# Patient Record
Sex: FEMALE | Race: WHITE | HISPANIC OR LATINO | URBAN - METROPOLITAN AREA
[De-identification: names, ages, dates, MRNs, and addresses within clinical notes are randomized per-mention and may not be internally consistent; named-entity substitution may affect disease eponyms.]

---

## 2018-10-22 ENCOUNTER — EMERGENCY (EMERGENCY)
Facility: HOSPITAL | Age: 53
LOS: 0 days | Discharge: HOME | End: 2018-10-22
Attending: EMERGENCY MEDICINE | Admitting: EMERGENCY MEDICINE

## 2018-10-22 VITALS
TEMPERATURE: 98 F | DIASTOLIC BLOOD PRESSURE: 68 MMHG | SYSTOLIC BLOOD PRESSURE: 158 MMHG | OXYGEN SATURATION: 99 % | RESPIRATION RATE: 18 BRPM | HEART RATE: 87 BPM
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
## 2018-10-22 DIAGNOSIS — M54.5 LOW BACK PAIN: ICD-10-CM
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
## 2018-10-22 DIAGNOSIS — I10 ESSENTIAL (PRIMARY) HYPERTENSION: ICD-10-CM
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
## 2018-10-22 DIAGNOSIS — E11.9 TYPE 2 DIABETES MELLITUS WITHOUT COMPLICATIONS: ICD-10-CM

## 2018-10-22 DIAGNOSIS — D64.9 ANEMIA, UNSPECIFIED: ICD-10-CM

## 2018-10-22 DIAGNOSIS — S30.0XXA CONTUSION OF LOWER BACK AND PELVIS, INITIAL ENCOUNTER: ICD-10-CM

## 2018-10-22 LAB
ABO RH CONFIRMATION: SIGNIFICANT CHANGE UP
ALBUMIN SERPL ELPH-MCNC: 4.2 G/DL — SIGNIFICANT CHANGE UP (ref 3.5–5.2)
ALP SERPL-CCNC: 75 U/L — SIGNIFICANT CHANGE UP (ref 30–115)
ALT FLD-CCNC: 13 U/L — SIGNIFICANT CHANGE UP (ref 0–41)
ANION GAP SERPL CALC-SCNC: 11 MMOL/L — SIGNIFICANT CHANGE UP (ref 7–14)
ANISOCYTOSIS BLD QL: SIGNIFICANT CHANGE UP
APTT BLD: 30.2 SEC — SIGNIFICANT CHANGE UP (ref 27–39.2)
AST SERPL-CCNC: 15 U/L — SIGNIFICANT CHANGE UP (ref 0–41)
BASOPHILS # BLD AUTO: 0.06 K/UL — SIGNIFICANT CHANGE UP (ref 0–0.2)
BASOPHILS NFR BLD AUTO: 0.6 % — SIGNIFICANT CHANGE UP (ref 0–1)
BILIRUB SERPL-MCNC: 0.2 MG/DL — SIGNIFICANT CHANGE UP (ref 0.2–1.2)
BLD GP AB SCN SERPL QL: SIGNIFICANT CHANGE UP
BUN SERPL-MCNC: 10 MG/DL — SIGNIFICANT CHANGE UP (ref 10–20)
CALCIUM SERPL-MCNC: 9.1 MG/DL — SIGNIFICANT CHANGE UP (ref 8.5–10.1)
CHLORIDE SERPL-SCNC: 100 MMOL/L — SIGNIFICANT CHANGE UP (ref 98–110)
CO2 SERPL-SCNC: 26 MMOL/L — SIGNIFICANT CHANGE UP (ref 17–32)
CREAT SERPL-MCNC: <0.5 MG/DL — LOW (ref 0.7–1.5)
EOSINOPHIL # BLD AUTO: 0.34 K/UL — SIGNIFICANT CHANGE UP (ref 0–0.7)
EOSINOPHIL NFR BLD AUTO: 3.5 % — SIGNIFICANT CHANGE UP (ref 0–8)
GIANT PLATELETS BLD QL SMEAR: PRESENT — SIGNIFICANT CHANGE UP
GLUCOSE SERPL-MCNC: 127 MG/DL — HIGH (ref 70–99)
HCG SERPL QL: NEGATIVE — SIGNIFICANT CHANGE UP
HCT VFR BLD CALC: 24.1 % — LOW (ref 37–47)
HGB BLD-MCNC: 6.5 G/DL — CRITICAL LOW (ref 12–16)
HYPOCHROMIA BLD QL: SIGNIFICANT CHANGE UP
IMM GRANULOCYTES NFR BLD AUTO: 0.3 % — SIGNIFICANT CHANGE UP (ref 0.1–0.3)
INR BLD: 1.07 RATIO — SIGNIFICANT CHANGE UP (ref 0.65–1.3)
LYMPHOCYTES # BLD AUTO: 1.78 K/UL — SIGNIFICANT CHANGE UP (ref 1.2–3.4)
LYMPHOCYTES # BLD AUTO: 18.1 % — LOW (ref 20.5–51.1)
MANUAL SMEAR VERIFICATION: SIGNIFICANT CHANGE UP
MCHC RBC-ENTMCNC: 16 PG — LOW (ref 27–31)
MCHC RBC-ENTMCNC: 27 G/DL — LOW (ref 32–37)
MCV RBC AUTO: 59.2 FL — LOW (ref 81–99)
MICROCYTES BLD QL: SLIGHT — SIGNIFICANT CHANGE UP
MONOCYTES # BLD AUTO: 0.76 K/UL — HIGH (ref 0.1–0.6)
MONOCYTES NFR BLD AUTO: 7.7 % — SIGNIFICANT CHANGE UP (ref 1.7–9.3)
NEUTROPHILS # BLD AUTO: 6.86 K/UL — HIGH (ref 1.4–6.5)
NEUTROPHILS NFR BLD AUTO: 69.8 % — SIGNIFICANT CHANGE UP (ref 42.2–75.2)
NRBC # BLD: 0 /100 WBCS — SIGNIFICANT CHANGE UP (ref 0–0)
NRBC # BLD: 1 /100 — HIGH (ref 0–0)
PLAT MORPH BLD: ABNORMAL
PLATELET # BLD AUTO: 348 K/UL — SIGNIFICANT CHANGE UP (ref 130–400)
POIKILOCYTOSIS BLD QL AUTO: SLIGHT — SIGNIFICANT CHANGE UP
POTASSIUM SERPL-MCNC: 3.6 MMOL/L — SIGNIFICANT CHANGE UP (ref 3.5–5)
POTASSIUM SERPL-SCNC: 3.6 MMOL/L — SIGNIFICANT CHANGE UP (ref 3.5–5)
PROT SERPL-MCNC: 7.3 G/DL — SIGNIFICANT CHANGE UP (ref 6–8)
PROTHROM AB SERPL-ACNC: 12.3 SEC — SIGNIFICANT CHANGE UP (ref 9.95–12.87)
RBC # BLD: 4.07 M/UL — LOW (ref 4.2–5.4)
RBC # FLD: 21.4 % — HIGH (ref 11.5–14.5)
RBC BLD AUTO: ABNORMAL
SODIUM SERPL-SCNC: 137 MMOL/L — SIGNIFICANT CHANGE UP (ref 135–146)
TYPE + AB SCN PNL BLD: SIGNIFICANT CHANGE UP
WBC # BLD: 9.83 K/UL — SIGNIFICANT CHANGE UP (ref 4.8–10.8)
WBC # FLD AUTO: 9.83 K/UL — SIGNIFICANT CHANGE UP (ref 4.8–10.8)

## 2018-10-22 RX ORDER — OXYCODONE AND ACETAMINOPHEN 5; 325 MG/1; MG/1
1 TABLET ORAL ONCE
Qty: 0 | Refills: 0 | Status: DISCONTINUED | OUTPATIENT
Start: 2018-10-22 | End: 2018-10-22

## 2018-10-22 RX ORDER — METHOCARBAMOL 500 MG/1
1500 TABLET, FILM COATED ORAL ONCE
Qty: 0 | Refills: 0 | Status: COMPLETED | OUTPATIENT
Start: 2018-10-22 | End: 2018-10-22

## 2018-10-22 RX ORDER — OXYCODONE AND ACETAMINOPHEN 5; 325 MG/1; MG/1
1 TABLET ORAL
Qty: 10 | Refills: 0
Start: 2018-10-22

## 2018-10-22 RX ORDER — KETOROLAC TROMETHAMINE 30 MG/ML
15 SYRINGE (ML) INJECTION ONCE
Qty: 0 | Refills: 0 | Status: DISCONTINUED | OUTPATIENT
Start: 2018-10-22 | End: 2018-10-22

## 2018-10-22 RX ORDER — KETOROLAC TROMETHAMINE 30 MG/ML
30 SYRINGE (ML) INJECTION ONCE
Qty: 0 | Refills: 0 | Status: DISCONTINUED | OUTPATIENT
Start: 2018-10-22 | End: 2018-10-22

## 2018-10-22 RX ADMIN — Medication 15 MILLIGRAM(S): at 10:58

## 2018-10-22 RX ADMIN — METHOCARBAMOL 1500 MILLIGRAM(S): 500 TABLET, FILM COATED ORAL at 03:00

## 2018-10-22 RX ADMIN — OXYCODONE AND ACETAMINOPHEN 1 TABLET(S): 5; 325 TABLET ORAL at 10:58

## 2018-10-22 RX ADMIN — Medication 30 MILLIGRAM(S): at 12:20

## 2018-10-22 RX ADMIN — OXYCODONE AND ACETAMINOPHEN 1 TABLET(S): 5; 325 TABLET ORAL at 03:01

## 2018-10-22 RX ADMIN — Medication 30 MILLIGRAM(S): at 03:00

## 2018-10-22 RX ADMIN — OXYCODONE AND ACETAMINOPHEN 1 TABLET(S): 5; 325 TABLET ORAL at 12:20

## 2018-10-22 RX ADMIN — Medication 15 MILLIGRAM(S): at 13:04

## 2018-10-22 NOTE — ED PROVIDER NOTE - PROGRESS NOTE DETAILS
noted marked anemia. stated hgb 6.5 is low for her. usually around 8. but denies weakness/palpitations/chest pain/lightheadedness. patient has hx of anemia 2/2 heavy menses. patient currently is having her menses now. refused rectal exam. pt in ER with R low back pain s/p trip and fall on stairs.  ct abd/pelvis neg. labs sig for hgb 6.5 - pt with h/o anemia due to fibroid uterus, states when she is bleeding, her hgb can go to 7's.  had had blood transfusions in the past, would like one now as she says her hgb not usually this low. Pt to receive 1 unit prbc's. endorsed to Dr. Staples to re-eval pt after transfusion. pt evaluated, pt requesting pain meds again for BP, ed work up neg for trauma but found to be anemic w large fibroids, currently on menses. has had transfusions before, asymptomatic at this time but agreeable to 1 unit given <7 and currently on menses, was light but just soaked a pad in ED, no active bleeding, refusing pelvic exam by myself or evaluation by OBGYN and pelvic sono, states this is known problem for long time and has a GYN in [facility] remember name, wants surgery but unable to get off work, pending 1 u prbc. no midline spinal tenderness or neurodeficits on  exam.

## 2018-10-22 NOTE — ED PROVIDER NOTE - OBJECTIVE STATEMENT
53 yo female hx of DM/Neuropathy/lower back surgery 25 years ago present c/o lower back pain s/p slipped and fell down 4 steps at home. reported she tripped on her flip flop and fell on her back and down the steps. denies head injury and LOC. denies neck and back pain. denies numbness and weakness down the leg. denies chest pain/sob/abd pain.

## 2018-10-22 NOTE — ED PROVIDER NOTE - PHYSICAL EXAMINATION
CONSTITUTIONAL: Well-appearing; well-nourished; in no apparent distress.   EYES: PERRL; EOM intact.   CARDIOVASCULAR: Normal S1, S2; no murmurs, rubs, or gallops.   RESPIRATORY: Normal chest excursion with respiration; breath sounds clear and equal bilaterally; no wheezes, rhonchi, or rales.  GI/: Normal bowel sounds; non-distended; non-tender; no palpable organomegaly.   MS: + ttp midline lower back around L3,4 level with PVM tenderness toward to right lower back. no chest wall and neck/upper back tenderness. no lower extremities bony tenderness. 2+ DP pulses b/l. sensation and strength equal to b/l LE.   SKIN: Normal for age and race; warm; dry; good turgor; no apparent lesions or exudate.   NEURO/PSYCH: A & O x 4; grossly unremarkable. mood and manner are appropriate. Grooming and personal hygiene are appropriate. No apparent thoughts of harm to self or others.

## 2018-10-22 NOTE — ED PROVIDER NOTE - ATTENDING CONTRIBUTION TO CARE
51 y/o female with h/o htn, dm, diabetic neuropathy, anemia secondary to fibroids, in ER with c/o R sided back pain after fall. Pt was walking down wooden stairs, slipped and fell onto buttocks/back, slid down 4 steps.  did not hit head, no LOC.  no ha/ neck pain.  no cp/sob. no abd pain.  no motor weakness.  baseline paresthesias to b/l toes, no change.  pe - nad, nc/at, eomi, perrl, op - clear, no c-spine tenderness, cta b/l, no w/r/r, rrr, abd - soft, nt/nd, nabs, pelvis stable and nt, back - no vertebral tenderness, + tenderness R lower back, no extremity tenderness, A&O x 3, cn 2-12 intact, no motor/sensory deficit.  -pelvis, cxr, ct abd/pelvis, pain meds, re-eval.

## 2018-10-22 NOTE — ED ADULT NURSE NOTE - NSIMPLEMENTINTERV_GEN_ALL_ED
Implemented All Fall Risk Interventions:  [place] to call system. Call bell, personal items and telephone within reach. Instruct patient to call for assistance. Room bathroom lighting operational. Non-slip footwear when patient is off stretcher. Physically safe environment: no spills, clutter or unnecessary equipment. Stretcher in lowest position, wheels locked, appropriate side rails in place. Provide visual cue, wrist band, yellow gown, etc. Monitor gait and stability. Monitor for mental status changes and reorient to person, place, and time. Review medications for side effects contributing to fall risk. Reinforce activity limits and safety measures with patient and family.

## 2018-10-22 NOTE — ED PROVIDER NOTE - MEDICAL DECISION MAKING DETAILS
pt in ER with R low back pain s/p trip and fall on stairs.  ct abd/pelvis neg. labs sig for hgb 6.5 - pt with h/o anemia due to fibroid uterus, states when she is bleeding, her hgb can go to 6's-7's.  Pt currently menstruating, but states that bleeding is slowing down.  she denies any symptoms of anemia, does not want blood transfusion at this point (has had them in past).  Pt just started taking iron pills.  pt instructed to f/u with her gyn.  told to return to ER if she feels worse or for any other new/concerning symptoms.  pt understands and agrees with plan. pt states pain controlled, tolerated pRBC well, will f/u w her OBGYN this week for re-eval, copy of results given to pt to provide her OBGYN, strict return precautions provided.

## 2018-10-22 NOTE — ED PROVIDER NOTE - CARE PLAN
Principal Discharge DX:	Anemia, unspecified  Secondary Diagnosis:	Fall  Secondary Diagnosis:	Back contusion, right, initial encounter

## 2018-10-22 NOTE — ED ADULT NURSE NOTE - OBJECTIVE STATEMENT
c/o fall in the home. As per patient she had an accidental trip and fall ,had fallen around seven stairs .

## 2019-10-01 ENCOUNTER — EMERGENCY (EMERGENCY)
Facility: HOSPITAL | Age: 54
LOS: 0 days | Discharge: HOME | End: 2019-10-02
Attending: EMERGENCY MEDICINE | Admitting: EMERGENCY MEDICINE
Payer: COMMERCIAL

## 2019-10-01 VITALS
HEART RATE: 107 BPM | DIASTOLIC BLOOD PRESSURE: 97 MMHG | TEMPERATURE: 99 F | WEIGHT: 139.99 LBS | SYSTOLIC BLOOD PRESSURE: 171 MMHG | RESPIRATION RATE: 18 BRPM | OXYGEN SATURATION: 100 %

## 2019-10-01 DIAGNOSIS — M54.5 LOW BACK PAIN: ICD-10-CM

## 2019-10-01 DIAGNOSIS — R10.9 UNSPECIFIED ABDOMINAL PAIN: ICD-10-CM

## 2019-10-01 LAB
ALBUMIN SERPL ELPH-MCNC: 4.6 G/DL — SIGNIFICANT CHANGE UP (ref 3.5–5.2)
ALP SERPL-CCNC: 100 U/L — SIGNIFICANT CHANGE UP (ref 30–115)
ALT FLD-CCNC: 16 U/L — SIGNIFICANT CHANGE UP (ref 0–41)
ANION GAP SERPL CALC-SCNC: 13 MMOL/L — SIGNIFICANT CHANGE UP (ref 7–14)
APPEARANCE UR: CLEAR — SIGNIFICANT CHANGE UP
AST SERPL-CCNC: 31 U/L — SIGNIFICANT CHANGE UP (ref 0–41)
BASOPHILS # BLD AUTO: 0.03 K/UL — SIGNIFICANT CHANGE UP (ref 0–0.2)
BASOPHILS NFR BLD AUTO: 0.4 % — SIGNIFICANT CHANGE UP (ref 0–1)
BILIRUB DIRECT SERPL-MCNC: <0.2 MG/DL — SIGNIFICANT CHANGE UP (ref 0–0.2)
BILIRUB INDIRECT FLD-MCNC: >0 MG/DL — LOW (ref 0.2–1.2)
BILIRUB SERPL-MCNC: 0.2 MG/DL — SIGNIFICANT CHANGE UP (ref 0.2–1.2)
BILIRUB UR-MCNC: NEGATIVE — SIGNIFICANT CHANGE UP
BUN SERPL-MCNC: 13 MG/DL — SIGNIFICANT CHANGE UP (ref 10–20)
CALCIUM SERPL-MCNC: 9.8 MG/DL — SIGNIFICANT CHANGE UP (ref 8.5–10.1)
CHLORIDE SERPL-SCNC: 96 MMOL/L — LOW (ref 98–110)
CO2 SERPL-SCNC: 27 MMOL/L — SIGNIFICANT CHANGE UP (ref 17–32)
COLOR SPEC: SIGNIFICANT CHANGE UP
CREAT SERPL-MCNC: 0.6 MG/DL — LOW (ref 0.7–1.5)
DIFF PNL FLD: NEGATIVE — SIGNIFICANT CHANGE UP
EOSINOPHIL # BLD AUTO: 0.2 K/UL — SIGNIFICANT CHANGE UP (ref 0–0.7)
EOSINOPHIL NFR BLD AUTO: 2.9 % — SIGNIFICANT CHANGE UP (ref 0–8)
GLUCOSE SERPL-MCNC: 292 MG/DL — HIGH (ref 70–99)
GLUCOSE UR QL: ABNORMAL
HCT VFR BLD CALC: 35.6 % — LOW (ref 37–47)
HGB BLD-MCNC: 11.2 G/DL — LOW (ref 12–16)
IMM GRANULOCYTES NFR BLD AUTO: 0.1 % — SIGNIFICANT CHANGE UP (ref 0.1–0.3)
KETONES UR-MCNC: SIGNIFICANT CHANGE UP
LEUKOCYTE ESTERASE UR-ACNC: NEGATIVE — SIGNIFICANT CHANGE UP
LIDOCAIN IGE QN: 35 U/L — SIGNIFICANT CHANGE UP (ref 7–60)
LYMPHOCYTES # BLD AUTO: 2.12 K/UL — SIGNIFICANT CHANGE UP (ref 1.2–3.4)
LYMPHOCYTES # BLD AUTO: 30.5 % — SIGNIFICANT CHANGE UP (ref 20.5–51.1)
MCHC RBC-ENTMCNC: 23.1 PG — LOW (ref 27–31)
MCHC RBC-ENTMCNC: 31.5 G/DL — LOW (ref 32–37)
MCV RBC AUTO: 73.4 FL — LOW (ref 81–99)
MONOCYTES # BLD AUTO: 0.51 K/UL — SIGNIFICANT CHANGE UP (ref 0.1–0.6)
MONOCYTES NFR BLD AUTO: 7.3 % — SIGNIFICANT CHANGE UP (ref 1.7–9.3)
NEUTROPHILS # BLD AUTO: 4.09 K/UL — SIGNIFICANT CHANGE UP (ref 1.4–6.5)
NEUTROPHILS NFR BLD AUTO: 58.8 % — SIGNIFICANT CHANGE UP (ref 42.2–75.2)
NITRITE UR-MCNC: NEGATIVE — SIGNIFICANT CHANGE UP
NRBC # BLD: 0 /100 WBCS — SIGNIFICANT CHANGE UP (ref 0–0)
PH UR: 7 — SIGNIFICANT CHANGE UP (ref 5–8)
PLATELET # BLD AUTO: 250 K/UL — SIGNIFICANT CHANGE UP (ref 130–400)
POTASSIUM SERPL-MCNC: 4.1 MMOL/L — SIGNIFICANT CHANGE UP (ref 3.5–5)
POTASSIUM SERPL-SCNC: 4.1 MMOL/L — SIGNIFICANT CHANGE UP (ref 3.5–5)
PROT SERPL-MCNC: 8.1 G/DL — HIGH (ref 6–8)
PROT UR-MCNC: NEGATIVE — SIGNIFICANT CHANGE UP
RBC # BLD: 4.85 M/UL — SIGNIFICANT CHANGE UP (ref 4.2–5.4)
RBC # FLD: 18.1 % — HIGH (ref 11.5–14.5)
SODIUM SERPL-SCNC: 136 MMOL/L — SIGNIFICANT CHANGE UP (ref 135–146)
SP GR SPEC: 1.04 — HIGH (ref 1.01–1.02)
UROBILINOGEN FLD QL: SIGNIFICANT CHANGE UP
WBC # BLD: 6.96 K/UL — SIGNIFICANT CHANGE UP (ref 4.8–10.8)
WBC # FLD AUTO: 6.96 K/UL — SIGNIFICANT CHANGE UP (ref 4.8–10.8)

## 2019-10-01 PROCEDURE — 99284 EMERGENCY DEPT VISIT MOD MDM: CPT

## 2019-10-01 RX ORDER — METHOCARBAMOL 500 MG/1
1000 TABLET, FILM COATED ORAL ONCE
Refills: 0 | Status: COMPLETED | OUTPATIENT
Start: 2019-10-01 | End: 2019-10-01

## 2019-10-01 RX ORDER — KETOROLAC TROMETHAMINE 30 MG/ML
15 SYRINGE (ML) INJECTION ONCE
Refills: 0 | Status: DISCONTINUED | OUTPATIENT
Start: 2019-10-01 | End: 2019-10-01

## 2019-10-01 RX ORDER — SODIUM CHLORIDE 9 MG/ML
1000 INJECTION, SOLUTION INTRAVENOUS ONCE
Refills: 0 | Status: COMPLETED | OUTPATIENT
Start: 2019-10-01 | End: 2019-10-01

## 2019-10-01 RX ADMIN — Medication 15 MILLIGRAM(S): at 22:09

## 2019-10-01 RX ADMIN — SODIUM CHLORIDE 1000 MILLILITER(S): 9 INJECTION, SOLUTION INTRAVENOUS at 22:13

## 2019-10-01 RX ADMIN — METHOCARBAMOL 1000 MILLIGRAM(S): 500 TABLET, FILM COATED ORAL at 23:52

## 2019-10-01 NOTE — ED PROVIDER NOTE - NS ED ROS FT
Constitutional: (-) fever, (-) chills  Eyes: (-) visual changes  ENT: (-) nasal congestions  Cardiovascular: (-) chest pain, (-) syncope  Respiratory: (-) cough, (-) shortness of breath, (-) dyspnea,   Gastrointestinal: (-) vomiting, (-) diarrhea, (-)nausea,  Musculoskeletal: (-) neck pain, (+) back pain, (-) joint pain,  Integumentary: (-) rash, (-) edema, (-) bruises  Neurological: (-) headache, (-) loc, (-) dizziness, (-) tingling, (-)numbness,  Peripheral Vascular: (-) leg swelling  :  (-)dysuria,  (-) hematuria  Allergic/Immunologic: (-) pruritus

## 2019-10-01 NOTE — ED PROVIDER NOTE - OBJECTIVE STATEMENT
52 yo F, history of DM II here for assessment of L sided low back pain. Pain is aching, worse with flexion and rotation at hip, non radiating. No fever, chills, nausea, vomiting, dysuria, hematuria, no numbness, paresthesias to extremities, no change in BM or urination

## 2019-10-01 NOTE — ED PROVIDER NOTE - PHYSICAL EXAMINATION
Physical Exam    Vital Signs: I have reviewed the initial vital signs.  Constitutional: well-nourished, appears stated age, no acute distress  Eyes: Conjunctiva pink, Sclera clear  Cardiovascular: S1 and S2, regular rate, regular rhythm, well-perfused extremities, radial pulses equal and 2+  Respiratory: unlabored respiratory effort, clear to auscultation bilaterally no wheezing, rales and rhonchi  Gastrointestinal: soft, non-tender abdomen, no pulsatile mass, normal bowl sounds  Musculoskeletal: supple neck, no lower extremity edema, no midline tenderness  Integumentary: warm, dry, no rash  Neurologic: awake, alert, cranial nerves II-XII grossly intact, extremities’ motor and sensory functions grossly intact

## 2019-10-01 NOTE — ED PROVIDER NOTE - CLINICAL SUMMARY MEDICAL DECISION MAKING FREE TEXT BOX
Patient now pain free, labs unremarkable, US without hydro. Advised patient on NSAIDs, heat, stretching, no heavy lifting, signs of spinal injury, return precautions

## 2019-10-01 NOTE — ED ADULT TRIAGE NOTE - CHIEF COMPLAINT QUOTE
Patient states she has lower left flank pain. Patient states she was on antibiotics for infection on her face. denies n/v/d

## 2019-10-01 NOTE — ED PROVIDER NOTE - ATTENDING CONTRIBUTION TO CARE
52 yo F, history of DM II here for assessment of L sided low back pain. Pain is aching, worse with flexion and rotation at hip, non radiating. No fever, chills, nausea, vomiting, dysuria, hematuria, no numbness, paresthesias to extremities, no change in BM or urination     No recent trauma, no IVDA.    VS notable for mild tachycardia which resolved prior to assessment. Patient has clear lungs, RRR, soft, NT, ND abdomen, no CVA tenderness, full ROM of LE, intact sensation, no saddle anesthesia, no midline CTL spine tenderness.    Unable to reproduce pain with palpation, only with movement.    Doubt renal pathology, no signs of midline spine/cord injury, likely MSK. Will treat as such but check labs, UA and do renal US to rule out renal pathology.

## 2019-10-01 NOTE — ED PROVIDER NOTE - PATIENT PORTAL LINK FT
You can access the FollowMyHealth Patient Portal offered by [facility] by registering at the following website: [email or web address]. By joining [employer]s FollowMyHealth portal, you will also be able to view your health information using other applications (apps) compatible with our system.

## 2020-07-28 ENCOUNTER — EMERGENCY (EMERGENCY)
Facility: HOSPITAL | Age: 55
LOS: 0 days | Discharge: HOME | End: 2020-07-28
Attending: EMERGENCY MEDICINE | Admitting: EMERGENCY MEDICINE
Payer: SELF-PAY

## 2020-07-28 VITALS
DIASTOLIC BLOOD PRESSURE: 88 MMHG | SYSTOLIC BLOOD PRESSURE: 194 MMHG | OXYGEN SATURATION: 98 % | RESPIRATION RATE: 16 BRPM | HEART RATE: 96 BPM | WEIGHT: 153 LBS | TEMPERATURE: 98 F

## 2020-07-28 VITALS
SYSTOLIC BLOOD PRESSURE: 168 MMHG | HEART RATE: 94 BPM | RESPIRATION RATE: 17 BRPM | DIASTOLIC BLOOD PRESSURE: 87 MMHG | OXYGEN SATURATION: 99 % | TEMPERATURE: 98 F

## 2020-07-28 DIAGNOSIS — M54.5 LOW BACK PAIN: ICD-10-CM

## 2020-07-28 DIAGNOSIS — E11.9 TYPE 2 DIABETES MELLITUS WITHOUT COMPLICATIONS: ICD-10-CM

## 2020-07-28 DIAGNOSIS — I10 ESSENTIAL (PRIMARY) HYPERTENSION: ICD-10-CM

## 2020-07-28 DIAGNOSIS — N32.89 OTHER SPECIFIED DISORDERS OF BLADDER: ICD-10-CM

## 2020-07-28 DIAGNOSIS — Z98.890 OTHER SPECIFIED POSTPROCEDURAL STATES: ICD-10-CM

## 2020-07-28 DIAGNOSIS — D25.9 LEIOMYOMA OF UTERUS, UNSPECIFIED: ICD-10-CM

## 2020-07-28 LAB
ALBUMIN SERPL ELPH-MCNC: 4.3 G/DL — SIGNIFICANT CHANGE UP (ref 3.5–5.2)
ALP SERPL-CCNC: 100 U/L — SIGNIFICANT CHANGE UP (ref 30–115)
ALT FLD-CCNC: 12 U/L — SIGNIFICANT CHANGE UP (ref 0–41)
ANION GAP SERPL CALC-SCNC: 10 MMOL/L — SIGNIFICANT CHANGE UP (ref 7–14)
APPEARANCE UR: CLEAR — SIGNIFICANT CHANGE UP
AST SERPL-CCNC: 19 U/L — SIGNIFICANT CHANGE UP (ref 0–41)
BASOPHILS # BLD AUTO: 0.05 K/UL — SIGNIFICANT CHANGE UP (ref 0–0.2)
BASOPHILS NFR BLD AUTO: 0.8 % — SIGNIFICANT CHANGE UP (ref 0–1)
BILIRUB SERPL-MCNC: 0.4 MG/DL — SIGNIFICANT CHANGE UP (ref 0.2–1.2)
BILIRUB UR-MCNC: NEGATIVE — SIGNIFICANT CHANGE UP
BUN SERPL-MCNC: 16 MG/DL — SIGNIFICANT CHANGE UP (ref 10–20)
CALCIUM SERPL-MCNC: 9.6 MG/DL — SIGNIFICANT CHANGE UP (ref 8.5–10.1)
CHLORIDE SERPL-SCNC: 101 MMOL/L — SIGNIFICANT CHANGE UP (ref 98–110)
CO2 SERPL-SCNC: 26 MMOL/L — SIGNIFICANT CHANGE UP (ref 17–32)
COLOR SPEC: SIGNIFICANT CHANGE UP
CREAT SERPL-MCNC: 0.5 MG/DL — LOW (ref 0.7–1.5)
DIFF PNL FLD: NEGATIVE — SIGNIFICANT CHANGE UP
EOSINOPHIL # BLD AUTO: 0.33 K/UL — SIGNIFICANT CHANGE UP (ref 0–0.7)
EOSINOPHIL NFR BLD AUTO: 5.4 % — SIGNIFICANT CHANGE UP (ref 0–8)
GLUCOSE SERPL-MCNC: 154 MG/DL — HIGH (ref 70–99)
GLUCOSE UR QL: ABNORMAL
HCT VFR BLD CALC: 37.3 % — SIGNIFICANT CHANGE UP (ref 37–47)
HGB BLD-MCNC: 11.6 G/DL — LOW (ref 12–16)
IMM GRANULOCYTES NFR BLD AUTO: 0.2 % — SIGNIFICANT CHANGE UP (ref 0.1–0.3)
KETONES UR-MCNC: NEGATIVE — SIGNIFICANT CHANGE UP
LACTATE SERPL-SCNC: 0.7 MMOL/L — SIGNIFICANT CHANGE UP (ref 0.7–2)
LEUKOCYTE ESTERASE UR-ACNC: NEGATIVE — SIGNIFICANT CHANGE UP
LIDOCAIN IGE QN: 26 U/L — SIGNIFICANT CHANGE UP (ref 7–60)
LYMPHOCYTES # BLD AUTO: 1.84 K/UL — SIGNIFICANT CHANGE UP (ref 1.2–3.4)
LYMPHOCYTES # BLD AUTO: 29.9 % — SIGNIFICANT CHANGE UP (ref 20.5–51.1)
MCHC RBC-ENTMCNC: 24.3 PG — LOW (ref 27–31)
MCHC RBC-ENTMCNC: 31.1 G/DL — LOW (ref 32–37)
MCV RBC AUTO: 78 FL — LOW (ref 81–99)
MONOCYTES # BLD AUTO: 0.48 K/UL — SIGNIFICANT CHANGE UP (ref 0.1–0.6)
MONOCYTES NFR BLD AUTO: 7.8 % — SIGNIFICANT CHANGE UP (ref 1.7–9.3)
NEUTROPHILS # BLD AUTO: 3.45 K/UL — SIGNIFICANT CHANGE UP (ref 1.4–6.5)
NEUTROPHILS NFR BLD AUTO: 55.9 % — SIGNIFICANT CHANGE UP (ref 42.2–75.2)
NITRITE UR-MCNC: NEGATIVE — SIGNIFICANT CHANGE UP
NRBC # BLD: 0 /100 WBCS — SIGNIFICANT CHANGE UP (ref 0–0)
PH UR: 7 — SIGNIFICANT CHANGE UP (ref 5–8)
PLATELET # BLD AUTO: 235 K/UL — SIGNIFICANT CHANGE UP (ref 130–400)
POTASSIUM SERPL-MCNC: 3.9 MMOL/L — SIGNIFICANT CHANGE UP (ref 3.5–5)
POTASSIUM SERPL-SCNC: 3.9 MMOL/L — SIGNIFICANT CHANGE UP (ref 3.5–5)
PROT SERPL-MCNC: 7.2 G/DL — SIGNIFICANT CHANGE UP (ref 6–8)
PROT UR-MCNC: NEGATIVE — SIGNIFICANT CHANGE UP
RBC # BLD: 4.78 M/UL — SIGNIFICANT CHANGE UP (ref 4.2–5.4)
RBC # FLD: 16.6 % — HIGH (ref 11.5–14.5)
SODIUM SERPL-SCNC: 137 MMOL/L — SIGNIFICANT CHANGE UP (ref 135–146)
SP GR SPEC: 1.02 — SIGNIFICANT CHANGE UP (ref 1.01–1.02)
UROBILINOGEN FLD QL: SIGNIFICANT CHANGE UP
WBC # BLD: 6.16 K/UL — SIGNIFICANT CHANGE UP (ref 4.8–10.8)
WBC # FLD AUTO: 6.16 K/UL — SIGNIFICANT CHANGE UP (ref 4.8–10.8)

## 2020-07-28 PROCEDURE — 74177 CT ABD & PELVIS W/CONTRAST: CPT | Mod: 26

## 2020-07-28 PROCEDURE — 99285 EMERGENCY DEPT VISIT HI MDM: CPT

## 2020-07-28 RX ORDER — SODIUM CHLORIDE 9 MG/ML
1000 INJECTION INTRAMUSCULAR; INTRAVENOUS; SUBCUTANEOUS ONCE
Refills: 0 | Status: COMPLETED | OUTPATIENT
Start: 2020-07-28 | End: 2020-07-28

## 2020-07-28 RX ADMIN — SODIUM CHLORIDE 1000 MILLILITER(S): 9 INJECTION INTRAMUSCULAR; INTRAVENOUS; SUBCUTANEOUS at 13:24

## 2020-07-28 NOTE — ED PROVIDER NOTE - NS ED ROS FT
Constitutional: See HPI.  Eyes: No visual changes, eye pain or discharge.   ENMT: No hearing changes, pain, discharge or infections.  Cardiac: No SOB or edema. No chest pain with exertion.  Respiratory: No cough or respiratory distress.  GI: + abd pain. No nausea, vomiting, diarrhea   : + dysuria. No frequency or burning. No Discharge  MS: No myalgia, muscle weakness, joint pain or back pain.  Neuro: No headache or weakness.   Skin: No skin rash.  Except as documented in the HPI, all other systems are negative.

## 2020-07-28 NOTE — ED ADULT NURSE NOTE - NSIMPLEMENTINTERV_GEN_ALL_ED
Implemented All Universal Safety Interventions:  [place] to call system. Call bell, personal items and telephone within reach. Instruct patient to call for assistance. Room bathroom lighting operational. Non-slip footwear when patient is off stretcher. Physically safe environment: no spills, clutter or unnecessary equipment. Stretcher in lowest position, wheels locked, appropriate side rails in place.

## 2020-07-28 NOTE — ED PROVIDER NOTE - PATIENT PORTAL LINK FT
You can access the FollowMyHealth Patient Portal offered by [facility] by registering at the following website: [email or web address]. By joining [employer]’s FollowMyHealth portal, you will also be able to view your health information using other applications (apps) compatible with our system.

## 2020-07-28 NOTE — ED PROVIDER NOTE - CARE PLAN
Principal Discharge DX:	Uterine fibroid Principal Discharge DX:	Uterine fibroid  Secondary Diagnosis:	Back pain

## 2020-07-28 NOTE — ED PROVIDER NOTE - PHYSICAL EXAMINATION
CONST: Well appearing in NAD  EYES: Sclera and conjunctiva clear.  CARD: Normal S1 S2; Normal rate and rhythm  RESP: Equal BS B/L, No wheezes, rhonchi or rales. No distress  GI: Soft, mild suprapubic tenderness, no cva tenderness, non-distended  MS: Normal ROM in all extremities. No edema of lower extremities, no calf pain, radial pulses 2+ bilaterally  SKIN: Warm, dry, no acute rashes. Good turgor  NEURO: A&Ox3, No focal deficits. Strength 5/5 with no sensory deficits.

## 2020-07-28 NOTE — ED PROVIDER NOTE - PROGRESS NOTE DETAILS
Attending Note: 53 y/o F with PMH of DM, presents to ED for evaluation of abdominal pain. Pt states she has been having some increased burning sensation with urination and mild suprapubic abdominal discomfort that radiates to the back. No f/c, vomiting.   PE: CON: ao x 3, HENMT: clear oropharynx, neck supple,  CV: rrr, equal pulses b/l, RESP: cta b/l, GI:  soft, (+) mild suprapubic tenderness, no rebound, no guarding, (+) mild CVA tenderness SKIN: no rash, MSK: no deformities, NEURO: no gross motor or sensory deficit Psychiatric: appropriate mood, appropriate affect  IMP: Urinary SX.  Plan: Labs, UA, reevaluate. Discussed results with pt.  All questions were answered and return precautions discussed.  Pt is asx and comfortable at this time.  Unremarkable re-exam.  No further concerns at this time from pt.  Will follow up with PMD and gyn.  Pt understands and agrees with tx plan. Attending Note: 53 y/o F with PMH of DM, presents to ED for evaluation of abdominal pain. Pt states she has been having some increased burning sensation with urination and mild suprapubic abdominal discomfort that radiates to the back. No f/c, vomiting.   PE: CON: ao x 3, HENMT: clear oropharynx, neck supple,  CV: rrr, equal pulses b/l, RESP: cta b/l, GI:  soft, (+) mild suprapubic tenderness, no rebound, no guarding, (+) mild CVA tenderness SKIN: no rash, MSK: no deformities, NEURO: no gross motor or sensory deficit Psychiatric: appropriate mood, appropriate affect  IMP: Urinary SX, labs, ua and re-eval  Plan: Labs, UA, reevaluate. pt refused  exam. will f/u with gu.  strict return precautions discussed.

## 2020-07-28 NOTE — ED PROVIDER NOTE - CLINICAL SUMMARY MEDICAL DECISION MAKING FREE TEXT BOX
pt with urinary sx's, lower back pain. UA wnl. labs grossly unremarkable. CT Abdomen Pelvis done to exclude acute intra-abdominal pathology. ct showing enlarged fibroids, no other pathology. pt aware and will f/u as outpt

## 2020-07-28 NOTE — ED ADULT NURSE NOTE - OBJECTIVE STATEMENT
Pt presents for Lower abdominal pain radiating to lower back with burning while urinating. denies fever/chills.

## 2020-07-28 NOTE — ED PROVIDER NOTE - NSFOLLOWUPCLINICS_GEN_ALL_ED_FT
[facility]  OB/GYN  [address] [place] [address]  Phone: [phone]  Fax:   Follow Up Time: 1-3 Days

## 2020-07-28 NOTE — ED PROVIDER NOTE - OBJECTIVE STATEMENT
54 y.o female w/ hx of DM, uterine fibroids presents to the ED for evaluation of urinary discomfort.  states intermittent dysuria x 1 week associated w/ low abd pain, intermittent, worse w/ urination, mild severity, no radiation of pain. Denies diarrhea, constipation, vaginal d/c or bleeding, urinary sxs, fever, chills.

## 2020-07-28 NOTE — ED PROVIDER NOTE - NSFOLLOWUPINSTRUCTIONS_ED_ALL_ED_FT
Uterine Fibroids  Image   Uterine fibroids are lumps of tissue (tumors) in your womb (uterus). They are not cancer (are benign).    Most women with this condition do not need treatment. Sometimes fibroids can affect your ability to have children (your fertility). If that happens, you may need surgery to take out the fibroids.    Follow these instructions at home:  Take over-the-counter and prescription medicines only as told by your doctor. Your doctor may suggest NSAIDs (such as aspirin or ibuprofen) to help with pain.  Ask your doctor if you should:  Take iron pills.  Eat more foods that have iron in them, such as dark green, leafy vegetables.  If directed, apply heat to your back or belly to reduce pain. Use the heat source that your doctor recommends, such as a moist heat pack or a heating pad.  Put a towel between your skin and the heat source.  Leave the heat on for 20–30 minutes.  Remove the heat if your skin turns bright red. This is especially important if you are unable to feel pain, heat, or cold. You may have a greater risk of getting burned.  Pay close attention to your period (menstrual) cycles. Tell your doctor about any changes, such as:  A heavier blood flow than usual.  Needing to use more pads or tampons than normal.  A change in how many days your period lasts.  A change in symptoms that come with your period, such as cramps or back pain.  Keep all follow-up visits as told by your doctor. This is important. Your doctor may need to watch your fibroids over time for any changes.  Contact a doctor if you:  Have pain that does not get better with medicine or heat, such as pain or cramps in:  Your back.  The area between your hip bones (pelvic area).  Your belly.  Have new bleeding between your periods.  Have more bleeding during or between your periods.  Feel very tired or weak.  Feel light-headed.  Get help right away if you:  Pass out (faint).  Have pain in the area between your hip bones that suddenly gets worse.  Have bleeding that soaks a tampon or pad in 30 minutes or less.  Summary  Uterine fibroids are lumps of tissue (tumors) in your womb (uterus). They are not cancer.  The only treatment that most women need is taking aspirin or ibuprofen for pain.  Contact a doctor if you have pain or cramps that do not get better with medicine.  Make sure you know what symptoms you should get help for right away.  This information is not intended to replace advice given to you by your health care provider. Make sure you discuss any questions you have with your health care provider.    Follow up with your primary medical doctor in 1-2 days

## 2020-07-30 LAB
CULTURE RESULTS: SIGNIFICANT CHANGE UP
SPECIMEN SOURCE: SIGNIFICANT CHANGE UP

## 2021-03-24 ENCOUNTER — APPOINTMENT (OUTPATIENT)
Dept: RHEUMATOLOGY | Facility: CLINIC | Age: 56
End: 2021-03-24
Payer: MEDICAID

## 2021-03-24 VITALS
SYSTOLIC BLOOD PRESSURE: 124 MMHG | BODY MASS INDEX: 24.75 KG/M2 | WEIGHT: 154 LBS | HEART RATE: 92 BPM | DIASTOLIC BLOOD PRESSURE: 76 MMHG | HEIGHT: 66 IN | OXYGEN SATURATION: 99 % | TEMPERATURE: 97.8 F

## 2021-03-24 DIAGNOSIS — Z86.69 PERSONAL HISTORY OF OTHER DISEASES OF THE NERVOUS SYSTEM AND SENSE ORGANS: ICD-10-CM

## 2021-03-24 DIAGNOSIS — Z82.61 FAMILY HISTORY OF ARTHRITIS: ICD-10-CM

## 2021-03-24 DIAGNOSIS — M25.50 PAIN IN UNSPECIFIED JOINT: ICD-10-CM

## 2021-03-24 DIAGNOSIS — Z86.39 PERSONAL HISTORY OF OTHER ENDOCRINE, NUTRITIONAL AND METABOLIC DISEASE: ICD-10-CM

## 2021-03-24 DIAGNOSIS — Z78.9 OTHER SPECIFIED HEALTH STATUS: ICD-10-CM

## 2021-03-24 DIAGNOSIS — F17.200 NICOTINE DEPENDENCE, UNSPECIFIED, UNCOMPLICATED: ICD-10-CM

## 2021-03-24 DIAGNOSIS — Z86.79 PERSONAL HISTORY OF OTHER DISEASES OF THE CIRCULATORY SYSTEM: ICD-10-CM

## 2021-03-24 PROCEDURE — 99072 ADDL SUPL MATRL&STAF TM PHE: CPT

## 2021-03-24 PROCEDURE — 99205 OFFICE O/P NEW HI 60 MIN: CPT

## 2021-03-24 RX ORDER — PREGABALIN 150 MG/1
150 CAPSULE ORAL
Refills: 0 | Status: ACTIVE | COMMUNITY

## 2021-03-24 RX ORDER — DICLOFENAC POTASSIUM 50 MG/1
50 TABLET, COATED ORAL
Refills: 0 | Status: ACTIVE | COMMUNITY

## 2021-03-24 RX ORDER — LOSARTAN POTASSIUM 100 MG/1
TABLET, FILM COATED ORAL
Refills: 0 | Status: ACTIVE | COMMUNITY

## 2021-03-24 RX ORDER — INSULIN GLARGINE AND LIXISENATIDE 100; 33 U/ML; UG/ML
100-33 INJECTION, SOLUTION SUBCUTANEOUS
Refills: 0 | Status: ACTIVE | COMMUNITY

## 2021-03-25 NOTE — HISTORY OF PRESENT ILLNESS
[FreeTextEntry1] : 55 year old female with a history of DM, HTN, hypothyroidism here for evaluation of hand pains. \par \par Since 7/2020 patient reports difficulty grabbing objections and hard to make a fist, bilateral thumbs with trigger finger, neuropathy in feet that she takes Lyrica for along with numbness in both her hands. Symptoms started in one hand and migrated to the other with the same symptoms. Unable to button up. She unsure if there's swelling in in the hands, AM stiffness is 1 hour. NSAIDs and Tylenol gives partial relief but still unable to bend thumbs. Pain is rated from 6-8/10. Better with rest, worse with activity, symptoms are worse at the beginning of the day, when she is active and throughout the day her symptoms resolve a little bit. \par \par Denies fevers, weight loss, night sweats, rash, photosensitivity, raynaud's, oral ulcers, nasal ulcers, hair loss, sinus problems, nosebleeds, visual disturbances, dry eyes, dry mouth, history of VTE, history of miscarriage, history of serositis \par \par Reports mom and grandmother had RA

## 2021-03-25 NOTE — ASSESSMENT
[FreeTextEntry1] : 55 year old female with a history of DM, HTN, hypothyroidism here for evaluation of hand pains. \par \par \par 1. Hand pain with bilateral thumb trigger finger\par \par -Family history of RA and symptoms could suggest RA. \par -CBC, CMP, CRP, ESR, HANK, dsDNA, RF negative from PCP labs\par -Check CCP and x-rays hands and feet\par -Continue NSAIDs PRN \par -F/u 2 weeks\par \par

## 2021-03-25 NOTE — PHYSICAL EXAM
[General Appearance - Alert] : alert [General Appearance - In No Acute Distress] : in no acute distress [Sclera] : the sclera and conjunctiva were normal [PERRL With Normal Accommodation] : pupils were equal in size, round, and reactive to light [Extraocular Movements] : extraocular movements were intact [Outer Ear] : the ears and nose were normal in appearance [Oropharynx] : the oropharynx was normal [Neck Appearance] : the appearance of the neck was normal [Neck Cervical Mass (___cm)] : no neck mass was observed [Jugular Venous Distention Increased] : there was no jugular-venous distention [Thyroid Diffuse Enlargement] : the thyroid was not enlarged [Thyroid Nodule] : there were no palpable thyroid nodules [Auscultation Breath Sounds / Voice Sounds] : lungs were clear to auscultation bilaterally [Heart Rate And Rhythm] : heart rate was normal and rhythm regular [Heart Sounds] : normal S1 and S2 [Heart Sounds Gallop] : no gallops [Murmurs] : no murmurs [Heart Sounds Pericardial Friction Rub] : no pericardial rub [Bowel Sounds] : normal bowel sounds [Abdomen Soft] : soft [Abdomen Tenderness] : non-tender [Abdomen Mass (___ Cm)] : no abdominal mass palpated [Abnormal Walk] : normal gait [Nail Clubbing] : no clubbing  or cyanosis of the fingernails [Musculoskeletal - Swelling] : no joint swelling seen [Motor Tone] : muscle strength and tone were normal [] : no rash [Sensation] : the sensory exam was normal to light touch and pinprick [Affect] : the affect was normal [FreeTextEntry1] : No swelling or synovitis. Tender in all PIPs.

## 2021-08-11 ENCOUNTER — TRANSCRIPTION ENCOUNTER (OUTPATIENT)
Age: 56
End: 2021-08-11

## 2021-08-11 ENCOUNTER — EMERGENCY (EMERGENCY)
Facility: HOSPITAL | Age: 56
LOS: 0 days | Discharge: HOME | End: 2021-08-12
Attending: EMERGENCY MEDICINE | Admitting: EMERGENCY MEDICINE
Payer: MEDICAID

## 2021-08-11 VITALS
HEIGHT: 65 IN | HEART RATE: 91 BPM | RESPIRATION RATE: 20 BRPM | TEMPERATURE: 99 F | OXYGEN SATURATION: 98 % | DIASTOLIC BLOOD PRESSURE: 79 MMHG | SYSTOLIC BLOOD PRESSURE: 179 MMHG

## 2021-08-11 DIAGNOSIS — F17.200 NICOTINE DEPENDENCE, UNSPECIFIED, UNCOMPLICATED: ICD-10-CM

## 2021-08-11 DIAGNOSIS — Z90.89 ACQUIRED ABSENCE OF OTHER ORGANS: ICD-10-CM

## 2021-08-11 DIAGNOSIS — Z86.2 PERSONAL HISTORY OF DISEASES OF THE BLOOD AND BLOOD-FORMING ORGANS AND CERTAIN DISORDERS INVOLVING THE IMMUNE MECHANISM: ICD-10-CM

## 2021-08-11 DIAGNOSIS — U07.1 COVID-19: ICD-10-CM

## 2021-08-11 DIAGNOSIS — R53.1 WEAKNESS: ICD-10-CM

## 2021-08-11 DIAGNOSIS — E11.9 TYPE 2 DIABETES MELLITUS WITHOUT COMPLICATIONS: ICD-10-CM

## 2021-08-11 DIAGNOSIS — I10 ESSENTIAL (PRIMARY) HYPERTENSION: ICD-10-CM

## 2021-08-11 DIAGNOSIS — Z86.39 PERSONAL HISTORY OF OTHER ENDOCRINE, NUTRITIONAL AND METABOLIC DISEASE: ICD-10-CM

## 2021-08-11 DIAGNOSIS — R05 COUGH: ICD-10-CM

## 2021-08-11 DIAGNOSIS — M79.10 MYALGIA, UNSPECIFIED SITE: ICD-10-CM

## 2021-08-11 PROCEDURE — 99284 EMERGENCY DEPT VISIT MOD MDM: CPT

## 2021-08-12 LAB — SARS-COV-2 RNA SPEC QL NAA+PROBE: DETECTED

## 2021-08-12 NOTE — ED PROVIDER NOTE - PHYSICAL EXAMINATION
Gen: NAD, AOx3  Head: NCAT  HEENT: PERRL, oral mucosa moist, normal conjunctiva, oropharynx clear without exudate or erythema  Lung: CTAB, no respiratory distress, no wheezing, rales, rhonchi, spO2 % on exertion   CV: normal s1/s2, rrr, Normal perfusion, pulses 2+ throughout  Abd: soft, NTND, no CVA tenderness  Genitourinary: no pelvic tenderness  MSK: No edema, no visible deformities, full range of motion in all 4 extremities  Neuro: No focal neurologic deficits  Skin: No rash   Psych: normal affect

## 2021-08-12 NOTE — ED PROVIDER NOTE - NSFOLLOWUPINSTRUCTIONS_ED_ALL_ED_FT
Please follow up with your primary care physician within 24-72 hours and return immediately if symptoms worsen.    Novel Coronavirus (COVID-19)  The Facts  What is a coronavirus?  Coronaviruses are a large family of viruses that cause illnesses ranging from the common cold  to more severe diseases such as Middle East Respiratory Syndrome (MERS) and Severe Acute  Respiratory Syndrome (SARS).  What is Novel Coronavirus (COVID-19)?  COVID-19 is a new strain of Coronavirus that has not been previously identified in humans. COVID-19  was identified in [place], [place], [place] in December 2019 (COVID-19). COVID-19 has  since been identified outside of [place], in a growing number of countries internationally, including  the [place].  Where can I find the most recent information about COVID-19?  The Centers for Disease Control and Prevention (CDC) is closely monitoring the outbreak caused by the  COVID-19. For the latest information about COVID- 19, visit the CDC website at  https://www.cdc.gov/coronavirus/index.html  How are coronaviruses spread?  Coronaviruses can be transmitted from person-to- person, usually after close contact with an infected person,  for example, in a household, workplace, or healthcare setting via droplets that become airborne after a cough  or sneeze by an affected person. These droplets can then infect a nearby person. It is likely transmission also  occurs by touching recently contaminated surfaces.  What are the symptoms of coronavirus infection?  It depends on the virus, but common signs include fever and/or respiratory symptoms such as  cough and shortness of breath. In more severe cases, infection can cause pneumonia, severe acute  respiratory syndrome, kidney failure and even death. Fortunately, most cases of COVID-19 have an  illness no different than the influenza “flu”. With a majority of these patients having mild symptoms  and overall mortality which appears to be not much different than the flu.  Is there a treatment for a COVID-19?  There is no specific treatment for disease caused by COVID-19. However, many of the symptoms can  be treated based on the patient’s clinical condition. Supportive care for infected persons can be highly  effective.  What can I do to protect myself?  Washing your hands, covering your cough, and disinfecting surfaces are the best precautionary  measures. It is also advisable to avoid close contact with anyone showing symptoms of respiratory  illness such as coughing and sneezing. Those with symptoms should wear a surgical mask when  around others.  What can I do to protect those around me?  If you have been identified as someone who may be infected with COVID-19, we recommend you  follow the self-isolation procedures outlined below to protect those around you and limit the spread  of this virus.   March 3, 2020  Recommendations for Patients Advised to Self-Isolate  for Possible COVID-19 Exposure  We recommend the below precautionary steps from now until 14 days from when you  returned from your travel or date of your last known possible contact:  - Do not go to work, school, or public areas. Avoid using public transportation, ride-sharing, or  taxis.  - As much as possible, separate yourself from other people in your home. If you can, you should  stay in a room and away from other people in your home. Also, you should use a separate  bathroom, if available.  - Wear the supplied mask whenever you are around other people.  - If you have a non-urgent medical appointment, please reschedule for a later date. If the  appointment is urgent, please call the healthcare provider and tell them that you are on selfisolation for possible COVID-19. This will help the healthcare provider’s office take steps to keep  other people from getting infected or exposed. If you can reschedule routine appointments, do  so.  - Wash your hands often with soap and water for at least 15 to 20 seconds or clean your hands  with an alcohol-based hand  that contains 60 to 95% alcohol, covering all surfaces of  your hands and rubbing them together until they feel dry. Soap and water should be used  preferentially if hands are visibly dirty.  - Cover your mouth and nose with a tissue when you cough or sneeze. Throw used tissues in a  lined trash can; immediately wash your hands.  - Avoid touching your eyes, nose, and mouth with your hands.  - Avoid sharing personal household items. You should not share dishes, drinking glasses, cups,  eating utensils, towels, or bedding with other people or pets in your home. After using these  items, they should be washed thoroughly with soap and water.  - Clean and disinfect all “high-touch” surfaces every day. High touch surfaces include counters,  tabletops, doorknobs, light switches, remote controls, bathroom fixtures, toilets, phones,  keyboards, tablets, and bedside tables. Also, clean any surfaces that may have blood, stool, or  body fluids on them.       If you develop worsening symptoms:  - If you develop worsening symptoms, such as severe shortness of breath, please call [phone] option #9. They will assist you in determining your next steps.  During your time on self-isolation do the following:  - Work from home if you are able to so.  - Limit social isolation by talking with friends and family on the phone or with face-time  - Talk with friends and relatives who don’t live with you about supporting each other if one  household has to be quarantined. For example, agree to drop groceries or other supplies at the  front door.  - Exercise and spend time outdoors away from others if able to do so.    Why didn’t I get tested for novel coronavirus (COVID-19)?  The number of available tests is very limited so strict rules exist for who is allowed to be tested.  [facility] has been authorized to perform testing and is currently working hard to be  able to start providing the test. Such testing is currently reserved for patients who have had  contact with someone infected with the virus, or those who are very sick a plus those who have  traveled to areas identified by the Centers for Disease Control and Prevention (CD) and will  require hospitalization.  What should I do now?  If you are well enough to be discharged home and are not in a high risk group to have  contracted the COVID-10, you should care for yourself at home exactly like you would if you  have Influenza “flu”. Follow all the standard guidelines about washing your hands, covering  your cough, etc.  You should return to the Emergency Department if you develop worse symptoms, trouble  breathing, chest pain, and/or a fever that doesn’t improve with over the counter  acetaminophen or ibuprofen.

## 2021-08-12 NOTE — ED PROVIDER NOTE - ATTENDING CONTRIBUTION TO CARE
I personally evaluated the patient. I reviewed the Resident’s or Physician Assistant’s note (as assigned above), and agree with the findings and plan except as documented in my note.  Pt with URI complaints, tested post yesterday for Covid. Denies CP, SOB. VS reviewed, pt non-toxic appearing, NAD. Head ncat, MMM, neck supple, normal ROM, normal s1s2 without any murmurs, Lungs CTAB with normal work of breathing. abd +BS, s/nd/nt, extremities wnl, neuro exam grossly normal. No acute skin rashes. Plan is covid testing and reassess.

## 2021-08-12 NOTE — ED PROVIDER NOTE - PATIENT PORTAL LINK FT
You can access the FollowMyHealth Patient Portal offered by [facility] by registering at the following website: [email or web address]. By joining [employer]’s FollowMyHealth portal, you will also be able to view your health information using other applications (apps) compatible with our system.

## 2021-08-12 NOTE — ED PROVIDER NOTE - CLINICAL SUMMARY MEDICAL DECISION MAKING FREE TEXT BOX
Pt presents with covid symptoms. Will be discharged with instructions of symptoms management at home.    ED evaluation and management discussed with the patient and family (if available) in detail.  Close PMD follow up encouraged.  Strict ED return instructions discussed in detail and patient given the opportunity to ask any questions about their discharge diagnosis and instructions. Patient verbalized understanding.

## 2021-08-12 NOTE — ED PROVIDER NOTE - OBJECTIVE STATEMENT
54 yo female with a pmh of DM and HTN cough/weakness/body aches/subjective fevers for 2 days. pt states she tested covid+ yesterday. pt denies any other symptoms including headache, recent illness/travel, abdominal pain, chest pain, or SOB.

## 2021-08-13 ENCOUNTER — EMERGENCY (EMERGENCY)
Facility: HOSPITAL | Age: 56
LOS: 0 days | Discharge: HOME | End: 2021-08-14
Attending: EMERGENCY MEDICINE | Admitting: EMERGENCY MEDICINE
Payer: MEDICAID

## 2021-08-13 VITALS
OXYGEN SATURATION: 98 % | HEART RATE: 89 BPM | SYSTOLIC BLOOD PRESSURE: 135 MMHG | TEMPERATURE: 98 F | HEIGHT: 65 IN | RESPIRATION RATE: 20 BRPM | WEIGHT: 152.12 LBS | DIASTOLIC BLOOD PRESSURE: 73 MMHG

## 2021-08-13 DIAGNOSIS — I10 ESSENTIAL (PRIMARY) HYPERTENSION: ICD-10-CM

## 2021-08-13 DIAGNOSIS — R51.9 HEADACHE, UNSPECIFIED: ICD-10-CM

## 2021-08-13 DIAGNOSIS — F17.200 NICOTINE DEPENDENCE, UNSPECIFIED, UNCOMPLICATED: ICD-10-CM

## 2021-08-13 DIAGNOSIS — U07.1 COVID-19: ICD-10-CM

## 2021-08-13 DIAGNOSIS — E11.9 TYPE 2 DIABETES MELLITUS WITHOUT COMPLICATIONS: ICD-10-CM

## 2021-08-13 DIAGNOSIS — Z86.39 PERSONAL HISTORY OF OTHER ENDOCRINE, NUTRITIONAL AND METABOLIC DISEASE: ICD-10-CM

## 2021-08-13 DIAGNOSIS — Z90.89 ACQUIRED ABSENCE OF OTHER ORGANS: ICD-10-CM

## 2021-08-13 DIAGNOSIS — Z86.2 PERSONAL HISTORY OF DISEASES OF THE BLOOD AND BLOOD-FORMING ORGANS AND CERTAIN DISORDERS INVOLVING THE IMMUNE MECHANISM: ICD-10-CM

## 2021-08-13 DIAGNOSIS — R19.7 DIARRHEA, UNSPECIFIED: ICD-10-CM

## 2021-08-13 PROCEDURE — 99284 EMERGENCY DEPT VISIT MOD MDM: CPT

## 2021-08-13 NOTE — ED ADULT TRIAGE NOTE - HEIGHT IN FEET
5 Medical Necessity Information: It is in your best interest to select a reason for this procedure from the list below. All of these items fulfill various CMS LCD requirements except the new and changing color options.

## 2021-08-13 NOTE — ED ADULT NURSE NOTE - OBJECTIVE STATEMENT
patient covid + monday, took pulse ox at home today and it was 92% and was referred to ED by PMD. endorses cough, headache and diarrhea. denies sob. was prescribed inhalers by pmd. denies cp

## 2021-08-13 NOTE — ED ADULT TRIAGE NOTE - CHIEF COMPLAINT QUOTE
patient covid + Monday presents after o2 sat was 92% at home today. endorses cough, headache & diarrhea.

## 2021-08-14 ENCOUNTER — APPOINTMENT (OUTPATIENT)
Dept: DISASTER EMERGENCY | Facility: HOSPITAL | Age: 56
End: 2021-08-14

## 2021-08-14 VITALS
RESPIRATION RATE: 17 BRPM | TEMPERATURE: 97 F | SYSTOLIC BLOOD PRESSURE: 135 MMHG | DIASTOLIC BLOOD PRESSURE: 64 MMHG | OXYGEN SATURATION: 98 % | HEART RATE: 82 BPM

## 2021-08-14 VITALS
RESPIRATION RATE: 18 BRPM | DIASTOLIC BLOOD PRESSURE: 66 MMHG | TEMPERATURE: 99 F | HEART RATE: 81 BPM | SYSTOLIC BLOOD PRESSURE: 133 MMHG | OXYGEN SATURATION: 99 % | HEIGHT: 65 IN

## 2021-08-14 DIAGNOSIS — U07.1 COVID-19: ICD-10-CM

## 2021-08-14 DIAGNOSIS — E11.9 TYPE 2 DIABETES MELLITUS WITHOUT COMPLICATIONS: ICD-10-CM

## 2021-08-14 PROCEDURE — L9998: CPT

## 2021-08-14 RX ORDER — ONDANSETRON 8 MG/1
1 TABLET, FILM COATED ORAL
Qty: 6 | Refills: 0
Start: 2021-08-14 | End: 2021-08-15

## 2021-08-14 RX ORDER — ACETAMINOPHEN 500 MG
650 TABLET ORAL ONCE
Refills: 0 | Status: COMPLETED | OUTPATIENT
Start: 2021-08-14 | End: 2021-08-14

## 2021-08-14 RX ORDER — METOCLOPRAMIDE HCL 10 MG
10 TABLET ORAL ONCE
Refills: 0 | Status: COMPLETED | OUTPATIENT
Start: 2021-08-14 | End: 2021-08-14

## 2021-08-14 RX ORDER — SODIUM CHLORIDE 9 MG/ML
250 INJECTION INTRAMUSCULAR; INTRAVENOUS; SUBCUTANEOUS
Refills: 0 | Status: DISCONTINUED | OUTPATIENT
Start: 2021-08-14 | End: 2021-08-14

## 2021-08-14 RX ORDER — SODIUM CHLORIDE 9 MG/ML
1000 INJECTION, SOLUTION INTRAVENOUS ONCE
Refills: 0 | Status: COMPLETED | OUTPATIENT
Start: 2021-08-14 | End: 2021-08-14

## 2021-08-14 RX ADMIN — SODIUM CHLORIDE 1000 MILLILITER(S): 9 INJECTION, SOLUTION INTRAVENOUS at 00:30

## 2021-08-14 RX ADMIN — Medication 10 MILLIGRAM(S): at 00:30

## 2021-08-14 NOTE — ED PROVIDER NOTE - CLINICAL SUMMARY MEDICAL DECISION MAKING FREE TEXT BOX
pt presenting with loose stools, nausea. no sob. +COVID+. day 5 sx. Well appearing, NAD, non toxic. NCAT PERRLA EOMI neck supple non tender normal wob cta bl rrr abdomen s nt nd no rebound no guarding WWPx4 neuro non focal. normal ambulatory sat. going to get MAB tomorrow. Pt feeling improved, tolerating PO, abdomen soft, non-tender, non-distended, no rebound, no guarding. Comfortable with discharge and follow-up outpatient, strict return precautions given. Endorses understanding of all of this and aware that they can return at any time for new or concerning symptoms. No further questions or concerns at this time

## 2021-08-14 NOTE — ED PROVIDER NOTE - NS ED ROS FT
Review of Systems:  	•	CONSTITUTIONAL: no fever, no chills  	•	SKIN: no rash  	•	ENT: no sore throat   	•	RESPIRATORY: no shortness of breath, no cough  	•	CARDIAC: no chest pain, no palpitations  	•	GI: +diarrhea. no abd pain, no nausea/vomiting  	•	GENITO-URINARY: no discharge, no dysuria; no hematuria, no increased urinary frequency  	•	MUSCULOSKELETAL: no joint paint, no swelling, no redness  	•	NEUROLOGIC: +headache, no syncope, no loc   	•	PSYCH: no anxiety, non suicidal, non homicidal, no hallucination, no depression

## 2021-08-14 NOTE — ED PROVIDER NOTE - OBJECTIVE STATEMENT
55 year old female, +smoker, who presents with covid-symptoms. patient not vaccinated, reports +covid x3 days ago, presents with persistent symptoms. patient endorses decrease po intake, diarrhea and headache. patient checks pulse ox @ home, >95% ORA. denies fever, chills, chest pain, SOB, abd pain, nausea/vomiting, urinary symptoms.

## 2021-08-14 NOTE — ED PROVIDER NOTE - PATIENT PORTAL LINK FT
You can access the FollowMyHealth Patient Portal offered by [facility] by registering at the following website: [email or web address]. By joining [employer]’s FollowMyHealth portal, you will also be able to view your health information using other applications (apps) compatible with our system.

## 2021-08-14 NOTE — ED PROVIDER NOTE - PATIENT PORTAL LINK FT
You can access the FollowMyHealth Patient Portal offered by [facility] by registering at the following website: [email or web address]. By joining [employer]’s FollowMyHealth portal, you will also be able to view your health information using other applications (apps) compatible with our system.

## 2021-08-14 NOTE — ED PROVIDER NOTE - PHYSICAL EXAMINATION
CONSTITUTIONAL: Well-developed; well-nourished; in no acute distress, nontoxic appearing  SKIN: skin exam is warm and dry  HEAD: Normocephalic; atraumatic  ENT: Dry MM   NECK: No nuchal rigidity. ROM intact.  CARD: S1, S2 normal, no murmur  RESP: No wheezes, rales or rhonchi. Good air movement bilaterally  ABD: soft; non-distended; non-tender. No Rebound, No guarding  EXT: Normal ROM. No cyanosis or edema  NEURO: awake, alert, following commands, oriented, grossly unremarkable. No Focal deficits. GCS 15.   PSYCH: Cooperative, appropriate.

## 2021-08-14 NOTE — ED PROVIDER NOTE - NSFOLLOWUPINSTRUCTIONS_ED_ALL_ED_FT
Please follow up with your primary care doctor for routine follow up  Please be aware of any new or worsening signs or symptoms that should prompt your return to the ER.      Novel Coronavirus (COVID-19)  The Facts  What is a coronavirus?  Coronaviruses are a large family of viruses that cause illnesses ranging from the common cold  to more severe diseases such as Middle East Respiratory Syndrome (MERS) and Severe Acute  Respiratory Syndrome (SARS).  What is Novel Coronavirus (COVID-19)?  COVID-19 is a new strain of Coronavirus that has not been previously identified in humans. COVID-19  was identified in [place], [place], [place] in December 2019 (COVID-19). COVID-19 has  since been identified outside of [place], in a growing number of countries internationally, including  the [place].  Where can I find the most recent information about COVID-19?  The Centers for Disease Control and Prevention (CDC) is closely monitoring the outbreak caused by the  COVID-19. For the latest information about COVID- 19, visit the CDC website at  https://www.cdc.gov/coronavirus/index.html  How are coronaviruses spread?  Coronaviruses can be transmitted from person-to- person, usually after close contact with an infected person,  for example, in a household, workplace, or healthcare setting via droplets that become airborne after a cough  or sneeze by an affected person. These droplets can then infect a nearby person. It is likely transmission also  occurs by touching recently contaminated surfaces.  What are the symptoms of coronavirus infection?  It depends on the virus, but common signs include fever and/or respiratory symptoms such as  cough and shortness of breath. In more severe cases, infection can cause pneumonia, severe acute  respiratory syndrome, kidney failure and even death. Fortunately, most cases of COVID-19 have an  illness no different than the influenza “flu”. With a majority of these patients having mild symptoms  and overall mortality which appears to be not much different than the flu.  Is there a treatment for a COVID-19?  There is no specific treatment for disease caused by COVID-19. However, many of the symptoms can  be treated based on the patient’s clinical condition. Supportive care for infected persons can be highly  effective.  What can I do to protect myself?  Washing your hands, covering your cough, and disinfecting surfaces are the best precautionary  measures. It is also advisable to avoid close contact with anyone showing symptoms of respiratory  illness such as coughing and sneezing. Those with symptoms should wear a surgical mask when  around others.  What can I do to protect those around me?  If you have been identified as someone who may be infected with COVID-19, we recommend you  follow the self-isolation procedures outlined below to protect those around you and limit the spread  of this virus.   March 3, 2020  Recommendations for Patients Advised to Self-Isolate  for Possible COVID-19 Exposure  We recommend the below precautionary steps from now until 14 days from when you  returned from your travel or date of your last known possible contact:  - Do not go to work, school, or public areas. Avoid using public transportation, ride-sharing, or  taxis.  - As much as possible, separate yourself from other people in your home. If you can, you should  stay in a room and away from other people in your home. Also, you should use a separate  bathroom, if available.  - Wear the supplied mask whenever you are around other people.  - If you have a non-urgent medical appointment, please reschedule for a later date. If the  appointment is urgent, please call the healthcare provider and tell them that you are on selfisolation for possible COVID-19. This will help the healthcare provider’s office take steps to keep  other people from getting infected or exposed. If you can reschedule routine appointments, do  so.  - Wash your hands often with soap and water for at least 15 to 20 seconds or clean your hands  with an alcohol-based hand  that contains 60 to 95% alcohol, covering all surfaces of  your hands and rubbing them together until they feel dry. Soap and water should be used  preferentially if hands are visibly dirty.  - Cover your mouth and nose with a tissue when you cough or sneeze. Throw used tissues in a  lined trash can; immediately wash your hands.  - Avoid touching your eyes, nose, and mouth with your hands.  - Avoid sharing personal household items. You should not share dishes, drinking glasses, cups,  eating utensils, towels, or bedding with other people or pets in your home. After using these  items, they should be washed thoroughly with soap and water.  - Clean and disinfect all “high-touch” surfaces every day. High touch surfaces include counters,  tabletops, doorknobs, light switches, remote controls, bathroom fixtures, toilets, phones,  keyboards, tablets, and bedside tables. Also, clean any surfaces that may have blood, stool, or  body fluids on them.       If you develop worsening symptoms:  - If you develop worsening symptoms, such as severe shortness of breath, please call [phone] option #[phone]. They will assist you in determining your next steps.  During your time on self-isolation do the following:  - Work from home if you are able to so.  - Limit social isolation by talking with friends and family on the phone or with face-time  - Talk with friends and relatives who don’t live with you about supporting each other if one  household has to be quarantined. For example, agree to drop groceries or other supplies at the  front door.  - Exercise and spend time outdoors away from others if able to do so.    Why didn’t I get tested for novel coronavirus (COVID-19)?  The number of available tests is very limited so strict rules exist for who is allowed to be tested.  [facility] has been authorized to perform testing and is currently working hard to be  able to start providing the test. Such testing is currently reserved for patients who have had  contact with someone infected with the virus, or those who are very sick a plus those who have  traveled to areas identified by the Centers for Disease Control and Prevention (CD) and will  require hospitalization.  What should I do now?  If you are well enough to be discharged home and are not in a high risk group to have  contracted the COVID-10, you should care for yourself at home exactly like you would if you  have Influenza “flu”. Follow all the standard guidelines about washing your hands, covering  your cough, etc.  You should return to the Emergency Department if you develop worse symptoms, trouble  breathing, chest pain, and/or a fever that doesn’t improve with over the counter  acetaminophen or ibuprofen.

## 2021-08-14 NOTE — ED PROVIDER NOTE - PROGRESS NOTE DETAILS
patient tolerated po. reports moderate improvement of symptoms. patient educated on signs and symptoms to be aware of that should prompt return to the er. patient verbalizes understanding.

## 2021-08-14 NOTE — ED PROVIDER NOTE - OBJECTIVE STATEMENT
Scheduled MAB Infusion, requesting no professional billing.     Patient has history of COVID-19 symptoms for [6] days    Patient has high risk factors that include:  [  ] Chronic Kidney Disease [+] Diabetes Mellitus [  ] Immune Suppressive [  ] Receiving Immunosuppressive Treatment [  ] Overweight/Obesity BMI greater than 25 [  ] Age greater than or equal 65 years [  ] Cardiovascular Disease [  ] HTN [  ] COPD/Other Chronic Respiratory Disease [  ] Sickle Cell Disease [  ] Congenital/Acquired Heart Disease [  ] Neurodevelopmental Disorder [  ] Medical related technology dependence [  ] Asthma/Chronic Respiratory Disease [  ] Immune Suppressive Disease [  ] Receiving Immune Suppressive Therapy [  ] Other:     Patient received prior COVID treatment that included [-]  Patient provided explanation of monoclonal antibody infusion and is in agreement with treatment plan. See consent form in medical record.

## 2021-08-14 NOTE — ED PROVIDER NOTE - ATTENDING CONTRIBUTION TO CARE
Here for MAB. Pt never vaccinated. Sick since Monday. PCR + Tuesday. Pts sats are good. Risk: Hx of diabetes.

## 2021-08-15 ENCOUNTER — TRANSCRIPTION ENCOUNTER (OUTPATIENT)
Age: 56
End: 2021-08-15

## 2021-08-16 ENCOUNTER — TRANSCRIPTION ENCOUNTER (OUTPATIENT)
Age: 56
End: 2021-08-16

## 2022-10-09 ENCOUNTER — EMERGENCY (EMERGENCY)
Facility: HOSPITAL | Age: 57
LOS: 0 days | Discharge: HOME | End: 2022-10-09
Attending: EMERGENCY MEDICINE | Admitting: EMERGENCY MEDICINE

## 2022-10-09 VITALS
WEIGHT: 149.91 LBS | HEIGHT: 65 IN | OXYGEN SATURATION: 99 % | TEMPERATURE: 99 F | DIASTOLIC BLOOD PRESSURE: 70 MMHG | HEART RATE: 87 BPM | SYSTOLIC BLOOD PRESSURE: 171 MMHG | RESPIRATION RATE: 18 BRPM

## 2022-10-09 DIAGNOSIS — I10 ESSENTIAL (PRIMARY) HYPERTENSION: ICD-10-CM

## 2022-10-09 DIAGNOSIS — E11.9 TYPE 2 DIABETES MELLITUS WITHOUT COMPLICATIONS: ICD-10-CM

## 2022-10-09 DIAGNOSIS — L03.116 CELLULITIS OF LEFT LOWER LIMB: ICD-10-CM

## 2022-10-09 DIAGNOSIS — Z86.2 PERSONAL HISTORY OF DISEASES OF THE BLOOD AND BLOOD-FORMING ORGANS AND CERTAIN DISORDERS INVOLVING THE IMMUNE MECHANISM: ICD-10-CM

## 2022-10-09 DIAGNOSIS — L53.9 ERYTHEMATOUS CONDITION, UNSPECIFIED: ICD-10-CM

## 2022-10-09 DIAGNOSIS — D64.9 ANEMIA, UNSPECIFIED: ICD-10-CM

## 2022-10-09 DIAGNOSIS — M79.89 OTHER SPECIFIED SOFT TISSUE DISORDERS: ICD-10-CM

## 2022-10-09 DIAGNOSIS — M25.572 PAIN IN LEFT ANKLE AND JOINTS OF LEFT FOOT: ICD-10-CM

## 2022-10-09 PROCEDURE — 99283 EMERGENCY DEPT VISIT LOW MDM: CPT

## 2022-10-09 PROCEDURE — 73610 X-RAY EXAM OF ANKLE: CPT | Mod: 26,LT

## 2022-10-09 PROCEDURE — 73630 X-RAY EXAM OF FOOT: CPT | Mod: 26,LT

## 2022-10-09 RX ORDER — IBUPROFEN 200 MG
600 TABLET ORAL ONCE
Refills: 0 | Status: COMPLETED | OUTPATIENT
Start: 2022-10-09 | End: 2022-10-09

## 2022-10-09 RX ADMIN — Medication 600 MILLIGRAM(S): at 15:00

## 2022-10-09 NOTE — ED PROVIDER NOTE - CARE PROVIDER_API CALL
Aneesh Gaitan  Internal Medicine  [phone] [place] [address]  Phone: [phone]  Fax: ()-  Follow Up Time: 1-3 Days

## 2022-10-09 NOTE — ED PROVIDER NOTE - OBJECTIVE STATEMENT
56 year old female with pmh of anemia, dm, htn presenting to the ED for L ankle pain x3 days, denies any trauma or injuries to the ankle. Endorsing some swelling and redness. denies any pain w/ ambulation, no calf pain, no cp, sob, or fevers/chills.

## 2022-10-09 NOTE — ED PROVIDER NOTE - PATIENT PORTAL LINK FT
You can access the FollowMyHealth Patient Portal offered by [facility] by registering at the following website: [email or web address]. By joining [email or web address]’s FollowMyHealth portal, you will also be able to view your health information using other applications (apps) compatible with our system.

## 2022-10-09 NOTE — ED PROVIDER NOTE - PHYSICAL EXAMINATION
CONSTITUTIONAL: Well-developed; well-nourished; in no acute distress.   SKIN: warm, dry. no rashes.  HEAD: Normocephalic; atraumatic.  EYES: PERRLA, EOMI, no conjunctival erythema.  ENT: No nasal discharge; airway clear. MMM.  EXT: Normal ROM. (+) mild swelling of L ankle, dorsum of the foot w/ mild erythema and warmth, no calf ttp or leg edema. No open wounds.  NEURO: Alert, oriented, grossly unremarkable. nml motor, strength, and sensation. no fnd.  PSYCH: Cooperative, appropriate.

## 2022-10-09 NOTE — ED PROVIDER NOTE - ATTENDING CONTRIBUTION TO CARE
56-year-old female past med history of anemia, diabetes, hypertension presented with nontraumatic left ankle pain for the past 3 days.  Patient noted pain associated with swelling and some redness.  Denies any pain in ambulation, fever/ chills, calf swelling, chest pain shortness of breath or any additional complaints.  No history of trauma.  Well-appearing female no acute distress exam shows mild swelling of the ankle, and dorsum of the foot with mild erythema and warmth, no lesions, full range of motion at all joints, no palpable crepitus, neurovascular intact, no calf tenderness to palpation or leg edema, normal work of  breathing, speaking full sentences, rest of exam is unremarkable.  X-ray appears negative, findings are concerning for cellulitis, antibiotics prescribed.  Patient was advised to take the antibiotic as prescribed, follow-up with primary care doctor in 3 days, strict return precautions given.  Patient verbalized understanding and is amenable with the plan.

## 2022-11-02 ENCOUNTER — INPATIENT (INPATIENT)
Facility: HOSPITAL | Age: 57
LOS: 25 days | Discharge: HOME | End: 2022-11-28
Attending: PSYCHIATRY & NEUROLOGY | Admitting: PSYCHIATRY & NEUROLOGY

## 2022-11-02 VITALS
SYSTOLIC BLOOD PRESSURE: 200 MMHG | RESPIRATION RATE: 18 BRPM | OXYGEN SATURATION: 100 % | DIASTOLIC BLOOD PRESSURE: 100 MMHG | HEART RATE: 125 BPM | TEMPERATURE: 98 F

## 2022-11-02 DIAGNOSIS — F31.9 BIPOLAR DISORDER, UNSPECIFIED: ICD-10-CM

## 2022-11-02 DIAGNOSIS — Z72.0 TOBACCO USE: ICD-10-CM

## 2022-11-02 LAB
ALBUMIN SERPL ELPH-MCNC: 4.6 G/DL — SIGNIFICANT CHANGE UP (ref 3.5–5.2)
ALP SERPL-CCNC: 116 U/L — HIGH (ref 30–115)
ALT FLD-CCNC: 38 U/L — SIGNIFICANT CHANGE UP (ref 0–41)
AMPHET UR-MCNC: NEGATIVE — SIGNIFICANT CHANGE UP
ANION GAP SERPL CALC-SCNC: 16 MMOL/L — HIGH (ref 7–14)
APAP SERPL-MCNC: <5 UG/ML — LOW (ref 10–30)
APPEARANCE UR: CLEAR — SIGNIFICANT CHANGE UP
AST SERPL-CCNC: 37 U/L — SIGNIFICANT CHANGE UP (ref 0–41)
BACTERIA # UR AUTO: NEGATIVE — SIGNIFICANT CHANGE UP
BARBITURATES UR SCN-MCNC: NEGATIVE — SIGNIFICANT CHANGE UP
BASOPHILS # BLD AUTO: 0.03 K/UL — SIGNIFICANT CHANGE UP (ref 0–0.2)
BASOPHILS NFR BLD AUTO: 0.4 % — SIGNIFICANT CHANGE UP (ref 0–1)
BENZODIAZ UR-MCNC: NEGATIVE — SIGNIFICANT CHANGE UP
BILIRUB SERPL-MCNC: 0.6 MG/DL — SIGNIFICANT CHANGE UP (ref 0.2–1.2)
BILIRUB UR-MCNC: NEGATIVE — SIGNIFICANT CHANGE UP
BUN SERPL-MCNC: 21 MG/DL — HIGH (ref 10–20)
CALCIUM SERPL-MCNC: 9.8 MG/DL — SIGNIFICANT CHANGE UP (ref 8.4–10.5)
CHLORIDE SERPL-SCNC: 98 MMOL/L — SIGNIFICANT CHANGE UP (ref 98–110)
CO2 SERPL-SCNC: 26 MMOL/L — SIGNIFICANT CHANGE UP (ref 17–32)
COCAINE METAB.OTHER UR-MCNC: POSITIVE
COLOR SPEC: SIGNIFICANT CHANGE UP
CREAT SERPL-MCNC: 0.6 MG/DL — LOW (ref 0.7–1.5)
DIFF PNL FLD: NEGATIVE — SIGNIFICANT CHANGE UP
EGFR: 105 ML/MIN/1.73M2 — SIGNIFICANT CHANGE UP
EOSINOPHIL # BLD AUTO: 0.08 K/UL — SIGNIFICANT CHANGE UP (ref 0–0.7)
EOSINOPHIL NFR BLD AUTO: 1 % — SIGNIFICANT CHANGE UP (ref 0–8)
EPI CELLS # UR: 1 /HPF — SIGNIFICANT CHANGE UP (ref 0–5)
ETHANOL SERPL-MCNC: <10 MG/DL — SIGNIFICANT CHANGE UP
GLUCOSE BLDC GLUCOMTR-MCNC: 163 MG/DL — HIGH (ref 70–99)
GLUCOSE SERPL-MCNC: 259 MG/DL — HIGH (ref 70–99)
GLUCOSE UR QL: ABNORMAL
HCT VFR BLD CALC: 40.7 % — SIGNIFICANT CHANGE UP (ref 37–47)
HGB BLD-MCNC: 14.2 G/DL — SIGNIFICANT CHANGE UP (ref 12–16)
HYALINE CASTS # UR AUTO: 1 /LPF — SIGNIFICANT CHANGE UP (ref 0–7)
IMM GRANULOCYTES NFR BLD AUTO: 0.1 % — SIGNIFICANT CHANGE UP (ref 0.1–0.3)
KETONES UR-MCNC: ABNORMAL
LEUKOCYTE ESTERASE UR-ACNC: NEGATIVE — SIGNIFICANT CHANGE UP
LYMPHOCYTES # BLD AUTO: 1.82 K/UL — SIGNIFICANT CHANGE UP (ref 1.2–3.4)
LYMPHOCYTES # BLD AUTO: 23.9 % — SIGNIFICANT CHANGE UP (ref 20.5–51.1)
MCHC RBC-ENTMCNC: 33.6 PG — HIGH (ref 27–31)
MCHC RBC-ENTMCNC: 34.9 G/DL — SIGNIFICANT CHANGE UP (ref 32–37)
MCV RBC AUTO: 96.2 FL — SIGNIFICANT CHANGE UP (ref 81–99)
METHADONE UR-MCNC: NEGATIVE — SIGNIFICANT CHANGE UP
MONOCYTES # BLD AUTO: 0.64 K/UL — HIGH (ref 0.1–0.6)
MONOCYTES NFR BLD AUTO: 8.4 % — SIGNIFICANT CHANGE UP (ref 1.7–9.3)
NEUTROPHILS # BLD AUTO: 5.04 K/UL — SIGNIFICANT CHANGE UP (ref 1.4–6.5)
NEUTROPHILS NFR BLD AUTO: 66.2 % — SIGNIFICANT CHANGE UP (ref 42.2–75.2)
NITRITE UR-MCNC: NEGATIVE — SIGNIFICANT CHANGE UP
NRBC # BLD: 0 /100 WBCS — SIGNIFICANT CHANGE UP (ref 0–0)
OPIATES UR-MCNC: NEGATIVE — SIGNIFICANT CHANGE UP
PCP SPEC-MCNC: SIGNIFICANT CHANGE UP
PH UR: 7 — SIGNIFICANT CHANGE UP (ref 5–8)
PLATELET # BLD AUTO: 221 K/UL — SIGNIFICANT CHANGE UP (ref 130–400)
POTASSIUM SERPL-MCNC: 3.7 MMOL/L — SIGNIFICANT CHANGE UP (ref 3.5–5)
POTASSIUM SERPL-SCNC: 3.7 MMOL/L — SIGNIFICANT CHANGE UP (ref 3.5–5)
PROPOXYPHENE QUALITATIVE URINE RESULT: NEGATIVE — SIGNIFICANT CHANGE UP
PROT SERPL-MCNC: 7.9 G/DL — SIGNIFICANT CHANGE UP (ref 6–8)
PROT UR-MCNC: ABNORMAL
RBC # BLD: 4.23 M/UL — SIGNIFICANT CHANGE UP (ref 4.2–5.4)
RBC # FLD: 14.4 % — SIGNIFICANT CHANGE UP (ref 11.5–14.5)
RBC CASTS # UR COMP ASSIST: 2 /HPF — SIGNIFICANT CHANGE UP (ref 0–4)
SALICYLATES SERPL-MCNC: <0.3 MG/DL — LOW (ref 4–30)
SARS-COV-2 RNA SPEC QL NAA+PROBE: SIGNIFICANT CHANGE UP
SODIUM SERPL-SCNC: 140 MMOL/L — SIGNIFICANT CHANGE UP (ref 135–146)
SP GR SPEC: 1.02 — SIGNIFICANT CHANGE UP (ref 1.01–1.03)
TSH SERPL-MCNC: 3.14 UIU/ML — SIGNIFICANT CHANGE UP (ref 0.27–4.2)
UROBILINOGEN FLD QL: SIGNIFICANT CHANGE UP
WBC # BLD: 7.62 K/UL — SIGNIFICANT CHANGE UP (ref 4.8–10.8)
WBC # FLD AUTO: 7.62 K/UL — SIGNIFICANT CHANGE UP (ref 4.8–10.8)
WBC UR QL: 1 /HPF — SIGNIFICANT CHANGE UP (ref 0–5)

## 2022-11-02 PROCEDURE — 93010 ELECTROCARDIOGRAM REPORT: CPT

## 2022-11-02 PROCEDURE — 71045 X-RAY EXAM CHEST 1 VIEW: CPT | Mod: 26

## 2022-11-02 PROCEDURE — 99285 EMERGENCY DEPT VISIT HI MDM: CPT

## 2022-11-02 PROCEDURE — 99232 SBSQ HOSP IP/OBS MODERATE 35: CPT

## 2022-11-02 RX ORDER — INSULIN LISPRO 100/ML
VIAL (ML) SUBCUTANEOUS
Refills: 0 | Status: DISCONTINUED | OUTPATIENT
Start: 2022-11-02 | End: 2022-11-28

## 2022-11-02 RX ORDER — LEVOTHYROXINE SODIUM 125 MCG
25 TABLET ORAL DAILY
Refills: 0 | Status: DISCONTINUED | OUTPATIENT
Start: 2022-11-03 | End: 2022-11-28

## 2022-11-02 RX ORDER — HALOPERIDOL DECANOATE 100 MG/ML
2 INJECTION INTRAMUSCULAR EVERY 6 HOURS
Refills: 0 | Status: DISCONTINUED | OUTPATIENT
Start: 2022-11-02 | End: 2022-11-07

## 2022-11-02 RX ORDER — LOSARTAN POTASSIUM 100 MG/1
50 TABLET, FILM COATED ORAL DAILY
Refills: 0 | Status: DISCONTINUED | OUTPATIENT
Start: 2022-11-03 | End: 2022-11-28

## 2022-11-02 RX ORDER — DEXTROSE 50 % IN WATER 50 %
25 SYRINGE (ML) INTRAVENOUS ONCE
Refills: 0 | Status: DISCONTINUED | OUTPATIENT
Start: 2022-11-02 | End: 2022-11-28

## 2022-11-02 RX ORDER — DIPHENHYDRAMINE HCL 50 MG
25 CAPSULE ORAL EVERY 6 HOURS
Refills: 0 | Status: DISCONTINUED | OUTPATIENT
Start: 2022-11-02 | End: 2022-11-07

## 2022-11-02 RX ORDER — METOPROLOL TARTRATE 50 MG
50 TABLET ORAL DAILY
Refills: 0 | Status: DISCONTINUED | OUTPATIENT
Start: 2022-11-03 | End: 2022-11-28

## 2022-11-02 RX ORDER — ARIPIPRAZOLE 15 MG/1
5 TABLET ORAL DAILY
Refills: 0 | Status: DISCONTINUED | OUTPATIENT
Start: 2022-11-02 | End: 2022-11-02

## 2022-11-02 RX ORDER — GLUCAGON INJECTION, SOLUTION 0.5 MG/.1ML
1 INJECTION, SOLUTION SUBCUTANEOUS ONCE
Refills: 0 | Status: DISCONTINUED | OUTPATIENT
Start: 2022-11-02 | End: 2022-11-28

## 2022-11-02 RX ORDER — SODIUM CHLORIDE 9 MG/ML
1000 INJECTION, SOLUTION INTRAVENOUS
Refills: 0 | Status: DISCONTINUED | OUTPATIENT
Start: 2022-11-02 | End: 2022-11-28

## 2022-11-02 RX ORDER — DEXTROSE 50 % IN WATER 50 %
12.5 SYRINGE (ML) INTRAVENOUS ONCE
Refills: 0 | Status: DISCONTINUED | OUTPATIENT
Start: 2022-11-02 | End: 2022-11-28

## 2022-11-02 RX ORDER — HYDROXYZINE HCL 10 MG
50 TABLET ORAL EVERY 6 HOURS
Refills: 0 | Status: DISCONTINUED | OUTPATIENT
Start: 2022-11-02 | End: 2022-11-28

## 2022-11-02 RX ORDER — NICOTINE POLACRILEX 2 MG
2 GUM BUCCAL EVERY 4 HOURS
Refills: 0 | Status: DISCONTINUED | OUTPATIENT
Start: 2022-11-02 | End: 2022-11-16

## 2022-11-02 RX ORDER — ARIPIPRAZOLE 15 MG/1
5 TABLET ORAL DAILY
Refills: 0 | Status: DISCONTINUED | OUTPATIENT
Start: 2022-11-03 | End: 2022-11-05

## 2022-11-02 RX ORDER — DEXTROSE 50 % IN WATER 50 %
15 SYRINGE (ML) INTRAVENOUS ONCE
Refills: 0 | Status: DISCONTINUED | OUTPATIENT
Start: 2022-11-02 | End: 2022-11-28

## 2022-11-02 RX ADMIN — Medication 2 MILLIGRAM(S): at 10:04

## 2022-11-02 NOTE — PROGRESS NOTE BEHAVIORAL HEALTH - SUMMARY
55 yo woman with no formal psychiatric history with a medical history of NIDDM and Thyroid Disease who presents with an acute change in behavior and mentation characterized as carlton. Patient's behavior has become aggressive and disruptive and her symptoms preclude her being safely managed in an outpatient setting. Patient requires inpatient admission to be assessed, to treat her acute symptoms and to fashion a safe discharge plan.  Bipolar Disorder (current episode manic) v Mood Disorder due to underlying medical condition    #Admit    #Psychosis r/o Bipolar disorder with psychosis      -Abilify 5mg Daily    -Hydroxyzine 50mg Q6 PRN for anxiety/insomnia  -Haldol 2mg Q6 PRN for agitation/psychosis  -Benadryl 25mg Q6 PRN got EPS  -Lorazepam 1mg Q6 PRN for aggression    #Medical meds  -Metoprolol Succinate  -Synthroid  -Losartan  -Lyrica  -Insulin    #Agitation  -for agitation not amenable to verbal redirection, may give haldol 5 mg q6h prn, ativan 2 mg q6h prn, benadryl 50 mg q6h prn with escalation to IM if pt is a danger to self or/and others with repeat EKG to ensure QTc <500 ms. 55 yo woman with no formal psychiatric history with a medical history of NIDDM and Thyroid Disease who presents with an acute change in behavior and mentation characterized as carlton. Patient's behavior has become aggressive and disruptive and her symptoms preclude her being safely managed in an outpatient setting. Patient requires inpatient admission to be assessed, to treat her acute symptoms and to fashion a safe discharge plan.  Bipolar Disorder (current episode manic) v Mood Disorder due to underlying medical condition    #Admit    #Bipolar disorder with psychosis r/o substance induced mood disorder      -Abilify 5mg Daily    -Hydroxyzine 50mg Q6 PRN for anxiety/insomnia  -Haldol 2mg Q6 PRN for agitation/psychosis  -Benadryl 25mg Q6 PRN got EPS  -Lorazepam 1mg Q6 PRN for aggression    #Medical meds  -Metoprolol Succinate  -Synthroid  -Losartan  -Lyrica  -Insulin    #Agitation  -for agitation not amenable to verbal redirection, may give haldol 5 mg q6h prn, ativan 2 mg q6h prn, benadryl 50 mg q6h prn with escalation to IM if pt is a danger to self or/and others with repeat EKG to ensure QTc <500 ms. 55 yo woman with no formal psychiatric history with a medical history of NIDDM and Thyroid Disease who presents with an acute change in behavior and mentation characterized as carlton. Patient's behavior has become aggressive and disruptive and her symptoms preclude her being safely managed in an outpatient setting. Patient requires inpatient admission to be assessed, to treat her acute symptoms and to fashion a safe discharge plan.  Bipolar Disorder (current episode manic) v Mood Disorder due to underlying medical condition    #Admit    #Bipolar disorder psychosis r/o substance induced mood disorder  -Abilify 5mg Daily    #Tobacco use  -Nicotine Gum PRN    -Hydroxyzine 50mg Q6 PRN for anxiety/insomnia  -Haldol 2mg Q6 PRN for agitation/psychosis  -Benadryl 25mg Q6 PRN got EPS  -Lorazepam 1mg Q6 PRN for aggression    #Medical meds  -Metoprolol Succinate  -Synthroid  -Losartan  -Lyrica  -Insulin    #Agitation  -for agitation not amenable to verbal redirection, may give haldol 5 mg q6h prn, ativan 2 mg q6h prn, benadryl 50 mg q6h prn with escalation to IM if pt is a danger to self or/and others with repeat EKG to ensure QTc <500 ms. 57 yo woman with no formal psychiatric history with a medical history of NIDDM and Thyroid Disease who presents with an acute change in behavior and mentation characterized as carlton. Patient's behavior has become aggressive and disruptive and her symptoms preclude her being safely managed in an outpatient setting. Patient requires inpatient admission to be assessed, to treat her acute symptoms and to fashion a safe discharge plan.  Bipolar Disorder (current episode manic) v Mood Disorder due to underlying medical condition    #Admit    #Bipolar disorder r/o substance induced mood disorder  -Abilify 5mg Daily    #Tobacco use  -Nicotine Gum PRN    -Hydroxyzine 50mg Q6 PRN for anxiety/insomnia  -Haldol 2mg Q6 PRN for agitation/psychosis  -Benadryl 25mg Q6 PRN got EPS  -Lorazepam 1mg Q6 PRN for aggression    #Medical meds  -Metoprolol Succinate  -Synthroid  -Losartan  -Lyrica  -Insulin    #Agitation  -for agitation not amenable to verbal redirection, may give haldol 5 mg q6h prn, ativan 2 mg q6h prn, benadryl 50 mg q6h prn with escalation to IM if pt is a danger to self or/and others with repeat EKG to ensure QTc <500 ms.

## 2022-11-02 NOTE — ED PROVIDER NOTE - PHYSICAL EXAMINATION
PHYSICAL EXAM:    GENERAL: Alert, appears stated age, well appearing, non-toxic  SKIN: Warm, pink and dry. MMM.   HEAD: NC, AT  EYE: Normal lids/conjunctiva  ENT: Normal hearing, patent oropharynx   NECK: +supple. No meningismus, or JVD  Pulm: Bilateral BS, normal resp effort, no wheezes, stridor, or retractions  CV: RRR, no M/R/G, 2+and = radial pulses  Abd: soft, non-tender, non-distended  Mskel: no erythema, cyanosis, edema. no calf tenderness  Neuro: AAOx3. normal gait.  Psych: poor insight,  no command hallucinations/flight of ideas

## 2022-11-02 NOTE — BH CONSULTATION LIAISON ASSESSMENT NOTE - NSCOMMENTSUICRISKFACT_PSY_ALL_CORE
Patient's sleep is dysregulated and she is labile, though not impulsive by history, other than the events on the day of admission

## 2022-11-02 NOTE — BH CONSULTATION LIAISON ASSESSMENT NOTE - NSBHCHARTREVIEWVS_PSY_A_CORE FT
Vital Signs Last 24 Hrs  T(C): 37 (02 Nov 2022 06:00), Max: 37 (02 Nov 2022 06:00)  T(F): 98.6 (02 Nov 2022 06:00), Max: 98.6 (02 Nov 2022 06:00)  HR: 101 (02 Nov 2022 06:00) (101 - 125)  BP: 193/86 (02 Nov 2022 06:00) (183/85 - 200/100)  BP(mean): --  RR: 18 (02 Nov 2022 06:00) (18 - 20)  SpO2: 99% (02 Nov 2022 06:00) (99% - 100%)    Parameters below as of 02 Nov 2022 00:26  Patient On (Oxygen Delivery Method): room air
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
## 2022-11-02 NOTE — BH CONSULTATION LIAISON ASSESSMENT NOTE - VIOLENCE PROTECTIVE FACTORS:
<--- Click to Launch ICDx for PreOp, PostOp and Procedure Residential stability/Relationship stability/Sobriety

## 2022-11-02 NOTE — ED ADULT TRIAGE NOTE - CHIEF COMPLAINT QUOTE
I'm a registered nurse, my son called me and said my mother was not acting okay. She's paranoid, she's manic, not making sense. She's never acted like this, She was on some anxiety meds and she abruptly stopped 7 days ago - daughter

## 2022-11-02 NOTE — ED PROVIDER NOTE - ATTENDING APP SHARED VISIT CONTRIBUTION OF CARE
57 yo f hx anxiety, depression, anemia, dm, thyroid disorder per chart  pt brought by daughter for change in behavior. daughter states pt stopped taking her anxiety and depression medications several weeks ago abruptly. since then, pt has been acting "manic." pt has been very paranoid, anxious, hyper Hinduism.  no hallucinations. no trauma. no reported drug use. pt not daily drinker. no trauma.  daughter not sure of meds.   when speaking to patient, she says nothing is wrong or deflects questions telling writer to ask her daughter. pt states she feels fine.    vss  gen- NAD, aaox3  card-rrr  lungs-ctab, no wheezing or rhonchi  abd-sntnd, no guarding or rebound  neuro- full str/sensation, cn ii-xii grossly intact, normal coordination    will get med clearance labs, psych consult  no reported trauma, no fnd, physical exam reassuring

## 2022-11-02 NOTE — BH CONSULTATION LIAISON ASSESSMENT NOTE - SUMMARY
57 yo woman with no formal psychiatric history with a medical history of NIDDM and Thyroid Disease who presents with an acute change in behavior and mentation characterized as carlton. Patient's behavior has become aggressive and disruptive and her symptoms preclude her being safely managed in an outpatient setting. Patient requires inpatient admission to be assessed, to treat her acute symptoms and to fashion a safe discharge plan.

## 2022-11-02 NOTE — ED PROVIDER NOTE - OBJECTIVE STATEMENT
55 yo f hx anxiety, depression, anemia, dm, thyroid disorder per chart  pt brought by daughter for change in behavior. daughter states pt stopped taking her anxiety and depression medications several weeks ago abruptly. since then, pt has been acting "manic." pt has been very paranoid, anxious, hyper Pentecostalism.  no hallucinations. no trauma. no reported drug use. pt not daily drinker. no trauma.  daughter not sure of meds.   when speaking to patient, she says nothing is wrong or deflects questions telling writer to ask her daughter. pt states she feels fine.
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
## 2022-11-02 NOTE — BH CONSULTATION LIAISON ASSESSMENT NOTE - RISK ASSESSMENT
Patient is aggressive and labile though not homicidal. Patient currently refuses to answer questions about self-harm or suicide and so must be considered to be at risk at this time. Patient will require a 1:1

## 2022-11-02 NOTE — PATIENT PROFILE BEHAVIORAL HEALTH - FALL HARM RISK - RISK INTERVENTIONS
Assistance with ambulation/Communicate Fall Risk and Risk Factors to all staff, patient, and family/Monitor gait and stability/Reinforce activity limits and safety measures with patient and family/Reorient to person, place and time as needed/Use of alarms - bed, chair and/or voice tab/Purposeful Proactive Rounding

## 2022-11-02 NOTE — ED PROVIDER NOTE - CLINICAL SUMMARY MEDICAL DECISION MAKING FREE TEXT BOX
Patient endorsed to me.  Patient with change in behavior, paranoia noted on exam, disorganized thought and behavior. nonfocal exam otherwise. labs reviewed. psychiatry evaluated patient, to be admitted to their service for stabilization.

## 2022-11-02 NOTE — BH CONSULTATION LIAISON ASSESSMENT NOTE - NSUNABLEASSESSPROTRISKCOMMENT_PSY_ALL_CORE
Patient's daughter is not aware of any behavior suggesting suicide risk and feels her mother would not likely attempt to harm herself.
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
## 2022-11-02 NOTE — BH CONSULTATION LIAISON ASSESSMENT NOTE - NSBHREFERDETAILS_PSY_A_CORE_FT
Patient brought in by daughter who complained that her mother (the patient) was acting bizarrely and aggressively at home. The patient did not object but offered no other information.

## 2022-11-02 NOTE — ED PROVIDER NOTE - NS ED ROS FT
Review of Systems    Constitutional: (-) fever   Eyes/ENT: (-) vision changes   Cardiovascular: (-) chest pain, (-) syncope (-) palpitations  Respiratory: (-) cough, (-) shortness of breath  Gastrointestinal: (-) vomiting, (-) diarrhea (-) abdominal pain  Genitourinary:  (-) dysuria   Musculoskeletal: (-) neck pain, (-) back pain, (-) leg pain/swelling  Integumentary: (-) rash, (-) edema  Neurological: (-) headache,   Hematologic: (-) easy bruising

## 2022-11-02 NOTE — ED PROVIDER NOTE - PROGRESS NOTE DETAILS
CO- pt endorsed to Dr. Paniagua- pending ua/ekg, AM psych consult Received pt from LID[facility] Kinney, discussed with psych, will consult. Pt currently pacing the floors, barefoot, not allowing PCA to put on socks, very suspicious of staff.

## 2022-11-02 NOTE — ED ADULT NURSE REASSESSMENT NOTE - NS ED NURSE REASSESS COMMENT FT1
pt pacing hallway barefoot and attempting to enter other rooms, pt very suspicious of staff and will not engage in conversation. LIDIA Stiles aware and security present for safety

## 2022-11-02 NOTE — PROGRESS NOTE BEHAVIORAL HEALTH - RISK ASSESSMENT
Patient is aggressive and labile though not homicidal. Patient currently refuses to answer questions about self-harm or suicide and so must be considered to be at risk at this time. Patient will require a 1:1 Patient is aggressive and labile though not homicidal. Patient currently refuses to answer questions about self-harm or suicide and so must be considered to be at risk at this time.

## 2022-11-02 NOTE — PROGRESS NOTE BEHAVIORAL HEALTH - NSBHCHARTREVIEWINVESTIGATE_PSY_A_CORE FT
< from: 12 Lead ECG (11.02.22 @ 06:01) >    Ventricular Rate 99 BPM    Atrial Rate 99 BPM    P-R Interval 136 ms    QRS Duration 96 ms    Q-T Interval 390 ms    QTC Calculation(Bazett) 500 ms    P Axis 74 degrees    R Axis 73 degrees    T Axis 83 degrees    Diagnosis Line Normal sinus rhythm  Normal ECG    < end of copied text >

## 2022-11-02 NOTE — ED PROVIDER NOTE - NS ED ATTENDING STATEMENT MOD
This was a shared visit with the [facility]. I reviewed and verified the documentation and independently performed the documented:

## 2022-11-03 LAB
GLUCOSE BLDC GLUCOMTR-MCNC: 236 MG/DL — HIGH (ref 70–99)
GLUCOSE BLDC GLUCOMTR-MCNC: 245 MG/DL — HIGH (ref 70–99)

## 2022-11-03 PROCEDURE — 99231 SBSQ HOSP IP/OBS SF/LOW 25: CPT

## 2022-11-03 PROCEDURE — 99252 IP/OBS CONSLTJ NEW/EST SF 35: CPT

## 2022-11-03 PROCEDURE — 99221 1ST HOSP IP/OBS SF/LOW 40: CPT

## 2022-11-03 RX ADMIN — Medication 25 MICROGRAM(S): at 06:32

## 2022-11-03 NOTE — CONSULT NOTE ADULT - ASSESSMENT
55 yo f hx anxiety, depression, anemia, dm, thyroid disorder per chart pt brought by daughter for change in behavior. daughter states pt stopped taking her anxiety and depression medications several weeks ago abruptly.    Hypothyroidism:  -Follows with primary care doctor, recently had thyroid US as per patient, was awaiting results  -Continue Synthroid  -No need to check TSH while admitted to IPP  -Follow with outpatient work up    DM2:  -Can continue with sliding scale for now  -Can add Lantus 10 units SC QHS if BG consistently >120  -CHO diet    HTN:  -Continue home meds

## 2022-11-03 NOTE — CONSULT NOTE ADULT - SUBJECTIVE AND OBJECTIVE BOX
57 yo f hx anxiety, depression, anemia, dm, thyroid disorder per chart pt brought by daughter for change in behavior. daughter states pt stopped taking her anxiety and depression medications several weeks ago abruptly.    Today:  Seen in room, offers no complaints.          REVIEW OF SYSTEMS:  CONSTITUTIONAL: No fever, weight loss, or fatigue  EYES: No eye pain, visual disturbances, or discharge  ENMT:  No difficulty hearing, tinnitus, vertigo; No sinus or throat pain  NECK: No pain or stiffness  RESPIRATORY: No cough, wheezing, chills or hemoptysis; No shortness of breath  CARDIOVASCULAR: No chest pain, palpitations, dizziness, or leg swelling  GASTROINTESTINAL: No abdominal or epigastric pain. No nausea, vomiting, or hematemesis; No diarrhea or constipation. No melena or hematochezia.  GENITOURINARY: No dysuria, frequency, hematuria, or incontinence  NEUROLOGICAL: No headaches, memory loss, loss of strength, numbness, or tremors  SKIN: No itching, burning, rashes, or lesions   LYMPH NODES: No enlarged glands  ENDOCRINE: No heat or cold intolerance; No hair loss  MUSCULOSKELETAL: No joint pain or swelling; No muscle, back, or extremity pain  HEME/LYMPH: No easy bruising, or bleeding gums  ALLERGY AND IMMUNOLOGIC: No hives or eczema      MEDICATIONS  (STANDING):  ARIPiprazole 5 milliGRAM(s) Oral daily  dextrose 5%. 1000 milliLiter(s) (50 mL/Hr) IV Continuous <Continuous>  dextrose 5%. 1000 milliLiter(s) (100 mL/Hr) IV Continuous <Continuous>  dextrose 50% Injectable 25 Gram(s) IV Push once  dextrose 50% Injectable 12.5 Gram(s) IV Push once  dextrose 50% Injectable 25 Gram(s) IV Push once  glucagon  Injectable 1 milliGRAM(s) IntraMuscular once  insulin lispro (ADMELOG) corrective regimen sliding scale   SubCutaneous three times a day before meals  levothyroxine 25 MICROGram(s) Oral daily  losartan 50 milliGRAM(s) Oral daily  metoprolol succinate ER 50 milliGRAM(s) Oral daily  pregabalin 150 milliGRAM(s) Oral three times a day    MEDICATIONS  (PRN):  dextrose Oral Gel 15 Gram(s) Oral once PRN Blood Glucose LESS THAN 70 milliGRAM(s)/deciliter  diphenhydrAMINE 25 milliGRAM(s) Oral every 6 hours PRN agitation  haloperidol     Tablet 2 milliGRAM(s) Oral every 6 hours PRN agiation  hydrOXYzine hydrochloride 50 milliGRAM(s) Oral every 6 hours PRN anxiety and insomania  LORazepam     Tablet 1 milliGRAM(s) Oral every 6 hours PRN Agitation  nicotine  Polacrilex Gum 2 milliGRAM(s) Oral every 4 hours PRN Smoking Cessation      Allergies  No Known Allergies      FAMILY HISTORY:  No pertinent family history in first degree relatives        Vital Signs Last 24 Hrs  T(C): 36.8 (2022 08:45), Max: 36.8 (2022 08:45)  T(F): 98.3 (2022 08:45), Max: 98.3 (2022 08:45)  HR: 118 (2022 08:45) (95 - 118)  BP: 147/65 (2022 08:45) (130/69 - 147/65)  RR: 16 (2022 08:45) (16 - 18)          PHYSICAL EXAM:  GENERAL: NAD, well-groomed, well-developed  HEAD:  Atraumatic, Normocephalic  EYES: EOMI, PERRLA, conjunctiva and sclera clear  ENMT: No tonsillar erythema, exudates, or enlargement; Moist mucous membranes, Good dentition, No lesions  NECK: Supple, No JVD, Normal thyroid  NERVOUS SYSTEM:  Alert & Oriented X3, Good concentration  CHEST/LUNG: CTA bilaterally; No rales, rhonchi, wheezing, or rubs  HEART: Regular rate and rhythm; No murmurs, rubs, or gallops  ABDOMEN: Soft, Nontender, Nondistended; Bowel sounds present  EXTREMITIES:  2+ Peripheral Pulses, No clubbing, cyanosis, or edema      LABS:                        14.2   7.62  )-----------( 221      ( 2022 03:15 )             40.7     11-    140  |  98  |  21<H>  ----------------------------<  259<H>  3.7   |  26  |  0.6<L>    Ca    9.8      2022 03:15    TPro  7.9  /  Alb  4.6  /  TBili  0.6  /  DBili  x   /  AST  37  /  ALT  38  /  AlkPhos  116<H>  11-02      Urinalysis Basic - ( 2022 11:37 )    Color: Light Yellow / Appearance: Clear / S.023 / pH: x  Gluc: x / Ketone: Small  / Bili: Negative / Urobili: <2 mg/dL   Blood: x / Protein: 30 mg/dL / Nitrite: Negative   Leuk Esterase: Negative / RBC: 2 /HPF / WBC 1 /HPF   Sq Epi: x / Non Sq Epi: 1 /HPF / Bacteria: Negative

## 2022-11-03 NOTE — PROGRESS NOTE BEHAVIORAL HEALTH - NSBHFUPINTERVALHXFT_PSY_A_CORE
Patient seen and evaluated. As per nursing report refusing all meds except Synthroid. Patient presents as disorganized, paranoid, tangential. Patient refusing all meds. First patient stating that there is nothing wrong with her and refused Abilify. Stated she does not need any psych meds. When writer asked about her medical meds stated she needs to take them everyday but still refused to take them and could not provide a rationale as to why she was refusing them. When asked why she was in the hospital patient stated she was not herself but repeatedly stated throughout the interview that she is fine. When asked about A/v hallucinations, patient stated “everybody talks to themselves once in a while? When asked about paranoia patient stated “people get jealous”. When asked about ETOH use first stated she drinks occasionally but no drugs. Then said she does smoke “weed” sometimes”. When writer stated her urine came back positive for cocaine, patient then admitted she does cocaine occasionally. The went off on a tangent on being in a toxic relationship with the aysha she does cocaine with. Then later stated she had a . It was very difficult to follow patient. Denies any. Denies any suicidal/homicidal ideations. Denies any hx of suicide attempts.    Left message for patients daughter Caroline (# [phone]) Patient seen and evaluated. As per nursing report refusing all meds except Synthroid. Patient presents as disorganized, paranoid, tangential. Patient refusing all meds. First patient stating that there is nothing wrong with her and refused Abilify. Stated she does not need any psych meds. When writer asked about her medical meds stated she needs to take them everyday but still refused to take them and could not provide a rationale as to why she was refusing them. When asked why she was in the hospital patient stated she was not herself but repeatedly stated throughout the interview that she is fine. When asked about A/v hallucinations, patient stated “everybody talks to themselves once in a while? When asked about paranoia patient stated “people get jealous”. Then later in the conversation her computer "acting up". When asked about ETOH use first stated she drinks occasionally but no drugs. Then said she does smoke “weed” sometimes”. When writer stated her urine came back positive for cocaine, patient then admitted she does cocaine occasionally. The went off on a tangent on being in a toxic relationship with the aysha she does cocaine with. Then later stated she had a . It was very difficult to follow patient. Denies any. Denies any suicidal/homicidal ideations. Denies any hx of suicide attempts.    Left message for patients daughter Caroline (# [phone])

## 2022-11-03 NOTE — CHART NOTE - NSCHARTNOTEFT_GEN_A_CORE
INTERVAL DATA:   · Interval Chief Complaint: "I don't want to take my meds"  · Interval History: Patient seen and evaluated. as per nursing report, pt was refusing to take her psychiatric or medical medications this morning, stating she does not want to take them and will take them at home. team explained the importance of taking her medical medications, but patient still refused without any rationale as to why. Patient is agitated, but not aggressive. Patient states she is in the hospital because her family thought she was acting strangely. Patient is tangential and defensive. States her neurologist put her on anti anxiety/ anti depressants but she stopped taking them. she states she has not been herself lately, but also states she is fine and there is nothing wrong with her. she admits to ETOH use saying she occasionally drinks beer or Fireball. pt originally states she uses no drugs, and then only admits to smoking weed in the past, but when told her urine was positive for cocaine, admitted to using it with her ex- boyfriend. States she smokes cigarettes. Patient states she will stay here as long as her family wants her to. Patient is paranoid about her computer acting strangely, and suspicious about her grandson. When asked about hallucinations patient states, "everyone talks to themselves once in awhile". Patient is hard to follow. Denies suicidal/homicidal ideations.      MENTAL STATUS EXAM:   Level of consciousness: Alert  General appearance: no deformities  Body Habitus: Thin build  · Hygiene  	Fair  · Grooming	Fair  · Behavior	Uncooperative  · Eye Contact	Good  Relatedness:  normal  · Impulse Control	Normal  · Muscle Tone / Strength	Normal muscle tone/strength  · Abnormal Movements	No abnormal movements  · Gait / Station	Normal gait / station  · Speech Volume	Normal	  · Speech Rate       Increased  · Speech Spontaneity	Normal  Speech articulation     Normal  · Muscle Tone / Strength	Normal muscle tone/strength  · Abnormal Movements	No abnormal movements  · Gait / Station	Normal gait / station  · Reported mood	Good  Affect quality Irritated/ Agitated  · Affect Range	labile  Affect congruence: not congruent  Thought Associations  Loose  · Thought Process	Disorganized, tangential  · Thought Content	Paranoia  · Perceptions	unable to assess   Oriented to Person Yes   Oriented to place  Yes  · Oriented to Time	Yes  Attention / Concentration: normal  · Estimated Intelligence	Average  · Recent Memory	unable to assess  · Remote Memory	unable to assess  · Fund of Knowledge unable to assess  · Judgment (regarding everyday events) poor  · Insight (regarding psychiatric illness)	Poor      SUICIDALITY:   · Suicidality (Interval)	none known    HOMICIDALITY/AGGRESSION:   · Homicidality/Aggression	none known    DIAGNOSIS DSM-V:    Psychiatric Diagnosis (Corresponds to DSM-IV Axis I, II):  Primary Dx Bipolar disorder F31.9. Secondary Dx 1 Tobacco use Z72.0.    plan:  #Admit    #Bipolar disorder r/o substance induced mood disorder  -Abilify 5mg Daily    #Tobacco use  -Nicotine Gum PRN    -Hydroxyzine 50mg Q6 PRN for anxiety/insomnia  -Haldol 2mg Q6 PRN for agitation/psychosis  -Benadryl 25mg Q6 PRN got EPS  -Lorazepam 1mg Q6 PRN for aggression    #Medical meds  -Metoprolol Succinate  -Synthroid  -Losartan  -Lyrica  -Insulin    #Agitation  -for agitation not amenable to verbal redirection, may give haldol 5 mg q6h prn, ativan 2 mg q6h prn, benadryl 50 mg q6h prn with escalation to IM if pt is a danger to self or/and others with repeat EKG to ensure QTc <500 ms.

## 2022-11-03 NOTE — CHART NOTE - NSCHARTNOTEFT_GEN_A_CORE
Social Work Note:    Patient is a 56 years of age female who was admitted for evaluation of psychotic symptoms.  Patient was brought to the hospital by her daughter for an acute change in behavior.  According to the daughter, patient has been acting "manic", paranoid, anxious, and hyper Jain.  At the time of assessment in the emergency department, patient presented as bizarre, tangential, and labile.  She was unable to participate in meaningful interview or provide any coherent information.  She was admitted to [facility] for further assessment, safety, and stabilization.      In the community, patient is domiciled in a private residence on [address] with her daughter and her daughter’s family.  She is single marital status and has adult children.  Patient is unemployed.  Patient has a past medical history of NIDDM and Thyroid Disease.  She has no formal past psychiatric history, no past P admissions, and no prior suicide attempts.  She is not engaged in outpatient mental health treatment,      Sexual History – Patient identifies as heterosexual     Family relationships and history – Patient resides with her daughter and her daughter’s family.    Leisure Activity Assessment – Unable to assess     Community Supports –  None known     Employment – Patient is unemployed.      Substance Use Assessment – Unable to assess        History of suicidality or self- injurious behaviors – None known     Significant Loses – None known       Life Goals – Unable to assess     will continue to meet with patient 1:1 and with treatment team daily.  Discharge plan is for continued mental health treatment in an outpatient setting.

## 2022-11-03 NOTE — PROGRESS NOTE BEHAVIORAL HEALTH - RISK ASSESSMENT
Patient is aggressive and labile though not homicidal. Patient currently refuses to answer questions about self-harm or suicide and so must be considered to be at risk at this time.

## 2022-11-03 NOTE — PROGRESS NOTE BEHAVIORAL HEALTH - SUMMARY
57 yo woman with no formal psychiatric history with a medical history of NIDDM and Thyroid Disease who presents with an acute change in behavior and mentation characterized as carlton. Patient's behavior has become aggressive and disruptive and her symptoms preclude her being safely managed in an outpatient setting. Patient requires inpatient admission to be assessed, to treat her acute symptoms and to fashion a safe discharge plan.  Bipolar Disorder (current episode manic) v Mood Disorder due to underlying medical condition    Patient seen and evaluated. As per nursing report refusing all meds except Synthroid. Patient presents as disorganized, paranoid, tangential. Patient refusing all meds. First patient stating that there is nothing wrong with her and refused Abilify. Stated she does not need any psych meds. When writer asked about her medical meds stated she needs to take them everyday but still refused to take them and could not provide a rationale as to why she was refusing them. When asked why she was in the hospital patient stated she was not herself but repeatedly stated throughout the interview that she is fine. When asked about A/v hallucinations, patient stated “everybody talks to themselves once in a while? When asked about paranoia patient stated “people get jealous”. When asked about ETOH use first stated she drinks occasionally but no drugs. Then said she does smoke “weed” sometimes”. When writer stated her urine came back positive for cocaine, patient then admitted she does cocaine occasionally. The went off on a tangent on being in a toxic relationship with the aysha she does cocaine with. Then later stated she had a . It was very difficult to follow patient. Denies any. Denies any suicidal/homicidal ideations. Denies any hx of suicide attempts.    #Admit    #Bipolar disorder r/o substance induced mood disorder  -Abilify 5mg Daily    #Tobacco use  -Nicotine Gum PRN    -Hydroxyzine 50mg Q6 PRN for anxiety/insomnia  -Haldol 2mg Q6 PRN for agitation/psychosis  -Benadryl 25mg Q6 PRN got EPS  -Lorazepam 1mg Q6 PRN for aggression    #Medical meds  -Metoprolol Succinate  -Synthroid  -Losartan  -Lyrica  -Insulin    #Agitation  -for agitation not amenable to verbal redirection, may give haldol 5 mg q6h prn, ativan 2 mg q6h prn, benadryl 50 mg q6h prn with escalation to IM if pt is a danger to self or/and others with repeat EKG to ensure QTc <500 ms. 55 yo woman with no formal psychiatric history with a medical history of NIDDM and Thyroid Disease who presents with an acute change in behavior and mentation characterized as carlton. Patient's behavior has become aggressive and disruptive and her symptoms preclude her being safely managed in an outpatient setting. Patient requires inpatient admission to be assessed, to treat her acute symptoms and to fashion a safe discharge plan.  Bipolar Disorder (current episode manic) v Mood Disorder due to underlying medical condition    Patient seen and evaluated. As per nursing report refusing all meds except Synthroid. Patient presents as disorganized, paranoid, tangential. Patient refusing all meds. First patient stating that there is nothing wrong with her and refused Abilify. Stated she does not need any psych meds. When writer asked about her medical meds stated she needs to take them everyday but still refused to take them and could not provide a rationale as to why she was refusing them. When asked why she was in the hospital patient stated she was not herself but repeatedly stated throughout the interview that she is fine. When asked about A/v hallucinations, patient stated “everybody talks to themselves once in a while? When asked about paranoia patient stated “people get jealous”. Then later in the conversation her computer "acting up". When asked about ETOH use first stated she drinks occasionally but no drugs. Then said she does smoke “weed” sometimes”. When writer stated her urine came back positive for cocaine, patient then admitted she does cocaine occasionally. The went off on a tangent on being in a toxic relationship with the aysha she does cocaine with. Then later stated she had a . It was very difficult to follow patient. Denies any. Denies any suicidal/homicidal ideations. Denies any hx of suicide attempts.    #Admit    #Bipolar disorder r/o substance induced mood disorder  -Abilify 5mg Daily    #Tobacco use  -Nicotine Gum PRN    -Hydroxyzine 50mg Q6 PRN for anxiety/insomnia  -Haldol 2mg Q6 PRN for agitation/psychosis  -Benadryl 25mg Q6 PRN got EPS  -Lorazepam 1mg Q6 PRN for aggression    #Medical meds  -Metoprolol Succinate  -Synthroid  -Losartan  -Lyrica  -Insulin    #Agitation  -for agitation not amenable to verbal redirection, may give haldol 5 mg q6h prn, ativan 2 mg q6h prn, benadryl 50 mg q6h prn with escalation to IM if pt is a danger to self or/and others with repeat EKG to ensure QTc <500 ms.

## 2022-11-04 DIAGNOSIS — F39 UNSPECIFIED MOOD [AFFECTIVE] DISORDER: ICD-10-CM

## 2022-11-04 PROCEDURE — 93010 ELECTROCARDIOGRAM REPORT: CPT

## 2022-11-04 PROCEDURE — 99231 SBSQ HOSP IP/OBS SF/LOW 25: CPT

## 2022-11-04 RX ORDER — HALOPERIDOL DECANOATE 100 MG/ML
2.5 INJECTION INTRAMUSCULAR ONCE
Refills: 0 | Status: COMPLETED | OUTPATIENT
Start: 2022-11-04 | End: 2022-11-04

## 2022-11-04 RX ORDER — DIPHENHYDRAMINE HCL 50 MG
25 CAPSULE ORAL ONCE
Refills: 0 | Status: COMPLETED | OUTPATIENT
Start: 2022-11-04 | End: 2022-11-04

## 2022-11-04 RX ORDER — CLONAZEPAM 1 MG
1 TABLET ORAL ONCE
Refills: 0 | Status: DISCONTINUED | OUTPATIENT
Start: 2022-11-04 | End: 2022-11-04

## 2022-11-04 RX ADMIN — Medication 25 MILLIGRAM(S): at 19:32

## 2022-11-04 RX ADMIN — Medication 1 MILLIGRAM(S): at 19:33

## 2022-11-04 RX ADMIN — HALOPERIDOL DECANOATE 2.5 MILLIGRAM(S): 100 INJECTION INTRAMUSCULAR at 19:33

## 2022-11-04 NOTE — PROGRESS NOTE BEHAVIORAL HEALTH - NSBHFUPINTERVALHXFT_PSY_A_CORE
Patient seen and evaluated. As per nursing report continues to refuse all meds, medical and psychiatric. Patient continues to present as disorganized, paranoid, labile, tangential. Writer unsure at this time if patient has and underlying psychiatric condition, possibly Bipolar disorder. Or patients condition is 2/2 substance use. Will continue to monitor. Continues to refuse all meds, labs, fs despite much encouragement from nurses and writer. Explained to patient that her BP is elevated so she needs to take her blood pressure meds. Refused EKG yesterday. Last EKG QTC was 510. EKG tech present with team. With much encouragement from Writer and RN patient agreed to EKG. Denies any A/V hallucinations. Denies any suicidal/homicidal ideations. Patient encouraged to get out of her room and attend groups.    Spoke to patients daughter Caroline (# [phone]). Updated her on patients care and progress. Patient refusing all meds. Daughter states she will talk to patient. Patient seen and evaluated. As per nursing report continues to refuse all meds, medical and psychiatric. Patient continues to present as disorganized, paranoid, labile, tangential. Writer unsure at this time if patient has and underlying psychiatric condition, possibly Bipolar disorder. Or patients condition is 2/2 substance use. Will continue to monitor. Continues to refuse all meds, labs, fs despite much encouragement from nurses and writer. Explained to patient that her BP is elevated so she needs to take her blood pressure meds. Refused EKG yesterday. Last EKG QTC was 510. EKG tech present with team. With much encouragement from Writer and RN patient agreed to EKG. Denies any A/V hallucinations. Denies any suicidal/homicidal ideations. Patient encouraged to get out of her room and attend groups.    Spoke to patients daughter Caroline (# [phone]). Updated her on patients care and progress. Patient refusing all meds. Daughter states she will talk to patient.    Writer later notified by RN that patients BP is continuing to increase. Writer and 2 RNs repeatedly try to encourage patient to take her BP meds. Patient went off on a tangent about not taking it every day, the number is not that high and her doctor has not approved. Writer placed call to daughter who also tried to convince patient to take her medication. All attempts failed. Patient continuing to refused meds despite much encouragement. Patient seen and evaluated. As per nursing report continues to refuse all meds, medical and psychiatric. Patient continues to present as disorganized, paranoid, labile, tangential. Writer unsure at this time if patient has an underlying psychiatric condition, possibly Bipolar disorder. Or patients condition is 2/2 substance use. Will continue to monitor. Continues to refuse all meds, labs, fs despite much encouragement from nurses and writer. Explained to patient that her BP is elevated so she needs to take her blood pressure meds. Refused EKG yesterday. Last EKG QTC was 510. EKG tech present with team. With much encouragement from Writer and RN patient agreed to EKG (486). Denies any A/V hallucinations. Denies any suicidal/homicidal ideations. Patient encouraged to get out of her room and attend groups.    Spoke to patients daughter Caroline (# [phone]). Updated her on patients care and progress. Patient refusing all meds. Daughter states she will talk to patient.    Writer later notified by RN that patients BP is continuing to increase. Writer and 2 RNs repeatedly tried to encourage patient to take her BP meds. Patient went off on a tangent about not taking it every day, the number is not that high and her doctor has not approved. Writer placed call to daughter who also tried to convince patient to take her medication. All attempts failed. Patient continuing to refused meds despite much encouragement.

## 2022-11-04 NOTE — CHART NOTE - NSCHARTNOTEFT_GEN_A_CORE
Was notified by RN that patients BP is 199/103 systolic with a heart rate of 127. Patient continues to refuse her BP medications.  Patient was evaluated at bedside. Patient is continuously refusing to take her BP medication. Patient was thoroughly informed of the risks of not taking her BP medications. Patient denies headache, dizziness, blurry vision, N/V or chest pain.

## 2022-11-04 NOTE — PROGRESS NOTE BEHAVIORAL HEALTH - NSBHCHARTREVIEWINVESTIGATE_PSY_A_CORE FT
< from: 12 Lead ECG (11.02.22 @ 06:01) >    Ventricular Rate 99 BPM    Atrial Rate 99 BPM    P-R Interval 136 ms    QRS Duration 96 ms    Q-T Interval 390 ms    QTC Calculation(Bazett) 500 ms    P Axis 74 degrees    R Axis 73 degrees    T Axis 83 degrees    Diagnosis Line Normal sinus rhythm  Normal ECG    < end of copied text > < from: 12 Lead ECG (11.04.22 @ 09:15) >      Ventricular Rate 116 BPM    Atrial Rate 116 BPM    P-R Interval 148 ms    QRS Duration 94 ms    Q-T Interval 350 ms    QTC Calculation(Bazett) 486 ms    P Axis 73 degrees    R Axis 78 degrees    T Axis 76 degrees    Diagnosis Line Sinus tachycardia  Incomplete right bundle branch block  Borderline ECG

## 2022-11-04 NOTE — CHART NOTE - NSCHARTNOTEFT_GEN_A_CORE
Social Work Note:       Mounika  remains on the unit for continued treatment, safety, and observation.  Treatment team met with patient this morning on unit rounds.  Patient was calm and pleasant.  She said she’s doing well.  Patient continues to present as disorganized and confused.  She continues to refuse all medications (both psychiatric and medical) despite encouragement.  She denies SI/HI and A/V/H.  He endorses good sleep and appetite.  Patient has been in good behavioral control on the unit.      Once stable for discharge, patient will return to her home on [place] with her family.  Plan is for referral for continued mental health treatment in an outpatient setting.      Mental Status Exam:      Mood – Labile    Sleep  - Fair  Appetite – Good  ADLs – Fair  Thought Process – Disorganized/Tangential  Observation – q10 minutes Social Work Note:       Mounika  remains on the unit for continued treatment, safety, and observation.  Treatment team met with patient this morning on unit rounds.  Patient was calm and pleasant.  She said she’s doing well.  Patient continues to present as disorganized and confused.  She continues to refuse all medications (both psychiatric and medical) despite encouragement.  She denies SI/HI and A/V/H.  He endorses good sleep and appetite.  Patient has been in good behavioral control on the unit.      Once stable for discharge, patient will return to her home on [place] with her family.  Plan is for referral for continued mental health treatment in an outpatient setting.      MADELINE spoke to patients son Gonzalez [phone] on this date.  SW provided an update on patients care and progress    Mental Status Exam:      Mood – Labile    Sleep  - Fair  Appetite – Good  ADLs – Fair  Thought Process – Disorganized/Tangential  Observation – q10 minutes
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
## 2022-11-04 NOTE — CHART NOTE - NSCHARTNOTEFT_GEN_A_CORE
Patient's nurse asked me if I could help with patient because she has been refusing medications and spends most of her time near the unit's door attempting to leave against medical advice. Patient is overtly anxious and has difficulties to make simple decisions. I offered her medication to decrease her anxiety but she was unable to decide if she would take it. She has order for Ativan PRN but so far has refused to take it. I offered her Clonazepam and as she was undecided I wrote and order for 1mg of Clonazepam now, but when nurse took it to her she refused to take it. After this nurse will ask the doctor on call to help her as I have finished my work hours today.

## 2022-11-04 NOTE — PROGRESS NOTE BEHAVIORAL HEALTH - NSBHCHARTREVIEWLAB_PSY_A_CORE FT
Complete Blood Count + Automated Diff (11.02.22 @ 03:15)   WBC Count: 7.62 K/uL   RBC Count: 4.23 M/uL   Hemoglobin: 14.2 g/dL   Hematocrit: 40.7   Mean Cell Volume: 96.2 fL   Mean Cell Hemoglobin: 33.6 pg   Mean Cell Hemoglobin Conc: 34.9 g/dL   Red Cell Distrib Width: 14.4 %   Platelet Count - Automated: 221 K/uL   Auto Neutrophil #: 5.04 K/uL   Auto Lymphocyte #: 1.82 K/uL   Auto Monocyte #: 0.64 K/uL   Auto Eosinophil #: 0.08 K/uL   Auto Basophil #: 0.03 K/uL   Auto Neutrophil %: 66.2: Differential percentages must be correlated with absolute numbers for   clinical significance.   Auto Lymphocyte %: 23.9   Auto Monocyte %: 8.4   Auto Eosinophil %: 1.0   Auto Basophil %: 0.4   Auto Immature Granulocyte %: 0.1: (Includes meta, myelo and promyelocytes). Mild elevations in immature   granulocytes may be seen with many inflammatory processes and pregnancy;   clinical correlation suggested.    Nucleated RBC: 0 /100 WBCs

## 2022-11-04 NOTE — PROGRESS NOTE BEHAVIORAL HEALTH - SUMMARY
57 yo woman with no formal psychiatric history with a medical history of NIDDM and Thyroid Disease who presents with an acute change in behavior and mentation characterized as carlton. Patient's behavior has become aggressive and disruptive and her symptoms preclude her being safely managed in an outpatient setting. Patient requires inpatient admission to be assessed, to treat her acute symptoms and to fashion a safe discharge plan.  Bipolar Disorder (current episode manic) v Mood Disorder due to underlying medical condition    Patient seen and evaluated. As per nursing report continues to refuse all meds, medical and psychiatric. Patient continues to present as disorganized, paranoid, labile, tangential. Writer unsure at this time if patient has and underlying psychiatric condition, possibly Bipolar disorder. Or patients condition is 2/2 substance use. Will continue to monitor. Continues to refuse all meds, labs, fs despite much encouragement from nurses and writer. Explained to patient that her BP is elevated so she needs to take her blood pressure meds. Refused EKG yesterday. Last EKG QTC was 510. EKG tech present with team. With much encouragement from Writer and RN patient agreed to EKG. Denies any A/V hallucinations. Denies any suicidal/homicidal ideations. Patient encouraged to get out of her room and attend groups.    #Admit    #Mood disorder with psychosis vs substance induced mood disorder  -Abilify 5mg Daily    #Tobacco use  -Nicotine Gum PRN    -Hydroxyzine 50mg Q6 PRN for anxiety/insomnia  -Haldol 2mg Q6 PRN for agitation/psychosis  -Benadryl 25mg Q6 PRN got EPS  -Lorazepam 1mg Q6 PRN for aggression    #Medical meds  -Metoprolol Succinate  -Synthroid  -Losartan  -Lyrica  -Insulin    #Agitation  -for agitation not amenable to verbal redirection, may give haldol 5 mg q6h prn, ativan 2 mg q6h prn, benadryl 50 mg q6h prn with escalation to IM if pt is a danger to self or/and others with repeat EKG to ensure QTc <500 ms.

## 2022-11-04 NOTE — CHART NOTE - NSCHARTNOTEFT_GEN_A_CORE
INTERVAL DATA:   · Interval Chief Complaint: "none"  · Interval History: Patient seen and evaluated lying in bed. as per nursing report, pt was refusing meds and fingersticks, and her blood pressure has elevated to 194/86. team explained the importance of taking her medical medications, but patient still refused without any rationale as to why.  patient states she just needs to "relax" and her blood pressure will go down. patient confused as to why she needs repeat EKG and states "you're making me feel like I'm gonna have a heart attack or something", but eventually let staff do it. Denies suicidal/homicidal ideations or past SA.       MENTAL STATUS EXAM:   Level of consciousness: Alert  General appearance: no deformities  Body Habitus: Thin build  · Hygiene  	Fair  · Grooming	Fair  · Behavior	Uncooperative  · Eye Contact	Good  Relatedness:  normal  · Impulse Control	Normal  · Muscle Tone / Strength	Normal muscle tone/strength  · Abnormal Movements	No abnormal movements  · Gait / Station	Normal gait / station  · Speech Volume	Normal	  · Speech Rate       Normal  · Speech Spontaneity	Normal  Speech articulation     Normal  · Muscle Tone / Strength	Normal muscle tone/strength  · Abnormal Movements	No abnormal movements  · Gait / Station	Normal gait / station  · Reported mood	Good/ relaxed  Affect quality Euthymic  · Affect Range	normal   Affect congruence: congruent  Thought Associations  Loose  · Thought Process	Disorganized, tangential  · Thought Content	Paranoia  · Perceptions	unable to assess   Oriented to Person Yes   Oriented to place  Yes  · Oriented to Time	Yes  Attention / Concentration: normal  · Estimated Intelligence	Average  · Recent Memory	unable to assess  · Remote Memory	unable to assess  · Fund of Knowledge unable to assess  · Judgment (regarding everyday events) poor  · Insight (regarding psychiatric illness)	Poor      SUICIDALITY:   · Suicidality (Interval)	none known    HOMICIDALITY/AGGRESSION:   · Homicidality/Aggression	none known      DIAGNOSIS DSM-V:    Psychiatric Diagnosis (Corresponds to DSM-IV Axis I, II):  Primary Dx Bipolar disorder F31.9. Secondary Dx 1 Tobacco use Z72.0.    Plan     #Admit    #Bipolar disorder r/o substance induced mood disorder  -Abilify 5mg Daily    #Tobacco use  -Nicotine Gum PRN    -Hydroxyzine 50mg Q6 PRN for anxiety/insomnia  -Haldol 2mg Q6 PRN for agitation/psychosis  -Benadryl 25mg Q6 PRN got EPS  -Lorazepam 1mg Q6 PRN for aggression    #Medical meds  -Metoprolol Succinate  -Synthroid  -Losartan  -Lyrica  -Insulin    #Agitation  -for agitation not amenable to verbal redirection, may give haldol 5 mg q6h prn, ativan 2 mg q6h prn, benadryl 50 mg q6h prn with escalation to IM if pt is a danger to self or/and others with repeat EKG to ensure QTc <500 ms.    DIAGNOSIS DSM-V:    Psychiatric Diagnosis (Corresponds to DSM-IV Axis I, II):  Primary Dx Bipolar disorder F31.9. Secondary Dx 1 Tobacco use Z72.0.    plan:  #Admit    #Bipolar disorder r/o substance induced mood disorder  -Abilify 5mg Daily    #Tobacco use  -Nicotine Gum PRN    -Hydroxyzine 50mg Q6 PRN for anxiety/insomnia  -Haldol 2mg Q6 PRN for agitation/psychosis  -Benadryl 25mg Q6 PRN got EPS  -Lorazepam 1mg Q6 PRN for aggression    #Medical meds  -Metoprolol Succinate  -Synthroid  -Losartan  -Lyrica  -Insulin    #Agitation  -for agitation not amenable to verbal redirection, may give haldol 5 mg q6h prn, ativan 2 mg q6h prn, benadryl 50 mg q6h prn with escalation to IM if pt is a danger to self or/and others with repeat EKG to ensure QTc <500 ms.

## 2022-11-05 DIAGNOSIS — F14.90 COCAINE USE, UNSPECIFIED, UNCOMPLICATED: ICD-10-CM

## 2022-11-05 LAB
GLUCOSE BLDC GLUCOMTR-MCNC: 201 MG/DL — HIGH (ref 70–99)
GLUCOSE BLDC GLUCOMTR-MCNC: 345 MG/DL — HIGH (ref 70–99)

## 2022-11-05 PROCEDURE — 99231 SBSQ HOSP IP/OBS SF/LOW 25: CPT | Mod: GC

## 2022-11-05 RX ORDER — ARIPIPRAZOLE 15 MG/1
5 TABLET ORAL AT BEDTIME
Refills: 0 | Status: DISCONTINUED | OUTPATIENT
Start: 2022-11-05 | End: 2022-11-07

## 2022-11-05 RX ORDER — DIPHENHYDRAMINE HCL 50 MG
25 CAPSULE ORAL ONCE
Refills: 0 | Status: COMPLETED | OUTPATIENT
Start: 2022-11-05 | End: 2022-11-05

## 2022-11-05 RX ORDER — HALOPERIDOL DECANOATE 100 MG/ML
2.5 INJECTION INTRAMUSCULAR ONCE
Refills: 0 | Status: COMPLETED | OUTPATIENT
Start: 2022-11-05 | End: 2022-11-05

## 2022-11-05 RX ADMIN — Medication 25 MICROGRAM(S): at 06:41

## 2022-11-05 RX ADMIN — HALOPERIDOL DECANOATE 2.5 MILLIGRAM(S): 100 INJECTION INTRAMUSCULAR at 15:47

## 2022-11-05 RX ADMIN — ARIPIPRAZOLE 5 MILLIGRAM(S): 15 TABLET ORAL at 20:20

## 2022-11-05 RX ADMIN — Medication 25 MILLIGRAM(S): at 15:46

## 2022-11-05 RX ADMIN — Medication 1 MILLIGRAM(S): at 15:46

## 2022-11-05 NOTE — PROGRESS NOTE BEHAVIORAL HEALTH - NSBHFUPINTERVALHXFT_PSY_A_CORE
Patient evaluated at bedside, chart reviewed, per nursing staff pt became restless and anxious last night, then progressed to labile and paranoid, required PRNs of haldol, ativan and benadryl at 19:00.  She has been refusing all of her medications including antihypertensives, insulin, abilify, lyrica.      Upon approach, patient is calm and cooperative.  Has odd affect, illogical thought process.  She is in good behavioral control this morning.      Patient tells the writer that her family brought her into the hospital but she does not know why they did.  She does not appear to have insight into her psychiatric decompensation that occurred prior to admission.  She states that yesterday she was anxious and had to receive PRNs for agitation, however does not have insight into issues of paranoia and irritability that occurred yesterday.  She denies paranoid thoughts today and there are no overt delusions elicted, however she does mention some thoughts she had 1 week ago about her phone being bugged; she no longer believes those thoughts ot be true at this time and adamantly states "I've never had issues with paranoia."  She admits to using marijuana gummies prior to admission and also drinking a shot of whiskey whenever she felt anxious, but denies other substance use.  She denies alcohol withdrawal symptoms at this time.    Writer educated pt on risks and benefits of current medications, and educated pt on the risks of not taking insulin, antihypertensives; she expressed understanding and agrees to take these medications.  She states that she takes trulicity at home, not insulin.    Spoke with daughter Caroline who states that the pt was very secretive about substance use prior to admission but they had suspicions she was using cocaine.  Recently they also found a bag of pills at home, some labeled xanax and some unlabeled.  Daughter states that the pt was very paranoid and agitated toward daughter's son recently and she does not feel safe with the pt coming back home unless she goes to rehab first; she however admits that if pt does not go to rehab then she would have no where else to go.    Daughter states that the pt takes Trulicity 1.5mg injection weekly, does not take insulin.  Writer confirmed with [facility] that pt takes Trulicity 1.5mg weekly.

## 2022-11-05 NOTE — PROGRESS NOTE BEHAVIORAL HEALTH - OTHER
odd affect oddly related no overt delusions but endorses some paranoid ideations that she states were true a week ago, not true today "Im fine"

## 2022-11-05 NOTE — CHART NOTE - NSCHARTNOTEFT_GEN_A_CORE
Pt wants to take her own Trulicity medication instead of taking hospital medications  Pt brought in 2 different doses of trulicity (0.75 mg & 1.5 mg) stating she takes both weekly (due yday 11/4)  SPoke with pt's PCP office ([phone] Dr. Brandt) who states that pt should only be taking the 1.5 mg dose  Will order the above as nonformulary

## 2022-11-05 NOTE — CHART NOTE - NSCHARTNOTEFT_GEN_A_CORE
I was asked to see patient because patient was standing near 2S unit door refusing to leave the area. I attempted talking to patient, trying to persuade her to go to her room, but patient refused to do so. She was answering "OK, I'll go", but stayed in place. Patient was medicated with PRM Haldol 2.5mg IM, Ativan 1mg IM and Benadryl 25mg IM, and placed into restraints.

## 2022-11-05 NOTE — PROGRESS NOTE BEHAVIORAL HEALTH - SUMMARY
57 yo woman with no formal psychiatric history with a medical history of NIDDM and Thyroid Disease who presents with an acute change in behavior and mentation characterized as calrton. Patient's behavior has become aggressive and disruptive and her symptoms preclude her being safely managed in an outpatient setting. Patient requires inpatient admission to be assessed, to treat her acute symptoms and to fashion a safe discharge plan.  Bipolar Disorder (current episode manic) v Mood Disorder due to underlying medical condition    Patient seen and evaluated. As per nursing report continues to refuse all meds, medical and psychiatric. Patient continues to present as disorganized, paranoid, labile, tangential. Writer unsure at this time if patient has and underlying psychiatric condition, possibly Bipolar disorder. Or patients condition is 2/2 substance use. Will continue to monitor. Continues to refuse all meds, labs, fs despite much encouragement from nurses and writer. Explained to patient that her BP is elevated so she needs to take her blood pressure meds. Refused EKG yesterday. EKG showed QTC was 510 on 11/2 and 486 on 11/4; will continue to monitor.  Denies any A/V hallucinations. Denies any suicidal/homicidal ideations. Patient encouraged to get out of her room and attend groups. She remains disorganized in thought process, oddly related/odd affect but overall improved.  Insight remains poor.  She continues to require inpatient psychiatric hospitalization for stabilization.  Consider rehab for dispo planning.        #Mood disorder with psychosis vs substance induced mood disorder  -Abilify 5mg Daily    #Cocaine use  - UDS positive for cocaine  - consider rehab vs. outpatient substance use Tx    #Tobacco use  -Nicotine Gum PRN    -Hydroxyzine 50mg Q6 PRN for anxiety/insomnia  -Haldol 2mg Q6 PRN for agitation/psychosis  -Benadryl 25mg Q6 PRN got EPS  -Lorazepam 1mg Q6 PRN for aggression    #Medical meds  -Metoprolol Succinate  -Synthroid  -Losartan  -Lyrica  -d/c insulin --> start Trulicity (family will bring in)    #Agitation  -for agitation not amenable to verbal redirection, may give haldol 5 mg q6h prn, ativan 2 mg q6h prn, benadryl 50 mg q6h prn with escalation to IM if pt is a danger to self or/and others with repeat EKG to ensure QTc <500 ms.

## 2022-11-05 NOTE — PROGRESS NOTE BEHAVIORAL HEALTH - NSBHCHARTREVIEWINVESTIGATE_PSY_A_CORE FT
< from: 12 Lead ECG (11.04.22 @ 09:15) >    Ventricular Rate 116 BPM    Atrial Rate 116 BPM    P-R Interval 148 ms    QRS Duration 94 ms    Q-T Interval 350 ms    QTC Calculation(Bazett) 486 ms    P Axis 73 degrees    R Axis 78 degrees    T Axis 76 degrees    Diagnosis Line Sinus tachycardia  Incomplete right bundle branch block  Borderline ECG    Confirmed by Omero Castillo ([id number]) on 11/4/2022 2:09:09 PM    < end of copied text >
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
## 2022-11-06 LAB
GLUCOSE BLDC GLUCOMTR-MCNC: 190 MG/DL — HIGH (ref 70–99)
GLUCOSE BLDC GLUCOMTR-MCNC: 197 MG/DL — HIGH (ref 70–99)

## 2022-11-06 RX ADMIN — LOSARTAN POTASSIUM 50 MILLIGRAM(S): 100 TABLET, FILM COATED ORAL at 08:27

## 2022-11-06 RX ADMIN — ARIPIPRAZOLE 5 MILLIGRAM(S): 15 TABLET ORAL at 20:18
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
## 2022-11-06 NOTE — PROGRESS NOTE BEHAVIORAL HEALTH - NSBHCHARTREVIEWINVESTIGATE_PSY_A_CORE FT
< from: 12 Lead ECG (11.04.22 @ 09:15) >    Ventricular Rate 116 BPM    Atrial Rate 116 BPM    P-R Interval 148 ms    QRS Duration 94 ms    Q-T Interval 350 ms    QTC Calculation(Bazett) 486 ms    P Axis 73 degrees    R Axis 78 degrees    T Axis 76 degrees    Diagnosis Line Sinus tachycardia  Incomplete right bundle branch block  Borderline ECG    Confirmed by Omero Castillo ([id number]) on 11/4/2022 2:09:09 PM    < end of copied text >

## 2022-11-06 NOTE — PROGRESS NOTE BEHAVIORAL HEALTH - OTHER
"Im fine" oddly related odd affect no overt delusions but endorses some paranoid ideations that she states were true a week ago, not true today

## 2022-11-06 NOTE — PROGRESS NOTE BEHAVIORAL HEALTH - SUMMARY
55 yo woman with no formal psychiatric history with a medical history of NIDDM and Thyroid Disease who presents with an acute change in behavior and mentation characterized as carlton. Patient's behavior has become aggressive and disruptive and her symptoms preclude her being safely managed in an outpatient setting. Patient requires inpatient admission to be assessed, to treat her acute symptoms and to fashion a safe discharge plan.  Bipolar Disorder (current episode manic) v Mood Disorder due to underlying medical condition    Patient seen and evaluated. As per nursing report continues to refuse all meds, medical and psychiatric. Patient continues to present as disorganized, paranoid, labile, tangential. Writer unsure at this time if patient has and underlying psychiatric condition, possibly Bipolar disorder. Or patients condition is 2/2 substance use. Will continue to monitor. Continues to refuse all meds, labs, fs despite much encouragement from nurses and writer. Explained to patient that her BP is elevated so she needs to take her blood pressure meds. Refused EKG yesterday. EKG showed QTC was 510 on 11/2 and 486 on 11/4; will continue to monitor.  Denies any A/V hallucinations. Denies any suicidal/homicidal ideations. Patient encouraged to get out of her room and attend groups. She remains disorganized in thought process, oddly related/odd affect but overall improved.  Insight remains poor.  She continues to require inpatient psychiatric hospitalization for stabilization.  Consider rehab for dispo planning.        #Mood disorder with psychosis vs substance induced mood disorder  -Abilify 5mg Daily    #Cocaine use  - UDS positive for cocaine  - consider rehab vs. outpatient substance use Tx    #Tobacco use  -Nicotine Gum PRN    -Hydroxyzine 50mg Q6 PRN for anxiety/insomnia  -Haldol 2mg Q6 PRN for agitation/psychosis  -Benadryl 25mg Q6 PRN got EPS  -Lorazepam 1mg Q6 PRN for aggression    #Medical meds  -Metoprolol Succinate  -Synthroid  -Losartan  -Lyrica  -d/c insulin --> start Trulicity (family will bring in)    #Agitation  -for agitation not amenable to verbal redirection, may give haldol 5 mg q6h prn, ativan 2 mg q6h prn, benadryl 50 mg q6h prn with escalation to IM if pt is a danger to self or/and others with repeat EKG to ensure QTc <500 ms.

## 2022-11-07 LAB
BENZOYLEGONINE, UR RESULT: SIGNIFICANT CHANGE UP NG/ML
BZE UR QL SCN: SIGNIFICANT CHANGE UP NG/ML
COCAINE IN-HOUSE INTERPRETATION: POSITIVE
COCAINE UR QL SCN: POSITIVE
GLUCOSE BLDC GLUCOMTR-MCNC: 194 MG/DL — HIGH (ref 70–99)
GLUCOSE BLDC GLUCOMTR-MCNC: 210 MG/DL — HIGH (ref 70–99)
GLUCOSE BLDC GLUCOMTR-MCNC: 241 MG/DL — HIGH (ref 70–99)

## 2022-11-07 PROCEDURE — 99231 SBSQ HOSP IP/OBS SF/LOW 25: CPT

## 2022-11-07 RX ORDER — DIPHENHYDRAMINE HCL 50 MG
50 CAPSULE ORAL ONCE
Refills: 0 | Status: COMPLETED | OUTPATIENT
Start: 2022-11-07 | End: 2022-11-07

## 2022-11-07 RX ORDER — HALOPERIDOL DECANOATE 100 MG/ML
5 INJECTION INTRAMUSCULAR ONCE
Refills: 0 | Status: COMPLETED | OUTPATIENT
Start: 2022-11-07 | End: 2022-11-07

## 2022-11-07 RX ORDER — DIPHENHYDRAMINE HCL 50 MG
50 CAPSULE ORAL EVERY 6 HOURS
Refills: 0 | Status: DISCONTINUED | OUTPATIENT
Start: 2022-11-07 | End: 2022-11-28

## 2022-11-07 RX ORDER — HALOPERIDOL DECANOATE 100 MG/ML
5 INJECTION INTRAMUSCULAR EVERY 6 HOURS
Refills: 0 | Status: DISCONTINUED | OUTPATIENT
Start: 2022-11-07 | End: 2022-11-28

## 2022-11-07 RX ORDER — ARIPIPRAZOLE 15 MG/1
10 TABLET ORAL AT BEDTIME
Refills: 0 | Status: ACTIVE | OUTPATIENT
Start: 2022-11-07 | End: 2023-10-06

## 2022-11-07 RX ADMIN — Medication 2 MILLIGRAM(S): at 11:54

## 2022-11-07 RX ADMIN — Medication 50 MILLIGRAM(S): at 11:53

## 2022-11-07 RX ADMIN — HALOPERIDOL DECANOATE 5 MILLIGRAM(S): 100 INJECTION INTRAMUSCULAR at 11:54

## 2022-11-07 NOTE — PROGRESS NOTE BEHAVIORAL HEALTH - NSBHFUPINTERVALHXFT_PSY_A_CORE
Patient seen and evaluated. As per nursing report patient disruptive on the unit this weekend requiring IM medication. Continues to refuse meds. Was compliant with Abilify last night. Patient continues to present as disorganized, mood labile, paranoid, tangential. Will titrate Abilify accordingly. Denies any A/V hallucinations. Denies any suicidal/homicidal ideations. Patient encouraged to get out of her room and attend groups.    Writer notified after morning rounds patient becoming increasingly agitated, disruptive on unit. Writer assessed patient. Refusing PO PRN's. Efforts to de-escalate failed.  Patient escorted to her room. Haldol 5mg IM, Benadryl 50mg IM, Ativan 2mg IM ordered and given without incident.

## 2022-11-07 NOTE — CHART NOTE - NSCHARTNOTEFT_GEN_A_CORE
Writer notified patient becoming increasingly agitated, disruptive on unit. Refusing PO PRN's. Efforts to de-escalate failed.  Patient escorted to her room. Haldol 5mg IM, Benadryl 50mg IM, Ativan 2mg IM ordered and given without incident. Writer notified patient becoming increasingly agitated, disruptive on unit. Writer assessed patient. Refusing PO PRN's. Efforts to de-escalate failed.  Patient escorted to her room. Haldol 5mg IM, Benadryl 50mg IM, Ativan 2mg IM ordered and given without incident.

## 2022-11-07 NOTE — CHART NOTE - NSCHARTNOTEFT_GEN_A_CORE
Social Work Note:       Mounika  remains on the unit for continued treatment, safety, and observation.  Treatment team met with patient this morning on unit rounds.  Patient was calm and superficially cooperative.  She said she’s doing fine and offered no complaints.  Patient continues to refuse meds despite encouragement.  She remains disorganized and labile.  Patient has periods of irritability and agitation.  She denies SI/HI and A/V/H.      Once stable for discharge, patient will return to her home on [address] with her family.  Plan is for referral for continued mental health treatment in an outpatient setting.      Mental Status Exam:      Mood – Labile    Sleep  - Fair  Appetite – Good  ADLs – Fair  Thought Process – Disorganized/Tangential  Observation – q10 minutes.

## 2022-11-07 NOTE — PROGRESS NOTE BEHAVIORAL HEALTH - NSBHCHARTREVIEWINVESTIGATE_PSY_A_CORE FT
< from: 12 Lead ECG (11.04.22 @ 09:15) >    Ventricular Rate 116 BPM    Atrial Rate 116 BPM    P-R Interval 148 ms    QRS Duration 94 ms    Q-T Interval 350 ms    QTC Calculation(Bazett) 486 ms    P Axis 73 degrees    R Axis 78 degrees    T Axis 76 degrees    Diagnosis Line Sinus tachycardia  Incomplete right bundle branch block  Borderline ECG    Confirmed by Omero Castillo ([id number]) on 11/4/2022 2:09:09 PM    < end of copied text >

## 2022-11-07 NOTE — PROGRESS NOTE BEHAVIORAL HEALTH - SUMMARY
55 yo woman with no formal psychiatric history with a medical history of NIDDM and Thyroid Disease who presents with an acute change in behavior and mentation characterized as carlton. Patient's behavior has become aggressive and disruptive and her symptoms preclude her being safely managed in an outpatient setting. Patient requires inpatient admission to be assessed, to treat her acute symptoms and to fashion a safe discharge plan.  Bipolar Disorder (current episode manic) v Mood Disorder due to underlying medical condition    Patient seen and evaluated. As per nursing report patient disruptive on the unit this weekend requiring IM medication. Continues to refuse meds. Was compliant with Abilify last night. Patient continues to present as disorganized, mood labile, paranoid, tangential. Will titrate Abilify accordingly. Denies any A/V hallucinations. Denies any suicidal/homicidal ideations. Patient encouraged to get out of her room and attend groups.    #Mood disorder with psychosis vs substance induced mood disorder  -Abilify 10mg bedtime    #Cocaine use  - UDS positive for cocaine  - consider rehab vs. outpatient substance use Tx    #Tobacco use  -Nicotine Gum PRN    -Hydroxyzine 50mg Q6 PRN for anxiety/insomnia  -Haldol 2mg Q6 PRN for agitation/psychosis  -Benadryl 25mg Q6 PRN got EPS  -Lorazepam 1mg Q6 PRN for aggression    #Medical meds  -Metoprolol Succinate  -Synthroid  -Losartan  -Lyrica  -d/c insulin --> start Trulicity (family will bring in)    #Agitation  -for agitation not amenable to verbal redirection, may give haldol 5 mg q6h prn, ativan 2 mg q6h prn, benadryl 50 mg q6h prn with escalation to IM if pt is a danger to self or/and others with repeat EKG to ensure QTc <500 ms. 57 yo woman with no formal psychiatric history with a medical history of NIDDM and Thyroid Disease who presents with an acute change in behavior and mentation characterized as carlton. Patient's behavior has become aggressive and disruptive and her symptoms preclude her being safely managed in an outpatient setting. Patient requires inpatient admission to be assessed, to treat her acute symptoms and to fashion a safe discharge plan.  Bipolar Disorder (current episode manic) v Mood Disorder due to underlying medical condition    Patient seen and evaluated. As per nursing report patient disruptive on the unit this weekend requiring IM medication. Continues to refuse meds. Was compliant with Abilify last night. Patient continues to present as disorganized, mood labile, paranoid, tangential. Will titrate Abilify accordingly. Denies any A/V hallucinations. Denies any suicidal/homicidal ideations. Patient encouraged to get out of her room and attend groups.    #Mood disorder with psychosis vs substance induced mood disorder  -Abilify 10mg bedtime    #Cocaine use  - UDS positive for cocaine  - consider rehab vs. outpatient substance use Tx    #Tobacco use  -Nicotine Gum PRN    -Hydroxyzine 50mg Q6 PRN for anxiety/insomnia  -Haldol 5mg Q6 PRN for agitation/psychosis  -Benadryl 50mg Q6 PRN got EPS  -Lorazepam 2mg Q6 PRN for aggression    #Medical meds  -Metoprolol Succinate  -Synthroid  -Losartan  -Lyrica  -d/c insulin --> start Trulicity (family will bring in)    #Agitation  -for agitation not amenable to verbal redirection, may give haldol 5 mg q6h prn, ativan 2 mg q6h prn, benadryl 50 mg q6h prn with escalation to IM if pt is a danger to self or/and others with repeat EKG to ensure QTc <500 ms.

## 2022-11-08 LAB
GLUCOSE BLDC GLUCOMTR-MCNC: 191 MG/DL — HIGH (ref 70–99)
GLUCOSE BLDC GLUCOMTR-MCNC: 233 MG/DL — HIGH (ref 70–99)
GLUCOSE BLDC GLUCOMTR-MCNC: 270 MG/DL — HIGH (ref 70–99)

## 2022-11-08 PROCEDURE — 99231 SBSQ HOSP IP/OBS SF/LOW 25: CPT

## 2022-11-08 RX ORDER — SALICYLIC ACID 0.5 %
1 CLEANSER (GRAM) TOPICAL EVERY 6 HOURS
Refills: 0 | Status: DISCONTINUED | OUTPATIENT
Start: 2022-11-08 | End: 2022-11-28

## 2022-11-08 RX ORDER — ARIPIPRAZOLE 15 MG/1
10 TABLET ORAL
Refills: 0 | Status: DISCONTINUED | OUTPATIENT
Start: 2022-11-08 | End: 2022-11-13

## 2022-11-08 RX ADMIN — ARIPIPRAZOLE 10 MILLIGRAM(S): 15 TABLET ORAL at 15:01

## 2022-11-08 NOTE — PROGRESS NOTE BEHAVIORAL HEALTH - NSBHFUPINTERVALHXFT_PSY_A_CORE
Patient seen and evaluated. As per nursing report continues to refuse meds. On approach patient calm, no agitation or aggression noted. When asked why she refused her medication patient stated she took her medication.  Writer stated she refused Abilify last night and her medical meds this morning. Patient then stated she will take her Abilify tonight. Writer recommended patient taking the Abilify during the day when writer was present. Patient stated she requested it to be switched at night because it makes her sleepy. Patient eventually agreed to take it in the afternoon after visiting. Patient continues to present as disorganized, mood labile, tangential. Will titrate Abilify accordingly. Denies any A/V hallucinations. Denies any suicidal/homicidal ideations. Patient visible on the unit engaging with peers and attending groups. CATCH team is involved. Patient now refusing to go to rehab. Patient seen and evaluated. As per nursing report continues to refuse meds. On approach patient calm, no agitation or aggression noted. When asked why she refused her medication patient stated she took her medication.  Writer stated she refused Abilify last night and her medical meds this morning. Patient then stated she will take her Abilify tonight. Writer recommended patient taking the Abilify during the day when writer was present. Patient stated she requested it to be switched at night because it makes her sleepy. Patient eventually agreed to take it in the afternoon after visiting. Patient continues to present as disorganized, mood labile, tangential. Will titrate Abilify accordingly. Denies any A/V hallucinations. Denies any suicidal/homicidal ideations. Patient visible on the unit engaging with peers and attending groups. CATCH team is involved. Patient now refusing to go to rehab.    Patient compliant with her Abilify this afternoon

## 2022-11-08 NOTE — PROGRESS NOTE BEHAVIORAL HEALTH - NSBHCHARTREVIEWINVESTIGATE_PSY_A_CORE FT
< from: 12 Lead ECG (11.04.22 @ 09:15) >    Ventricular Rate 116 BPM    Atrial Rate 116 BPM    P-R Interval 148 ms    QRS Duration 94 ms    Q-T Interval 350 ms    QTC Calculation(Bazett) 486 ms    P Axis 73 degrees    R Axis 78 degrees    T Axis 76 degrees    Diagnosis Line Sinus tachycardia  Incomplete right bundle branch block  Borderline ECG    Confirmed by Omero Castillo ([id number]) on 11/4/2022 2:09:09 PM    < end of copied text >

## 2022-11-08 NOTE — PROGRESS NOTE BEHAVIORAL HEALTH - SUMMARY
57 yo woman with no formal psychiatric history with a medical history of NIDDM and Thyroid Disease who presents with an acute change in behavior and mentation characterized as carlton. Patient's behavior has become aggressive and disruptive and her symptoms preclude her being safely managed in an outpatient setting. Patient requires inpatient admission to be assessed, to treat her acute symptoms and to fashion a safe discharge plan.  Bipolar Disorder (current episode manic) v Mood Disorder due to underlying medical condition    Patient seen and evaluated. As per nursing report continues to refuse meds. On approach patient calm, no agitation or aggression noted. When asked why she refused her medication patient stated she took her medication.  Writer stated she refused Abilify last night and her medical meds this morning. Patient then stated she will take her Abilify tonight. Writer recommended patient taking the Abilify during the day when writer was present. Patient stated she requested it to be switched at night because it makes her sleepy. Patient eventually agreed to take it in the afternoon after visiting. Patient continues to present as disorganized, mood labile, tangential. Will titrate Abilify accordingly. Denies any A/V hallucinations. Denies any suicidal/homicidal ideations. Patient visible on the unit engaging with peers and attending groups. CATCH team is involved. Patient now refusing to go to rehab.    #Mood disorder with psychosis vs substance induced mood disorder  -Abilify 10mg bedtime    #Cocaine use  - UDS positive for cocaine  - consider rehab vs. outpatient substance use Tx    #Tobacco use  -Nicotine Gum PRN    -Hydroxyzine 50mg Q6 PRN for anxiety/insomnia  -Haldol 5mg Q6 PRN for agitation/psychosis  -Benadryl 50mg Q6 PRN got EPS  -Lorazepam 2mg Q6 PRN for aggression    #Medical meds  -Metoprolol Succinate  -Synthroid  -Losartan  -Lyrica  -d/c insulin --> start Trulicity (family will bring in)    #Agitation  -for agitation not amenable to verbal redirection, may give haldol 5 mg q6h prn, ativan 2 mg q6h prn, benadryl 50 mg q6h prn with escalation to IM if pt is a danger to self or/and others with repeat EKG to ensure QTc <500 ms.

## 2022-11-09 LAB
GLUCOSE BLDC GLUCOMTR-MCNC: 208 MG/DL — HIGH (ref 70–99)
GLUCOSE BLDC GLUCOMTR-MCNC: 249 MG/DL — HIGH (ref 70–99)
GLUCOSE BLDC GLUCOMTR-MCNC: 274 MG/DL — HIGH (ref 70–99)
GLUCOSE BLDC GLUCOMTR-MCNC: 310 MG/DL — HIGH (ref 70–99)

## 2022-11-09 PROCEDURE — 99231 SBSQ HOSP IP/OBS SF/LOW 25: CPT

## 2022-11-09 RX ORDER — INSULIN LISPRO 100/ML
6 VIAL (ML) SUBCUTANEOUS ONCE
Refills: 0 | Status: COMPLETED | OUTPATIENT
Start: 2022-11-09 | End: 2022-11-09

## 2022-11-09 RX ADMIN — ARIPIPRAZOLE 10 MILLIGRAM(S): 15 TABLET ORAL at 15:10

## 2022-11-09 NOTE — PROGRESS NOTE BEHAVIORAL HEALTH - NSBHFUPINTERVALHXFT_PSY_A_CORE
Patient seen and evaluated. As per nursing report continues to refuse medical meds. Compliant with Abilify yesterday. On approach patient calm, no agitation or aggression noted. Patient continues to present as disorganized, mood labile, tangential. Will titrate Abilify accordingly. Again agrees to take her Abilify after visiting. Denies any A/V hallucinations. Denies any suicidal/homicidal ideations. Patient visible on the unit engaging with peers and attending groups. CATCH team is involved. Patient continuing to refuse to go to rehab.

## 2022-11-09 NOTE — PROGRESS NOTE BEHAVIORAL HEALTH - SUMMARY
55 yo woman with no formal psychiatric history with a medical history of NIDDM and Thyroid Disease who presents with an acute change in behavior and mentation characterized as carlton. Patient's behavior has become aggressive and disruptive and her symptoms preclude her being safely managed in an outpatient setting. Patient requires inpatient admission to be assessed, to treat her acute symptoms and to fashion a safe discharge plan.  Bipolar Disorder (current episode manic) v Mood Disorder due to underlying medical condition    Patient seen and evaluated. As per nursing report continues to refuse medical meds. Compliant with Abilify yesterday. On approach patient calm, no agitation or aggression noted. Patient continues to present as disorganized, mood labile, tangential. Will titrate Abilify accordingly. Again agrees to take her Abilify after visiting. Denies any A/V hallucinations. Denies any suicidal/homicidal ideations. Patient visible on the unit engaging with peers and attending groups. CATCH team is involved. Patient continuing to refuse to go to rehab.    #Mood disorder with psychosis vs substance induced mood disorder  -Abilify 10mg bedtime    #Cocaine use  - UDS positive for cocaine  - consider rehab vs. outpatient substance use Tx    #Tobacco use  -Nicotine Gum PRN    -Hydroxyzine 50mg Q6 PRN for anxiety/insomnia  -Haldol 5mg Q6 PRN for agitation/psychosis  -Benadryl 50mg Q6 PRN got EPS  -Lorazepam 2mg Q6 PRN for aggression    #Medical meds  -Metoprolol Succinate  -Synthroid  -Losartan  -d/c insulin --> start Trulicity (family will bring in)    #Agitation  -for agitation not amenable to verbal redirection, may give haldol 5 mg q6h prn, ativan 2 mg q6h prn, benadryl 50 mg q6h prn with escalation to IM if pt is a danger to self or/and others with repeat EKG to ensure QTc <500 ms.

## 2022-11-09 NOTE — PROGRESS NOTE BEHAVIORAL HEALTH - NSBHCHARTREVIEWINVESTIGATE_PSY_A_CORE FT
< from: 12 Lead ECG (11.04.22 @ 09:15) >    Ventricular Rate 116 BPM    Atrial Rate 116 BPM    P-R Interval 148 ms    QRS Duration 94 ms    Q-T Interval 350 ms    QTC Calculation(Bazett) 486 ms    P Axis 73 degrees    R Axis 78 degrees    T Axis 76 degrees    Diagnosis Line Sinus tachycardia  Incomplete right bundle branch block  Borderline ECG    Confirmed by Omero Castillo ([id number]) on 11/4/2022 2:09:09 PM    < end of copied text >

## 2022-11-09 NOTE — CHART NOTE - NSCHARTNOTEFT_GEN_A_CORE
Social Work Note:       Mounika  remains on the unit for continued treatment, safety, and observation.  Treatment team met with patient this morning on unit rounds.  Patient was calm and superficially cooperative.  She said she’s doing fine and offered no complaints.  Patient continues to refuse all medical medications.  She takes psychiatric medications reluctantly.  Patient is bizarre, disorganized, and illogical.  She denies SI/HI and A/V/H.  She has been visible on the unit, socializing with peers and attending groups.       Once stable for discharge, patient will return to her home on [address] with her family.  Plan is for referral for continued mental health treatment in an outpatient setting.      At this time patient is not psychiatrically stable for discharge.     Mental Status Exam:      Mood – Labile    Sleep  - Fair  Appetite – Good  ADLs – Fair  Thought Process – Disorganized/ Illogical  Observation – q10 minutes.

## 2022-11-10 LAB
GLUCOSE BLDC GLUCOMTR-MCNC: 222 MG/DL — HIGH (ref 70–99)
GLUCOSE BLDC GLUCOMTR-MCNC: 238 MG/DL — HIGH (ref 70–99)
GLUCOSE BLDC GLUCOMTR-MCNC: 253 MG/DL — HIGH (ref 70–99)

## 2022-11-10 PROCEDURE — 99231 SBSQ HOSP IP/OBS SF/LOW 25: CPT

## 2022-11-10 RX ADMIN — ARIPIPRAZOLE 10 MILLIGRAM(S): 15 TABLET ORAL at 14:41

## 2022-11-10 NOTE — PROGRESS NOTE BEHAVIORAL HEALTH - NSBHCHARTREVIEWINVESTIGATE_PSY_A_CORE FT
< from: 12 Lead ECG (11.04.22 @ 09:15) >    Ventricular Rate 116 BPM    Atrial Rate 116 BPM    P-R Interval 148 ms    QRS Duration 94 ms    Q-T Interval 350 ms    QTC Calculation(Bazett) 486 ms    P Axis 73 degrees    R Axis 78 degrees    T Axis 76 degrees    Diagnosis Line Sinus tachycardia  Incomplete right bundle branch block  Borderline ECG    Confirmed by Omero Castillo ([id number]) on 11/4/2022 2:09:09 PM    < end of copied text >

## 2022-11-10 NOTE — PROGRESS NOTE BEHAVIORAL HEALTH - NSBHFUPINTERVALHXFT_PSY_A_CORE
Patient seen and evaluated. As per nursing report continues to refuse medical meds. Compliant with Abilify. On approach patient calm, no agitation or aggression noted. Patient continues to present as disorganized, mood labile, tangential although less so. Will titrate Abilify accordingly. Again agrees to take her Abilify after visiting. Writer asked patient why she continues to refuse her medical meds. Patient states she’s not compliant at homes. Knows she should be. States she just needs to relax when her blood pressure is elevated. States she will continue to take her Trulicity weekly but is not compliant with her other diabetic meds. States does not want to be on a lot of medication. Denies any A/V hallucinations. Denies any suicidal/homicidal ideations. Patient visible on the unit engaging with peers and attending groups. CATCH team is involved. Patient continuing to refuse to go to rehab. Patient seen and evaluated. As per nursing report continues to refuse medical meds. Compliant with Abilify. On approach patient calm, no agitation or aggression noted. Patient continues to present as disorganized, mood labile, tangential although less so. Will titrate Abilify accordingly. Again agrees to take her Abilify after visiting. Writer asked patient why she continues to refuse her medical meds. Patient states she’s not compliant at homes. Knows she should be. States she just needs to relax when her blood pressure is elevated. States she will continue to take her Trulicity weekly but is not compliant with her other diabetic meds. States does not want to be on a lot of medication. Denies any A/V hallucinations. Denies any suicidal/homicidal ideations. Patient visible on the unit engaging with peers and attending groups. CATCH team is involved. Patient continuing to refuse to go to rehab.    Spoke to patients son Gonzalez [phone]. Updated him on patients care and progress Patient seen and evaluated. As per nursing report continues to refuse medical meds. Compliant with Abilify. On approach patient calm, no agitation or aggression noted. Patient continues to present as disorganized, mood labile, tangential although less so. Will titrate Abilify accordingly. Again agrees to take her Abilify after visiting. Writer asked patient why she continues to refuse her medical meds. Patient states she’s not compliant at home. Knows she should be. States she just needs to relax when her blood pressure is elevated. States she will continue to take her Trulicity weekly but is not compliant with her other diabetic meds. States does not want to be on a lot of medication. Denies any A/V hallucinations. Denies any suicidal/homicidal ideations. Patient visible on the unit engaging with peers and attending groups. CATCH team is involved. Patient continuing to refuse to go to rehab.    Spoke to patients son Gonzalez [phone]. Updated him on patients care and progress
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
## 2022-11-10 NOTE — PROGRESS NOTE BEHAVIORAL HEALTH - SUMMARY
57 yo woman with no formal psychiatric history with a medical history of NIDDM and Thyroid Disease who presents with an acute change in behavior and mentation characterized as carlton. Patient's behavior has become aggressive and disruptive and her symptoms preclude her being safely managed in an outpatient setting. Patient requires inpatient admission to be assessed, to treat her acute symptoms and to fashion a safe discharge plan.  Bipolar Disorder (current episode manic) v Mood Disorder due to underlying medical condition    Patient seen and evaluated. As per nursing report continues to refuse medical meds. Compliant with Abilify. On approach patient calm, no agitation or aggression noted. Patient continues to present as disorganized, mood labile, tangential although less so. Will titrate Abilify accordingly. Again agrees to take her Abilify after visiting. Writer asked patient why she continues to refuse her medical meds. Patient states she’s not compliant at homes. Knows she should be. States she just needs to relax when her blood pressure is elevated. States she will continue to take her Trulicity weekly but is not compliant with her other diabetic meds. States does not want to be on a lot of medication. Denies any A/V hallucinations. Denies any suicidal/homicidal ideations. Patient visible on the unit engaging with peers and attending groups. CATCH team is involved. Patient continuing to refuse to go to rehab.    #Mood disorder with psychosis vs substance induced mood disorder  -Abilify 10mg bedtime    #Cocaine use  - UDS positive for cocaine  - consider rehab vs. outpatient substance use Tx    #Tobacco use  -Nicotine Gum PRN    -Hydroxyzine 50mg Q6 PRN for anxiety/insomnia  -Haldol 5mg Q6 PRN for agitation/psychosis  -Benadryl 50mg Q6 PRN got EPS  -Lorazepam 2mg Q6 PRN for aggression    #Medical meds  -Metoprolol Succinate  -Synthroid  -Losartan  -d/c insulin --> start Trulicity (family will bring in)    #Agitation  -for agitation not amenable to verbal redirection, may give haldol 5 mg q6h prn, ativan 2 mg q6h prn, benadryl 50 mg q6h prn with escalation to IM if pt is a danger to self or/and others with repeat EKG to ensure QTc <500 ms. 55 yo woman with no formal psychiatric history with a medical history of NIDDM and Thyroid Disease who presents with an acute change in behavior and mentation characterized as carlton. Patient's behavior has become aggressive and disruptive and her symptoms preclude her being safely managed in an outpatient setting. Patient requires inpatient admission to be assessed, to treat her acute symptoms and to fashion a safe discharge plan.  Bipolar Disorder (current episode manic) v Mood Disorder due to underlying medical condition    Patient seen and evaluated. As per nursing report continues to refuse medical meds. Compliant with Abilify. On approach patient calm, no agitation or aggression noted. Patient continues to present as disorganized, mood labile, tangential although less so. Will titrate Abilify accordingly. Again agrees to take her Abilify after visiting. Writer asked patient why she continues to refuse her medical meds. Patient states she’s not compliant at home. Knows she should be. States she just needs to relax when her blood pressure is elevated. States she will continue to take her Trulicity weekly but is not compliant with her other diabetic meds. States does not want to be on a lot of medication. Denies any A/V hallucinations. Denies any suicidal/homicidal ideations. Patient visible on the unit engaging with peers and attending groups. CATCH team is involved. Patient continuing to refuse to go to rehab.    #Mood disorder with psychosis vs substance induced mood disorder  -Abilify 10mg bedtime    #Cocaine use  - UDS positive for cocaine  - consider rehab vs. outpatient substance use Tx    #Tobacco use  -Nicotine Gum PRN    -Hydroxyzine 50mg Q6 PRN for anxiety/insomnia  -Haldol 5mg Q6 PRN for agitation/psychosis  -Benadryl 50mg Q6 PRN got EPS  -Lorazepam 2mg Q6 PRN for aggression    #Medical meds  -Metoprolol Succinate  -Synthroid  -Losartan  -d/c insulin --> start Trulicity (family will bring in)    #Agitation  -for agitation not amenable to verbal redirection, may give haldol 5 mg q6h prn, ativan 2 mg q6h prn, benadryl 50 mg q6h prn with escalation to IM if pt is a danger to self or/and others with repeat EKG to ensure QTc <500 ms.

## 2022-11-11 LAB
A1C WITH ESTIMATED AVERAGE GLUCOSE RESULT: 8.7 % — HIGH (ref 4–5.6)
CHOLEST SERPL-MCNC: 156 MG/DL — SIGNIFICANT CHANGE UP
ESTIMATED AVERAGE GLUCOSE: 203 MG/DL — HIGH (ref 68–114)
GLUCOSE BLDC GLUCOMTR-MCNC: 197 MG/DL — HIGH (ref 70–99)
GLUCOSE BLDC GLUCOMTR-MCNC: 197 MG/DL — HIGH (ref 70–99)
GLUCOSE BLDC GLUCOMTR-MCNC: 203 MG/DL — HIGH (ref 70–99)
GLUCOSE BLDC GLUCOMTR-MCNC: 275 MG/DL — HIGH (ref 70–99)
HDLC SERPL-MCNC: 42 MG/DL — LOW
LIPID PNL WITH DIRECT LDL SERPL: 85 MG/DL — SIGNIFICANT CHANGE UP
NON HDL CHOLESTEROL: 114 MG/DL — SIGNIFICANT CHANGE UP
TRIGL SERPL-MCNC: 144 MG/DL — SIGNIFICANT CHANGE UP

## 2022-11-11 PROCEDURE — 99231 SBSQ HOSP IP/OBS SF/LOW 25: CPT

## 2022-11-11 RX ADMIN — Medication 30 MILLILITER(S): at 23:40

## 2022-11-11 RX ADMIN — Medication 2 MILLIGRAM(S): at 19:15

## 2022-11-11 RX ADMIN — Medication 2 MILLIGRAM(S): at 15:04

## 2022-11-11 RX ADMIN — ARIPIPRAZOLE 10 MILLIGRAM(S): 15 TABLET ORAL at 14:50

## 2022-11-11 NOTE — CHART NOTE - NSCHARTNOTEFT_GEN_A_CORE
INTERVAL DATA:   · Interval Chief Complaint: "I'm feeling good"  · Interval History: Patient seen and evaluated. As per nursing report continues to refuse medical meds. Compliant with Abilify yesterday. On approach patient calm, no agitation or aggression noted. Patient continues to present as disorganized, mood labile, tangential, and paranoid. Will titrate Abilify accordingly. Again agrees to take her Abilify after visiting. Patient continues to refuse medical medications without logical reason as to why, but states she is not always compliant with meds at home. Denies any A/V hallucinations. Denies any suicidal/homicidal ideations. Patient visible on the unit engaging with peers and attending groups. CATCH team is involved. Discussed discharge next week with patient, and she feels ready to go. Patient continuing to refuse to go to rehab, states she wants to go to [place] and stay with family. Patient states she is interested in outpatient treatment.    MENTAL STATUS EXAM:   · General Appearance	No deformities present  · Body Habitus	Average build  · Hygiene 	Fair  · Grooming	Fair  · Behavior	cooperative  · Eye Contact	Fair  · Relatedness	Normal  · Impulse Control	Impaired  · Muscle Tone / Strength	Normal muscle tone/strength  · Abnormal Movements	No abnormal movements  · Gait / Station	Normal gait / station  · Speech Volume	Normal  · Speech Rate	Normal  · Speech Spontaneity	Normal  · Speech Articulation	Normal  · Reported mood	Normal    · Affect Quality	Euthymic  Other  · Other	odd affect  · Affect Range	Labile  · Affect Congruence	Congruent  · Thought Process	Disorganized/ Tangential  · Thought Associations	Normal  · Thought Content	Unremarkable  · Perceptions	no abnormalities  · Oriented to Time	Yes  · Oriented to Place	Yes  · Oriented to Situation	Yes  · Oriented to Person	Yes  · Attention / Concentration	Normal  · Estimated Intelligence	Average  · Recent Memory	Normal  · Remote Memory	Normal  · Fund of Knowledge	Normal  · Judgment (regarding everyday events)	Fair  · Insight (regarding psychiatric illness)	Poor    SUICIDALITY:   · Suicidality (Interval)	none known    HOMICIDALITY/AGGRESSION:   · Homicidality/Aggression	none known    DIAGNOSIS DSM-V:    Psychiatric Diagnosis (Corresponds to DSM-IV Axis I, II):  Primary Dx Mood disorder with psychosis F39. Secondary Dx 1 Tobacco use Z72.0. Secondary Dx 2 Cocaine use F14.90.    Plan    #Mood disorder with psychosis vs substance induced mood disorder  -Abilify 10mg bedtime    #Cocaine use  - UDS positive for cocaine  - consider rehab vs. outpatient substance use Tx    #Tobacco use  -Nicotine Gum PRN    -Hydroxyzine 50mg Q6 PRN for anxiety/insomnia  -Haldol 5mg Q6 PRN for agitation/psychosis  -Benadryl 50mg Q6 PRN got EPS  -Lorazepam 2mg Q6 PRN for aggression    #Medical meds  -Metoprolol Succinate  -Synthroid  -Losartan  -d/c insulin --> start Trulicity (family will bring in)    #Agitation  -for agitation not amenable to verbal redirection, may give haldol 5 mg q6h prn, ativan 2 mg q6h prn, benadryl 50 mg q6h prn with escalation to IM if pt is a danger to self or/and others with repeat EKG to ensure QTc <500 ms

## 2022-11-11 NOTE — PROGRESS NOTE BEHAVIORAL HEALTH - NSBHCHARTREVIEWINVESTIGATE_PSY_A_CORE FT
< from: 12 Lead ECG (11.04.22 @ 09:15) >    Ventricular Rate 116 BPM    Atrial Rate 116 BPM    P-R Interval 148 ms    QRS Duration 94 ms    Q-T Interval 350 ms    QTC Calculation(Bazett) 486 ms    P Axis 73 degrees    R Axis 78 degrees    T Axis 76 degrees    Diagnosis Line Sinus tachycardia  Incomplete right bundle branch block  Borderline ECG    Confirmed by Omero Castillo ([id number]) on 11/4/2022 2:09:09 PM    < end of copied text >

## 2022-11-11 NOTE — CHART NOTE - NSCHARTNOTEFT_GEN_A_CORE
Social Work Note:       Mounika  remains on the unit for continued treatment, safety, and observation.  Patient attended treatment team meeting today.  She was calm and pleasant.  She was much more cooperative than on previous evaluations.  She said she feels good and did not verbalize any complaints.  She remains disorganized and illogical at times but much less so than on admission.  She denies SI/HI and A/V/H.  Patient continues to refuse medical medications for unclear reasons but has been compliant with Abilify.  She has been social with peers and in good behavioral control on the unit.         Treatment team discussed discharge plans with patient.  She reports she will return home with her family.  She is agreeable to a referral to [facility].    SW and NP spoke to patient’s son Gonzalez Godinez [phone] yesterday 11/10 and provided an update on patients care and progress.      At this time patient is not psychiatrically stable for discharge.     Mental Status Exam:      Mood – Euthymic    Sleep  - Good  Appetite – Good  ADLs – Good  Thought Process – Disorganized  Observation – q10 minutes.

## 2022-11-11 NOTE — PROGRESS NOTE BEHAVIORAL HEALTH - NSBHFUPINTERVALHXFT_PSY_A_CORE
Patient seen and evaluated in treatment team. As per nursing report continues to refuse medical meds. Compliant with Abilify. No PRNs. On approach patient calm, no agitation or aggression noted. Patient less disorganized, less labile. Appears to be responding to the medication.  No overt delusions noted. Again agrees to take her Abilify after visiting. Denies any A/V hallucinations. Denies any suicidal/homicidal ideations. Discussed possible discharge early next week provided patient continues to improve over the weekend. Patient is agreeable to [facility] outpatient. Patient visible on the unit engaging with peers and attending groups. CATCH team is involved. Patient continuing to refuse to go to rehab.

## 2022-11-11 NOTE — PROGRESS NOTE BEHAVIORAL HEALTH - SUMMARY
55 yo woman with no formal psychiatric history with a medical history of NIDDM and Thyroid Disease who presents with an acute change in behavior and mentation characterized as carlton. Patient's behavior has become aggressive and disruptive and her symptoms preclude her being safely managed in an outpatient setting. Patient requires inpatient admission to be assessed, to treat her acute symptoms and to fashion a safe discharge plan.  Bipolar Disorder (current episode manic) v Mood Disorder due to underlying medical condition    Patient seen and evaluated in treatment team. As per nursing report continues to refuse medical meds. Compliant with Abilify. No PRNs. On approach patient calm, no agitation or aggression noted. Patient less disorganized, less labile. Appears to be responding to the medication.  No overt delusions noted. Again agrees to take her Abilify after visiting. Denies any A/V hallucinations. Denies any suicidal/homicidal ideations. Discussed possible discharge early next week provided patient continues to improve over the weekend. Patient is agreeable to [facility] outpatient. Patient visible on the unit engaging with peers and attending groups. CATCH team is involved. Patient continuing to refuse to go to rehab.    #Mood disorder with psychosis vs substance induced mood disorder  -Abilify 10mg bedtime    #Cocaine use  - UDS positive for cocaine  - consider rehab vs. outpatient substance use Tx    #Tobacco use  -Nicotine Gum PRN    -Hydroxyzine 50mg Q6 PRN for anxiety/insomnia  -Haldol 5mg Q6 PRN for agitation/psychosis  -Benadryl 50mg Q6 PRN got EPS  -Lorazepam 2mg Q6 PRN for aggression    #Medical meds  -Metoprolol Succinate  -Synthroid  -Losartan  -d/c insulin --> start Trulicity (family will bring in)    #Agitation  -for agitation not amenable to verbal redirection, may give haldol 5 mg q6h prn, ativan 2 mg q6h prn, benadryl 50 mg q6h prn with escalation to IM if pt is a danger to self or/and others with repeat EKG to ensure QTc <500 ms. 57 yo woman with no formal psychiatric history with a medical history of NIDDM and Thyroid Disease who presents with an acute change in behavior and mentation characterized as carlton. Patient's behavior has become aggressive and disruptive and her symptoms preclude her being safely managed in an outpatient setting. Patient requires inpatient admission to be assessed, to treat her acute symptoms and to fashion a safe discharge plan.  Bipolar Disorder (current episode manic) v Mood Disorder due to underlying medical condition    Patient seen and evaluated in treatment team. As per nursing report continues to refuse medical meds. Compliant with Abilify. No PRNs. On approach patient calm, no agitation or aggression noted. Patient less disorganized, less labile. Appears to be responding to the medication.  No overt delusions noted. Again agrees to take her Abilify after visiting. Denies any A/V hallucinations. Denies any suicidal/homicidal ideations. Discussed possible discharge early next week provided patient continues to improve over the weekend. Patient is agreeable to [facility] outpatient. Patient visible on the unit engaging with peers and attending groups. CATCH team is involved. Patient continuing to refuse to go to rehab.    #Mood disorder with psychosis vs substance induced mood disorder  -Abilify 10mg bedtime    #Cocaine use  - UDS positive for cocaine  - consider rehab vs. outpatient substance use Tx    #Tobacco use  -Nicotine Gum PRN    -Hydroxyzine 50mg Q6 PRN for anxiety/insomnia  -Haldol 5mg Q6 PRN for agitation/psychosis  -Benadryl 50mg Q6 PRN got EPS  -Lorazepam 2mg Q6 PRN for aggression    #Medical meds  -Metoprolol Succinate  -Synthroid  -Losartan  -Trulicity   -Maalox    #Agitation  -for agitation not amenable to verbal redirection, may give haldol 5 mg q6h prn, ativan 2 mg q6h prn, benadryl 50 mg q6h prn with escalation to IM if pt is a danger to self or/and others with repeat EKG to ensure QTc <500 ms.

## 2022-11-12 LAB
GLUCOSE BLDC GLUCOMTR-MCNC: 245 MG/DL — HIGH (ref 70–99)
GLUCOSE BLDC GLUCOMTR-MCNC: 305 MG/DL — HIGH (ref 70–99)

## 2022-11-12 RX ADMIN — Medication 30 MILLILITER(S): at 04:59

## 2022-11-12 RX ADMIN — ARIPIPRAZOLE 10 MILLIGRAM(S): 15 TABLET ORAL at 15:05

## 2022-11-12 RX ADMIN — Medication 2 MILLIGRAM(S): at 20:46

## 2022-11-12 RX ADMIN — Medication 50 MILLIGRAM(S): at 23:06

## 2022-11-13 LAB
GLUCOSE BLDC GLUCOMTR-MCNC: 215 MG/DL — HIGH (ref 70–99)
GLUCOSE BLDC GLUCOMTR-MCNC: 255 MG/DL — HIGH (ref 70–99)

## 2022-11-13 RX ORDER — ARIPIPRAZOLE 15 MG/1
10 TABLET ORAL AT BEDTIME
Refills: 0 | Status: DISCONTINUED | OUTPATIENT
Start: 2022-11-13 | End: 2022-11-15

## 2022-11-13 RX ADMIN — Medication 25 MICROGRAM(S): at 06:42

## 2022-11-13 RX ADMIN — ARIPIPRAZOLE 10 MILLIGRAM(S): 15 TABLET ORAL at 20:29

## 2022-11-14 LAB
GLUCOSE BLDC GLUCOMTR-MCNC: 235 MG/DL — HIGH (ref 70–99)
GLUCOSE BLDC GLUCOMTR-MCNC: 272 MG/DL — HIGH (ref 70–99)

## 2022-11-14 PROCEDURE — 99231 SBSQ HOSP IP/OBS SF/LOW 25: CPT

## 2022-11-14 RX ORDER — ACETAMINOPHEN 500 MG
650 TABLET ORAL EVERY 6 HOURS
Refills: 0 | Status: DISCONTINUED | OUTPATIENT
Start: 2022-11-14 | End: 2022-11-19

## 2022-11-14 RX ADMIN — ARIPIPRAZOLE 10 MILLIGRAM(S): 15 TABLET ORAL at 20:47

## 2022-11-14 RX ADMIN — Medication 25 MICROGRAM(S): at 05:49

## 2022-11-14 RX ADMIN — Medication 50 MILLIGRAM(S): at 00:25

## 2022-11-14 RX ADMIN — Medication 50 MILLIGRAM(S): at 22:52

## 2022-11-14 RX ADMIN — Medication 650 MILLIGRAM(S): at 05:48

## 2022-11-14 RX ADMIN — Medication 650 MILLIGRAM(S): at 15:09

## 2022-11-14 NOTE — PROGRESS NOTE BEHAVIORAL HEALTH - SUMMARY
57 yo woman with no formal psychiatric history with a medical history of NIDDM and Thyroid Disease who presents with an acute change in behavior and mentation characterized as carlton. Patient's behavior has become aggressive and disruptive and her symptoms preclude her being safely managed in an outpatient setting. Patient requires inpatient admission to be assessed, to treat her acute symptoms and to fashion a safe discharge plan.  Bipolar Disorder (current episode manic) v Mood Disorder due to underlying medical condition    Patient seen and evaluated. As per nursing report continues to refuse medical meds. Compliant with Abilify. On approach patient calm, no agitation or aggression noted. Patient responding well to the medication. Discussed FORBES. Patient prefers to take the pills. Discussed her substance use and how it contributed to her current hospitalization. Verbalizes wanting to stay sober. Patient  No overt delusions noted. Denies any A/V hallucinations. Denies any suicidal/homicidal ideations. Possible discharge later this week provided patient continues to improve. Patient is agreeable to [facility] outpatient. Patient visible on the unit engaging with peers and attending groups.    #Mood disorder with psychosis vs substance induced mood disorder  -Abilify 10mg bedtime    #Cocaine use  - UDS positive for cocaine  - consider rehab vs. outpatient substance use Tx    #Tobacco use  -Nicotine Gum PRN    -Hydroxyzine 50mg Q6 PRN for anxiety/insomnia  -Haldol 5mg Q6 PRN for agitation/psychosis  -Benadryl 50mg Q6 PRN got EPS  -Lorazepam 2mg Q6 PRN for aggression    #Medical meds  -Metoprolol Succinate  -Synthroid  -Losartan  -Trulicity   -Maalox    #Agitation  -for agitation not amenable to verbal redirection, may give haldol 5 mg q6h prn, ativan 2 mg q6h prn, benadryl 50 mg q6h prn with escalation to IM if pt is a danger to self or/and others with repeat EKG to ensure QTc <500 ms. 55 yo woman with no formal psychiatric history with a medical history of NIDDM and Thyroid Disease who presents with an acute change in behavior and mentation characterized as carlton. Patient's behavior has become aggressive and disruptive and her symptoms preclude her being safely managed in an outpatient setting. Patient requires inpatient admission to be assessed, to treat her acute symptoms and to fashion a safe discharge plan.  Bipolar Disorder (current episode manic) v Mood Disorder due to underlying medical condition    Patient seen and evaluated. As per nursing report continues to refuse medical meds. Compliant with Abilify. On approach patient calm, no agitation or aggression noted. Patient responding well to the medication. Discussed FORBES. Patient prefers to take the pills. Discussed her substance use and how it contributed to her current hospitalization. Verbalizes wanting to stay sober. No overt delusions noted. Denies any A/V hallucinations. Denies any suicidal/homicidal ideations. Possible discharge later this week provided patient continues to improve. Patient is agreeable to [facility] outpatient. Patient visible on the unit engaging with peers and attending groups.    #Mood disorder with psychosis vs substance induced mood disorder  -Abilify 10mg bedtime    #Cocaine use  - UDS positive for cocaine  - consider rehab vs. outpatient substance use Tx    #Tobacco use  -Nicotine Gum PRN    -Hydroxyzine 50mg Q6 PRN for anxiety/insomnia  -Haldol 5mg Q6 PRN for agitation/psychosis  -Benadryl 50mg Q6 PRN got EPS  -Lorazepam 2mg Q6 PRN for aggression    #Medical meds  -Metoprolol Succinate  -Synthroid  -Losartan  -Trulicity   -Maalox    #Agitation  -for agitation not amenable to verbal redirection, may give haldol 5 mg q6h prn, ativan 2 mg q6h prn, benadryl 50 mg q6h prn with escalation to IM if pt is a danger to self or/and others with repeat EKG to ensure QTc <500 ms.
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
## 2022-11-14 NOTE — CHART NOTE - NSCHARTNOTEFT_GEN_A_CORE
Social Work Note:       Mounika  remains on the unit for continued treatment, safety, and observation.  Treatment team met with patient on morning rounds.  She was calm, cooperative, and pleasant.  She said she feels good and did not verbalize any complaints.  Patient presents with significantly improved insight.  She discussed how substance abuse contributed to her hospital admission and expressed the desire to maintain sobriety at discharge.  She has been taking her psychiatric medications as prescribed and is agreeable to continue taking them when she leaves the hospital.  Patient denies SI/HI and A/V/H.  She has been visible on the unit, interacting with peers, watching TV, and attending groups.  She has been in good behavioral control.      Once stable for discharge, patient will return to her home on [address] with her family.  She is agreeable to referral to [facility].    Anticipated discharge later this week provided patient continues to improve.      Mental Status Exam:      Mood – Euthymic    Sleep  - Good  Appetite – Good  ADLs – Good  Thought Process – Linear  Observation – q10 minutes.

## 2022-11-14 NOTE — DISCHARGE NOTE BEHAVIORAL HEALTH - MEDICATION SUMMARY - MEDICATIONS TO STOP TAKING
I will STOP taking the medications listed below when I get home from the hospital:    oxyCODONE-acetaminophen 5 mg-325 mg oral tablet  -- 1 tab(s) by mouth every 4 to 6 hours, As Needed -for moderate pain MDD:6  -- Caution federal law prohibits the transfer of this drug to any person other  than the person for whom it was prescribed.  May cause drowsiness.  Alcohol may intensify this effect.  Use care when operating dangerous machinery.  This prescription cannot be refilled.  This product contains acetaminophen.  Do not use  with any other product containing acetaminophen to prevent possible liver damage.  Using more of this medication than prescribed may cause serious breathing problems.    Zofran 4 mg oral tablet  -- 1 tab(s) by mouth every 8 hours as needed for nausea.    amoxicillin-clavulanate 875 mg-125 mg oral tablet  -- 1 tab(s) by mouth every 12 hours   -- Finish all this medication unless otherwise directed by prescriber.  Take with food or milk.

## 2022-11-14 NOTE — DISCHARGE NOTE BEHAVIORAL HEALTH - NSBHDCDXVALIDYESFT_PSY_A_CORE
Primary Dx Mood disorder with psychosis F39. Secondary Dx 1 Tobacco use Z72.0. Secondary Dx 2 Cocaine use F14.90.

## 2022-11-14 NOTE — BH SAFETY PLAN - ENVIRONMENT SAFETY 2:
Another way I would make my environment safe would be to call for help once I get into one of these moods to call for help.

## 2022-11-14 NOTE — DISCHARGE NOTE BEHAVIORAL HEALTH - NSBHDCSWCOMMENTSFT_PSY_A_CORE
Discharge summary to [facility] Discharge summary E-Mailed to Mahesh [email or web address] on 11/28 at 10am

## 2022-11-14 NOTE — DISCHARGE NOTE BEHAVIORAL HEALTH - MEDICATION SUMMARY - MEDICATIONS TO TAKE
I will START or STAY ON the medications listed below when I get home from the hospital:    losartan 50 mg oral tablet  -- 1 tab(s) by mouth once a day  Continue to take as prescribed until told otherwise by your provider  -- Indication: For Hypertension     Trulicity Pen 1.5 mg/0.5 mL subcutaneous solution  -- 1.5 milligram(s) subcutaneous once a week Continue to take as prescribed until told otherwise by your provider  -- Indication: For Diabetes    insulin lispro 100 units/mL injectable solution  -- Administer subcutaneous 3 times a day (with meals) Continue to take as prescribed until told otherwise by your provider    1 Unit(s) if Glucose 151 - 200  2 Unit(s) if Glucose 201 - 250  3 Unit(s) if Glucose 251 - 300  4 Unit(s) if Glucose 301 - 350  5 Unit(s) if Glucose 351 - 400  6 Unit(s) if Glucose Greater Than 400  -- Indication: For Diabetes    ARIPiprazole 20 mg oral tablet  -- 1 tab(s) by mouth once a day (at bedtime)  Continue to take as prescribed until told otherwise by your provider  -- Indication: For Mood    hydrOXYzine hydrochloride 50 mg oral tablet  -- 1 tab(s) by mouth every 8 hours, As Needed -anxiety or/and insomnia Continue to take as prescribed until told otherwise by your provider  -- Indication: For Anxiety/insomnia    metoprolol succinate 50 mg oral tablet, extended release  -- 1 tab(s) by mouth once a day  -- Indication: For Hypertension    Melatonin 5 mg oral tablet  -- 1 tab(s) by mouth once a day (at bedtime)  Continue to take as prescribed until told otherwise by your provider  -- Indication: For Insomnia    nicotine 2 mg oral transmucosal gum  -- 1 gum oral transmucosal every 2 hours, As Needed -smoking cessation Continue to take as prescribed until told otherwise by your provider  -- Indication: For Smoking cessation    levothyroxine 25 mcg (0.025 mg) oral tablet  -- 1 tab(s) by mouth once a day  Continue to take as prescribed until told otherwise by your provider  -- Indication: For Hypothyroidism    Multiple Vitamins oral tablet  -- 1 tab(s) by mouth once a day  Continue to take as prescribed until told otherwise by your provider  -- Indication: For Supplement

## 2022-11-14 NOTE — DISCHARGE NOTE BEHAVIORAL HEALTH - NS MD DC FALL RISK RISK
For information on Fall & Injury Prevention, visit: [email or web address] OR  [email or web address] OR  https://www.cdc.gov/steadi/patient.html

## 2022-11-14 NOTE — PROGRESS NOTE BEHAVIORAL HEALTH - NSBHCHARTREVIEWINVESTIGATE_PSY_A_CORE FT
< from: 12 Lead ECG (11.04.22 @ 09:15) >    Ventricular Rate 116 BPM    Atrial Rate 116 BPM    P-R Interval 148 ms    QRS Duration 94 ms    Q-T Interval 350 ms    QTC Calculation(Bazett) 486 ms    P Axis 73 degrees    R Axis 78 degrees    T Axis 76 degrees    Diagnosis Line Sinus tachycardia  Incomplete right bundle branch block  Borderline ECG    Confirmed by Omero Castillo ([id number]) on 11/4/2022 2:09:09 PM    < end of copied text >

## 2022-11-14 NOTE — PROGRESS NOTE BEHAVIORAL HEALTH - NSBHFUPINTERVALHXFT_PSY_A_CORE
Patient seen and evaluated. As per nursing report continues to refuse medical meds. Compliant with Abilify. On approach patient calm, no agitation or aggression noted. Patient responding well to the medication. Discussed FORBES. Patient prefers to take the pills. Discussed her substance use and how it contributed to her current hospitalization. Verbalizes wanting to stay sober. Patient  No overt delusions noted. Denies any A/V hallucinations. Denies any suicidal/homicidal ideations. Possible discharge later this week provided patient continues to improve. Patient is agreeable to [facility] outpatient. Patient visible on the unit engaging with peers and attending groups. Patient seen and evaluated. As per nursing report continues to refuse medical meds. Compliant with Abilify. On approach patient calm, no agitation or aggression noted. Patient responding well to the medication. Discussed FORBES. Patient prefers to take the pills. Discussed her substance use and how it contributed to her current hospitalization. Verbalizes wanting to stay sober. No overt delusions noted. Denies any A/V hallucinations. Denies any suicidal/homicidal ideations. Possible discharge later this week provided patient continues to improve. Patient is agreeable to Dual focus outpatient. Patient visible on the unit engaging with peers and attending groups.

## 2022-11-15 LAB
GLUCOSE BLDC GLUCOMTR-MCNC: 249 MG/DL — HIGH (ref 70–99)
GLUCOSE BLDC GLUCOMTR-MCNC: 267 MG/DL — HIGH (ref 70–99)

## 2022-11-15 PROCEDURE — 99231 SBSQ HOSP IP/OBS SF/LOW 25: CPT

## 2022-11-15 RX ORDER — IBUPROFEN 200 MG
400 TABLET ORAL ONCE
Refills: 0 | Status: COMPLETED | OUTPATIENT
Start: 2022-11-15 | End: 2022-11-15

## 2022-11-15 RX ORDER — ARIPIPRAZOLE 15 MG/1
15 TABLET ORAL AT BEDTIME
Refills: 0 | Status: DISCONTINUED | OUTPATIENT
Start: 2022-11-15 | End: 2022-11-17

## 2022-11-15 RX ADMIN — Medication 2 MILLIGRAM(S): at 12:12

## 2022-11-15 RX ADMIN — Medication 25 MICROGRAM(S): at 06:34

## 2022-11-15 RX ADMIN — HALOPERIDOL DECANOATE 5 MILLIGRAM(S): 100 INJECTION INTRAMUSCULAR at 10:27

## 2022-11-15 RX ADMIN — Medication 650 MILLIGRAM(S): at 01:40

## 2022-11-15 RX ADMIN — Medication 650 MILLIGRAM(S): at 01:24

## 2022-11-15 RX ADMIN — Medication 50 MILLIGRAM(S): at 01:16

## 2022-11-15 RX ADMIN — Medication 400 MILLIGRAM(S): at 05:21

## 2022-11-15 RX ADMIN — ARIPIPRAZOLE 15 MILLIGRAM(S): 15 TABLET ORAL at 20:42

## 2022-11-15 NOTE — PROGRESS NOTE BEHAVIORAL HEALTH - NSBHFUPINTERVALHXFT_PSY_A_CORE
Patient seen and evaluated. As per nursing report continues to be compliant with Abilify. PRNs for sleep. On approach patient calm, no agitation or aggression noted. Patient responding well to the medication. No overt delusions noted. Denies any A/V hallucinations. Denies any suicidal/homicidal ideations. Discussed discharge. Patient is agreeable to Dual focus outpatient. Patient visible on the unit engaging with peers and attending groups. Anticipated discharge tomorrow 11/16/22. Compliant with medication. Does not warrant continued Inpatient hospitalization. Patient does not present a risk to self or others at this time. Patient visible on the unit engaging with peers and attending groups.     Later writer made aware patient acting very strangely. Writer observed patients’ behavior as erratic, mood labile. Unsure if some of it was behavioral. Unsure if patient showing more manic traits. Per staff patient was told by family that they wanted her to go the rehab. Urinalysis, urine drug screen ordered. Patient offered and accepter PRN’s.     Writer later assessed patient. Patient calm now. Writer discussed putting patient discharge on hold for now. Patient agreed. Verbalized knowing she should go to rehab but stated she was not ready yet. Expressed not feeling like herself. Writer asked about trouble sleeping last night. Writer discussed increasing Abilify. Patient in agreement.      Discharge on HOLD  Spoke to patient’s daughter Caroline (# [phone]). Updated her on patients care and progress and events of the day. Per daughter family wants patient to go to rehab. States patient does not agree to go from here they would be willing to pay for another rehab in [place]. Patient seen and evaluated. As per nursing report continues to be compliant with Abilify. PRNs for sleep. On approach patient calm, no agitation or aggression noted. Patient responding well to the medication. No overt delusions noted. Denies any A/V hallucinations. Denies any suicidal/homicidal ideations. Discussed discharge. Patient is agreeable to Dual focus outpatient. Patient visible on the unit engaging with peers and attending groups. Anticipated discharge tomorrow 11/16/22.     Later writer made aware patient acting very strangely. Writer observed patients’ behavior as erratic, mood labile. Unsure if some of it was behavioral. Unsure if patient showing more manic traits. Per staff patient was told by family that they wanted her to go the rehab. Urinalysis, urine drug screen, serum drug screen ordered. Patient offered and accepter PRN’s.     Writer later assessed patient. Patient calm now. Writer discussed putting patient discharge on hold for now. Patient agreed. Verbalized knowing she should go to rehab but stated she was not ready yet. Expressed not feeling like herself. Writer asked about trouble sleeping last night. Writer discussed increasing Abilify. Patient in agreement.      Discharge on HOLD    Spoke to patient’s daughter Caroline (# [phone]). Updated her on patients care and progress and events of the day. Per daughter family wants patient to go to rehab. States patient does not agree to go from here they would be willing to pay for another rehab in [place]. made her aware discharge on hold at this time.

## 2022-11-15 NOTE — PROVIDER CONTACT NOTE (MEDICATION) - SITUATION
pt became bizzarre singing dancing talking about [facility] was intrusive po prn of haldol not effective

## 2022-11-15 NOTE — PROGRESS NOTE BEHAVIORAL HEALTH - SUMMARY
57 yo woman with no formal psychiatric history with a medical history of NIDDM and Thyroid Disease who presents with an acute change in behavior and mentation characterized as carlton. Patient's behavior has become aggressive and disruptive and her symptoms preclude her being safely managed in an outpatient setting. Patient requires inpatient admission to be assessed, to treat her acute symptoms and to fashion a safe discharge plan.  Bipolar Disorder (current episode manic) v Mood Disorder due to underlying medical condition    Patient seen and evaluated. As per nursing report continues to be compliant with Abilify. PRNs for sleep. On approach patient calm, no agitation or aggression noted. Patient responding well to the medication. No overt delusions noted. Denies any A/V hallucinations. Denies any suicidal/homicidal ideations. Discussed discharge. Patient is agreeable to [facility] outpatient. Patient visible on the unit engaging with peers and attending groups. Anticipated discharge tomorrow 11/16/22. Compliant with medication. Does not warrant continued Inpatient hospitalization. Patient does not present a risk to self or others at this time. Patient visible on the unit engaging with peers and attending groups.     Later writer made aware patient acting very strangely. Writer observed patients’ behavior as erratic, mood labile. Unsure if some of it was behavioral. Unsure if patient showing more manic traits. Per staff patient was told by family that they wanted her to go the rehab. Urinalysis, urine drug screen ordered. Patient offered and accepter PRN’s.     Writer later assessed patient. Patient calm now. Writer discussed putting patient discharge on hold for now. Patient agreed. Verbalized knowing she should go to rehab but stated she was not ready yet. Expressed not feeling like herself. Writer asked about trouble sleeping last night. Writer discussed increasing Abilify. Patient in agreement.        #Mood disorder with psychosis vs substance induced mood disorder vs bipolar disorder  -Abilify 15mg bedtime    #Cocaine use  - UDS positive for cocaine  - consider rehab vs. outpatient substance use Tx    #Tobacco use  -Nicotine Gum PRN    -Hydroxyzine 50mg Q6 PRN for anxiety/insomnia  -Haldol 5mg Q6 PRN for agitation/psychosis  -Benadryl 50mg Q6 PRN got EPS  -Lorazepam 2mg Q6 PRN for aggression    #Medical meds  -Metoprolol Succinate  -Synthroid  -Losartan  -Trulicity   -Maalox    #Agitation  -for agitation not amenable to verbal redirection, may give haldol 5 mg q6h prn, ativan 2 mg q6h prn, benadryl 50 mg q6h prn with escalation to IM if pt is a danger to self or/and others with repeat EKG to ensure QTc <500 ms. 55 yo woman with no formal psychiatric history with a medical history of NIDDM and Thyroid Disease who presents with an acute change in behavior and mentation characterized as carlton. Patient's behavior has become aggressive and disruptive and her symptoms preclude her being safely managed in an outpatient setting. Patient requires inpatient admission to be assessed, to treat her acute symptoms and to fashion a safe discharge plan.  Bipolar Disorder (current episode manic) v Mood Disorder due to underlying medical condition    Patient seen and evaluated. As per nursing report continues to be compliant with Abilify. PRNs for sleep. On approach patient calm, no agitation or aggression noted. Patient responding well to the medication. No overt delusions noted. Denies any A/V hallucinations. Denies any suicidal/homicidal ideations. Discussed discharge. Patient is agreeable to [facility] outpatient. Patient visible on the unit engaging with peers and attending groups. Anticipated discharge tomorrow 11/16/22. Compliant with medication. Does not warrant continued Inpatient hospitalization. Patient does not present a risk to self or others at this time. Patient visible on the unit engaging with peers and attending groups.     Later writer made aware patient acting very strangely. Writer observed patients’ behavior as erratic, mood labile. Unsure if some of it was behavioral. Unsure if patient showing more manic traits. Per staff patient was told by family that they wanted her to go the rehab. Urinalysis, urine drug screen ordered. Patient offered and accepter PRN’s.     Writer later assessed patient. Patient calm now. Writer discussed putting patient discharge on hold for now. Patient agreed. Verbalized knowing she should go to rehab but stated she was not ready yet. Expressed not feeling like herself. Writer asked about trouble sleeping last night. Writer discussed increasing Abilify. Patient in agreement.    Discharge on Hold        #Mood disorder with psychosis vs substance induced mood disorder vs bipolar disorder  -Abilify 15mg bedtime    #Cocaine use  - UDS positive for cocaine  - consider rehab vs. outpatient substance use Tx    #Tobacco use  -Nicotine Gum PRN    -Hydroxyzine 50mg Q6 PRN for anxiety/insomnia  -Haldol 5mg Q6 PRN for agitation/psychosis  -Benadryl 50mg Q6 PRN got EPS  -Lorazepam 2mg Q6 PRN for aggression    #Medical meds  -Metoprolol Succinate  -Synthroid  -Losartan  -Trulicity   -Maalox    #Agitation  -for agitation not amenable to verbal redirection, may give haldol 5 mg q6h prn, ativan 2 mg q6h prn, benadryl 50 mg q6h prn with escalation to IM if pt is a danger to self or/and others with repeat EKG to ensure QTc <500 ms.

## 2022-11-15 NOTE — PROGRESS NOTE BEHAVIORAL HEALTH - NSBHCHARTREVIEWINVESTIGATE_PSY_A_CORE FT
< from: 12 Lead ECG (11.04.22 @ 09:15) >    Ventricular Rate 116 BPM    Atrial Rate 116 BPM    P-R Interval 148 ms    QRS Duration 94 ms    Q-T Interval 350 ms    QTC Calculation(Bazett) 486 ms    P Axis 73 degrees    R Axis 78 degrees    T Axis 76 degrees    Diagnosis Line Sinus tachycardia  Incomplete right bundle branch block  Borderline ECG    Confirmed by Omero Castillo ([id number]) on 11/4/2022 2:09:09 PM    < end of copied text >

## 2022-11-16 LAB
APPEARANCE UR: CLEAR — SIGNIFICANT CHANGE UP
BACTERIA # UR AUTO: ABNORMAL
BILIRUB UR-MCNC: NEGATIVE — SIGNIFICANT CHANGE UP
COLOR SPEC: YELLOW — SIGNIFICANT CHANGE UP
COMMENT - URINE: SIGNIFICANT CHANGE UP
DIFF PNL FLD: ABNORMAL
DRUG SCREEN 1, URINE RESULT: SIGNIFICANT CHANGE UP
EPI CELLS # UR: ABNORMAL /HPF
GLUCOSE BLDC GLUCOMTR-MCNC: 223 MG/DL — HIGH (ref 70–99)
GLUCOSE BLDC GLUCOMTR-MCNC: 246 MG/DL — HIGH (ref 70–99)
GLUCOSE BLDC GLUCOMTR-MCNC: 282 MG/DL — HIGH (ref 70–99)
GLUCOSE UR QL: >=1000 MG/DL
KETONES UR-MCNC: NEGATIVE — SIGNIFICANT CHANGE UP
LEUKOCYTE ESTERASE UR-ACNC: ABNORMAL
NITRITE UR-MCNC: NEGATIVE — SIGNIFICANT CHANGE UP
PH UR: 7 — SIGNIFICANT CHANGE UP (ref 5–8)
PROT UR-MCNC: NEGATIVE MG/DL — SIGNIFICANT CHANGE UP
RBC CASTS # UR COMP ASSIST: SIGNIFICANT CHANGE UP /HPF
SP GR SPEC: 1.02 — SIGNIFICANT CHANGE UP (ref 1.01–1.03)
UROBILINOGEN FLD QL: 1 MG/DL
WBC UR QL: SIGNIFICANT CHANGE UP /HPF

## 2022-11-16 PROCEDURE — 11721 DEBRIDE NAIL 6 OR MORE: CPT

## 2022-11-16 PROCEDURE — 99231 SBSQ HOSP IP/OBS SF/LOW 25: CPT

## 2022-11-16 PROCEDURE — 99221 1ST HOSP IP/OBS SF/LOW 40: CPT | Mod: 25

## 2022-11-16 RX ORDER — NICOTINE POLACRILEX 2 MG
2 GUM BUCCAL
Refills: 0 | Status: DISCONTINUED | OUTPATIENT
Start: 2022-11-16 | End: 2022-11-28

## 2022-11-16 RX ADMIN — Medication 650 MILLIGRAM(S): at 05:48

## 2022-11-16 RX ADMIN — Medication 2 MILLIGRAM(S): at 09:51

## 2022-11-16 RX ADMIN — Medication 2 MILLIGRAM(S): at 05:51

## 2022-11-16 RX ADMIN — Medication 25 MICROGRAM(S): at 05:48

## 2022-11-16 RX ADMIN — Medication 2 MILLIGRAM(S): at 12:33

## 2022-11-16 RX ADMIN — Medication 50 MILLIGRAM(S): at 08:33

## 2022-11-16 RX ADMIN — Medication 650 MILLIGRAM(S): at 21:39

## 2022-11-16 RX ADMIN — Medication 50 MILLIGRAM(S): at 14:39

## 2022-11-16 RX ADMIN — LOSARTAN POTASSIUM 50 MILLIGRAM(S): 100 TABLET, FILM COATED ORAL at 08:32

## 2022-11-16 RX ADMIN — Medication 50 MILLIGRAM(S): at 21:42

## 2022-11-16 RX ADMIN — ARIPIPRAZOLE 15 MILLIGRAM(S): 15 TABLET ORAL at 20:34

## 2022-11-16 RX ADMIN — Medication 650 MILLIGRAM(S): at 22:15

## 2022-11-16 NOTE — CHART NOTE - NSCHARTNOTEFT_GEN_A_CORE
Social Work Note:       Mounika  remains on the unit for continued treatment, safety, and observation.   Patient had an episode of bizarre and erratic behavior yesterday afternoon.  Per nursing report, there were no further incidents last night.  This morning, patient was calm and cooperative.  She was tearful at times as she discussed how she doesn’t feel herself and knows she needs help.  Treatment team provided emotional support.  Patient has been medication compliant and denies side effects.  She has been visible on the unit interacting with peers and attending groups.      SW met with patient to discuss discharge plans.  Previously, patient had refused a referral to inpatient rehab.  She now says she is willing to go.  CATCH team to meet with patient to discuss.      At this time patient is not psychiatrically stable for discharge.      Mental Status Exam:      Mood – Labile   Sleep  - Fair  Appetite – Good  ADLs – Good  Thought Process – Linear  Observation – q10 minutes.

## 2022-11-16 NOTE — CHART NOTE - NSCHARTNOTEFT_GEN_A_CORE
Spoke to daughter Caroline to let her know patient is not being discharged today or this week. Daughter had many questions for SW so writer gave her Allie's number to call. INTERVAL DATA:   · Interval Chief Complaint: "I'm better than yesterday"  · Interval History: Patient seen and evaluated. As per nursing report continues to be compliant with Abilify. On approach patient acting a little strangely, hesitant to go into her room to talk. Patient states she feels better than yesterday. says yesterday she "lost control" and became upset thinking about leaving her peers as she cares about them and if they will make the right choices when they leave. patient states she would like to be discharged now, however team let her know discharge is now on hold. patient states she will go to rehab if it means being able to see her family. patient getting emotional at the thought of not being able to see her family. Denies any A/V hallucinations. Denies any suicidal/homicidal ideations. Patient visible on the unit engaging with peers and attending groups.     Spoke to daughter Caroline to let her know patient is not being discharged today or this week. Daughter had many questions for SW so writer gave her Allie's number to call.    MENTAL STATUS EXAM:   · General Appearance	No deformities present  · Body Habitus	Average build  · Hygiene 	Fair  · Grooming	Fair  · Behavior	Cooperative, emotional  · Eye Contact	Fair  · Relatedness	Other  · Other	oddly related  · Impulse Control	Normal  · Muscle Tone / Strength	Normal muscle tone/strength  · Abnormal Movements	No abnormal movements  · Gait / Station	Normal gait / station  · Speech Volume	Normal  · Speech Rate	Normal  · Speech Spontaneity	Normal  · Speech Articulation	Normal  · Reported mood	Normal  · Affect Quality	Emotional  · Affect Range	Labile  · Affect Congruence	Not congruent  · Thought Process	Linear  · Thought Associations	Loose  · Thought Content	Unremarkable  · Perceptions	No abnormalities  · Oriented to Time	Yes  · Oriented to Place	Yes  · Oriented to Situation	Yes  · Oriented to Person	Yes  · Attention / Concentration	Normal  · Estimated Intelligence	Average  · Recent Memory	Normal  · Remote Memory	Normal  · Fund of Knowledge	Normal  · Judgment (regarding everyday events)	Fair  · Insight (regarding psychiatric illness)	Poor    SUICIDALITY:   · Suicidality (Interval)	none known    HOMICIDALITY/AGGRESSION:   · Homicidality/Aggression	none known    DIAGNOSIS DSM-V:    Psychiatric Diagnosis (Corresponds to DSM-IV Axis I, II):  Primary Dx Mood disorder with psychosis F39. Secondary Dx 1 Tobacco use Z72.0. Secondary Dx 2 Cocaine use F14.90.    Plan:  #Mood disorder with psychosis vs substance induced mood disorder vs bipolar disorder  -Abilify 15mg bedtime    #Cocaine use  - UDS positive for cocaine  - consider rehab vs. outpatient substance use Tx    #Tobacco use  -Nicotine Gum PRN    -Hydroxyzine 50mg Q6 PRN for anxiety/insomnia  -Haldol 5mg Q6 PRN for agitation/psychosis  -Benadryl 50mg Q6 PRN got EPS  -Lorazepam 2mg Q6 PRN for aggression    #Medical meds  -Metoprolol Succinate  -Synthroid  -Losartan  -Trulicity   -Maalox    #Agitation  -for agitation not amenable to verbal redirection, may give haldol 5 mg q6h prn, ativan 2 mg q6h prn, benadryl 50 mg q6h prn with escalation to IM if pt is a danger to self or/and others with repeat EKG to ensure QTc <500 ms.

## 2022-11-16 NOTE — PROGRESS NOTE BEHAVIORAL HEALTH - NSBHFUPINTERVALHXFT_PSY_A_CORE
Patient seen and evaluated. As per nursing report no further incidents last night, no PRNs for sleep and slept through the night. Continues to be compliant with Abilify. On approach patient calm, no agitation or aggression noted. Yesterday patients’ behavior was erratic, mood labile. Writer unsure if some of it was behavioral, if patient showing more manic traits. Patient admitted later she did not feel ready to go home. Discharge was scheduled for today. Discharge now on hold. Verbalized not feeling herself. Abilify increased. Will continue to monitor. Patient tearful during interview. Verbalizes knowing she needs help and wants to see her grandchildren. Now agreeable to go to Rehab. Agreeable to speak with the CATCH team again. No overt delusions noted. Denies any A/V hallucinations. Denies any suicidal/homicidal ideations. Patient visible on the unit engaging with peers and attending groups.

## 2022-11-16 NOTE — PROGRESS NOTE BEHAVIORAL HEALTH - SUMMARY
57 yo woman with no formal psychiatric history with a medical history of NIDDM and Thyroid Disease who presents with an acute change in behavior and mentation characterized as carlton. Patient's behavior has become aggressive and disruptive and her symptoms preclude her being safely managed in an outpatient setting. Patient requires inpatient admission to be assessed, to treat her acute symptoms and to fashion a safe discharge plan.  Bipolar Disorder (current episode manic) v Mood Disorder due to underlying medical condition    Patient seen and evaluated. As per nursing report no further incidents last night, no PRNs for sleep and slept through the night. Continues to be compliant with Abilify. On approach patient calm, no agitation or aggression noted. Yesterday patients’ behavior was erratic, mood labile. Writer unsure if some of it was behavioral, if patient showing more manic traits. Patient admitted later she did not feel ready to go home. Discharge was scheduled for today. Discharge now on hold. Verbalized not feeling herself. Abilify increased. Will continue to monitor. Patient tearful during interview. Verbalizes knowing she needs help and wants to see her grandchildren. Now agreeable to go to Rehab. Agreeable to speak with the CATCH team again. No overt delusions noted. Denies any A/V hallucinations. Denies any suicidal/homicidal ideations. Patient visible on the unit engaging with peers and attending groups.     Discharge on Hold    #Mood disorder with psychosis vs substance induced mood disorder vs bipolar disorder  -Abilify 15mg bedtime    #Cocaine use  - UDS positive for cocaine  - consider rehab vs. outpatient substance use Tx    #Tobacco use  -Nicotine Gum PRN    -Hydroxyzine 50mg Q6 PRN for anxiety/insomnia  -Haldol 5mg Q6 PRN for agitation/psychosis  -Benadryl 50mg Q6 PRN got EPS  -Lorazepam 2mg Q6 PRN for aggression    #Medical meds  -Metoprolol Succinate  -Synthroid  -Losartan  -Trulicity   -Maalox    #Agitation  -for agitation not amenable to verbal redirection, may give haldol 5 mg q6h prn, ativan 2 mg q6h prn, benadryl 50 mg q6h prn with escalation to IM if pt is a danger to self or/and others with repeat EKG to ensure QTc <500 ms.

## 2022-11-16 NOTE — CONSULT NOTE ADULT - SUBJECTIVE AND OBJECTIVE BOX
Podiatry Consult Note    Subjective:    DANISH NIXON is a 56y year old Female seen at bedside with a chief complaint of  painful thickened, dystrophic, ingrowing and long toenails digits 1-5 bilaterally  and preventative foot examination. Patient is medically managed  by Medicine/Hospitalists.  Patient denies any history of trauma to both feet. Patient has no other pedal complaints.  Patient is experiencing pain while standing, walking and in shoe gear.     PMH: Hypertension    Diabetes mellitus    Thyroid disease    Anemia     PAST MEDICAL & SURGICAL HISTORY:  Hypertension      Diabetes mellitus      Thyroid disease      Anemia      S/P lumbar discectomy  2006.      S/P tonsillectomy        PSH:S/P lumbar discectomy    S/P tonsillectomy      Allergies:No Known Allergies      Labs:      WBC Trend  7.62 Date (11-02 @ 03:15)      Chem          T(F): 97.8 (11-16-22 @ 01:11), Max: 98.2 (11-15-22 @ 09:10)  HR: 98 (11-16-22 @ 01:11) (94 - 104)  BP: 138/74 (11-16-22 @ 01:11) (138/74 - 152/62)  RR: 18 (11-16-22 @ 01:11) (18 - 20)  SpO2: --  Wt(kg): --    Objective:   DERM:  Skin warm, dry and supple bilateral.  No open lesions or inter-digital macerations noted bilateral.   Toenails 1-5 Right and Left feet thickened, elongated, discolored, and dystrophic with subungual debris. There is pain upon palpation of all fungal and ingrowing nails 1-5 bilaterally.   VASC: Dorsalis Pedis and Posterior Tibial pulses 2/4.  Capillary re-fill time less than 3 seconds digits 1-5 bilateral.   NEURO: Protective sensation intact to the level of the digits bilateral.  MSK: Muscle strength 5/5 all major muscle groups bilateral. No structural abnormality, bilaterally    Assessment:   Bilateral dystrophic toenails consistent with  Onychomycosis.   Pain from Elongated nails, ingrowing, incurvated,  and dystrophic nails upon palpation of nails.  Xerosis of bilateral plantar feet.     Plan:   Discussed diagnosis and treatment with patient  Aseptic debridement and curettage of all fungal and ingrowing nails 1-5 bilateral with sterile nail nipper.  Discussed importance of daily foot examinations, drying of feet after bathing and proper shoe gear.  Patient Would Benefit From Periodic Debridements; Can Follow Up as Outpatient w/ Dr. Conley Post Discharge   Discussed Plan w/ Attending; Dr. Conley    Spectra;     Podiatry Consult Note    Subjective:    DANISH NIXON is a 56y year old Female seen at bedside with a chief complaint of  painful thickened, dystrophic, ingrowing and long toenails digits 1-5 bilaterally  and preventative foot examination. Patient is medically managed  by Medicine/Hospitalists.  Patient denies any history of trauma to both feet. Patient has no other pedal complaints.  Patient is experiencing pain while standing, walking and in shoe gear.     PMH: Hypertension    Diabetes mellitus    Thyroid disease    Anemia     PAST MEDICAL & SURGICAL HISTORY:  Hypertension      Diabetes mellitus      Thyroid disease      Anemia      S/P lumbar discectomy  2006.      S/P tonsillectomy        PSH:S/P lumbar discectomy    S/P tonsillectomy      Allergies:No Known Allergies      Labs:      WBC Trend  7.62 Date (11-02 @ 03:15)      Chem          T(F): 97.8 (11-16-22 @ 01:11), Max: 98.2 (11-15-22 @ 09:10)  HR: 98 (11-16-22 @ 01:11) (94 - 104)  BP: 138/74 (11-16-22 @ 01:11) (138/74 - 152/62)  RR: 18 (11-16-22 @ 01:11) (18 - 20)  SpO2: --  Wt(kg): --    Objective:   DERM:  Skin warm, dry and supple bilateral.  No open lesions or inter-digital macerations noted bilateral.   Toenails 1-5 Right and Left feet thickened, elongated, discolored, and dystrophic with subungual debris. There is pain upon palpation of all fungal and ingrowing nails 1-5 bilaterally.   VASC: Dorsalis Pedis and Posterior Tibial pulses 2/4.  Capillary re-fill time less than 3 seconds digits 1-5 bilateral.   NEURO: Protective sensation diminished to the level of the digits bilateral.  MSK: Muscle strength 5/5 all major muscle groups bilateral. No structural abnormality, bilaterally    Assessment:   Bilateral dystrophic toenails consistent with  Onychomycosis.   Pain from Elongated nails, ingrowing, incurvated,  and dystrophic nails upon palpation of nails.  Xerosis of bilateral plantar feet.     Plan:   Discussed diagnosis and treatment with patient  Aseptic debridement and curettage of all fungal and ingrowing nails 1-5 bilateral with sterile nail nipper.  Discussed importance of daily foot examinations, drying of feet after bathing and proper shoe gear.  Patient Would Benefit From Periodic Debridements; Can Follow Up as Outpatient w/ Dr. Conley Post Discharge   Discussed Plan w/ Attending; Dr. Conley    Spectra;

## 2022-11-16 NOTE — PROGRESS NOTE BEHAVIORAL HEALTH - NSBHCHARTREVIEWINVESTIGATE_PSY_A_CORE FT
< from: 12 Lead ECG (11.04.22 @ 09:15) >    Ventricular Rate 116 BPM    Atrial Rate 116 BPM    P-R Interval 148 ms    QRS Duration 94 ms    Q-T Interval 350 ms    QTC Calculation(Bazett) 486 ms    P Axis 73 degrees    R Axis 78 degrees    T Axis 76 degrees    Diagnosis Line Sinus tachycardia  Incomplete right bundle branch block  Borderline ECG    Confirmed by Omero Castillo ([id number]) on 11/4/2022 2:09:09 PM    < end of copied text >

## 2022-11-17 ENCOUNTER — APPOINTMENT (OUTPATIENT)
Dept: PSYCHIATRY | Facility: CLINIC | Age: 57
End: 2022-11-17

## 2022-11-17 LAB
AMPHET UR-MCNC: NEGATIVE — SIGNIFICANT CHANGE UP
BARBITURATES UR SCN-MCNC: NEGATIVE — SIGNIFICANT CHANGE UP
BENZODIAZ UR-MCNC: NEGATIVE — SIGNIFICANT CHANGE UP
COCAINE METAB.OTHER UR-MCNC: NEGATIVE — SIGNIFICANT CHANGE UP
FENTANYL UR QL: NEGATIVE — SIGNIFICANT CHANGE UP
GLUCOSE BLDC GLUCOMTR-MCNC: 306 MG/DL — HIGH (ref 70–99)
GLUCOSE BLDC GLUCOMTR-MCNC: 311 MG/DL — HIGH (ref 70–99)
GLUCOSE BLDC GLUCOMTR-MCNC: 356 MG/DL — HIGH (ref 70–99)
METHADONE UR-MCNC: NEGATIVE — SIGNIFICANT CHANGE UP
OPIATES UR-MCNC: NEGATIVE — SIGNIFICANT CHANGE UP
OXYCODONE UR-MCNC: NEGATIVE — SIGNIFICANT CHANGE UP
PCP UR-MCNC: NEGATIVE — SIGNIFICANT CHANGE UP
PROPOXYPHENE QUALITATIVE URINE RESULT: NEGATIVE — SIGNIFICANT CHANGE UP
THC UR QL: NEGATIVE — SIGNIFICANT CHANGE UP

## 2022-11-17 PROCEDURE — 93010 ELECTROCARDIOGRAM REPORT: CPT

## 2022-11-17 PROCEDURE — 99231 SBSQ HOSP IP/OBS SF/LOW 25: CPT

## 2022-11-17 RX ORDER — ARIPIPRAZOLE 15 MG/1
20 TABLET ORAL AT BEDTIME
Refills: 0 | Status: DISCONTINUED | OUTPATIENT
Start: 2022-11-17 | End: 2022-11-28

## 2022-11-17 RX ADMIN — Medication 50 MILLIGRAM(S): at 08:45

## 2022-11-17 RX ADMIN — Medication 650 MILLIGRAM(S): at 04:37

## 2022-11-17 RX ADMIN — Medication 650 MILLIGRAM(S): at 10:31

## 2022-11-17 RX ADMIN — Medication 25 MICROGRAM(S): at 06:26

## 2022-11-17 RX ADMIN — Medication 650 MILLIGRAM(S): at 23:32

## 2022-11-17 RX ADMIN — ARIPIPRAZOLE 20 MILLIGRAM(S): 15 TABLET ORAL at 20:18

## 2022-11-17 RX ADMIN — LOSARTAN POTASSIUM 50 MILLIGRAM(S): 100 TABLET, FILM COATED ORAL at 08:46

## 2022-11-17 RX ADMIN — Medication 650 MILLIGRAM(S): at 04:31

## 2022-11-17 RX ADMIN — Medication 50 MILLIGRAM(S): at 20:21

## 2022-11-17 RX ADMIN — Medication 650 MILLIGRAM(S): at 11:00

## 2022-11-17 RX ADMIN — Medication 2 MILLIGRAM(S): at 23:34

## 2022-11-17 RX ADMIN — HALOPERIDOL DECANOATE 5 MILLIGRAM(S): 100 INJECTION INTRAMUSCULAR at 13:27

## 2022-11-17 RX ADMIN — Medication 2 MILLIGRAM(S): at 13:28

## 2022-11-17 NOTE — PROGRESS NOTE BEHAVIORAL HEALTH - NSBHCHARTREVIEWINVESTIGATE_PSY_A_CORE FT
< from: 12 Lead ECG (11.04.22 @ 09:15) >    Ventricular Rate 116 BPM    Atrial Rate 116 BPM    P-R Interval 148 ms    QRS Duration 94 ms    Q-T Interval 350 ms    QTC Calculation(Bazett) 486 ms    P Axis 73 degrees    R Axis 78 degrees    T Axis 76 degrees    Diagnosis Line Sinus tachycardia  Incomplete right bundle branch block  Borderline ECG    Confirmed by Omero Castillo ([id number]) on 11/4/2022 2:09:09 PM    < end of copied text >

## 2022-11-17 NOTE — PROGRESS NOTE BEHAVIORAL HEALTH - SUMMARY
55 yo woman with no formal psychiatric history with a medical history of NIDDM and Thyroid Disease who presents with an acute change in behavior and mentation characterized as carlton. Patient's behavior has become aggressive and disruptive and her symptoms preclude her being safely managed in an outpatient setting. Patient requires inpatient admission to be assessed, to treat her acute symptoms and to fashion a safe discharge plan.  Bipolar Disorder (current episode manic) v Mood Disorder due to underlying medical condition    Patient seen and evaluated. As per nursing report hydroxyzine for sleep with good effect. Patient compliant with all meds except insulin sliding scale. On approach patient calm, no agitation or aggression noted. Now agreeable to go to Rehab. Signed form yesterday for referrals to be sent out. No overt delusions noted. Denies any A/V hallucinations. Denies any suicidal/homicidal ideations. Patient visible on the unit engaging with peers and attending groups.  Discharge on Hold    #Mood disorder with psychosis vs substance induced mood disorder vs bipolar disorder  -Abilify 15mg bedtime    #Cocaine use  - UDS positive for cocaine  - consider rehab vs. outpatient substance use Tx    #Tobacco use  -Nicotine Gum PRN    -Hydroxyzine 50mg Q6 PRN for anxiety/insomnia  -Haldol 5mg Q6 PRN for agitation/psychosis  -Benadryl 50mg Q6 PRN got EPS  -Lorazepam 2mg Q6 PRN for aggression    #Medical meds  -Metoprolol Succinate  -Synthroid  -Losartan  -Trulicity   -Maalox    #Agitation  -for agitation not amenable to verbal redirection, may give haldol 5 mg q6h prn, ativan 2 mg q6h prn, benadryl 50 mg q6h prn with escalation to IM if pt is a danger to self or/and others with repeat EKG to ensure QTc <500 ms. 55 yo woman with no formal psychiatric history with a medical history of NIDDM and Thyroid Disease who presents with an acute change in behavior and mentation characterized as carlton. Patient's behavior has become aggressive and disruptive and her symptoms preclude her being safely managed in an outpatient setting. Patient requires inpatient admission to be assessed, to treat her acute symptoms and to fashion a safe discharge plan.  Bipolar Disorder (current episode manic) v Mood Disorder due to underlying medical condition    Patient seen and evaluated. As per nursing report hydroxyzine for sleep with good effect. Patient compliant with all meds except insulin sliding scale. On approach patient calm, no agitation or aggression noted. Now agreeable to go to Rehab. Signed form yesterday for referrals to be sent out. Mood labile. Will continue to titrate Abilify accordingly. Denies any A/V hallucinations. Denies any suicidal/homicidal ideations. Patient visible on the unit engaging with peers and attending groups. Ambulates as tolerated. Performs ADLs independently.    #Mood disorder with psychosis vs substance induced mood disorder vs bipolar disorder  -Abilify 15mg bedtime    #Cocaine use  - UDS positive for cocaine  - consider rehab vs. outpatient substance use Tx    #Tobacco use  -Nicotine Gum PRN    -Hydroxyzine 50mg Q6 PRN for anxiety/insomnia  -Haldol 5mg Q6 PRN for agitation/psychosis  -Benadryl 50mg Q6 PRN got EPS  -Lorazepam 2mg Q6 PRN for aggression    #Medical meds  -Metoprolol Succinate  -Synthroid  -Losartan  -Trulicity   -Maalox    #Agitation  -for agitation not amenable to verbal redirection, may give haldol 5 mg q6h prn, ativan 2 mg q6h prn, benadryl 50 mg q6h prn with escalation to IM if pt is a danger to self or/and others with repeat EKG to ensure QTc <500 ms. 55 yo woman with no formal psychiatric history with a medical history of NIDDM and Thyroid Disease who presents with an acute change in behavior and mentation characterized as carlton. Patient's behavior has become aggressive and disruptive and her symptoms preclude her being safely managed in an outpatient setting. Patient requires inpatient admission to be assessed, to treat her acute symptoms and to fashion a safe discharge plan.  Bipolar Disorder (current episode manic) v Mood Disorder due to underlying medical condition    Patient seen and evaluated. As per nursing report hydroxyzine for sleep with good effect. Patient compliant with all meds except insulin sliding scale. On approach patient calm, no agitation or aggression noted. Now agreeable to go to Rehab. Signed form yesterday for referrals to be sent out. Mood labile. Will continue to titrate Abilify accordingly. Denies any A/V hallucinations. Denies any suicidal/homicidal ideations. Patient visible on the unit engaging with peers and attending groups. Ambulates as tolerated. Performs ADLs independently.    #Mood disorder with psychosis vs substance induced mood disorder vs bipolar disorder  -Abilify 20mg bedtime    #Cocaine use  - UDS positive for cocaine  - consider rehab vs. outpatient substance use Tx    #Tobacco use  -Nicotine Gum PRN    -Hydroxyzine 50mg Q6 PRN for anxiety/insomnia  -Haldol 5mg Q6 PRN for agitation/psychosis  -Benadryl 50mg Q6 PRN got EPS  -Lorazepam 2mg Q6 PRN for aggression    #Medical meds  -Metoprolol Succinate  -Synthroid  -Losartan  -Trulicity   -Maalox    #Agitation  -for agitation not amenable to verbal redirection, may give haldol 5 mg q6h prn, ativan 2 mg q6h prn, benadryl 50 mg q6h prn with escalation to IM if pt is a danger to self or/and others with repeat EKG to ensure QTc <500 ms.

## 2022-11-17 NOTE — CHART NOTE - NSCHARTNOTEFT_GEN_A_CORE
INTERVAL DATA:   · Interval Chief Complaint: "I'm feeling great"  · Interval History: Patient seen and evaluated. As per nursing report patient took vistaril and Tylenol PRN last night. On approach patient is calm and cooperative with no aggression or agitation noted. Yesterday, patient agreed to and signed forms for rehabilitation following discharge. Patient states she would like to be discharged, however she is still awaiting placement at a rehabilitation facility. Denies any A/V hallucinations. Denies any suicidal/homicidal ideations. Patient visible on the unit engaging with peers and attending groups.     MENTAL STATUS EXAM:   · General Appearance	No deformities present  · Body Habitus	Average build  · Hygiene 	Fair  · Grooming	Fair  · Behavior	Cooperative  · Eye Contact	Fair  · Relatedness	Other  · Other	oddly related  · Impulse Control	Normal  · Muscle Tone / Strength	Normal muscle tone/strength  · Abnormal Movements	No abnormal movements  · Gait / Station	Normal gait / station  · Speech Volume	Normal  · Speech Rate	Normal  · Speech Spontaneity	Normal  · Speech Articulation	Normal  · Reported mood	Normal  · Affect Quality	Emotional  · Affect Range	Labile  · Affect Congruence	Not congruent  · Thought Process	Linear  · Thought Associations	Loose  · Thought Content	Unremarkable  · Perceptions	No abnormalities  · Oriented to Time	Yes  · Oriented to Place	Yes  · Oriented to Situation	Yes  · Oriented to Person	Yes  · Attention / Concentration	Normal  · Estimated Intelligence	Average  · Recent Memory	Normal  · Remote Memory	Normal  · Fund of Knowledge	Normal  · Judgment (regarding everyday events)	Fair  · Insight (regarding psychiatric illness)	Poor    SUICIDALITY:   · Suicidality (Interval)	none known    HOMICIDALITY/AGGRESSION:   · Homicidality/Aggression	none known    DIAGNOSIS DSM-V:    Psychiatric Diagnosis (Corresponds to DSM-IV Axis I, II):  Primary Dx Mood disorder with psychosis F39. Secondary Dx 1 Tobacco use Z72.0. Secondary Dx 2 Cocaine use F14.90.    Plan:  #Mood disorder with psychosis vs substance induced mood disorder vs bipolar disorder  -Abilify 15mg bedtime    #Cocaine use  - UDS positive for cocaine  - consider rehab vs. outpatient substance use Tx    #Tobacco use  -Nicotine Gum PRN    -Hydroxyzine 50mg Q6 PRN for anxiety/insomnia  -Haldol 5mg Q6 PRN for agitation/psychosis  -Benadryl 50mg Q6 PRN got EPS  -Lorazepam 2mg Q6 PRN for aggression    #Medical meds  -Metoprolol Succinate  -Synthroid  -Losartan  -Trulicity   -Maalox    #Agitation  -for agitation not amenable to verbal redirection, may give haldol 5 mg q6h prn, ativan 2 mg q6h prn, benadryl 50 mg q6h prn with escalation to IM if pt is a danger to self or/and others with repeat EKG to ensure QTc <500 ms.

## 2022-11-17 NOTE — PROGRESS NOTE BEHAVIORAL HEALTH - NSBHFUPINTERVALHXFT_PSY_A_CORE
Patient seen and evaluated. As per nursing report hydroxyzine for sleep with good effect. Patient compliant with all meds except insulin sliding scale. On approach patient calm, no agitation or aggression noted. Now agreeable to go to Rehab. Signed form yesterday for referrals to be sent out. No overt delusions noted. Denies any A/V hallucinations. Denies any suicidal/homicidal ideations. Patient visible on the unit engaging with peers and attending groups. Patient seen and evaluated. As per nursing report hydroxyzine for sleep with good effect. Patient compliant with all meds except insulin sliding scale. On approach patient calm, no agitation or aggression noted. Now agreeable to go to Rehab. Signed form yesterday for referrals to be sent out. No overt delusions noted. Denies any A/V hallucinations. Denies any suicidal/homicidal ideations. Patient visible on the unit engaging with peers and attending groups. Ambulate as tolerated. Performs ADLs independently. Patient seen and evaluated. As per nursing report hydroxyzine for sleep with good effect. Patient compliant with all meds except insulin sliding scale. On approach patient calm, no agitation or aggression noted. Now agreeable to go to Rehab. Signed form yesterday for referrals to be sent out. No overt delusions noted. Denies any A/V hallucinations. Denies any suicidal/homicidal ideations. Patient visible on the unit engaging with peers and attending groups. Ambulates as tolerated. Performs ADLs independently. Patient seen and evaluated. As per nursing report hydroxyzine for sleep with good effect. Patient compliant with all meds except insulin sliding scale. On approach patient calm, no agitation or aggression noted. Now agreeable to go to Rehab. Signed form yesterday for referrals to be sent out. Mood labile. Will continue to titrate Abilify accordingly. Denies any A/V hallucinations. Denies any suicidal/homicidal ideations. Patient visible on the unit engaging with peers and attending groups. Ambulates as tolerated. Performs ADLs independently.

## 2022-11-18 LAB
DRUG SCREEN, SERUM: SIGNIFICANT CHANGE UP
GLUCOSE BLDC GLUCOMTR-MCNC: 333 MG/DL — HIGH (ref 70–99)

## 2022-11-18 PROCEDURE — 99231 SBSQ HOSP IP/OBS SF/LOW 25: CPT

## 2022-11-18 RX ADMIN — Medication 650 MILLIGRAM(S): at 00:00

## 2022-11-18 RX ADMIN — Medication 650 MILLIGRAM(S): at 16:45

## 2022-11-18 RX ADMIN — Medication 50 MILLIGRAM(S): at 08:09

## 2022-11-18 RX ADMIN — Medication 650 MILLIGRAM(S): at 08:16

## 2022-11-18 RX ADMIN — ARIPIPRAZOLE 20 MILLIGRAM(S): 15 TABLET ORAL at 20:23

## 2022-11-18 RX ADMIN — Medication 2 MILLIGRAM(S): at 14:34

## 2022-11-18 RX ADMIN — Medication 50 MILLIGRAM(S): at 00:22

## 2022-11-18 RX ADMIN — Medication 650 MILLIGRAM(S): at 15:46

## 2022-11-18 RX ADMIN — Medication 25 MICROGRAM(S): at 04:36

## 2022-11-18 RX ADMIN — HALOPERIDOL DECANOATE 5 MILLIGRAM(S): 100 INJECTION INTRAMUSCULAR at 14:34

## 2022-11-18 RX ADMIN — LOSARTAN POTASSIUM 50 MILLIGRAM(S): 100 TABLET, FILM COATED ORAL at 08:09

## 2022-11-18 RX ADMIN — Medication 50 MILLIGRAM(S): at 21:18

## 2022-11-18 RX ADMIN — Medication 650 MILLIGRAM(S): at 09:15

## 2022-11-18 NOTE — CHART NOTE - NSCHARTNOTEFT_GEN_A_CORE
Social Work Note:       Ms. Godinez remains on the unit for continued treatment, safety, and observation. She continues to meet with  and treatment team daily. As per nursing report, PRN's were needed for sleep. Patient was emotional and tearful during morning rounds. She discussed her desire to see her Grandchildren and expressed ambivalence about exploring rehab, although her family is strongly encouraging her to go. Patient was open to education and support from treatment team. She denies SI/ HI and AVH. She is visible on the unit and attends most groups offered, engaging with her peers.     Ms. Godinez has agreed to work with [facility] team ([phone]) to explore rehab referrals although she continues to express ambivalence. Patient was provided with an appointment with a telephone screening with [facility] on Monday, November 21, 2022 @ 11 AM. Discharge planning ensues.     At this time patient is not psychiatrically stable for discharge.      Mental Status Exam:      Mood – Normal/ Euthymic   Sleep  - Fair  Appetite – Good  ADLs – Good  Thought Process – Linear  Observation – q10 minutes.

## 2022-11-18 NOTE — PROGRESS NOTE BEHAVIORAL HEALTH - NSBHFUPINTERVALHXFT_PSY_A_CORE
Patient seen and evaluated. As per nursing report PRNs for sleep. Patient thought she saw her son in visiting yesterday and got upset when staff told her it was not him and would not let her go in. Patient was verbally re-directable. Offered and accepted PRN’s to calm her down. On approach patient calm, no agitation or aggression noted. Patient became very emotional during rounds. Verbalized that she wanted to see her grandchildren and it would be hard for her to go to Rehab without seeing them. Expressed wanting to see them when leaving. Writer advised patient to talk to her family. Expressed concerns about visiting in rehab. Patient expressed how she never considered herself and addict but knows she needs help. Stated she always thought of an addict as someone who could never stop using. Writer discussed patient getting well so she could be there for her grandchildren. Patient agreed. Signed forms for referrals to be sent out the other day. No overt delusions noted. Denies any A/V hallucinations. Denies any suicidal/homicidal ideations. Patient visible on the unit engaging with peers and attending groups.

## 2022-11-18 NOTE — CHART NOTE - NSCHARTNOTEFT_GEN_A_CORE
INTERVAL DATA:   · Interval Chief Complaint: "I feel blessed"  · Interval History: Patient seen and evaluated. As per nursing report patient took PRNs for sleep last night. On approach patient is calm and cooperative with no aggression or agitation noted. Two days ago, patient agreed to and signed forms for rehabilitation following discharge, however patient states this morning she sent the [facility] team away when they came to speak to her. Patient now stating she does not want to go straight to rehab from the hospital, and that she wants to do it under her "terms and conditions". Patient is argumentative, and states her daughter wants her to stay in [facility]. Team told her it is her daughters house and her daughter will not let her come home unless she goes to rehab. Patient became emotional, stating she just wants to see her family. Patient eventually stated she will go to rehab and will speak to [facility] team. Denies any A/V hallucinations. Denies any suicidal/homicidal ideations. Patient visible on the unit engaging with peers and attending groups.     MENTAL STATUS EXAM:   · General Appearance	No deformities present  · Body Habitus	Average build  · Hygiene 	Fair  · Grooming	Fair  · Behavior	Uncooperative  · Eye Contact	Fair  · Relatedness	Other  · Other	oddly related  · Impulse Control	Normal  · Muscle Tone / Strength	Normal muscle tone/strength  · Abnormal Movements	No abnormal movements  · Gait / Station	Normal gait / station  · Speech Volume	Normal  · Speech Rate	Normal  · Speech Spontaneity	Normal  · Speech Articulation	Normal  · Reported mood	"Blessed"  · Affect Quality	Emotional  · Affect Range	Labile  · Affect Congruence	Not congruent  · Thought Process	Linear  · Thought Associations	Loose  · Thought Content	Unremarkable  · Perceptions	No abnormalities  · Oriented to Time	Yes  · Oriented to Place	Yes  · Oriented to Situation	Yes  · Oriented to Person	Yes  · Attention / Concentration	Normal  · Estimated Intelligence	Average  · Recent Memory	Normal  · Remote Memory	Normal  · Fund of Knowledge	Normal  · Judgment (regarding everyday events)	Fair  · Insight (regarding psychiatric illness)	Poor    SUICIDALITY:   · Suicidality (Interval)	none known    HOMICIDALITY/AGGRESSION:   · Homicidality/Aggression	none known    DIAGNOSIS DSM-V:    Psychiatric Diagnosis (Corresponds to DSM-IV Axis I, II):  Primary Dx Mood disorder with psychosis F39. Secondary Dx 1 Tobacco use Z72.0. Secondary Dx 2 Cocaine use F14.90.    Plan:  #Mood disorder with psychosis vs substance induced mood disorder vs bipolar disorder  -Abilify 15mg bedtime    #Cocaine use  - UDS positive for cocaine  - consider rehab vs. outpatient substance use Tx    #Tobacco use  -Nicotine Gum PRN    -Hydroxyzine 50mg Q6 PRN for anxiety/insomnia  -Haldol 5mg Q6 PRN for agitation/psychosis  -Benadryl 50mg Q6 PRN got EPS  -Lorazepam 2mg Q6 PRN for aggression    #Medical meds  -Metoprolol Succinate  -Synthroid  -Losartan  -Trulicity   -Maalox    #Agitation  -for agitation not amenable to verbal redirection, may give haldol 5 mg q6h prn, ativan 2 mg q6h prn, benadryl 50 mg q6h prn with escalation to IM if pt is a danger to self or/and others with repeat EKG to ensure QTc <500 ms. INTERVAL DATA:   · Interval Chief Complaint: "I feel blessed"  · Interval History: Patient seen and evaluated. As per nursing report patient took PRNs for sleep last night. On approach patient is calm and cooperative with no aggression or agitation noted. Two days ago, patient agreed to and signed forms for rehabilitation following discharge. Patient id voice concerns today about visiting in rehab and not being able to see her grandchildren. Patient also expressed that she always thought Rehab was for "addicts" who are unable to stop using. States she does not believe she is an addict but does feel she needs help. Patient asking about seeing her grandchildren before she goes and became very emotional. Patient advised to speak to her family. Denies any A/V hallucinations. Denies any suicidal/homicidal ideations. Patient visible on the unit engaging with peers and attending groups.     MENTAL STATUS EXAM:   · General Appearance	No deformities present  · Body Habitus	Average build  · Hygiene 	Fair  · Grooming	Fair  · Behavior	Uncooperative  · Eye Contact	Fair  · Relatedness	Other  · Other	oddly related  · Impulse Control	Normal  · Muscle Tone / Strength	Normal muscle tone/strength  · Abnormal Movements	No abnormal movements  · Gait / Station	Normal gait / station  · Speech Volume	Normal  · Speech Rate	Normal  · Speech Spontaneity	Normal  · Speech Articulation	Normal  · Reported mood	"Blessed"  · Affect Quality	Emotional  · Affect Range	Labile  · Affect Congruence	Not congruent  · Thought Process	Linear  · Thought Associations	Loose  · Thought Content	Unremarkable  · Perceptions	No abnormalities  · Oriented to Time	Yes  · Oriented to Place	Yes  · Oriented to Situation	Yes  · Oriented to Person	Yes  · Attention / Concentration	Normal  · Estimated Intelligence	Average  · Recent Memory	Normal  · Remote Memory	Normal  · Fund of Knowledge	Normal  · Judgment (regarding everyday events)	Fair  · Insight (regarding psychiatric illness)	Poor    SUICIDALITY:   · Suicidality (Interval)	none known    HOMICIDALITY/AGGRESSION:   · Homicidality/Aggression	none known    DIAGNOSIS DSM-V:    Psychiatric Diagnosis (Corresponds to DSM-IV Axis I, II):  Primary Dx Mood disorder with psychosis F39. Secondary Dx 1 Tobacco use Z72.0. Secondary Dx 2 Cocaine use F14.90.    Plan:  #Mood disorder with psychosis vs substance induced mood disorder vs bipolar disorder  -Abilify 15mg bedtime    #Cocaine use  - UDS positive for cocaine  - consider rehab vs. outpatient substance use Tx    #Tobacco use  -Nicotine Gum PRN    -Hydroxyzine 50mg Q6 PRN for anxiety/insomnia  -Haldol 5mg Q6 PRN for agitation/psychosis  -Benadryl 50mg Q6 PRN got EPS  -Lorazepam 2mg Q6 PRN for aggression    #Medical meds  -Metoprolol Succinate  -Synthroid  -Losartan  -Trulicity   -Maalox    #Agitation  -for agitation not amenable to verbal redirection, may give haldol 5 mg q6h prn, ativan 2 mg q6h prn, benadryl 50 mg q6h prn with escalation to IM if pt is a danger to self or/and others with repeat EKG to ensure QTc <500 ms. INTERVAL DATA:   · Interval Chief Complaint: "I feel blessed"  · Interval History: Patient seen and evaluated. As per nursing report patient took PRNs for sleep last night. On approach patient is calm and cooperative with no aggression or agitation noted. Two days ago, patient agreed to and signed forms for rehabilitation following discharge. Patient did voice concerns today about visiting in rehab and not being able to see her grandchildren. Patient also expressed that she always thought Rehab was for "addicts" who are unable to stop using. States she does not believe she is an addict but does feel she needs help. Patient asking about seeing her grandchildren before she goes and became very emotional. Patient advised to speak to her family. Denies any A/V hallucinations. Denies any suicidal/homicidal ideations. Patient visible on the unit engaging with peers and attending groups.     MENTAL STATUS EXAM:   · General Appearance	No deformities present  · Body Habitus	Average build  · Hygiene 	Fair  · Grooming	Fair  · Behavior	Uncooperative  · Eye Contact	Fair  · Relatedness	Other  · Other	oddly related  · Impulse Control	Normal  · Muscle Tone / Strength	Normal muscle tone/strength  · Abnormal Movements	No abnormal movements  · Gait / Station	Normal gait / station  · Speech Volume	Normal  · Speech Rate	Normal  · Speech Spontaneity	Normal  · Speech Articulation	Normal  · Reported mood	"Blessed"  · Affect Quality	Emotional  · Affect Range	Labile  · Affect Congruence	Not congruent  · Thought Process	Linear  · Thought Associations	Loose  · Thought Content	Unremarkable  · Perceptions	No abnormalities  · Oriented to Time	Yes  · Oriented to Place	Yes  · Oriented to Situation	Yes  · Oriented to Person	Yes  · Attention / Concentration	Normal  · Estimated Intelligence	Average  · Recent Memory	Normal  · Remote Memory	Normal  · Fund of Knowledge	Normal  · Judgment (regarding everyday events)	Fair  · Insight (regarding psychiatric illness)	Poor    SUICIDALITY:   · Suicidality (Interval)	none known    HOMICIDALITY/AGGRESSION:   · Homicidality/Aggression	none known    DIAGNOSIS DSM-V:    Psychiatric Diagnosis (Corresponds to DSM-IV Axis I, II):  Primary Dx Mood disorder with psychosis F39. Secondary Dx 1 Tobacco use Z72.0. Secondary Dx 2 Cocaine use F14.90.    Plan:  #Mood disorder with psychosis vs substance induced mood disorder vs bipolar disorder  -Abilify 15mg bedtime    #Cocaine use  - UDS positive for cocaine  - consider rehab vs. outpatient substance use Tx    #Tobacco use  -Nicotine Gum PRN    -Hydroxyzine 50mg Q6 PRN for anxiety/insomnia  -Haldol 5mg Q6 PRN for agitation/psychosis  -Benadryl 50mg Q6 PRN got EPS  -Lorazepam 2mg Q6 PRN for aggression    #Medical meds  -Metoprolol Succinate  -Synthroid  -Losartan  -Trulicity   -Maalox    #Agitation  -for agitation not amenable to verbal redirection, may give haldol 5 mg q6h prn, ativan 2 mg q6h prn, benadryl 50 mg q6h prn with escalation to IM if pt is a danger to self or/and others with repeat EKG to ensure QTc <500 ms.

## 2022-11-18 NOTE — PROGRESS NOTE BEHAVIORAL HEALTH - SUMMARY
55 yo woman with no formal psychiatric history with a medical history of NIDDM and Thyroid Disease who presents with an acute change in behavior and mentation characterized as carlton. Patient's behavior has become aggressive and disruptive and her symptoms preclude her being safely managed in an outpatient setting. Patient requires inpatient admission to be assessed, to treat her acute symptoms and to fashion a safe discharge plan.  Bipolar Disorder (current episode manic) v Mood Disorder due to underlying medical condition    Patient seen and evaluated. As per nursing report PRNs for sleep. Patient thought she saw her son in visiting yesterday and got upset when staff told her it was not him and would not let her go in. Patient was verbally re-directable. Offered and accepted PRN’s to calm her down. On approach patient calm, no agitation or aggression noted. Patient became very emotional during rounds. Verbalized that she wanted to see her grandchildren and it would be hard for her to go to Rehab without seeing them. Expressed wanting to see them when leaving. Writer advised patient to talk to her family. Expressed concerns about visiting in rehab. Patient expressed how she never considered herself and addict but knows she needs help. Stated she always thought of an addict as someone who could never stop using. Writer discussed patient getting well so she could be there for her grandchildren. Patient agreed. Signed forms for referrals to be sent out the other day. No overt delusions noted. Denies any A/V hallucinations. Denies any suicidal/homicidal ideations. Patient visible on the unit engaging with peers and attending groups.     #Mood disorder with psychosis vs substance induced mood disorder vs bipolar disorder  -Abilify 15mg bedtime    #Cocaine use  - UDS positive for cocaine  - consider rehab vs. outpatient substance use Tx    #Tobacco use  -Nicotine Gum PRN    -Hydroxyzine 50mg Q6 PRN for anxiety/insomnia  -Haldol 5mg Q6 PRN for agitation/psychosis  -Benadryl 50mg Q6 PRN got EPS  -Lorazepam 2mg Q6 PRN for aggression    #Medical meds  -Metoprolol Succinate  -Synthroid  -Losartan  -Trulicity   -Maalox    #Agitation  -for agitation not amenable to verbal redirection, may give haldol 5 mg q6h prn, ativan 2 mg q6h prn, benadryl 50 mg q6h prn with escalation to IM if pt is a danger to self or/and others with repeat EKG to ensure QTc <500 ms. 55 yo woman with no formal psychiatric history with a medical history of NIDDM and Thyroid Disease who presents with an acute change in behavior and mentation characterized as carlton. Patient's behavior has become aggressive and disruptive and her symptoms preclude her being safely managed in an outpatient setting. Patient requires inpatient admission to be assessed, to treat her acute symptoms and to fashion a safe discharge plan.  Bipolar Disorder (current episode manic) v Mood Disorder due to underlying medical condition    Patient seen and evaluated. As per nursing report PRNs for sleep. Patient thought she saw her son in visiting yesterday and got upset when staff told her it was not him and would not let her go in. Patient was verbally re-directable. Offered and accepted PRN’s to calm her down. On approach patient calm, no agitation or aggression noted. Patient became very emotional during rounds. Verbalized that she wanted to see her grandchildren and it would be hard for her to go to Rehab without seeing them. Expressed wanting to see them when leaving. Writer advised patient to talk to her family. Expressed concerns about visiting in rehab. Patient expressed how she never considered herself and addict but knows she needs help. Stated she always thought of an addict as someone who could never stop using. Writer discussed patient getting well so she could be there for her grandchildren. Patient agreed. Signed forms for referrals to be sent out the other day. No overt delusions noted. Denies any A/V hallucinations. Denies any suicidal/homicidal ideations. Patient visible on the unit engaging with peers and attending groups.     #Mood disorder with psychosis vs substance induced mood disorder vs bipolar disorder  -Abilify 20mg bedtime    #Cocaine use  - UDS positive for cocaine  - consider rehab vs. outpatient substance use Tx    #Tobacco use  -Nicotine Gum PRN    -Hydroxyzine 50mg Q6 PRN for anxiety/insomnia  -Haldol 5mg Q6 PRN for agitation/psychosis  -Benadryl 50mg Q6 PRN got EPS  -Lorazepam 2mg Q6 PRN for aggression    #Medical meds  -Metoprolol Succinate  -Synthroid  -Losartan  -Trulicity   -Maalox    #Agitation  -for agitation not amenable to verbal redirection, may give haldol 5 mg q6h prn, ativan 2 mg q6h prn, benadryl 50 mg q6h prn with escalation to IM if pt is a danger to self or/and others with repeat EKG to ensure QTc <500 ms.

## 2022-11-19 LAB
GLUCOSE BLDC GLUCOMTR-MCNC: 279 MG/DL — HIGH (ref 70–99)
GLUCOSE BLDC GLUCOMTR-MCNC: 313 MG/DL — HIGH (ref 70–99)

## 2022-11-19 RX ORDER — IBUPROFEN 200 MG
400 TABLET ORAL EVERY 6 HOURS
Refills: 0 | Status: DISCONTINUED | OUTPATIENT
Start: 2022-11-19 | End: 2022-11-28

## 2022-11-19 RX ORDER — ACETAMINOPHEN 500 MG
650 TABLET ORAL EVERY 6 HOURS
Refills: 0 | Status: DISCONTINUED | OUTPATIENT
Start: 2022-11-19 | End: 2022-11-28

## 2022-11-19 RX ADMIN — Medication 25 MICROGRAM(S): at 05:08

## 2022-11-19 RX ADMIN — Medication 2 MILLIGRAM(S): at 04:48

## 2022-11-19 RX ADMIN — Medication 50 MILLIGRAM(S): at 08:13

## 2022-11-19 RX ADMIN — LOSARTAN POTASSIUM 50 MILLIGRAM(S): 100 TABLET, FILM COATED ORAL at 08:13

## 2022-11-19 RX ADMIN — Medication 650 MILLIGRAM(S): at 01:50

## 2022-11-19 RX ADMIN — Medication 650 MILLIGRAM(S): at 13:45

## 2022-11-19 RX ADMIN — ARIPIPRAZOLE 20 MILLIGRAM(S): 15 TABLET ORAL at 20:22

## 2022-11-19 RX ADMIN — Medication 650 MILLIGRAM(S): at 01:19

## 2022-11-19 RX ADMIN — Medication 400 MILLIGRAM(S): at 18:20

## 2022-11-19 RX ADMIN — Medication 400 MILLIGRAM(S): at 19:20

## 2022-11-19 RX ADMIN — Medication 650 MILLIGRAM(S): at 22:54

## 2022-11-19 RX ADMIN — Medication 650 MILLIGRAM(S): at 23:54

## 2022-11-19 RX ADMIN — Medication 650 MILLIGRAM(S): at 13:13

## 2022-11-20 LAB
GLUCOSE BLDC GLUCOMTR-MCNC: 223 MG/DL — HIGH (ref 70–99)
GLUCOSE BLDC GLUCOMTR-MCNC: 249 MG/DL — HIGH (ref 70–99)

## 2022-11-20 RX ADMIN — Medication 50 MILLIGRAM(S): at 08:11

## 2022-11-20 RX ADMIN — LOSARTAN POTASSIUM 50 MILLIGRAM(S): 100 TABLET, FILM COATED ORAL at 08:10

## 2022-11-20 RX ADMIN — Medication 50 MILLIGRAM(S): at 20:24

## 2022-11-20 RX ADMIN — Medication 400 MILLIGRAM(S): at 23:54

## 2022-11-20 RX ADMIN — Medication 50 MILLIGRAM(S): at 22:07

## 2022-11-20 RX ADMIN — ARIPIPRAZOLE 20 MILLIGRAM(S): 15 TABLET ORAL at 20:23

## 2022-11-20 RX ADMIN — Medication 400 MILLIGRAM(S): at 00:45

## 2022-11-20 RX ADMIN — Medication 2: at 16:42

## 2022-11-20 RX ADMIN — Medication 400 MILLIGRAM(S): at 01:45

## 2022-11-20 RX ADMIN — Medication 400 MILLIGRAM(S): at 08:11

## 2022-11-20 RX ADMIN — Medication 400 MILLIGRAM(S): at 17:34

## 2022-11-20 RX ADMIN — Medication 25 MICROGRAM(S): at 06:23

## 2022-11-20 RX ADMIN — Medication 50 MILLIGRAM(S): at 00:45

## 2022-11-20 RX ADMIN — Medication 650 MILLIGRAM(S): at 12:10

## 2022-11-21 LAB
GLUCOSE BLDC GLUCOMTR-MCNC: 255 MG/DL — HIGH (ref 70–99)
GLUCOSE BLDC GLUCOMTR-MCNC: 306 MG/DL — HIGH (ref 70–99)

## 2022-11-21 PROCEDURE — 99231 SBSQ HOSP IP/OBS SF/LOW 25: CPT

## 2022-11-21 RX ORDER — LANOLIN ALCOHOL/MO/W.PET/CERES
5 CREAM (GRAM) TOPICAL AT BEDTIME
Refills: 0 | Status: DISCONTINUED | OUTPATIENT
Start: 2022-11-21 | End: 2022-11-25

## 2022-11-21 RX ADMIN — Medication 50 MILLIGRAM(S): at 09:45

## 2022-11-21 RX ADMIN — Medication 30 MILLILITER(S): at 23:01

## 2022-11-21 RX ADMIN — Medication 50 MILLIGRAM(S): at 16:54

## 2022-11-21 RX ADMIN — ARIPIPRAZOLE 20 MILLIGRAM(S): 15 TABLET ORAL at 20:13

## 2022-11-21 RX ADMIN — Medication 400 MILLIGRAM(S): at 23:01

## 2022-11-21 RX ADMIN — Medication 30 MILLILITER(S): at 00:36

## 2022-11-21 RX ADMIN — LOSARTAN POTASSIUM 50 MILLIGRAM(S): 100 TABLET, FILM COATED ORAL at 09:45

## 2022-11-21 RX ADMIN — Medication 50 MILLIGRAM(S): at 20:00

## 2022-11-21 RX ADMIN — Medication 400 MILLIGRAM(S): at 23:32

## 2022-11-21 RX ADMIN — Medication 4: at 07:43

## 2022-11-21 RX ADMIN — Medication 5 MILLIGRAM(S): at 23:29

## 2022-11-21 RX ADMIN — Medication 25 MICROGRAM(S): at 06:44

## 2022-11-21 NOTE — PROGRESS NOTE BEHAVIORAL HEALTH - NSBHCHARTREVIEWINVESTIGATE_PSY_A_CORE FT
< from: 12 Lead ECG (11.04.22 @ 09:15) >    Ventricular Rate 116 BPM    Atrial Rate 116 BPM    P-R Interval 148 ms    QRS Duration 94 ms    Q-T Interval 350 ms    QTC Calculation(Bazett) 486 ms    P Axis 73 degrees    R Axis 78 degrees    T Axis 76 degrees    Diagnosis Line Sinus tachycardia  Incomplete right bundle branch block  Borderline ECG    Confirmed by Omero Castillo ([id number]) on 11/4/2022 2:09:09 PM    < end of copied text >

## 2022-11-21 NOTE — PROGRESS NOTE BEHAVIORAL HEALTH - NSBHFUPINTERVALHXFT_PSY_A_CORE
Patient seen and evaluated. As per nursing report PRNs for sleep. Now compliant with all meds, including insulin. On approach patient calm, no agitation or aggression noted. Patient expressed feeling better than she did on admission. Asking what the next steps regarding Rehab are. Patient has a phone screening today. No overt delusions noted. Denies any A/V hallucinations. Denies any suicidal/homicidal ideations. Patient visible on the unit engaging with peers and attending groups. Patient seen and evaluated. As per nursing report PRNs for sleep. Now compliant with all meds, including insulin. On approach patient calm, no agitation or aggression noted. Patient expressed feeling better than she did on admission. Asking what the next steps regarding Rehab are. Patient has a phone screening today. No overt delusions noted. Denies any A/V hallucinations. Denies any suicidal/homicidal ideations. Patient visible on the unit engaging with peers and attending groups. Ambulates as tolerated. Performs ADLs independently.

## 2022-11-21 NOTE — PROGRESS NOTE BEHAVIORAL HEALTH - SUMMARY
57 yo woman with no formal psychiatric history with a medical history of NIDDM and Thyroid Disease who presents with an acute change in behavior and mentation characterized as carlton. Patient's behavior has become aggressive and disruptive and her symptoms preclude her being safely managed in an outpatient setting. Patient requires inpatient admission to be assessed, to treat her acute symptoms and to fashion a safe discharge plan.  Bipolar Disorder (current episode manic) v Mood Disorder due to underlying medical condition    Patient seen and evaluated. As per nursing report PRNs for sleep. Now compliant with all meds, including insulin. On approach patient calm, no agitation or aggression noted. Patient expressed feeling better than she did on admission. Asking what the next steps regarding Rehab are. Patient has a phone screening today. No overt delusions noted. Denies any A/V hallucinations. Denies any suicidal/homicidal ideations. Patient visible on the unit engaging with peers and attending groups.     #Mood disorder with psychosis vs substance induced mood disorder vs bipolar disorder  -Abilify 20mg bedtime    #Cocaine use  - UDS positive for cocaine  - consider rehab vs. outpatient substance use Tx    #Tobacco use  -Nicotine Gum PRN    -Hydroxyzine 50mg Q6 PRN for anxiety/insomnia  -Haldol 5mg Q6 PRN for agitation/psychosis  -Benadryl 50mg Q6 PRN got EPS  -Lorazepam 2mg Q6 PRN for aggression    #Medical meds  -Metoprolol Succinate  -Synthroid  -Losartan  -Trulicity   -Maalox    #Agitation  -for agitation not amenable to verbal redirection, may give haldol 5 mg q6h prn, ativan 2 mg q6h prn, benadryl 50 mg q6h prn with escalation to IM if pt is a danger to self or/and others with repeat EKG to ensure QTc <500 ms. 57 yo woman with no formal psychiatric history with a medical history of NIDDM and Thyroid Disease who presents with an acute change in behavior and mentation characterized as carlton. Patient's behavior has become aggressive and disruptive and her symptoms preclude her being safely managed in an outpatient setting. Patient requires inpatient admission to be assessed, to treat her acute symptoms and to fashion a safe discharge plan.  Bipolar Disorder (current episode manic) v Mood Disorder due to underlying medical condition    Patient seen and evaluated. As per nursing report PRNs for sleep. Now compliant with all meds, including insulin. On approach patient calm, no agitation or aggression noted. Patient expressed feeling better than she did on admission. Asking what the next steps regarding Rehab are. Patient has a phone screening today. No overt delusions noted. Denies any A/V hallucinations. Denies any suicidal/homicidal ideations. Patient visible on the unit engaging with peers and attending groups. Ambulates as tolerated. Performs ADLs independently.    #Mood disorder with psychosis vs substance induced mood disorder vs bipolar disorder  -Abilify 20mg bedtime    #Cocaine use  - UDS positive for cocaine  - consider rehab vs. outpatient substance use Tx    #Tobacco use  -Nicotine Gum PRN    -Hydroxyzine 50mg Q6 PRN for anxiety/insomnia  -Haldol 5mg Q6 PRN for agitation/psychosis  -Benadryl 50mg Q6 PRN got EPS  -Lorazepam 2mg Q6 PRN for aggression    #Medical meds  -Metoprolol Succinate  -Synthroid  -Losartan  -Trulicity   -Maalox    #Agitation  -for agitation not amenable to verbal redirection, may give haldol 5 mg q6h prn, ativan 2 mg q6h prn, benadryl 50 mg q6h prn with escalation to IM if pt is a danger to self or/and others with repeat EKG to ensure QTc <500 ms.

## 2022-11-21 NOTE — CHART NOTE - NSCHARTNOTEFT_GEN_A_CORE
Social Work Note:       Mounika remains on the unit for continued treatment, safety, and observation. Treatment team met with patient on morning rounds.  She was calm and cooperative.  Patient said her weekend was ok.  She endorsed improved mood since admission. She denies SI/ HI and AVH. Patient has been visible on the unit, socializing with peers and attending groups.  She has been medication compliant and in good behavioral control.     Mounika has agreed to work with CATCH team (Rachael [phone]) to explore rehab referrals although she continues to express ambivalence. Patient is scheduled for a telephone screening with [facility] [place] today.     At this time patient is not psychiatrically stable for discharge.      Mental Status Exam:      Mood – Euthymic   Sleep  - Fair  Appetite – Good  ADLs – Good  Thought Process – Linear  Observation – q10 minutes.

## 2022-11-22 LAB
GLUCOSE BLDC GLUCOMTR-MCNC: 243 MG/DL — HIGH (ref 70–99)
GLUCOSE BLDC GLUCOMTR-MCNC: 247 MG/DL — HIGH (ref 70–99)
GLUCOSE BLDC GLUCOMTR-MCNC: 263 MG/DL — HIGH (ref 70–99)

## 2022-11-22 PROCEDURE — 99231 SBSQ HOSP IP/OBS SF/LOW 25: CPT

## 2022-11-22 RX ORDER — BACITRACIN ZINC 500 UNIT/G
1 OINTMENT IN PACKET (EA) TOPICAL
Refills: 0 | Status: DISCONTINUED | OUTPATIENT
Start: 2022-11-22 | End: 2022-11-28

## 2022-11-22 RX ADMIN — Medication 25 MICROGRAM(S): at 05:20

## 2022-11-22 RX ADMIN — Medication 1 APPLICATION(S): at 20:31

## 2022-11-22 RX ADMIN — LOSARTAN POTASSIUM 50 MILLIGRAM(S): 100 TABLET, FILM COATED ORAL at 09:10

## 2022-11-22 RX ADMIN — Medication 50 MILLIGRAM(S): at 21:30

## 2022-11-22 RX ADMIN — Medication 50 MILLIGRAM(S): at 11:03

## 2022-11-22 RX ADMIN — Medication 50 MILLIGRAM(S): at 03:38

## 2022-11-22 RX ADMIN — Medication 3: at 12:09

## 2022-11-22 RX ADMIN — Medication 5 MILLIGRAM(S): at 21:30

## 2022-11-22 RX ADMIN — Medication 50 MILLIGRAM(S): at 09:09

## 2022-11-22 RX ADMIN — ARIPIPRAZOLE 20 MILLIGRAM(S): 15 TABLET ORAL at 20:31

## 2022-11-22 RX ADMIN — Medication 30 MILLILITER(S): at 22:55

## 2022-11-22 RX ADMIN — Medication 400 MILLIGRAM(S): at 15:49

## 2022-11-22 RX ADMIN — Medication 2: at 16:38

## 2022-11-22 NOTE — PROGRESS NOTE BEHAVIORAL HEALTH - NSBHCHARTREVIEWINVESTIGATE_PSY_A_CORE FT
< from: 12 Lead ECG (11.04.22 @ 09:15) >    Ventricular Rate 116 BPM    Atrial Rate 116 BPM    P-R Interval 148 ms    QRS Duration 94 ms    Q-T Interval 350 ms    QTC Calculation(Bazett) 486 ms    P Axis 73 degrees    R Axis 78 degrees    T Axis 76 degrees    Diagnosis Line Sinus tachycardia  Incomplete right bundle branch block  Borderline ECG    Confirmed by Omero Castillo ([id number]) on 11/4/2022 2:09:09 PM    < end of copied text >

## 2022-11-22 NOTE — CHART NOTE - NSCHARTNOTEFT_GEN_A_CORE
INTERVAL DATA:   · Interval Chief Complaint: "none"  · Interval History: Patient seen and evaluated. As per nursing report patient took vistaril and ibuprofen PRN last night. On approach patient is calm and cooperative with no aggression or agitation noted. Patient planning on attending rehab after discharge. Yesterday, patient had screening with a rehabilitation facility, and states she believes it went well. Patient states she is anxious about the process, as she is currently unsure of where she is going to go in terms of what facility. Denies any A/V hallucinations. Denies any suicidal/homicidal ideations. Patient visible on the unit engaging with peers and attending groups.     MENTAL STATUS EXAM:   · General Appearance	No deformities present  · Body Habitus	Average build  · Hygiene 	Fair  · Grooming	Fair  · Behavior	Cooperative  · Eye Contact	Fair  · Relatedness	Other  · Other	oddly related  · Impulse Control	Normal  · Muscle Tone / Strength	Normal muscle tone/strength  · Abnormal Movements	No abnormal movements  · Gait / Station	Normal gait / station  · Speech Volume	Normal  · Speech Rate	Normal  · Speech Spontaneity	Normal  · Speech Articulation	Normal  · Reported mood	Anxious  · Affect Quality	Anxious  · Affect Range	Labile  · Affect Congruence	congruent  · Thought Process	Linear  · Thought Associations	Loose  · Thought Content	Anxiety surrounding rehab placement  · Perceptions	No abnormalities  · Oriented to Time	Yes  · Oriented to Place	Yes  · Oriented to Situation	Yes  · Oriented to Person	Yes  · Attention / Concentration	Normal  · Estimated Intelligence	Average  · Recent Memory	Normal  · Remote Memory	Normal  · Fund of Knowledge	Normal  · Judgment (regarding everyday events)	Fair  · Insight (regarding psychiatric illness)	Poor    SUICIDALITY:   · Suicidality (Interval)	none known    HOMICIDALITY/AGGRESSION:   · Homicidality/Aggression	none known    DIAGNOSIS DSM-V:    Psychiatric Diagnosis (Corresponds to DSM-IV Axis I, II):  Primary Dx Mood disorder with psychosis F39. Secondary Dx 1 Tobacco use Z72.0. Secondary Dx 2 Cocaine use F14.90.    Plan:  #Mood disorder with psychosis vs substance induced mood disorder vs bipolar disorder  -Abilify 15mg bedtime    #Cocaine use  - UDS positive for cocaine  - consider rehab vs. outpatient substance use Tx    #Tobacco use  -Nicotine Gum PRN    -Hydroxyzine 50mg Q6 PRN for anxiety/insomnia  -Haldol 5mg Q6 PRN for agitation/psychosis  -Benadryl 50mg Q6 PRN got EPS  -Lorazepam 2mg Q6 PRN for aggression    #Medical meds  -Metoprolol Succinate  -Synthroid  -Losartan  -Trulicity   -Maalox    #Agitation  -for agitation not amenable to verbal redirection, may give haldol 5 mg q6h prn, ativan 2 mg q6h prn, benadryl 50 mg q6h prn with escalation to IM if pt is a danger to self or/and others with repeat EKG to ensure QTc <500 ms.

## 2022-11-22 NOTE — CHART NOTE - NSCHARTNOTEFT_GEN_A_CORE
Called by rn for patient with a wound to scalp  Pt seen at bedside mild complaint of small abrasion with drainage to posterior scalp.  Area has been excoriated patient admits to scratching the wound approx 2cm x 2cm.  Pt advised to not irritate area  Cleanse BID and cover with bacitracin and monitor.  RN will monitor and if worsens needs to be re-evaluated in 2 days.

## 2022-11-22 NOTE — PROGRESS NOTE BEHAVIORAL HEALTH - NSBHFUPINTERVALHXFT_PSY_A_CORE
Patient seen and evaluated. As per nursing report PRNs for sleep. Now compliant with all meds, including insulin. On approach patient calm, no agitation or aggression noted. Patient expressed feeling better. Verbalized being ready to go to Rehab. Patient had a phone screening yesterday. Expressed having trouble sleeping last night because she was anxious about how the phone screening went. Anxious about not knowing if she was accepted and where she will be going. No overt delusions noted. Denies any A/V hallucinations. Denies any suicidal/homicidal ideations. Patient visible on the unit engaging with peers and attending groups. Awaiting Rehab placement.

## 2022-11-22 NOTE — PROGRESS NOTE BEHAVIORAL HEALTH - SUMMARY
55 yo woman with no formal psychiatric history with a medical history of NIDDM and Thyroid Disease who presents with an acute change in behavior and mentation characterized as carlton. Patient's behavior has become aggressive and disruptive and her symptoms preclude her being safely managed in an outpatient setting. Patient requires inpatient admission to be assessed, to treat her acute symptoms and to fashion a safe discharge plan.  Bipolar Disorder (current episode manic) v Mood Disorder due to underlying medical condition    Patient seen and evaluated. As per nursing report PRNs for sleep. Now compliant with all meds, including insulin. On approach patient calm, no agitation or aggression noted. Patient expressed feeling better. Verbalized being ready to go to Rehab. Patient had a phone screening yesterday. Expressed having trouble sleeping last night because she was anxious about how the phone screening went. Anxious about not knowing if she was accepted and where she will be going. No overt delusions noted. Denies any A/V hallucinations. Denies any suicidal/homicidal ideations. Patient visible on the unit engaging with peers and attending groups. Awaiting Rehab placement.    #Mood disorder with psychosis vs substance induced mood disorder vs bipolar disorder  -Abilify 20mg bedtime    #Cocaine use  - UDS positive for cocaine  - consider rehab vs. outpatient substance use Tx    #Tobacco use  -Nicotine Gum PRN    -Hydroxyzine 50mg Q6 PRN for anxiety/insomnia  -Haldol 5mg Q6 PRN for agitation/psychosis  -Benadryl 50mg Q6 PRN got EPS  -Lorazepam 2mg Q6 PRN for aggression    #Medical meds  -Metoprolol Succinate  -Synthroid  -Losartan  -Trulicity   -Maalox    #Agitation  -for agitation not amenable to verbal redirection, may give haldol 5 mg q6h prn, ativan 2 mg q6h prn, benadryl 50 mg q6h prn with escalation to IM if pt is a danger to self or/and others with repeat EKG to ensure QTc <500 ms. 57 yo woman with no formal psychiatric history with a medical history of NIDDM and Thyroid Disease who presents with an acute change in behavior and mentation characterized as carlton. Patient's behavior has become aggressive and disruptive and her symptoms preclude her being safely managed in an outpatient setting. Patient requires inpatient admission to be assessed, to treat her acute symptoms and to fashion a safe discharge plan.  Bipolar Disorder (current episode manic) v Mood Disorder due to underlying medical condition    Patient seen and evaluated. As per nursing report PRNs for sleep. Now compliant with all meds, including insulin. On approach patient calm, no agitation or aggression noted. Patient expressed feeling better. Verbalized being ready to go to Rehab. Patient had a phone screening yesterday. Expressed having trouble sleeping last night because she was anxious about how the phone screening went. Anxious about not knowing if she was accepted and where she will be going. No overt delusions noted. Denies any A/V hallucinations. Denies any suicidal/homicidal ideations. Patient visible on the unit engaging with peers and attending groups. Awaiting Rehab placement.    #Mood disorder with psychosis vs substance induced mood disorder vs bipolar disorder  -Abilify 20mg bedtime    #Cocaine use  - UDS positive for cocaine  - consider rehab vs. outpatient substance use Tx    #Tobacco use  -Nicotine Gum PRN    -Hydroxyzine 50mg Q6 PRN for anxiety/insomnia  -Haldol 5mg Q6 PRN for agitation/psychosis  -Benadryl 50mg Q6 PRN got EPS  -Lorazepam 2mg Q6 PRN for aggression    -Melatonin 5mg bedtime PRN for insomnia    #Medical meds  -Metoprolol Succinate  -Synthroid  -Losartan  -Trulicity   -Maalox    #Agitation  -for agitation not amenable to verbal redirection, may give haldol 5 mg q6h prn, ativan 2 mg q6h prn, benadryl 50 mg q6h prn with escalation to IM if pt is a danger to self or/and others with repeat EKG to ensure QTc <500 ms.

## 2022-11-23 LAB
GLUCOSE BLDC GLUCOMTR-MCNC: 220 MG/DL — HIGH (ref 70–99)
GLUCOSE BLDC GLUCOMTR-MCNC: 233 MG/DL — HIGH (ref 70–99)
GLUCOSE BLDC GLUCOMTR-MCNC: 279 MG/DL — HIGH (ref 70–99)
GLUCOSE BLDC GLUCOMTR-MCNC: 352 MG/DL — HIGH (ref 70–99)

## 2022-11-23 PROCEDURE — 99231 SBSQ HOSP IP/OBS SF/LOW 25: CPT

## 2022-11-23 RX ADMIN — Medication 3: at 11:50

## 2022-11-23 RX ADMIN — Medication 1 TABLET(S): at 15:33

## 2022-11-23 RX ADMIN — Medication 50 MILLIGRAM(S): at 04:20

## 2022-11-23 RX ADMIN — Medication 400 MILLIGRAM(S): at 05:20

## 2022-11-23 RX ADMIN — Medication 2 MILLIGRAM(S): at 20:48

## 2022-11-23 RX ADMIN — Medication 5: at 16:17

## 2022-11-23 RX ADMIN — Medication 1 APPLICATION(S): at 20:13

## 2022-11-23 RX ADMIN — Medication 25 MICROGRAM(S): at 06:19

## 2022-11-23 RX ADMIN — Medication 2: at 07:40

## 2022-11-23 RX ADMIN — Medication 50 MILLIGRAM(S): at 12:54

## 2022-11-23 RX ADMIN — Medication 50 MILLIGRAM(S): at 22:16

## 2022-11-23 RX ADMIN — Medication 30 MILLILITER(S): at 14:22

## 2022-11-23 RX ADMIN — ARIPIPRAZOLE 20 MILLIGRAM(S): 15 TABLET ORAL at 20:12

## 2022-11-23 RX ADMIN — Medication 2 MILLIGRAM(S): at 20:15

## 2022-11-23 RX ADMIN — Medication 50 MILLIGRAM(S): at 08:12

## 2022-11-23 RX ADMIN — Medication 400 MILLIGRAM(S): at 04:20

## 2022-11-23 RX ADMIN — Medication 5 MILLIGRAM(S): at 20:14

## 2022-11-23 RX ADMIN — Medication 1 APPLICATION(S): at 08:13

## 2022-11-23 RX ADMIN — LOSARTAN POTASSIUM 50 MILLIGRAM(S): 100 TABLET, FILM COATED ORAL at 08:12

## 2022-11-23 NOTE — PROGRESS NOTE BEHAVIORAL HEALTH - NSBHFUPINTERVALHXFT_PSY_A_CORE
Patient seen and evaluated. As per nursing report PRNs for sleep. Now compliant with all meds. On approach patient calm, no agitation or aggression noted. Patient expresses feeling better than on admission but admits to being anxious about Rehab. Verbalizes being ready to go but anxious about not knowing if she has been accepted or not and when she will be leaving. Patient appears optimistic about Rehab and verbalizes being ready to get help. Patient had another phone screening yesterday. No overt delusions noted. Denies any A/V hallucinations. Denies any suicidal/homicidal ideations. Patient visible on the unit engaging with peers and attending groups. Awaiting Rehab placement. Ambulates as tolerated. Performs ADLs independently. Patient seen and evaluated. As per nursing report PRNs for sleep. Now compliant with all meds. On approach patient calm, no agitation or aggression noted. Patient expresses feeling better than on admission but admits to being anxious about Rehab. Verbalizes being ready to go but anxious about not knowing if she has been accepted or not and when she will be leaving. Patient appears optimistic about Rehab and verbalizes being ready to get help. Patient had another phone screening yesterday. No overt delusions noted. Denies any A/V hallucinations. Denies any suicidal/homicidal ideations. Patient visible on the unit engaging with peers and attending groups. Awaiting Rehab placement. Ambulates as tolerated. Performs ADLs independently.    Patient has a bump on scalp which she scratched. Bacitracin ordered.    Patient requested a multivitamin

## 2022-11-23 NOTE — PROGRESS NOTE BEHAVIORAL HEALTH - NSBHCHARTREVIEWINVESTIGATE_PSY_A_CORE FT
< from: 12 Lead ECG (11.04.22 @ 09:15) >    Ventricular Rate 116 BPM    Atrial Rate 116 BPM    P-R Interval 148 ms    QRS Duration 94 ms    Q-T Interval 350 ms    QTC Calculation(Bazett) 486 ms    P Axis 73 degrees    R Axis 78 degrees    T Axis 76 degrees    Diagnosis Line Sinus tachycardia  Incomplete right bundle branch block  Borderline ECG    Confirmed by Omero Castillo ([id number]) on 11/4/2022 2:09:09 PM    < end of copied text >

## 2022-11-23 NOTE — CHART NOTE - NSCHARTNOTEFT_GEN_A_CORE
Social Work Note:       Mounika remains on the unit for continued treatment, safety, and observation. Treatment team met with patient on morning rounds.  She was calm and cooperative.  Patient endorsed improved mood since admission.  She presents with significantly improved insight and reports looking forward to going to rehab to get help.  Patient denies SI/HI and A/V/H.  She has been medication compliant and in good behavioral control.  Patient has been visible on the unit, interacting with peers, and attending groups.      Mounika has agreed to work with CATCH team (Rachael [id number]) to explore rehab referrals.  Patient completed a phone screen with [facility] yesterday.  Her acceptance is pending.     Mental Status Exam:      Mood – Euthymic   Sleep  - Fair  Appetite – Good  ADLs – Good  Thought Process – Linear  Observation – q10 minutes.

## 2022-11-23 NOTE — PROGRESS NOTE BEHAVIORAL HEALTH - SUMMARY
57 yo woman with no formal psychiatric history with a medical history of NIDDM and Thyroid Disease who presents with an acute change in behavior and mentation characterized as carlton. Patient's behavior has become aggressive and disruptive and her symptoms preclude her being safely managed in an outpatient setting. Patient requires inpatient admission to be assessed, to treat her acute symptoms and to fashion a safe discharge plan.  Bipolar Disorder (current episode manic) v Mood Disorder due to underlying medical condition    Patient seen and evaluated. As per nursing report PRNs for sleep. Now compliant with all meds. On approach patient calm, no agitation or aggression noted. Patient expresses feeling better than on admission but admits to being anxious about Rehab. Verbalizes being ready to go but anxious about not knowing if she has been accepted or not and when she will be leaving. Patient appears optimistic about Rehab and verbalizes being ready to get help. Patient had another phone screening yesterday. No overt delusions noted. Denies any A/V hallucinations. Denies any suicidal/homicidal ideations. Patient visible on the unit engaging with peers and attending groups. Awaiting Rehab placement. Ambulates as tolerated. Performs ADLs independently.    #Mood disorder with psychosis vs substance induced mood disorder vs bipolar disorder  -Abilify 20mg bedtime    #Cocaine use  - UDS positive for cocaine  - consider rehab vs. outpatient substance use Tx    #Tobacco use  -Nicotine Gum PRN    -Hydroxyzine 50mg Q6 PRN for anxiety/insomnia  -Haldol 5mg Q6 PRN for agitation/psychosis  -Benadryl 50mg Q6 PRN got EPS  -Lorazepam 2mg Q6 PRN for aggression    -Melatonin 5mg bedtime PRN for insomnia    #Medical meds  -Metoprolol Succinate  -Synthroid  -Losartan  -Trulicity   -Maalox    #Agitation  -for agitation not amenable to verbal redirection, may give haldol 5 mg q6h prn, ativan 2 mg q6h prn, benadryl 50 mg q6h prn with escalation to IM if pt is a danger to self or/and others with repeat EKG to ensure QTc <500 ms. 57 yo woman with no formal psychiatric history with a medical history of NIDDM and Thyroid Disease who presents with an acute change in behavior and mentation characterized as carlton. Patient's behavior has become aggressive and disruptive and her symptoms preclude her being safely managed in an outpatient setting. Patient requires inpatient admission to be assessed, to treat her acute symptoms and to fashion a safe discharge plan.  Bipolar Disorder (current episode manic) v Mood Disorder due to underlying medical condition    Patient seen and evaluated. As per nursing report PRNs for sleep. Now compliant with all meds. On approach patient calm, no agitation or aggression noted. Patient expresses feeling better than on admission but admits to being anxious about Rehab. Verbalizes being ready to go but anxious about not knowing if she has been accepted or not and when she will be leaving. Patient appears optimistic about Rehab and verbalizes being ready to get help. Patient had another phone screening yesterday. No overt delusions noted. Denies any A/V hallucinations. Denies any suicidal/homicidal ideations. Patient visible on the unit engaging with peers and attending groups. Awaiting Rehab placement. Ambulates as tolerated. Performs ADLs independently.    #Mood disorder with psychosis vs substance induced mood disorder vs bipolar disorder  -Abilify 20mg bedtime    #Cocaine use  - UDS positive for cocaine  - consider rehab vs. outpatient substance use Tx    #Tobacco use  -Nicotine Gum PRN    -Hydroxyzine 50mg Q6 PRN for anxiety/insomnia  -Haldol 5mg Q6 PRN for agitation/psychosis  -Benadryl 50mg Q6 PRN got EPS  -Lorazepam 2mg Q6 PRN for aggression    -Melatonin 5mg bedtime PRN for insomnia    #Medical meds  -Metoprolol Succinate  -Synthroid  -Losartan  -Trulicity   -Maalox  -Multivitamin  -Bacitracin for scalp abraision    #Agitation  -for agitation not amenable to verbal redirection, may give haldol 5 mg q6h prn, ativan 2 mg q6h prn, benadryl 50 mg q6h prn with escalation to IM if pt is a danger to self or/and others with repeat EKG to ensure QTc <500 ms.

## 2022-11-24 LAB
GLUCOSE BLDC GLUCOMTR-MCNC: 228 MG/DL — HIGH (ref 70–99)
GLUCOSE BLDC GLUCOMTR-MCNC: 268 MG/DL — HIGH (ref 70–99)
GLUCOSE BLDC GLUCOMTR-MCNC: 308 MG/DL — HIGH (ref 70–99)
GLUCOSE BLDC GLUCOMTR-MCNC: 313 MG/DL — HIGH (ref 70–99)

## 2022-11-24 RX ORDER — INSULIN LISPRO 100/ML
4 VIAL (ML) SUBCUTANEOUS ONCE
Refills: 0 | Status: COMPLETED | OUTPATIENT
Start: 2022-11-24 | End: 2022-11-24

## 2022-11-24 RX ORDER — SIMETHICONE 80 MG/1
40 TABLET, CHEWABLE ORAL ONCE
Refills: 0 | Status: COMPLETED | OUTPATIENT
Start: 2022-11-24 | End: 2022-11-24

## 2022-11-24 RX ADMIN — Medication 5 MILLIGRAM(S): at 20:16

## 2022-11-24 RX ADMIN — Medication 1 TABLET(S): at 08:01

## 2022-11-24 RX ADMIN — ARIPIPRAZOLE 20 MILLIGRAM(S): 15 TABLET ORAL at 20:16

## 2022-11-24 RX ADMIN — Medication 50 MILLIGRAM(S): at 05:33

## 2022-11-24 RX ADMIN — Medication 4 UNIT(S): at 20:15

## 2022-11-24 RX ADMIN — Medication 1 APPLICATION(S): at 20:17

## 2022-11-24 RX ADMIN — SIMETHICONE 40 MILLIGRAM(S): 80 TABLET, CHEWABLE ORAL at 20:16

## 2022-11-24 RX ADMIN — Medication 400 MILLIGRAM(S): at 05:31

## 2022-11-24 RX ADMIN — Medication 2: at 11:29

## 2022-11-24 RX ADMIN — Medication 25 MICROGRAM(S): at 05:30

## 2022-11-24 RX ADMIN — Medication 50 MILLIGRAM(S): at 08:01

## 2022-11-24 RX ADMIN — Medication 4: at 17:17

## 2022-11-24 RX ADMIN — Medication 30 MILLILITER(S): at 19:19

## 2022-11-24 RX ADMIN — Medication 50 MILLIGRAM(S): at 23:29

## 2022-11-24 RX ADMIN — LOSARTAN POTASSIUM 50 MILLIGRAM(S): 100 TABLET, FILM COATED ORAL at 08:01

## 2022-11-24 RX ADMIN — Medication 4: at 07:54

## 2022-11-24 NOTE — CHART NOTE - NSCHARTNOTEFT_GEN_A_CORE
1 time dose of simethicone ordered for c/o upset stomach & gas after lunch, with past sxs resolved w/ simethicone

## 2022-11-25 LAB
GLUCOSE BLDC GLUCOMTR-MCNC: 198 MG/DL — HIGH (ref 70–99)
GLUCOSE BLDC GLUCOMTR-MCNC: 337 MG/DL — HIGH (ref 70–99)
GLUCOSE BLDC GLUCOMTR-MCNC: 345 MG/DL — HIGH (ref 70–99)

## 2022-11-25 PROCEDURE — 99231 SBSQ HOSP IP/OBS SF/LOW 25: CPT

## 2022-11-25 RX ORDER — METOPROLOL TARTRATE 50 MG
1 TABLET ORAL
Qty: 0 | Refills: 0 | DISCHARGE
Start: 2022-11-25

## 2022-11-25 RX ORDER — LEVOTHYROXINE SODIUM 125 MCG
1 TABLET ORAL
Qty: 0 | Refills: 0 | DISCHARGE
Start: 2022-11-25

## 2022-11-25 RX ORDER — ARIPIPRAZOLE 15 MG/1
1 TABLET ORAL
Qty: 0 | Refills: 0 | DISCHARGE
Start: 2022-11-25

## 2022-11-25 RX ORDER — INSULIN LISPRO 100/ML
0 VIAL (ML) SUBCUTANEOUS
Qty: 0 | Refills: 0 | DISCHARGE
Start: 2022-11-25

## 2022-11-25 RX ORDER — LOSARTAN POTASSIUM 100 MG/1
1 TABLET, FILM COATED ORAL
Qty: 0 | Refills: 0 | DISCHARGE
Start: 2022-11-25

## 2022-11-25 RX ORDER — LANOLIN ALCOHOL/MO/W.PET/CERES
5 CREAM (GRAM) TOPICAL
Refills: 0 | Status: DISCONTINUED | OUTPATIENT
Start: 2022-11-25 | End: 2022-11-28

## 2022-11-25 RX ORDER — HYDROXYZINE HCL 10 MG
1 TABLET ORAL
Qty: 0 | Refills: 0 | DISCHARGE
Start: 2022-11-25

## 2022-11-25 RX ADMIN — Medication 4: at 15:48

## 2022-11-25 RX ADMIN — Medication 400 MILLIGRAM(S): at 03:13

## 2022-11-25 RX ADMIN — Medication 4: at 06:58

## 2022-11-25 RX ADMIN — Medication 400 MILLIGRAM(S): at 15:00

## 2022-11-25 RX ADMIN — Medication 1 APPLICATION(S): at 20:41

## 2022-11-25 RX ADMIN — Medication 1 APPLICATION(S): at 07:04

## 2022-11-25 RX ADMIN — Medication 30 MILLILITER(S): at 10:35

## 2022-11-25 RX ADMIN — Medication 30 MILLILITER(S): at 17:01

## 2022-11-25 RX ADMIN — ARIPIPRAZOLE 20 MILLIGRAM(S): 15 TABLET ORAL at 20:40

## 2022-11-25 RX ADMIN — Medication 2 MILLIGRAM(S): at 22:18

## 2022-11-25 RX ADMIN — LOSARTAN POTASSIUM 50 MILLIGRAM(S): 100 TABLET, FILM COATED ORAL at 09:34

## 2022-11-25 RX ADMIN — Medication 1 TABLET(S): at 09:34

## 2022-11-25 RX ADMIN — Medication 5 MILLIGRAM(S): at 20:40

## 2022-11-25 RX ADMIN — Medication 50 MILLIGRAM(S): at 09:34

## 2022-11-25 RX ADMIN — Medication 25 MICROGRAM(S): at 06:18

## 2022-11-25 RX ADMIN — Medication 400 MILLIGRAM(S): at 02:33

## 2022-11-25 RX ADMIN — Medication 400 MILLIGRAM(S): at 13:19

## 2022-11-25 RX ADMIN — Medication 2 MILLIGRAM(S): at 07:03

## 2022-11-25 NOTE — PROGRESS NOTE BEHAVIORAL HEALTH - NSBHATTESTBILLING_PSY_A_CORE
[id number]-Bknnjzyyny Inpatient care - low complexity - 15 minutes
[id number]-Mgrkebrhmh Inpatient care - moderate complexity - 25 minutes
[id number]-Omghkuzbry Inpatient care - low complexity - 15 minutes
18792-Oaaziqaqjx Inpatient care - low complexity - 15 minutes
11387-Phuuzchmqb Inpatient care - low complexity - 15 minutes
19111-Kapotthezr Inpatient care - low complexity - 15 minutes
47406-Rpcvhcmsuu Inpatient care - low complexity - 15 minutes
00732-Dkaflwdxnd Inpatient care - low complexity - 15 minutes
87073-Eodfnrzlkz Inpatient care - low complexity - 15 minutes
66505-Ynwytvrpzu Inpatient care - low complexity - 15 minutes
46958-Ozegbcumta Inpatient care - low complexity - 15 minutes
32978-Dntiouwgrw Inpatient care - low complexity - 15 minutes
22366-Ipzfhowuvc Inpatient care - low complexity - 15 minutes
00134-Sspfulhwno Inpatient care - low complexity - 15 minutes
48386-Ntyxrotaur Inpatient care - low complexity - 15 minutes
77928-Hcibnwgxvg Inpatient care - low complexity - 15 minutes
43172-Nradqycsso Inpatient care - low complexity - 15 minutes
80288-Ijeqdodsep Inpatient care - low complexity - 15 minutes
91183-Acsoeunkel Inpatient care - low complexity - 15 minutes

## 2022-11-25 NOTE — PROGRESS NOTE BEHAVIORAL HEALTH - THOUGHT ASSOCIATIONS
Normal
Loose
Loose
Normal
Loose
Normal

## 2022-11-25 NOTE — PROGRESS NOTE BEHAVIORAL HEALTH - ATTENTION / CONCENTRATION
Normal
Impaired
Normal
Impaired
Normal

## 2022-11-25 NOTE — CHART NOTE - NSCHARTNOTEFT_GEN_A_CORE
Social Work Note:       Mounika remains on the unit for continued treatment, safety, and observation. Treatment team met with patient on morning rounds.  She was calm and cooperative.  Patient endorsed improved mood since admission.  She presents with significantly improved insight and reports looking forward to going to rehab to get help.  Patient denies SI/HI and A/V/H.  She has been medication compliant and in good behavioral control.  Patient has been visible on the unit, interacting with peers, and attending groups.      Patient has been working with the CATCH team (Rachael [phone]) to explore rehab referrals.  Patient completed a phone screen with [facility] on Wednesday 11/23.  Her acceptance is pending.     Mental Status Exam:      Mood – Euthymic   Sleep  - Fair  Appetite – Good  ADLs – Good  Thought Process – Linear  Observation – q10 minutes Social Work Note:       Mounika remains on the unit for continued treatment, safety, and observation. Treatment team met with patient on morning rounds.  She was calm and cooperative.  Patient endorsed improved mood since admission.  She presents with significantly improved insight and reports looking forward to going to rehab to get help.  Patient denies SI/HI and A/V/H.  She has been medication compliant and in good behavioral control.  Patient has been visible on the unit, interacting with peers, and attending groups.      Patient has been working with the CATCH team (Rachael [phone]) to explore rehab referrals.  Patient completed a phone screen with [facility] on Wednesday 11/23 and was accepted into the program.    Anticipated discharge to [facility] on Monday 11/28    Mental Status Exam:      Mood – Euthymic   Sleep  - Fair  Appetite – Good  ADLs – Good  Thought Process – Linear  Observation – q10 minutes

## 2022-11-25 NOTE — PROGRESS NOTE BEHAVIORAL HEALTH - NSBHADMITIPOBS_PSY_A_CORE
Enhanced supervision

## 2022-11-25 NOTE — PROGRESS NOTE BEHAVIORAL HEALTH - ESTIMATED INTELLIGENCE
Average
Other
Average

## 2022-11-25 NOTE — PROGRESS NOTE BEHAVIORAL HEALTH - MOOD
Normal
Other
Normal/Other
Normal
Normal
Other
Normal
Normal
Normal/Other
Normal
Normal
Normal/Other
Normal
Normal/Other
Normal/Other
Other
Normal

## 2022-11-25 NOTE — PROGRESS NOTE BEHAVIORAL HEALTH - PRIMARY DX
Mood disorder with psychosis
Bipolar disorder
Mood disorder with psychosis
Bipolar disorder
Mood disorder with psychosis

## 2022-11-25 NOTE — PROGRESS NOTE BEHAVIORAL HEALTH - NSBHFUPINTERVALCCFT_PSY_A_CORE
"I'm fine."
"None"
"I feel good"
"None"
"I'm good"
"Im feeling blessed"
"None"
"Im feeling better"
"I'm fine."
"Im better than yesterday"
"None"
"I'm doing really well."
"I'm doing well."
"Im feeling great"
"I feel good"
"I feel good"
"None"
"I'm fine, I'm ready to go"
"I'm fine, just tired"

## 2022-11-25 NOTE — PROGRESS NOTE BEHAVIORAL HEALTH - RECENT MEMORY
Other
Other
Normal
Other
Normal

## 2022-11-25 NOTE — PROGRESS NOTE BEHAVIORAL HEALTH - NSBHFUPVIOL_PSY_A_CORE
unable to assess
none known

## 2022-11-25 NOTE — PROGRESS NOTE BEHAVIORAL HEALTH - SUMMARY
57 yo woman with no formal psychiatric history with a medical history of NIDDM and Thyroid Disease who presents with an acute change in behavior and mentation characterized as carlton. Patient's behavior has become aggressive and disruptive and her symptoms preclude her being safely managed in an outpatient setting. Patient requires inpatient admission to be assessed, to treat her acute symptoms and to fashion a safe discharge plan.  Bipolar Disorder (current episode manic) v Mood Disorder due to underlying medical condition    Patient seen and evaluated. As per nursing report PRNs for sleep. Now compliant with all meds. On approach patient calm, no agitation or aggression noted. Patient expresses feeling better than on admission but admits to being anxious about Rehab. Verbalizes being ready to go but anxious about not knowing if she has been accepted or not and when she will be leaving. Patient appears optimistic about Rehab and verbalizes being ready to get help. Patient had another phone screening yesterday. No overt delusions noted. Denies any A/V hallucinations. Denies any suicidal/homicidal ideations. Patient visible on the unit engaging with peers and attending groups. Awaiting Rehab placement. Ambulates as tolerated. Performs ADLs independently.    #Mood disorder with psychosis vs substance induced mood disorder vs bipolar disorder  -c/w abilify 20 mg qhs  -change melatonin 5 mg at 1900 for insomnia    #Cocaine use  - UDS positive for cocaine  - rehab, pending placement    #Tobacco use  -Nicotine Gum PRN    -Hydroxyzine 50mg Q6 PRN for anxiety/insomnia  -Haldol 5mg Q6 PRN for agitation/psychosis  -Benadryl 50mg Q6 PRN got EPS  -Lorazepam 2mg Q6 PRN for aggression    -Melatonin 5mg bedtime PRN for insomnia    #Medical meds  -Metoprolol Succinate  -Synthroid  -Losartan  -Trulicity   -Maalox  -Multivitamin  -Bacitracin for scalp abraision    #Agitation  -for agitation not amenable to verbal redirection, may give haldol 5 mg q6h prn, ativan 2 mg q6h prn, benadryl 50 mg q6h prn with escalation to IM if pt is a danger to self or/and others with repeat EKG to ensure QTc <500 ms. 55 yo woman with no formal psychiatric history with a medical history of NIDDM and Thyroid Disease who presents with an acute change in behavior and mentation characterized as carlton. Patient's behavior has become aggressive and disruptive and her symptoms preclude her being safely managed in an outpatient setting. Patient requires inpatient admission to be assessed, to treat her acute symptoms and to fashion a safe discharge plan.  Bipolar Disorder (current episode manic) v Mood Disorder due to underlying medical condition    On evaluation today, pt presents linear and pleasant with good ADLs. She reports she is doing well. Has been in good behavioral control. She reports good mood, sleep and appetite. Discussed anxieties on rehab placement at length. No overt psychosis noted. Denies SI/HI, intent and plan. Reports she is ready for discharge, pending rehab placement. Discharge planning ensues.    #Mood disorder with psychosis vs substance induced mood disorder vs bipolar disorder  -c/w abilify 20 mg qhs  -change melatonin 5 mg at 1900 for insomnia    #Cocaine use  - UDS positive for cocaine  - rehab, pending placement    #Tobacco use  -Nicotine Gum PRN    -Hydroxyzine 50mg Q6 PRN for anxiety/insomnia  -Haldol 5mg Q6 PRN for agitation/psychosis  -Benadryl 50mg Q6 PRN got EPS  -Lorazepam 2mg Q6 PRN for aggression    -Melatonin 5mg bedtime PRN for insomnia    #Medical meds  -Metoprolol Succinate  -Synthroid  -Losartan  -Trulicity   -Maalox  -Multivitamin  -Bacitracin for scalp abraision    #Agitation  -for agitation not amenable to verbal redirection, may give haldol 5 mg q6h prn, ativan 2 mg q6h prn, benadryl 50 mg q6h prn with escalation to IM if pt is a danger to self or/and others with repeat EKG to ensure QTc <500 ms. 55 yo woman with no formal psychiatric history with a medical history of NIDDM and Thyroid Disease who presents with an acute change in behavior and mentation characterized as carlton. Patient's behavior has become aggressive and disruptive and her symptoms preclude her being safely managed in an outpatient setting. Patient requires inpatient admission to be assessed, to treat her acute symptoms and to fashion a safe discharge plan.  Bipolar Disorder (current episode manic) v Mood Disorder due to underlying medical condition    On evaluation today, pt presents linear and pleasant with good ADLs. She reports she is doing well. Has been in good behavioral control. She reports good mood, sleep and appetite. Discussed anxieties on rehab placement at length. No overt psychosis noted. Denies SI/HI, intent and plan. Reports she is ready for discharge, accepted to [facility]. Anticipated discharge for Monday.    #Mood disorder with psychosis vs substance induced mood disorder vs bipolar disorder  -c/w abilify 20 mg qhs  -change melatonin 5 mg at 1900 for insomnia    #Cocaine use  - UDS positive for cocaine  - rehab, pending placement    #Tobacco use  -Nicotine Gum PRN    -Hydroxyzine 50mg Q6 PRN for anxiety/insomnia  -Haldol 5mg Q6 PRN for agitation/psychosis  -Benadryl 50mg Q6 PRN got EPS  -Lorazepam 2mg Q6 PRN for aggression    -Melatonin 5mg bedtime PRN for insomnia    #Medical meds  -Metoprolol Succinate  -Synthroid  -Losartan  -Trulicity   -Maalox  -Multivitamin  -Bacitracin for scalp abraision    #Agitation  -for agitation not amenable to verbal redirection, may give haldol 5 mg q6h prn, ativan 2 mg q6h prn, benadryl 50 mg q6h prn with escalation to IM if pt is a danger to self or/and others with repeat EKG to ensure QTc <500 ms.

## 2022-11-25 NOTE — PROGRESS NOTE BEHAVIORAL HEALTH - THOUGHT CONTENT
Unremarkable
Other
Unremarkable
Other
Unremarkable
Unremarkable
Delusions/Other
Unremarkable
Other
Unremarkable
Other
Unremarkable
Other

## 2022-11-25 NOTE — PROGRESS NOTE BEHAVIORAL HEALTH - NS ED BHA AXIS I SECONDARY1 CODE FT
Z72.0

## 2022-11-25 NOTE — PROGRESS NOTE BEHAVIORAL HEALTH - NS ED BHA AXIS I SECONDARY2 CODE FT
F14.90

## 2022-11-25 NOTE — PROGRESS NOTE BEHAVIORAL HEALTH - NSBHCHARTREVIEWVS_PSY_A_CORE FT
Vital Signs Last 24 Hrs  T(C): 36.9 (04 Nov 2022 08:20), Max: 36.9 (04 Nov 2022 08:20)  T(F): 98.4 (04 Nov 2022 08:20), Max: 98.4 (04 Nov 2022 08:20)  HR: 105 (04 Nov 2022 08:20) (95 - 105)  BP: 196/84 (04 Nov 2022 08:20) (160/81 - 196/84)  BP(mean): --  RR: 16 (04 Nov 2022 08:20) (16 - 16)  SpO2: --
Vital Signs Last 24 Hrs  T(C): 36.3 (08 Nov 2022 09:27), Max: 37 (07 Nov 2022 15:50)  T(F): 97.3 (08 Nov 2022 09:27), Max: 98.6 (07 Nov 2022 15:50)  HR: 18 (08 Nov 2022 09:27) (18 - 89)  BP: 128/69 (08 Nov 2022 09:27) (125/62 - 128/69)  BP(mean): --  RR: 113 (08 Nov 2022 09:27) (18 - 113)  SpO2: --
Vital Signs Last 24 Hrs  T(C): 36.8 (03 Nov 2022 08:45), Max: 36.8 (03 Nov 2022 08:45)  T(F): 98.3 (03 Nov 2022 08:45), Max: 98.3 (03 Nov 2022 08:45)  HR: 118 (03 Nov 2022 08:45) (95 - 118)  BP: 147/65 (03 Nov 2022 08:45) (130/69 - 147/65)  BP(mean): --  RR: 16 (03 Nov 2022 08:45) (16 - 18)  SpO2: --
Vital Signs Last 24 Hrs  T(C): 36.6 (16 Nov 2022 09:22), Max: 36.6 (16 Nov 2022 01:11)  T(F): 97.9 (16 Nov 2022 09:22), Max: 97.9 (16 Nov 2022 09:22)  HR: 127 (16 Nov 2022 09:22) (94 - 127)  BP: 118/86 (16 Nov 2022 09:22) (118/86 - 152/62)  BP(mean): --  RR: 16 (16 Nov 2022 09:22) (16 - 20)  SpO2: --
Vital Signs Last 24 Hrs  T(C): 36.8 (15 Nov 2022 09:10), Max: 37.4 (15 Nov 2022 05:49)  T(F): 98.2 (15 Nov 2022 09:10), Max: 99.4 (15 Nov 2022 05:49)  HR: 104 (15 Nov 2022 09:10) (101 - 113)  BP: 141/83 (15 Nov 2022 09:10) (140/88 - 157/99)  BP(mean): --  RR: 18 (15 Nov 2022 09:10) (18 - 20)  SpO2: --
Vital Signs Last 24 Hrs  T(C): 36.9 (05 Nov 2022 08:12), Max: 37.2 (04 Nov 2022 20:30)  T(F): 98.5 (05 Nov 2022 08:12), Max: 98.9 (04 Nov 2022 20:30)  HR: 99 (05 Nov 2022 08:12) (99 - 127)  BP: 154/66 (05 Nov 2022 08:12) (119/68 - 199/103)  BP(mean): --  RR: 18 (05 Nov 2022 08:12) (16 - 18)  SpO2: 97% (04 Nov 2022 20:30) (97% - 98%)
Vital Signs Last 24 Hrs  T(C): 36.3 (08 Nov 2022 09:27), Max: 37 (07 Nov 2022 15:50)  T(F): 97.3 (08 Nov 2022 09:27), Max: 98.6 (07 Nov 2022 15:50)  HR: 18 (08 Nov 2022 09:27) (18 - 89)  BP: 128/69 (08 Nov 2022 09:27) (125/62 - 128/69)  BP(mean): --  RR: 113 (08 Nov 2022 09:27) (18 - 113)  SpO2: --
Vital Signs Last 24 Hrs  T(C): 36.4 (25 Nov 2022 09:47), Max: 36.4 (25 Nov 2022 09:47)  T(F): 97.6 (25 Nov 2022 09:47), Max: 97.6 (25 Nov 2022 09:47)  HR: 91 (25 Nov 2022 09:47) (91 - 92)  BP: 161/73 (25 Nov 2022 09:47) (142/78 - 161/73)  BP(mean): --  RR: 18 (25 Nov 2022 09:47) (16 - 18)  SpO2: --
Vital Signs Last 24 Hrs  T(C): 36.8 (10 Nov 2022 10:00), Max: 36.8 (09 Nov 2022 16:07)  T(F): 98.2 (10 Nov 2022 10:00), Max: 98.3 (09 Nov 2022 16:07)  HR: 110 (10 Nov 2022 10:00) (110 - 118)  BP: 178/95 (10 Nov 2022 10:00) (132/101 - 178/95)  BP(mean): --  RR: 18 (10 Nov 2022 10:00) (16 - 18)  SpO2: --
Vital Signs Last 24 Hrs  T(C): 36.9 (21 Nov 2022 08:45), Max: 36.9 (21 Nov 2022 08:45)  T(F): 98.4 (21 Nov 2022 08:45), Max: 98.4 (21 Nov 2022 08:45)  HR: 92 (21 Nov 2022 08:45) (92 - 92)  BP: 124/79 (21 Nov 2022 08:45) (124/79 - 124/79)  BP(mean): --  RR: 18 (21 Nov 2022 08:45) (18 - 18)  SpO2: --
Vital Signs Last 24 Hrs  T(C): 36.4 (02 Nov 2022 15:32), Max: 37 (02 Nov 2022 06:00)  T(F): 97.6 (02 Nov 2022 15:32), Max: 98.6 (02 Nov 2022 06:00)  HR: 95 (02 Nov 2022 15:32) (95 - 125)  BP: 140/66 (02 Nov 2022 15:32) (140/66 - 200/100)  BP(mean): --  RR: 18 (02 Nov 2022 15:32) (18 - 20)  SpO2: 99% (02 Nov 2022 06:00) (99% - 100%)    Parameters below as of 02 Nov 2022 00:26  Patient On (Oxygen Delivery Method): room air
Vital Signs Last 24 Hrs  T(C): 36.3 (22 Nov 2022 08:52), Max: 37 (21 Nov 2022 15:33)  T(F): 97.4 (22 Nov 2022 08:52), Max: 98.6 (21 Nov 2022 15:33)  HR: 84 (22 Nov 2022 08:52) (84 - 95)  BP: 165/75 (22 Nov 2022 08:52) (130/74 - 165/75)  BP(mean): --  RR: 18 (22 Nov 2022 08:52) (16 - 18)  SpO2: --
Vital Signs Last 24 Hrs  T(C): 36.8 (14 Nov 2022 03:08), Max: 37 (13 Nov 2022 16:00)  T(F): 98.3 (14 Nov 2022 03:08), Max: 98.6 (13 Nov 2022 16:00)  HR: 123 (14 Nov 2022 03:08) (119 - 123)  BP: 151/93 (14 Nov 2022 03:08) (151/93 - 168/73)  BP(mean): --  RR: 20 (14 Nov 2022 03:08) (18 - 20)  SpO2: --
Vital Signs Last 24 Hrs  T(C): 36.1 (17 Nov 2022 08:22), Max: 36.8 (16 Nov 2022 16:20)  T(F): 96.9 (17 Nov 2022 08:22), Max: 98.3 (16 Nov 2022 16:20)  HR: 96 (17 Nov 2022 08:22) (89 - 100)  BP: 150/83 (17 Nov 2022 08:22) (129/60 - 160/90)  BP(mean): --  RR: 18 (17 Nov 2022 08:22) (17 - 18)  SpO2: --
Vital Signs Last 24 Hrs  T(C): 37.3 (06 Nov 2022 09:40), Max: 37.3 (06 Nov 2022 09:40)  T(F): 99.1 (06 Nov 2022 09:40), Max: 99.1 (06 Nov 2022 09:40)  HR: 127 (06 Nov 2022 09:40) (121 - 127)  BP: 150/85 (06 Nov 2022 09:40) (150/85 - 195/89)  BP(mean): --  RR: 16 (06 Nov 2022 09:40) (16 - 20)  SpO2: --
Vital Signs Last 24 Hrs  T(C): 36.9 (17 Nov 2022 15:41), Max: 36.9 (17 Nov 2022 15:41)  T(F): 98.4 (17 Nov 2022 15:41), Max: 98.4 (17 Nov 2022 15:41)  HR: 95 (17 Nov 2022 15:41) (95 - 95)  BP: 157/80 (17 Nov 2022 15:41) (157/80 - 157/80)  BP(mean): --  RR: 18 (17 Nov 2022 15:41) (18 - 18)  SpO2: --
Vital Signs Last 24 Hrs  T(C): 36.4 (23 Nov 2022 08:24), Max: 36.4 (23 Nov 2022 08:24)  T(F): 97.6 (23 Nov 2022 08:24), Max: 97.6 (23 Nov 2022 08:24)  HR: 89 (23 Nov 2022 08:24) (85 - 89)  BP: 135/74 (23 Nov 2022 08:24) (135/74 - 156/69)  BP(mean): --  RR: 18 (23 Nov 2022 08:24) (18 - 18)  SpO2: --
Vital Signs Last 24 Hrs  T(C): 36.7 (07 Nov 2022 08:30), Max: 36.7 (07 Nov 2022 05:07)  T(F): 98 (07 Nov 2022 08:30), Max: 98 (07 Nov 2022 05:07)  HR: 58 (07 Nov 2022 08:30) (58 - 122)  BP: 137/70 (07 Nov 2022 08:30) (126/80 - 158/98)  BP(mean): --  RR: 18 (07 Nov 2022 08:30) (16 - 20)  SpO2: --
Vital Signs Last 24 Hrs  T(C): 36.1 (11 Nov 2022 08:58), Max: 36.2 (10 Nov 2022 16:21)  T(F): 96.9 (11 Nov 2022 08:58), Max: 97.1 (10 Nov 2022 16:21)  HR: 110 (11 Nov 2022 08:58) (110 - 116)  BP: 156/73 (11 Nov 2022 08:58) (156/73 - 184/86)  BP(mean): --  RR: 18 (11 Nov 2022 08:58) (16 - 18)  SpO2: --

## 2022-11-25 NOTE — PROGRESS NOTE BEHAVIORAL HEALTH - NSBHCHARTREVIEWINVESTIGATE_PSY_A_CORE FT
< from: 12 Lead ECG (11.04.22 @ 09:15) >    Ventricular Rate 116 BPM    Atrial Rate 116 BPM    P-R Interval 148 ms    QRS Duration 94 ms    Q-T Interval 350 ms    QTC Calculation(Bazett) 486 ms    P Axis 73 degrees    R Axis 78 degrees    T Axis 76 degrees    Diagnosis Line Sinus tachycardia  Incomplete right bundle branch block  Borderline ECG    Confirmed by Omero Castillo ([id number]) on 11/4/2022 2:09:09 PM    < end of copied text >

## 2022-11-25 NOTE — PROGRESS NOTE BEHAVIORAL HEALTH - NSBHADMITIPOBSFT_PSY_A_CORE
As per unit protocol
As per unit ptotocol
As per unit ptotocol
As per unit protocol
As per unit ptotocol
As per unit protocol
As per unit ptotocol
As per unit protocol
As per unit ptotocol
As per unit protocol

## 2022-11-25 NOTE — PROGRESS NOTE BEHAVIORAL HEALTH - NSBHPTASSESSDT_PSY_A_CORE
04-Nov-2022 13:14
07-Nov-2022 12:27
08-Nov-2022 12:53
23-Nov-2022 09:41
15-Nov-2022 12:40
25-Nov-2022 09:15
16-Nov-2022 10:50
18-Nov-2022 11:58
05-Nov-2022 10:27
11-Nov-2022 10:20
10-Nov-2022 10:40
09-Nov-2022 10:44
22-Nov-2022 10:23
02-Nov-2022 14:47
21-Nov-2022 10:08
06-Nov-2022 14:31
17-Nov-2022 10:50
03-Nov-2022 10:52
14-Nov-2022 11:19

## 2022-11-25 NOTE — PROGRESS NOTE BEHAVIORAL HEALTH - NSBHFUPINTERVALHXFT_PSY_A_CORE
Pt seen and evaluated during treatment team, chart reviewed. As per nursing report, pt requested PRN melatonin for insomnia. On evaluation, pt presents calm and cooperative, socializing appropriately with unit milieu. She reports she is doing well and ready for discharge. She reports mood "fine". Endorses anxiety on being accepted to rehab, requesting updates on referrals. States d/t anxiety, she had difficulty falling asleep, reports PRN melatonin effective. Endorses good appetite. She denies AVH, paranoia. Denies SI/HI, intent and plan. Adherent to medications, denies negative side effects. Visible on unit and in behavioral control. Pt noted with elevated BS, has been nonadherent with CHO diet. Reeducated pt on importance of adhering to CHO diet and medication regimen, she verbalizes understanding. Discharge planning ensues. Pt seen and evaluated during treatment team, chart reviewed. As per nursing report, pt requested PRN melatonin for insomnia. On evaluation, pt presents calm and cooperative, socializing appropriately with unit milieu. She reports she is doing well and ready for discharge. She reports mood "fine". Endorses anxiety on being accepted to rehab, requesting updates on referrals. States d/t anxiety, she had difficulty falling asleep, reports PRN melatonin effective. Endorses good appetite. She denies AVH, paranoia. Denies SI/HI, intent and plan. Adherent to medications, denies negative side effects. Visible on unit and in behavioral control. Pt noted with elevated BS, has been nonadherent with CHO diet. Reeducated pt on importance of adhering to CHO diet and medication regimen, she verbalizes understanding.   Pt accepted to  [facility], anticipated discharge for Monday.

## 2022-11-25 NOTE — PROGRESS NOTE BEHAVIORAL HEALTH - PERCEPTIONS
No abnormalities
Other
No abnormalities
Other
No abnormalities

## 2022-11-25 NOTE — PROGRESS NOTE BEHAVIORAL HEALTH - SECONDARY DX2
Cocaine use

## 2022-11-25 NOTE — PROGRESS NOTE BEHAVIORAL HEALTH - FUND OF KNOWLEDGE
Normal
Other
Normal
Other
Normal
Other
Normal

## 2022-11-25 NOTE — PROGRESS NOTE BEHAVIORAL HEALTH - AXIS III
HTN, DM, Thyroid disease

## 2022-11-25 NOTE — PROGRESS NOTE BEHAVIORAL HEALTH - NSBHATTESTTYPEVISIT_PSY_A_CORE
Attending Only
CIARA without on-site Attending supervision
Attending with Resident/Fellow/Student
Attending Only
CIARA without on-site Attending supervision
Attending Only

## 2022-11-25 NOTE — PROGRESS NOTE BEHAVIORAL HEALTH - ORIENTED TO SITUATION
Fwd to AD  Pending order for refill of Advair for review.
Yes

## 2022-11-25 NOTE — PROGRESS NOTE BEHAVIORAL HEALTH - PRN MEDS
haldol, ativan, benadryl last night
melatonin
haldol, ativan, benadryl last night

## 2022-11-25 NOTE — PROGRESS NOTE BEHAVIORAL HEALTH - NS ED [facility] REVIEW OF ED CHART AVAILABLE LABS REVIEWED
None available
Yes
None available
Yes
None available
Yes
None available
None available

## 2022-11-25 NOTE — PROGRESS NOTE BEHAVIORAL HEALTH - NSBHFUPTYPE_PSY_A_CORE
Inpatient
Inpatient-On Service Note
Inpatient

## 2022-11-25 NOTE — PROGRESS NOTE BEHAVIORAL HEALTH - REMOTE MEMORY
Other
Normal
Other
Normal
Normal
Other
Normal

## 2022-11-25 NOTE — PROGRESS NOTE BEHAVIORAL HEALTH - BEHAVIOR
Cooperative
Uncooperative
Uncooperative
Cooperative
Uncooperative
Cooperative
Uncooperative
Uncooperative
Cooperative
Uncooperative
Cooperative
Cooperative/Other
Cooperative

## 2022-11-26 LAB
GLUCOSE BLDC GLUCOMTR-MCNC: 198 MG/DL — HIGH (ref 70–99)
GLUCOSE BLDC GLUCOMTR-MCNC: 290 MG/DL — HIGH (ref 70–99)

## 2022-11-26 RX ADMIN — Medication 3: at 16:34

## 2022-11-26 RX ADMIN — ARIPIPRAZOLE 20 MILLIGRAM(S): 15 TABLET ORAL at 20:29

## 2022-11-26 RX ADMIN — Medication 50 MILLIGRAM(S): at 20:29

## 2022-11-26 RX ADMIN — Medication 1 APPLICATION(S): at 08:11

## 2022-11-26 RX ADMIN — Medication 30 MILLILITER(S): at 16:07

## 2022-11-26 RX ADMIN — Medication 25 MICROGRAM(S): at 05:58

## 2022-11-26 RX ADMIN — LOSARTAN POTASSIUM 50 MILLIGRAM(S): 100 TABLET, FILM COATED ORAL at 08:10

## 2022-11-26 RX ADMIN — Medication 1 APPLICATION(S): at 20:30

## 2022-11-26 RX ADMIN — Medication 2 MILLIGRAM(S): at 20:47

## 2022-11-26 RX ADMIN — Medication 3: at 11:56

## 2022-11-26 RX ADMIN — Medication 5 MILLIGRAM(S): at 20:28

## 2022-11-26 RX ADMIN — Medication 50 MILLIGRAM(S): at 08:11

## 2022-11-26 RX ADMIN — Medication 1: at 08:06

## 2022-11-26 RX ADMIN — Medication 650 MILLIGRAM(S): at 06:27

## 2022-11-26 RX ADMIN — Medication 650 MILLIGRAM(S): at 06:57

## 2022-11-26 RX ADMIN — Medication 1 TABLET(S): at 08:10

## 2022-11-27 LAB
GLUCOSE BLDC GLUCOMTR-MCNC: 255 MG/DL — HIGH (ref 70–99)
GLUCOSE BLDC GLUCOMTR-MCNC: 266 MG/DL — HIGH (ref 70–99)
GLUCOSE BLDC GLUCOMTR-MCNC: 270 MG/DL — HIGH (ref 70–99)
GLUCOSE BLDC GLUCOMTR-MCNC: 331 MG/DL — HIGH (ref 70–99)

## 2022-11-27 RX ORDER — INSULIN LISPRO 100/ML
6 VIAL (ML) SUBCUTANEOUS ONCE
Refills: 0 | Status: COMPLETED | OUTPATIENT
Start: 2022-11-27 | End: 2022-11-27

## 2022-11-27 RX ADMIN — Medication 400 MILLIGRAM(S): at 20:51

## 2022-11-27 RX ADMIN — Medication 400 MILLIGRAM(S): at 00:36

## 2022-11-27 RX ADMIN — Medication 50 MILLIGRAM(S): at 07:50

## 2022-11-27 RX ADMIN — Medication 25 MICROGRAM(S): at 05:06

## 2022-11-27 RX ADMIN — LOSARTAN POTASSIUM 50 MILLIGRAM(S): 100 TABLET, FILM COATED ORAL at 07:50

## 2022-11-27 RX ADMIN — Medication 3: at 10:59

## 2022-11-27 RX ADMIN — Medication 50 MILLIGRAM(S): at 20:19

## 2022-11-27 RX ADMIN — ARIPIPRAZOLE 20 MILLIGRAM(S): 15 TABLET ORAL at 20:17

## 2022-11-27 RX ADMIN — Medication 6 UNIT(S): at 20:15

## 2022-11-27 RX ADMIN — Medication 1 APPLICATION(S): at 20:17

## 2022-11-27 RX ADMIN — Medication 1 TABLET(S): at 07:50

## 2022-11-27 RX ADMIN — Medication 1 APPLICATION(S): at 07:50

## 2022-11-27 RX ADMIN — Medication 3: at 07:36

## 2022-11-27 RX ADMIN — Medication 5 MILLIGRAM(S): at 20:17

## 2022-11-27 RX ADMIN — Medication 400 MILLIGRAM(S): at 21:20

## 2022-11-28 VITALS
TEMPERATURE: 96 F | HEART RATE: 86 BPM | RESPIRATION RATE: 16 BRPM | DIASTOLIC BLOOD PRESSURE: 92 MMHG | SYSTOLIC BLOOD PRESSURE: 128 MMHG

## 2022-11-28 LAB
GLUCOSE BLDC GLUCOMTR-MCNC: 174 MG/DL — HIGH (ref 70–99)
GLUCOSE BLDC GLUCOMTR-MCNC: 270 MG/DL — HIGH (ref 70–99)
GLUCOSE BLDC GLUCOMTR-MCNC: 279 MG/DL — HIGH (ref 70–99)

## 2022-11-28 PROCEDURE — 99238 HOSP IP/OBS DSCHRG MGMT 30/<: CPT

## 2022-11-28 RX ADMIN — Medication 50 MILLIGRAM(S): at 06:22

## 2022-11-28 RX ADMIN — LOSARTAN POTASSIUM 50 MILLIGRAM(S): 100 TABLET, FILM COATED ORAL at 08:03

## 2022-11-28 RX ADMIN — Medication 25 MICROGRAM(S): at 06:22

## 2022-11-28 RX ADMIN — Medication 50 MILLIGRAM(S): at 08:03

## 2022-11-28 RX ADMIN — Medication 1 APPLICATION(S): at 08:03

## 2022-11-28 RX ADMIN — Medication 1 TABLET(S): at 08:03

## 2022-11-28 NOTE — CHART NOTE - NSCHARTNOTEFT_GEN_A_CORE
D/C to Rehab today. Transportation set up. Patient appeared to be in good spirits today. Endorses a better mood. Insight and judgment have improved. Denies suicidal/ homicidal ideations. Patient able to contract for safety, stating the importance of compliance with medication and treatment. Compliant with medication. Does not warrant continued Inpatient hospitalization. Writer reviewed D/C papers with patient. Patient verbalized understanding. Patient does not appear to be of risk to self or others at this time.    Patient admitted with her own supply of Trulicity. Given to patient upon discharge to take with her to Rehab.    · Residence	other facility...  · Housing Details	[facility]     Aftercare Appointments:  · Agency Name	[facility]  · Appointment Date/Time	28-Nov-2022 10:00  · Address	[address] [place] [address]  · Phone #	[phone]  · Purpose	Inpatient Substance Abuse Treatment

## 2022-11-28 NOTE — CHART NOTE - NSCHARTNOTEFT_GEN_A_CORE
Spoke to daughter, Caroline, to let her know patient is being discharged today around 10:00am. daughter has no concerns about discharge. Provided her with address and phone # to [facility] [employer].

## 2022-11-28 NOTE — CHART NOTE - NSCHARTNOTESELECT_GEN_ALL_CORE
Event Note
Social Work Note/Event Note
DM meds
Elevated BP/Event Note
Event Note
Nutrition Services

## 2022-11-28 NOTE — CHART NOTE - NSCHARTNOTEFT_GEN_A_CORE
Social Work Discharge Note    Patient is for discharge today.  She is alert and oriented x3.  Mood has improved.  Anxiety has decreased.  Insight and judgment have improved.  Suicidal/homicidal ideation denied.    Patient will be discharged to [facility] for inpatient substance use treatment.       Patient confirms her cell phone # is [phone]    Patient is aware and agreeable to discharge today.

## 2022-11-29 ENCOUNTER — EMERGENCY (EMERGENCY)
Facility: HOSPITAL | Age: 57
LOS: 0 days | Discharge: HOME | End: 2022-11-30
Attending: EMERGENCY MEDICINE | Admitting: EMERGENCY MEDICINE

## 2022-11-29 VITALS
DIASTOLIC BLOOD PRESSURE: 88 MMHG | HEART RATE: 85 BPM | TEMPERATURE: 99 F | WEIGHT: 141.1 LBS | SYSTOLIC BLOOD PRESSURE: 179 MMHG | OXYGEN SATURATION: 98 % | RESPIRATION RATE: 18 BRPM | HEIGHT: 65 IN

## 2022-11-29 DIAGNOSIS — Z79.85 LONG-TERM (CURRENT) USE OF INJECTABLE NON-INSULIN ANTIDIABETIC DRUGS: ICD-10-CM

## 2022-11-29 DIAGNOSIS — I10 ESSENTIAL (PRIMARY) HYPERTENSION: ICD-10-CM

## 2022-11-29 DIAGNOSIS — E11.9 TYPE 2 DIABETES MELLITUS WITHOUT COMPLICATIONS: ICD-10-CM

## 2022-11-29 DIAGNOSIS — E03.9 HYPOTHYROIDISM, UNSPECIFIED: ICD-10-CM

## 2022-11-29 DIAGNOSIS — R51.9 HEADACHE, UNSPECIFIED: ICD-10-CM

## 2022-11-29 PROCEDURE — 99284 EMERGENCY DEPT VISIT MOD MDM: CPT

## 2022-11-29 RX ORDER — LOSARTAN POTASSIUM 100 MG/1
25 TABLET, FILM COATED ORAL ONCE
Refills: 0 | Status: COMPLETED | OUTPATIENT
Start: 2022-11-29 | End: 2022-11-29

## 2022-11-29 RX ORDER — ACETAMINOPHEN 500 MG
975 TABLET ORAL ONCE
Refills: 0 | Status: COMPLETED | OUTPATIENT
Start: 2022-11-29 | End: 2022-11-29

## 2022-11-29 RX ORDER — METOCLOPRAMIDE HCL 10 MG
10 TABLET ORAL ONCE
Refills: 0 | Status: COMPLETED | OUTPATIENT
Start: 2022-11-29 | End: 2022-11-29

## 2022-11-29 RX ADMIN — LOSARTAN POTASSIUM 25 MILLIGRAM(S): 100 TABLET, FILM COATED ORAL at 23:58

## 2022-11-29 RX ADMIN — Medication 975 MILLIGRAM(S): at 23:58

## 2022-11-29 RX ADMIN — Medication 10 MILLIGRAM(S): at 23:58

## 2022-11-30 VITALS — DIASTOLIC BLOOD PRESSURE: 76 MMHG | SYSTOLIC BLOOD PRESSURE: 162 MMHG

## 2022-11-30 NOTE — ED PROVIDER NOTE - NS ED ROS FT
Constitutional: No fever, chills, unintended weight loss.  Eyes:  No visual changes, eye pain or discharge.  ENMT:  No hearing changes, pain, no sore throat or runny nose, no difficulty swallowing  Cardiac:  No chest pain, SOB or edema. No chest pain with exertion.  Respiratory:  No cough or respiratory distress. No hemoptysis. No history of asthma or RAD.  GI:  No nausea, vomiting, diarrhea or abdominal pain.  :  No dysuria, frequency or burning.  MS:  No myalgia, muscle weakness, joint pain or back pain.  Neuro:  +headache no focal numbness or weakness.  No LOC.  Skin:  No skin rash.   Endocrine: +dm +hypothyroidism

## 2022-11-30 NOTE — ED PROVIDER NOTE - CLINICAL SUMMARY MEDICAL DECISION MAKING FREE TEXT BOX
perez, htn - neuro exam nf - analgesia, home BP med given while in ED, bp improved - strict return precautions discussed, rec close op PCP f/u for continued HTN mgmt

## 2022-11-30 NOTE — ED PROVIDER NOTE - PROVIDER TOKENS
FREE:[LAST:[aleman],PHONE:[(   )    -],FAX:[(   )    -],ADDRESS:[your PCP Dr. Aleman],FOLLOWUP:[1-3 Days],ESTABLISHEDPATIENT:[T]]

## 2022-11-30 NOTE — ED PROVIDER NOTE - CARE PROVIDER_API CALL
tatum,   your PCP Dr. Aleman  Phone: (   )    -  Fax: (   )    -  Established Patient  Follow Up Time: 1-3 Days

## 2022-11-30 NOTE — ED PROVIDER NOTE - PATIENT PORTAL LINK FT
You can access the FollowMyHealth Patient Portal offered by [facility] by registering at the following website: [email or web address]. By joining [email or web address]’s FollowMyHealth portal, you will also be able to view your health information using other applications (apps) compatible with our system.

## 2022-11-30 NOTE — ED PROVIDER NOTE - OBJECTIVE STATEMENT
56F from [address] [place] [address] dm, htn, hypothyroidism p/w htn. Rpts recent poorly controlled htn, has h/o same, states her med were adjusted after recent [facility] admission from metoprolol to losartan, rpts compliance but bp still high. Accomp by intermitt generalized ha. Denies vision changes, cp/sob, nv, abd pain, edema, focal numbness or weakness.

## 2022-12-01 DIAGNOSIS — F51.05 INSOMNIA DUE TO OTHER MENTAL DISORDER: ICD-10-CM

## 2022-12-01 DIAGNOSIS — E03.9 HYPOTHYROIDISM, UNSPECIFIED: ICD-10-CM

## 2022-12-01 DIAGNOSIS — Z56.0 UNEMPLOYMENT, UNSPECIFIED: ICD-10-CM

## 2022-12-01 DIAGNOSIS — F41.9 ANXIETY DISORDER, UNSPECIFIED: ICD-10-CM

## 2022-12-01 DIAGNOSIS — I10 ESSENTIAL (PRIMARY) HYPERTENSION: ICD-10-CM

## 2022-12-01 DIAGNOSIS — Z20.822 CONTACT WITH AND (SUSPECTED) EXPOSURE TO COVID-19: ICD-10-CM

## 2022-12-01 DIAGNOSIS — F29 UNSPECIFIED PSYCHOSIS NOT DUE TO A SUBSTANCE OR KNOWN PHYSIOLOGICAL CONDITION: ICD-10-CM

## 2022-12-01 DIAGNOSIS — F31.9 BIPOLAR DISORDER, UNSPECIFIED: ICD-10-CM
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
## 2022-12-01 DIAGNOSIS — S00.01XA ABRASION OF SCALP, INITIAL ENCOUNTER: ICD-10-CM

## 2022-12-01 DIAGNOSIS — T43.206A UNDERDOSING OF UNSPECIFIED ANTIDEPRESSANTS, INITIAL ENCOUNTER: ICD-10-CM

## 2022-12-01 DIAGNOSIS — B35.1 TINEA UNGUIUM: ICD-10-CM

## 2022-12-01 DIAGNOSIS — Z28.21 IMMUNIZATION NOT CARRIED OUT BECAUSE OF PATIENT REFUSAL: ICD-10-CM

## 2022-12-01 DIAGNOSIS — F17.210 NICOTINE DEPENDENCE, CIGARETTES, UNCOMPLICATED: ICD-10-CM

## 2022-12-01 DIAGNOSIS — Z62.810 PERSONAL HISTORY OF PHYSICAL AND SEXUAL ABUSE IN CHILDHOOD: ICD-10-CM

## 2022-12-01 DIAGNOSIS — T50.916A UNDERDOSING OF MULTIPLE UNSPECIFIED DRUGS, MEDICAMENTS AND BIOLOGICAL SUBSTANCES, INITIAL ENCOUNTER: ICD-10-CM

## 2022-12-01 DIAGNOSIS — E11.65 TYPE 2 DIABETES MELLITUS WITH HYPERGLYCEMIA: ICD-10-CM

## 2022-12-01 DIAGNOSIS — Z79.85 LONG-TERM (CURRENT) USE OF INJECTABLE NON-INSULIN ANTIDIABETIC DRUGS: ICD-10-CM

## 2022-12-01 DIAGNOSIS — Z91.128 PATIENT'S INTENTIONAL UNDERDOSING OF MEDICATION REGIMEN FOR OTHER REASON: ICD-10-CM

## 2022-12-01 DIAGNOSIS — L60.0 INGROWING NAIL: ICD-10-CM

## 2022-12-01 DIAGNOSIS — Z91.118 PATIENT'S NONCOMPLIANCE WITH DIETARY REGIMEN FOR OTHER REASON: ICD-10-CM

## 2022-12-01 DIAGNOSIS — Z62.811 PERSONAL HISTORY OF PSYCHOLOGICAL ABUSE IN CHILDHOOD: ICD-10-CM
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
## 2022-12-01 DIAGNOSIS — R45.1 RESTLESSNESS AND AGITATION: ICD-10-CM

## 2022-12-01 SDOH — ECONOMIC STABILITY - INCOME SECURITY: UNEMPLOYMENT, UNSPECIFIED: Z56.0

## 2023-03-11 ENCOUNTER — EMERGENCY (EMERGENCY)
Facility: HOSPITAL | Age: 58
LOS: 0 days | Discharge: HOME | End: 2023-03-11
Attending: EMERGENCY MEDICINE
Payer: MEDICAID

## 2023-03-11 VITALS
HEART RATE: 98 BPM | SYSTOLIC BLOOD PRESSURE: 179 MMHG | RESPIRATION RATE: 16 BRPM | DIASTOLIC BLOOD PRESSURE: 81 MMHG | TEMPERATURE: 98 F | OXYGEN SATURATION: 98 %

## 2023-03-11 DIAGNOSIS — M79.672 PAIN IN LEFT FOOT: ICD-10-CM

## 2023-03-11 DIAGNOSIS — Z90.89 ACQUIRED ABSENCE OF OTHER ORGANS: ICD-10-CM

## 2023-03-11 DIAGNOSIS — E11.9 TYPE 2 DIABETES MELLITUS WITHOUT COMPLICATIONS: ICD-10-CM

## 2023-03-11 DIAGNOSIS — I10 ESSENTIAL (PRIMARY) HYPERTENSION: ICD-10-CM

## 2023-03-11 DIAGNOSIS — M79.671 PAIN IN RIGHT FOOT: ICD-10-CM

## 2023-03-11 DIAGNOSIS — E03.9 HYPOTHYROIDISM, UNSPECIFIED: ICD-10-CM

## 2023-03-11 DIAGNOSIS — Z79.4 LONG TERM (CURRENT) USE OF INSULIN: ICD-10-CM

## 2023-03-11 PROCEDURE — 99284 EMERGENCY DEPT VISIT MOD MDM: CPT

## 2023-03-11 PROCEDURE — 99283 EMERGENCY DEPT VISIT LOW MDM: CPT

## 2023-03-11 RX ORDER — IBUPROFEN 200 MG
600 TABLET ORAL ONCE
Refills: 0 | Status: COMPLETED | OUTPATIENT
Start: 2023-03-11 | End: 2023-03-11

## 2023-03-11 RX ORDER — GABAPENTIN 400 MG/1
300 CAPSULE ORAL ONCE
Refills: 0 | Status: COMPLETED | OUTPATIENT
Start: 2023-03-11 | End: 2023-03-11

## 2023-03-11 RX ADMIN — Medication 600 MILLIGRAM(S): at 11:14

## 2023-03-11 RX ADMIN — GABAPENTIN 300 MILLIGRAM(S): 400 CAPSULE ORAL at 11:14

## 2023-03-11 NOTE — ED ADULT NURSE NOTE - OBJECTIVE STATEMENT
presented to ED c/o b/l LE nerve pain. pt c/o b/l feet tingling for years. pt states she takes gabapentin at home

## 2023-03-11 NOTE — ED PROVIDER NOTE - PATIENT PORTAL LINK FT
You can access the FollowMyHealth Patient Portal offered by [facility] by registering at the following website: [email or web address]. By joining "[employer]’s FollowMyHealth portal, you will also be able to view your health information using other applications (apps) compatible with our system.

## 2023-03-11 NOTE — ED PROVIDER NOTE - NSFOLLOWUPINSTRUCTIONS_ED_ALL_ED_FT
Please follow-up with your primary care doctor.  Continue taking your medications as prescribed.    Diabetes is a chronic condition caused by high blood sugar levels. Your blood sugar levels become high because your body does not have enough insulin. Insulin helps move sugar out of the blood so it can be used for energy. Increased sugar in your blood can cause problems in several organs of your body including but not limited to your blood vessels, your kidneys, your brain, and your eyes. The lack of insulin forces your body to use fat instead of sugar for energy. This can be dangerous if not controlled.  Seek Medical Attention If: You have a seizure. You begin to breathe fast, or are short of breath. You become weak and confused. You are more drowsy than usual. You have fruity, sweet breath. You have severe, new stomach pain and are vomiting. Your blood sugar level is lower or higher than your healthcare provider says it should be. You have ketones in your blood or urine. You have a fever or chills. You are more thirsty than usual. You are urinating more often than usual. You have questions or concerns about your condition or care.  Insulin and diabetes medicine decreases the amount of sugar in your blood. The best way to prevent problems from Diabetes is to control your blood sugars.  Monitor your blood sugar levels closely. Administer Insulin as directed by your physician.  Speak with your doctor and/or a nutritionist about the best way to change your lifestyle and dietary habits to avoid any problems from Diabetes in the future.

## 2023-03-11 NOTE — ED PROVIDER NOTE - CLINICAL SUMMARY MEDICAL DECISION MAKING FREE TEXT BOX
57-year-old female with past medical history of diabetes, presents with bilateral solar feet pain for several months.  No acute changes.  Denies any swelling, redness, fever or trauma.  On exam nontoxic, vital signs noted, bilateral feet nontender, nonswollen, no erythema or fluctuance, no signs of infection.  Neurovascular intact.  Suspect due to chronic diabetic peripheral neuropathy.  Advised that she needs to continue with her gabapentin and follow-up with her PMD to discuss change in management.  In my opinion, based on current evaluation and results, an acute medical or surgical emergency does not appear to be occurring at this time and I feel that the pt is stable for further outpatient work up and/or treatment.

## 2023-03-11 NOTE — ED PROVIDER NOTE - IV ALTEPLASE INCLUSION HIDDEN
show I personally evaluated the patient. I reviewed the Resident’s or Physician Assistant’s note (as assigned above), and agree with the findings and plan except as documented in my note.  74 F with hx of DM and MDS with resulting chronic anemia was sent in by her hematologist for evaluation of incidental hyperkalemia. Had a level of 6.2 yesterday and a repeated level of 5.9 thiis morning. Pt has no complaints. VS reviewed, pt non-toxic appearing, NAD. Head ncat, MMM, normal s1s2 without any murmurs, Lungs CTAB with normal work of breathing. abd +BS, s/nd/nt, extremities wnl, neuro exam grossly normal. No acute skin rashes. Plan is ekg, labs and dispo accordingly.

## 2023-03-11 NOTE — ED PROVIDER NOTE - CARE PROVIDER_API CALL
Gaurav Villeda)  Family Medicine  [address] [place] [address]  Phone: [phone]  Fax: ()-  Follow Up Time:

## 2023-03-11 NOTE — ED ADULT NURSE NOTE - IN THE PAST 12 MONTHS HAVE YOU USED DRUGS OTHER THAN THOSE REQUIRED FOR MEDICAL REASON?
Chief Complaint   Patient presents with     Allergy Injection     allergy injection     Patient came in for weekly Allergy injection. Patient had no reaction to last weeks shots. I gave 0.5 ml of the red bottle x3 sub cutaneous [id number] any issues.   Patient instructed to wait in building for at least 30 min. after injections  Brett Orosco comes into clinic today at the request of Charlie Rivera for allergy injections    No reaction to last injection.    This service provided today was under the direct supervision of Dr. Rivera, who was available if needed.        Monalisa Whtieside, SMA     No

## 2023-03-11 NOTE — ED PROVIDER NOTE - OBJECTIVE STATEMENT
57-year-old female past medical history insulin-dependent diabetes, hypertension, hypothyroidism presents to ED for bilateral feet pain times several months.  Patient states she follows with PMD Dr. Daniel.  She was previously on gabapentin 100 mg 3 times daily for this problem, was increased to 300 mg 3 times daily last week.  She reports she still has bilateral heel pain that is described as shooting pain in her bilateral heels. Denies trauma, swelling, redness, rash.  She states she had blood work done recently with PMD and had hemoglobin A1c 12.5.  States she does not check her blood sugar regularly at home.

## 2023-04-10 NOTE — PROGRESS NOTE BEHAVIORAL HEALTH - ORIENTATION OTHER
Unable to assess
What Type Of Note Output Would You Prefer (Optional)?: Standard Output
Hpi Title: Evaluation of Skin Lesions
How Severe Are Your Spot(S)?: moderate
Have Your Spot(S) Been Treated In The Past?: has not been treated
Unable to assess

## 2023-10-11 NOTE — DISCHARGE NOTE BEHAVIORAL HEALTH - ADMISSION DATE +STARTOFVISITDATE
[facility] RHEUMATOLOGY FOLLOW UP NOTE       Date Of Service: 10/11/2023  Provider: ESTEFANIA Stoll - KARLEY    Name: Mak Ball   MRN: [id number]    Chief Complaint(s)     Chief Complaint   Patient presents with    Follow-up     3 month follow up        History of Present Illness (HPI)     Mak Ball  is a(n)60 y.o. female with a hx of adrenal abnormalities, arthritis, asthma, CVA, headaches, hyperlipidemia, h/o myositis, bipolar disorder, may thurner syndrome, dry eye, fatty liver here for the f/u evaluation of h/o MCTD, inflammatory arthritis     Interval hx:    - vomiting and diarrhea starting last Wednesday- went to ER and diagnosed with viral gastroenteritis   - started the methotrexate - is noticing some improvement    - has not started the humira yet- did get an approval letter   - unsteady gait- scheduled to see neurologist soon (Dr. Satnam Koehler)    pain affecting the fingers, wrists, elbows, neck, back, ankles, feet  Pain on a scale 0-10: 9/10  Type of pain: constant aching, stiffness  Timing: mornings  Aggravating factors: weather changes, back: getting up from seated position, walking long distances  Alleviating factors: tylenol hands    Associated symptoms:  + swelling/  Redness/ warmth (right fingers), + AM stiffness lasting a couple hours      REVIEW OF SYSTEMS: (ROS)    Review of Systems   Constitutional:  Positive for fatigue. Negative for fever and unexpected weight change. HENT:  Negative for congestion and trouble swallowing. Eyes:  Negative for pain and itching. Respiratory:  Positive for shortness of breath. Negative for cough. Cardiovascular:  Negative for chest pain and leg swelling. Gastrointestinal:  Positive for constipation. Negative for abdominal pain, diarrhea and nausea. Endocrine: Negative for cold intolerance and heat intolerance. Genitourinary:  Negative for difficulty urinating, frequency and urgency.    Musculoskeletal:  Positive for arthralgias and joint Statement Selected

## 2024-02-01 ENCOUNTER — INPATIENT (INPATIENT)
Facility: HOSPITAL | Age: 59
LOS: 4 days | Discharge: HOME | DRG: 812 | End: 2024-02-06
Attending: HOSPITALIST | Admitting: INTERNAL MEDICINE
Payer: MEDICAID

## 2024-02-01 VITALS
SYSTOLIC BLOOD PRESSURE: 129 MMHG | TEMPERATURE: 98 F | DIASTOLIC BLOOD PRESSURE: 60 MMHG | HEART RATE: 108 BPM | OXYGEN SATURATION: 99 % | RESPIRATION RATE: 19 BRPM

## 2024-02-01 DIAGNOSIS — T50.901A POISONING BY UNSPECIFIED DRUGS, MEDICAMENTS AND BIOLOGICAL SUBSTANCES, ACCIDENTAL (UNINTENTIONAL), INITIAL ENCOUNTER: ICD-10-CM

## 2024-02-01 DIAGNOSIS — E08.10 DIABETES MELLITUS DUE TO UNDERLYING CONDITION WITH KETOACIDOSIS WITHOUT COMA: ICD-10-CM

## 2024-02-01 LAB
ALBUMIN SERPL ELPH-MCNC: 4.6 G/DL — SIGNIFICANT CHANGE UP (ref 3.5–5.2)
ALP SERPL-CCNC: 249 U/L — HIGH (ref 30–115)
ALT FLD-CCNC: 26 U/L — SIGNIFICANT CHANGE UP (ref 0–41)
ANION GAP SERPL CALC-SCNC: 18 MMOL/L — HIGH (ref 7–14)
APAP SERPL-MCNC: <5 UG/ML — LOW (ref 10–30)
APPEARANCE UR: CLEAR — SIGNIFICANT CHANGE UP
AST SERPL-CCNC: 20 U/L — SIGNIFICANT CHANGE UP (ref 0–41)
B-OH-BUTYR SERPL-SCNC: 3.5 MMOL/L — HIGH
BASOPHILS # BLD AUTO: 0.03 K/UL — SIGNIFICANT CHANGE UP (ref 0–0.2)
BASOPHILS NFR BLD AUTO: 0.3 % — SIGNIFICANT CHANGE UP (ref 0–1)
BILIRUB SERPL-MCNC: 0.4 MG/DL — SIGNIFICANT CHANGE UP (ref 0.2–1.2)
BILIRUB UR-MCNC: NEGATIVE — SIGNIFICANT CHANGE UP
BUN SERPL-MCNC: 18 MG/DL — SIGNIFICANT CHANGE UP (ref 10–20)
CALCIUM SERPL-MCNC: 10.7 MG/DL — HIGH (ref 8.4–10.5)
CHLORIDE SERPL-SCNC: 92 MMOL/L — LOW (ref 98–110)
CO2 SERPL-SCNC: 22 MMOL/L — SIGNIFICANT CHANGE UP (ref 17–32)
COLOR SPEC: YELLOW — SIGNIFICANT CHANGE UP
CREAT SERPL-MCNC: 0.8 MG/DL — SIGNIFICANT CHANGE UP (ref 0.7–1.5)
DIFF PNL FLD: ABNORMAL
EGFR: 85 ML/MIN/1.73M2 — SIGNIFICANT CHANGE UP
EOSINOPHIL # BLD AUTO: 0.02 K/UL — SIGNIFICANT CHANGE UP (ref 0–0.7)
EOSINOPHIL NFR BLD AUTO: 0.2 % — SIGNIFICANT CHANGE UP (ref 0–8)
ETHANOL SERPL-MCNC: <10 MG/DL — SIGNIFICANT CHANGE UP
GAS PNL BLDV: SIGNIFICANT CHANGE UP
GLUCOSE BLDC GLUCOMTR-MCNC: 273 MG/DL — HIGH (ref 70–99)
GLUCOSE BLDC GLUCOMTR-MCNC: 281 MG/DL — HIGH (ref 70–99)
GLUCOSE BLDC GLUCOMTR-MCNC: 430 MG/DL — HIGH (ref 70–99)
GLUCOSE BLDC GLUCOMTR-MCNC: 492 MG/DL — CRITICAL HIGH (ref 70–99)
GLUCOSE SERPL-MCNC: 627 MG/DL — CRITICAL HIGH (ref 70–99)
GLUCOSE UR QL: >=1000 MG/DL
HCG SERPL QL: NEGATIVE — SIGNIFICANT CHANGE UP
HCT VFR BLD CALC: 49 % — HIGH (ref 37–47)
HGB BLD-MCNC: 16.8 G/DL — HIGH (ref 12–16)
IMM GRANULOCYTES NFR BLD AUTO: 0.2 % — SIGNIFICANT CHANGE UP (ref 0.1–0.3)
KETONES UR-MCNC: 40 MG/DL
LACTATE SERPL-SCNC: 2.2 MMOL/L — HIGH (ref 0.7–2)
LEUKOCYTE ESTERASE UR-ACNC: NEGATIVE — SIGNIFICANT CHANGE UP
LIDOCAIN IGE QN: 37 U/L — SIGNIFICANT CHANGE UP (ref 7–60)
LYMPHOCYTES # BLD AUTO: 1.92 K/UL — SIGNIFICANT CHANGE UP (ref 1.2–3.4)
LYMPHOCYTES # BLD AUTO: 20.9 % — SIGNIFICANT CHANGE UP (ref 20.5–51.1)
MAGNESIUM SERPL-MCNC: 2.3 MG/DL — SIGNIFICANT CHANGE UP (ref 1.8–2.4)
MCHC RBC-ENTMCNC: 29.8 PG — SIGNIFICANT CHANGE UP (ref 27–31)
MCHC RBC-ENTMCNC: 34.3 G/DL — SIGNIFICANT CHANGE UP (ref 32–37)
MCV RBC AUTO: 87 FL — SIGNIFICANT CHANGE UP (ref 81–99)
MONOCYTES # BLD AUTO: 0.62 K/UL — HIGH (ref 0.1–0.6)
MONOCYTES NFR BLD AUTO: 6.7 % — SIGNIFICANT CHANGE UP (ref 1.7–9.3)
NEUTROPHILS # BLD AUTO: 6.59 K/UL — HIGH (ref 1.4–6.5)
NEUTROPHILS NFR BLD AUTO: 71.7 % — SIGNIFICANT CHANGE UP (ref 42.2–75.2)
NITRITE UR-MCNC: NEGATIVE — SIGNIFICANT CHANGE UP
NRBC # BLD: 0 /100 WBCS — SIGNIFICANT CHANGE UP (ref 0–0)
OSMOLALITY SERPL: 314 MOS/KG — HIGH (ref 275–300)
PH UR: 6.5 — SIGNIFICANT CHANGE UP (ref 5–8)
PHOSPHATE SERPL-MCNC: 4 MG/DL — SIGNIFICANT CHANGE UP (ref 2.1–4.9)
PLATELET # BLD AUTO: 238 K/UL — SIGNIFICANT CHANGE UP (ref 130–400)
PMV BLD: 10.9 FL — HIGH (ref 7.4–10.4)
POTASSIUM SERPL-MCNC: 4.6 MMOL/L — SIGNIFICANT CHANGE UP (ref 3.5–5)
POTASSIUM SERPL-SCNC: 4.6 MMOL/L — SIGNIFICANT CHANGE UP (ref 3.5–5)
PROT SERPL-MCNC: 8.2 G/DL — HIGH (ref 6–8)
PROT UR-MCNC: 100 MG/DL
RBC # BLD: 5.63 M/UL — HIGH (ref 4.2–5.4)
RBC # FLD: 14.7 % — HIGH (ref 11.5–14.5)
SALICYLATES SERPL-MCNC: <0.3 MG/DL — LOW (ref 4–30)
SODIUM SERPL-SCNC: 132 MMOL/L — LOW (ref 135–146)
SP GR SPEC: >1.03 — HIGH (ref 1–1.03)
UROBILINOGEN FLD QL: 0.2 MG/DL — SIGNIFICANT CHANGE UP (ref 0.2–1)
WBC # BLD: 9.2 K/UL — SIGNIFICANT CHANGE UP (ref 4.8–10.8)
WBC # FLD AUTO: 9.2 K/UL — SIGNIFICANT CHANGE UP (ref 4.8–10.8)

## 2024-02-01 PROCEDURE — 82962 GLUCOSE BLOOD TEST: CPT

## 2024-02-01 PROCEDURE — 93010 ELECTROCARDIOGRAM REPORT: CPT

## 2024-02-01 PROCEDURE — 80048 BASIC METABOLIC PNL TOTAL CA: CPT

## 2024-02-01 PROCEDURE — 72125 CT NECK SPINE W/O DYE: CPT | Mod: 26,MA

## 2024-02-01 PROCEDURE — 70450 CT HEAD/BRAIN W/O DYE: CPT | Mod: 26,MA

## 2024-02-01 PROCEDURE — 93005 ELECTROCARDIOGRAM TRACING: CPT

## 2024-02-01 PROCEDURE — 71045 X-RAY EXAM CHEST 1 VIEW: CPT | Mod: 26

## 2024-02-01 PROCEDURE — 99291 CRITICAL CARE FIRST HOUR: CPT

## 2024-02-01 PROCEDURE — 85025 COMPLETE CBC W/AUTO DIFF WBC: CPT

## 2024-02-01 PROCEDURE — 83036 HEMOGLOBIN GLYCOSYLATED A1C: CPT

## 2024-02-01 PROCEDURE — 83605 ASSAY OF LACTIC ACID: CPT

## 2024-02-01 PROCEDURE — 83735 ASSAY OF MAGNESIUM: CPT

## 2024-02-01 PROCEDURE — 84443 ASSAY THYROID STIM HORMONE: CPT

## 2024-02-01 PROCEDURE — 80053 COMPREHEN METABOLIC PANEL: CPT

## 2024-02-01 PROCEDURE — 84100 ASSAY OF PHOSPHORUS: CPT

## 2024-02-01 PROCEDURE — 86803 HEPATITIS C AB TEST: CPT

## 2024-02-01 PROCEDURE — 36415 COLL VENOUS BLD VENIPUNCTURE: CPT

## 2024-02-01 PROCEDURE — 80061 LIPID PANEL: CPT

## 2024-02-01 RX ORDER — SODIUM CHLORIDE 9 MG/ML
1000 INJECTION, SOLUTION INTRAVENOUS
Refills: 0 | Status: DISCONTINUED | OUTPATIENT
Start: 2024-02-01 | End: 2024-02-01

## 2024-02-01 RX ORDER — INSULIN HUMAN 100 [IU]/ML
10 INJECTION, SOLUTION SUBCUTANEOUS ONCE
Refills: 0 | Status: COMPLETED | OUTPATIENT
Start: 2024-02-01 | End: 2024-02-01

## 2024-02-01 RX ORDER — SODIUM CHLORIDE 9 MG/ML
2000 INJECTION, SOLUTION INTRAVENOUS ONCE
Refills: 0 | Status: COMPLETED | OUTPATIENT
Start: 2024-02-01 | End: 2024-02-01

## 2024-02-01 RX ORDER — INSULIN HUMAN 100 [IU]/ML
10 INJECTION, SOLUTION SUBCUTANEOUS
Qty: 100 | Refills: 0 | Status: DISCONTINUED | OUTPATIENT
Start: 2024-02-01 | End: 2024-02-02

## 2024-02-01 RX ADMIN — INSULIN HUMAN 10 UNIT(S): 100 INJECTION, SOLUTION SUBCUTANEOUS at 20:40

## 2024-02-01 RX ADMIN — INSULIN HUMAN 7 UNIT(S)/HR: 100 INJECTION, SOLUTION SUBCUTANEOUS at 20:41

## 2024-02-01 RX ADMIN — SODIUM CHLORIDE 2000 MILLILITER(S): 9 INJECTION, SOLUTION INTRAVENOUS at 20:40

## 2024-02-01 NOTE — ED PROVIDER NOTE - CHIEF COMPLAINT
The patient is a 58y Female complaining of hypoglycemia. The patient is a 58y Female complaining of hyperglycemia

## 2024-02-01 NOTE — ED PROVIDER NOTE - INTERPRETATION
How Did Your Itching Occur?: gradual in onset  (over a period of years)
How Severe Is Your Itching?: moderate
Additional History: Washes hair once weekly. No change in hair routine
normal

## 2024-02-01 NOTE — ED PROVIDER NOTE - CARE PLAN
1 Principal Discharge DX:	Diabetic ketoacidosis   Principal Discharge DX:	Diabetic ketoacidosis  Secondary Diagnosis:	Overdose of medication

## 2024-02-01 NOTE — ED ADULT NURSE NOTE - CHIEF COMPLAINT QUOTE
pt has history of diabetes and has not had her insulin in months. pt is non complaint. FS in triage "hi" pt states she took extra Lyrica and Baclofen today

## 2024-02-01 NOTE — ED ADULT NURSE NOTE - OBJECTIVE STATEMENT
Patient is a 58 year old female with history of diabetes, has not taken her insulin in over a month, havintg multiple falls over the past week. Patient DIOMEDES CHRISTENSEN

## 2024-02-01 NOTE — ED PROVIDER NOTE - PHYSICAL EXAMINATION
Initial vital signs reviewed.  General: NAD, nontoxic appearing.  HENT: AT/NC. No hemotympanum b/l. MMM. Oropharynx clear without erythema or exudate. No pillai signs or raccoon eyes.  Eyes: non-injected conjunctivae b/l. PERRLA b/l. EOMI b/l.  Neck: supple. No nuchal rigidity. Full aROM. C-spine without midline tenderness or stepoffs.  CV: RRR, no murmurs. 2+ distal pulses x4.  Pulm: nonlabored work of breathing, CTAB.  Abd: soft, nondistended, nontender.  MSK: unsteady gait, no joint deformity.  Skin: warm, dry, well-perfused.  Neuro: A&Ox4. moving all extremities.  Psych: appropriate mood and affect.

## 2024-02-01 NOTE — ED PROVIDER NOTE - OBJECTIVE STATEMENT
58 yoF with pmhx IDDM with neuropathy, hypertension, hypothyroidism, presents for lightheadedness, falls, and vomiting in setting of noncompliance with insulin as well as taking additional baclofen and lyrica x 3 days. States that she feels "whoozy and jerky" with reportedly falling 3x over these past 3 day without any LOC. Has been vomiting multiple times. Reportedly supposed to take baclofen and lyrica q8, but has been taking both q3-4h due to increasing chronic LE pain. Denies any other pain including no chest pain, abd pain, neck pain. 58 yoF with pmhx IDDM with neuropathy, hypertension, hypothyroidism, presents for lightheadedness, falls, and vomiting in setting of noncompliance with insulin as well as taking additional baclofen and lyrica x 3 days. States that she feels "whoozy and jerky" with reportedly falling 3x over these past 3 day without any LOC. Has been vomiting multiple times. Reportedly supposed to take 20mg baclofen and 150mg lyrica q8, but has been taking both q3-4h due to increasing chronic LE pain. Denies any other pain including no chest pain, abd pain, neck pain.

## 2024-02-01 NOTE — ED PROVIDER NOTE - CLINICAL SUMMARY MEDICAL DECISION MAKING FREE TEXT BOX
58 y.o. F with pmhx IDDM with neuropathy, hypertension, hypothyroidism, presents for lightheadedness, falls, and vomiting in setting of noncompliance with insulin as well as taking additional baclofen and Lyrica x 3 days. Pt is a little confused and not appropriately answering questions. Pt said that she felt dizzy and fell down today but had no LOC, reports hitting her head. Pt has also had multiple episodes of vomiting- NBNB. Pt reportedly supposed to take 20mg baclofen 8 times a day and 150mg lyrica 8 times a day, but has been taking both q3-4h due to increasing chronic neuropathy. On exam, pt in NAD, sleepy, confused, head NC/AT, CN II-XII intact, PEERL, EOMi, MM dry, neck (-) midline tenderness, lungs CTA B/L, CV S1S2 regular, abdomen soft/NT/ND/(+)BS, ext (-) edema/deformity, moving all extremities. Labs/CT reviewed. IVF/insulin started. Will admit to ICU for DKA. Tox consulted.

## 2024-02-01 NOTE — ED ADULT NURSE NOTE - NSFALLHARMRISKINTERV_ED_ALL_ED
Assistance OOB with selected safe patient handling equipment if applicable/Assistance with ambulation/Communicate risk of Fall with Harm to all staff, patient, and family/Monitor gait and stability/Monitor for mental status changes and reorient to person, place, and time, as needed/Provide visual cue: red socks, yellow wristband, yellow gown, etc/Reinforce activity limits and safety measures with patient and family/Toileting schedule using arm’s reach rule for commode and bathroom/Use of alarms - bed, stretcher, chair and/or video monitoring/Bed in lowest position, wheels locked, appropriate side rails in place/Call bell, personal items and telephone in reach/Instruct patient to call for assistance before getting out of bed/chair/stretcher/Non-slip footwear applied when patient is off stretcher/[place] to call system/Physically safe environment - no spills, clutter or unnecessary equipment/Purposeful Proactive Rounding/Room/bathroom lighting operational, light cord in reach

## 2024-02-01 NOTE — ED PROVIDER NOTE - PROGRESS NOTE DETAILS
PS: Pt is a 57 y/o female with PMH of DM (noncompliant with insulin), HTN and hypothyroidism (noncompliant with synthroid) presenting for weakness. Pt reports she has had generalized weakness x couple of days. Says she fell 3x over the last couple of days and hit her head today. No LOC. No blood thinners. Also reports she has been taking her lyrica and baclofen more frequently. Says she is supposed to take it every 8 hours but she has been taking it every 4 hours the last couple of days.     CONST: lethargic but awake and alert  HEAD:  normocephalic, atraumatic  EYES:  conjunctivae without injection, drainage or discharge  ENMT: dry mucous membranes  NECK:  supple, no midline tenderness, full ROM  CARDIAC:  regular rate and rhythm, normal S1 and S2, no murmurs, rubs or gallops  RESP:  respiratory rate and effort appear normal for age; lungs are clear to auscultation bilaterally; no rales or wheezes  ABDOMEN:  soft, nontender, nondistended  MUSCULOSKELETAL/NEURO:  SUE, unable to ambulate secondary to generalized weakness  SKIN:  no rash    FS high in triage. Will do labs, imaging, ECG and toxicology consult. Will need admission PS: Pt consents to tox consult

## 2024-02-02 LAB
A1C WITH ESTIMATED AVERAGE GLUCOSE RESULT: >15.5 % — HIGH (ref 4–5.6)
ALBUMIN SERPL ELPH-MCNC: 3.4 G/DL — LOW (ref 3.5–5.2)
ALBUMIN SERPL ELPH-MCNC: 3.7 G/DL — SIGNIFICANT CHANGE UP (ref 3.5–5.2)
ALP SERPL-CCNC: 157 U/L — HIGH (ref 30–115)
ALP SERPL-CCNC: 187 U/L — HIGH (ref 30–115)
ALT FLD-CCNC: 16 U/L — SIGNIFICANT CHANGE UP (ref 0–41)
ALT FLD-CCNC: 18 U/L — SIGNIFICANT CHANGE UP (ref 0–41)
ANION GAP SERPL CALC-SCNC: 14 MMOL/L — SIGNIFICANT CHANGE UP (ref 7–14)
ANION GAP SERPL CALC-SCNC: 15 MMOL/L — HIGH (ref 7–14)
ANION GAP SERPL CALC-SCNC: 9 MMOL/L — SIGNIFICANT CHANGE UP (ref 7–14)
ANION GAP SERPL CALC-SCNC: 9 MMOL/L — SIGNIFICANT CHANGE UP (ref 7–14)
AST SERPL-CCNC: 11 U/L — SIGNIFICANT CHANGE UP (ref 0–41)
AST SERPL-CCNC: 13 U/L — SIGNIFICANT CHANGE UP (ref 0–41)
BASOPHILS # BLD AUTO: 0.03 K/UL — SIGNIFICANT CHANGE UP (ref 0–0.2)
BASOPHILS # BLD AUTO: 0.04 K/UL — SIGNIFICANT CHANGE UP (ref 0–0.2)
BASOPHILS NFR BLD AUTO: 0.4 % — SIGNIFICANT CHANGE UP (ref 0–1)
BASOPHILS NFR BLD AUTO: 0.5 % — SIGNIFICANT CHANGE UP (ref 0–1)
BILIRUB SERPL-MCNC: 0.4 MG/DL — SIGNIFICANT CHANGE UP (ref 0.2–1.2)
BILIRUB SERPL-MCNC: 0.5 MG/DL — SIGNIFICANT CHANGE UP (ref 0.2–1.2)
BUN SERPL-MCNC: 10 MG/DL — SIGNIFICANT CHANGE UP (ref 10–20)
BUN SERPL-MCNC: 13 MG/DL — SIGNIFICANT CHANGE UP (ref 10–20)
BUN SERPL-MCNC: 13 MG/DL — SIGNIFICANT CHANGE UP (ref 10–20)
BUN SERPL-MCNC: 8 MG/DL — LOW (ref 10–20)
CALCIUM SERPL-MCNC: 7.4 MG/DL — LOW (ref 8.4–10.5)
CALCIUM SERPL-MCNC: 8.8 MG/DL — SIGNIFICANT CHANGE UP (ref 8.4–10.5)
CALCIUM SERPL-MCNC: 9.4 MG/DL — SIGNIFICANT CHANGE UP (ref 8.4–10.5)
CALCIUM SERPL-MCNC: 9.9 MG/DL — SIGNIFICANT CHANGE UP (ref 8.4–10.4)
CHLORIDE SERPL-SCNC: 100 MMOL/L — SIGNIFICANT CHANGE UP (ref 98–110)
CHLORIDE SERPL-SCNC: 104 MMOL/L — SIGNIFICANT CHANGE UP (ref 98–110)
CHLORIDE SERPL-SCNC: 98 MMOL/L — SIGNIFICANT CHANGE UP (ref 98–110)
CHLORIDE SERPL-SCNC: 98 MMOL/L — SIGNIFICANT CHANGE UP (ref 98–110)
CHOLEST SERPL-MCNC: 153 MG/DL — SIGNIFICANT CHANGE UP
CO2 SERPL-SCNC: 22 MMOL/L — SIGNIFICANT CHANGE UP (ref 17–32)
CO2 SERPL-SCNC: 22 MMOL/L — SIGNIFICANT CHANGE UP (ref 17–32)
CO2 SERPL-SCNC: 24 MMOL/L — SIGNIFICANT CHANGE UP (ref 17–32)
CO2 SERPL-SCNC: 25 MMOL/L — SIGNIFICANT CHANGE UP (ref 17–32)
CREAT SERPL-MCNC: 0.5 MG/DL — LOW (ref 0.7–1.5)
CREAT SERPL-MCNC: 0.6 MG/DL — LOW (ref 0.7–1.5)
CREAT SERPL-MCNC: 0.6 MG/DL — LOW (ref 0.7–1.5)
CREAT SERPL-MCNC: <0.5 MG/DL — LOW (ref 0.7–1.5)
EGFR: 103 ML/MIN/1.73M2 — SIGNIFICANT CHANGE UP
EGFR: 103 ML/MIN/1.73M2 — SIGNIFICANT CHANGE UP
EGFR: 108 ML/MIN/1.73M2 — SIGNIFICANT CHANGE UP
EGFR: 122 ML/MIN/1.73M2 — SIGNIFICANT CHANGE UP
EOSINOPHIL # BLD AUTO: 0.02 K/UL — SIGNIFICANT CHANGE UP (ref 0–0.7)
EOSINOPHIL # BLD AUTO: 0.04 K/UL — SIGNIFICANT CHANGE UP (ref 0–0.7)
EOSINOPHIL NFR BLD AUTO: 0.2 % — SIGNIFICANT CHANGE UP (ref 0–8)
EOSINOPHIL NFR BLD AUTO: 0.5 % — SIGNIFICANT CHANGE UP (ref 0–8)
ESTIMATED AVERAGE GLUCOSE: >398 MG/DL — HIGH (ref 68–114)
GLUCOSE BLDC GLUCOMTR-MCNC: 112 MG/DL — HIGH (ref 70–99)
GLUCOSE BLDC GLUCOMTR-MCNC: 117 MG/DL — HIGH (ref 70–99)
GLUCOSE BLDC GLUCOMTR-MCNC: 148 MG/DL — HIGH (ref 70–99)
GLUCOSE BLDC GLUCOMTR-MCNC: 182 MG/DL — HIGH (ref 70–99)
GLUCOSE BLDC GLUCOMTR-MCNC: 206 MG/DL — HIGH (ref 70–99)
GLUCOSE BLDC GLUCOMTR-MCNC: 213 MG/DL — HIGH (ref 70–99)
GLUCOSE BLDC GLUCOMTR-MCNC: 214 MG/DL — HIGH (ref 70–99)
GLUCOSE BLDC GLUCOMTR-MCNC: 223 MG/DL — HIGH (ref 70–99)
GLUCOSE BLDC GLUCOMTR-MCNC: 227 MG/DL — HIGH (ref 70–99)
GLUCOSE BLDC GLUCOMTR-MCNC: 233 MG/DL — HIGH (ref 70–99)
GLUCOSE BLDC GLUCOMTR-MCNC: 234 MG/DL — HIGH (ref 70–99)
GLUCOSE BLDC GLUCOMTR-MCNC: 250 MG/DL — HIGH (ref 70–99)
GLUCOSE BLDC GLUCOMTR-MCNC: 252 MG/DL — HIGH (ref 70–99)
GLUCOSE BLDC GLUCOMTR-MCNC: 269 MG/DL — HIGH (ref 70–99)
GLUCOSE BLDC GLUCOMTR-MCNC: 302 MG/DL — HIGH (ref 70–99)
GLUCOSE BLDC GLUCOMTR-MCNC: 359 MG/DL — HIGH (ref 70–99)
GLUCOSE BLDC GLUCOMTR-MCNC: 382 MG/DL — HIGH (ref 70–99)
GLUCOSE BLDC GLUCOMTR-MCNC: 85 MG/DL — SIGNIFICANT CHANGE UP (ref 70–99)
GLUCOSE SERPL-MCNC: 162 MG/DL — HIGH (ref 70–99)
GLUCOSE SERPL-MCNC: 196 MG/DL — HIGH (ref 70–99)
GLUCOSE SERPL-MCNC: 212 MG/DL — HIGH (ref 70–99)
GLUCOSE SERPL-MCNC: 292 MG/DL — HIGH (ref 70–99)
HCT VFR BLD CALC: 38.9 % — SIGNIFICANT CHANGE UP (ref 37–47)
HCT VFR BLD CALC: 42.3 % — SIGNIFICANT CHANGE UP (ref 37–47)
HCV AB S/CO SERPL IA: 0.04 COI — SIGNIFICANT CHANGE UP
HCV AB SERPL-IMP: SIGNIFICANT CHANGE UP
HDLC SERPL-MCNC: 38 MG/DL — LOW
HGB BLD-MCNC: 13.4 G/DL — SIGNIFICANT CHANGE UP (ref 12–16)
HGB BLD-MCNC: 14.7 G/DL — SIGNIFICANT CHANGE UP (ref 12–16)
IMM GRANULOCYTES NFR BLD AUTO: 0.3 % — SIGNIFICANT CHANGE UP (ref 0.1–0.3)
IMM GRANULOCYTES NFR BLD AUTO: 0.4 % — HIGH (ref 0.1–0.3)
LACTATE SERPL-SCNC: 1.1 MMOL/L — SIGNIFICANT CHANGE UP (ref 0.7–2)
LACTATE SERPL-SCNC: 2 MMOL/L — SIGNIFICANT CHANGE UP (ref 0.7–2)
LIPID PNL WITH DIRECT LDL SERPL: 91 MG/DL — SIGNIFICANT CHANGE UP
LYMPHOCYTES # BLD AUTO: 1.62 K/UL — SIGNIFICANT CHANGE UP (ref 1.2–3.4)
LYMPHOCYTES # BLD AUTO: 1.65 K/UL — SIGNIFICANT CHANGE UP (ref 1.2–3.4)
LYMPHOCYTES # BLD AUTO: 18.8 % — LOW (ref 20.5–51.1)
LYMPHOCYTES # BLD AUTO: 19.6 % — LOW (ref 20.5–51.1)
MAGNESIUM SERPL-MCNC: 1.5 MG/DL — LOW (ref 1.8–2.4)
MAGNESIUM SERPL-MCNC: 1.7 MG/DL — LOW (ref 1.8–2.4)
MAGNESIUM SERPL-MCNC: 2.5 MG/DL — HIGH (ref 1.8–2.4)
MCHC RBC-ENTMCNC: 29.7 PG — SIGNIFICANT CHANGE UP (ref 27–31)
MCHC RBC-ENTMCNC: 29.8 PG — SIGNIFICANT CHANGE UP (ref 27–31)
MCHC RBC-ENTMCNC: 34.4 G/DL — SIGNIFICANT CHANGE UP (ref 32–37)
MCHC RBC-ENTMCNC: 34.8 G/DL — SIGNIFICANT CHANGE UP (ref 32–37)
MCV RBC AUTO: 85.5 FL — SIGNIFICANT CHANGE UP (ref 81–99)
MCV RBC AUTO: 86.6 FL — SIGNIFICANT CHANGE UP (ref 81–99)
MONOCYTES # BLD AUTO: 0.56 K/UL — SIGNIFICANT CHANGE UP (ref 0.1–0.6)
MONOCYTES # BLD AUTO: 0.69 K/UL — HIGH (ref 0.1–0.6)
MONOCYTES NFR BLD AUTO: 6.5 % — SIGNIFICANT CHANGE UP (ref 1.7–9.3)
MONOCYTES NFR BLD AUTO: 8.2 % — SIGNIFICANT CHANGE UP (ref 1.7–9.3)
NEUTROPHILS # BLD AUTO: 6.01 K/UL — SIGNIFICANT CHANGE UP (ref 1.4–6.5)
NEUTROPHILS # BLD AUTO: 6.34 K/UL — SIGNIFICANT CHANGE UP (ref 1.4–6.5)
NEUTROPHILS NFR BLD AUTO: 71.2 % — SIGNIFICANT CHANGE UP (ref 42.2–75.2)
NEUTROPHILS NFR BLD AUTO: 73.4 % — SIGNIFICANT CHANGE UP (ref 42.2–75.2)
NON HDL CHOLESTEROL: 115 MG/DL — SIGNIFICANT CHANGE UP
NRBC # BLD: 0 /100 WBCS — SIGNIFICANT CHANGE UP (ref 0–0)
NRBC # BLD: 0 /100 WBCS — SIGNIFICANT CHANGE UP (ref 0–0)
PLATELET # BLD AUTO: 182 K/UL — SIGNIFICANT CHANGE UP (ref 130–400)
PLATELET # BLD AUTO: 215 K/UL — SIGNIFICANT CHANGE UP (ref 130–400)
PMV BLD: 10.3 FL — SIGNIFICANT CHANGE UP (ref 7.4–10.4)
PMV BLD: 10.6 FL — HIGH (ref 7.4–10.4)
POTASSIUM SERPL-MCNC: 3.2 MMOL/L — LOW (ref 3.5–5)
POTASSIUM SERPL-MCNC: 4 MMOL/L — SIGNIFICANT CHANGE UP (ref 3.5–5)
POTASSIUM SERPL-MCNC: 4.3 MMOL/L — SIGNIFICANT CHANGE UP (ref 3.5–5)
POTASSIUM SERPL-MCNC: 7.1 MMOL/L — CRITICAL HIGH (ref 3.5–5)
POTASSIUM SERPL-SCNC: 3.2 MMOL/L — LOW (ref 3.5–5)
POTASSIUM SERPL-SCNC: 4 MMOL/L — SIGNIFICANT CHANGE UP (ref 3.5–5)
POTASSIUM SERPL-SCNC: 4.3 MMOL/L — SIGNIFICANT CHANGE UP (ref 3.5–5)
POTASSIUM SERPL-SCNC: 7.1 MMOL/L — CRITICAL HIGH (ref 3.5–5)
PROT SERPL-MCNC: 5.5 G/DL — LOW (ref 6–8)
PROT SERPL-MCNC: 6.4 G/DL — SIGNIFICANT CHANGE UP (ref 6–8)
RBC # BLD: 4.49 M/UL — SIGNIFICANT CHANGE UP (ref 4.2–5.4)
RBC # BLD: 4.95 M/UL — SIGNIFICANT CHANGE UP (ref 4.2–5.4)
RBC # FLD: 14.6 % — HIGH (ref 11.5–14.5)
RBC # FLD: 14.6 % — HIGH (ref 11.5–14.5)
SODIUM SERPL-SCNC: 129 MMOL/L — LOW (ref 135–146)
SODIUM SERPL-SCNC: 134 MMOL/L — LOW (ref 135–146)
SODIUM SERPL-SCNC: 138 MMOL/L — SIGNIFICANT CHANGE UP (ref 135–146)
SODIUM SERPL-SCNC: 139 MMOL/L — SIGNIFICANT CHANGE UP (ref 135–146)
TRIGL SERPL-MCNC: 117 MG/DL — SIGNIFICANT CHANGE UP
TSH SERPL-MCNC: 5.84 UIU/ML — HIGH (ref 0.27–4.2)
WBC # BLD: 8.43 K/UL — SIGNIFICANT CHANGE UP (ref 4.8–10.8)
WBC # BLD: 8.63 K/UL — SIGNIFICANT CHANGE UP (ref 4.8–10.8)
WBC # FLD AUTO: 8.43 K/UL — SIGNIFICANT CHANGE UP (ref 4.8–10.8)
WBC # FLD AUTO: 8.63 K/UL — SIGNIFICANT CHANGE UP (ref 4.8–10.8)

## 2024-02-02 PROCEDURE — ZZZZZ: CPT

## 2024-02-02 PROCEDURE — 99291 CRITICAL CARE FIRST HOUR: CPT

## 2024-02-02 PROCEDURE — 93010 ELECTROCARDIOGRAM REPORT: CPT

## 2024-02-02 RX ORDER — INSULIN LISPRO 100/ML
VIAL (ML) SUBCUTANEOUS AT BEDTIME
Refills: 0 | Status: DISCONTINUED | OUTPATIENT
Start: 2024-02-02 | End: 2024-02-06

## 2024-02-02 RX ORDER — ZOLPIDEM TARTRATE 10 MG/1
5 TABLET ORAL AT BEDTIME
Refills: 0 | Status: DISCONTINUED | OUTPATIENT
Start: 2024-02-02 | End: 2024-02-06

## 2024-02-02 RX ORDER — INSULIN LISPRO 100/ML
VIAL (ML) SUBCUTANEOUS
Refills: 0 | Status: DISCONTINUED | OUTPATIENT
Start: 2024-02-02 | End: 2024-02-03

## 2024-02-02 RX ORDER — INSULIN GLARGINE 100 [IU]/ML
18 INJECTION, SOLUTION SUBCUTANEOUS ONCE
Refills: 0 | Status: COMPLETED | OUTPATIENT
Start: 2024-02-02 | End: 2024-02-02

## 2024-02-02 RX ORDER — SODIUM CHLORIDE 9 MG/ML
1000 INJECTION, SOLUTION INTRAVENOUS
Refills: 0 | Status: DISCONTINUED | OUTPATIENT
Start: 2024-02-02 | End: 2024-02-06

## 2024-02-02 RX ORDER — ENOXAPARIN SODIUM 100 MG/ML
40 INJECTION SUBCUTANEOUS EVERY 24 HOURS
Refills: 0 | Status: DISCONTINUED | OUTPATIENT
Start: 2024-02-02 | End: 2024-02-06

## 2024-02-02 RX ORDER — LANOLIN ALCOHOL/MO/W.PET/CERES
1 CREAM (GRAM) TOPICAL
Qty: 0 | Refills: 0 | DISCHARGE

## 2024-02-02 RX ORDER — DEXTROSE MONOHYDRATE, SODIUM CHLORIDE, AND POTASSIUM CHLORIDE 50; .745; 4.5 G/1000ML; G/1000ML; G/1000ML
1000 INJECTION, SOLUTION INTRAVENOUS
Refills: 0 | Status: DISCONTINUED | OUTPATIENT
Start: 2024-02-02 | End: 2024-02-02

## 2024-02-02 RX ORDER — BACLOFEN 100 %
20 POWDER (GRAM) MISCELLANEOUS EVERY 8 HOURS
Refills: 0 | Status: DISCONTINUED | OUTPATIENT
Start: 2024-02-02 | End: 2024-02-06

## 2024-02-02 RX ORDER — POTASSIUM CHLORIDE 20 MEQ
40 PACKET (EA) ORAL EVERY 4 HOURS
Refills: 0 | Status: COMPLETED | OUTPATIENT
Start: 2024-02-02 | End: 2024-02-02

## 2024-02-02 RX ORDER — MAGNESIUM SULFATE 500 MG/ML
2 VIAL (ML) INJECTION ONCE
Refills: 0 | Status: COMPLETED | OUTPATIENT
Start: 2024-02-02 | End: 2024-02-02

## 2024-02-02 RX ORDER — INSULIN LISPRO 100/ML
6 VIAL (ML) SUBCUTANEOUS
Refills: 0 | Status: DISCONTINUED | OUTPATIENT
Start: 2024-02-02 | End: 2024-02-03

## 2024-02-02 RX ORDER — ACETAMINOPHEN 500 MG
650 TABLET ORAL EVERY 6 HOURS
Refills: 0 | Status: DISCONTINUED | OUTPATIENT
Start: 2024-02-02 | End: 2024-02-06

## 2024-02-02 RX ORDER — INFLUENZA VIRUS VACCINE 15; 15; 15; 15 UG/.5ML; UG/.5ML; UG/.5ML; UG/.5ML
0.5 SUSPENSION INTRAMUSCULAR ONCE
Refills: 0 | Status: DISCONTINUED | OUTPATIENT
Start: 2024-02-02 | End: 2024-02-06

## 2024-02-02 RX ORDER — POTASSIUM CHLORIDE 20 MEQ
40 PACKET (EA) ORAL EVERY 4 HOURS
Refills: 0 | Status: DISCONTINUED | OUTPATIENT
Start: 2024-02-02 | End: 2024-02-02

## 2024-02-02 RX ORDER — PANTOPRAZOLE SODIUM 20 MG/1
40 TABLET, DELAYED RELEASE ORAL
Refills: 0 | Status: DISCONTINUED | OUTPATIENT
Start: 2024-02-02 | End: 2024-02-06

## 2024-02-02 RX ORDER — DEXTROSE 50 % IN WATER 50 %
25 SYRINGE (ML) INTRAVENOUS ONCE
Refills: 0 | Status: DISCONTINUED | OUTPATIENT
Start: 2024-02-02 | End: 2024-02-06

## 2024-02-02 RX ORDER — LEVOTHYROXINE SODIUM 125 MCG
50 TABLET ORAL DAILY
Refills: 0 | Status: DISCONTINUED | OUTPATIENT
Start: 2024-02-02 | End: 2024-02-06

## 2024-02-02 RX ORDER — INSULIN GLARGINE 100 [IU]/ML
20 INJECTION, SOLUTION SUBCUTANEOUS EVERY MORNING
Refills: 0 | Status: DISCONTINUED | OUTPATIENT
Start: 2024-02-03 | End: 2024-02-03

## 2024-02-02 RX ORDER — GLUCAGON INJECTION, SOLUTION 0.5 MG/.1ML
1 INJECTION, SOLUTION SUBCUTANEOUS ONCE
Refills: 0 | Status: DISCONTINUED | OUTPATIENT
Start: 2024-02-02 | End: 2024-02-06

## 2024-02-02 RX ORDER — DEXTROSE 50 % IN WATER 50 %
12.5 SYRINGE (ML) INTRAVENOUS ONCE
Refills: 0 | Status: DISCONTINUED | OUTPATIENT
Start: 2024-02-02 | End: 2024-02-06

## 2024-02-02 RX ORDER — CHLORHEXIDINE GLUCONATE 213 G/1000ML
1 SOLUTION TOPICAL
Refills: 0 | Status: DISCONTINUED | OUTPATIENT
Start: 2024-02-02 | End: 2024-02-06

## 2024-02-02 RX ORDER — NORTRIPTYLINE HYDROCHLORIDE 10 MG/1
25 CAPSULE ORAL AT BEDTIME
Refills: 0 | Status: DISCONTINUED | OUTPATIENT
Start: 2024-02-02 | End: 2024-02-06

## 2024-02-02 RX ORDER — NICOTINE POLACRILEX 2 MG
1 GUM BUCCAL
Qty: 0 | Refills: 0 | DISCHARGE

## 2024-02-02 RX ORDER — METOPROLOL TARTRATE 50 MG
100 TABLET ORAL DAILY
Refills: 0 | Status: DISCONTINUED | OUTPATIENT
Start: 2024-02-02 | End: 2024-02-06

## 2024-02-02 RX ORDER — LOSARTAN POTASSIUM 100 MG/1
50 TABLET, FILM COATED ORAL DAILY
Refills: 0 | Status: DISCONTINUED | OUTPATIENT
Start: 2024-02-02 | End: 2024-02-06

## 2024-02-02 RX ORDER — SODIUM CHLORIDE 9 MG/ML
1000 INJECTION, SOLUTION INTRAVENOUS
Refills: 0 | Status: DISCONTINUED | OUTPATIENT
Start: 2024-02-02 | End: 2024-02-02

## 2024-02-02 RX ORDER — DULAGLUTIDE 4.5 MG/.5ML
1.5 INJECTION, SOLUTION SUBCUTANEOUS
Qty: 0 | Refills: 0 | DISCHARGE

## 2024-02-02 RX ORDER — DEXTROSE 50 % IN WATER 50 %
50 SYRINGE (ML) INTRAVENOUS ONCE
Refills: 0 | Status: COMPLETED | OUTPATIENT
Start: 2024-02-02 | End: 2024-02-02

## 2024-02-02 RX ORDER — DEXTROSE 50 % IN WATER 50 %
15 SYRINGE (ML) INTRAVENOUS ONCE
Refills: 0 | Status: DISCONTINUED | OUTPATIENT
Start: 2024-02-02 | End: 2024-02-06

## 2024-02-02 RX ADMIN — Medication 50 MICROGRAM(S): at 05:40

## 2024-02-02 RX ADMIN — Medication 6: at 22:44

## 2024-02-02 RX ADMIN — NORTRIPTYLINE HYDROCHLORIDE 25 MILLIGRAM(S): 10 CAPSULE ORAL at 22:16

## 2024-02-02 RX ADMIN — Medication 40 MILLIEQUIVALENT(S): at 15:32

## 2024-02-02 RX ADMIN — Medication 150 MILLIGRAM(S): at 15:32

## 2024-02-02 RX ADMIN — INSULIN GLARGINE 18 UNIT(S): 100 INJECTION, SOLUTION SUBCUTANEOUS at 11:49

## 2024-02-02 RX ADMIN — Medication 20 MILLIGRAM(S): at 21:16

## 2024-02-02 RX ADMIN — Medication 6 UNIT(S): at 11:49

## 2024-02-02 RX ADMIN — Medication 20 MILLIGRAM(S): at 15:31

## 2024-02-02 RX ADMIN — LOSARTAN POTASSIUM 50 MILLIGRAM(S): 100 TABLET, FILM COATED ORAL at 05:40

## 2024-02-02 RX ADMIN — Medication 50 MILLILITER(S): at 10:56

## 2024-02-02 RX ADMIN — Medication 150 MILLIGRAM(S): at 21:16

## 2024-02-02 RX ADMIN — Medication 100 MILLIGRAM(S): at 05:40

## 2024-02-02 RX ADMIN — INSULIN HUMAN 10 UNIT(S)/HR: 100 INJECTION, SOLUTION SUBCUTANEOUS at 03:37

## 2024-02-02 RX ADMIN — Medication 40 MILLIEQUIVALENT(S): at 11:49

## 2024-02-02 RX ADMIN — SODIUM CHLORIDE 75 MILLILITER(S): 9 INJECTION, SOLUTION INTRAVENOUS at 10:55

## 2024-02-02 RX ADMIN — DEXTROSE MONOHYDRATE, SODIUM CHLORIDE, AND POTASSIUM CHLORIDE 100 MILLILITER(S): 50; .745; 4.5 INJECTION, SOLUTION INTRAVENOUS at 04:39

## 2024-02-02 RX ADMIN — Medication 650 MILLIGRAM(S): at 20:35

## 2024-02-02 RX ADMIN — Medication 25 GRAM(S): at 15:31

## 2024-02-02 NOTE — PATIENT PROFILE ADULT - FALL HARM RISK - HARM RISK INTERVENTIONS
Assistance with ambulation/Assistance OOB with selected safe patient handling equipment/Communicate Risk of Fall with Harm to all staff/Discuss with provider need for PT consult/Monitor gait and stability/Reinforce activity limits and safety measures with patient and family/Tailored Fall Risk Interventions/Visual Cue: Yellow wristband and red socks/Bed in lowest position, wheels locked, appropriate side rails in place/Call bell, personal items and telephone in reach/Instruct patient to call for assistance before getting out of bed or chair/Non-slip footwear when patient is out of bed/[place] to call system/Physically safe environment - no spills, clutter or unnecessary equipment/Purposeful Proactive Rounding/Room/bathroom lighting operational, light cord in reach

## 2024-02-02 NOTE — CONSULT NOTE ADULT - SUBJECTIVE AND OBJECTIVE BOX
MEDICAL TOXICOLOGY CONSULT    HPI:  58y female bc of IDDM, hypothyroid presents for multiple falls. Pt has had leg pain lately and for the past few days been taking baclofen 20mg and pregabalin 150mg q3-4hr instead of TID for both. Reportedly pt also stopped taking insulin. Denies taking any other meds/illicit drugs.  Vitals: , /60, RR 19, T 98.5F, sats well on RA  EKG: QRS 84, QTc 469  Exam: Lethargic and confused in the ER  Labs: salicylate/apap/alcohol negative, kidney function/LFT’s reassuring, rest of pts labs consistent with DKA  In ED: pt treated for DKA/admitted    Toxicology consulted for supra therapeutic dosing of baclofen/pregabalin    PAST MEDICAL & SURGICAL HISTORY:  Hypertension      Diabetes mellitus      Thyroid disease      Anemia      S/P lumbar discectomy  2006.      S/P tonsillectomy          MEDICATION HISTORY:  acetaminophen     Tablet .. 650 milliGRAM(s) Oral every 6 hours PRN  baclofen 20 milliGRAM(s) Oral every 8 hours  chlorhexidine 2% Cloths 1 Application(s) Topical <User Schedule>  enoxaparin Injectable 40 milliGRAM(s) SubCutaneous every 24 hours  insulin regular Infusion 10 Unit(s)/Hr IV Continuous <Continuous>  levothyroxine 50 MICROGram(s) Oral daily  losartan 50 milliGRAM(s) Oral daily  metoprolol succinate  milliGRAM(s) Oral daily  nortriptyline 25 milliGRAM(s) Oral at bedtime  pantoprazole    Tablet 40 milliGRAM(s) Oral before breakfast  pregabalin 150 milliGRAM(s) Oral every 8 hours  sodium chloride 0.45% with potassium chloride 20 mEq/L 1000 milliLiter(s) IV Continuous <Continuous>  zolpidem 5 milliGRAM(s) Oral at bedtime PRN  zolpidem 5 milliGRAM(s) Oral at bedtime PRN      FAMILY HISTORY:      REVIEW OF SYSTEMS:   _____unable to perform due to intoxication, dementia, or illness      Vital Signs Last 24 Hrs  T(C): 36.7 (01 Feb 2024 23:36), Max: 36.9 (01 Feb 2024 18:35)  T(F): 98 (01 Feb 2024 23:36), Max: 98.5 (01 Feb 2024 18:35)  HR: 90 (02 Feb 2024 02:00) (90 - 108)  BP: 132/70 (02 Feb 2024 02:00) (129/60 - 158/74)  BP(mean): --  RR: 17 (02 Feb 2024 02:00) (17 - 19)  SpO2: 97% (02 Feb 2024 02:00) (96% - 99%)    Parameters below as of 02 Feb 2024 02:00  Patient On (Oxygen Delivery Method): room air        SIGNIFICANT LABORATORY STUDIES:                        16.8   9.20  )-----------( 238      ( 01 Feb 2024 19:19 )             49.0       02-02    139  |  100  |  13  ----------------------------<  292<H>  4.0   |  24  |  0.6<L>    Ca    9.4      02 Feb 2024 01:11  Phos  4.0     02-01  Mg     2.3     02-01    TPro  8.2<H>  /  Alb  4.6  /  TBili  0.4  /  DBili  x   /  AST  20  /  ALT  26  /  AlkPhos  249<H>  02-01          Urinalysis Basic - ( 02 Feb 2024 01:11 )    Color: x / Appearance: x / SG: x / pH: x  Gluc: 292 mg/dL / Ketone: x  / Bili: x / Urobili: x   Blood: x / Protein: x / Nitrite: x   Leuk Esterase: x / RBC: x / WBC x   Sq Epi: x / Non Sq Epi: x / Bacteria: x        Anion Gap: 15<H> 02-02 @ 01:11  CK: -- 02-02 @ 01:11  Troponin:  --  02-02 @ 01:11  Pro-BNP:  --  02-02 @ 01:11  VBG:  --  02-02 @ 01:11  Carboxyhemoglobin %:  --  02-02 @ 01:11  Methemoglobin %:  --  02-02 @ 01:11  Osmolality Serum:  --  02-02 @ 01:11  Aspirin Level: --  02-02 @ 01:11  Acetaminophen Level:  --  02-02 @ 01:11  Ethanol Level:  --  02-02 @ 01:11  Digoxin Level:  --  02-02 @ 01:11  Phenytoin Level:  --  02-02 @ 01:11  Carbamazepine level:  --  02-02 @ 01:11  Lamotrigine level:  --  02-02 @ 01:11  Anion Gap: 18<H> 02-01 @ 19:19  CK: -- 02-01 @ 19:19  Troponin:  --  02-01 @ 19:19  Pro-BNP:  --  02-01 @ 19:19  VBG:  --  02-01 @ 19:19  Carboxyhemoglobin %:  --  02-01 @ 19:19  Methemoglobin %:  --  02-01 @ 19:19  Osmolality Serum:  314<H>  02-01 @ 19:19  Aspirin Level: <0.3<L>  02-01 @ 19:19  Acetaminophen Level:  <5.0<L>  02-01 @ 19:19  Ethanol Level:  --  02-01 @ 19:19  Digoxin Level:  --  02-01 @ 19:19  Phenytoin Level:  --  02-01 @ 19:19  Carbamazepine level:  --  02-01 @ 19:19  Lamotrigine level:  --  02-01 @ 19:19

## 2024-02-02 NOTE — H&P ADULT - ATTENDING COMMENTS
my note robin resident noted, presented with dizziness/ N/ V, taking her baclofen/ lyrica more than prescribed, off her insulin for 2 month, seen by tox was given IVF/ insulin drip for mild DKA, feels better this AM, when AG closed change to SQ, feeding, IVF, home dose baclofen, SDU when off insulin

## 2024-02-02 NOTE — PATIENT PROFILE ADULT - NSTOBACCOCESSATIONEDU2_GEN_A_NUR
Patient called and complains of diverticulitis. She has been treated in the past for this. She requests antibiotic and if there is anything she can take for the pain.    Patient uses [facility] in [employer]
Develop coping skills.  For example, strategies and lifestyle changes that reduce negative moods, stress, and exposure to smoking cues.

## 2024-02-02 NOTE — H&P ADULT - NSHPPHYSICALEXAM_GEN_ALL_CORE
GENERAL: NAD, lying in bed comfortably  HEAD:  Atraumatic, Normocephalic  EYES: EOMI, PERRLA, conjunctiva and sclera clear  ENT: dry mucous membranes  NECK: Supple, No JVD  CHEST/LUNG: Clear to auscultation bilaterally; No rales, rhonchi, wheezing, or rubs. Unlabored respirations  HEART: Regular rate and rhythm; No murmurs, rubs, or gallops  ABDOMEN: BSx4; Soft, nontender, nondistended  EXTREMITIES:  2+ Peripheral Pulses, brisk capillary refill. No clubbing, cyanosis, or edema  NERVOUS SYSTEM:  A&Ox3, no focal deficits   SKIN: one ulcer on the left chin, healed

## 2024-02-02 NOTE — H&P ADULT - HISTORY OF PRESENT ILLNESS
58 yoF with pmhx IDDM with neuropathy, hypertension, hypothyroidism, presents for lightheadedness, falls, and vomiting in setting of noncompliance with insulin as well as taking additional baclofen and lyrica x 3 days. States that she feels "whoozy and jerky" with reportedly falling 3x over these past 3 day without any LOC. Pt has also had multiple episodes of vomiting. Reportedly supposed to take 20mg baclofen and 150mg lyrica q8, but has been taking both q3-4h due to increasing chronic LE pain.     ED vitals:   / 60, , RR 19, Temp 98.5, O2 sat 99% on RA    Sig labs   Na 132, AG 18, Bicarb 22, Glucose 627, Calcium 10.7, Beta HB 3.5, Osm 314    VBG: lactate 3.0    UA: bacteria occasional, no nitrite or LE    CT HEAD:  - No acute intracranial findings.  - Mild chronic microvascular ischemic changes.    CT CERVICAL SPINE:  - No acute cervical fracture or facet subluxation.    Pt was given 3 L of LR, started on insulin infusion and admitted to the ICU for DKA 59 yo F with pmhx IDDM with neuropathy, hypertension, hypothyroidism, presents for lightheadedness, falls, and vomiting in setting of noncompliance with insulin as well as taking additional baclofen and lyrica x 3 days. Pt is a little confused and not appropriately answering questions. Pt said that she fell dizzy and fell down today but had no LOC but did hit her head on the left. Pt has also had multiple episodes of vomiting and it was all food that she ate. Reportedly supposed to take 20mg baclofen 8 times a day and 150mg lyrica 8 times a day, but has been taking both q3-4h due to increasing chronic neuropathy.     ED vitals:   / 60, , RR 19, Temp 98.5, O2 sat 99% on RA    Sig labs   Na 132, AG 18, Bicarb 22, Glucose 627, Calcium 10.7, Beta HB 3.5, Osm 314    VBG: lactate 3.0    UA: bacteria occasional, no nitrite or LE    CT HEAD:  - No acute intracranial findings.  - Mild chronic microvascular ischemic changes.    CT CERVICAL SPINE:  - No acute cervical fracture or facet subluxation.    Pt was given 3 L of LR, started on insulin infusion and admitted to the ICU for DKA 59 yo F with pmhx IDDM with neuropathy, hypertension, hypothyroidism, presents for lightheadedness, falls, and vomiting in setting of noncompliance with insulin as well as taking additional baclofen and Lyrica x 3 days. Pt is a little confused and not appropriately answering questions. Pt said that she fell dizzy and fell down today but had no LOC but did hit her head on the left. Pt has also had multiple episodes of vomiting and it was all food that she ate. Reportedly supposed to take 20mg baclofen 8 times a day and 150mg lyrica 8 times a day, but has been taking both q3-4h due to increasing chronic neuropathy.     ED vitals:   / 60, , RR 19, Temp 98.5, O2 sat 99% on RA    Sig labs   Na 132, AG 18, Bicarb 22, Glucose 627, Calcium 10.7, Beta HB 3.5, Osm 314    VBG: lactate 3.0    UA: bacteria occasional, no nitrite or LE    CT HEAD:  - No acute intracranial findings.  - Mild chronic microvascular ischemic changes.    CT CERVICAL SPINE:  - No acute cervical fracture or facet subluxation.    Pt was given 3 L of LR, started on insulin infusion and admitted to the ICU for DKA

## 2024-02-02 NOTE — H&P ADULT - NSHPLABSRESULTS_GEN_ALL_CORE
LABS:                          16.8   9.20  )-----------( 238      ( 01 Feb 2024 19:19 )             49.0     02-02    139  |  100  |  13  ----------------------------<  292<H>  4.0   |  24  |  0.6<L>    Ca    9.4      02 Feb 2024 01:11  Phos  4.0     02-01  Mg     2.3     02-01    TPro  8.2<H>  /  Alb  4.6  /  TBili  0.4  /  DBili  x   /  AST  20  /  ALT  26  /  AlkPhos  249<H>  02-01

## 2024-02-02 NOTE — H&P ADULT - NSHPREVIEWOFSYSTEMS_GEN_ALL_CORE
CONSTITUTIONAL: No weakness, fevers or chills  EYES/ENT: No visual changes;  No vertigo or throat pain   NECK: No pain or stiffness  RESPIRATORY: No cough, wheezing, hemoptysis; No shortness of breath  CARDIOVASCULAR: No chest pain or palpitations  GASTROINTESTINAL: Nausea and vomiting. No abdominal or epigastric pain.  GENITOURINARY: increase frequency of urination  NEUROLOGICAL: numbness of LE, chronic  SKIN: No itching, rashes

## 2024-02-02 NOTE — ED ADULT NURSE REASSESSMENT NOTE - NS ED NURSE REASSESS COMMENT FT1
Pt received from outgoing shift at 0700. Pt is calmly resting in bed at this time. Pt is alert and oriented x3. No s/s of respiratory distress. Pt is able to make all needs known. No further complaints at this time. Pt is currently awaiting further reevaluation at this time. Safety and comfort measures in place. Fall precautions in place as per hospital policy. Will continue to monitor and assess for changes.

## 2024-02-02 NOTE — CONSULT NOTE ADULT - ASSESSMENT
Assessment:  Clinical picture seems multifactorial however supra therapeutic ingestions of baclofen and pregabalin may be contributing to pts unsteadiness. Toxicity for baclofen (GABAb agonist) would be most worrisome as it may cause CNS depression, lethargy/coma, seizures. It’s important to note though that if the pt is dependent on baclofen, abruptly stopping this medication would cause a potentially deadly withdrawal syndrome of tremors, hyperthermia/rhabdomyolysis, and seizures (if pt is dependent on pregabalin, this may cause delirium/altered mental status if abruptly discontinued though withdrawal syndrome not as dangerous as baclofen).    Pt is being admitted for treatment of DKA.    Recommendations:  1. If the pt hasn’t chronically been on baclofen/pregabalin (and is therefore not dependent on them), would consider holding the pts next dose or two of baclofen and pregabalin and monitoring mental status.   2. If the pt has been taking these medications for some time and may be dependent on them, would start the pt back up on home dosing regimen of these medications though at a lower dose as to prevent the pt from going into withdrawal while also decreasing exposure of these xenobiotics. Once pt is back at baseline mental status or if there’s ever concern for withdrawal from these medications, can give the pt back their home dose of medication.  3. Double check to ensure pt isn’t a daily alcohol drinker or pt doesn’t use benzodiazapines daily to ensure pt isn’t having withdrawal from these xenobiotics. Assessment:  Clinical picture seems multifactorial however supra therapeutic ingestions of baclofen and pregabalin may be contributing to pts unsteadiness. Toxicity for baclofen (GABAb agonist) would be most worrisome as it may cause CNS depression, lethargy/coma, seizures. It’s important to note though that if the pt is dependent on baclofen, abruptly stopping this medication would cause a potentially deadly withdrawal syndrome of tremors, hyperthermia/rhabdomyolysis, and seizures (if pt is dependent on pregabalin, this may cause delirium/altered mental status if abruptly discontinued though withdrawal syndrome not as dangerous as baclofen).    Pt is being admitted for treatment of DKA.    Recommendations:  1. If the pt hasn’t chronically been on baclofen/pregabalin (and is therefore not dependent on them), would consider holding the pts next dose or two of baclofen and pregabalin and monitoring mental status.   2. If the pt has been taking these medications for some time and may be dependent on them, would start the pt back up on home dosing regimen of these medications though at a lower dose as to prevent the pt from going into withdrawal while also decreasing exposure of these xenobiotics. Once pt is back at baseline mental status or if there’s ever concern for withdrawal from these medications, can give the pt back their home dose of medication.  3. Double check to ensure pt isn’t a daily alcohol drinker or pt doesn’t use benzodiazapines daily to ensure pt isn’t having withdrawal from these xenobiotics.    I personally discussed with ED team. I reviewed the med tox fellow’s note (as assigned above), and agree with the findings and plan except as documented in my note.  --Will continue to follow. Please call with any further questions    Jamel    [phone] (pager)

## 2024-02-02 NOTE — PATIENT PROFILE ADULT - FUNCTIONAL SCREEN CURRENT LEVEL: SWALLOWING (IF SCORE 2 OR MORE FOR ANY ITEM, CONSULT REHAB SERVICES), MLM)
Addended Nickie Arthur on: 8/26/2021 06:11 AM     Modules accepted: Orders
0 = swallows foods/liquids without difficulty

## 2024-02-02 NOTE — H&P ADULT - ASSESSMENT
58 yoF with pmhx IDDM with neuropathy, hypertension, hypothyroidism, presents for lightheadedness, falls, and vomiting in setting of noncompliance with insulin as well as taking additional baclofen and lyrica x 3 days.    Impression  #DKA in the setting of non-compliance with Neuropathy  #HTN  #Hypothyroidism    PLAN:    HEENT: Oral care    PULMONARY:  HOB @ 45 degrees    CARDIOVASCULAR:    GI: GI prophylaxis.  Feeding     RENAL:  Follow up lytes.  Correct as needed    INFECTIOUS DISEASE: Follow up cultures    HEMATOLOGICAL:  DVT prophylaxis.    ENDOCRINE:  - 0.45 ns with K @100/ hr  - cw insulin infusion  - once serum glucose falls to < 200 mg/dL (11.1 mmol/L) change fluids to 5% dextrose with 0.45% sodium chloride at 150-250 mL/hour  - check bmp q4h until closure of ag  - maintain serum glucose 150-200 mg/dL (8.3-11.1 mmol/L) until resolution of DKA    MUSCULOSKELETAL:  Bed rest    Dispo: ICU   58 yoF with pmhx IDDM with neuropathy, hypertension, hypothyroidism, Hx Mood disorder with psychosis, current tobacco use presents for lightheadedness, falls, and vomiting in setting of noncompliance with insulin as well as taking additional baclofen and lyrica x 3 days.    Impression  #DKA in the setting of non-compliance with Neuropathy  #HTN  #Hypothyroidism  #Hx of mood disorder    PLAN:    HEENT: Oral care    PULMONARY:  HOB @ 45 degrees    CARDIOVASCULAR:  - cw losartan and metoprolol  - keep HR above 60 and MAP above 65    GI:   - GI prophylaxis.  NPO for now  - ppi     RENAL:    - Follow up lytes.  Correct as needed    INFECTIOUS DISEASE:   - monitor off abx for now    HEMATOLOGICAL:    - DVT prophylaxis.    ENDOCRINE:  - 0.45 ns with K @100/ hr  - cw insulin infusion  - once serum glucose falls to < 200 mg/dL (11.1 mmol/L) change fluids to 5% dextrose with 0.45% sodium chloride at 150-250 mL/hour  - check bmp q4h until closure of ag  - maintain serum glucose 150-200 mg/dL (8.3-11.1 mmol/L) until resolution of DKA    MUSCULOSKELETAL:  - Bed rest    Dispo: ICU

## 2024-02-03 LAB
A1C WITH ESTIMATED AVERAGE GLUCOSE RESULT: 13.8 % — HIGH (ref 4–5.6)
ALBUMIN SERPL ELPH-MCNC: 3.5 G/DL — SIGNIFICANT CHANGE UP (ref 3.5–5.2)
ALP SERPL-CCNC: 194 U/L — HIGH (ref 30–115)
ALT FLD-CCNC: 20 U/L — SIGNIFICANT CHANGE UP (ref 0–41)
ANION GAP SERPL CALC-SCNC: 12 MMOL/L — SIGNIFICANT CHANGE UP (ref 7–14)
ANION GAP SERPL CALC-SCNC: 16 MMOL/L — HIGH (ref 7–14)
ANION GAP SERPL CALC-SCNC: 9 MMOL/L — SIGNIFICANT CHANGE UP (ref 7–14)
AST SERPL-CCNC: 24 U/L — SIGNIFICANT CHANGE UP (ref 0–41)
BASOPHILS # BLD AUTO: 0.04 K/UL — SIGNIFICANT CHANGE UP (ref 0–0.2)
BASOPHILS NFR BLD AUTO: 0.5 % — SIGNIFICANT CHANGE UP (ref 0–1)
BILIRUB SERPL-MCNC: 0.9 MG/DL — SIGNIFICANT CHANGE UP (ref 0.2–1.2)
BUN SERPL-MCNC: 14 MG/DL — SIGNIFICANT CHANGE UP (ref 10–20)
BUN SERPL-MCNC: 15 MG/DL — SIGNIFICANT CHANGE UP (ref 10–20)
BUN SERPL-MCNC: 16 MG/DL — SIGNIFICANT CHANGE UP (ref 10–20)
CALCIUM SERPL-MCNC: 9.3 MG/DL — SIGNIFICANT CHANGE UP (ref 8.4–10.5)
CALCIUM SERPL-MCNC: 9.4 MG/DL — SIGNIFICANT CHANGE UP (ref 8.4–10.5)
CALCIUM SERPL-MCNC: 9.6 MG/DL — SIGNIFICANT CHANGE UP (ref 8.4–10.5)
CHLORIDE SERPL-SCNC: 93 MMOL/L — LOW (ref 98–110)
CHLORIDE SERPL-SCNC: 94 MMOL/L — LOW (ref 98–110)
CHLORIDE SERPL-SCNC: 98 MMOL/L — SIGNIFICANT CHANGE UP (ref 98–110)
CO2 SERPL-SCNC: 20 MMOL/L — SIGNIFICANT CHANGE UP (ref 17–32)
CO2 SERPL-SCNC: 23 MMOL/L — SIGNIFICANT CHANGE UP (ref 17–32)
CO2 SERPL-SCNC: 24 MMOL/L — SIGNIFICANT CHANGE UP (ref 17–32)
CREAT SERPL-MCNC: 0.6 MG/DL — LOW (ref 0.7–1.5)
CREAT SERPL-MCNC: 0.7 MG/DL — SIGNIFICANT CHANGE UP (ref 0.7–1.5)
CREAT SERPL-MCNC: 0.8 MG/DL — SIGNIFICANT CHANGE UP (ref 0.7–1.5)
CULTURE RESULTS: SIGNIFICANT CHANGE UP
EGFR: 100 ML/MIN/1.73M2 — SIGNIFICANT CHANGE UP
EGFR: 103 ML/MIN/1.73M2 — SIGNIFICANT CHANGE UP
EGFR: 85 ML/MIN/1.73M2 — SIGNIFICANT CHANGE UP
EOSINOPHIL # BLD AUTO: 0.07 K/UL — SIGNIFICANT CHANGE UP (ref 0–0.7)
EOSINOPHIL NFR BLD AUTO: 0.9 % — SIGNIFICANT CHANGE UP (ref 0–8)
ESTIMATED AVERAGE GLUCOSE: 349 MG/DL — HIGH (ref 68–114)
GLUCOSE BLDC GLUCOMTR-MCNC: 219 MG/DL — HIGH (ref 70–99)
GLUCOSE BLDC GLUCOMTR-MCNC: 257 MG/DL — HIGH (ref 70–99)
GLUCOSE BLDC GLUCOMTR-MCNC: 313 MG/DL — HIGH (ref 70–99)
GLUCOSE BLDC GLUCOMTR-MCNC: 416 MG/DL — HIGH (ref 70–99)
GLUCOSE BLDC GLUCOMTR-MCNC: 443 MG/DL — HIGH (ref 70–99)
GLUCOSE BLDC GLUCOMTR-MCNC: 542 MG/DL — CRITICAL HIGH (ref 70–99)
GLUCOSE SERPL-MCNC: 450 MG/DL — CRITICAL HIGH (ref 70–99)
GLUCOSE SERPL-MCNC: 452 MG/DL — CRITICAL HIGH (ref 70–99)
GLUCOSE SERPL-MCNC: 547 MG/DL — CRITICAL HIGH (ref 70–99)
HCT VFR BLD CALC: 40.8 % — SIGNIFICANT CHANGE UP (ref 37–47)
HGB BLD-MCNC: 14.1 G/DL — SIGNIFICANT CHANGE UP (ref 12–16)
IMM GRANULOCYTES NFR BLD AUTO: 0.5 % — HIGH (ref 0.1–0.3)
LYMPHOCYTES # BLD AUTO: 1.8 K/UL — SIGNIFICANT CHANGE UP (ref 1.2–3.4)
LYMPHOCYTES # BLD AUTO: 22.8 % — SIGNIFICANT CHANGE UP (ref 20.5–51.1)
MAGNESIUM SERPL-MCNC: 2.1 MG/DL — SIGNIFICANT CHANGE UP (ref 1.8–2.4)
MCHC RBC-ENTMCNC: 30 PG — SIGNIFICANT CHANGE UP (ref 27–31)
MCHC RBC-ENTMCNC: 34.6 G/DL — SIGNIFICANT CHANGE UP (ref 32–37)
MCV RBC AUTO: 86.8 FL — SIGNIFICANT CHANGE UP (ref 81–99)
MONOCYTES # BLD AUTO: 0.8 K/UL — HIGH (ref 0.1–0.6)
MONOCYTES NFR BLD AUTO: 10.1 % — HIGH (ref 1.7–9.3)
NEUTROPHILS # BLD AUTO: 5.14 K/UL — SIGNIFICANT CHANGE UP (ref 1.4–6.5)
NEUTROPHILS NFR BLD AUTO: 65.2 % — SIGNIFICANT CHANGE UP (ref 42.2–75.2)
NRBC # BLD: 0 /100 WBCS — SIGNIFICANT CHANGE UP (ref 0–0)
PLATELET # BLD AUTO: 159 K/UL — SIGNIFICANT CHANGE UP (ref 130–400)
PMV BLD: 10.5 FL — HIGH (ref 7.4–10.4)
POTASSIUM SERPL-MCNC: 4.3 MMOL/L — SIGNIFICANT CHANGE UP (ref 3.5–5)
POTASSIUM SERPL-MCNC: 4.4 MMOL/L — SIGNIFICANT CHANGE UP (ref 3.5–5)
POTASSIUM SERPL-MCNC: 4.6 MMOL/L — SIGNIFICANT CHANGE UP (ref 3.5–5)
POTASSIUM SERPL-SCNC: 4.3 MMOL/L — SIGNIFICANT CHANGE UP (ref 3.5–5)
POTASSIUM SERPL-SCNC: 4.4 MMOL/L — SIGNIFICANT CHANGE UP (ref 3.5–5)
POTASSIUM SERPL-SCNC: 4.6 MMOL/L — SIGNIFICANT CHANGE UP (ref 3.5–5)
PROT SERPL-MCNC: 6.2 G/DL — SIGNIFICANT CHANGE UP (ref 6–8)
RBC # BLD: 4.7 M/UL — SIGNIFICANT CHANGE UP (ref 4.2–5.4)
RBC # FLD: 15 % — HIGH (ref 11.5–14.5)
SODIUM SERPL-SCNC: 129 MMOL/L — LOW (ref 135–146)
SODIUM SERPL-SCNC: 129 MMOL/L — LOW (ref 135–146)
SODIUM SERPL-SCNC: 131 MMOL/L — LOW (ref 135–146)
SPECIMEN SOURCE: SIGNIFICANT CHANGE UP
WBC # BLD: 7.89 K/UL — SIGNIFICANT CHANGE UP (ref 4.8–10.8)
WBC # FLD AUTO: 7.89 K/UL — SIGNIFICANT CHANGE UP (ref 4.8–10.8)

## 2024-02-03 PROCEDURE — 99232 SBSQ HOSP IP/OBS MODERATE 35: CPT

## 2024-02-03 RX ORDER — INSULIN GLARGINE 100 [IU]/ML
45 INJECTION, SOLUTION SUBCUTANEOUS EVERY MORNING
Refills: 0 | Status: DISCONTINUED | OUTPATIENT
Start: 2024-02-03 | End: 2024-02-06

## 2024-02-03 RX ORDER — INSULIN GLARGINE 100 [IU]/ML
40 INJECTION, SOLUTION SUBCUTANEOUS EVERY MORNING
Refills: 0 | Status: DISCONTINUED | OUTPATIENT
Start: 2024-02-03 | End: 2024-02-03

## 2024-02-03 RX ORDER — INSULIN LISPRO 100/ML
12 VIAL (ML) SUBCUTANEOUS
Refills: 0 | Status: DISCONTINUED | OUTPATIENT
Start: 2024-02-03 | End: 2024-02-04

## 2024-02-03 RX ORDER — INSULIN GLARGINE 100 [IU]/ML
18 INJECTION, SOLUTION SUBCUTANEOUS ONCE
Refills: 0 | Status: COMPLETED | OUTPATIENT
Start: 2024-02-03 | End: 2024-02-03

## 2024-02-03 RX ORDER — INSULIN HUMAN 100 [IU]/ML
10 INJECTION, SOLUTION SUBCUTANEOUS ONCE
Refills: 0 | Status: DISCONTINUED | OUTPATIENT
Start: 2024-02-03 | End: 2024-02-03

## 2024-02-03 RX ORDER — INSULIN GLARGINE 100 [IU]/ML
36 INJECTION, SOLUTION SUBCUTANEOUS EVERY MORNING
Refills: 0 | Status: DISCONTINUED | OUTPATIENT
Start: 2024-02-03 | End: 2024-02-03

## 2024-02-03 RX ADMIN — Medication 6 UNIT(S): at 11:27

## 2024-02-03 RX ADMIN — PANTOPRAZOLE SODIUM 40 MILLIGRAM(S): 20 TABLET, DELAYED RELEASE ORAL at 05:23

## 2024-02-03 RX ADMIN — NORTRIPTYLINE HYDROCHLORIDE 25 MILLIGRAM(S): 10 CAPSULE ORAL at 21:26

## 2024-02-03 RX ADMIN — INSULIN GLARGINE 18 UNIT(S): 100 INJECTION, SOLUTION SUBCUTANEOUS at 06:07

## 2024-02-03 RX ADMIN — Medication 20 MILLIGRAM(S): at 05:50

## 2024-02-03 RX ADMIN — Medication 100 MILLIGRAM(S): at 05:23

## 2024-02-03 RX ADMIN — Medication 150 MILLIGRAM(S): at 05:50

## 2024-02-03 RX ADMIN — Medication 12: at 08:06

## 2024-02-03 RX ADMIN — Medication 12 UNIT(S): at 17:51

## 2024-02-03 RX ADMIN — Medication 0: at 21:25

## 2024-02-03 RX ADMIN — Medication 50 MICROGRAM(S): at 05:23

## 2024-02-03 RX ADMIN — INSULIN GLARGINE 20 UNIT(S): 100 INJECTION, SOLUTION SUBCUTANEOUS at 11:27

## 2024-02-03 RX ADMIN — ENOXAPARIN SODIUM 40 MILLIGRAM(S): 100 INJECTION SUBCUTANEOUS at 08:07

## 2024-02-03 RX ADMIN — CHLORHEXIDINE GLUCONATE 1 APPLICATION(S): 213 SOLUTION TOPICAL at 05:24

## 2024-02-03 RX ADMIN — Medication 20 MILLIGRAM(S): at 17:52

## 2024-02-03 RX ADMIN — Medication 12: at 11:28

## 2024-02-03 RX ADMIN — Medication 150 MILLIGRAM(S): at 17:51

## 2024-02-03 RX ADMIN — Medication 150 MILLIGRAM(S): at 21:26

## 2024-02-03 RX ADMIN — LOSARTAN POTASSIUM 50 MILLIGRAM(S): 100 TABLET, FILM COATED ORAL at 05:23

## 2024-02-03 RX ADMIN — Medication 6 UNIT(S): at 08:06

## 2024-02-03 RX ADMIN — Medication 20 MILLIGRAM(S): at 21:26

## 2024-02-04 LAB
ALBUMIN SERPL ELPH-MCNC: 3.2 G/DL — LOW (ref 3.5–5.2)
ALP SERPL-CCNC: 182 U/L — HIGH (ref 30–115)
ALT FLD-CCNC: 18 U/L — SIGNIFICANT CHANGE UP (ref 0–41)
ANION GAP SERPL CALC-SCNC: 12 MMOL/L — SIGNIFICANT CHANGE UP (ref 7–14)
AST SERPL-CCNC: 19 U/L — SIGNIFICANT CHANGE UP (ref 0–41)
BASOPHILS # BLD AUTO: 0.04 K/UL — SIGNIFICANT CHANGE UP (ref 0–0.2)
BASOPHILS NFR BLD AUTO: 0.6 % — SIGNIFICANT CHANGE UP (ref 0–1)
BILIRUB SERPL-MCNC: 0.5 MG/DL — SIGNIFICANT CHANGE UP (ref 0.2–1.2)
BUN SERPL-MCNC: 15 MG/DL — SIGNIFICANT CHANGE UP (ref 10–20)
CALCIUM SERPL-MCNC: 9 MG/DL — SIGNIFICANT CHANGE UP (ref 8.4–10.5)
CHLORIDE SERPL-SCNC: 98 MMOL/L — SIGNIFICANT CHANGE UP (ref 98–110)
CO2 SERPL-SCNC: 27 MMOL/L — SIGNIFICANT CHANGE UP (ref 17–32)
CREAT SERPL-MCNC: 0.7 MG/DL — SIGNIFICANT CHANGE UP (ref 0.7–1.5)
EGFR: 100 ML/MIN/1.73M2 — SIGNIFICANT CHANGE UP
EOSINOPHIL # BLD AUTO: 0.13 K/UL — SIGNIFICANT CHANGE UP (ref 0–0.7)
EOSINOPHIL NFR BLD AUTO: 1.9 % — SIGNIFICANT CHANGE UP (ref 0–8)
GLUCOSE BLDC GLUCOMTR-MCNC: 221 MG/DL — HIGH (ref 70–99)
GLUCOSE BLDC GLUCOMTR-MCNC: 266 MG/DL — HIGH (ref 70–99)
GLUCOSE BLDC GLUCOMTR-MCNC: 277 MG/DL — HIGH (ref 70–99)
GLUCOSE BLDC GLUCOMTR-MCNC: 431 MG/DL — HIGH (ref 70–99)
GLUCOSE SERPL-MCNC: 292 MG/DL — HIGH (ref 70–99)
HCT VFR BLD CALC: 39.3 % — SIGNIFICANT CHANGE UP (ref 37–47)
HGB BLD-MCNC: 13.5 G/DL — SIGNIFICANT CHANGE UP (ref 12–16)
IMM GRANULOCYTES NFR BLD AUTO: 0.3 % — SIGNIFICANT CHANGE UP (ref 0.1–0.3)
LYMPHOCYTES # BLD AUTO: 2.34 K/UL — SIGNIFICANT CHANGE UP (ref 1.2–3.4)
LYMPHOCYTES # BLD AUTO: 33.8 % — SIGNIFICANT CHANGE UP (ref 20.5–51.1)
MAGNESIUM SERPL-MCNC: 1.8 MG/DL — SIGNIFICANT CHANGE UP (ref 1.8–2.4)
MCHC RBC-ENTMCNC: 29.9 PG — SIGNIFICANT CHANGE UP (ref 27–31)
MCHC RBC-ENTMCNC: 34.4 G/DL — SIGNIFICANT CHANGE UP (ref 32–37)
MCV RBC AUTO: 87.1 FL — SIGNIFICANT CHANGE UP (ref 81–99)
MONOCYTES # BLD AUTO: 0.74 K/UL — HIGH (ref 0.1–0.6)
MONOCYTES NFR BLD AUTO: 10.7 % — HIGH (ref 1.7–9.3)
NEUTROPHILS # BLD AUTO: 3.65 K/UL — SIGNIFICANT CHANGE UP (ref 1.4–6.5)
NEUTROPHILS NFR BLD AUTO: 52.7 % — SIGNIFICANT CHANGE UP (ref 42.2–75.2)
NRBC # BLD: 0 /100 WBCS — SIGNIFICANT CHANGE UP (ref 0–0)
PHOSPHATE SERPL-MCNC: 2.9 MG/DL — SIGNIFICANT CHANGE UP (ref 2.1–4.9)
PLATELET # BLD AUTO: 156 K/UL — SIGNIFICANT CHANGE UP (ref 130–400)
PMV BLD: 10.3 FL — SIGNIFICANT CHANGE UP (ref 7.4–10.4)
POTASSIUM SERPL-MCNC: 4.3 MMOL/L — SIGNIFICANT CHANGE UP (ref 3.5–5)
POTASSIUM SERPL-SCNC: 4.3 MMOL/L — SIGNIFICANT CHANGE UP (ref 3.5–5)
PROT SERPL-MCNC: 5.9 G/DL — LOW (ref 6–8)
RBC # BLD: 4.51 M/UL — SIGNIFICANT CHANGE UP (ref 4.2–5.4)
RBC # FLD: 14.8 % — HIGH (ref 11.5–14.5)
SODIUM SERPL-SCNC: 137 MMOL/L — SIGNIFICANT CHANGE UP (ref 135–146)
WBC # BLD: 6.92 K/UL — SIGNIFICANT CHANGE UP (ref 4.8–10.8)
WBC # FLD AUTO: 6.92 K/UL — SIGNIFICANT CHANGE UP (ref 4.8–10.8)

## 2024-02-04 PROCEDURE — 99232 SBSQ HOSP IP/OBS MODERATE 35: CPT

## 2024-02-04 RX ORDER — INSULIN LISPRO 100/ML
20 VIAL (ML) SUBCUTANEOUS
Refills: 0 | Status: DISCONTINUED | OUTPATIENT
Start: 2024-02-04 | End: 2024-02-06

## 2024-02-04 RX ORDER — INSULIN GLARGINE 100 [IU]/ML
20 INJECTION, SOLUTION SUBCUTANEOUS AT BEDTIME
Refills: 0 | Status: DISCONTINUED | OUTPATIENT
Start: 2024-02-04 | End: 2024-02-06

## 2024-02-04 RX ORDER — KETOROLAC TROMETHAMINE 30 MG/ML
10 SYRINGE (ML) INJECTION ONCE
Refills: 0 | Status: DISCONTINUED | OUTPATIENT
Start: 2024-02-04 | End: 2024-02-04

## 2024-02-04 RX ADMIN — Medication 150 MILLIGRAM(S): at 05:12

## 2024-02-04 RX ADMIN — CHLORHEXIDINE GLUCONATE 1 APPLICATION(S): 213 SOLUTION TOPICAL at 05:15

## 2024-02-04 RX ADMIN — Medication 650 MILLIGRAM(S): at 20:06

## 2024-02-04 RX ADMIN — LOSARTAN POTASSIUM 50 MILLIGRAM(S): 100 TABLET, FILM COATED ORAL at 05:13

## 2024-02-04 RX ADMIN — Medication 20 MILLIGRAM(S): at 21:19

## 2024-02-04 RX ADMIN — Medication 50 MICROGRAM(S): at 05:13

## 2024-02-04 RX ADMIN — INSULIN GLARGINE 45 UNIT(S): 100 INJECTION, SOLUTION SUBCUTANEOUS at 08:26

## 2024-02-04 RX ADMIN — Medication 150 MILLIGRAM(S): at 14:56

## 2024-02-04 RX ADMIN — INSULIN GLARGINE 20 UNIT(S): 100 INJECTION, SOLUTION SUBCUTANEOUS at 21:19

## 2024-02-04 RX ADMIN — Medication 20 UNIT(S): at 17:32

## 2024-02-04 RX ADMIN — Medication 20 MILLIGRAM(S): at 05:12

## 2024-02-04 RX ADMIN — Medication 12 UNIT(S): at 11:17

## 2024-02-04 RX ADMIN — NORTRIPTYLINE HYDROCHLORIDE 25 MILLIGRAM(S): 10 CAPSULE ORAL at 21:19

## 2024-02-04 RX ADMIN — Medication 20 MILLIGRAM(S): at 14:56

## 2024-02-04 RX ADMIN — PANTOPRAZOLE SODIUM 40 MILLIGRAM(S): 20 TABLET, DELAYED RELEASE ORAL at 05:17

## 2024-02-04 RX ADMIN — Medication 12 UNIT(S): at 08:24

## 2024-02-04 RX ADMIN — Medication 0: at 21:21

## 2024-02-04 RX ADMIN — ENOXAPARIN SODIUM 40 MILLIGRAM(S): 100 INJECTION SUBCUTANEOUS at 08:26

## 2024-02-04 RX ADMIN — Medication 100 MILLIGRAM(S): at 05:12

## 2024-02-04 RX ADMIN — Medication 150 MILLIGRAM(S): at 21:18

## 2024-02-04 NOTE — BH CONSULTATION LIAISON ASSESSMENT NOTE - NSBHCHARTREVIEWINVESTIGATE_PSY_A_CORE FT
Ventricular Rate 82 BPM    Atrial Rate 82 BPM    P-R Interval 144 ms    QRS Duration 88 ms    Q-T Interval 396 ms    QTC Calculation(Bazett) 462 ms    P Axis 64 degrees    R Axis 50 degrees    T Axis 99 degrees    Diagnosis Line Normal sinus rhythm  Normal ECG    Confirmed by Behuria, Supreeti ([id number]) on 2/2/2024 2:26:17 PM

## 2024-02-04 NOTE — BH CONSULTATION LIAISON ASSESSMENT NOTE - NSBHCHARTREVIEWVS_PSY_A_CORE FT
Vital Signs Last 24 Hrs  T(C): 36 (04 Feb 2024 13:14), Max: 37.1 (03 Feb 2024 20:56)  T(F): 96.8 (04 Feb 2024 13:14), Max: 98.8 (03 Feb 2024 20:56)  HR: 104 (04 Feb 2024 13:14) (95 - 104)  BP: 107/61 (04 Feb 2024 13:14) (107/61 - 123/59)  BP(mean): 80 (04 Feb 2024 04:41) (80 - 80)  RR: 18 (04 Feb 2024 13:14) (16 - 18)  SpO2: --    Parameters below as of 04 Feb 2024 13:14  Patient On (Oxygen Delivery Method): room air

## 2024-02-04 NOTE — BH CONSULTATION LIAISON ASSESSMENT NOTE - HPI (INCLUDE ILLNESS QUALITY, SEVERITY, DURATION, TIMING, CONTEXT, MODIFYING FACTORS, ASSOCIATED SIGNS AND SYMPTOMS)
Mounika Godinez is a 59 yo woman, domiciled in private residence, unemployed, PMH IDDM with neuropathy, hypertension, hypothyroidism, PPH unspecified mood disorder, reports 3 inpatient psychiatric hospitalizations, denies any prior suicide attempts, substance use hx of cocaine, BIBS for confusion secondary to medication noncompliance for diabetes. Admitted to ICU for hyperglycemia/DKA, downgraded to medicine. Psychiatry consulted for depression.    Upon approach, patient is lying in bed comfortably. Patient is alert and oriented and engages with interviewer. Patient is cooperative and answers all questions appropriately. Patient reports that she was taking extra baclofen and other medications due to not being able to sleep. Patient reports that her neuropathy has been worsening over the years. She reports that she has not gotten refills on some of her diabetes medications and that when she last had checked her a1c it was around 8. Patient reports that she was watching TV and thinking she was seeing something from 100 years ago and thought her necklace would burn her. Patient denies having a lot of difficulty falling and staying asleep as she would often have to get up to use the restroom or she would have pain in her feet keeping her awake.     Patient denies taking any extra medications in an attempt to hurt herself or end her life. Denies any prior suicide attempts. Patient reports she has had some passive suicidal ideations in the past with regards to her diabetic neuropathy and not being able to live a "full" life, but denies any current passive or active suicidal ideation, intent, or plans. Patient denies any current persecutory delusions, auditory or visual hallucinations. Denies any homicidal ideations. Patient reports depressive symptoms of poor sleep, irritability, denies any feelings of guilt, hopelessness. Denies any changes to appetite. Patient denies any symptoms of carlton.     Patient allows permission to speak with  Gonzalez, [phone]. Attempted to call, went to .

## 2024-02-04 NOTE — BH CONSULTATION LIAISON ASSESSMENT NOTE - NSBHCONSULTFOLLOWAFTERCARE_PSY_A_CORE FT
Upon discharge, if agreeable, can refer patient to [facility], located at [address] [place] [address]. Patient can be given [phone] to make appointment themselves. Please ensure that this referral information is included in discharge document.

## 2024-02-04 NOTE — BH CONSULTATION LIAISON ASSESSMENT NOTE - NSBHCHARTREVIEWLAB_PSY_A_CORE FT
02-04    137  |  98  |  15  ----------------------------<  292<H>  4.3   |  27  |  0.7    Ca    9.0      04 Feb 2024 05:50  Phos  2.9     02-04  Mg     1.8     02-04    TPro  5.9<L>  /  Alb  3.2<L>  /  TBili  0.5  /  DBili  x   /  AST  19  /  ALT  18  /  AlkPhos  182<H>  02-04                          13.5   6.92  )-----------( 156      ( 04 Feb 2024 05:50 )             39.3

## 2024-02-04 NOTE — BH CONSULTATION LIAISON ASSESSMENT NOTE - OTHER PAST PSYCHIATRIC HISTORY (INCLUDE DETAILS REGARDING ONSET, COURSE OF ILLNESS, INPATIENT/OUTPATIENT TREATMENT)
Patient reports she has been hospitalized in inpatient psychiatric setting three times. Once in November 2022 and two other times after, last in 2023 [place]. Patient reports dx of bipolar disorder, however, did not follow up outpatient and denies medication compliance post discharge. Reports rehab at [facility] after first inpatient hospitalization in Nov 2022.

## 2024-02-04 NOTE — BH CONSULTATION LIAISON ASSESSMENT NOTE - SUMMARY
Mounika Godinez is a 57 yo woman, domiciled in private residence, unemployed, PMH IDDM with neuropathy, hypertension, hypothyroidism, PPH unspecified mood disorder, reports 3 inpatient psychiatric hospitalizations, denies any prior suicide attempts, substance use hx of cocaine, BIBS for confusion secondary to medication noncompliance for diabetes. Admitted to ICU for hyperglycemia/DKA, downgraded to medicine. Psychiatry consulted for depression.    Upon assessment, patient is linear, cooperative, AOx3. Patient reports some depressive symptoms in the context of worsening diabetic neuropathy and associated insomnia. Patient denies taking medications in an attempt to hurt herself or end her life, but rather in order to sleep as she was unable to due to neuropathic pain. Patient is future-oriented, hopeful, has many protective factors as listed below. Although patient has had prior inpatient psychiatric hospitalizations, patient is not currently suicidal, homicidal, manic, psychotic. Patient agreeable to outpatient psychiatry services. Patient does not meet criteria for inpatient psychiatric hospitalization at this time, and does not require constant observation. No psychiatric contraindications to discharge. [facility] psychiatry will sign off on this patient, please feel free to call back with any questions.

## 2024-02-04 NOTE — BH CONSULTATION LIAISON ASSESSMENT NOTE - DESCRIPTION
Lives at home with  and dog, Brenda. 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 Reports has two children, 4 grandchildren. Reports not currently employed.

## 2024-02-04 NOTE — BH CONSULTATION LIAISON ASSESSMENT NOTE - RISK ASSESSMENT
Modifiable risk factors include current depressive symptoms, medical illness/pain, unemployment  Static risk factors include chronic medical illness  Protective factors include social support/family connectiveness, parenthood, seeking out treatment/hopefulness, residential stability, pets

## 2024-02-04 NOTE — BH CONSULTATION LIAISON ASSESSMENT NOTE - DETAILS
Reports feeling like a "zombie" while on risperdal  Patient reports passive suicidal ideation intermittent secondary to neuropathic pain, but denies any active suicidal ideations, intent, plans

## 2024-02-04 NOTE — BH CONSULTATION LIAISON ASSESSMENT NOTE - CURRENT MEDICATION
MEDICATIONS  (STANDING):  baclofen 20 milliGRAM(s) Oral every 8 hours  chlorhexidine 2% Cloths 1 Application(s) Topical <User Schedule>  dextrose 5%. 1000 milliLiter(s) (50 mL/Hr) IV Continuous <Continuous>  dextrose 5%. 1000 milliLiter(s) (100 mL/Hr) IV Continuous <Continuous>  dextrose 50% Injectable 12.5 Gram(s) IV Push once  dextrose 50% Injectable 25 Gram(s) IV Push once  dextrose 50% Injectable 25 Gram(s) IV Push once  enoxaparin Injectable 40 milliGRAM(s) SubCutaneous every 24 hours  glucagon  Injectable 1 milliGRAM(s) IntraMuscular once  influenza   Vaccine 0.5 milliLiter(s) IntraMuscular once  insulin glargine Injectable (LANTUS) 45 Unit(s) SubCutaneous every morning  insulin lispro (ADMELOG) corrective regimen sliding scale   SubCutaneous at bedtime  insulin lispro Injectable (ADMELOG) 12 Unit(s) SubCutaneous three times a day before meals  levothyroxine 50 MICROGram(s) Oral daily  losartan 50 milliGRAM(s) Oral daily  metoprolol succinate  milliGRAM(s) Oral daily  nortriptyline 25 milliGRAM(s) Oral at bedtime  pantoprazole    Tablet 40 milliGRAM(s) Oral before breakfast  pregabalin 150 milliGRAM(s) Oral every 8 hours    MEDICATIONS  (PRN):  acetaminophen     Tablet .. 650 milliGRAM(s) Oral every 6 hours PRN Temp greater or equal to 38C (100.4F), Mild Pain (1 - 3)  dextrose Oral Gel 15 Gram(s) Oral once PRN Blood Glucose LESS THAN 70 milliGRAM(s)/deciliter  zolpidem 5 milliGRAM(s) Oral at bedtime PRN Insomnia  zolpidem 5 milliGRAM(s) Oral at bedtime PRN Insomnia

## 2024-02-05 LAB
ALBUMIN SERPL ELPH-MCNC: 3.4 G/DL — LOW (ref 3.5–5.2)
ALP SERPL-CCNC: 193 U/L — HIGH (ref 30–115)
ALT FLD-CCNC: 22 U/L — SIGNIFICANT CHANGE UP (ref 0–41)
ANION GAP SERPL CALC-SCNC: 10 MMOL/L — SIGNIFICANT CHANGE UP (ref 7–14)
AST SERPL-CCNC: 24 U/L — SIGNIFICANT CHANGE UP (ref 0–41)
BASOPHILS # BLD AUTO: 0.02 K/UL — SIGNIFICANT CHANGE UP (ref 0–0.2)
BASOPHILS NFR BLD AUTO: 0.4 % — SIGNIFICANT CHANGE UP (ref 0–1)
BILIRUB SERPL-MCNC: <0.2 MG/DL — SIGNIFICANT CHANGE UP (ref 0.2–1.2)
BUN SERPL-MCNC: 18 MG/DL — SIGNIFICANT CHANGE UP (ref 10–20)
CALCIUM SERPL-MCNC: 8.7 MG/DL — SIGNIFICANT CHANGE UP (ref 8.4–10.5)
CHLORIDE SERPL-SCNC: 99 MMOL/L — SIGNIFICANT CHANGE UP (ref 98–110)
CO2 SERPL-SCNC: 26 MMOL/L — SIGNIFICANT CHANGE UP (ref 17–32)
CREAT SERPL-MCNC: <0.5 MG/DL — LOW (ref 0.7–1.5)
EGFR: 114 ML/MIN/1.73M2 — SIGNIFICANT CHANGE UP
EOSINOPHIL # BLD AUTO: 0.12 K/UL — SIGNIFICANT CHANGE UP (ref 0–0.7)
EOSINOPHIL NFR BLD AUTO: 2.3 % — SIGNIFICANT CHANGE UP (ref 0–8)
GLUCOSE BLDC GLUCOMTR-MCNC: 232 MG/DL — HIGH (ref 70–99)
GLUCOSE BLDC GLUCOMTR-MCNC: 292 MG/DL — HIGH (ref 70–99)
GLUCOSE BLDC GLUCOMTR-MCNC: 299 MG/DL — HIGH (ref 70–99)
GLUCOSE BLDC GLUCOMTR-MCNC: 311 MG/DL — HIGH (ref 70–99)
GLUCOSE SERPL-MCNC: 329 MG/DL — HIGH (ref 70–99)
HCT VFR BLD CALC: 38.9 % — SIGNIFICANT CHANGE UP (ref 37–47)
HGB BLD-MCNC: 13.3 G/DL — SIGNIFICANT CHANGE UP (ref 12–16)
IMM GRANULOCYTES NFR BLD AUTO: 0.4 % — HIGH (ref 0.1–0.3)
LYMPHOCYTES # BLD AUTO: 1.67 K/UL — SIGNIFICANT CHANGE UP (ref 1.2–3.4)
LYMPHOCYTES # BLD AUTO: 31.9 % — SIGNIFICANT CHANGE UP (ref 20.5–51.1)
MAGNESIUM SERPL-MCNC: 2.1 MG/DL — SIGNIFICANT CHANGE UP (ref 1.8–2.4)
MCHC RBC-ENTMCNC: 29.9 PG — SIGNIFICANT CHANGE UP (ref 27–31)
MCHC RBC-ENTMCNC: 34.2 G/DL — SIGNIFICANT CHANGE UP (ref 32–37)
MCV RBC AUTO: 87.4 FL — SIGNIFICANT CHANGE UP (ref 81–99)
MONOCYTES # BLD AUTO: 0.63 K/UL — HIGH (ref 0.1–0.6)
MONOCYTES NFR BLD AUTO: 12 % — HIGH (ref 1.7–9.3)
NEUTROPHILS # BLD AUTO: 2.78 K/UL — SIGNIFICANT CHANGE UP (ref 1.4–6.5)
NEUTROPHILS NFR BLD AUTO: 53 % — SIGNIFICANT CHANGE UP (ref 42.2–75.2)
NRBC # BLD: 0 /100 WBCS — SIGNIFICANT CHANGE UP (ref 0–0)
PHOSPHATE SERPL-MCNC: 3 MG/DL — SIGNIFICANT CHANGE UP (ref 2.1–4.9)
PLATELET # BLD AUTO: 150 K/UL — SIGNIFICANT CHANGE UP (ref 130–400)
PMV BLD: 10.6 FL — HIGH (ref 7.4–10.4)
POTASSIUM SERPL-MCNC: 4.1 MMOL/L — SIGNIFICANT CHANGE UP (ref 3.5–5)
POTASSIUM SERPL-SCNC: 4.1 MMOL/L — SIGNIFICANT CHANGE UP (ref 3.5–5)
PROT SERPL-MCNC: 6 G/DL — SIGNIFICANT CHANGE UP (ref 6–8)
RBC # BLD: 4.45 M/UL — SIGNIFICANT CHANGE UP (ref 4.2–5.4)
RBC # FLD: 14.6 % — HIGH (ref 11.5–14.5)
SODIUM SERPL-SCNC: 135 MMOL/L — SIGNIFICANT CHANGE UP (ref 135–146)
WBC # BLD: 5.24 K/UL — SIGNIFICANT CHANGE UP (ref 4.8–10.8)
WBC # FLD AUTO: 5.24 K/UL — SIGNIFICANT CHANGE UP (ref 4.8–10.8)

## 2024-02-05 PROCEDURE — 99233 SBSQ HOSP IP/OBS HIGH 50: CPT

## 2024-02-05 PROCEDURE — 99253 IP/OBS CNSLTJ NEW/EST LOW 45: CPT | Mod: GC

## 2024-02-05 PROCEDURE — 99223 1ST HOSP IP/OBS HIGH 75: CPT

## 2024-02-05 RX ORDER — KETOROLAC TROMETHAMINE 30 MG/ML
15 SYRINGE (ML) INJECTION ONCE
Refills: 0 | Status: DISCONTINUED | OUTPATIENT
Start: 2024-02-05 | End: 2024-02-05

## 2024-02-05 RX ADMIN — Medication 50 MICROGRAM(S): at 05:20

## 2024-02-05 RX ADMIN — Medication 150 MILLIGRAM(S): at 21:28

## 2024-02-05 RX ADMIN — Medication 150 MILLIGRAM(S): at 05:19

## 2024-02-05 RX ADMIN — Medication 100 MILLIGRAM(S): at 05:20

## 2024-02-05 RX ADMIN — Medication 20 MILLIGRAM(S): at 05:20

## 2024-02-05 RX ADMIN — LOSARTAN POTASSIUM 50 MILLIGRAM(S): 100 TABLET, FILM COATED ORAL at 05:20

## 2024-02-05 RX ADMIN — Medication 150 MILLIGRAM(S): at 15:09

## 2024-02-05 RX ADMIN — INSULIN GLARGINE 20 UNIT(S): 100 INJECTION, SOLUTION SUBCUTANEOUS at 21:27

## 2024-02-05 RX ADMIN — Medication 15 MILLIGRAM(S): at 10:31

## 2024-02-05 RX ADMIN — Medication 20 UNIT(S): at 17:13

## 2024-02-05 RX ADMIN — Medication 650 MILLIGRAM(S): at 09:00

## 2024-02-05 RX ADMIN — CHLORHEXIDINE GLUCONATE 1 APPLICATION(S): 213 SOLUTION TOPICAL at 05:26

## 2024-02-05 RX ADMIN — Medication 20 UNIT(S): at 07:46

## 2024-02-05 RX ADMIN — Medication 2: at 21:27

## 2024-02-05 RX ADMIN — Medication 20 MILLIGRAM(S): at 21:28

## 2024-02-05 RX ADMIN — Medication 20 UNIT(S): at 11:56

## 2024-02-05 RX ADMIN — Medication 20 MILLIGRAM(S): at 15:09

## 2024-02-05 RX ADMIN — PANTOPRAZOLE SODIUM 40 MILLIGRAM(S): 20 TABLET, DELAYED RELEASE ORAL at 05:21

## 2024-02-05 RX ADMIN — ENOXAPARIN SODIUM 40 MILLIGRAM(S): 100 INJECTION SUBCUTANEOUS at 07:50

## 2024-02-05 RX ADMIN — INSULIN GLARGINE 45 UNIT(S): 100 INJECTION, SOLUTION SUBCUTANEOUS at 07:47

## 2024-02-05 NOTE — CONSULT NOTE ADULT - SUBJECTIVE AND OBJECTIVE BOX
HPI:  59 yo F with pmhx IDDM with neuropathy, hypertension, hypothyroidism, presents for lightheadedness, falls, and vomiting in setting of noncompliance with insulin as well as taking additional baclofen and Lyrica x 3 days. Pt is a little confused and not appropriately answering questions. Pt said that she fell dizzy and fell down today but had no LOC but did hit her head on the left. Pt has also had multiple episodes of vomiting and it was all food that she ate. Reportedly supposed to take 20mg baclofen 8 times a day and 150mg lyrica 8 times a day, but has been taking both q3-4h due to increasing chronic neuropathy.     ED vitals:   / 60, , RR 19, Temp 98.5, O2 sat 99% on RA    Sig labs   Na 132, AG 18, Bicarb 22, Glucose 627, Calcium 10.7, Beta HB 3.5, Osm 314    VBG: lactate 3.0    UA: bacteria occasional, no nitrite or LE    CT HEAD:  - No acute intracranial findings.  - Mild chronic microvascular ischemic changes.    CT CERVICAL SPINE:  - No acute cervical fracture or facet subluxation.    Pt was given 3 L of LR, started on insulin infusion and admitted to the ICU for DKA (02 Feb 2024 00:22)      PAST MEDICAL & SURGICAL HISTORY  Hypertension    Diabetes mellitus    Thyroid disease    Anemia    S/P lumbar discectomy  2006.    S/P tonsillectomy      FAMILY HISTORY:  FAMILY HISTORY:    SOCIAL HISTORY:  [  ] Non-smoker  [  ] Smoker  [  ] Alcohol use:  [  ] Drug use:     ALLERGIES:  No Known Allergies      MEDICATIONS:  MEDICATIONS  (STANDING):  baclofen 20 milliGRAM(s) Oral every 8 hours  chlorhexidine 2% Cloths 1 Application(s) Topical <User Schedule>  dextrose 5%. 1000 milliLiter(s) (50 mL/Hr) IV Continuous <Continuous>  dextrose 5%. 1000 milliLiter(s) (100 mL/Hr) IV Continuous <Continuous>  dextrose 50% Injectable 25 Gram(s) IV Push once  dextrose 50% Injectable 25 Gram(s) IV Push once  dextrose 50% Injectable 12.5 Gram(s) IV Push once  enoxaparin Injectable 40 milliGRAM(s) SubCutaneous every 24 hours  glucagon  Injectable 1 milliGRAM(s) IntraMuscular once  influenza   Vaccine 0.5 milliLiter(s) IntraMuscular once  insulin glargine Injectable (LANTUS) 45 Unit(s) SubCutaneous every morning  insulin glargine Injectable (LANTUS) 20 Unit(s) SubCutaneous at bedtime  insulin lispro (ADMELOG) corrective regimen sliding scale   SubCutaneous at bedtime  insulin lispro Injectable (ADMELOG) 20 Unit(s) SubCutaneous three times a day before meals  levothyroxine 50 MICROGram(s) Oral daily  losartan 50 milliGRAM(s) Oral daily  metoprolol succinate  milliGRAM(s) Oral daily  nortriptyline 25 milliGRAM(s) Oral at bedtime  pantoprazole    Tablet 40 milliGRAM(s) Oral before breakfast  pregabalin 150 milliGRAM(s) Oral every 8 hours    MEDICATIONS  (PRN):  acetaminophen     Tablet .. 650 milliGRAM(s) Oral every 6 hours PRN Temp greater or equal to 38C (100.4F), Mild Pain (1 - 3)  dextrose Oral Gel 15 Gram(s) Oral once PRN Blood Glucose LESS THAN 70 milliGRAM(s)/deciliter  zolpidem 5 milliGRAM(s) Oral at bedtime PRN Insomnia  zolpidem 5 milliGRAM(s) Oral at bedtime PRN Insomnia      HOME MEDICATIONS:  Home Medications:  Ambien 10 mg oral tablet: 1 tab(s) orally once a day (at bedtime) (02 Feb 2024 02:34)  baclofen 20 mg oral tablet: 1 tab(s) orally every 8 hours (02 Feb 2024 02:33)  ibuprofen 800 mg oral tablet: 1 tab(s) orally every 8 hours (02 Feb 2024 02:33)  Jardiance 25 mg oral tablet: 1 tab(s) orally once a day (02 Feb 2024 02:33)  levothyroxine 50 mcg (0.05 mg) oral tablet: 1 tab(s) orally once a day (02 Feb 2024 02:34)  losartan 50 mg oral tablet: 1 tab(s) orally once a day (02 Feb 2024 02:34)  metoprolol succinate 100 mg oral capsule, extended release: 1 cap(s) orally once a day (02 Feb 2024 02:34)  nortriptyline 25 mg oral capsule: 1 cap(s) orally once a day (at bedtime) (02 Feb 2024 02:33)  pregabalin 150 mg oral capsule: 1 cap(s) orally every 8 hours (02 Feb 2024 02:33)      VITALS:   T(F): 96.9 (02-05 @ 06:01), Max: 99 (02-03 @ 13:14)  HR: 94 (02-05 @ 06:01) (88 - 119)  BP: 157/78 (02-05 @ 06:01) (104/53 - 177/81)  BP(mean): 80 (02-04 @ 04:41) (80 - 80)  RR: 18 (02-05 @ 06:01) (16 - 20)  SpO2: 96% (02-02 @ 12:31) (96% - 96%)    I&O's Summary      REVIEW OF SYSTEMS:  negative except as mentioned in HPI    PHYSICAL EXAM:  GENERAL: Patient is awake , alert and oriented,  not in acute distress  EYES: No proptosis, no lid lag  NECK: No thyroid enlargement, no palpable nodules , no bruit  LUNGS: Clear to auscultation bilaterally   CARDIOVASCULAR: S1/S2 present, RRR , no murmurs or rubs  ABD: Soft, non-tender, non-distended, +BS  EXT: No ZEHRA    LABS:                        13.3   5.24  )-----------( 150      ( 05 Feb 2024 07:12 )             38.9     02-05    135  |  99  |  18  ----------------------------<  329<H>  4.1   |  26  |  <0.5<L>    Ca    8.7      05 Feb 2024 07:12  Phos  3.0     02-05  Mg     2.1     02-05    TPro  6.0  /  Alb  3.4<L>  /  TBili  <0.2  /  DBili  x   /  AST  24  /  ALT  22  /  AlkPhos  193<H>  02-05 02-02 Chol 153 LDL -- HDL 38<L> Trig 117  POCT Blood Glucose.: 299 mg/dL (02-05-24 @ 11:05)  POCT Blood Glucose.: 311 mg/dL (02-05-24 @ 07:27)  POCT Blood Glucose.: 221 mg/dL (02-04-24 @ 21:03)  POCT Blood Glucose.: 266 mg/dL (02-04-24 @ 16:52)  POCT Blood Glucose.: 431 mg/dL (02-04-24 @ 11:12)  POCT Blood Glucose.: 277 mg/dL (02-04-24 @ 07:52)  POCT Blood Glucose.: 219 mg/dL (02-03-24 @ 21:01)  POCT Blood Glucose.: 257 mg/dL (02-03-24 @ 17:07)  POCT Blood Glucose.: 313 mg/dL (02-03-24 @ 14:06)  POCT Blood Glucose.: 542 mg/dL (02-03-24 @ 11:21)  POCT Blood Glucose.: 416 mg/dL (02-03-24 @ 07:43)  POCT Blood Glucose.: 443 mg/dL (02-03-24 @ 05:41)  POCT Blood Glucose.: 359 mg/dL (02-02-24 @ 22:36)  POCT Blood Glucose.: 382 mg/dL (02-02-24 @ 21:00)  POCT Blood Glucose.: 250 mg/dL (02-02-24 @ 17:53)  POCT Blood Glucose.: 117 mg/dL (02-02-24 @ 14:44)    TSH 5.84; Total T3 --; Free T4 --   HPI:  59 yo F with pmhx IDDM with neuropathy, hypertension, hypothyroidism, presents for lightheadedness, falls, and vomiting in setting of noncompliance with insulin as well as taking additional baclofen and Lyrica x 3 days. Pt is a little confused and not appropriately answering questions. Pt said that she fell dizzy and fell down today but had no LOC but did hit her head on the left. Pt has also had multiple episodes of vomiting and it was all food that she ate. Reportedly supposed to take 20mg baclofen 8 times a day and 150mg lyrica 8 times a day, but has been taking both q3-4h due to increasing chronic neuropathy.     ED vitals:   / 60, , RR 19, Temp 98.5, O2 sat 99% on RA    Sig labs   Na 132, AG 18, Bicarb 22, Glucose 627, Calcium 10.7, Beta HB 3.5, Osm 314    VBG: lactate 3.0    UA: bacteria occasional, no nitrite or LE    CT HEAD:  - No acute intracranial findings.  - Mild chronic microvascular ischemic changes.    CT CERVICAL SPINE:  - No acute cervical fracture or facet subluxation.    Pt was given 3 L of LR, started on insulin infusion and admitted to the ICU for DKA (02 Feb 2024 00:22)      PAST MEDICAL & SURGICAL HISTORY  Hypertension    Diabetes mellitus    Thyroid disease    Anemia    S/P lumbar discectomy  2006.    S/P tonsillectomy      FAMILY HISTORY:  FAMILY HISTORY:    SOCIAL HISTORY:  [  ] Non-smoker  [  ] Smoker  [  ] Alcohol use:  [  ] Drug use:     ALLERGIES:  No Known Allergies      MEDICATIONS:  MEDICATIONS  (STANDING):  baclofen 20 milliGRAM(s) Oral every 8 hours  chlorhexidine 2% Cloths 1 Application(s) Topical <User Schedule>  dextrose 5%. 1000 milliLiter(s) (50 mL/Hr) IV Continuous <Continuous>  dextrose 5%. 1000 milliLiter(s) (100 mL/Hr) IV Continuous <Continuous>  dextrose 50% Injectable 25 Gram(s) IV Push once  dextrose 50% Injectable 25 Gram(s) IV Push once  dextrose 50% Injectable 12.5 Gram(s) IV Push once  enoxaparin Injectable 40 milliGRAM(s) SubCutaneous every 24 hours  glucagon  Injectable 1 milliGRAM(s) IntraMuscular once  influenza   Vaccine 0.5 milliLiter(s) IntraMuscular once  insulin glargine Injectable (LANTUS) 45 Unit(s) SubCutaneous every morning  insulin glargine Injectable (LANTUS) 20 Unit(s) SubCutaneous at bedtime  insulin lispro (ADMELOG) corrective regimen sliding scale   SubCutaneous at bedtime  insulin lispro Injectable (ADMELOG) 20 Unit(s) SubCutaneous three times a day before meals  levothyroxine 50 MICROGram(s) Oral daily  losartan 50 milliGRAM(s) Oral daily  metoprolol succinate  milliGRAM(s) Oral daily  nortriptyline 25 milliGRAM(s) Oral at bedtime  pantoprazole    Tablet 40 milliGRAM(s) Oral before breakfast  pregabalin 150 milliGRAM(s) Oral every 8 hours    MEDICATIONS  (PRN):  acetaminophen     Tablet .. 650 milliGRAM(s) Oral every 6 hours PRN Temp greater or equal to 38C (100.4F), Mild Pain (1 - 3)  dextrose Oral Gel 15 Gram(s) Oral once PRN Blood Glucose LESS THAN 70 milliGRAM(s)/deciliter  zolpidem 5 milliGRAM(s) Oral at bedtime PRN Insomnia  zolpidem 5 milliGRAM(s) Oral at bedtime PRN Insomnia      HOME MEDICATIONS:  Home Medications:  Ambien 10 mg oral tablet: 1 tab(s) orally once a day (at bedtime) (02 Feb 2024 02:34)  baclofen 20 mg oral tablet: 1 tab(s) orally every 8 hours (02 Feb 2024 02:33)  ibuprofen 800 mg oral tablet: 1 tab(s) orally every 8 hours (02 Feb 2024 02:33)  Jardiance 25 mg oral tablet: 1 tab(s) orally once a day (02 Feb 2024 02:33)  levothyroxine 50 mcg (0.05 mg) oral tablet: 1 tab(s) orally once a day (02 Feb 2024 02:34)  losartan 50 mg oral tablet: 1 tab(s) orally once a day (02 Feb 2024 02:34)  metoprolol succinate 100 mg oral capsule, extended release: 1 cap(s) orally once a day (02 Feb 2024 02:34)  nortriptyline 25 mg oral capsule: 1 cap(s) orally once a day (at bedtime) (02 Feb 2024 02:33)  pregabalin 150 mg oral capsule: 1 cap(s) orally every 8 hours (02 Feb 2024 02:33)      VITALS:   T(F): 96.9 (02-05 @ 06:01), Max: 99 (02-03 @ 13:14)  HR: 94 (02-05 @ 06:01) (88 - 119)  BP: 157/78 (02-05 @ 06:01) (104/53 - 177/81)  BP(mean): 80 (02-04 @ 04:41) (80 - 80)  RR: 18 (02-05 @ 06:01) (16 - 20)  SpO2: 96% (02-02 @ 12:31) (96% - 96%)    I&O's Summary      REVIEW OF SYSTEMS:  negative except as mentioned in HPI    PHYSICAL EXAM:  GENERAL: Patient is awake , alert and oriented,  not in acute distress  EYES: No proptosis, no lid lag  NECK: No thyroid enlargement, no palpable nodules , no bruit  LUNGS: Clear to auscultation bilaterally   CARDIOVASCULAR: S1/S2 present, RRR , no murmurs or rubs  ABD: Soft, non-tender, non-distended, +BS  EXT: No ZEHRA    LABS:                        13.3   5.24  )-----------( 150      ( 05 Feb 2024 07:12 )             38.9     02-05    135  |  99  |  18  ----------------------------<  329<H>  4.1   |  26  |  <0.5<L>    Ca    8.7      05 Feb 2024 07:12  Phos  3.0     02-05  Mg     2.1     02-05    TPro  6.0  /  Alb  3.4<L>  /  TBili  <0.2  /  DBili  x   /  AST  24  /  ALT  22  /  AlkPhos  193<H>  02-05 02-02 Chol 153 LDL -- HDL 38<L> Trig 117  POCT Blood Glucose.: 299 mg/dL (02-05-24 @ 11:05)  POCT Blood Glucose.: 311 mg/dL (02-05-24 @ 07:27)  POCT Blood Glucose.: 221 mg/dL (02-04-24 @ 21:03)  POCT Blood Glucose.: 266 mg/dL (02-04-24 @ 16:52)  POCT Blood Glucose.: 431 mg/dL (02-04-24 @ 11:12)  POCT Blood Glucose.: 277 mg/dL (02-04-24 @ 07:52)  POCT Blood Glucose.: 219 mg/dL (02-03-24 @ 21:01)  POCT Blood Glucose.: 257 mg/dL (02-03-24 @ 17:07)  POCT Blood Glucose.: 313 mg/dL (02-03-24 @ 14:06)  POCT Blood Glucose.: 542 mg/dL (02-03-24 @ 11:21)  POCT Blood Glucose.: 416 mg/dL (02-03-24 @ 07:43)  POCT Blood Glucose.: 443 mg/dL (02-03-24 @ 05:41)  POCT Blood Glucose.: 359 mg/dL (02-02-24 @ 22:36)  POCT Blood Glucose.: 382 mg/dL (02-02-24 @ 21:00)  POCT Blood Glucose.: 250 mg/dL (02-02-24 @ 17:53)  POCT Blood Glucose.: 117 mg/dL (02-02-24 @ 14:44)          A1C with Estimated Average Glucose Result: 13.8: Method: Immunoassay Thyroid Stimulating Hormone, Serum: 5.84 uIU/mL (02.02.24 @ 06:28) TSH 5.84; Total T3 --; Free T4 --

## 2024-02-05 NOTE — CONSULT NOTE ADULT - ASSESSMENT
57 yo F with pmhx IDDM with neuropathy, hypertension, hypothyroidism, presents for lightheadedness, falls, and vomiting in setting of noncompliance with insulin as well as taking additional baclofen and Lyrica x 3 days. patient was found to be in DKA. endocrinology consulted for hyperglycemia    #IDDM  #DKA-resolved   - a1c 13.8 02/24  - patient does not know the doses of her insulin at home  - records show she is on Jardiance at home as well  - patient is non compliant with he medications  - here patient was lantus 45 units AM and lispro 12 units TID premeal -> uncontrolled FS -> primary team switched lispro to 20 units TID premeal and added lantus 20 units at night today    #hypothyroidism  - TSH 5.52 02/24  - on levothyroxine 50 mcg daily but also not compliant 57 yo F with pmhx IDDM with neuropathy, hypertension, hypothyroidism, presents for lightheadedness, falls, and vomiting in setting of noncompliance with insulin as well as taking additional baclofen and Lyrica x 3 days. patient was found to be in DKA. endocrinology consulted for hyperglycemia    #IDDM  #DKA-resolved   - a1c 13.8 02/24  - patient does not know the doses of her insulin at home but she reports taking shots twice a day in addition to once a week Trulicity shot  - records show she is on Jardiance at home as well  - patient is non compliant with her medications -> she hasn't taken any of her meds for the past 2 months.  - here patient was lantus 45 units AM and lispro 12 units TID premeal -> uncontrolled FS -> primary team switched lispro to 20 units TID premeal and added lantus 20 units at night today  - would wait and see the effect of the changes made today -> would opt to change lantus to once a day dosing eventually (60 units) due to compliance issues.    #hypothyroidism  - TSH 5.52 02/24  - on levothyroxine 50 mcg daily but also not compliant  - c/w levothyroxine with OP fu 57 yo F with pmhx IDDM with neuropathy, hypertension, hypothyroidism, presents for lightheadedness, falls, and vomiting in setting of noncompliance with insulin as well as taking additional baclofen and Lyrica x 3 days. patient was found to be in DKA. endocrinology consulted for hyperglycemia    #IDDM  #DKA-resolved   - a1c 13.8 02/24  - patient does not know the doses of her insulin at home but she reports taking shots twice a day in addition to once a week Trulicity shot  - records show she is on Jardiance at home as well  - patient is non compliant with her medications -> she hasn't taken any of her meds for the past 2 months.  - here patient was lantus 45 units AM and lispro 12 units TID premeal -> uncontrolled FS -> primary team switched lispro to 20 units TID premeal and added lantus 20 units at night today  - increase am lantus to 52 units and continue bedtime Lantus 20 units   - increase admelog to 24 units TIDAC   - sliding scale 2: 50 > 150   - discussed importance of compliance with insulin once at home to avoid complications ,     #hypothyroidism  - TSH 5.52 02/24  - on levothyroxine 50 mcg daily but also not compliant, was not taking it   - c/w levothyroxine 50mcg daily

## 2024-02-05 NOTE — CONSULT NOTE ADULT - SUBJECTIVE AND OBJECTIVE BOX
PAIN MANAGEMENT CONSULT NOTE    Chief Complaint: low back pain    HPI:  57 yo F with pmhx IDDM with neuropathy, hypertension, hypothyroidism, presents for lightheadedness, falls, and vomiting in setting of noncompliance with insulin as well as taking additional baclofen and Lyrica x 3 days. Pt is a little confused and not appropriately answering questions. Pt said that she fell dizzy and fell down today but had no LOC but did hit her head on the left. Pt has also had multiple episodes of vomiting and it was all food that she ate. Reportedly supposed to take 20mg baclofen 8 times a day and 150mg lyrica 8 times a day, but has been taking both q3-4h due to increasing chronic neuropathy. Pain is sharp and stabbing in nature down her legs which can be severe and not relieved with epidurals.     ED vitals:   / 60, , RR 19, Temp 98.5, O2 sat 99% on RA    Sig labs   Na 132, AG 18, Bicarb 22, Glucose 627, Calcium 10.7, Beta HB 3.5, Osm 314    VBG: lactate 3.0    UA: bacteria occasional, no nitrite or LE    CT HEAD:  - No acute intracranial findings.  - Mild chronic microvascular ischemic changes.    CT CERVICAL SPINE:  - No acute cervical fracture or facet subluxation.    Pt was given 3 L of LR, started on insulin infusion and admitted to the ICU for DKA (02 Feb 2024 00:22)      PAST MEDICAL & SURGICAL HISTORY:  Hypertension      Diabetes mellitus      Thyroid disease      Anemia      S/P lumbar discectomy  2006.      S/P tonsillectomy          FAMILY HISTORY:      SOCIAL HISTORY:  [x ] Denies Smoking, Alcohol, or Drug Use    HOME MEDICATIONS:   Please refer to initial HNP    PAIN HOME MEDICATIONS:    Allergies    No Known Allergies    Intolerances        PAIN MEDICATIONS:  acetaminophen     Tablet .. 650 milliGRAM(s) Oral every 6 hours PRN  baclofen 20 milliGRAM(s) Oral every 8 hours  nortriptyline 25 milliGRAM(s) Oral at bedtime  pregabalin 150 milliGRAM(s) Oral every 8 hours  zolpidem 5 milliGRAM(s) Oral at bedtime PRN  zolpidem 5 milliGRAM(s) Oral at bedtime PRN    Heme:  enoxaparin Injectable 40 milliGRAM(s) SubCutaneous every 24 hours    Antibiotics:    Cardiovascular:  losartan 50 milliGRAM(s) Oral daily  metoprolol succinate  milliGRAM(s) Oral daily    GI:  pantoprazole    Tablet 40 milliGRAM(s) Oral before breakfast    Endocrine:  dextrose 50% Injectable 25 Gram(s) IV Push once  dextrose 50% Injectable 25 Gram(s) IV Push once  dextrose 50% Injectable 12.5 Gram(s) IV Push once  dextrose Oral Gel 15 Gram(s) Oral once PRN  glucagon  Injectable 1 milliGRAM(s) IntraMuscular once  insulin glargine Injectable (LANTUS) 45 Unit(s) SubCutaneous every morning  insulin glargine Injectable (LANTUS) 20 Unit(s) SubCutaneous at bedtime  insulin lispro (ADMELOG) corrective regimen sliding scale   SubCutaneous at bedtime  insulin lispro Injectable (ADMELOG) 20 Unit(s) SubCutaneous three times a day before meals  levothyroxine 50 MICROGram(s) Oral daily    All Other Medications:  chlorhexidine 2% Cloths 1 Application(s) Topical <User Schedule>  dextrose 5%. 1000 milliLiter(s) IV Continuous <Continuous>  dextrose 5%. 1000 milliLiter(s) IV Continuous <Continuous>  influenza   Vaccine 0.5 milliLiter(s) IntraMuscular once      Vital Signs Last 24 Hrs  T(C): 36.4 (05 Feb 2024 13:47), Max: 36.4 (05 Feb 2024 13:47)  T(F): 97.6 (05 Feb 2024 13:47), Max: 97.6 (05 Feb 2024 13:47)  HR: 90 (05 Feb 2024 13:47) (90 - 94)  BP: 135/77 (05 Feb 2024 13:47) (135/77 - 157/78)  BP(mean): --  RR: 20 (05 Feb 2024 13:47) (18 - 20)  SpO2: --    Parameters below as of 05 Feb 2024 13:47  Patient On (Oxygen Delivery Method): room air        LABS:                        13.3   5.24  )-----------( 150      ( 05 Feb 2024 07:12 )             38.9     02-05    135  |  99  |  18  ----------------------------<  329<H>  4.1   |  26  |  <0.5<L>    Ca    8.7      05 Feb 2024 07:12  Phos  3.0     02-05  Mg     2.1     02-05    TPro  6.0  /  Alb  3.4<L>  /  TBili  <0.2  /  DBili  x   /  AST  24  /  ALT  22  /  AlkPhos  193<H>  02-05      Urinalysis Basic - ( 05 Feb 2024 07:12 )    Color: x / Appearance: x / SG: x / pH: x  Gluc: 329 mg/dL / Ketone: x  / Bili: x / Urobili: x   Blood: x / Protein: x / Nitrite: x   Leuk Esterase: x / RBC: x / WBC x   Sq Epi: x / Non Sq Epi: x / Bacteria: x        REVIEW OF SYSTEMS:  see hpi    PHYSICAL EXAM  GENERAL: Laying in bed, NAD  Neuro:  EOMI  Cranial nerves grossly intact  CHEST/LUNG: no audible wheeze, no accessory muscle usage  HEART: Regular rate and rhythm; No edema  ABDOMEN: Soft, Nontender  EXTREMITIES: No clubbing, cyanosis  SKIN: No rashes or lesions      ASSESSMENT:   lumbar radiculopathy  low back pain    PLAN:   - Pain:  change tylenol to 975mg q8hr  c/w lyrica 150mg q8hr  d/c nortriptyline as per pt  start celebrex 200mg BID  c/w baclofen 20mg q8hr  can add tramadol 50mg q8hr prn   follow up with her pain physician outpatient for another epidural steroid injection  she has had two without relief thus far  can d/c on this regimen

## 2024-02-05 NOTE — CONSULT NOTE ADULT - ATTENDING COMMENTS
# DKA / poorly controlled type 2 DM   - A1c 13.8 % , reports using insulin in the past and trulicity but stopped more then 2 months ago and wasonly taking jardiance   - POC reviewed now on  lantus 45 units AM  and received 20 units at bedtime, and lispro 20  units TID premeal   - would wait and see the effect of the changes made today -> would opt to change lantus to once a day dosing eventually (60 units) due to compliance issues.    #hypothyroidism  - TSH 5.52 02/24  - on levothyroxine 50 mcg daily but also not compliant  - c/w levothyroxine with OP fu

## 2024-02-06 ENCOUNTER — TRANSCRIPTION ENCOUNTER (OUTPATIENT)
Age: 59
End: 2024-02-06

## 2024-02-06 VITALS
SYSTOLIC BLOOD PRESSURE: 127 MMHG | TEMPERATURE: 98 F | HEART RATE: 99 BPM | DIASTOLIC BLOOD PRESSURE: 73 MMHG | RESPIRATION RATE: 18 BRPM

## 2024-02-06 LAB
ALBUMIN SERPL ELPH-MCNC: 3.3 G/DL — LOW (ref 3.5–5.2)
ALP SERPL-CCNC: 202 U/L — HIGH (ref 30–115)
ALT FLD-CCNC: 24 U/L — SIGNIFICANT CHANGE UP (ref 0–41)
ANION GAP SERPL CALC-SCNC: 11 MMOL/L — SIGNIFICANT CHANGE UP (ref 7–14)
AST SERPL-CCNC: 30 U/L — SIGNIFICANT CHANGE UP (ref 0–41)
BASOPHILS # BLD AUTO: 0.03 K/UL — SIGNIFICANT CHANGE UP (ref 0–0.2)
BASOPHILS NFR BLD AUTO: 0.6 % — SIGNIFICANT CHANGE UP (ref 0–1)
BILIRUB SERPL-MCNC: <0.2 MG/DL — SIGNIFICANT CHANGE UP (ref 0.2–1.2)
BUN SERPL-MCNC: 14 MG/DL — SIGNIFICANT CHANGE UP (ref 10–20)
CALCIUM SERPL-MCNC: 8.9 MG/DL — SIGNIFICANT CHANGE UP (ref 8.4–10.4)
CHLORIDE SERPL-SCNC: 98 MMOL/L — SIGNIFICANT CHANGE UP (ref 98–110)
CO2 SERPL-SCNC: 25 MMOL/L — SIGNIFICANT CHANGE UP (ref 17–32)
CREAT SERPL-MCNC: <0.5 MG/DL — LOW (ref 0.7–1.5)
EGFR: 114 ML/MIN/1.73M2 — SIGNIFICANT CHANGE UP
EOSINOPHIL # BLD AUTO: 0.15 K/UL — SIGNIFICANT CHANGE UP (ref 0–0.7)
EOSINOPHIL NFR BLD AUTO: 2.9 % — SIGNIFICANT CHANGE UP (ref 0–8)
GLUCOSE BLDC GLUCOMTR-MCNC: 288 MG/DL — HIGH (ref 70–99)
GLUCOSE BLDC GLUCOMTR-MCNC: 292 MG/DL — HIGH (ref 70–99)
GLUCOSE SERPL-MCNC: 328 MG/DL — HIGH (ref 70–99)
HCT VFR BLD CALC: 38.1 % — SIGNIFICANT CHANGE UP (ref 37–47)
HGB BLD-MCNC: 12.9 G/DL — SIGNIFICANT CHANGE UP (ref 12–16)
IMM GRANULOCYTES NFR BLD AUTO: 0.4 % — HIGH (ref 0.1–0.3)
LYMPHOCYTES # BLD AUTO: 1.86 K/UL — SIGNIFICANT CHANGE UP (ref 1.2–3.4)
LYMPHOCYTES # BLD AUTO: 35.8 % — SIGNIFICANT CHANGE UP (ref 20.5–51.1)
MAGNESIUM SERPL-MCNC: 1.8 MG/DL — SIGNIFICANT CHANGE UP (ref 1.8–2.4)
MCHC RBC-ENTMCNC: 29.7 PG — SIGNIFICANT CHANGE UP (ref 27–31)
MCHC RBC-ENTMCNC: 33.9 G/DL — SIGNIFICANT CHANGE UP (ref 32–37)
MCV RBC AUTO: 87.8 FL — SIGNIFICANT CHANGE UP (ref 81–99)
MONOCYTES # BLD AUTO: 0.63 K/UL — HIGH (ref 0.1–0.6)
MONOCYTES NFR BLD AUTO: 12.1 % — HIGH (ref 1.7–9.3)
NEUTROPHILS # BLD AUTO: 2.5 K/UL — SIGNIFICANT CHANGE UP (ref 1.4–6.5)
NEUTROPHILS NFR BLD AUTO: 48.2 % — SIGNIFICANT CHANGE UP (ref 42.2–75.2)
NRBC # BLD: 0 /100 WBCS — SIGNIFICANT CHANGE UP (ref 0–0)
PHOSPHATE SERPL-MCNC: 3.1 MG/DL — SIGNIFICANT CHANGE UP (ref 2.1–4.9)
PLATELET # BLD AUTO: 163 K/UL — SIGNIFICANT CHANGE UP (ref 130–400)
PMV BLD: 11 FL — HIGH (ref 7.4–10.4)
POTASSIUM SERPL-MCNC: 4.1 MMOL/L — SIGNIFICANT CHANGE UP (ref 3.5–5)
POTASSIUM SERPL-SCNC: 4.1 MMOL/L — SIGNIFICANT CHANGE UP (ref 3.5–5)
PROT SERPL-MCNC: 5.9 G/DL — LOW (ref 6–8)
RBC # BLD: 4.34 M/UL — SIGNIFICANT CHANGE UP (ref 4.2–5.4)
RBC # FLD: 14.5 % — SIGNIFICANT CHANGE UP (ref 11.5–14.5)
SODIUM SERPL-SCNC: 134 MMOL/L — LOW (ref 135–146)
WBC # BLD: 5.19 K/UL — SIGNIFICANT CHANGE UP (ref 4.8–10.8)
WBC # FLD AUTO: 5.19 K/UL — SIGNIFICANT CHANGE UP (ref 4.8–10.8)

## 2024-02-06 PROCEDURE — 99239 HOSP IP/OBS DSCHRG MGMT >30: CPT

## 2024-02-06 RX ORDER — NORTRIPTYLINE HYDROCHLORIDE 10 MG/1
1 CAPSULE ORAL
Refills: 0 | DISCHARGE

## 2024-02-06 RX ORDER — EMPAGLIFLOZIN 10 MG/1
1 TABLET, FILM COATED ORAL
Qty: 30 | Refills: 0
Start: 2024-02-06

## 2024-02-06 RX ORDER — INSULIN GLARGINE 100 [IU]/ML
60 INJECTION, SOLUTION SUBCUTANEOUS
Qty: 0 | Refills: 0 | DISCHARGE
Start: 2024-02-06

## 2024-02-06 RX ORDER — ISOPROPYL ALCOHOL, BENZOCAINE .7; .06 ML/ML; ML/ML
0 SWAB TOPICAL
Qty: 100 | Refills: 1
Start: 2024-02-06

## 2024-02-06 RX ORDER — IBUPROFEN 200 MG
1 TABLET ORAL
Refills: 0 | DISCHARGE

## 2024-02-06 RX ORDER — INSULIN GLARGINE 100 [IU]/ML
20 INJECTION, SOLUTION SUBCUTANEOUS ONCE
Refills: 0 | Status: COMPLETED | OUTPATIENT
Start: 2024-02-06 | End: 2024-02-06

## 2024-02-06 RX ORDER — CELECOXIB 200 MG/1
200 CAPSULE ORAL
Refills: 0 | Status: DISCONTINUED | OUTPATIENT
Start: 2024-02-06 | End: 2024-02-06

## 2024-02-06 RX ORDER — CELECOXIB 200 MG/1
1 CAPSULE ORAL
Qty: 0 | Refills: 0 | DISCHARGE
Start: 2024-02-06

## 2024-02-06 RX ORDER — DULAGLUTIDE 4.5 MG/.5ML
0.75 INJECTION, SOLUTION SUBCUTANEOUS
Qty: 0 | Refills: 0 | DISCHARGE

## 2024-02-06 RX ORDER — INSULIN LISPRO 100/ML
24 VIAL (ML) SUBCUTANEOUS
Refills: 0 | Status: DISCONTINUED | OUTPATIENT
Start: 2024-02-06 | End: 2024-02-06

## 2024-02-06 RX ORDER — ACETAMINOPHEN 500 MG
975 TABLET ORAL ONCE
Refills: 0 | Status: COMPLETED | OUTPATIENT
Start: 2024-02-06 | End: 2024-02-06

## 2024-02-06 RX ORDER — ACETAMINOPHEN 500 MG
975 TABLET ORAL EVERY 8 HOURS
Refills: 0 | Status: DISCONTINUED | OUTPATIENT
Start: 2024-02-06 | End: 2024-02-06

## 2024-02-06 RX ORDER — ZOLPIDEM TARTRATE 10 MG/1
1 TABLET ORAL
Refills: 0 | DISCHARGE

## 2024-02-06 RX ORDER — EMPAGLIFLOZIN 10 MG/1
1 TABLET, FILM COATED ORAL
Refills: 0 | DISCHARGE

## 2024-02-06 RX ORDER — INSULIN GLARGINE 100 [IU]/ML
60 INJECTION, SOLUTION SUBCUTANEOUS
Qty: 10 | Refills: 0
Start: 2024-02-06

## 2024-02-06 RX ORDER — DULAGLUTIDE 4.5 MG/.5ML
0.75 INJECTION, SOLUTION SUBCUTANEOUS
Qty: 5 | Refills: 0
Start: 2024-02-06 | End: 2024-03-06

## 2024-02-06 RX ORDER — ISOPROPYL ALCOHOL, BENZOCAINE .7; .06 ML/ML; ML/ML
1 SWAB TOPICAL
Qty: 1 | Refills: 0
Start: 2024-02-06

## 2024-02-06 RX ADMIN — Medication 50 MICROGRAM(S): at 05:18

## 2024-02-06 RX ADMIN — Medication 650 MILLIGRAM(S): at 04:58

## 2024-02-06 RX ADMIN — Medication 650 MILLIGRAM(S): at 02:50

## 2024-02-06 RX ADMIN — Medication 975 MILLIGRAM(S): at 11:55

## 2024-02-06 RX ADMIN — Medication 150 MILLIGRAM(S): at 13:02

## 2024-02-06 RX ADMIN — LOSARTAN POTASSIUM 50 MILLIGRAM(S): 100 TABLET, FILM COATED ORAL at 05:17

## 2024-02-06 RX ADMIN — ENOXAPARIN SODIUM 40 MILLIGRAM(S): 100 INJECTION SUBCUTANEOUS at 08:11

## 2024-02-06 RX ADMIN — Medication 20 MILLIGRAM(S): at 13:03

## 2024-02-06 RX ADMIN — Medication 20 UNIT(S): at 08:12

## 2024-02-06 RX ADMIN — Medication 150 MILLIGRAM(S): at 05:17

## 2024-02-06 RX ADMIN — PANTOPRAZOLE SODIUM 40 MILLIGRAM(S): 20 TABLET, DELAYED RELEASE ORAL at 05:17

## 2024-02-06 RX ADMIN — Medication 24 UNIT(S): at 11:39

## 2024-02-06 RX ADMIN — Medication 975 MILLIGRAM(S): at 12:30

## 2024-02-06 RX ADMIN — INSULIN GLARGINE 45 UNIT(S): 100 INJECTION, SOLUTION SUBCUTANEOUS at 08:12

## 2024-02-06 RX ADMIN — Medication 20 MILLIGRAM(S): at 05:17

## 2024-02-06 RX ADMIN — CHLORHEXIDINE GLUCONATE 1 APPLICATION(S): 213 SOLUTION TOPICAL at 05:19

## 2024-02-06 RX ADMIN — Medication 100 MILLIGRAM(S): at 05:17

## 2024-02-06 RX ADMIN — INSULIN GLARGINE 20 UNIT(S): 100 INJECTION, SOLUTION SUBCUTANEOUS at 11:39

## 2024-02-06 NOTE — DISCHARGE NOTE NURSING/CASE MANAGEMENT/SOCIAL WORK - NSDCPEWEB_GEN_ALL_CORE
[facility] for Tobacco Control website --- [email or web address] website --- [email or web address]

## 2024-02-06 NOTE — DISCHARGE NOTE PROVIDER - DISCHARGE SERVICE FOR PATIENT
on the discharge service for the patient. I have reviewed and made amendments to the documentation where necessary. 3.507

## 2024-02-06 NOTE — DISCHARGE NOTE NURSING/CASE MANAGEMENT/SOCIAL WORK - NSDCPEFALRISK_GEN_ALL_CORE
For information on Fall & Injury Prevention, visit: [email or web address] OR  [email or web address] OR  https://www.cdc.gov/steadi/patient.html

## 2024-02-06 NOTE — DISCHARGE NOTE PROVIDER - NSDCMRMEDTOKEN_GEN_ALL_CORE_FT
Ambien 10 mg oral tablet: 1 tab(s) orally once a day (at bedtime)  baclofen 20 mg oral tablet: 1 tab(s) orally every 8 hours  ibuprofen 800 mg oral tablet: 1 tab(s) orally every 8 hours  Jardiance 25 mg oral tablet: 1 tab(s) orally once a day  levothyroxine 50 mcg (0.05 mg) oral tablet: 1 tab(s) orally once a day  losartan 50 mg oral tablet: 1 tab(s) orally once a day  metoprolol succinate 100 mg oral capsule, extended release: 1 cap(s) orally once a day  nortriptyline 25 mg oral capsule: 1 cap(s) orally once a day (at bedtime)  pregabalin 150 mg oral capsule: 1 cap(s) orally every 8 hours   baclofen 20 mg oral tablet: 1 tab(s) orally every 8 hours  celecoxib 200 mg oral capsule: 1 cap(s) orally 2 times a day as needed for  moderate pain  dulaglutide 0.75 mg/0.5 mL subcutaneous solution: 0.75 milligram(s) subcutaneously every 7 days  insulin glargine 100 units/mL subcutaneous solution: 60 unit(s) subcutaneous once a day  Jardiance 25 mg oral tablet: 1 tab(s) orally once a day  levothyroxine 50 mcg (0.05 mg) oral tablet: 1 tab(s) orally once a day  losartan 50 mg oral tablet: 1 tab(s) orally once a day  metoprolol succinate 100 mg oral capsule, extended release: 1 cap(s) orally once a day  pregabalin 150 mg oral capsule: 1 cap(s) orally every 8 hours

## 2024-02-06 NOTE — DISCHARGE NOTE NURSING/CASE MANAGEMENT/SOCIAL WORK - PATIENT PORTAL LINK FT
You can access the FollowMyHealth Patient Portal offered by [facility] by registering at the following website: [email or web address]. By joining [email or web address]’s FollowMyHealth portal, you will also be able to view your health information using other applications (apps) compatible with our system.

## 2024-02-06 NOTE — DISCHARGE NOTE PROVIDER - PROVIDER TOKENS
PROVIDER:[TOKEN:[[id number]],FOLLOWUP:[2 weeks],ESTABLISHEDPATIENT:[T]],PROVIDER:[TOKEN:[[id number]],FOLLOWUP:[2 weeks],ESTABLISHEDPATIENT:[T]] PROVIDER:[TOKEN:[[id number]],FOLLOWUP:[2 weeks],ESTABLISHEDPATIENT:[T]],PROVIDER:[TOKEN:[[id number]],FOLLOWUP:[2 weeks],ESTABLISHEDPATIENT:[T]],PROVIDER:[TOKEN:[[id number]],FOLLOWUP:[2 weeks]]

## 2024-02-06 NOTE — DISCHARGE NOTE NURSING/CASE MANAGEMENT/SOCIAL WORK - NSDCPEEMAIL_GEN_ALL_CORE
[facility] email [email or web address]

## 2024-02-06 NOTE — DISCHARGE NOTE PROVIDER - HOSPITAL COURSE
57 yo F with pmhx IDDM with neuropathy, hypertension, hypothyroidism, presents for lightheadedness, falls, and vomiting in setting of noncompliance with insulin as well as taking additional baclofen and Lyrica x 3 days. Pt is a little confused and not appropriately answering questions. Pt said that she fell dizzy and fell down today but had no LOC but did hit her head on the left. Pt has also had multiple episodes of vomiting and it was all food that she ate. Reportedly supposed to take 20mg baclofen 8 times a day and 150mg lyrica 8 times a day, but has been taking both q3-4h due to increasing chronic neuropathy.     ED vitals:   / 60, , RR 19, Temp 98.5, O2 sat 99% on RA    Sig labs   Na 132, AG 18, Bicarb 22, Glucose 627, Calcium 10.7, Beta HB 3.5, Osm 314    VBG: lactate 3.0    UA: bacteria occasional, no nitrite or LE    CT HEAD:  - No acute intracranial findings.  - Mild chronic microvascular ischemic changes.    CT CERVICAL SPINE:  - No acute cervical fracture or facet subluxation.    Pt was given 3 L of LR, started on insulin infusion and admitted to the ICU for DKA    #Toxic Metabolic Encephaloapthy 2/2 possible Baclofen and Lyrica Overdose   - resolved  - patient intentionally took extra dose of above meds to control pain   - tox consult appreciated on admission  - c/w baclofen  - c/w Lyrica   - pt councelled    #Mild DKA due to non-compliance    #Noncompliance   #Pseudohyponatremia   #Diabetic neuropathy   - Lantus 45 units qAM, Lantus 20 units at bedtime   - 20 units Lispro with meals   - A1c -13.8   - pt counselled  - Insulin adjusted    #HTN  #Hypothyroidism  #Hx of mood disorder  - c/w losartan and metoprolol  - cont current meds    dvt ppx: lovenox  GI ppx: protonix

## 2024-02-06 NOTE — DISCHARGE NOTE PROVIDER - CARE PROVIDER_API CALL
[facility]  [address] - [address]  [place] [address]  Phone: [phone]  Fax: [phone]  Established Patient  Follow Up Time: 2 weeks    Mojgan Oden  Endocrinology/Metab/Diabetes  [address]  [place] [address]  Phone: [phone]  Fax: [phone]  Established Patient  Follow Up Time: 2 weeks   [facility]  [address]  [place] [address]  Phone: [phone]  Fax: [phone]  Established Patient  Follow Up Time: 2 weeks    Mojgan Oden  Endocrinology/Metab/Diabetes  [address] [facility] [address]  [place] [address]  Phone: [phone]  Fax: [phone]  Established Patient  Follow Up Time: 2 weeks    Adrian Harding  Physical/Rehab Medicine  [address] [place] [address]  Phone: [phone]  Fax: [phone]  Follow Up Time: 2 weeks

## 2024-02-06 NOTE — DISCHARGE NOTE PROVIDER - CARE PROVIDERS DIRECT ADDRESSES
,[email or web address],DirectAddress_Unknown [email or web address],DirectAddress_Unknown,DirectAddress_Unknown

## 2024-02-06 NOTE — PROGRESS NOTE ADULT - ASSESSMENT
58 yoF with pmhx IDDM with neuropathy, hypertension, hypothyroidism, Hx Mood disorder with psychosis, current tobacco use presents for lightheadedness, falls, and vomiting in setting of noncompliance with insulin as well as taking additional baclofen and lyrica x 3 days.     #TME 2/2 Baclofen and Lyrica Overdose   - resolved  - patient intentionally took extra dose of above meds to control pain   - tox consult appreciated on admission  - c/w baclofen  - c/w Lyrica     #Mild DKA due to non-compliance    #Noncompliance due to depression  #Pseudohyponatremia   #Diabetic neuropathy   - Lantus 45 units qAM, Lantus 20 units at bedtime   - 20 units Lispro with meals   - endo eval pending  - A1c -13.8   - psych eval for depression (noncompliant with insulin due to depression, 2 previous admissions to inpatient psych)    - pain management eval for neuropathic pain     #HTN  #Hypothyroidism  #Hx of mood disorder  - c/w losartan and metoprolol  - endo eval     dvt ppx: lovenox  GI ppx: protonix  Pending: pain management, endo eval, monitor hyperglycemia       
58 yoF with pmhx IDDM with neuropathy, hypertension, hypothyroidism, Hx Mood disorder with psychosis, current tobacco use presents for lightheadedness, falls, and vomiting in setting of noncompliance with insulin as well as taking additional baclofen and lyrica x 3 days.     #Toxic Metabolic Encephaloapthy 2/2 possible Baclofen and Lyrica Overdose   - resolved  - patient intentionally took extra dose of above meds to control pain   - tox consult appreciated on admission  - c/w baclofen  - c/w Lyrica   - pt councelled    #Mild DKA due to non-compliance    #Noncompliance   #Pseudohyponatremia   #Diabetic neuropathy   - Lantus 45 units qAM, Lantus 20 units at bedtime   - 20 units Lispro with meals   - A1c -13.8   - pt counselled  - Insulin adjusted    #HTN  #Hypothyroidism  #Hx of mood disorder  - c/w losartan and metoprolol  - cont current meds    dvt ppx: lovenox  GI ppx: protonix    #Progress Note Handoff  Pending: Clinical improvement and stability__x___  Pt/Family discussion: Pt informed and agrees with the current plan  Disposition: Home____    My note supersedes the residents note should a discrepancy arise.    Chart and notes personally reviewed.  Care Discussed with Consultants/Other Providers/ Housestaff [ x] YES [ ] NO   Radiology, labs, old records personally reviewed.    discussed w/ housestaff, nursing, case management    Attestation Statements:    Attestation Statements:  Risk Statement (NON-critical care).     On this date of service, level of risk to patient is considered: High.     Due to: uncontrolled DM, toxic metabolic encephalopathy    Time-based billing (NON-critical care).     50 minutes spent on total encounter. The necessity of the time spent during the encounter on this date of service was due to:     time spent on review of labs, imaging studies, old records, obtaining history, personally examining patient, counselling and communicating with patient/ family, entering orders for medications/tests/etc, discussions with other health care providers, documentation in electronic health records, independent interpretation of labs, imaging/procedure results and care coordination.        
58 yoF with pmhx IDDM with neuropathy, hypertension, hypothyroidism, Hx Mood disorder with psychosis, current tobacco use presents for lightheadedness, falls, and vomiting in setting of noncompliance with insulin as well as taking additional baclofen and lyrica x 3 days.    #TME 2/2 Baclofen and Lyrica  #Mild DKA due to non-compliance    #Pseudohyponatremia   #HTN  #Hypothyroidism  #Hx of mood disorder  PLAN:  - Lantus 45 units qAM and 12 units Lispro with meals   - endo eval  - f/u A1c -13.8   - psych eval for depression (noncompliant with insulin due to depression, 2 previous admissions to inpatient psych for depression)    - c/w losartan and metoprolol  - c/w baclofen  - c/w Lyrica   - endo eval     dvt ppx     
58 yoF with pmhx IDDM with neuropathy, hypertension, hypothyroidism, Hx Mood disorder with psychosis, current tobacco use presents for lightheadedness, falls, and vomiting in setting of noncompliance with insulin as well as taking additional baclofen and lyrica x 3 days.     #TME 2/2 Baclofen and Lyrica Overdose   - resolved  - patient intentionally took extra dose of above meds to control pain   - tox consult appreciated on admission  - c/w baclofen  - c/w Lyrica     #Mild DKA due to non-compliance    #Noncompliance due to depression  #Pseudohyponatremia   #Diabetic neuropathy   PLAN:  - Lantus 45 units qAM, add Lantus 20 units at bedtime   - Increase to 20 units Lispro with meals   - endo eval pending  - A1c -13.8   - psych eval for depression (noncompliant with insulin due to depression, 2 previous admissions to inpatient psych)    - called psych today for non urgent consult   - pain management eval for neuropathic pain     #HTN  #Hypothyroidism  #Hx of mood disorder  - c/w losartan and metoprolol  - endo eval     dvt ppx       Pending: pain management, endo eval, monitor hyperglycemia   Plan of care d/w family at bedside   Dispo: Home in 24-48 hrs 
58 yoF with pmhx IDDM with neuropathy, hypertension, hypothyroidism, Hx Mood disorder with psychosis, current tobacco use presents for lightheadedness, falls, and vomiting in setting of noncompliance with insulin as well as taking additional baclofen and lyrica x 3 days.     #Toxic Metabolic Encephaloapthy 2/2 possible Baclofen and Lyrica Overdose   - resolved  - patient intentionally took extra dose of above meds to control pain   - tox consult appreciated on admission  - c/w baclofen  - c/w Lyrica   - pt councelled    #Mild DKA due to non-compliance    #Noncompliance   #Pseudohyponatremia   #Diabetic neuropathy   - Lantus 60 units qAM  - Trulicity and Jardiance on d/c  - A1c -13.8   - pt counselled  - Insulin adjusted    #HTN  #Hypothyroidism  #Hx of mood disorder  - c/w losartan and metoprolol      dvt ppx: lovenox  GI ppx: protonix    Discharge instructions discussed and patient knows when to seek immediate medical attention.  Patient has proper follow up.  All results discussed and patient aware they require further follow up/work up.  Stressed importance of proper follow up.  Medications prescribed and changes discussed.  All questions and concerns from patient  addressed. Understanding of instructions verbalized.    Chart and notes personally reviewed.  Care Discussed with Consultants/Other Providers/ Housestaff [ x] YES [ ] NO   Radiology, labs, old records personally reviewed.    discussed w/ housestaff, nursing, case management    Time-based billing (NON-critical care).     35 minutes spent on total encounter. The necessity of the time spent during the encounter on this date of service was due to:     time spent on review of labs, imaging studies, old records, obtaining history, personally examining patient, counselling and communicating with patient/ family, entering orders for medications/tests/etc, discussions with other health care providers, documentation in electronic health records, independent interpretation of labs, imaging/procedure results and care coordination.

## 2024-02-06 NOTE — DISCHARGE NOTE PROVIDER - NSDCCPCAREPLAN_GEN_ALL_CORE_FT
PRINCIPAL DISCHARGE DIAGNOSIS  Diagnosis: Diabetic ketoacidosis  Assessment and Plan of Treatment: You came to the hospital because you had elevated blood glucose. You were placed on insulin until your blood glucose levels came down. Please take your medications as directed and follow up with your PCP and an endocrinologist in 2 weeks.     PRINCIPAL DISCHARGE DIAGNOSIS  Diagnosis: Diabetic ketoacidosis  Assessment and Plan of Treatment: You came to the hospital because you had elevated blood glucose. You were placed on insulin until your blood glucose levels came down. Please take your medications as directed and follow up with your PCP and an endocrinologist in 1-2 weeks. Please take your lyrica and Baclofen only as prescribed. Taking more than prescribed can lead to coma or death.

## 2024-02-06 NOTE — PROGRESS NOTE ADULT - SUBJECTIVE AND OBJECTIVE BOX
Pt seen and examined at bedside. Continues to reports LE pain from neuropathy.       VITAL SIGNS (Last 24 hrs):  T(C): 36 (02-04-24 @ 13:14), Max: 37.1 (02-03-24 @ 20:56)  HR: 104 (02-04-24 @ 13:14) (95 - 104)  BP: 107/61 (02-04-24 @ 13:14) (107/61 - 123/59)  RR: 18 (02-04-24 @ 13:14) (16 - 18)  SpO2: --  Wt(kg): --  Daily     Daily     I&O's Summary      PHYSICAL EXAM:  GENERAL: NAD, well-developed  HEAD:  Atraumatic, Normocephalic  EYES: EOMI, PERRLA, conjunctiva and sclera clear  NECK: Supple, No JVD  CHEST/LUNG: Clear to auscultation bilaterally; No wheeze  HEART: Regular rate and rhythm; No murmurs, rubs, or gallops  ABDOMEN: Soft, Nontender, Nondistended; Bowel sounds present  EXTREMITIES:  2+ Peripheral Pulses, No clubbing, cyanosis, or edema  PSYCH: AAOx3  NEUROLOGY: non-focal  SKIN: No rashes or lesions    Labs Reviewed  Spoke to patient in regards to abnormal labs.    CBC Full  -  ( 04 Feb 2024 05:50 )  WBC Count : 6.92 K/uL  Hemoglobin : 13.5 g/dL  Hematocrit : 39.3 %  Platelet Count - Automated : 156 K/uL  Mean Cell Volume : 87.1 fL  Mean Cell Hemoglobin : 29.9 pg  Mean Cell Hemoglobin Concentration : 34.4 g/dL  Auto Neutrophil # : 3.65 K/uL  Auto Lymphocyte # : 2.34 K/uL  Auto Monocyte # : 0.74 K/uL  Auto Eosinophil # : 0.13 K/uL  Auto Basophil # : 0.04 K/uL  Auto Neutrophil % : 52.7 %  Auto Lymphocyte % : 33.8 %  Auto Monocyte % : 10.7 %  Auto Eosinophil % : 1.9 %  Auto Basophil % : 0.6 %    BMP:    02-04 @ 05:50    Blood Urea Nitrogen - 15  Calcium - 9.0  Carbond Dioxide - 27  Chloride - 98  Creatinine - 0.7  Glucose - 292  Potassium - 4.3  Sodium - 137      Hemoglobin A1c -     Urine Culture:  02-01 @ 20:35 Urine culture: --    Culture Results:   >=3 organisms. Probable collection contamination.  Method Type: --  Organism: --  Organism Identification: --  Specimen Source: Clean Catch Clean Catch (Midstream)            MEDICATIONS  (STANDING):  baclofen 20 milliGRAM(s) Oral every 8 hours  chlorhexidine 2% Cloths 1 Application(s) Topical <User Schedule>  dextrose 5%. 1000 milliLiter(s) (50 mL/Hr) IV Continuous <Continuous>  dextrose 5%. 1000 milliLiter(s) (100 mL/Hr) IV Continuous <Continuous>  dextrose 50% Injectable 25 Gram(s) IV Push once  dextrose 50% Injectable 25 Gram(s) IV Push once  dextrose 50% Injectable 12.5 Gram(s) IV Push once  enoxaparin Injectable 40 milliGRAM(s) SubCutaneous every 24 hours  glucagon  Injectable 1 milliGRAM(s) IntraMuscular once  influenza   Vaccine 0.5 milliLiter(s) IntraMuscular once  insulin glargine Injectable (LANTUS) 45 Unit(s) SubCutaneous every morning  insulin glargine Injectable (LANTUS) 20 Unit(s) SubCutaneous at bedtime  insulin lispro (ADMELOG) corrective regimen sliding scale   SubCutaneous at bedtime  insulin lispro Injectable (ADMELOG) 20 Unit(s) SubCutaneous three times a day before meals  levothyroxine 50 MICROGram(s) Oral daily  losartan 50 milliGRAM(s) Oral daily  metoprolol succinate  milliGRAM(s) Oral daily  nortriptyline 25 milliGRAM(s) Oral at bedtime  pantoprazole    Tablet 40 milliGRAM(s) Oral before breakfast  pregabalin 150 milliGRAM(s) Oral every 8 hours    MEDICATIONS  (PRN):  acetaminophen     Tablet .. 650 milliGRAM(s) Oral every 6 hours PRN Temp greater or equal to 38C (100.4F), Mild Pain (1 - 3)  dextrose Oral Gel 15 Gram(s) Oral once PRN Blood Glucose LESS THAN 70 milliGRAM(s)/deciliter  zolpidem 5 milliGRAM(s) Oral at bedtime PRN Insomnia  zolpidem 5 milliGRAM(s) Oral at bedtime PRN Insomnia  
24H events:    Patient is a 58y old Female who presents with a chief complaint of   Primary diagnosis of Diabetic ketoacidosis    Today is hospital day 4d. This morning patient was seen and examined at bedside, resting comfortably in bed.    No acute or major events overnight.      PAST MEDICAL & SURGICAL HISTORY  Hypertension    Diabetes mellitus    Thyroid disease    Anemia    S/P lumbar discectomy  2006.    S/P tonsillectomy      SOCIAL HISTORY:  Social History:      ALLERGIES:  No Known Allergies    MEDICATIONS:  STANDING MEDICATIONS  baclofen 20 milliGRAM(s) Oral every 8 hours  chlorhexidine 2% Cloths 1 Application(s) Topical <User Schedule>  dextrose 5%. 1000 milliLiter(s) IV Continuous <Continuous>  dextrose 5%. 1000 milliLiter(s) IV Continuous <Continuous>  dextrose 50% Injectable 12.5 Gram(s) IV Push once  dextrose 50% Injectable 25 Gram(s) IV Push once  dextrose 50% Injectable 25 Gram(s) IV Push once  enoxaparin Injectable 40 milliGRAM(s) SubCutaneous every 24 hours  glucagon  Injectable 1 milliGRAM(s) IntraMuscular once  influenza   Vaccine 0.5 milliLiter(s) IntraMuscular once  insulin glargine Injectable (LANTUS) 45 Unit(s) SubCutaneous every morning  insulin glargine Injectable (LANTUS) 20 Unit(s) SubCutaneous at bedtime  insulin lispro (ADMELOG) corrective regimen sliding scale   SubCutaneous at bedtime  insulin lispro Injectable (ADMELOG) 20 Unit(s) SubCutaneous three times a day before meals  ketorolac   Injectable 15 milliGRAM(s) IV Push once  levothyroxine 50 MICROGram(s) Oral daily  losartan 50 milliGRAM(s) Oral daily  metoprolol succinate  milliGRAM(s) Oral daily  nortriptyline 25 milliGRAM(s) Oral at bedtime  pantoprazole    Tablet 40 milliGRAM(s) Oral before breakfast  pregabalin 150 milliGRAM(s) Oral every 8 hours    PRN MEDICATIONS  acetaminophen     Tablet .. 650 milliGRAM(s) Oral every 6 hours PRN  dextrose Oral Gel 15 Gram(s) Oral once PRN  zolpidem 5 milliGRAM(s) Oral at bedtime PRN  zolpidem 5 milliGRAM(s) Oral at bedtime PRN    VITALS:   T(F): 96.9  HR: 94  BP: 157/78  RR: 18  SpO2: --    PHYSICAL EXAM:  GENERAL:   ( x ) NAD, lying in bed comfortably     (  ) obtunded     (  ) lethargic     (  ) somnolent    HEART:  Rate -->     ( x ) normal rate     (  ) bradycardic     (  ) tachycardic  Rhythm -->     ( x ) regular     (  ) regularly irregular     (  ) irregularly irregular  Murmurs -->     ( x ) normal s1s2     (  ) systolic murmur     (  ) diastolic murmur     (  ) continuous murmur      (  ) S3 present     (  ) S4 present    LUNGS:   ( x )Unlabored respirations     (  ) tachypnea  ( x ) B/L air entry     (  ) decreased breath sounds in:  (location     )    (  ) no adventitious sound     (  ) crackles     (  ) wheezing      (  ) rhonchi      (specify location:       )  (  ) chest wall tenderness (specify location:       )    ABDOMEN:   ( x ) Soft     (  ) tense   |   ( x ) nondistended     (  ) distended   |   (  ) +BS     (  ) hypoactive bowel sounds     (  ) hyperactive bowel sounds  (  ) nontender     (  ) RUQ tenderness     (  ) RLQ tenderness     (  ) LLQ tenderness     (  ) epigastric tenderness     (  ) diffuse tenderness  (  ) Splenomegaly      (  ) Hepatomegaly      (  ) Jaundice     (  ) ecchymosis     EXTREMITIES:  ( x ) Normal     (  ) Rash     (  ) ecchymosis     (  ) varicose veins      (  ) pitting edema     (  ) non-pitting edema   (  ) ulceration     (  ) gangrene:     (location:     )    NERVOUS SYSTEM:    ( x ) A&Ox3     (  ) confused     (  ) lethargic  CN II-XII:     (  ) Intact     (  ) deficits found     (Specify:     )   Upper extremities:     (  ) no sensorimotor deficits     (  ) weakness     (  ) loss of proprioception/vibration     (  ) loss of touch/temperature (specify:    )  Lower extremities:     (  ) no sensorimotor deficits     (  ) weakness     (  ) loss of proprioception/vibration     (  ) loss of touch/temperature (specify:    )    (  ) Indwelling Shannon Catheter:   Date insterted:    Reason (  ) Critical illness     (  ) urinary retention    (  ) Accurate Ins/Outs Monitoring     (  ) CMO patient    (  ) Central Line:   Date inserted:  Location: (  ) Right IJ     (  ) Left IJ     (  ) Right Fem     (  ) Left Fem    (  ) SPC        (  ) pigtail       (  ) PEG tube       (  ) colostomy       (  ) jejunostomy  (  ) U-Dall    LABS:                        13.3   5.24  )-----------( 150      ( 05 Feb 2024 07:12 )             38.9     02-05    135  |  99  |  18  ----------------------------<  329<H>  4.1   |  26  |  <0.5<L>    Ca    8.7      05 Feb 2024 07:12  Phos  3.0     02-05  Mg     2.1     02-05    TPro  6.0  /  Alb  3.4<L>  /  TBili  <0.2  /  DBili  x   /  AST  24  /  ALT  22  /  AlkPhos  193<H>  02-05      Urinalysis Basic - ( 05 Feb 2024 07:12 )    Color: x / Appearance: x / SG: x / pH: x  Gluc: 329 mg/dL / Ketone: x  / Bili: x / Urobili: x   Blood: x / Protein: x / Nitrite: x   Leuk Esterase: x / RBC: x / WBC x   Sq Epi: x / Non Sq Epi: x / Bacteria: x                RADIOLOGY:          
Patient is a 58y old  Female who presents with a chief complaint of   INTERVAL HPI/OVERNIGHT EVENTS: Patient was examined and seen at bedside. This morning pt is resting comfortably in bed and reports no new issues or overnight events. No complaints, feels better  ROS: Denies CP, SOB, AP, new weakness  All other systems reviewed and are within normal limits.  InitialHPI:  57 yo F with pmhx IDDM with neuropathy, hypertension, hypothyroidism, presents for lightheadedness, falls, and vomiting in setting of noncompliance with insulin as well as taking additional baclofen and Lyrica x 3 days. Pt is a little confused and not appropriately answering questions. Pt said that she fell dizzy and fell down today but had no LOC but did hit her head on the left. Pt has also had multiple episodes of vomiting and it was all food that she ate. Reportedly supposed to take 20mg baclofen 8 times a day and 150mg lyrica 8 times a day, but has been taking both q3-4h due to increasing chronic neuropathy.     ED vitals:   / 60, , RR 19, Temp 98.5, O2 sat 99% on RA    Sig labs   Na 132, AG 18, Bicarb 22, Glucose 627, Calcium 10.7, Beta HB 3.5, Osm 314    VBG: lactate 3.0    UA: bacteria occasional, no nitrite or LE    CT HEAD:  - No acute intracranial findings.  - Mild chronic microvascular ischemic changes.    CT CERVICAL SPINE:  - No acute cervical fracture or facet subluxation.    Pt was given 3 L of LR, started on insulin infusion and admitted to the ICU for DKA (02 Feb 2024 00:22)    PAST MEDICAL & SURGICAL HISTORY:  Hypertension      Diabetes mellitus      Thyroid disease      Anemia      S/P lumbar discectomy  2006.      S/P tonsillectomy          General: NAD, AAO3  HEENT:  EOMI, no LAD  CV: S1 S2  Resp: decreased breath sounds at bases  GI: NT/ND/S +BS  MS: no clubbing/cyanosis/edema, + pulses b/l  Neuro: nonfocal, +reflexes thruout, decr sens distal LE          Home Medications:  baclofen 20 mg oral tablet: 1 tab(s) orally every 8 hours (02 Feb 2024 02:33)  celecoxib 200 mg oral capsule: 1 cap(s) orally 2 times a day as needed for  moderate pain (06 Feb 2024 12:42)  levothyroxine 50 mcg (0.05 mg) oral tablet: 1 tab(s) orally once a day (02 Feb 2024 02:34)  losartan 50 mg oral tablet: 1 tab(s) orally once a day (02 Feb 2024 02:34)  metoprolol succinate 100 mg oral capsule, extended release: 1 cap(s) orally once a day (02 Feb 2024 02:34)  pregabalin 150 mg oral capsule: 1 cap(s) orally every 8 hours (02 Feb 2024 02:33)    MEDICATIONS  (STANDING):  acetaminophen     Tablet .. 975 milliGRAM(s) Oral every 8 hours  baclofen 20 milliGRAM(s) Oral every 8 hours  celecoxib 200 milliGRAM(s) Oral two times a day  chlorhexidine 2% Cloths 1 Application(s) Topical <User Schedule>  dextrose 5%. 1000 milliLiter(s) (50 mL/Hr) IV Continuous <Continuous>  dextrose 5%. 1000 milliLiter(s) (100 mL/Hr) IV Continuous <Continuous>  dextrose 50% Injectable 25 Gram(s) IV Push once  dextrose 50% Injectable 12.5 Gram(s) IV Push once  dextrose 50% Injectable 25 Gram(s) IV Push once  enoxaparin Injectable 40 milliGRAM(s) SubCutaneous every 24 hours  glucagon  Injectable 1 milliGRAM(s) IntraMuscular once  influenza   Vaccine 0.5 milliLiter(s) IntraMuscular once  insulin glargine Injectable (LANTUS) 45 Unit(s) SubCutaneous every morning  insulin lispro (ADMELOG) corrective regimen sliding scale   SubCutaneous at bedtime  insulin lispro Injectable (ADMELOG) 24 Unit(s) SubCutaneous three times a day before meals  levothyroxine 50 MICROGram(s) Oral daily  losartan 50 milliGRAM(s) Oral daily  metoprolol succinate  milliGRAM(s) Oral daily  pantoprazole    Tablet 40 milliGRAM(s) Oral before breakfast  pregabalin 150 milliGRAM(s) Oral every 8 hours    MEDICATIONS  (PRN):  dextrose Oral Gel 15 Gram(s) Oral once PRN Blood Glucose LESS THAN 70 milliGRAM(s)/deciliter  zolpidem 5 milliGRAM(s) Oral at bedtime PRN Insomnia  zolpidem 5 milliGRAM(s) Oral at bedtime PRN Insomnia    Vital Signs Last 24 Hrs  T(C): 36.7 (06 Feb 2024 14:00), Max: 36.8 (05 Feb 2024 20:21)  T(F): 98.1 (06 Feb 2024 14:00), Max: 98.3 (05 Feb 2024 20:21)  HR: 99 (06 Feb 2024 14:00) (92 - 100)  BP: 127/73 (06 Feb 2024 14:00) (127/73 - 146/72)  BP(mean): 94 (06 Feb 2024 14:00) (91 - 94)  RR: 18 (06 Feb 2024 14:00) (18 - 18)  SpO2: --    Parameters below as of 05 Feb 2024 20:21  Patient On (Oxygen Delivery Method): room air      CAPILLARY BLOOD GLUCOSE      POCT Blood Glucose.: 288 mg/dL (06 Feb 2024 11:01)  POCT Blood Glucose.: 292 mg/dL (06 Feb 2024 07:16)  POCT Blood Glucose.: 292 mg/dL (05 Feb 2024 21:22)    LABS:                        12.9   5.19  )-----------( 163      ( 06 Feb 2024 06:07 )             38.1     02-06    134<L>  |  98  |  14  ----------------------------<  328<H>  4.1   |  25  |  <0.5<L>    Ca    8.9      06 Feb 2024 06:07  Phos  3.1     02-06  Mg     1.8     02-06    TPro  5.9<L>  /  Alb  3.3<L>  /  TBili  <0.2  /  DBili  x   /  AST  30  /  ALT  24  /  AlkPhos  202<H>  02-06    LIVER FUNCTIONS - ( 06 Feb 2024 06:07 )  Alb: 3.3 g/dL / Pro: 5.9 g/dL / ALK PHOS: 202 U/L / ALT: 24 U/L / AST: 30 U/L / GGT: x                 Urinalysis Basic - ( 06 Feb 2024 06:07 )    Color: x / Appearance: x / SG: x / pH: x  Gluc: 328 mg/dL / Ketone: x  / Bili: x / Urobili: x   Blood: x / Protein: x / Nitrite: x   Leuk Esterase: x / RBC: x / WBC x   Sq Epi: x / Non Sq Epi: x / Bacteria: x              Consultant Notes Reviewed:  [x ] YES  [ ] NO  Care Discussed with Consultants/Other Providers/ Housestaff [ x] YES  [ ] NO  Radiology, labs, EKGs, new studies personally reviewed.                                                            
     Pt seen and examined at bedside.   ROS positive for depressed mood. Denies SI, HI.       VITAL SIGNS (Last 24 hrs):  T(C): 37.2 (24 @ 13:14), Max: 37.2 (24 @ 13:14)  HR: 103 (24 @ 13:14) (103 - 119)  BP: 104/53 (24 @ 13:14) (104/53 - 125/54)  RR: 20 (24 @ 13:14) (16 - 20)  SpO2: --  Wt(kg): --  Daily     Daily Weight in k.8 (2024 20:30)    I&O's Summary    2024 07:01  -  2024 07:00  --------------------------------------------------------  IN: 632 mL / OUT: 0 mL / NET: 632 mL        PHYSICAL EXAM:  GENERAL: NAD, well-developed  HEAD:  Atraumatic, Normocephalic  EYES: EOMI, PERRLA, conjunctiva and sclera clear  NECK: Supple, No JVD  CHEST/LUNG: Clear to auscultation bilaterally; No wheeze  HEART: Regular rate and rhythm; No murmurs, rubs, or gallops  ABDOMEN: Soft, Nontender, Nondistended; Bowel sounds present  EXTREMITIES:  2+ Peripheral Pulses, No clubbing, cyanosis, or edema  PSYCH: AAOx3  NEUROLOGY: non-focal  SKIN: No rashes or lesions    Labs Reviewed  Spoke to patient in regards to abnormal labs.    CBC Full  -  ( 2024 06:33 )  WBC Count : 7.89 K/uL  Hemoglobin : 14.1 g/dL  Hematocrit : 40.8 %  Platelet Count - Automated : 159 K/uL  Mean Cell Volume : 86.8 fL  Mean Cell Hemoglobin : 30.0 pg  Mean Cell Hemoglobin Concentration : 34.6 g/dL  Auto Neutrophil # : 5.14 K/uL  Auto Lymphocyte # : 1.80 K/uL  Auto Monocyte # : 0.80 K/uL  Auto Eosinophil # : 0.07 K/uL  Auto Basophil # : 0.04 K/uL  Auto Neutrophil % : 65.2 %  Auto Lymphocyte % : 22.8 %  Auto Monocyte % : 10.1 %  Auto Eosinophil % : 0.9 %  Auto Basophil % : 0.5 %    BMP:     @ 11:29    Blood Urea Nitrogen - 15  Calcium - 9.4  Carbond Dioxide - 23  Chloride - 94  Creatinine - 0.7  Glucose - 547  Potassium - 4.3  Sodium - 129      Hemoglobin A1c -     Urine Culture:   @ 20:35 Urine culture: --    Culture Results:   >=3 organisms. Probable collection contamination.  Method Type: --  Organism: --  Organism Identification: --  Specimen Source: Clean Catch Clean Catch (Midstream)            MEDICATIONS  (STANDING):  baclofen 20 milliGRAM(s) Oral every 8 hours  chlorhexidine 2% Cloths 1 Application(s) Topical <User Schedule>  dextrose 5%. 1000 milliLiter(s) (50 mL/Hr) IV Continuous <Continuous>  dextrose 5%. 1000 milliLiter(s) (100 mL/Hr) IV Continuous <Continuous>  dextrose 50% Injectable 25 Gram(s) IV Push once  dextrose 50% Injectable 25 Gram(s) IV Push once  dextrose 50% Injectable 12.5 Gram(s) IV Push once  enoxaparin Injectable 40 milliGRAM(s) SubCutaneous every 24 hours  glucagon  Injectable 1 milliGRAM(s) IntraMuscular once  influenza   Vaccine 0.5 milliLiter(s) IntraMuscular once  insulin glargine Injectable (LANTUS) 45 Unit(s) SubCutaneous every morning  insulin lispro (ADMELOG) corrective regimen sliding scale   SubCutaneous at bedtime  insulin lispro Injectable (ADMELOG) 12 Unit(s) SubCutaneous three times a day before meals  levothyroxine 50 MICROGram(s) Oral daily  losartan 50 milliGRAM(s) Oral daily  metoprolol succinate  milliGRAM(s) Oral daily  nortriptyline 25 milliGRAM(s) Oral at bedtime  pantoprazole    Tablet 40 milliGRAM(s) Oral before breakfast  pregabalin 150 milliGRAM(s) Oral every 8 hours    MEDICATIONS  (PRN):  acetaminophen     Tablet .. 650 milliGRAM(s) Oral every 6 hours PRN Temp greater or equal to 38C (100.4F), Mild Pain (1 - 3)  dextrose Oral Gel 15 Gram(s) Oral once PRN Blood Glucose LESS THAN 70 milliGRAM(s)/deciliter  zolpidem 5 milliGRAM(s) Oral at bedtime PRN Insomnia  zolpidem 5 milliGRAM(s) Oral at bedtime PRN Insomnia  
Patient is a 58y old  Female who presents with a chief complaint of   INTERVAL HPI/OVERNIGHT EVENTS: Patient was examined and seen at bedside. This morning pt is resting comfortably in bed and reports no new issues or overnight events. No complaints, feels better  ROS: Denies CP, SOB, AP, new weakness  All other systems reviewed and are within normal limits.  InitialHPI:  59 yo F with pmhx IDDM with neuropathy, hypertension, hypothyroidism, presents for lightheadedness, falls, and vomiting in setting of noncompliance with insulin as well as taking additional baclofen and Lyrica x 3 days. Pt is a little confused and not appropriately answering questions. Pt said that she fell dizzy and fell down today but had no LOC but did hit her head on the left. Pt has also had multiple episodes of vomiting and it was all food that she ate. Reportedly supposed to take 20mg baclofen 8 times a day and 150mg lyrica 8 times a day, but has been taking both q3-4h due to increasing chronic neuropathy.     ED vitals:   / 60, , RR 19, Temp 98.5, O2 sat 99% on RA    Sig labs   Na 132, AG 18, Bicarb 22, Glucose 627, Calcium 10.7, Beta HB 3.5, Osm 314    VBG: lactate 3.0    UA: bacteria occasional, no nitrite or LE    CT HEAD:  - No acute intracranial findings.  - Mild chronic microvascular ischemic changes.    CT CERVICAL SPINE:  - No acute cervical fracture or facet subluxation.    Pt was given 3 L of LR, started on insulin infusion and admitted to the ICU for DKA (02 Feb 2024 00:22)    PAST MEDICAL & SURGICAL HISTORY:  Hypertension      Diabetes mellitus      Thyroid disease      Anemia      S/P lumbar discectomy  2006.      S/P tonsillectomy          General: NAD, AAO3  HEENT:  EOMI, no LAD  CV: S1 S2  Resp: decreased breath sounds at bases  GI: NT/ND/S +BS  MS: no clubbing/cyanosis/edema, + pulses b/l  Neuro: nonfocal, +reflexes thruout, decr sens distal LE    MEDICATIONS  (STANDING):  baclofen 20 milliGRAM(s) Oral every 8 hours  chlorhexidine 2% Cloths 1 Application(s) Topical <User Schedule>  dextrose 5%. 1000 milliLiter(s) (50 mL/Hr) IV Continuous <Continuous>  dextrose 5%. 1000 milliLiter(s) (100 mL/Hr) IV Continuous <Continuous>  dextrose 50% Injectable 12.5 Gram(s) IV Push once  dextrose 50% Injectable 25 Gram(s) IV Push once  dextrose 50% Injectable 25 Gram(s) IV Push once  enoxaparin Injectable 40 milliGRAM(s) SubCutaneous every 24 hours  glucagon  Injectable 1 milliGRAM(s) IntraMuscular once  influenza   Vaccine 0.5 milliLiter(s) IntraMuscular once  insulin glargine Injectable (LANTUS) 45 Unit(s) SubCutaneous every morning  insulin glargine Injectable (LANTUS) 20 Unit(s) SubCutaneous at bedtime  insulin lispro (ADMELOG) corrective regimen sliding scale   SubCutaneous at bedtime  insulin lispro Injectable (ADMELOG) 20 Unit(s) SubCutaneous three times a day before meals  levothyroxine 50 MICROGram(s) Oral daily  losartan 50 milliGRAM(s) Oral daily  metoprolol succinate  milliGRAM(s) Oral daily  nortriptyline 25 milliGRAM(s) Oral at bedtime  pantoprazole    Tablet 40 milliGRAM(s) Oral before breakfast  pregabalin 150 milliGRAM(s) Oral every 8 hours    MEDICATIONS  (PRN):  acetaminophen     Tablet .. 650 milliGRAM(s) Oral every 6 hours PRN Temp greater or equal to 38C (100.4F), Mild Pain (1 - 3)  dextrose Oral Gel 15 Gram(s) Oral once PRN Blood Glucose LESS THAN 70 milliGRAM(s)/deciliter  zolpidem 5 milliGRAM(s) Oral at bedtime PRN Insomnia  zolpidem 5 milliGRAM(s) Oral at bedtime PRN Insomnia    Vital Signs Last 24 Hrs  T(C): 36.4 (05 Feb 2024 13:47), Max: 36.7 (04 Feb 2024 20:08)  T(F): 97.6 (05 Feb 2024 13:47), Max: 98 (04 Feb 2024 20:08)  HR: 90 (05 Feb 2024 13:47) (90 - 105)  BP: 135/77 (05 Feb 2024 13:47) (122/60 - 157/78)  BP(mean): --  RR: 20 (05 Feb 2024 13:47) (18 - 20)  SpO2: --    Parameters below as of 05 Feb 2024 13:47  Patient On (Oxygen Delivery Method): room air      CAPILLARY BLOOD GLUCOSE      POCT Blood Glucose.: 232 mg/dL (05 Feb 2024 16:58)  POCT Blood Glucose.: 299 mg/dL (05 Feb 2024 11:05)  POCT Blood Glucose.: 311 mg/dL (05 Feb 2024 07:27)  POCT Blood Glucose.: 221 mg/dL (04 Feb 2024 21:03)                          13.3   5.24  )-----------( 150      ( 05 Feb 2024 07:12 )             38.9     02-05    135  |  99  |  18  ----------------------------<  329<H>  4.1   |  26  |  <0.5<L>    Ca    8.7      05 Feb 2024 07:12  Phos  3.0     02-05  Mg     2.1     02-05    TPro  6.0  /  Alb  3.4<L>  /  TBili  <0.2  /  DBili  x   /  AST  24  /  ALT  22  /  AlkPhos  193<H>  02-05    LIVER FUNCTIONS - ( 05 Feb 2024 07:12 )  Alb: 3.4 g/dL / Pro: 6.0 g/dL / ALK PHOS: 193 U/L / ALT: 22 U/L / AST: 24 U/L / GGT: x                 Urinalysis Basic - ( 05 Feb 2024 07:12 )    Color: x / Appearance: x / SG: x / pH: x  Gluc: 329 mg/dL / Ketone: x  / Bili: x / Urobili: x   Blood: x / Protein: x / Nitrite: x   Leuk Esterase: x / RBC: x / WBC x   Sq Epi: x / Non Sq Epi: x / Bacteria: x              Chart, Consultant(s) Notes Reviewed:  [x ] YES  [ ] NO  Care Discussed with Consultants/Other Providers/ Housestaff [ x] YES  [ ] NO  Radiology, labs, old available records personally reviewed.

## 2024-02-08 ENCOUNTER — APPOINTMENT (OUTPATIENT)
Dept: PSYCHIATRY | Facility: CLINIC | Age: 59
End: 2024-02-08

## 2024-02-12 DIAGNOSIS — Z91.148 PATIENT'S OTHER NONCOMPLIANCE WITH MEDICATION REGIMEN FOR OTHER REASON: ICD-10-CM

## 2024-02-12 DIAGNOSIS — E11.40 TYPE 2 DIABETES MELLITUS WITH DIABETIC NEUROPATHY, UNSPECIFIED: ICD-10-CM

## 2024-02-12 DIAGNOSIS — F39 UNSPECIFIED MOOD [AFFECTIVE] DISORDER: ICD-10-CM
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
## 2024-02-12 DIAGNOSIS — T65.894A: ICD-10-CM

## 2024-02-12 DIAGNOSIS — E11.649 TYPE 2 DIABETES MELLITUS WITH HYPOGLYCEMIA WITHOUT COMA: ICD-10-CM

## 2024-02-12 DIAGNOSIS — G92.8 OTHER TOXIC ENCEPHALOPATHY: ICD-10-CM

## 2024-02-12 DIAGNOSIS — I10 ESSENTIAL (PRIMARY) HYPERTENSION: ICD-10-CM

## 2024-02-12 DIAGNOSIS — T42.8X1A POISONING BY ANTIPARKINSONISM DRUGS AND OTHER CENTRAL MUSCLE-TONE DEPRESSANTS, ACCIDENTAL (UNINTENTIONAL), INITIAL ENCOUNTER: ICD-10-CM

## 2024-02-12 DIAGNOSIS — E11.10 TYPE 2 DIABETES MELLITUS WITH KETOACIDOSIS WITHOUT COMA: ICD-10-CM

## 2024-02-12 DIAGNOSIS — E03.8 OTHER SPECIFIED HYPOTHYROIDISM: ICD-10-CM

## 2024-02-12 DIAGNOSIS — E87.1 HYPO-OSMOLALITY AND HYPONATREMIA: ICD-10-CM

## 2024-02-12 DIAGNOSIS — Z91.199 PATIENT'S NONCOMPLIANCE WITH OTHER MEDICAL TREATMENT AND REGIMEN DUE TO UNSPECIFIED REASON: ICD-10-CM

## 2024-02-12 DIAGNOSIS — Z48.1 ENCOUNTER FOR PLANNED POSTPROCEDURAL WOUND CLOSURE: ICD-10-CM

## 2024-02-12 DIAGNOSIS — E83.42 HYPOMAGNESEMIA: ICD-10-CM

## 2024-02-12 DIAGNOSIS — F43.20 ADJUSTMENT DISORDER, UNSPECIFIED: ICD-10-CM

## 2024-02-15 ENCOUNTER — INPATIENT (INPATIENT)
Facility: HOSPITAL | Age: 59
LOS: 5 days | Discharge: HOME HEALTH | DRG: 420 | End: 2024-02-21
Attending: INTERNAL MEDICINE | Admitting: INTERNAL MEDICINE
Payer: MEDICAID

## 2024-02-15 VITALS
WEIGHT: 169.98 LBS | SYSTOLIC BLOOD PRESSURE: 137 MMHG | OXYGEN SATURATION: 98 % | DIASTOLIC BLOOD PRESSURE: 82 MMHG | HEIGHT: 66 IN | TEMPERATURE: 99 F | HEART RATE: 100 BPM | RESPIRATION RATE: 18 BRPM

## 2024-02-15 DIAGNOSIS — R53.1 WEAKNESS: ICD-10-CM

## 2024-02-15 LAB
ALBUMIN SERPL ELPH-MCNC: 4.1 G/DL — SIGNIFICANT CHANGE UP (ref 3.5–5.2)
ALP SERPL-CCNC: 253 U/L — HIGH (ref 30–115)
ALT FLD-CCNC: 20 U/L — SIGNIFICANT CHANGE UP (ref 0–41)
ANION GAP SERPL CALC-SCNC: 12 MMOL/L — SIGNIFICANT CHANGE UP (ref 7–14)
AST SERPL-CCNC: 13 U/L — SIGNIFICANT CHANGE UP (ref 0–41)
B-OH-BUTYR SERPL-SCNC: 1.1 MMOL/L — HIGH
BASE EXCESS BLDV CALC-SCNC: 3.3 MMOL/L — HIGH (ref -2–3)
BASOPHILS # BLD AUTO: 0.03 K/UL — SIGNIFICANT CHANGE UP (ref 0–0.2)
BASOPHILS NFR BLD AUTO: 0.4 % — SIGNIFICANT CHANGE UP (ref 0–1)
BILIRUB SERPL-MCNC: 0.3 MG/DL — SIGNIFICANT CHANGE UP (ref 0.2–1.2)
BUN SERPL-MCNC: 14 MG/DL — SIGNIFICANT CHANGE UP (ref 10–20)
CA-I SERPL-SCNC: 1.29 MMOL/L — SIGNIFICANT CHANGE UP (ref 1.15–1.33)
CALCIUM SERPL-MCNC: 9.8 MG/DL — SIGNIFICANT CHANGE UP (ref 8.4–10.4)
CHLORIDE SERPL-SCNC: 100 MMOL/L — SIGNIFICANT CHANGE UP (ref 98–110)
CO2 SERPL-SCNC: 26 MMOL/L — SIGNIFICANT CHANGE UP (ref 17–32)
CREAT SERPL-MCNC: 0.6 MG/DL — LOW (ref 0.7–1.5)
EGFR: 103 ML/MIN/1.73M2 — SIGNIFICANT CHANGE UP
EOSINOPHIL # BLD AUTO: 0.1 K/UL — SIGNIFICANT CHANGE UP (ref 0–0.7)
EOSINOPHIL NFR BLD AUTO: 1.5 % — SIGNIFICANT CHANGE UP (ref 0–8)
GAS PNL BLDV: 136 MMOL/L — SIGNIFICANT CHANGE UP (ref 136–145)
GAS PNL BLDV: SIGNIFICANT CHANGE UP
GLUCOSE BLDC GLUCOMTR-MCNC: 196 MG/DL — HIGH (ref 70–99)
GLUCOSE BLDC GLUCOMTR-MCNC: 242 MG/DL — HIGH (ref 70–99)
GLUCOSE BLDC GLUCOMTR-MCNC: 284 MG/DL — HIGH (ref 70–99)
GLUCOSE SERPL-MCNC: 333 MG/DL — HIGH (ref 70–99)
HCO3 BLDV-SCNC: 30 MMOL/L — HIGH (ref 22–29)
HCT VFR BLD CALC: 44.8 % — SIGNIFICANT CHANGE UP (ref 37–47)
HCT VFR BLDA CALC: 46 % — SIGNIFICANT CHANGE UP (ref 34.5–46.5)
HGB BLD CALC-MCNC: 15.4 G/DL — SIGNIFICANT CHANGE UP (ref 11.7–16.1)
HGB BLD-MCNC: 15.1 G/DL — SIGNIFICANT CHANGE UP (ref 12–16)
IMM GRANULOCYTES NFR BLD AUTO: 0.4 % — HIGH (ref 0.1–0.3)
LACTATE BLDV-MCNC: 1.2 MMOL/L — SIGNIFICANT CHANGE UP (ref 0.5–2)
LYMPHOCYTES # BLD AUTO: 1.69 K/UL — SIGNIFICANT CHANGE UP (ref 1.2–3.4)
LYMPHOCYTES # BLD AUTO: 25.1 % — SIGNIFICANT CHANGE UP (ref 20.5–51.1)
MAGNESIUM SERPL-MCNC: 2.3 MG/DL — SIGNIFICANT CHANGE UP (ref 1.8–2.4)
MCHC RBC-ENTMCNC: 29.4 PG — SIGNIFICANT CHANGE UP (ref 27–31)
MCHC RBC-ENTMCNC: 33.7 G/DL — SIGNIFICANT CHANGE UP (ref 32–37)
MCV RBC AUTO: 87.2 FL — SIGNIFICANT CHANGE UP (ref 81–99)
MONOCYTES # BLD AUTO: 0.48 K/UL — SIGNIFICANT CHANGE UP (ref 0.1–0.6)
MONOCYTES NFR BLD AUTO: 7.1 % — SIGNIFICANT CHANGE UP (ref 1.7–9.3)
NEUTROPHILS # BLD AUTO: 4.39 K/UL — SIGNIFICANT CHANGE UP (ref 1.4–6.5)
NEUTROPHILS NFR BLD AUTO: 65.5 % — SIGNIFICANT CHANGE UP (ref 42.2–75.2)
NRBC # BLD: 0 /100 WBCS — SIGNIFICANT CHANGE UP (ref 0–0)
PCO2 BLDV: 50 MMHG — HIGH (ref 39–42)
PH BLDV: 7.38 — SIGNIFICANT CHANGE UP (ref 7.32–7.43)
PHOSPHATE SERPL-MCNC: 4 MG/DL — SIGNIFICANT CHANGE UP (ref 2.1–4.9)
PLATELET # BLD AUTO: 228 K/UL — SIGNIFICANT CHANGE UP (ref 130–400)
PMV BLD: 9.6 FL — SIGNIFICANT CHANGE UP (ref 7.4–10.4)
PO2 BLDV: 29 MMHG — SIGNIFICANT CHANGE UP (ref 25–45)
POTASSIUM BLDV-SCNC: 4.6 MMOL/L — SIGNIFICANT CHANGE UP (ref 3.5–5.1)
POTASSIUM SERPL-MCNC: 4.5 MMOL/L — SIGNIFICANT CHANGE UP (ref 3.5–5)
POTASSIUM SERPL-SCNC: 4.5 MMOL/L — SIGNIFICANT CHANGE UP (ref 3.5–5)
PROT SERPL-MCNC: 7.3 G/DL — SIGNIFICANT CHANGE UP (ref 6–8)
RBC # BLD: 5.14 M/UL — SIGNIFICANT CHANGE UP (ref 4.2–5.4)
RBC # FLD: 14.7 % — HIGH (ref 11.5–14.5)
SAO2 % BLDV: 46.8 % — LOW (ref 67–88)
SODIUM SERPL-SCNC: 138 MMOL/L — SIGNIFICANT CHANGE UP (ref 135–146)
WBC # BLD: 6.72 K/UL — SIGNIFICANT CHANGE UP (ref 4.8–10.8)
WBC # FLD AUTO: 6.72 K/UL — SIGNIFICANT CHANGE UP (ref 4.8–10.8)

## 2024-02-15 PROCEDURE — 81003 URINALYSIS AUTO W/O SCOPE: CPT

## 2024-02-15 PROCEDURE — G0378: CPT

## 2024-02-15 PROCEDURE — 85025 COMPLETE CBC W/AUTO DIFF WBC: CPT

## 2024-02-15 PROCEDURE — 97116 GAIT TRAINING THERAPY: CPT | Mod: GP

## 2024-02-15 PROCEDURE — 97162 PT EVAL MOD COMPLEX 30 MIN: CPT | Mod: GP

## 2024-02-15 PROCEDURE — 85027 COMPLETE CBC AUTOMATED: CPT

## 2024-02-15 PROCEDURE — 99222 1ST HOSP IP/OBS MODERATE 55: CPT

## 2024-02-15 PROCEDURE — 99285 EMERGENCY DEPT VISIT HI MDM: CPT

## 2024-02-15 PROCEDURE — 84443 ASSAY THYROID STIM HORMONE: CPT

## 2024-02-15 PROCEDURE — 82962 GLUCOSE BLOOD TEST: CPT

## 2024-02-15 PROCEDURE — 80053 COMPREHEN METABOLIC PANEL: CPT

## 2024-02-15 PROCEDURE — 83735 ASSAY OF MAGNESIUM: CPT

## 2024-02-15 PROCEDURE — 97110 THERAPEUTIC EXERCISES: CPT | Mod: GP

## 2024-02-15 PROCEDURE — 36415 COLL VENOUS BLD VENIPUNCTURE: CPT

## 2024-02-15 RX ORDER — LEVOTHYROXINE SODIUM 125 MCG
50 TABLET ORAL DAILY
Refills: 0 | Status: DISCONTINUED | OUTPATIENT
Start: 2024-02-15 | End: 2024-02-21

## 2024-02-15 RX ORDER — SODIUM CHLORIDE 9 MG/ML
1000 INJECTION, SOLUTION INTRAVENOUS
Refills: 0 | Status: DISCONTINUED | OUTPATIENT
Start: 2024-02-15 | End: 2024-02-21

## 2024-02-15 RX ORDER — METOPROLOL TARTRATE 50 MG
100 TABLET ORAL DAILY
Refills: 0 | Status: DISCONTINUED | OUTPATIENT
Start: 2024-02-15 | End: 2024-02-21

## 2024-02-15 RX ORDER — METOPROLOL TARTRATE 50 MG
1 TABLET ORAL
Refills: 0 | DISCHARGE

## 2024-02-15 RX ORDER — INSULIN LISPRO 100/ML
20 VIAL (ML) SUBCUTANEOUS
Refills: 0 | Status: DISCONTINUED | OUTPATIENT
Start: 2024-02-15 | End: 2024-02-21

## 2024-02-15 RX ORDER — LOSARTAN POTASSIUM 100 MG/1
50 TABLET, FILM COATED ORAL DAILY
Refills: 0 | Status: DISCONTINUED | OUTPATIENT
Start: 2024-02-15 | End: 2024-02-21

## 2024-02-15 RX ORDER — BACLOFEN 100 %
5 POWDER (GRAM) MISCELLANEOUS EVERY 8 HOURS
Refills: 0 | Status: DISCONTINUED | OUTPATIENT
Start: 2024-02-15 | End: 2024-02-18

## 2024-02-15 RX ORDER — INSULIN LISPRO 100/ML
VIAL (ML) SUBCUTANEOUS
Refills: 0 | Status: DISCONTINUED | OUTPATIENT
Start: 2024-02-15 | End: 2024-02-21

## 2024-02-15 RX ORDER — INSULIN GLARGINE 100 [IU]/ML
30 INJECTION, SOLUTION SUBCUTANEOUS AT BEDTIME
Refills: 0 | Status: DISCONTINUED | OUTPATIENT
Start: 2024-02-15 | End: 2024-02-16

## 2024-02-15 RX ORDER — ENOXAPARIN SODIUM 100 MG/ML
40 INJECTION SUBCUTANEOUS EVERY 24 HOURS
Refills: 0 | Status: DISCONTINUED | OUTPATIENT
Start: 2024-02-15 | End: 2024-02-21

## 2024-02-15 RX ORDER — CELECOXIB 200 MG/1
200 CAPSULE ORAL
Refills: 0 | Status: DISCONTINUED | OUTPATIENT
Start: 2024-02-15 | End: 2024-02-21

## 2024-02-15 RX ORDER — LEVOTHYROXINE SODIUM 125 MCG
1 TABLET ORAL
Refills: 0 | DISCHARGE

## 2024-02-15 RX ORDER — INSULIN GLARGINE 100 [IU]/ML
60 INJECTION, SOLUTION SUBCUTANEOUS AT BEDTIME
Refills: 0 | Status: DISCONTINUED | OUTPATIENT
Start: 2024-02-15 | End: 2024-02-15

## 2024-02-15 RX ORDER — SODIUM CHLORIDE 9 MG/ML
1000 INJECTION, SOLUTION INTRAVENOUS ONCE
Refills: 0 | Status: COMPLETED | OUTPATIENT
Start: 2024-02-15 | End: 2024-02-15

## 2024-02-15 RX ORDER — GLUCAGON INJECTION, SOLUTION 0.5 MG/.1ML
1 INJECTION, SOLUTION SUBCUTANEOUS ONCE
Refills: 0 | Status: DISCONTINUED | OUTPATIENT
Start: 2024-02-15 | End: 2024-02-21

## 2024-02-15 RX ADMIN — SODIUM CHLORIDE 1000 MILLILITER(S): 9 INJECTION, SOLUTION INTRAVENOUS at 14:59

## 2024-02-15 RX ADMIN — INSULIN GLARGINE 30 UNIT(S): 100 INJECTION, SOLUTION SUBCUTANEOUS at 23:37

## 2024-02-15 RX ADMIN — Medication 150 MILLIGRAM(S): at 22:55

## 2024-02-15 RX ADMIN — Medication 6: at 22:55

## 2024-02-15 NOTE — ED PROVIDER NOTE - CLINICAL SUMMARY MEDICAL DECISION MAKING FREE TEXT BOX
This patient presents with generalized weakness and fatigue likely secondary to DEHYDRATION and chronic illness. Labs non-diagnostic. Doubt intrinsic renal dysfunction or obstructive nephropathy. Considered alternate etiologies of the patient’s symptoms including infectious processes, severe metabolic derangements or electrolyte abnormalities, ischemia/ACS, heart failure, and intracranial/central processes but think these are unlikely given the history and physical exam. Plan to admit for further evaluation and management.

## 2024-02-15 NOTE — H&P ADULT - HISTORY OF PRESENT ILLNESS
58-year-old female with PMH of HTN, HLD, IDDM noncompliant with her insulin, neuropathy, HTN, hypothyroidism, mood disorder with 3 prior inpatient psychiatric hospitalizations, recent admission from 2/1 - 2/6 for DKA presents ED for evaluation of generalized weakness over the last few days.  Patient states she is too weak to get out of bed, normally ambulatory with a walker however is too weak to do this now.  Denies any fall or trauma, fever/chills, CP, SOB, abdominal pain, and/V/D, urinary symptoms.  Notes chronic lower extremity pain from her neuropathy but denies any new or worsening/different pain.    ED spoke with . Patient has been non-compliant with her medication since she arrived home. Unable to  medications. Would like his wife to be admitted for NH placement as she is too weak too walk and unable to independently take care of herself    At ED, VSS. Lab show hyperglycemia, but no HAGMA, and BHB- 1.1EKG shows NS.  Given 1L LR bolus

## 2024-02-15 NOTE — ED PROVIDER NOTE - ATTENDING CONTRIBUTION TO CARE
I have reviewed and agree with the mid-level note, except as documented in my note below.    58-year-old female history of HTN, DM, anemia, neuropathy, hypothyroid, spinal surgeries, admitted 2/1 to 2/6 for weakness and DKA, also reports recent frequent falls (not since most recent discharge), now presents with generalized weakness since discharge, associated NBNB vomiting, difficulty ambulating, denies fever, chest pain / pressure, abd pain, urinary sx, dyspnea, LE edema, near or total loss of consciousness, witnessed seizure activity, abnormal speech, paresthesias, clumsiness, difficulty with coordination, focal weakness or neurological deficits, recent trauma or other associated complaints at present. Old chart reviewed. I have reviewed and agree with the initial nursing note, except as documented in my note.    VSS, awake, alert, non-toxic appearing, oropharynx clear, mmm, chest CTAB, non-labored breathing, no w/r/r, +S1/S2, RRR, no m/r/g, abdomen soft, NT, ND, +BS, no peripheral edema or deformities, equal pulses upper and lower extremities, alert and oriented to person, place and time, clear speech, cranial nerves II-XII are intact, upper and lower extremity strength is symmetrical, coordination normal.        59 yo F with pmhx IDDM with neuropathy, hypertension, hypothyroidism, presents for lightheadedness, falls, and vomiting in setting of noncompliance with insulin as well as taking additional baclofen and Lyrica x 3 days. Pt is a little confused and not appropriately answering questions. Pt said that she fell dizzy and fell down today but had no LOC but did hit her head on the left. Pt has also had multiple episodes of vomiting and it was all food that she ate. Reportedly supposed to take 20mg baclofen 8 times a day and 150mg lyrica 8 times a day, but has been taking both q3-4h due to increasing chronic neuropathy.

## 2024-02-15 NOTE — ED PROVIDER NOTE - PROGRESS NOTE DETAILS
MARIA VICTORIA: I discussed with patient's  on the phone, he is requesting placement for rehabilitation as patient is too weak to walk and he cannot care for her if she is bedbound.  Labs show hyperglycemia but no DKA.  Admitted to medicine

## 2024-02-15 NOTE — H&P ADULT - NSHPLABSRESULTS_GEN_ALL_CORE
LABS:  cret                        15.1   6.72  )-----------( 228      ( 15 Feb 2024 15:29 )             44.8     02-15    138  |  100  |  14  ----------------------------<  333<H>  4.5   |  26  |  0.6<L>    Ca    9.8      15 Feb 2024 15:29  Phos  4.0     02-15  Mg     2.3     02-15    TPro  7.3  /  Alb  4.1  /  TBili  0.3  /  DBili  x   /  AST  13  /  ALT  20  /  AlkPhos  253<H>  02-15

## 2024-02-15 NOTE — ED PROVIDER NOTE - OBJECTIVE STATEMENT
58-year-old female with PMH of HTN, HLD, IDDM noncompliant with her insulin, neuropathy, HTN, hypothyroidism, mood disorder with 3 prior inpatient psychiatric hospitalizations, recent admission from 2/1 - 2/6 for DKA presents ED for evaluation of generalized weakness over the last few days.  Patient states she is too weak to get out of bed, normally ambulatory with a walker however is too weak to do this now.  Denies any fall or trauma, fever/chills, CP, SOB, abdominal pain, and/V/D, urinary symptoms.  Notes chronic lower extremity pain from her neuropathy but denies any new or worsening/different pain.

## 2024-02-15 NOTE — ED ADULT TRIAGE NOTE - CHIEF COMPLAINT QUOTE
pt c/o weakness for months - family looking for placment/rehab. Pt has had multiple falls at home (last fall was last week - denies loc denies hitting head)  PT states she is diabetic -    by EMS

## 2024-02-15 NOTE — H&P ADULT - ATTENDING COMMENTS
58-year-old female with type 2 diabetes and history of prior admissions for DKA, psych history hypertension dyslipidemia presenting to the ER for evaluation for weakness  Patient was seen and examined in the emergency room  Assessment and plan as above  I edited the note  Will need PT eval  input  Continue with home meds  Discussed with the patient

## 2024-02-15 NOTE — ED ADULT NURSE NOTE - OBJECTIVE STATEMENT
58 year old female presenting to ED pt c/o weakness for months . Pt has had multiple falls at home. DM. Pt on monitor

## 2024-02-15 NOTE — ED ADULT NURSE NOTE - NSFALLHARMRISKINTERV_ED_ALL_ED

## 2024-02-15 NOTE — ED PROVIDER NOTE - PHYSICAL EXAMINATION
VITAL SIGNS: I have reviewed nursing notes and confirm.  CONSTITUTIONAL: Well-developed; well-nourished; in no acute distress.  SKIN: Skin exam is warm and dry, no acute rash.  HEAD: Normocephalic; atraumatic.  EYES: PERRL, EOM intact; conjunctiva and sclera clear.  ENT: No nasal discharge; airway clear. TMs clear.  CARD: S1, S2 normal; no murmurs, gallops, or rubs. Regular rate and rhythm.  RESP: Normal respiratory effort, no tachypnea or distress. Lungs CTAB, no wheezes, rales or rhonchi.  ABD: soft, NT/ND.  EXT: Normal ROM. No clubbing, cyanosis or edema.  NEURO: Alert, oriented. Grossly unremarkable. No focal deficits.  PSYCH: Cooperative, appropriate.

## 2024-02-16 LAB
ALBUMIN SERPL ELPH-MCNC: 3.7 G/DL — SIGNIFICANT CHANGE UP (ref 3.5–5.2)
ALP SERPL-CCNC: 224 U/L — HIGH (ref 30–115)
ALT FLD-CCNC: 17 U/L — SIGNIFICANT CHANGE UP (ref 0–41)
ANION GAP SERPL CALC-SCNC: 13 MMOL/L — SIGNIFICANT CHANGE UP (ref 7–14)
APPEARANCE UR: CLEAR — SIGNIFICANT CHANGE UP
AST SERPL-CCNC: 15 U/L — SIGNIFICANT CHANGE UP (ref 0–41)
BASOPHILS # BLD AUTO: 0.03 K/UL — SIGNIFICANT CHANGE UP (ref 0–0.2)
BASOPHILS NFR BLD AUTO: 0.4 % — SIGNIFICANT CHANGE UP (ref 0–1)
BILIRUB SERPL-MCNC: 0.3 MG/DL — SIGNIFICANT CHANGE UP (ref 0.2–1.2)
BILIRUB UR-MCNC: NEGATIVE — SIGNIFICANT CHANGE UP
BUN SERPL-MCNC: 14 MG/DL — SIGNIFICANT CHANGE UP (ref 10–20)
CALCIUM SERPL-MCNC: 9.2 MG/DL — SIGNIFICANT CHANGE UP (ref 8.4–10.4)
CHLORIDE SERPL-SCNC: 101 MMOL/L — SIGNIFICANT CHANGE UP (ref 98–110)
CO2 SERPL-SCNC: 24 MMOL/L — SIGNIFICANT CHANGE UP (ref 17–32)
COLOR SPEC: YELLOW — SIGNIFICANT CHANGE UP
CREAT SERPL-MCNC: 0.6 MG/DL — LOW (ref 0.7–1.5)
DIFF PNL FLD: NEGATIVE — SIGNIFICANT CHANGE UP
EGFR: 103 ML/MIN/1.73M2 — SIGNIFICANT CHANGE UP
EOSINOPHIL # BLD AUTO: 0.15 K/UL — SIGNIFICANT CHANGE UP (ref 0–0.7)
EOSINOPHIL NFR BLD AUTO: 2 % — SIGNIFICANT CHANGE UP (ref 0–8)
GLUCOSE BLDC GLUCOMTR-MCNC: 136 MG/DL — HIGH (ref 70–99)
GLUCOSE BLDC GLUCOMTR-MCNC: 179 MG/DL — HIGH (ref 70–99)
GLUCOSE BLDC GLUCOMTR-MCNC: 186 MG/DL — HIGH (ref 70–99)
GLUCOSE BLDC GLUCOMTR-MCNC: 94 MG/DL — SIGNIFICANT CHANGE UP (ref 70–99)
GLUCOSE SERPL-MCNC: 254 MG/DL — HIGH (ref 70–99)
GLUCOSE UR QL: >=1000 MG/DL
HCT VFR BLD CALC: 40.7 % — SIGNIFICANT CHANGE UP (ref 37–47)
HGB BLD-MCNC: 14 G/DL — SIGNIFICANT CHANGE UP (ref 12–16)
IMM GRANULOCYTES NFR BLD AUTO: 0.3 % — SIGNIFICANT CHANGE UP (ref 0.1–0.3)
KETONES UR-MCNC: 15 MG/DL
LEUKOCYTE ESTERASE UR-ACNC: NEGATIVE — SIGNIFICANT CHANGE UP
LYMPHOCYTES # BLD AUTO: 2.28 K/UL — SIGNIFICANT CHANGE UP (ref 1.2–3.4)
LYMPHOCYTES # BLD AUTO: 30.4 % — SIGNIFICANT CHANGE UP (ref 20.5–51.1)
MAGNESIUM SERPL-MCNC: 2.1 MG/DL — SIGNIFICANT CHANGE UP (ref 1.8–2.4)
MCHC RBC-ENTMCNC: 29.8 PG — SIGNIFICANT CHANGE UP (ref 27–31)
MCHC RBC-ENTMCNC: 34.4 G/DL — SIGNIFICANT CHANGE UP (ref 32–37)
MCV RBC AUTO: 86.6 FL — SIGNIFICANT CHANGE UP (ref 81–99)
MONOCYTES # BLD AUTO: 0.56 K/UL — SIGNIFICANT CHANGE UP (ref 0.1–0.6)
MONOCYTES NFR BLD AUTO: 7.5 % — SIGNIFICANT CHANGE UP (ref 1.7–9.3)
NEUTROPHILS # BLD AUTO: 4.46 K/UL — SIGNIFICANT CHANGE UP (ref 1.4–6.5)
NEUTROPHILS NFR BLD AUTO: 59.4 % — SIGNIFICANT CHANGE UP (ref 42.2–75.2)
NITRITE UR-MCNC: NEGATIVE — SIGNIFICANT CHANGE UP
NRBC # BLD: 0 /100 WBCS — SIGNIFICANT CHANGE UP (ref 0–0)
PH UR: 7 — SIGNIFICANT CHANGE UP (ref 5–8)
PLATELET # BLD AUTO: 225 K/UL — SIGNIFICANT CHANGE UP (ref 130–400)
PMV BLD: 9.8 FL — SIGNIFICANT CHANGE UP (ref 7.4–10.4)
POTASSIUM SERPL-MCNC: 3.9 MMOL/L — SIGNIFICANT CHANGE UP (ref 3.5–5)
POTASSIUM SERPL-SCNC: 3.9 MMOL/L — SIGNIFICANT CHANGE UP (ref 3.5–5)
PROT SERPL-MCNC: 6.7 G/DL — SIGNIFICANT CHANGE UP (ref 6–8)
PROT UR-MCNC: SIGNIFICANT CHANGE UP MG/DL
RBC # BLD: 4.7 M/UL — SIGNIFICANT CHANGE UP (ref 4.2–5.4)
RBC # FLD: 14.6 % — HIGH (ref 11.5–14.5)
SODIUM SERPL-SCNC: 138 MMOL/L — SIGNIFICANT CHANGE UP (ref 135–146)
SP GR SPEC: >1.03 — HIGH (ref 1–1.03)
UROBILINOGEN FLD QL: 0.2 MG/DL — SIGNIFICANT CHANGE UP (ref 0.2–1)
WBC # BLD: 7.5 K/UL — SIGNIFICANT CHANGE UP (ref 4.8–10.8)
WBC # FLD AUTO: 7.5 K/UL — SIGNIFICANT CHANGE UP (ref 4.8–10.8)

## 2024-02-16 PROCEDURE — 99232 SBSQ HOSP IP/OBS MODERATE 35: CPT

## 2024-02-16 RX ORDER — INSULIN GLARGINE 100 [IU]/ML
20 INJECTION, SOLUTION SUBCUTANEOUS AT BEDTIME
Refills: 0 | Status: DISCONTINUED | OUTPATIENT
Start: 2024-02-16 | End: 2024-02-21

## 2024-02-16 RX ORDER — INFLUENZA VIRUS VACCINE 15; 15; 15; 15 UG/.5ML; UG/.5ML; UG/.5ML; UG/.5ML
0.5 SUSPENSION INTRAMUSCULAR ONCE
Refills: 0 | Status: DISCONTINUED | OUTPATIENT
Start: 2024-02-16 | End: 2024-02-21

## 2024-02-16 RX ORDER — INSULIN GLARGINE 100 [IU]/ML
45 INJECTION, SOLUTION SUBCUTANEOUS EVERY MORNING
Refills: 0 | Status: DISCONTINUED | OUTPATIENT
Start: 2024-02-16 | End: 2024-02-21

## 2024-02-16 RX ADMIN — LOSARTAN POTASSIUM 50 MILLIGRAM(S): 100 TABLET, FILM COATED ORAL at 05:45

## 2024-02-16 RX ADMIN — Medication 20 UNIT(S): at 08:35

## 2024-02-16 RX ADMIN — CELECOXIB 200 MILLIGRAM(S): 200 CAPSULE ORAL at 23:27

## 2024-02-16 RX ADMIN — INSULIN GLARGINE 45 UNIT(S): 100 INJECTION, SOLUTION SUBCUTANEOUS at 10:11

## 2024-02-16 RX ADMIN — Medication 150 MILLIGRAM(S): at 21:35

## 2024-02-16 RX ADMIN — Medication 2: at 08:36

## 2024-02-16 RX ADMIN — Medication 150 MILLIGRAM(S): at 13:19

## 2024-02-16 RX ADMIN — Medication 100 MILLIGRAM(S): at 05:45

## 2024-02-16 RX ADMIN — CELECOXIB 200 MILLIGRAM(S): 200 CAPSULE ORAL at 21:35

## 2024-02-16 RX ADMIN — Medication 150 MILLIGRAM(S): at 05:44

## 2024-02-16 RX ADMIN — INSULIN GLARGINE 20 UNIT(S): 100 INJECTION, SOLUTION SUBCUTANEOUS at 21:36

## 2024-02-16 RX ADMIN — Medication 20 UNIT(S): at 11:29

## 2024-02-16 NOTE — PATIENT PROFILE ADULT - FALL HARM RISK - HARM RISK INTERVENTIONS
Communicate Risk of Fall with Harm to all staff/Monitor gait and stability/Reinforce activity limits and safety measures with patient and family/Tailored Fall Risk Interventions/Visual Cue: Yellow wristband and red socks/Bed in lowest position, wheels locked, appropriate side rails in place/Call bell, personal items and telephone in reach/Instruct patient to call for assistance before getting out of bed or chair/Non-slip footwear when patient is out of bed/[place] to call system/Physically safe environment - no spills, clutter or unnecessary equipment/Purposeful Proactive Rounding/Room/bathroom lighting operational, light cord in reach

## 2024-02-16 NOTE — PROGRESS NOTE ADULT - ASSESSMENT
58-year-old female with PMH of HTN, HLD, IDDM noncompliant with her insulin, neuropathy, HTN, hypothyroidism, mood disorder with 3 prior inpatient psychiatric hospitalizations, recent admission from 2/1 - 2/6 for DKA presents ED for evaluation of generalized weakness over the last few days. 2/2 noncompliance to medications, dehydration 2/2 being unable to take care of herself. needs placement to LTF    Generalized weakness  -s/p 1L IVF bolus  -PT  -orthostatic BP measurement    Mild DKA due to non-compliance    Noncompliance   Pseudohyponatremia   Diabetic neuropathy   -Lantus 45 units qAM, Lantus 20 units at bedtime   -20 units Lispro with meals   -A1c 13.8   -pt counselled  -Insulin adjusted    Chronic pain 2/2 lumbar radiculopathy, neuropathy  -c/w baclofen and pregabalin    HTN  Hypothyroidism  Hx of mood disorder  -c/w losartan and metoprolol  -cont current meds  -check TSH    DVT ppx: lovenox  GI ppx: protonix  Code: full  Diet: DASH with CC  Dispo: acute   58-year-old female with PMH of HTN, HLD, IDDM noncompliant with her insulin, neuropathy, HTN, hypothyroidism, mood disorder with 3 prior inpatient psychiatric hospitalizations, recent admission from 2/1 - 2/6 for DKA presents ED for evaluation of generalized weakness over the last few days. 2/2 noncompliance to medications, dehydration 2/2 being unable to take care of herself. needs placement to LTF    Generalized weakness  -s/p 1L IVF bolus  -PT    Plan:  ·	check orthostatic BP     Mild DKA due to non-compliance    Noncompliance   Pseudohyponatremia   Diabetic neuropathy   -Lantus 45 units qAM, Lantus 20 units at bedtime   -20 units Lispro with meals   -A1c 13.8   -pt counselled  -Insulin adjusted    Chronic pain 2/2 lumbar radiculopathy, neuropathy  -c/w baclofen and pregabalin    HTN  Hypothyroidism  Hx of mood disorder  -c/w losartan and metoprolol  -c/w levothyroxine  -c/w psych meds     Plan:  ·	check TSH    DVT ppx: lovenox  GI ppx: protonix  Code: full  Diet: DASH with CC  Dispo: acute    Hand off: pending placement

## 2024-02-16 NOTE — PROGRESS NOTE ADULT - ASSESSMENT
58-year-old female with PMH of HTN, HLD, IDDM noncompliant with her insulin, neuropathy, HTN, hypothyroidism, mood disorder with 3 prior inpatient psychiatric hospitalizations, recent admission from 2/1 - 2/6 for DKA presents ED for evaluation of generalized weakness over the last few days. 2/2 noncompliance to medications, dehydration 2/2 being unable to take care of herself. needs placement to LTF    Functional Decline due to Generalized weakness  -s/p 1L IVF bolus  -check orthostatic BP     Mild DKA due to non-compliance    Pseudohyponatremia   Diabetic neuropathy   -Lantus 45 units qAM, Lantus 20 units at bedtime   -20 units Lispro with meals   -A1c 13.8   -pt counselled  -Insulin adjusted    Chronic pain 2/2 lumbar radiculopathy, neuropathy  -c/w baclofen and pregabalin    HTN  Hypothyroidism  Hx of mood disorder  -c/w losartan and metoprolol  -c/w levothyroxine  -c/w psych meds      >> check TSH    DVT ppx: lovenox  GI ppx: protonix  Code: full  Diet: DASH with CC  Dispo: acute    Pending: PT  Plan d/w the patient at bedside.   Dispo: TBD  58-year-old female with PMH of HTN, HLD, IDDM noncompliant with her insulin, neuropathy, HTN, hypothyroidism, mood disorder with 3 prior inpatient psychiatric hospitalizations, recent admission from 2/1 - 2/6 for DKA presents ED for evaluation of generalized weakness over the last few days. 2/2 noncompliance to medications, dehydration 2/2 being unable to take care of herself. needs placement to LTF    Functional Decline due to Generalized weakness likely due to uncontrolled DM   -s/p 1L IVF bolus  -check orthostatic BP ,     UA    Mild DKA due to non-compliance    Pseudohyponatremia   Diabetic neuropathy   -Lantus 45 units qAM, Lantus 20 units at bedtime   -20 units Lispro with meals   -A1c 13.8   -pt counselled  -Insulin adjusted    Chronic pain 2/2 lumbar radiculopathy, neuropathy  -c/w baclofen and pregabalin    HTN  Hypothyroidism  Hx of mood disorder  -c/w losartan and metoprolol  -c/w levothyroxine  -c/w psych meds      >> check TSH    DVT ppx: lovenox  GI ppx: protonix  Code: full  Diet: DASH with CC  Dispo: acute    Pending: PT  Plan d/w the patient at bedside.   Dispo: TBD

## 2024-02-16 NOTE — PATIENT PROFILE ADULT - NSPROGENOTHERPROVIDER_GEN_A_NUR
Physical Therapy Visit    Visit Type: Daily Treatment Note  Visit: 14  Referring Provider: Gil French CNP  Medical Diagnosis (from order): I69.354 - Hemiparesis affecting left side as late effect of stroke (CMD)  Z74.09, Z78.9 - Impaired mobility and ADLs  I63.9 - Ischemic stroke of frontal lobe (CMD)  H53.2 - Diplopia   Patient alert and oriented X3.    SUBJECTIVE                                                                                                               [place]  [place] [id number]  Patient states she is doing well; walking without cane when inside therapy department.  At home is walking without the cane as well.  Doesn't notice issues with balance when walking, loses balance when having to stand on one foot.    Is doing more activity around the home; is able to do more cleaning activities including cleaning her bathroom.  Is able to prepare a meal.    Continues to voice difficulty if she moves quickly she will tend to lose her balance.    Still unable to mop the floor safely; can't climb stairs, but is able to sweep-uses the left hand to support and is able to do that.  Before the weather got cold she was able to get out and run some errands.      Pain / Symptoms  - Pain rating (out of 10): Current: 1   - Stiffness rating (out of 10): Current: 1 (some discomfort/tension in the posterior apect of the left leg.)      OBJECTIVE                                                                                                                                       Treatment     Therapeutic Exercise  Therapeutic exercise prescription designed to address ROM/arthrokinematics and strength deficits as identified in initial evaluation in order to achieve long term goals set in initial evaluation.  Supine-deferred this date 12/18/23; 12/21/23; 12/28/23; 1/4/24  Lower trunk rotation 20x  Bridging  with manual resist PNF techniques to left pelvis/hip 20x  SLR bilat 15x-deferred  Isometric hip  adduction with activation of TA 120x  Clam with resistance green theraband 20x  Bent knee fall outs left only 20  Dead bug for core stabilization 10x each side  Sit to stand with hands on knees, hinge at hips 15x  Seated posterior lean for abdominal/core strengthening 15 x  Nu step level 1 x 5 minutes with assist for left UE-performed this date 1/8/24 12/18/23'; 12/21/23; 1/4/24; 1/8/24  Forward marching with table support 7'  Forward walking with table support 7'  Backward walking with table support 7'  Side stepping with PT support 7'x4    NU step level 4 x 6 minutes arms and legs    Therapeutic Activity  Education on condition  Education in treatment options /plan  Education on effects of therapeutic exercise including possibility of increased pain initially as tissues are stretched and challenged. Encouraged to participate in current HEP and to bring papers to next visit to adjust as appropriate.    Education on increased participation in family events with family assist as endurance allows.   Tests and measures    Neuromuscular Re-Education  Neuromuscular re-education techniques utilized in PT to address weakness and balance deficits determined in initial evaluation as result of CVA with left sided weakness 10/7/23  Rolling in bed from supine to right side with PNF techniques  Seated Right UE forward, down, up and out 10x  Seated Right LE abduction with IR, Right UE across body 10x  Seated left LE abduction with IR, left UE across body 10x  Seated weight shift from right to left hand support 10x  Scooting on mat forward/backward 3x  Kneeling on mat with bilat UE reaching tasks/stacking cones at various levels x 3minutes-deferred  1/2 kneeling on mat with CGA and bilat UE reaching tasks/stacking cones at various levels x 3 minutes-deferred  Seated reaching for cones/various levels x 2 minutes-deferred  Standing-forward reaching multiple angles bilat Ue's x 2 minutes  Standing reaching toward upper cabinet  shelves-able to get to second shelve x 1 minute-deferred  Standing heel taps with abduction/ER and opposite arm reaching posteriorly 10x  Standing toe taps bilat 10x  Ambulation using table support with PT resistance 50'x2 and activation of core stabilzers  Stagger stance with opposite foot forward and back without UE support x 10x each side.   Foot placement on step and hold without UE support 3 x 10 seconds bilat-deferred  Standing on foam eyes open 1 minute-deferred  Standing on foam eyes closed 30 seconds-deferred  Marching on foam without UE support 20x-deferred  Marching in room 40 ft x 2  Side stepping in room 30ft x 4    Gait Training  Ambulation in room with gait belt, SBA and no assistive device 35'x2 with emphasis on arm swing opposite leg    Skilled input: verbal instruction/cues and posture correction    Writer verbally educated and received verbal consent for hand placement, positioning of patient, and techniques to be performed today from patient for therapist position for techniques and hand placement and palpation for techniques as described above and how they are pertinent to the patient's plan of care.  Home Exercise Program  None provided this date with evaluative testing performed.  Advised to bring HEP provided by previous PT to next session as well as continue with HEP provided at least daily.   Access Code: [email or web address]  URL: [email or web address]  Date: 11/17/2023  Prepared by: Ashanti Moreira    Exercises  - Supine Hip Adduction Isometric with Ball  - 2 x daily - 7 x weekly - 1 sets - 10 reps  - Hooklying Clamshell with Resistance  - 2 x daily - 7 x weekly - 1 sets - 10 reps  - Bent Knee Fallouts  - 2 x daily - 7 x weekly - 1 sets - 10 reps      ASSESSMENT                                                                                                            Patient was able to ambulate from reception area without use of NBQC.  Stating she is now only using the cane  when out in the community for safety.  No longer using when in her home.  Primary concerns with balance include quick turns as she might perform when working in her kitchen; or when she has to reach for something when one leg may come off the ground.    Is doing better overall in therapy sessions; able to tolerate advancement of exercise routine.   Is showing evidence of performance of comprehensive HEP with good carry over.  Will proceed with PT according to current POC.   Pain/symptoms after session (out of 10): 1  Education:   - Results of above outlined education: Verbalizes understanding and Demonstrates understanding    PLAN                                                                                                                           Suggestions for next session as indicated: Progress per plan of care       Therapy procedure time and total treatment time can be found documented on the Time Entry flowsheet     none

## 2024-02-17 LAB
ALBUMIN SERPL ELPH-MCNC: 3.7 G/DL — SIGNIFICANT CHANGE UP (ref 3.5–5.2)
ALP SERPL-CCNC: 219 U/L — HIGH (ref 30–115)
ALT FLD-CCNC: 15 U/L — SIGNIFICANT CHANGE UP (ref 0–41)
ANION GAP SERPL CALC-SCNC: 10 MMOL/L — SIGNIFICANT CHANGE UP (ref 7–14)
AST SERPL-CCNC: 17 U/L — SIGNIFICANT CHANGE UP (ref 0–41)
BASOPHILS # BLD AUTO: 0.04 K/UL — SIGNIFICANT CHANGE UP (ref 0–0.2)
BASOPHILS NFR BLD AUTO: 0.6 % — SIGNIFICANT CHANGE UP (ref 0–1)
BILIRUB SERPL-MCNC: 0.3 MG/DL — SIGNIFICANT CHANGE UP (ref 0.2–1.2)
BUN SERPL-MCNC: 20 MG/DL — SIGNIFICANT CHANGE UP (ref 10–20)
CALCIUM SERPL-MCNC: 9.3 MG/DL — SIGNIFICANT CHANGE UP (ref 8.4–10.5)
CHLORIDE SERPL-SCNC: 98 MMOL/L — SIGNIFICANT CHANGE UP (ref 98–110)
CO2 SERPL-SCNC: 25 MMOL/L — SIGNIFICANT CHANGE UP (ref 17–32)
CREAT SERPL-MCNC: <0.5 MG/DL — LOW (ref 0.7–1.5)
EGFR: 114 ML/MIN/1.73M2 — SIGNIFICANT CHANGE UP
EOSINOPHIL # BLD AUTO: 0.16 K/UL — SIGNIFICANT CHANGE UP (ref 0–0.7)
EOSINOPHIL NFR BLD AUTO: 2.2 % — SIGNIFICANT CHANGE UP (ref 0–8)
GLUCOSE BLDC GLUCOMTR-MCNC: 118 MG/DL — HIGH (ref 70–99)
GLUCOSE BLDC GLUCOMTR-MCNC: 119 MG/DL — HIGH (ref 70–99)
GLUCOSE BLDC GLUCOMTR-MCNC: 144 MG/DL — HIGH (ref 70–99)
GLUCOSE BLDC GLUCOMTR-MCNC: 150 MG/DL — HIGH (ref 70–99)
GLUCOSE BLDC GLUCOMTR-MCNC: 179 MG/DL — HIGH (ref 70–99)
GLUCOSE SERPL-MCNC: 145 MG/DL — HIGH (ref 70–99)
HCT VFR BLD CALC: 42.9 % — SIGNIFICANT CHANGE UP (ref 37–47)
HGB BLD-MCNC: 14.8 G/DL — SIGNIFICANT CHANGE UP (ref 12–16)
IMM GRANULOCYTES NFR BLD AUTO: 0.3 % — SIGNIFICANT CHANGE UP (ref 0.1–0.3)
LYMPHOCYTES # BLD AUTO: 2.08 K/UL — SIGNIFICANT CHANGE UP (ref 1.2–3.4)
LYMPHOCYTES # BLD AUTO: 28.8 % — SIGNIFICANT CHANGE UP (ref 20.5–51.1)
MAGNESIUM SERPL-MCNC: 2.1 MG/DL — SIGNIFICANT CHANGE UP (ref 1.8–2.4)
MCHC RBC-ENTMCNC: 29.7 PG — SIGNIFICANT CHANGE UP (ref 27–31)
MCHC RBC-ENTMCNC: 34.5 G/DL — SIGNIFICANT CHANGE UP (ref 32–37)
MCV RBC AUTO: 86.1 FL — SIGNIFICANT CHANGE UP (ref 81–99)
MONOCYTES # BLD AUTO: 0.47 K/UL — SIGNIFICANT CHANGE UP (ref 0.1–0.6)
MONOCYTES NFR BLD AUTO: 6.5 % — SIGNIFICANT CHANGE UP (ref 1.7–9.3)
NEUTROPHILS # BLD AUTO: 4.46 K/UL — SIGNIFICANT CHANGE UP (ref 1.4–6.5)
NEUTROPHILS NFR BLD AUTO: 61.6 % — SIGNIFICANT CHANGE UP (ref 42.2–75.2)
NRBC # BLD: 0 /100 WBCS — SIGNIFICANT CHANGE UP (ref 0–0)
PLATELET # BLD AUTO: 215 K/UL — SIGNIFICANT CHANGE UP (ref 130–400)
PMV BLD: 9.3 FL — SIGNIFICANT CHANGE UP (ref 7.4–10.4)
POTASSIUM SERPL-MCNC: 3.5 MMOL/L — SIGNIFICANT CHANGE UP (ref 3.5–5)
POTASSIUM SERPL-SCNC: 3.5 MMOL/L — SIGNIFICANT CHANGE UP (ref 3.5–5)
PROT SERPL-MCNC: 6.5 G/DL — SIGNIFICANT CHANGE UP (ref 6–8)
RBC # BLD: 4.98 M/UL — SIGNIFICANT CHANGE UP (ref 4.2–5.4)
RBC # FLD: 15.1 % — HIGH (ref 11.5–14.5)
SODIUM SERPL-SCNC: 133 MMOL/L — LOW (ref 135–146)
TSH SERPL-MCNC: 5.95 UIU/ML — HIGH (ref 0.27–4.2)
WBC # BLD: 7.23 K/UL — SIGNIFICANT CHANGE UP (ref 4.8–10.8)
WBC # FLD AUTO: 7.23 K/UL — SIGNIFICANT CHANGE UP (ref 4.8–10.8)

## 2024-02-17 PROCEDURE — 99232 SBSQ HOSP IP/OBS MODERATE 35: CPT

## 2024-02-17 RX ORDER — LANOLIN ALCOHOL/MO/W.PET/CERES
3 CREAM (GRAM) TOPICAL AT BEDTIME
Refills: 0 | Status: DISCONTINUED | OUTPATIENT
Start: 2024-02-17 | End: 2024-02-21

## 2024-02-17 RX ADMIN — Medication 20 UNIT(S): at 16:39

## 2024-02-17 RX ADMIN — Medication 100 MILLIGRAM(S): at 05:29

## 2024-02-17 RX ADMIN — Medication 3 MILLIGRAM(S): at 21:23

## 2024-02-17 RX ADMIN — Medication 1 APPLICATORFUL: at 17:43

## 2024-02-17 RX ADMIN — INSULIN GLARGINE 20 UNIT(S): 100 INJECTION, SOLUTION SUBCUTANEOUS at 21:22

## 2024-02-17 RX ADMIN — Medication 5 MILLIGRAM(S): at 05:30

## 2024-02-17 RX ADMIN — Medication 20 UNIT(S): at 12:15

## 2024-02-17 RX ADMIN — Medication 150 MILLIGRAM(S): at 14:02

## 2024-02-17 RX ADMIN — Medication 5 MILLIGRAM(S): at 20:06

## 2024-02-17 RX ADMIN — LOSARTAN POTASSIUM 50 MILLIGRAM(S): 100 TABLET, FILM COATED ORAL at 05:29

## 2024-02-17 RX ADMIN — ENOXAPARIN SODIUM 40 MILLIGRAM(S): 100 INJECTION SUBCUTANEOUS at 05:30

## 2024-02-17 RX ADMIN — CELECOXIB 200 MILLIGRAM(S): 200 CAPSULE ORAL at 14:01

## 2024-02-17 RX ADMIN — Medication 50 MICROGRAM(S): at 05:30

## 2024-02-17 RX ADMIN — INSULIN GLARGINE 45 UNIT(S): 100 INJECTION, SOLUTION SUBCUTANEOUS at 09:59

## 2024-02-17 RX ADMIN — Medication 150 MILLIGRAM(S): at 05:29

## 2024-02-17 RX ADMIN — Medication 3 MILLIGRAM(S): at 00:24

## 2024-02-17 RX ADMIN — Medication 150 MILLIGRAM(S): at 21:21

## 2024-02-17 RX ADMIN — CELECOXIB 200 MILLIGRAM(S): 200 CAPSULE ORAL at 14:31

## 2024-02-17 RX ADMIN — Medication 20 UNIT(S): at 08:27

## 2024-02-17 NOTE — PHYSICAL THERAPY INITIAL EVALUATION ADULT - PERTINENT HX OF CURRENT PROBLEM, REHAB EVAL
58-year-old female with PMH of HTN, HLD, IDDM noncompliant with her insulin, neuropathy, HTN, hypothyroidism, mood disorder with 3 prior inpatient psychiatric hospitalizations, recent admission from 2/1 - 2/6 for DKA presents ED for evaluation of generalized weakness over the last few days. 2/2 noncompliance to medications, dehydration 2/2 being unable to take care of herself. needs placement to LTF

## 2024-02-17 NOTE — PROVIDER CONTACT NOTE (OTHER) - SITUATION
pharmacy does not stock the 1% clotrimazole cream here. they said to call [phone] to see what can be ordered

## 2024-02-17 NOTE — PROGRESS NOTE ADULT - ASSESSMENT
58-year-old female with PMH of HTN, HLD, IDDM noncompliant with her insulin, neuropathy, HTN, hypothyroidism, mood disorder with 3 prior inpatient psychiatric hospitalizations, recent admission from 2/1 - 2/6 for DKA presents ED for evaluation of generalized weakness over the last few days. 2/2 noncompliance to medications, dehydration 2/2 being unable to take care of herself. needs placement to LTF    Functional Decline due to Generalized weakness likely due to uncontrolled DM   -physical therapy consulted     Mild DKA due to non-compliance    Pseudohyponatremia   Diabetic neuropathy   -Lantus 45 units qAM, Lantus 20 units at bedtime   -20 units Lispro with meals   -A1c 13.8   -pt counselled  -Insulin adjusted    Chronic pain 2/2 lumbar radiculopathy, neuropathy  -c/w baclofen and pregabalin    HTN  Hypothyroidism  Hx of mood disorder  -c/w losartan and metoprolol  -c/w levothyroxine  -c/w psych meds   - TSH elevated on 2/2 - 5.52, has been on levothyroxine with a hx of non-compliance; pt needs repeat thyroid studies in March       DVT ppx: lovenox  GI ppx: protonix  Code: full  Diet: DASH with CC  Dispo: acute    Pending: PT  Plan d/w the patient at bedside.   Dispo: TBD

## 2024-02-17 NOTE — PHYSICAL THERAPY INITIAL EVALUATION ADULT - PATIENT/FAMILY AGREES WITH PLAN
Health Maintenance Due   Topic Date Due   • Hepatitis B Vaccine (1 of 3 - 3-dose series) Never done   • Pneumococcal Vaccine 0-64 (1 - PCV) Never done   • DTaP/Tdap/Td Vaccine (1 - Tdap) Never done   • Cervical Cancer Screen 30-64 -  Never done   • Colorectal Cancer Screen-  Never done   • Shingles Vaccine (1 of 2) Never done   • Breast Cancer Screening  Never done   • COVID-19 Vaccine (2 - Pfizer series) 03/17/2022       Patient is due for topics listed above, she wishes to proceed with Colorectal Cancer Screening: Colonoscopy and Mammogram, but is not proceeding with Immunization(s) COVID-19, Dtap/Tdap/Td, Pneumococcal and Shingles and Cervical cancer screening at this time. The following has occurred:  Appt scheduled to perform Cervical Cancer Screening. Orders placed for Colorectal Cancer Screening: Colonoscopy and Mammogram. Education provided for Immunization(s) Pneumococcal.     yes

## 2024-02-18 LAB
GLUCOSE BLDC GLUCOMTR-MCNC: 104 MG/DL — HIGH (ref 70–99)
GLUCOSE BLDC GLUCOMTR-MCNC: 106 MG/DL — HIGH (ref 70–99)
GLUCOSE BLDC GLUCOMTR-MCNC: 113 MG/DL — HIGH (ref 70–99)
GLUCOSE BLDC GLUCOMTR-MCNC: 173 MG/DL — HIGH (ref 70–99)

## 2024-02-18 PROCEDURE — 99232 SBSQ HOSP IP/OBS MODERATE 35: CPT

## 2024-02-18 RX ORDER — HYDROXYZINE HCL 10 MG
25 TABLET ORAL ONCE
Refills: 0 | Status: COMPLETED | OUTPATIENT
Start: 2024-02-18 | End: 2024-02-18

## 2024-02-18 RX ORDER — BACLOFEN 100 %
20 POWDER (GRAM) MISCELLANEOUS EVERY 8 HOURS
Refills: 0 | Status: DISCONTINUED | OUTPATIENT
Start: 2024-02-18 | End: 2024-02-21

## 2024-02-18 RX ORDER — ALPRAZOLAM 0.25 MG
0.25 TABLET ORAL ONCE
Refills: 0 | Status: DISCONTINUED | OUTPATIENT
Start: 2024-02-18 | End: 2024-02-18

## 2024-02-18 RX ORDER — LANOLIN ALCOHOL/MO/W.PET/CERES
3 CREAM (GRAM) TOPICAL ONCE
Refills: 0 | Status: COMPLETED | OUTPATIENT
Start: 2024-02-18 | End: 2024-02-18

## 2024-02-18 RX ADMIN — Medication 3 MILLIGRAM(S): at 02:13

## 2024-02-18 RX ADMIN — Medication 150 MILLIGRAM(S): at 06:23

## 2024-02-18 RX ADMIN — Medication 50 MICROGRAM(S): at 05:22

## 2024-02-18 RX ADMIN — ENOXAPARIN SODIUM 40 MILLIGRAM(S): 100 INJECTION SUBCUTANEOUS at 06:24

## 2024-02-18 RX ADMIN — Medication 20 UNIT(S): at 12:11

## 2024-02-18 RX ADMIN — Medication 20 UNIT(S): at 08:20

## 2024-02-18 RX ADMIN — CELECOXIB 200 MILLIGRAM(S): 200 CAPSULE ORAL at 21:38

## 2024-02-18 RX ADMIN — Medication 2: at 08:21

## 2024-02-18 RX ADMIN — Medication 20 MILLIGRAM(S): at 13:09

## 2024-02-18 RX ADMIN — Medication 0.25 MILLIGRAM(S): at 13:09

## 2024-02-18 RX ADMIN — Medication 25 MILLIGRAM(S): at 23:02

## 2024-02-18 RX ADMIN — Medication 150 MILLIGRAM(S): at 13:09

## 2024-02-18 RX ADMIN — Medication 150 MILLIGRAM(S): at 21:35

## 2024-02-18 RX ADMIN — Medication 20 MILLIGRAM(S): at 21:35

## 2024-02-18 RX ADMIN — Medication 20 UNIT(S): at 17:08

## 2024-02-18 RX ADMIN — Medication 1 APPLICATORFUL: at 12:04

## 2024-02-18 RX ADMIN — Medication 20 MILLIGRAM(S): at 06:24

## 2024-02-18 RX ADMIN — Medication 100 MILLIGRAM(S): at 06:24

## 2024-02-18 RX ADMIN — INSULIN GLARGINE 45 UNIT(S): 100 INJECTION, SOLUTION SUBCUTANEOUS at 08:23

## 2024-02-18 RX ADMIN — LOSARTAN POTASSIUM 50 MILLIGRAM(S): 100 TABLET, FILM COATED ORAL at 06:24

## 2024-02-18 NOTE — PROVIDER CONTACT NOTE (OTHER) - SITUATION
during morning assessment. pt was noted to have no IV, pt stated she pulled it out and does not want a new one during morning assessment. pt was noted to have no IV, pt stated she pulled it out and does not want a new one. despite teaching and education on importance

## 2024-02-18 NOTE — PROVIDER CONTACT NOTE (OTHER) - DATE AND TIME:
16-Feb-2024 11:23
17-Feb-2024 16:35
17-Feb-2024 16:52
18-Feb-2024 09:45
18-Feb-2024 12:03
17-Feb-2024 13:47

## 2024-02-18 NOTE — PROVIDER CONTACT NOTE (OTHER) - SITUATION
pt is c/o blurry vision. this is new this admission, but pt reported this has happened in the past. /65 HR 82 resp 18, pulse ox 98 rm air.

## 2024-02-18 NOTE — PROVIDER CONTACT NOTE (OTHER) - ACTION/TREATMENT ORDERED:
Provider aware, no further intervention
notified resident Da, no further intervention
Provider came to assess pt. ordered 0.25 xanax. okay to give lispro as ordered
Provider notified RN awaiting new orders
provider notified of the above awaiting any new orders
Provider notified will order medication

## 2024-02-18 NOTE — PROGRESS NOTE ADULT - ASSESSMENT
58-year-old female with PMH of HTN, HLD, IDDM noncompliant with her insulin, neuropathy, HTN, hypothyroidism, mood disorder with 3 prior inpatient psychiatric hospitalizations, recent admission from 2/1 - 2/6 for DKA presents ED for evaluation of generalized weakness over the last few days. 2/2 noncompliance to medications, dehydration 2/2 being unable to take care of herself. needs placement to LTF    Functional Decline due to Generalized weakness likely due to uncontrolled DM   Multiple hospital admission  Hx of mood disorder  - PT recommends outpatient PT but need placement for medication managements  - c/w psych meds     Mild DKA due to non-compliance, fsg stable at this time  Pseudohyponatremia   Diabetic neuropathy   - Lantus 45 units qAM, Lantus 20 units at bedtime   - 20 units Lispro with meals   - A1c 13.8   - c/w baclofen and pregabalin    Chronic pain 2/2 lumbar radiculopathy, neuropathy  - c/w baclofen and pregabalin    HTN, stable  - c/w losartan and metoprolol    Hypothyroidism  - c/w levothyroxine  - TSH elevated on 2/2 - 5.52, has been on levothyroxine with a hx of non-compliance; pt needs repeat thyroid studies in March     DVT ppx: lovenox  GI ppx: protonix  Code: full  Diet: DASH with CC    Dispo: needs placement SNF vs. LTC  58-year-old female with PMH of HTN, HLD, IDDM noncompliant with her insulin, neuropathy, HTN, hypothyroidism, mood disorder with 3 prior inpatient psychiatric hospitalizations, recent admission from 2/1 - 2/6 for DKA presents ED for evaluation of generalized weakness over the last few days. 2/2 noncompliance to medications, dehydration 2/2 being unable to take care of herself. needs placement to LTF    Functional Decline due to Generalized weakness likely due to uncontrolled DM   Multiple hospital admission  - PT recommends outpatient PT but need placement for medication managements    Hx of mood disorder  - previous noted on risperidone May 2023 but not recent psych medications    Mild DKA due to non-compliance, fsg stable at this time  Pseudohyponatremia   Diabetic neuropathy   - Lantus 45 units qAM, Lantus 20 units at bedtime   - 20 units Lispro with meals   - A1c 13.8   - c/w baclofen and pregabalin    Chronic pain 2/2 lumbar radiculopathy, neuropathy  - c/w baclofen and pregabalin    HTN, stable  - c/w losartan and metoprolol    Hypothyroidism  - c/w levothyroxine  - TSH elevated on 2/2 - 5.52, has been on levothyroxine with a hx of non-compliance; pt needs repeat thyroid studies in March     DVT ppx: lovenox  GI ppx: protonix  Code: full  Diet: DASH with CC    Dispo: needs placement SNF vs. LTC

## 2024-02-19 LAB
ALBUMIN SERPL ELPH-MCNC: 3.4 G/DL — LOW (ref 3.5–5.2)
ALP SERPL-CCNC: 190 U/L — HIGH (ref 30–115)
ALT FLD-CCNC: 14 U/L — SIGNIFICANT CHANGE UP (ref 0–41)
ANION GAP SERPL CALC-SCNC: 11 MMOL/L — SIGNIFICANT CHANGE UP (ref 7–14)
AST SERPL-CCNC: 18 U/L — SIGNIFICANT CHANGE UP (ref 0–41)
BILIRUB SERPL-MCNC: 0.3 MG/DL — SIGNIFICANT CHANGE UP (ref 0.2–1.2)
BUN SERPL-MCNC: 20 MG/DL — SIGNIFICANT CHANGE UP (ref 10–20)
CALCIUM SERPL-MCNC: 9.7 MG/DL — SIGNIFICANT CHANGE UP (ref 8.4–10.5)
CHLORIDE SERPL-SCNC: 106 MMOL/L — SIGNIFICANT CHANGE UP (ref 98–110)
CO2 SERPL-SCNC: 24 MMOL/L — SIGNIFICANT CHANGE UP (ref 17–32)
CREAT SERPL-MCNC: 0.5 MG/DL — LOW (ref 0.7–1.5)
EGFR: 108 ML/MIN/1.73M2 — SIGNIFICANT CHANGE UP
GLUCOSE BLDC GLUCOMTR-MCNC: 113 MG/DL — HIGH (ref 70–99)
GLUCOSE BLDC GLUCOMTR-MCNC: 121 MG/DL — HIGH (ref 70–99)
GLUCOSE BLDC GLUCOMTR-MCNC: 160 MG/DL — HIGH (ref 70–99)
GLUCOSE BLDC GLUCOMTR-MCNC: 182 MG/DL — HIGH (ref 70–99)
GLUCOSE SERPL-MCNC: 174 MG/DL — HIGH (ref 70–99)
HCT VFR BLD CALC: 41.4 % — SIGNIFICANT CHANGE UP (ref 37–47)
HGB BLD-MCNC: 13.9 G/DL — SIGNIFICANT CHANGE UP (ref 12–16)
MAGNESIUM SERPL-MCNC: 2 MG/DL — SIGNIFICANT CHANGE UP (ref 1.8–2.4)
MCHC RBC-ENTMCNC: 29.5 PG — SIGNIFICANT CHANGE UP (ref 27–31)
MCHC RBC-ENTMCNC: 33.6 G/DL — SIGNIFICANT CHANGE UP (ref 32–37)
MCV RBC AUTO: 87.9 FL — SIGNIFICANT CHANGE UP (ref 81–99)
NRBC # BLD: 0 /100 WBCS — SIGNIFICANT CHANGE UP (ref 0–0)
PLATELET # BLD AUTO: 207 K/UL — SIGNIFICANT CHANGE UP (ref 130–400)
PMV BLD: 10 FL — SIGNIFICANT CHANGE UP (ref 7.4–10.4)
POTASSIUM SERPL-MCNC: 4 MMOL/L — SIGNIFICANT CHANGE UP (ref 3.5–5)
POTASSIUM SERPL-SCNC: 4 MMOL/L — SIGNIFICANT CHANGE UP (ref 3.5–5)
PROT SERPL-MCNC: 6.1 G/DL — SIGNIFICANT CHANGE UP (ref 6–8)
RBC # BLD: 4.71 M/UL — SIGNIFICANT CHANGE UP (ref 4.2–5.4)
RBC # FLD: 14.9 % — HIGH (ref 11.5–14.5)
SODIUM SERPL-SCNC: 141 MMOL/L — SIGNIFICANT CHANGE UP (ref 135–146)
WBC # BLD: 6.17 K/UL — SIGNIFICANT CHANGE UP (ref 4.8–10.8)
WBC # FLD AUTO: 6.17 K/UL — SIGNIFICANT CHANGE UP (ref 4.8–10.8)

## 2024-02-19 PROCEDURE — 99232 SBSQ HOSP IP/OBS MODERATE 35: CPT

## 2024-02-19 RX ORDER — ACETAMINOPHEN 500 MG
975 TABLET ORAL EVERY 8 HOURS
Refills: 0 | Status: DISCONTINUED | OUTPATIENT
Start: 2024-02-19 | End: 2024-02-21

## 2024-02-19 RX ADMIN — Medication 20 UNIT(S): at 12:25

## 2024-02-19 RX ADMIN — Medication 150 MILLIGRAM(S): at 05:48

## 2024-02-19 RX ADMIN — INSULIN GLARGINE 45 UNIT(S): 100 INJECTION, SOLUTION SUBCUTANEOUS at 12:32

## 2024-02-19 RX ADMIN — Medication 3 MILLIGRAM(S): at 22:16

## 2024-02-19 RX ADMIN — Medication 2: at 12:26

## 2024-02-19 RX ADMIN — Medication 50 MICROGRAM(S): at 05:48

## 2024-02-19 RX ADMIN — Medication 20 UNIT(S): at 17:23

## 2024-02-19 RX ADMIN — ENOXAPARIN SODIUM 40 MILLIGRAM(S): 100 INJECTION SUBCUTANEOUS at 05:51

## 2024-02-19 RX ADMIN — INSULIN GLARGINE 20 UNIT(S): 100 INJECTION, SOLUTION SUBCUTANEOUS at 21:46

## 2024-02-19 RX ADMIN — Medication 100 MILLIGRAM(S): at 05:48

## 2024-02-19 RX ADMIN — Medication 20 MILLIGRAM(S): at 22:16

## 2024-02-19 RX ADMIN — Medication 2: at 08:12

## 2024-02-19 RX ADMIN — Medication 20 UNIT(S): at 08:12

## 2024-02-19 RX ADMIN — Medication 150 MILLIGRAM(S): at 22:15

## 2024-02-19 RX ADMIN — LOSARTAN POTASSIUM 50 MILLIGRAM(S): 100 TABLET, FILM COATED ORAL at 05:48

## 2024-02-19 RX ADMIN — Medication 150 MILLIGRAM(S): at 15:06

## 2024-02-19 RX ADMIN — Medication 20 MILLIGRAM(S): at 05:48

## 2024-02-19 RX ADMIN — Medication 20 MILLIGRAM(S): at 15:06

## 2024-02-19 RX ADMIN — Medication 1 APPLICATORFUL: at 17:22

## 2024-02-19 RX ADMIN — Medication 975 MILLIGRAM(S): at 22:16

## 2024-02-19 NOTE — PROGRESS NOTE ADULT - ASSESSMENT
58-year-old female with PMH of HTN, HLD, IDDM noncompliant with her insulin, neuropathy, HTN, hypothyroidism, mood disorder with 3 prior inpatient psychiatric hospitalizations, recent admission from 2/1 - 2/6 for DKA presents ED for evaluation of generalized weakness over the last few days. 2/2 noncompliance to medications, dehydration 2/2 being unable to take care of herself. needs placement to LTF    #Functional Decline due to Generalized weakness likely due to uncontrolled DM   #Multiple hospital admissions  - PT recommends outpatient PT but need placement for medication managements    #Hx of mood disorder  - previous noted on risperidone May 2023 but not recent psych medications    #Mild DKA due to non-compliance, fsg stable at this time  #Pseudohyponatremia   #Diabetic neuropathy   - Lantus 45 units qAM, Lantus 20 units at bedtime   - 20 units Lispro with meals   - A1c 13.8   - c/w baclofen and pregabalin  -per pain mgmt note 2/5/24:  change tylenol to 975mg q8hr  c/w lyrica 150mg q8hr  start celebrex 200mg BID  c/w baclofen 20mg q8hr  can add tramadol 50mg q8hr prn   follow up with her pain physician outpatient for another epidural steroid injection  she has had two without relief thus far  can d/c on this regimen    #Chronic pain 2/2 lumbar radiculopathy, neuropathy  - c/w baclofen and pregabalin  -see above pain mgmt recs     #HTN, stable  - c/w losartan and metoprolol    #Hypothyroidism  - c/w levothyroxine  - TSH elevated on 2/2 - 5.52, has been on levothyroxine with a hx of non-compliance; pt needs repeat thyroid studies in March     DVT ppx: lovenox  GI ppx: protonix  Code: full  Diet: DASH with CC    Dispo: needs placement SNF vs. LTC; she has had multiple hospital readmissions due to issues with taking her medications

## 2024-02-20 ENCOUNTER — TRANSCRIPTION ENCOUNTER (OUTPATIENT)
Age: 59
End: 2024-02-20

## 2024-02-20 LAB
GLUCOSE BLDC GLUCOMTR-MCNC: 104 MG/DL — HIGH (ref 70–99)
GLUCOSE BLDC GLUCOMTR-MCNC: 140 MG/DL — HIGH (ref 70–99)
GLUCOSE BLDC GLUCOMTR-MCNC: 173 MG/DL — HIGH (ref 70–99)
GLUCOSE BLDC GLUCOMTR-MCNC: 175 MG/DL — HIGH (ref 70–99)
GLUCOSE BLDC GLUCOMTR-MCNC: 236 MG/DL — HIGH (ref 70–99)

## 2024-02-20 PROCEDURE — 99231 SBSQ HOSP IP/OBS SF/LOW 25: CPT

## 2024-02-20 RX ORDER — INSULIN GLARGINE 100 [IU]/ML
20 INJECTION, SOLUTION SUBCUTANEOUS
Qty: 0 | Refills: 0 | DISCHARGE
Start: 2024-02-20

## 2024-02-20 RX ORDER — INSULIN LISPRO 100/ML
8 VIAL (ML) SUBCUTANEOUS
Qty: 0 | Refills: 0 | DISCHARGE
Start: 2024-02-20

## 2024-02-20 RX ORDER — INSULIN GLARGINE 100 [IU]/ML
45 INJECTION, SOLUTION SUBCUTANEOUS
Qty: 0 | Refills: 0 | DISCHARGE
Start: 2024-02-20

## 2024-02-20 RX ORDER — CHLORHEXIDINE GLUCONATE 213 G/1000ML
1 SOLUTION TOPICAL
Refills: 0 | Status: DISCONTINUED | OUTPATIENT
Start: 2024-02-20 | End: 2024-02-21

## 2024-02-20 RX ADMIN — Medication 150 MILLIGRAM(S): at 06:26

## 2024-02-20 RX ADMIN — Medication 150 MILLIGRAM(S): at 14:11

## 2024-02-20 RX ADMIN — Medication 975 MILLIGRAM(S): at 06:25

## 2024-02-20 RX ADMIN — Medication 2: at 17:36

## 2024-02-20 RX ADMIN — Medication 20 MILLIGRAM(S): at 22:10

## 2024-02-20 RX ADMIN — Medication 20 UNIT(S): at 17:36

## 2024-02-20 RX ADMIN — Medication 150 MILLIGRAM(S): at 22:10

## 2024-02-20 RX ADMIN — Medication 1 APPLICATORFUL: at 11:26

## 2024-02-20 RX ADMIN — LOSARTAN POTASSIUM 50 MILLIGRAM(S): 100 TABLET, FILM COATED ORAL at 06:24

## 2024-02-20 RX ADMIN — Medication 4: at 08:23

## 2024-02-20 RX ADMIN — Medication 975 MILLIGRAM(S): at 22:11

## 2024-02-20 RX ADMIN — INSULIN GLARGINE 20 UNIT(S): 100 INJECTION, SOLUTION SUBCUTANEOUS at 22:09

## 2024-02-20 RX ADMIN — INSULIN GLARGINE 45 UNIT(S): 100 INJECTION, SOLUTION SUBCUTANEOUS at 08:23

## 2024-02-20 RX ADMIN — Medication 100 MILLIGRAM(S): at 06:24

## 2024-02-20 RX ADMIN — Medication 3 MILLIGRAM(S): at 22:10

## 2024-02-20 RX ADMIN — ENOXAPARIN SODIUM 40 MILLIGRAM(S): 100 INJECTION SUBCUTANEOUS at 06:27

## 2024-02-20 RX ADMIN — Medication 975 MILLIGRAM(S): at 14:11

## 2024-02-20 RX ADMIN — Medication 20 MILLIGRAM(S): at 06:25

## 2024-02-20 RX ADMIN — Medication 20 UNIT(S): at 08:23

## 2024-02-20 RX ADMIN — Medication 20 MILLIGRAM(S): at 14:11

## 2024-02-20 RX ADMIN — Medication 20 UNIT(S): at 12:13

## 2024-02-20 RX ADMIN — Medication 975 MILLIGRAM(S): at 14:41

## 2024-02-20 RX ADMIN — Medication 50 MICROGRAM(S): at 06:25

## 2024-02-20 NOTE — DISCHARGE NOTE PROVIDER - PROVIDER TOKENS
PROVIDER:[TOKEN:[[id number]],FOLLOWUP:[1 week]] PROVIDER:[TOKEN:[[id number]],FOLLOWUP:[1 week]],PROVIDER:[TOKEN:[[id number]],FOLLOWUP:[2 weeks]]

## 2024-02-20 NOTE — DISCHARGE NOTE NURSING/CASE MANAGEMENT/SOCIAL WORK - PATIENT PORTAL LINK FT
You can access the FollowMyHealth Patient Portal offered by [facility] by registering at the following website: [email or web address]. By joining [employer]’s FollowMyHealth portal, you will also be able to view your health information using other applications (apps) compatible with our system.

## 2024-02-20 NOTE — PROGRESS NOTE ADULT - ASSESSMENT
58-year-old female with PMH of HTN, HLD, IDDM noncompliant with her insulin, neuropathy, HTN, hypothyroidism, mood disorder with 3 prior inpatient psychiatric hospitalizations, recent admission from 2/1 - 2/6 for DKA presents ED for evaluation of generalized weakness over the last few days. 2/2 noncompliance to medications, dehydration 2/2 being unable to take care of herself. needs placement to LTF    #Functional Decline due to Generalized weakness likely due to uncontrolled DM   #Multiple hospital admissions  - PT recommends outpatient PT but need placement for medication managements    #Hx of mood disorder  - previous noted on risperidone May 2023 but not recent psych medications    #Mild DKA due to non-compliance, fsg stable at this time  #Pseudohyponatremia   #Diabetic neuropathy   - Lantus 45 units qAM, Lantus 20 units at bedtime   - 20 units Lispro with meals   - on DC should be on insulin   - A1c 13.8   - c/w baclofen and pregabalin  -per pain mgmt note 2/5/24:  tylenol to 975mg q8hr  c/w lyrica 150mg q8hr  celebrex 200mg BID  c/w baclofen 20mg q8hr  follow up with her pain physician outpatient for another epidural steroid injection        #Chronic pain 2/2 lumbar radiculopathy, neuropathy  - c/w baclofen and pregabalin  -see above pain mgmt recs     #HTN, stable  - c/w losartan and metoprolol    #Hypothyroidism  - c/w levothyroxine 50mcg qd   - TSH elevated on 2/2 - 5.52, has been on levothyroxine with a hx of non-compliance; pt needs repeat thyroid studies on March DVT ppx: lovenox  Code: full  Diet: DASH with CC    Dispo: needs placement SNF vs. LTC; she has had multiple hospital readmissions due to issues with taking her medications  58-year-old female with PMH of HTN, HLD, IDDM noncompliant with her insulin, neuropathy, HTN, hypothyroidism, mood disorder with 3 prior inpatient psychiatric hospitalizations, recent admission from 2/1 - 2/6 for DKA presents ED for evaluation of generalized weakness over the last few days. 2/2 noncompliance to medications, dehydration 2/2 being unable to take care of herself. needs placement to LTF    #Functional Decline due to Generalized weakness likely due to uncontrolled DM   #Multiple hospital admissions  - PT recommends outpatient PT but need placement for medication managements    #Hx of mood disorder  - previous noted on risperidone May 2023 but not recent psych medications    #Mild DKA due to non-compliance, fsg stable at this time  #Pseudohyponatremia   #Diabetic neuropathy   - Lantus 45 units qAM, Lantus 20 units at bedtime   - 20 units Lispro with meals   - on DC should be on insulin   - A1c 13.8   - c/w baclofen and pregabalin  -per pain mgmt note 2/5/24:  tylenol to 975mg q8hr  c/w lyrica 150mg q8hr  celebrex 200mg BID  c/w baclofen 20mg q8hr  follow up with her pain physician outpatient for another epidural steroid injection        #Chronic pain 2/2 lumbar radiculopathy, neuropathy  - c/w baclofen and pregabalin  -see above pain mgmt recs     #HTN, stable  - c/w losartan and metoprolol    #Hypothyroidism  - c/w levothyroxine 50mcg qd   - TSH elevated on 2/2 - 5.52, has been on levothyroxine with a hx of non-compliance; pt needs repeat thyroid studies on March DVT ppx: lovenox  Code: full  Diet: DASH with CC    Dispo: needs placement SNF vs. LTC; later decided to go home w home care, pt needs to be educated to use insulin, send DM supplies to [facility]

## 2024-02-20 NOTE — DISCHARGE NOTE PROVIDER - ATTENDING DISCHARGE PHYSICAL EXAMINATION:
PHYSICAL EXAM:  General: WDWN  HEENT: NC/AT; PERRL, clear conjunctiva  Neck: supple  Cardiovascular: +S1/S2; RRR  Respiratory: CTA b/l; no W/R/R  Gastrointestinal: soft, NT/ND; +BSx4  Extremities: WWP; 2+ peripheral pulses; no edema   Neurological: AAOx3; no focal deficits    Vital Signs Last 24 Hrs  T(C): 36.2 (21 Feb 2024 08:52), Max: 36.7 (20 Feb 2024 16:24)  T(F): 97.1 (21 Feb 2024 08:52), Max: 98.1 (20 Feb 2024 16:24)  HR: 81 (21 Feb 2024 08:52) (81 - 86)  BP: 143/82 (21 Feb 2024 08:52) (134/74 - 156/83)  BP(mean): --  RR: 18 (21 Feb 2024 08:52) (18 - 18)  SpO2: 98% (21 Feb 2024 08:52) (96% - 98%)    Parameters below as of 20 Feb 2024 16:24  Patient On (Oxygen Delivery Method): room air

## 2024-02-20 NOTE — DISCHARGE NOTE PROVIDER - HOSPITAL COURSE
58-year-old female with PMH of HTN, HLD, IDDM noncompliant with her insulin, neuropathy, HTN, hypothyroidism, mood disorder with 3 prior inpatient psychiatric hospitalizations, recent admission from 2/1 - 2/6 for DKA presents ED for evaluation of generalized weakness over the last few days. 2/2 noncompliance to medications, dehydration 2/2 being unable to take care of herself. She was admitted for placement.    #Functional Decline due to Generalized weakness likely due to uncontrolled DM   #Multiple hospital admissions  - PT recommends outpatient PT but need placement for medication managements    #Hx of mood disorder  - previous noted on risperidone May 2023 but not recent psych medications    #Mild DKA due to non-compliance, fsg stable at this time  #Pseudohyponatremia   #Diabetic neuropathy   - Lantus 45 units qAM, Lantus 20 units at bedtime   - 20 units Lispro with meals   - A1c 13.8   - c/w baclofen and pregabalin  -per pain mgmt note 2/5/24:  change tylenol to 975mg q8hr  c/w lyrica 150mg q8hr  start celebrex 200mg BID  c/w baclofen 20mg q8hr  can add tramadol 50mg q8hr prn   follow up with her pain physician outpatient for another epidural steroid injection  she has had two without relief thus far  can d/c on this regimen    #Chronic pain 2/2 lumbar radiculopathy, neuropathy  - c/w baclofen and pregabalin  -see above pain mgmt recs     #HTN, stable  - c/w losartan and metoprolol    #Hypothyroidism  - c/w levothyroxine  - TSH elevated on 2/2 - 5.52, has been on levothyroxine with a hx of non-compliance; pt needs repeat thyroid studies in March     DVT ppx: lovenox  GI ppx: protonix  Code: full  Diet: DASH with CC 58-year-old female with PMH of HTN, HLD, IDDM noncompliant with her insulin, neuropathy, HTN, hypothyroidism, mood disorder with 3 prior inpatient psychiatric hospitalizations, recent admission from 2/1 - 2/6 for DKA presents ED for evaluation of generalized weakness over the last few days. 2/2 noncompliance to medications, dehydration 2/2 being unable to take care of herself. She was admitted for placement. Discharged home after education on how to administer insulin and monitor FS at home.     #Functional Decline due to Generalized weakness likely due to uncontrolled DM   #Multiple hospital admissions  - PT recommends outpatient PT but need placement for medication managements    #Hx of mood disorder  - previous noted on risperidone May 2023 but not recent psych medications    #Mild DKA due to non-compliance, fsg stable at this time  #Pseudohyponatremia   #Diabetic neuropathy   - Lantus 45 units qAM, Lantus 20 units at bedtime   - 20 units Lispro with meals   - A1c 13.8   - c/w baclofen and pregabalin  -per pain mgmt note 2/5/24:  change tylenol to 975mg q8hr  c/w lyrica 150mg q8hr  start celebrex 200mg BID  c/w baclofen 20mg q8hr  can add tramadol 50mg q8hr prn   follow up with her pain physician outpatient for another epidural steroid injection  she has had two without relief thus far  can d/c on this regimen    #Chronic pain 2/2 lumbar radiculopathy, neuropathy  - c/w baclofen and pregabalin  -see above pain mgmt recs     #HTN, stable  - c/w losartan and metoprolol    #Hypothyroidism  - c/w levothyroxine  - TSH elevated on 2/2 - 5.52, has been on levothyroxine with a hx of non-compliance; pt needs repeat thyroid studies in March     DVT ppx: lovenox  GI ppx: protonix  Code: full  Diet: DASH with CC 58-year-old female with PMH of HTN, HLD, IDDM noncompliant with her insulin, neuropathy, HTN, hypothyroidism, mood disorder with 3 prior inpatient psychiatric hospitalizations, recent admission from 2/1 - 2/6 for DKA presents ED for evaluation of generalized weakness over the last few days. 2/2 noncompliance to medications, dehydration 2/2 being unable to take care of herself. She was admitted for placement. Discharged home after education on how to administer insulin and monitor FS at home.     #Functional Decline due to Generalized weakness likely due to uncontrolled DM   #Multiple hospital admissions  - PT recommends outpatient PT but need placement for medication managements    #Hx of mood disorder  - previous noted on risperidone May 2023 but not recent psych medications     #Mild DKA due to non-compliance, fsg stable at this time  #Pseudohyponatremia   #Diabetic neuropathy   - Lantus 45 units qAM, Lantus 20 units at bedtime   - 20 units Lispro with meals   - A1c 13.8   - c/w baclofen and pregabalin  -per pain mgmt note 2/5/24:  change tylenol to 975mg q8hr  c/w lyrica 150mg q8hr  start celebrex 200mg BID  c/w baclofen 20mg q8hr  can add tramadol 50mg q8hr prn   follow up with her pain physician outpatient for another epidural steroid injection  she has had two without relief thus far  can d/c on this regimen    #Chronic pain 2/2 lumbar radiculopathy, neuropathy  - c/w baclofen and pregabalin  -see above pain mgmt recs     #HTN, stable  - c/w losartan and metoprolol    #Hypothyroidism  - c/w levothyroxine  - TSH elevated on 2/2 - 5.52, has been on levothyroxine with a hx of non-compliance; pt needs repeat thyroid studies in March     DVT ppx: lovenox  GI ppx: protonix  Code: full  Diet: DASH with CC

## 2024-02-20 NOTE — DISCHARGE NOTE PROVIDER - CARE PROVIDER_API CALL
Jil Garner  Internal Medicine  [address] [place] [id number]  Phone: [phone]  Fax: [phone]  Follow Up Time: 1 week   Jil Garner  Internal Medicine  [address] [place] [address]  Phone: [phone]  Fax: [phone]  Follow Up Time: 1 week    Mojgan Oden  Endocrinology/Metab/Diabetes  [address] [facility]  [place] [address]  Phone: [phone]  Fax: [phone]  Follow Up Time: 2 weeks

## 2024-02-20 NOTE — DISCHARGE NOTE NURSING/CASE MANAGEMENT/SOCIAL WORK - NSDCPEFALRISK_GEN_ALL_CORE
For information on Fall & Injury Prevention, visit: [email or web address] OR  [email or web address] OR  https://www.cdc.gov/steadi/patient.html

## 2024-02-20 NOTE — DISCHARGE NOTE PROVIDER - NSDCMRMEDTOKEN_GEN_ALL_CORE_FT
baclofen 20 mg oral tablet: 1 tab(s) orally every 8 hours  celecoxib 200 mg oral capsule: 1 cap(s) orally 2 times a day as needed for  moderate pain  insulin glargine 100 units/mL subcutaneous solution: 20 unit(s) subcutaneous once a day (at bedtime)  insulin glargine 100 units/mL subcutaneous solution: 45 unit(s) subcutaneous once a day (in the morning)  insulin lispro 100 units/mL injectable solution: 20 unit(s) injectable 3 times a day (before meals)  Jardiance 25 mg oral tablet: 1 tab(s) orally once a day  levothyroxine 50 mcg (0.05 mg) oral tablet: 1 tab(s) orally once a day  losartan 50 mg oral tablet: 1 tab(s) orally once a day  metoprolol succinate 100 mg oral capsule, extended release: 1 cap(s) orally once a day  pregabalin 150 mg oral capsule: 1 cap(s) orally every 8 hours   alcohol swabs: Apply topically to affected area 4 times a day  baclofen 20 mg oral tablet: 1 tab(s) orally every 8 hours  celecoxib 200 mg oral capsule: 1 cap(s) orally 2 times a day as needed for  moderate pain  Freestyle Precision Samir Strips: Test blood sugar four times a day when you see check blood glucose symbol or when symptoms don&#x27;t match Marycruz reading.  glucometer (per patient&#x27;s insurance): Test blood sugars four times a day. Dispense #1 glucometer.  insulin glargine 100 units/mL subcutaneous solution: 20 unit(s) subcutaneous once a day (at bedtime)  insulin glargine 100 units/mL subcutaneous solution: 20 unit(s) subcutaneous once a day (at bedtime)  insulin glargine 100 units/mL subcutaneous solution: 45 unit(s) subcutaneous once a day (in the morning)  insulin lispro 100 units/mL injectable solution: 20 unit(s) injectable 3 times a day (before meals)  Insulin Pen Needles, 4mm: 1 application subcutaneously 4 times a day. ** Use with insulin pen **  Jardiance 25 mg oral tablet: 1 tab(s) orally once a day  lancets: 1 application subcutaneously 4 times a day  levothyroxine 50 mcg (0.05 mg) oral tablet: 1 tab(s) orally once a day  losartan 50 mg oral tablet: 1 tab(s) orally once a day  metoprolol succinate 100 mg oral capsule, extended release: 1 cap(s) orally once a day  pregabalin 150 mg oral capsule: 1 cap(s) orally every 8 hours   baclofen 20 mg oral tablet: 1 tab(s) orally every 8 hours  celecoxib 200 mg oral capsule: 1 cap(s) orally 2 times a day as needed for  moderate pain  insulin glargine 100 units/mL subcutaneous solution: 45 unit(s) subcutaneous once a day (in the morning)  insulin lispro 100 units/mL injectable solution: 20 unit(s) injectable 3 times a day (before meals)  Jardiance 25 mg oral tablet: 1 tab(s) orally once a day  levothyroxine 50 mcg (0.05 mg) oral tablet: 1 tab(s) orally once a day  losartan 50 mg oral tablet: 1 tab(s) orally once a day  metoprolol succinate 100 mg oral capsule, extended release: 1 cap(s) orally once a day  pregabalin 150 mg oral capsule: 1 cap(s) orally every 8 hours   baclofen 20 mg oral tablet: 1 tab(s) orally every 8 hours  celecoxib 200 mg oral capsule: 1 cap(s) orally 2 times a day as needed for  moderate pain  insulin glargine 100 units/mL subcutaneous solution: 45 unit(s) subcutaneous once a day (in the morning)  insulin lispro 100 units/mL injectable solution: 20 unit(s) injectable 3 times a day (before meals)  Insulin Lispro KwikPen 100 units/mL injectable solution: 20 unit(s) injectable 3 times a day (before meals)  Jardiance 25 mg oral tablet: 1 tab(s) orally once a day  levothyroxine 50 mcg (0.05 mg) oral tablet: 1 tab(s) orally once a day  losartan 50 mg oral tablet: 1 tab(s) orally once a day  metoprolol succinate 100 mg oral capsule, extended release: 1 cap(s) orally once a day  pregabalin 150 mg oral capsule: 1 cap(s) orally every 8 hours

## 2024-02-20 NOTE — DISCHARGE NOTE PROVIDER - CARE PROVIDERS DIRECT ADDRESSES
[email or web address]

## 2024-02-20 NOTE — DISCHARGE NOTE PROVIDER - NSDCCPCAREPLAN_GEN_ALL_CORE_FT
PRINCIPAL DISCHARGE DIAGNOSIS  Diagnosis: Generalized weakness  Assessment and Plan of Treatment: Weakness is a lack of strength. You may feel weak all over your body (generalized), or you may feel weak in one specific part of your body (focal). There are many potential causes of weakness. Sometimes, the cause of your weakness may not be known. Some causes of weakness can be serious, so it is important to see your doctor.  Follow these instructions at home:  Rest as needed.  Try to get enough sleep. Talk to your doctor about how much sleep you need each night.  Take over-the-counter and prescription medicines only as told by your doctor.  Eat a healthy, well-balanced diet. This includes:  Proteins to build muscles, such as lean meats and fish.  Fresh fruits and vegetables.  Carbohydrates to boost energy, such as whole grains.  Drink enough fluid to keep your pee (urine) clear or pale yellow.  Do strength exercises, such as arm curls and leg raises, for 30 minutes at least 2 days a week or as told by your doctor.  Think about working with a physical therapist or  to help you get stronger.  Keep all follow-up visits as told by your doctor. This is important.  Contact a doctor if:  Your weakness does not get better or it gets worse.  Your weakness affects your ability to:  Think clearly.  Do your normal daily activities.  Get help right away if:  You have sudden weakness.  You have trouble breathing or shortness of breath.  You have problems with your vision.  You have trouble talking or swallowing.  You have trouble standing or walking.  You have chest pain.  You are light-headed.  You pass out (lose consciousness).  This information is not intended to replace advice given to you by your health care provider. Make sure you discuss any questions you have with your health care provider.        SECONDARY DISCHARGE DIAGNOSES  Diagnosis: Unable to ambulate  Assessment and Plan of Treatment:      PRINCIPAL DISCHARGE DIAGNOSIS  Diagnosis: Generalized weakness  Assessment and Plan of Treatment: Weakness is a lack of strength. You may feel weak all over your body (generalized), or you may feel weak in one specific part of your body (focal). There are many potential causes of weakness. Sometimes, the cause of your weakness may not be known. Some causes of weakness can be serious, so it is important to see your doctor.  Follow these instructions at home:  Rest as needed.  Try to get enough sleep. Talk to your doctor about how much sleep you need each night.  Take over-the-counter and prescription medicines only as told by your doctor.  Eat a healthy, well-balanced diet. This includes:  Proteins to build muscles, such as lean meats and fish.  Fresh fruits and vegetables.  Carbohydrates to boost energy, such as whole grains.  Drink enough fluid to keep your pee (urine) clear or pale yellow.  Do strength exercises, such as arm curls and leg raises, for 30 minutes at least 2 days a week or as told by your doctor.  Think about working with a physical therapist or  to help you get stronger.  Keep all follow-up visits as told by your doctor. This is important.  Contact a doctor if:  Your weakness does not get better or it gets worse.  Your weakness affects your ability to:  Think clearly.  Do your normal daily activities.  Get help right away if:  You have sudden weakness.  You have trouble breathing or shortness of breath.  You have problems with your vision.  You have trouble talking or swallowing.  You have trouble standing or walking.  You have chest pain.  You are light-headed.  You pass out (lose consciousness).  This information is not intended to replace advice given to you by your health care provider. Make sure you discuss any questions you have with your health care provider.        SECONDARY DISCHARGE DIAGNOSES  Diagnosis: Unable to ambulate  Assessment and Plan of Treatment:     Diagnosis: Hypothyroidism  Assessment and Plan of Treatment: Please take meds as prescribed. Need to have repeat thyroid function studies in 1 month.

## 2024-02-21 VITALS
DIASTOLIC BLOOD PRESSURE: 82 MMHG | TEMPERATURE: 97 F | SYSTOLIC BLOOD PRESSURE: 143 MMHG | HEART RATE: 81 BPM | RESPIRATION RATE: 18 BRPM | OXYGEN SATURATION: 98 %

## 2024-02-21 LAB
GLUCOSE BLDC GLUCOMTR-MCNC: 169 MG/DL — HIGH (ref 70–99)
GLUCOSE BLDC GLUCOMTR-MCNC: 180 MG/DL — HIGH (ref 70–99)
GLUCOSE BLDC GLUCOMTR-MCNC: 197 MG/DL — HIGH (ref 70–99)

## 2024-02-21 PROCEDURE — 99239 HOSP IP/OBS DSCHRG MGMT >30: CPT

## 2024-02-21 RX ORDER — ISOPROPYL ALCOHOL, BENZOCAINE .7; .06 ML/ML; ML/ML
0 SWAB TOPICAL
Qty: 100 | Refills: 3
Start: 2024-02-21

## 2024-02-21 RX ORDER — INSULIN GLARGINE 100 [IU]/ML
20 INJECTION, SOLUTION SUBCUTANEOUS
Qty: 1 | Refills: 3
Start: 2024-02-21

## 2024-02-21 RX ORDER — INSULIN GLARGINE 100 [IU]/ML
45 INJECTION, SOLUTION SUBCUTANEOUS
Qty: 1 | Refills: 3
Start: 2024-02-21

## 2024-02-21 RX ORDER — INSULIN GLARGINE-YFGN 100 [IU]/ML
30 INJECTION, SOLUTION SUBCUTANEOUS
Qty: 1 | Refills: 3 | DISCHARGE
Start: 2024-02-21

## 2024-02-21 RX ORDER — INSULIN LISPRO 100/ML
20 VIAL (ML) SUBCUTANEOUS
Qty: 1 | Refills: 3
Start: 2024-02-21

## 2024-02-21 RX ADMIN — Medication 2: at 12:18

## 2024-02-21 RX ADMIN — Medication 1 APPLICATORFUL: at 12:16

## 2024-02-21 RX ADMIN — ENOXAPARIN SODIUM 40 MILLIGRAM(S): 100 INJECTION SUBCUTANEOUS at 05:14

## 2024-02-21 RX ADMIN — Medication 20 MILLIGRAM(S): at 05:14

## 2024-02-21 RX ADMIN — Medication 975 MILLIGRAM(S): at 05:14

## 2024-02-21 RX ADMIN — Medication 2: at 08:47

## 2024-02-21 RX ADMIN — Medication 50 MICROGRAM(S): at 05:14

## 2024-02-21 RX ADMIN — Medication 2: at 17:17

## 2024-02-21 RX ADMIN — Medication 20 UNIT(S): at 17:17

## 2024-02-21 RX ADMIN — Medication 20 UNIT(S): at 08:47

## 2024-02-21 RX ADMIN — LOSARTAN POTASSIUM 50 MILLIGRAM(S): 100 TABLET, FILM COATED ORAL at 05:15

## 2024-02-21 RX ADMIN — CHLORHEXIDINE GLUCONATE 1 APPLICATION(S): 213 SOLUTION TOPICAL at 05:16

## 2024-02-21 RX ADMIN — Medication 975 MILLIGRAM(S): at 13:24

## 2024-02-21 RX ADMIN — Medication 150 MILLIGRAM(S): at 13:23

## 2024-02-21 RX ADMIN — Medication 20 MILLIGRAM(S): at 13:23

## 2024-02-21 RX ADMIN — INSULIN GLARGINE 45 UNIT(S): 100 INJECTION, SOLUTION SUBCUTANEOUS at 08:55

## 2024-02-21 RX ADMIN — Medication 20 UNIT(S): at 12:18

## 2024-02-21 RX ADMIN — Medication 975 MILLIGRAM(S): at 13:22

## 2024-02-21 RX ADMIN — Medication 150 MILLIGRAM(S): at 05:15

## 2024-02-21 RX ADMIN — Medication 100 MILLIGRAM(S): at 05:15

## 2024-02-21 NOTE — PROGRESS NOTE ADULT - SUBJECTIVE AND OBJECTIVE BOX
TIFFANI, DANISH  58y  Female      Patient is a 58y old  Female who presents with a chief complaint of placement, generalized weakness     INTERVAL HPI/OVERNIGHT EVENTS:      ******************************* REVIEW OF SYSTEMS:**********************************************    All other review of systems negative    *********************** VITALS ******************************************    T(F): 97.6 (02-16-24 @ 09:00)  HR: 64 (02-16-24 @ 09:00) (64 - 100)  BP: 133/64 (02-16-24 @ 09:00) (129/65 - 147/68)  RR: 18 (02-16-24 @ 09:00) (18 - 18)  SpO2: 99% (02-16-24 @ 09:00) (96% - 99%)            ******************************** PHYSICAL EXAM:**************************************************  GENERAL: NAD    PSYCH: no agitation, baseline mentation  HEENT:     NERVOUS SYSTEM:  Alert & Oriented X3,   PULMONARY: [place], CTA    CARDIOVASCULAR: S1S2 RRR    GI: Soft, NT, ND; BS present.    EXTREMITIES:  2+ Peripheral Pulses, No clubbing, cyanosis, or edema    LYMPH: No lymphadenopathy noted    SKIN: No rashes or lesions      **************************** LABS *******************************************************                          14.0   7.50  )-----------( 225      ( 16 Feb 2024 05:21 )             40.7     02-16    138  |  101  |  14  ----------------------------<  254<H>  3.9   |  24  |  0.6<L>    Ca    9.2      16 Feb 2024 05:21  Phos  4.0     02-15  Mg     2.1     02-16    TPro  6.7  /  Alb  3.7  /  TBili  0.3  /  DBili  x   /  AST  15  /  ALT  17  /  AlkPhos  224<H>  02-16      Urinalysis Basic - ( 16 Feb 2024 05:21 )    Color: x / Appearance: x / SG: x / pH: x  Gluc: 254 mg/dL / Ketone: x  / Bili: x / Urobili: x   Blood: x / Protein: x / Nitrite: x   Leuk Esterase: x / RBC: x / WBC x   Sq Epi: x / Non Sq Epi: x / Bacteria: x        Lactate Trend        CAPILLARY BLOOD GLUCOSE      POCT Blood Glucose.: 136 mg/dL (16 Feb 2024 11:26)          **************************Active Medications *******************************************  No Known Allergies      baclofen 5 milliGRAM(s) Oral every 8 hours PRN  celecoxib 200 milliGRAM(s) Oral two times a day PRN  dextrose 5%. 1000 milliLiter(s) IV Continuous <Continuous>  enoxaparin Injectable 40 milliGRAM(s) SubCutaneous every 24 hours  glucagon  Injectable 1 milliGRAM(s) IntraMuscular once  influenza   Vaccine 0.5 milliLiter(s) IntraMuscular once  insulin glargine Injectable (LANTUS) 45 Unit(s) SubCutaneous every morning  insulin glargine Injectable (LANTUS) 20 Unit(s) SubCutaneous at bedtime  insulin lispro (ADMELOG) corrective regimen sliding scale   SubCutaneous three times a day before meals  insulin lispro Injectable (ADMELOG) 20 Unit(s) SubCutaneous three times a day before meals  levothyroxine 50 MICROGram(s) Oral daily  losartan 50 milliGRAM(s) Oral daily  metoprolol succinate  milliGRAM(s) Oral daily  pregabalin 150 milliGRAM(s) Oral every 8 hours      ***************************************************  RADIOLOGY & ADDITIONAL TESTS:    Imaging Personally Reviewed:  [ ] YES  [ ] NO    HEALTH ISSUES - PROBLEM Dx:      
24H events:    Patient is a 58y old Female who presents with a chief complaint of placement, generalized weakness (15 Feb 2024 16:41)    Primary diagnosis of Generalized weakness    This morning patient was seen and examined at bedside, resting comfortably in bed.    No acute or major events overnight.    PAST MEDICAL & SURGICAL HISTORY  Hypertension    Diabetes mellitus    Thyroid disease    Anemia    S/P lumbar discectomy  2006.    S/P tonsillectomy        ALLERGIES:  No Known Allergies    MEDICATIONS:  STANDING MEDICATIONS  dextrose 5%. 1000 milliLiter(s) IV Continuous <Continuous>  enoxaparin Injectable 40 milliGRAM(s) SubCutaneous every 24 hours  glucagon  Injectable 1 milliGRAM(s) IntraMuscular once  influenza   Vaccine 0.5 milliLiter(s) IntraMuscular once  insulin glargine Injectable (LANTUS) 30 Unit(s) SubCutaneous at bedtime  insulin lispro (ADMELOG) corrective regimen sliding scale   SubCutaneous three times a day before meals  insulin lispro Injectable (ADMELOG) 20 Unit(s) SubCutaneous three times a day before meals  levothyroxine 50 MICROGram(s) Oral daily  losartan 50 milliGRAM(s) Oral daily  metoprolol succinate  milliGRAM(s) Oral daily  pregabalin 150 milliGRAM(s) Oral every 8 hours    PRN MEDICATIONS  baclofen 5 milliGRAM(s) Oral every 8 hours PRN  celecoxib 200 milliGRAM(s) Oral two times a day PRN    VITALS:   T(F): 97.2  HR: 89  BP: 147/68  RR: 18  SpO2: 96%    PHYSICAL EXAM:  GENERAL: NAD    HEART: S1, S2 with no murmurs heard on auscultation    LUNGS: bilaterally clear to auscultation with no added sounds    ABDOMEN: soft, lax, NTND    EXTREMITIES: peripheral pulses palpable, no pitting edema    NERVOUS SYSTEM:  AOx3, non-focal    SKIN: no rashes or lesions noted      LABS:                        14.0   7.50  )-----------( 225      ( 16 Feb 2024 05:21 )             40.7     02-16    138  |  101  |  14  ----------------------------<  254<H>  3.9   |  24  |  0.6<L>    Ca    9.2      16 Feb 2024 05:21  Phos  4.0     02-15  Mg     2.1     02-16    TPro  6.7  /  Alb  3.7  /  TBili  0.3  /  DBili  x   /  AST  15  /  ALT  17  /  AlkPhos  224<H>  02-16      Urinalysis Basic - ( 16 Feb 2024 05:21 )    Color: x / Appearance: x / SG: x / pH: x  Gluc: 254 mg/dL / Ketone: x  / Bili: x / Urobili: x   Blood: x / Protein: x / Nitrite: x   Leuk Esterase: x / RBC: x / WBC x   Sq Epi: x / Non Sq Epi: x / Bacteria: x                      
  DANISH NIXON  58y Female    Patient is a 58y old  Female who presents with a chief complaint of generalized weakness    INTERVAL HPI/OVERNIGHT EVENTS:    ROS: Pt admits to constant bilateral LE pain. The patient denies fever, chest pain, chills, SOB, palpitations, nausea, vomiting, abdominal pain, dysuria, diarrhea, constipation, rash. The patient is using walker to ambulate to the bathrrom.  Overnight events: No acute events overnight.    Vital Signs Last 24 Hrs  T(C): 36.2 (18 Feb 2024 12:03), Max: 36.2 (18 Feb 2024 07:39)  T(F): 97.2 (18 Feb 2024 12:03), Max: 97.2 (18 Feb 2024 07:39)  HR: 82 (18 Feb 2024 12:03) (78 - 84)  BP: 145/65 (18 Feb 2024 12:03) (135/66 - 145/65)  BP(mean): --  RR: 18 (18 Feb 2024 12:03) (18 - 18)  SpO2: 98% (18 Feb 2024 12:03) (95% - 98%)    Parameters below as of 18 Feb 2024 12:03  Patient On (Oxygen Delivery Method): room air    No Known Allergies    MEDICATIONS  (STANDING):  baclofen 20 milliGRAM(s) Oral every 8 hours  clotrimazole 2% Vaginal Cream 1 Applicatorful Vaginal daily  dextrose 5%. 1000 milliLiter(s) (100 mL/Hr) IV Continuous <Continuous>  enoxaparin Injectable 40 milliGRAM(s) SubCutaneous every 24 hours  glucagon  Injectable 1 milliGRAM(s) IntraMuscular once  influenza   Vaccine 0.5 milliLiter(s) IntraMuscular once  insulin glargine Injectable (LANTUS) 20 Unit(s) SubCutaneous at bedtime  insulin glargine Injectable (LANTUS) 45 Unit(s) SubCutaneous every morning  insulin lispro (ADMELOG) corrective regimen sliding scale   SubCutaneous three times a day before meals  insulin lispro Injectable (ADMELOG) 20 Unit(s) SubCutaneous three times a day before meals  levothyroxine 50 MICROGram(s) Oral daily  losartan 50 milliGRAM(s) Oral daily  melatonin 3 milliGRAM(s) Oral at bedtime  metoprolol succinate  milliGRAM(s) Oral daily  pregabalin 150 milliGRAM(s) Oral every 8 hours    MEDICATIONS  (PRN):  celecoxib 200 milliGRAM(s) Oral two times a day PRN for moderate pain      PHYSICAL EXAM:  General Appearance: NAD, normal for age and gender. 	  Neck: Normal JVP, no bruit.   Eyes: Conjunctiva clear, Extra Ocular muscles intact. No scleral icterus.  ENMT: Moist oral mucosa. No oral lesion.  Cardiovascular: Regular rate and rhythm S1 S2, No JVD, No murmurs.  Respiratory: Bilateral air entry. No wheezes, rales or rhonchi.  Psychiatry: Alert and oriented x 3, Mood & affect appropriate.  Gastrointestinal:  Soft, Non-tender, Non-distended.  Neurologic: Non-focal deficits.  Musculoskeletal/ extremities: Normal range of motion, No clubbing, cyanosis or edema.  Vascular: Peripheral pulses palpable bilaterally.  Skin/Integumen: No rashes, No ecchymoses, No cyanosis.    LABS:                        14.8   7.23  )-----------( 215      ( 17 Feb 2024 08:53 )             42.9     02-17    133<L>  |  98  |  20  ----------------------------<  145<H>  3.5   |  25  |  <0.5<L>    Ca    9.3      17 Feb 2024 08:53  Mg     2.1     02-17    TPro  6.5  /  Alb  3.7  /  TBili  0.3  /  DBili  x   /  AST  17  /  ALT  15  /  AlkPhos  219<H>  02-17          RADIOLOGY & ADDITIONAL TESTS:            
Patient is a 58y old  Female who presents with a chief complaint of placement, generalized weakness (16 Feb 2024 13:07)      Patient seen and examined at bedside.  Patient denies any chest pain or shortness of breath   ALLERGIES:  No Known Allergies    MEDICATIONS:  baclofen 5 milliGRAM(s) Oral every 8 hours PRN  celecoxib 200 milliGRAM(s) Oral two times a day PRN  dextrose 5%. 1000 milliLiter(s) IV Continuous <Continuous>  enoxaparin Injectable 40 milliGRAM(s) SubCutaneous every 24 hours  glucagon  Injectable 1 milliGRAM(s) IntraMuscular once  influenza   Vaccine 0.5 milliLiter(s) IntraMuscular once  insulin glargine Injectable (LANTUS) 20 Unit(s) SubCutaneous at bedtime  insulin glargine Injectable (LANTUS) 45 Unit(s) SubCutaneous every morning  insulin lispro (ADMELOG) corrective regimen sliding scale   SubCutaneous three times a day before meals  insulin lispro Injectable (ADMELOG) 20 Unit(s) SubCutaneous three times a day before meals  levothyroxine 50 MICROGram(s) Oral daily  losartan 50 milliGRAM(s) Oral daily  melatonin 3 milliGRAM(s) Oral at bedtime  metoprolol succinate  milliGRAM(s) Oral daily  pregabalin 150 milliGRAM(s) Oral every 8 hours    Vital Signs Last 24 Hrs  T(F): 98 (17 Feb 2024 09:46), Max: 98.1 (16 Feb 2024 16:15)  HR: 94 (17 Feb 2024 09:46) (72 - 94)  BP: 136/70 (17 Feb 2024 09:46) (103/53 - 136/70)  RR: 18 (17 Feb 2024 09:46) (18 - 18)  SpO2: 98% (17 Feb 2024 09:46) (96% - 98%)  I&O's Summary    17 Feb 2024 07:01  -  17 Feb 2024 14:39  --------------------------------------------------------  IN: 300 mL / OUT: 350 mL / NET: -50 mL        PHYSICAL EXAM:  General: NAD, A/O x 3  ENT: MMM  Neck: Supple, No JVD  Lungs: Clear to auscultation bilaterally  Cardio: RRR, S1/S2, No murmurs  Abdomen: Soft, Nontender, Nondistended; Bowel sounds present  Extremities: No cyanosis, No edema    LABS:                        14.8   7.23  )-----------( 215      ( 17 Feb 2024 08:53 )             42.9     02-17    133  |  98  |  20  ----------------------------<  145  3.5   |  25  |  <0.5    Ca    9.3      17 Feb 2024 08:53  Phos  4.0     02-15  Mg     2.1     02-17    TPro  6.5  /  Alb  3.7  /  TBili  0.3  /  DBili  x   /  AST  17  /  ALT  15  /  AlkPhos  219  02-17              02-02 Chol 153 mg/dL LDL -- HDL 38 mg/dL Trig 117 mg/dL    15:10 - VBG - pH: 7.38  | pCO2: 50    | pO2: 29    | Lactate: 1.2              POCT Blood Glucose.: 119 mg/dL (17 Feb 2024 11:46)  POCT Blood Glucose.: 179 mg/dL (17 Feb 2024 09:53)  POCT Blood Glucose.: 150 mg/dL (17 Feb 2024 08:20)  POCT Blood Glucose.: 179 mg/dL (16 Feb 2024 21:14)  POCT Blood Glucose.: 94 mg/dL (16 Feb 2024 17:03)      Urinalysis Basic - ( 17 Feb 2024 08:53 )    Color: x / Appearance: x / SG: x / pH: x  Gluc: 145 mg/dL / Ketone: x  / Bili: x / Urobili: x   Blood: x / Protein: x / Nitrite: x   Leuk Esterase: x / RBC: x / WBC x   Sq Epi: x / Non Sq Epi: x / Bacteria: x            RADIOLOGY & ADDITIONAL TESTS:    Care Discussed with Consultants/Other Providers: 
Patient is a 58y old  Female who presents with a chief complaint of placement, generalized weakness (19 Feb 2024 14:22)      OVERNIGHT EVENTS: no major events     SUBJECTIVE / INTERVAL HPI: Patient seen and examined at bedside.     VITAL SIGNS:  Vital Signs Last 24 Hrs  T(C): 36.5 (20 Feb 2024 09:30), Max: 36.6 (19 Feb 2024 16:00)  T(F): 97.7 (20 Feb 2024 09:30), Max: 97.9 (19 Feb 2024 16:00)  HR: 78 (20 Feb 2024 09:30) (76 - 78)  BP: 117/62 (20 Feb 2024 09:30) (114/61 - 117/62)  BP(mean): --  RR: 18 (20 Feb 2024 09:30) (18 - 18)  SpO2: 97% (20 Feb 2024 09:30) (96% - 97%)    Parameters below as of 20 Feb 2024 00:30  Patient On (Oxygen Delivery Method): room air        PHYSICAL EXAM:    General: WDWN  HEENT: NC/AT; PERRL, clear conjunctiva  Neck: supple  Cardiovascular: +S1/S2; RRR  Respiratory: CTA b/l; no W/R/R  Gastrointestinal: soft, NT/ND; +BSx4  Extremities: WWP; 2+ peripheral pulses; no edema   Neurological: AAOx3; no focal deficits    MEDICATIONS:  MEDICATIONS  (STANDING):  acetaminophen     Tablet .. 975 milliGRAM(s) Oral every 8 hours  baclofen 20 milliGRAM(s) Oral every 8 hours  chlorhexidine 2% Cloths 1 Application(s) Topical <User Schedule>  clotrimazole 2% Vaginal Cream 1 Applicatorful Vaginal daily  dextrose 5%. 1000 milliLiter(s) (100 mL/Hr) IV Continuous <Continuous>  enoxaparin Injectable 40 milliGRAM(s) SubCutaneous every 24 hours  glucagon  Injectable 1 milliGRAM(s) IntraMuscular once  influenza   Vaccine 0.5 milliLiter(s) IntraMuscular once  insulin glargine Injectable (LANTUS) 45 Unit(s) SubCutaneous every morning  insulin glargine Injectable (LANTUS) 20 Unit(s) SubCutaneous at bedtime  insulin lispro (ADMELOG) corrective regimen sliding scale   SubCutaneous three times a day before meals  insulin lispro Injectable (ADMELOG) 20 Unit(s) SubCutaneous three times a day before meals  levothyroxine 50 MICROGram(s) Oral daily  losartan 50 milliGRAM(s) Oral daily  melatonin 3 milliGRAM(s) Oral at bedtime  metoprolol succinate  milliGRAM(s) Oral daily  pregabalin 150 milliGRAM(s) Oral every 8 hours    MEDICATIONS  (PRN):  celecoxib 200 milliGRAM(s) Oral two times a day PRN for moderate pain      ALLERGIES:  Allergies    No Known Allergies    Intolerances        LABS:                        13.9   6.17  )-----------( 207      ( 19 Feb 2024 05:55 )             41.4     02-19    141  |  106  |  20  ----------------------------<  174<H>  4.0   |  24  |  0.5<L>    Ca    9.7      19 Feb 2024 05:55  Mg     2.0     02-19    TPro  6.1  /  Alb  3.4<L>  /  TBili  0.3  /  DBili  x   /  AST  18  /  ALT  14  /  AlkPhos  190<H>  02-19      Urinalysis Basic - ( 19 Feb 2024 05:55 )    Color: x / Appearance: x / SG: x / pH: x  Gluc: 174 mg/dL / Ketone: x  / Bili: x / Urobili: x   Blood: x / Protein: x / Nitrite: x   Leuk Esterase: x / RBC: x / WBC x   Sq Epi: x / Non Sq Epi: x / Bacteria: x      CAPILLARY BLOOD GLUCOSE      POCT Blood Glucose.: 140 mg/dL (20 Feb 2024 12:09)      RADIOLOGY & ADDITIONAL TESTS: Reviewed.    ASSESSMENT:    PLAN: 
  DANISH NIXON  [facility] [address] ([facility])    ***My note supersedes ALL resident notes that I sign.  My corrections for their notes are in my note.***    Patient is a 58y old  Female who presents with a chief complaint of placement, generalized weakness (20 Feb 2024 13:06)        Interval events:   Patient seen and examined at bedside. No acute events overnight. Says leg pain is improved. No fevers. No CP/SOB.     -PMHx: Hypertension    Diabetes mellitus     Thyroid disease    Anemia      -PSHx:S/P lumbar discectomy    S/P tonsillectomy            REVIEW OF SYSTEMS:  CONSTITUTIONAL: No fever, weight loss, or fatigue  RESPIRATORY: No cough, wheezing, chills or hemoptysis; No shortness of breath  CARDIOVASCULAR: No chest pain, palpitations, dizziness, or leg swelling  GASTROINTESTINAL: No abdominal or epigastric pain. No nausea, vomiting, or hematemesis; No diarrhea or constipation. No melena or hematochezia.  NEUROLOGICAL: No headaches  LYMPH NODES: No enlarged glands  MUSCULOSKELETAL: as above       T(C): , Max: 36.7 (02-21-24 @ 04:24)  HR: 81 (02-21-24 @ 08:52)  BP: 143/82 (02-21-24 @ 08:52)  RR: 18 (02-21-24 @ 08:52)   SpO2: 98% (02-21-24 @ 08:52)  CAPILLARY BLOOD GLUCOSE      POCT Blood Glucose.: 169 mg/dL (21 Feb 2024 17:15)  POCT Blood Glucose.: 197 mg/dL (21 Feb 2024 11:41)  POCT Blood Glucose.: 180 mg/dL (21 Feb 2024 08:28)  POCT Blood Glucose.: 173 mg/dL (20 Feb 2024 21:28)      PHYSICAL EXAM:  General Appearance: NAD, normal for age and gender. 	  Neck: Normal JVP, no bruit.   Eyes: Conjunctiva clear, Extra Ocular muscles intact. No scleral icterus.  ENMT: Moist oral mucosa. No oral lesion.  Cardiovascular: Regular rate and rhythm S1 S2, No JVD, No murmurs.  Respiratory: Bilateral air entry. No wheezes, rales or rhonchi.  Psychiatry: Alert and oriented x 3, Mood & affect appropriate.  Gastrointestinal:  Soft, Non-tender, Non-distended.  Neurologic: Non-focal deficits.  Musculoskeletal/ extremities: Normal range of motion, No clubbing, cyanosis or edema.  Vascular: Peripheral pulses palpable bilaterally.  Skin/Integumen: No rashes, No ecchymoses, No cyanosis.    Consultant(s) Notes Reviewed:  [x ] YES  [ ] NO  Care Discussed with Consultants/Other Providers [ x] YES  [ ] NO      LABS:               Microbiology:      RADIOLOGY & ADDITIONAL TESTS:        Medications:  acetaminophen     Tablet .. 975 milliGRAM(s) Oral every 8 hours  baclofen 20 milliGRAM(s) Oral every 8 hours  celecoxib 200 milliGRAM(s) Oral two times a day PRN  chlorhexidine 2% Cloths 1 Application(s) Topical <User Schedule>  clotrimazole 2% Vaginal Cream 1 Applicatorful Vaginal daily  dextrose 5%. 1000 milliLiter(s) IV Continuous <Continuous>  enoxaparin Injectable 40 milliGRAM(s) SubCutaneous every 24 hours  glucagon  Injectable 1 milliGRAM(s) IntraMuscular once  influenza   Vaccine 0.5 milliLiter(s) IntraMuscular once  insulin glargine Injectable (LANTUS) 20 Unit(s) SubCutaneous at bedtime  insulin glargine Injectable (LANTUS) 45 Unit(s) SubCutaneous every morning  insulin lispro (ADMELOG) corrective regimen sliding scale   SubCutaneous three times a day before meals  insulin lispro Injectable (ADMELOG) 20 Unit(s) SubCutaneous three times a day before meals  levothyroxine 50 MICROGram(s) Oral daily  losartan 50 milliGRAM(s) Oral daily  melatonin 3 milliGRAM(s) Oral at bedtime  metoprolol succinate  milliGRAM(s) Oral daily  pregabalin 150 milliGRAM(s) Oral every 8 hours      
  DANISH NIXON  [facility] [address] ([facility])    ***My note supersedes ALL resident notes that I sign.  My corrections for their notes are in my note.***    Patient is a 58y old  Female who presents with a chief complaint of placement, generalized weakness (18 Feb 2024 16:26)        Interval events:   Patient seen and examined at bedside. No acute events overnight. C/o constant b/l LE neuropathy. No fevers/chills/CP/SOB.     -PMHx: Hypertension    Diabetes mellitus    Thyroid disease    Anemia      -PSHx:S/P lumbar discectomy    S/P tonsillectomy            REVIEW OF SYSTEMS:  CONSTITUTIONAL: No fever, weight loss, or fatigue  RESPIRATORY: No cough, wheezing, chills or hemoptysis; No shortness of breath  CARDIOVASCULAR: No chest pain, palpitations, dizziness, or leg swelling  GASTROINTESTINAL: No abdominal or epigastric pain. No nausea, vomiting, or hematemesis; No diarrhea or constipation. No melena or hematochezia.  NEUROLOGICAL: as above   LYMPH NODES: No enlarged glands  MUSCULOSKELETAL: as above        T(C): , Max: 36.3 (02-19-24 @ 09:21)  HR: 81 (02-19-24 @ 09:21)  BP: 154/76 (02-19-24 @ 09:21)  RR: 18 (02-19-24 @ 09:21)  SpO2: 97% (02-19-24 @ 09:21)  CAPILLARY BLOOD GLUCOSE      POCT Blood Glucose.: 182 mg/dL (19 Feb 2024 11:53)  POCT Blood Glucose.: 160 mg/dL (19 Feb 2024 08:02)  POCT Blood Glucose.: 104 mg/dL (18 Feb 2024 21:25)  POCT Blood Glucose.: 113 mg/dL (18 Feb 2024 17:00)      PHYSICAL EXAM:  General Appearance: NAD, normal for age and gender. 	  Neck: Normal JVP, no bruit.   Eyes: Conjunctiva clear, Extra Ocular muscles intact. No scleral icterus.  ENMT: Moist oral mucosa. No oral lesion.  Cardiovascular: Regular rate and rhythm S1 S2, No JVD, No murmurs.  Respiratory: Bilateral air entry. No wheezes, rales or rhonchi.  Psychiatry: Alert and oriented x 3, Mood & affect appropriate.  Gastrointestinal:  Soft, Non-tender, Non-distended.  Neurologic: Non-focal deficits.  Musculoskeletal/ extremities: Normal range of motion, No clubbing, cyanosis or edema.  Vascular: Peripheral pulses palpable bilaterally.  Skin/Integumen: No rashes, No ecchymoses, No cyanosis.    Consultant(s) Notes Reviewed:  [x ] YES  [ ] NO  Care Discussed with Consultants/Other Providers [ x] YES  [ ] NO      LABS:          13.9  6.17  )-------(207           41.4  N=--  L=--  MCV=87.9    141|106|20  ------------------<174<H>  4.0|24|0.5<L>  eGFR:--  Ca:9.7            Microbiology:      RADIOLOGY & ADDITIONAL TESTS:        Medications:  baclofen 20 milliGRAM(s) Oral every 8 hours  celecoxib 200 milliGRAM(s) Oral two times a day PRN  clotrimazole 2% Vaginal Cream 1 Applicatorful Vaginal daily  dextrose 5%. 1000 milliLiter(s) IV Continuous <Continuous>  enoxaparin Injectable 40 milliGRAM(s) SubCutaneous every 24 hours  glucagon  Injectable 1 milliGRAM(s) IntraMuscular once  influenza   Vaccine 0.5 milliLiter(s) IntraMuscular once  insulin glargine Injectable (LANTUS) 45 Unit(s) SubCutaneous every morning  insulin glargine Injectable (LANTUS) 20 Unit(s) SubCutaneous at bedtime  insulin lispro (ADMELOG) corrective regimen sliding scale   SubCutaneous three times a day before meals  insulin lispro Injectable (ADMELOG) 20 Unit(s) SubCutaneous three times a day before meals  levothyroxine 50 MICROGram(s) Oral daily  losartan 50 milliGRAM(s) Oral daily  melatonin 3 milliGRAM(s) Oral at bedtime  metoprolol succinate  milliGRAM(s) Oral daily  pregabalin 150 milliGRAM(s) Oral every 8 hours

## 2024-02-21 NOTE — PROGRESS NOTE ADULT - REASON FOR ADMISSION
placement, generalized weakness

## 2024-02-21 NOTE — PROGRESS NOTE ADULT - ASSESSMENT
58-year-old female with PMH of HTN, HLD, IDDM noncompliant with her insulin, neuropathy, HTN, hypothyroidism, mood disorder with 3 prior inpatient psychiatric hospitalizations, recent admission from 2/1 - 2/6 for DKA presents ED for evaluation of generalized weakness over the last few days. 2/2 noncompliance to medications, dehydration 2/2 being unable to take care of herself. needs placement to LTF    #Functional Decline due to Generalized weakness likely due to uncontrolled DM   #Multiple hospital admissions  -SW conducted family meeting with patient and  Gonzalez. Patient explained that she was not compliant with  diabetic meds and understands that this is affecting her health. Diabetic teaching from RN appreciated; sent all meds and supplies to [facility]. Patient will have home visit program come on Monday 2/26    #Hx of mood disorder  - previous noted on risperidone May 2023 but not recent psych medications     #Mild DKA due to non-compliance, fsg stable at this time  #Pseudohyponatremia   #Diabetic neuropathy   - Lantus 45 units qAM, Lantus 20 units at bedtime   - 20 units Lispro with meals   - on DC should be on insulin   - A1c 13.8   - c/w baclofen and pregabalin  -per pain mgmt note 2/5/24:  tylenol to 975mg q8hr  c/w lyrica 150mg q8hr  celebrex 200mg BID  c/w baclofen 20mg q8hr  follow up with her pain physician outpatient for another epidural steroid injection    #Chronic pain 2/2 lumbar radiculopathy, neuropathy  - c/w baclofen and pregabalin  -see above pain mgmt recs      #HTN, stable  - c/w losartan and metoprolol    #Hypothyroidism  - c/w levothyroxine 50mcg qd   - TSH elevated on 2/2 - 5.52, has been on levothyroxine with a hx of non-compliance; pt needs repeat thyroid studies on March     DVT ppx: lovenox  Code: full  Diet: DASH with CC    Dispo: home with home care   Medical team updated family

## 2024-02-25 NOTE — PROGRESS NOTE BEHAVIORAL HEALTH - NSBHLEGALSTATUS_PSY_A_CORE
The clinical goals for the shift include Pt will remain comfortable throughout the shift    Over the shift, Pt progressively became more alert. Pain managed well with Fentanyl PCA. Although Pt was disoriented, she quickly understood after being reoriented. Very pleasant and conversational. Hansen placed for accurate I/O due to concern for retention and diminished urine production. Gallbladder drain continues to drain bloody drainage, approximately 8 mL aside from the 20 mL flush.    9.39 (Emergency)

## 2024-02-26 ENCOUNTER — INPATIENT (INPATIENT)
Facility: HOSPITAL | Age: 59
LOS: 9 days | Discharge: NURSING, REHAB OR LONG-TERM CARE | DRG: 58 | End: 2024-03-07
Attending: INTERNAL MEDICINE | Admitting: INTERNAL MEDICINE
Payer: MEDICAID

## 2024-02-26 VITALS
TEMPERATURE: 98 F | WEIGHT: 169.98 LBS | OXYGEN SATURATION: 98 % | RESPIRATION RATE: 18 BRPM | SYSTOLIC BLOOD PRESSURE: 160 MMHG | HEIGHT: 66 IN | DIASTOLIC BLOOD PRESSURE: 89 MMHG | HEART RATE: 97 BPM

## 2024-02-26 DIAGNOSIS — R20.2 PARESTHESIA OF SKIN: ICD-10-CM
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
## 2024-02-26 DIAGNOSIS — E11.40 TYPE 2 DIABETES MELLITUS WITH DIABETIC NEUROPATHY, UNSPECIFIED: ICD-10-CM

## 2024-02-26 DIAGNOSIS — M79.661 PAIN IN RIGHT LOWER LEG: ICD-10-CM

## 2024-02-26 DIAGNOSIS — E03.9 HYPOTHYROIDISM, UNSPECIFIED: ICD-10-CM

## 2024-02-26 DIAGNOSIS — G89.11 ACUTE PAIN DUE TO TRAUMA: ICD-10-CM

## 2024-02-26 DIAGNOSIS — E11.65 TYPE 2 DIABETES MELLITUS WITH HYPERGLYCEMIA: ICD-10-CM

## 2024-02-26 DIAGNOSIS — M54.16 RADICULOPATHY, LUMBAR REGION: ICD-10-CM

## 2024-02-26 DIAGNOSIS — S82.831A OTHER FRACTURE OF UPPER AND LOWER END OF RIGHT FIBULA, INITIAL ENCOUNTER FOR CLOSED FRACTURE: ICD-10-CM

## 2024-02-26 DIAGNOSIS — F31.9 BIPOLAR DISORDER, UNSPECIFIED: ICD-10-CM

## 2024-02-26 DIAGNOSIS — R29.6 REPEATED FALLS: ICD-10-CM

## 2024-02-26 DIAGNOSIS — I10 ESSENTIAL (PRIMARY) HYPERTENSION: ICD-10-CM

## 2024-02-26 DIAGNOSIS — Z11.52 ENCOUNTER FOR SCREENING FOR COVID-19: ICD-10-CM

## 2024-02-26 DIAGNOSIS — Z79.4 LONG TERM (CURRENT) USE OF INSULIN: ICD-10-CM

## 2024-02-26 PROCEDURE — 73562 X-RAY EXAM OF KNEE 3: CPT | Mod: 26,RT

## 2024-02-26 PROCEDURE — 99285 EMERGENCY DEPT VISIT HI MDM: CPT

## 2024-02-26 PROCEDURE — 73590 X-RAY EXAM OF LOWER LEG: CPT | Mod: 26,RT

## 2024-02-26 PROCEDURE — 73630 X-RAY EXAM OF FOOT: CPT | Mod: 26,RT

## 2024-02-26 PROCEDURE — 73610 X-RAY EXAM OF ANKLE: CPT | Mod: 26,RT

## 2024-02-26 RX ORDER — KETOROLAC TROMETHAMINE 30 MG/ML
30 SYRINGE (ML) INJECTION ONCE
Refills: 0 | Status: DISCONTINUED | OUTPATIENT
Start: 2024-02-26 | End: 2024-02-26

## 2024-02-26 RX ADMIN — Medication 30 MILLIGRAM(S): at 21:31

## 2024-02-26 NOTE — ED ADULT TRIAGE NOTE - CHIEF COMPLAINT QUOTE
pt presents with R knee to R foot pain after falling down a few steps yesterday. denies head strike, LOC.

## 2024-02-27 DIAGNOSIS — R29.6 REPEATED FALLS: ICD-10-CM

## 2024-02-27 DIAGNOSIS — R09.89 OTHER SPECIFIED SYMPTOMS AND SIGNS INVOLVING THE CIRCULATORY AND RESPIRATORY SYSTEMS: ICD-10-CM

## 2024-02-27 LAB
ALBUMIN SERPL ELPH-MCNC: 4.1 G/DL — SIGNIFICANT CHANGE UP (ref 3.5–5.2)
ALP SERPL-CCNC: 220 U/L — HIGH (ref 30–115)
ALT FLD-CCNC: 22 U/L — SIGNIFICANT CHANGE UP (ref 0–41)
ANION GAP SERPL CALC-SCNC: 11 MMOL/L — SIGNIFICANT CHANGE UP (ref 7–14)
AST SERPL-CCNC: 20 U/L — SIGNIFICANT CHANGE UP (ref 0–41)
BILIRUB SERPL-MCNC: 0.2 MG/DL — SIGNIFICANT CHANGE UP (ref 0.2–1.2)
BUN SERPL-MCNC: 21 MG/DL — HIGH (ref 10–20)
CALCIUM SERPL-MCNC: 10.1 MG/DL — SIGNIFICANT CHANGE UP (ref 8.4–10.5)
CHLORIDE SERPL-SCNC: 104 MMOL/L — SIGNIFICANT CHANGE UP (ref 98–110)
CO2 SERPL-SCNC: 27 MMOL/L — SIGNIFICANT CHANGE UP (ref 17–32)
CREAT SERPL-MCNC: 0.7 MG/DL — SIGNIFICANT CHANGE UP (ref 0.7–1.5)
EGFR: 100 ML/MIN/1.73M2 — SIGNIFICANT CHANGE UP
GLUCOSE BLDC GLUCOMTR-MCNC: 205 MG/DL — HIGH (ref 70–99)
GLUCOSE BLDC GLUCOMTR-MCNC: 215 MG/DL — HIGH (ref 70–99)
GLUCOSE SERPL-MCNC: 279 MG/DL — HIGH (ref 70–99)
HCT VFR BLD CALC: 42.6 % — SIGNIFICANT CHANGE UP (ref 37–47)
HGB BLD-MCNC: 13.9 G/DL — SIGNIFICANT CHANGE UP (ref 12–16)
MAGNESIUM SERPL-MCNC: 2.3 MG/DL — SIGNIFICANT CHANGE UP (ref 1.8–2.4)
MCHC RBC-ENTMCNC: 29.3 PG — SIGNIFICANT CHANGE UP (ref 27–31)
MCHC RBC-ENTMCNC: 32.6 G/DL — SIGNIFICANT CHANGE UP (ref 32–37)
MCV RBC AUTO: 89.7 FL — SIGNIFICANT CHANGE UP (ref 81–99)
NRBC # BLD: 0 /100 WBCS — SIGNIFICANT CHANGE UP (ref 0–0)
PLATELET # BLD AUTO: 236 K/UL — SIGNIFICANT CHANGE UP (ref 130–400)
PMV BLD: 10 FL — SIGNIFICANT CHANGE UP (ref 7.4–10.4)
POTASSIUM SERPL-MCNC: 4.3 MMOL/L — SIGNIFICANT CHANGE UP (ref 3.5–5)
POTASSIUM SERPL-SCNC: 4.3 MMOL/L — SIGNIFICANT CHANGE UP (ref 3.5–5)
PROT SERPL-MCNC: 7.1 G/DL — SIGNIFICANT CHANGE UP (ref 6–8)
RBC # BLD: 4.75 M/UL — SIGNIFICANT CHANGE UP (ref 4.2–5.4)
RBC # FLD: 15.1 % — HIGH (ref 11.5–14.5)
SODIUM SERPL-SCNC: 142 MMOL/L — SIGNIFICANT CHANGE UP (ref 135–146)
WBC # BLD: 6.2 K/UL — SIGNIFICANT CHANGE UP (ref 4.8–10.8)
WBC # FLD AUTO: 6.2 K/UL — SIGNIFICANT CHANGE UP (ref 4.8–10.8)

## 2024-02-27 PROCEDURE — 82962 GLUCOSE BLOOD TEST: CPT

## 2024-02-27 PROCEDURE — G0378: CPT

## 2024-02-27 PROCEDURE — 99222 1ST HOSP IP/OBS MODERATE 55: CPT

## 2024-02-27 PROCEDURE — 97161 PT EVAL LOW COMPLEX 20 MIN: CPT | Mod: GP

## 2024-02-27 PROCEDURE — 97110 THERAPEUTIC EXERCISES: CPT | Mod: GP

## 2024-02-27 PROCEDURE — 97116 GAIT TRAINING THERAPY: CPT | Mod: GP

## 2024-02-27 PROCEDURE — 87635 SARS-COV-2 COVID-19 AMP PRB: CPT

## 2024-02-27 RX ORDER — KETOROLAC TROMETHAMINE 30 MG/ML
15 SYRINGE (ML) INJECTION THREE TIMES A DAY
Refills: 0 | Status: DISCONTINUED | OUTPATIENT
Start: 2024-02-27 | End: 2024-03-03

## 2024-02-27 RX ORDER — BACLOFEN 100 %
20 POWDER (GRAM) MISCELLANEOUS EVERY 8 HOURS
Refills: 0 | Status: DISCONTINUED | OUTPATIENT
Start: 2024-02-27 | End: 2024-03-07

## 2024-02-27 RX ORDER — DEXTROSE 50 % IN WATER 50 %
25 SYRINGE (ML) INTRAVENOUS ONCE
Refills: 0 | Status: DISCONTINUED | OUTPATIENT
Start: 2024-02-27 | End: 2024-03-07

## 2024-02-27 RX ORDER — ACETAMINOPHEN 500 MG
975 TABLET ORAL EVERY 8 HOURS
Refills: 0 | Status: DISCONTINUED | OUTPATIENT
Start: 2024-02-27 | End: 2024-03-07

## 2024-02-27 RX ORDER — ENOXAPARIN SODIUM 100 MG/ML
40 INJECTION SUBCUTANEOUS EVERY 24 HOURS
Refills: 0 | Status: DISCONTINUED | OUTPATIENT
Start: 2024-02-27 | End: 2024-03-07

## 2024-02-27 RX ORDER — LOSARTAN POTASSIUM 100 MG/1
50 TABLET, FILM COATED ORAL DAILY
Refills: 0 | Status: DISCONTINUED | OUTPATIENT
Start: 2024-02-27 | End: 2024-03-07

## 2024-02-27 RX ORDER — SODIUM CHLORIDE 9 MG/ML
1000 INJECTION, SOLUTION INTRAVENOUS
Refills: 0 | Status: DISCONTINUED | OUTPATIENT
Start: 2024-02-27 | End: 2024-03-07

## 2024-02-27 RX ORDER — MORPHINE SULFATE 50 MG/1
2 CAPSULE, EXTENDED RELEASE ORAL EVERY 6 HOURS
Refills: 0 | Status: DISCONTINUED | OUTPATIENT
Start: 2024-02-27 | End: 2024-02-28

## 2024-02-27 RX ORDER — METOPROLOL TARTRATE 50 MG
100 TABLET ORAL DAILY
Refills: 0 | Status: DISCONTINUED | OUTPATIENT
Start: 2024-02-27 | End: 2024-03-07

## 2024-02-27 RX ORDER — DEXTROSE 50 % IN WATER 50 %
15 SYRINGE (ML) INTRAVENOUS ONCE
Refills: 0 | Status: DISCONTINUED | OUTPATIENT
Start: 2024-02-27 | End: 2024-03-07

## 2024-02-27 RX ORDER — INSULIN GLARGINE 100 [IU]/ML
45 INJECTION, SOLUTION SUBCUTANEOUS EVERY MORNING
Refills: 0 | Status: DISCONTINUED | OUTPATIENT
Start: 2024-02-27 | End: 2024-03-07

## 2024-02-27 RX ORDER — INSULIN LISPRO 100/ML
20 VIAL (ML) SUBCUTANEOUS
Refills: 0 | Status: DISCONTINUED | OUTPATIENT
Start: 2024-02-27 | End: 2024-03-07

## 2024-02-27 RX ORDER — LEVOTHYROXINE SODIUM 125 MCG
50 TABLET ORAL DAILY
Refills: 0 | Status: DISCONTINUED | OUTPATIENT
Start: 2024-02-27 | End: 2024-03-07

## 2024-02-27 RX ORDER — INSULIN LISPRO 100/ML
VIAL (ML) SUBCUTANEOUS
Refills: 0 | Status: DISCONTINUED | OUTPATIENT
Start: 2024-02-27 | End: 2024-03-07

## 2024-02-27 RX ORDER — GLUCAGON INJECTION, SOLUTION 0.5 MG/.1ML
1 INJECTION, SOLUTION SUBCUTANEOUS ONCE
Refills: 0 | Status: DISCONTINUED | OUTPATIENT
Start: 2024-02-27 | End: 2024-03-07

## 2024-02-27 RX ORDER — INFLUENZA VIRUS VACCINE 15; 15; 15; 15 UG/.5ML; UG/.5ML; UG/.5ML; UG/.5ML
0.5 SUSPENSION INTRAMUSCULAR ONCE
Refills: 0 | Status: DISCONTINUED | OUTPATIENT
Start: 2024-02-27 | End: 2024-03-07

## 2024-02-27 RX ORDER — DEXTROSE 50 % IN WATER 50 %
12.5 SYRINGE (ML) INTRAVENOUS ONCE
Refills: 0 | Status: DISCONTINUED | OUTPATIENT
Start: 2024-02-27 | End: 2024-03-07

## 2024-02-27 RX ADMIN — Medication 150 MILLIGRAM(S): at 21:46

## 2024-02-27 RX ADMIN — Medication 100 MILLIGRAM(S): at 21:46

## 2024-02-27 RX ADMIN — ENOXAPARIN SODIUM 40 MILLIGRAM(S): 100 INJECTION SUBCUTANEOUS at 14:58

## 2024-02-27 RX ADMIN — INSULIN GLARGINE 45 UNIT(S): 100 INJECTION, SOLUTION SUBCUTANEOUS at 14:58

## 2024-02-27 RX ADMIN — LOSARTAN POTASSIUM 50 MILLIGRAM(S): 100 TABLET, FILM COATED ORAL at 21:45

## 2024-02-27 NOTE — PATIENT PROFILE ADULT - FALL HARM RISK - HARM RISK INTERVENTIONS

## 2024-02-27 NOTE — H&P ADULT - HISTORY OF PRESENT ILLNESS
58-year-old female with a past medical history of diabetes, bipolar disorder, hypertension, and chronic back pain presents to the ED for evaluation of right lower extremity pain status post fall 3 stairs yesterday.  Patient reports when she fell she landed on her left side and was able to get up on her own.  Patient denies head injury, LOC, headache, dizziness, neck pain, back pain, abdominal pain, nausea, vomiting, diarrhea, constipation, urinary symptoms, weakness, numbness, tingling, or use of blood thinners  58-year-old female with a past medical history of diabetes, bipolar disorder, hypertension, and chronic back pain recent admission for fall (related to taking too much baclofen as per pt) and uncontrolled BS. Pt presents to the ED for evaluation of right lower extremity pain status post fall. The patient reports that she missed a step going down the stairs lost her balance and subsequently fell 3 steps.  Patient reports when she fell she landed on her left side and was able to get up on her own.  Patient denies head injury, LOC, Pt presented to the ED S/P Fall and was found to have a likely sub-acute fibular fracture. The patient does report pain in the R fibular region for the past "several months" which worsens when she steps on the foot and there was no recent worsening of the pain S/P fall. In addition patient does note that her leg was slightly more swollen than usual.       Pts VSS remained stable on admission and will be admitted for ortho consult, PT eval for possible placement.

## 2024-02-27 NOTE — ED PROVIDER NOTE - PATIENT PORTAL LINK FT
You can access the FollowMyHealth Patient Portal offered by [facility] by registering at the following website: [email or web address]. By joining [employer]’s FollowMyHealth portal, you will also be able to view your health information using other applications (apps) compatible with our system.

## 2024-02-27 NOTE — H&P ADULT - NSHPREVIEWOFSYSTEMS_GEN_ALL_CORE
REVIEW OF SYSTEMS:    CONSTITUTIONAL: No weakness, fevers or chills  EYES/ENT: No visual changes;  No vertigo or throat pain   NECK: No pain or stiffness  RESPIRATORY: No cough, wheezing, hemoptysis; No shortness of breath  CARDIOVASCULAR: No chest pain or palpitations  GASTROINTESTINAL: No abdominal or epigastric pain. No nausea, vomiting, or hematemesis; No diarrhea or constipation. No melena or hematochezia.  GENITOURINARY: No dysuria, frequency or hematuria  NEUROLOGICAL: No numbness or weakness  SKIN: No itching, rashes REVIEW OF SYSTEMS:    CONSTITUTIONAL: No weakness, chills  RESPIRATORY: No cough, wheezing, hemoptysis; No shortness of breath  CARDIOVASCULAR: No chest pain or palpitations  GASTROINTESTINAL: No abdominal or epigastric pain. No nausea, vomiting, or hematemesis; No diarrhea or constipation. No melena or hematochezia.  GENITOURINARY: No dysuria, hematuria  NEUROLOGICAL: paresthesias of the legs (chronic)  SKIN: No itching, rashes

## 2024-02-27 NOTE — H&P ADULT - ASSESSMENT
58-year-old female with a past medical history of diabetes, bipolar disorder, hypertension, and chronic back pain recent admission for fall (related to taking too much baclofen as per pt) and uncontrolled BS. Pt presents to the ED for evaluation of right lower extremity pain status post fall.     #Frequent falls  #Fibular fracture likely sub-acute  - Pt reports her last fall was due to taking more Baclofen than prescribed leading to lethargy  - current fall is mechanical  - Spoke to Ortho resident to convey the consult  - as per our discussion pt to be NWB RLE till orthopedic evaluation   - PT eval if ortho clears for weight bearing   - fall precautions   - Pain control with the following   - Tylenol 975 Q 8 PRN mild pain   - Ketorolac 15 Q 8 PRN for mod pain to be titrated off to home dose celecoxib once pain is better controlled  - Morphine 2 mg Q 6 PRN for severe pain  - C/W home dose baclofen 20 Q 8  - C/W Lyrica 150 Q 8 (home dose)  - symptoms not consistent with orthostasis so will not order orthostatics    #DM  - C/W home insulin regimen   - 45 Lantus in am  - 20 TID lispro pre meals  - SSI +1  - adjust PRN to keep -180    #Chronic pain 2/2 lumbar radiculopathy, neuropathy  - c/w baclofen and pregabalin     #HTN, stable  - c/w losartan 50 Qd and metoprolol 100 QD    #Hypothyroidism  - c/w levothyroxine 50mcg qd   - TSH elevated on 2/2 - 5.52, has been on levothyroxine with a hx of non-compliance; pt needs repeat thyroid studies in March     DVT ppx: Lovenox  Diet: DASH with CC  Dispo: acute      58-year-old female with a past medical history of diabetes, bipolar disorder, hypertension, and chronic back pain recent admission for fall (related to taking too much baclofen as per pt) and uncontrolled BS. Pt presents to the ED for evaluation of right lower extremity pain status post fall.     #Frequent falls  #Fibular fracture likely sub-acute  - Pt reports her last fall was due to taking more Baclofen than prescribed leading to lethargy  - current fall is mechanical  - Spoke to Ortho resident to convey the consult  - as per our discussion pt to be NWB RLE till orthopedic evaluation   - PT eval if ortho clears for weight bearing   - fall precautions   - Pain control with the following   - Tylenol 975 Q 8 PRN mild pain   - Ketorolac 15 Q 8 PRN for mod pain to be titrated off to home dose celecoxib once pain is better controlled  - Morphine 2 mg Q 6 PRN for severe pain  - C/W home dose baclofen 20 Q 8  - C/W Lyrica 150 Q 8 (home dose)  - symptoms not consistent with orthostasis so will not order orthostatics    #DM  - C/W home insulin regimen   - 45 Lantus in am  - 20 TID lispro pre meals  - SSI +1  - adjust PRN to keep -180    #Chronic pain 2/2 lumbar radiculopathy, neuropathy  - c/w baclofen and pregabalin     #HTN, stable  - c/w losartan 50 Qd and metoprolol 100 QD    #Hypothyroidism  - c/w levothyroxine 50mcg qd   - TSH elevated on 2/2 - 5.52, has been on levothyroxine with a hx of non-compliance; pt needs repeat thyroid studies in March     DVT ppx: Lovenox  Diet: DASH with CC  Dispo: acute        Limit labs spoke to Dr. Olivares

## 2024-02-27 NOTE — ED PROVIDER NOTE - CARE PLAN
1 Principal Discharge DX:	Fall  Secondary Diagnosis:	Fibula fracture   Principal Discharge DX:	Frequent falls  Secondary Diagnosis:	Fibula fracture  Secondary Diagnosis:	Hospital admission due to social situation

## 2024-02-27 NOTE — ED ADULT NURSE NOTE - NS PRO AD NO ADVANCE DIRECTIVE
No Scheduled right laparoscopic partial nephrectomy.    Written & verbal preop instructions, gi prophylaxis & surgical soap given  Pt verbalized good understanding.  Teach back done on surgical soap instructions.  medical eval requested by surgeon

## 2024-02-27 NOTE — ED PROVIDER NOTE - ATTENDING APP SHARED VISIT CONTRIBUTION OF CARE
58-year-old female with past medical history of chronic back pain, diabetes, HTN, neuropathy, bipolar, frequent falls presents to the ED for another fall last night going down 3 stairs.  Patient states that she landed on her left side but now has right leg pain from the knee down to the ankle.  Denies any head injury, denies any use of anticoagulants, denies neck pain, denies any shortness of breath/chest pain/pelvic pain/nausea/vomiting/hematuria/diarrhea/rashes/headache.    On exam patient has no cervical spine/T/L/S spine tenderness or step off, +tenderness to the proximal distal fibula, mild soft tissue swelling to bilateral ankles, distal pulses intact, neuro/vascular/motor intact.  Patient ambulates with limp and pain.  Remainder exam as per PA note    A/P spoke to patient's daughter Caroline (phone number is [phone]) states patient was admitted recently for poorly controlled sugars and frequent falls and was supposed to be placed in an assisted living facility.  However patient at time refused to go to assisted living and therefore was discharged without placement.  Patient has been falling frequently and her daughter states that she has no place to go upon discharge, has been unable to make appropriate medical decisions and therefore she is taking action to become power of , and is very concerned that she is unsafe for discharge she will fall again.  Will check x-rays, leave a consult request with social work, and reassess    ALL: nkda  PMH as above  Meds: lyrica, jardiance, lantus, muscle relaxer, losartan, metoprolol

## 2024-02-27 NOTE — H&P ADULT - ATTENDING COMMENTS
Patient seen this morning, resting comfortably    s/p fall, apparently recent history of falls    No loss of consciousness    Vitals stable  Right lower extremity with ACE wrap  Ortho spoken to will perform formal assessment    Labs and xray results reviewed  ekg 2/15-nsr, no arrythmia (int by me)    Ortho eval/PT/SNR placement    Monitor finger sticks/regimen outlined by resident

## 2024-02-27 NOTE — H&P ADULT - NSHPLABSRESULTS_GEN_ALL_CORE
13.9   6.20  )-----------( 236      ( 27 Feb 2024 04:07 )             42.6       02-27    142  |  104  |  21<H>  ----------------------------<  279<H>  4.3   |  27  |  0.7    Ca    10.1      27 Feb 2024 04:07  Mg     2.3     02-27    TPro  7.1  /  Alb  4.1  /  TBili  0.2  /  DBili  x   /  AST  20  /  ALT  22  /  AlkPhos  220<H>  02-27              Urinalysis Basic - ( 27 Feb 2024 04:07 )    Color: x / Appearance: x / SG: x / pH: x  Gluc: 279 mg/dL / Ketone: x  / Bili: x / Urobili: x   Blood: x / Protein: x / Nitrite: x   Leuk Esterase: x / RBC: x / WBC x   Sq Epi: x / Non Sq Epi: x / Bacteria: x            Lactate Trend            CAPILLARY BLOOD GLUCOSE            Culture Results:   >=3 organisms. Probable collection contamination. (02-01 @ 20:35)      IMPRESSION:    Proximal fibular neck fracture with mild callus formation, possibly   subacute.    Swelling around the lateral malleolus with no definite fracture.    --- End of Report ---          JANNIE TAI MD; Resident Radiologist  This document has been electronically signed.  BOY THOMAS MD; Attending Radiologist  This document has been electronically signed. Feb 27 2024  1:33AM

## 2024-02-27 NOTE — ED PROVIDER NOTE - OBJECTIVE STATEMENT
58-year-old female with a past medical history of diabetes, hypertension, and chronic back pain presents to the ED for evaluation of right lower extremity pain status post fall 3 stairs yesterday.  Patient reports when she fell she landed on her left side and was able to get up on her own.  Patient denies head injury, LOC, headache, dizziness, neck pain, back pain, abdominal pain, nausea, vomiting, diarrhea, constipation, urinary symptoms, weakness, numbness, tingling, or use of blood thinners. 58-year-old female with a past medical history of diabetes, bipolar disorder, hypertension, and chronic back pain presents to the ED for evaluation of right lower extremity pain status post fall 3 stairs yesterday.  Patient reports when she fell she landed on her left side and was able to get up on her own.  Patient denies head injury, LOC, headache, dizziness, neck pain, back pain, abdominal pain, nausea, vomiting, diarrhea, constipation, urinary symptoms, weakness, numbness, tingling, or use of blood thinners. see progress note.

## 2024-02-27 NOTE — ED PROVIDER NOTE - NSFOLLOWUPINSTRUCTIONS_ED_ALL_ED_FT
I spoke with ortho resident Moris who reports patients proximal fibula fracture looks old (chronic), but even if it was acute would treat with ace wrapping the area of injury, ice, and weight bearing as tolerated. can give crutches if patient would like to use to help weight bearing. I discussed radiology results and plan with patient. I asked pt if she had fallen previously she said she fell two weeks ago after taking too much of her muscle relaxer causing her to feel week and she feels that she hurt her leg two weeks ago during that injury. pt reports she feel at home prior to that several weeks ago due to sugar levels. pt reports she lives with her . I asked pt if she feels safe at home with her right lower leg ace wrapped and crutches to use only if she needs it. pt and pt family at bedside agree with this plan. I discussed with pt that referral coordinator will called pt in 24-48 hours to schedule appt with orthopedist. pt advised of return precautions discussed at bedside. agreeable to discharge.     Our Emergency Department Referral Coordinators will be reaching out to you in the next 24-48 hours from 9:00am to 5:00pm with a follow up appointment. Please expect a phone call from the hospital in that time frame. If you do not receive a call or if you have any questions or concerns, you can reach them at   [phone]    Tibial and Fibular Fracture, Adult    Tibial and fibular fracture is a break in the bones of your lower leg (tibia and fibula). The tibia is the larger of these two bones. The fibula is the smaller of the two bones. It is on the outer side of your leg.     CAUSES  Low-energy injuries, such as a fall from ground level.  High-energy injuries, such as motor vehicle injuries, gunshot wounds, or high-speed sports collisions.    RISK FACTORS  Jumping activities.  Repetitive stress, such as long-distance running.  Participation in sports.  Osteoporosis.  Advanced age.    SIGNS AND SYMPTOMS  Pain.  Swelling.  Inability to put weight on your injured leg.  Bone deformities at the site of your injury.  Bruising.    DIAGNOSIS  Tibial and fibular fractures are diagnosed with the use of X-ray exams.    TREATMENT  If you have a simple fracture of these two bones, they can be treated with simple immobilization. A cast or splint will be used on your leg to keep it from moving while it heals. Then you can begin range-of-motion exercises to regain your knee motion.    HOME CARE INSTRUCTIONS  Apply ice to your leg:  Put ice in a plastic bag.  Place a towel between your skin and the bag.  Leave the ice on for 20 minutes, 2–3 times a day.  If you have a plaster or fiberglass cast:  Do not try to scratch the skin under the cast using sharp or pointed objects.  Check the skin around the cast every day. You may put lotion on any red or sore areas.  Keep your cast dry and clean.  If you have a plaster splint:  Wear the splint as directed.  You may loosen the elastic around the splint if your toes become numb, tingle, or turn cold or blue.  Do not put pressure on any part of your cast or splint until it is fully hardened, because it may deform.  Your cast or splint can be protected during bathing with a plastic bag. Do not lower the cast or splint into water.  Use crutches as directed.  Only take over-the-counter or prescription medicines for pain, discomfort, or fever as directed by your health care provider.   Follow all instructions given to you by your health care provider.  Make and keep all follow-up appointments.     SEEK MEDICAL CARE IF:  Your pain is becoming worse rather than better or is not controlled with medicines.  You have increased swelling or redness in the foot.  You begin to lose feeling in your foot or toes.    SEEK IMMEDIATE MEDICAL CARE IF:  You develop a cold or blue foot or toes on the injured side.  You develop severe pain in your injured leg, especially if the pain is increased with movement of your toes.    MAKE SURE YOU:  Understand these instructions.   Will watch your condition.  Will get help right away if you are not doing well or get worse.    ADDITIONAL NOTES AND INSTRUCTIONS    Please follow up with your Primary MD in 24-48 hr.  Seek immediate medical care for any new/worsening signs or symptoms.

## 2024-02-27 NOTE — ED PROVIDER NOTE - PHYSICAL EXAMINATION
Physical Exam    Vital Signs: I have reviewed the initial vital signs.  Constitutional: well-nourished, appears stated age, no acute distress  Eyes: Conjunctiva pink, Sclera clear  Cardiovascular: S1 and S2, regular rate, regular rhythm, well-perfused extremities, (+) pedal pulses equal and 2+ b/l.   Respiratory: unlabored respiratory effort, clear to auscultation bilaterally no wheezing, rales and rhonchi. pt is speaking full sentences. no accessory muscle use.   Musculoskeletal: supple neck, no lower extremity edema, no calf tenderness, no midline tenderness, no palpable spinal step offs, (+) pt has tenderness to the proximal right tibia, and medial malleolus with mild swelling. pt has trace swelling to the left lower extremity.   Integumentary: warm, dry, no rash  Neurologic: awake, extremities’ motor and sensory functions grossly intact. finger to nose intact. negative pronator drift.  steady gait.   Psychiatric: appropriate mood, appropriate affect

## 2024-02-27 NOTE — ED PROVIDER NOTE - CLINICAL SUMMARY MEDICAL DECISION MAKING FREE TEXT BOX
58-year-old female with past medical history of chronic back pain, diabetes, HTN, neuropathy, bipolar, frequent falls presents to the ED for another fall last night going down 3 stairs.  Patient states that she landed on her left side but now has right leg pain from the knee down to the ankle.  Denies any head injury, denies any use of anticoagulants, denies neck pain, denies any shortness of breath/chest pain/pelvic pain/nausea/vomiting/hematuria/diarrhea/rashes/headache.    On exam patient has no cervical spine/T/L/S spine tenderness or step off, +tenderness to the proximal distal fibula, mild soft tissue swelling to bilateral ankles, distal pulses intact, neuro/vascular/motor intact.  Patient ambulates with limp and pain.  Remainder exam as per PA note    A/P spoke to patient's daughter Caroline (phone number is [phone]) states patient was admitted recently for poorly controlled sugars and frequent falls and was supposed to be placed in an assisted living facility.  However patient at time refused to go to assisted living and therefore was discharged without placement.  Patient has been falling frequently and her daughter states that she has no place to go upon discharge, has been unable to make appropriate medical decisions and therefore she is taking action to become power of , and is very concerned that she is unsafe for discharge she will fall again.  XR shows subacute prox fibular fx, ortho recommends ace bandage for it for now, and  not on call tonight. Will admit to medicine for SW admission/placement issues secondary to her frequent falls and subacute fibular fx.

## 2024-02-27 NOTE — H&P ADULT - NSHPPHYSICALEXAM_GEN_ALL_CORE
PHYSICAL EXAM:  GENERAL: NAD, lying in bed comfortably  HEAD:  Atraumatic, Normocephalic  EYES: EOMI, PERRLA, conjunctiva and sclera clear  ENT: Moist mucous membranes  NECK: Supple, No JVD  CHEST/LUNG: Clear to auscultation bilaterally; No rales, rhonchi, wheezing, or rubs. Unlabored respirations  HEART: Regular rate and rhythm; No murmurs, rubs, or gallops  ABDOMEN: Bowel sounds present; Soft, Nontender, Nondistended. No hepatomegally  EXTREMITIES:  2+ Peripheral Pulses, brisk capillary refill. No clubbing, cyanosis, or edema  NERVOUS SYSTEM:  Alert & Oriented X3, speech clear. No deficits   MSK: FROM all 4 extremities, full and equal strength  SKIN: No rashes or lesions PHYSICAL EXAM:  GENERAL: NAD, lying in bed comfortably  NECK: Supple, No JVD  CHEST/LUNG: Clear to auscultation bilaterally  HEART: Regular rate and rhythm; No murmurs  ABDOMEN: Bowel sounds present; Soft, Nontender, Nondistended  EXTREMITIES:  No edema LE  NERVOUS SYSTEM:  Alert & Oriented X3, speech clear. No deficits   MSK: moving all extremities mild tenderness to palpation at the L fibular region  SKIN: No rashes or lesions

## 2024-02-27 NOTE — ED PROVIDER NOTE - PROGRESS NOTE DETAILS
YF: called radiologist x 2 for a reading. XRs show abnormality with prox and distal fibula on RLE. Spoke to resident Ese who states she will look at films and call back FF: I spoke with ortho resident ambrosio who reports pt proximal fibula fracture looks old, but even if it was acute would treat with ace wrapping the area of injury, ice and weight bearing as tolerated. can give crutches if she would like to use to help weight bearing. I discussed radiology results and plan with pt. I asked pt if she had fallen previously she said she fell two weeks ago after taking too much of her muscle relaxer causing her to feel week and she feels that she hurt her leg two weeks ago during that injury. pt reports she feel at home prior to that several weeks ago due to sugar levels. pt reports she lives with her . I asked pt if she feels safe at home with her right lower leg ace wrapped and crutches to use only if she needs it. pt and pt family at bedside agree with this plan. I discussed with pt that referral coordinator will called pt in 24-48 hours to schedule appt with orthopedist. pt advised of return precautions discussed at bedside. agreeable to discharge. FF: upon discharge pt daughter called and reports pt had no place to go and cannot care for herself. pt daughter alfonso at [phone] reports pt has been having frequent falls. pt daughter reports she spoke with [facility] who is aware of this but told daughter pt needs to go there after a hospital admission in order to have insurance pay for it.

## 2024-02-28 DIAGNOSIS — I10 ESSENTIAL (PRIMARY) HYPERTENSION: ICD-10-CM

## 2024-02-28 DIAGNOSIS — G89.29 OTHER CHRONIC PAIN: ICD-10-CM

## 2024-02-28 DIAGNOSIS — Z79.84 LONG TERM (CURRENT) USE OF ORAL HYPOGLYCEMIC DRUGS: ICD-10-CM

## 2024-02-28 DIAGNOSIS — F17.210 NICOTINE DEPENDENCE, CIGARETTES, UNCOMPLICATED: ICD-10-CM

## 2024-02-28 DIAGNOSIS — T38.3X6A UNDERDOSING OF INSULIN AND ORAL HYPOGLYCEMIC [ANTIDIABETIC] DRUGS, INITIAL ENCOUNTER: ICD-10-CM

## 2024-02-28 DIAGNOSIS — Z79.4 LONG TERM (CURRENT) USE OF INSULIN: ICD-10-CM

## 2024-02-28 DIAGNOSIS — E11.40 TYPE 2 DIABETES MELLITUS WITH DIABETIC NEUROPATHY, UNSPECIFIED: ICD-10-CM

## 2024-02-28 DIAGNOSIS — E86.0 DEHYDRATION: ICD-10-CM

## 2024-02-28 DIAGNOSIS — M51.16 INTERVERTEBRAL DISC DISORDERS WITH RADICULOPATHY, LUMBAR REGION: ICD-10-CM

## 2024-02-28 DIAGNOSIS — E11.10 TYPE 2 DIABETES MELLITUS WITH KETOACIDOSIS WITHOUT COMA: ICD-10-CM

## 2024-02-28 DIAGNOSIS — Z91.148 PATIENT'S OTHER NONCOMPLIANCE WITH MEDICATION REGIMEN FOR OTHER REASON: ICD-10-CM

## 2024-02-28 DIAGNOSIS — E03.9 HYPOTHYROIDISM, UNSPECIFIED: ICD-10-CM

## 2024-02-28 LAB
GLUCOSE BLDC GLUCOMTR-MCNC: 106 MG/DL — HIGH (ref 70–99)
GLUCOSE BLDC GLUCOMTR-MCNC: 128 MG/DL — HIGH (ref 70–99)
GLUCOSE BLDC GLUCOMTR-MCNC: 75 MG/DL — SIGNIFICANT CHANGE UP (ref 70–99)
GLUCOSE BLDC GLUCOMTR-MCNC: 88 MG/DL — SIGNIFICANT CHANGE UP (ref 70–99)
GLUCOSE BLDC GLUCOMTR-MCNC: 89 MG/DL — SIGNIFICANT CHANGE UP (ref 70–99)
SARS-COV-2 RNA SPEC QL NAA+PROBE: SIGNIFICANT CHANGE UP

## 2024-02-28 PROCEDURE — 99232 SBSQ HOSP IP/OBS MODERATE 35: CPT

## 2024-02-28 RX ADMIN — Medication 20 MILLIGRAM(S): at 21:13

## 2024-02-28 RX ADMIN — Medication 100 MILLIGRAM(S): at 05:44

## 2024-02-28 RX ADMIN — LOSARTAN POTASSIUM 50 MILLIGRAM(S): 100 TABLET, FILM COATED ORAL at 05:44

## 2024-02-28 RX ADMIN — Medication 20 MILLIGRAM(S): at 13:33

## 2024-02-28 RX ADMIN — Medication 150 MILLIGRAM(S): at 05:44

## 2024-02-28 RX ADMIN — Medication 20 UNIT(S): at 07:52

## 2024-02-28 RX ADMIN — Medication 150 MILLIGRAM(S): at 21:13

## 2024-02-28 RX ADMIN — ENOXAPARIN SODIUM 40 MILLIGRAM(S): 100 INJECTION SUBCUTANEOUS at 13:34

## 2024-02-28 RX ADMIN — Medication 20 UNIT(S): at 11:51

## 2024-02-28 RX ADMIN — Medication 50 MICROGRAM(S): at 05:44

## 2024-02-28 RX ADMIN — Medication 20 MILLIGRAM(S): at 05:44

## 2024-02-28 RX ADMIN — Medication 20 UNIT(S): at 15:59

## 2024-02-28 RX ADMIN — Medication 150 MILLIGRAM(S): at 13:33

## 2024-02-28 RX ADMIN — INSULIN GLARGINE 45 UNIT(S): 100 INJECTION, SOLUTION SUBCUTANEOUS at 08:06

## 2024-02-28 NOTE — PHYSICAL THERAPY INITIAL EVALUATION ADULT - ADDITIONAL COMMENTS
Pt reports has been staying with daughter but is going to an apt with her spouse, has 12 steps. Pt was ambulating independently with RW prior to this admission 2* to hx of R fibula fx.

## 2024-02-28 NOTE — PROGRESS NOTE ADULT - SUBJECTIVE AND OBJECTIVE BOX
Patient is a 58y old  Female who presents with a chief complaint of Fall (27 Feb 2024 16:14)    INTERVAL HPI/OVERNIGHT EVENTS: Patient was examined and seen at bedside. This morning pt is resting comfortably in bed and reports no new issues or overnight events. No complaints, feels better. Ambulating w/ PT.  ROS: Denies CP, SOB, AP, new weakness  All other systems reviewed and are within normal limits.  InitialHPI:   58-year-old female with a past medical history of diabetes, bipolar disorder, hypertension, and chronic back pain recent admission for fall (related to taking too much baclofen as per pt) and uncontrolled BS. Pt presents to the ED for evaluation of right lower extremity pain status post fall. The patient reports that she missed a step going down the stairs lost her balance and subsequently fell 3 steps.  Patient reports when she fell she landed on her left side and was able to get up on her own.  Patient denies head injury, LOC, Pt presented to the ED S/P Fall and was found to have a likely sub-acute fibular fracture. The patient does report pain in the R fibular region for the past "several months" which worsens when she steps on the foot and there was no recent worsening of the pain S/P fall. In addition patient does note that her leg was slightly more swollen than usual.       Pts VSS remained stable on admission and will be admitted for ortho consult, PT eval for possible placement.             (27 Feb 2024 12:42)    PAST MEDICAL & SURGICAL HISTORY:  Hypertension      Diabetes mellitus      Thyroid disease      Anemia      S/P lumbar discectomy  2006.      S/P tonsillectomy          General: NAD, AAO3  HEENT:  EOMI, no LAD  CV: S1 S2  Resp: decreased breath sounds at bases  GI: NT/ND/S +BS  MS: no clubbing/cyanosis/edema, + pulses b/l  Neuro: nonfocal, +reflexes thruout    MEDICATIONS  (STANDING):  baclofen 20 milliGRAM(s) Oral every 8 hours  dextrose 5%. 1000 milliLiter(s) (50 mL/Hr) IV Continuous <Continuous>  dextrose 5%. 1000 milliLiter(s) (100 mL/Hr) IV Continuous <Continuous>  dextrose 50% Injectable 12.5 Gram(s) IV Push once  dextrose 50% Injectable 25 Gram(s) IV Push once  dextrose 50% Injectable 25 Gram(s) IV Push once  enoxaparin Injectable 40 milliGRAM(s) SubCutaneous every 24 hours  glucagon  Injectable 1 milliGRAM(s) IntraMuscular once  influenza   Vaccine 0.5 milliLiter(s) IntraMuscular once  insulin glargine Injectable (LANTUS) 45 Unit(s) SubCutaneous every morning  insulin lispro (ADMELOG) corrective regimen sliding scale   SubCutaneous three times a day before meals  insulin lispro Injectable (ADMELOG) 20 Unit(s) SubCutaneous three times a day before meals  levothyroxine 50 MICROGram(s) Oral daily  losartan 50 milliGRAM(s) Oral daily  metoprolol succinate  milliGRAM(s) Oral daily  pregabalin 150 milliGRAM(s) Oral every 8 hours    MEDICATIONS  (PRN):  acetaminophen     Tablet .. 975 milliGRAM(s) Oral every 8 hours PRN Mild Pain (1 - 3)  dextrose Oral Gel 15 Gram(s) Oral once PRN Blood Glucose LESS THAN 70 milliGRAM(s)/deciliter  ketorolac   Injectable 15 milliGRAM(s) IV Push three times a day PRN Moderate Pain (4 - 6)  morphine  - Injectable 2 milliGRAM(s) IV Push every 6 hours PRN Severe Pain (7 - 10)    Vital Signs Last 24 Hrs  T(C): 37.1 (28 Feb 2024 13:47), Max: 37.1 (28 Feb 2024 13:47)  T(F): 98.8 (28 Feb 2024 13:47), Max: 98.8 (28 Feb 2024 13:47)  HR: 83 (28 Feb 2024 13:47) (76 - 97)  BP: 108/59 (28 Feb 2024 13:47) (108/59 - 138/74)  BP(mean): 77 (28 Feb 2024 13:47) (77 - 97)  RR: 18 (28 Feb 2024 13:47) (18 - 18)  SpO2: 98% (28 Feb 2024 13:47) (98% - 98%)    Parameters below as of 28 Feb 2024 13:47  Patient On (Oxygen Delivery Method): room air      CAPILLARY BLOOD GLUCOSE      POCT Blood Glucose.: 89 mg/dL (28 Feb 2024 17:07)  POCT Blood Glucose.: 75 mg/dL (28 Feb 2024 15:59)  POCT Blood Glucose.: 128 mg/dL (28 Feb 2024 11:38)  POCT Blood Glucose.: 106 mg/dL (28 Feb 2024 07:41)                          13.9   6.20  )-----------( 236      ( 27 Feb 2024 04:07 )             42.6     02-27    142  |  104  |  21<H>  ----------------------------<  279<H>  4.3   |  27  |  0.7    Ca    10.1      27 Feb 2024 04:07  Mg     2.3     02-27    TPro  7.1  /  Alb  4.1  /  TBili  0.2  /  DBili  x   /  AST  20  /  ALT  22  /  AlkPhos  220<H>  02-27    LIVER FUNCTIONS - ( 27 Feb 2024 04:07 )  Alb: 4.1 g/dL / Pro: 7.1 g/dL / ALK PHOS: 220 U/L / ALT: 22 U/L / AST: 20 U/L / GGT: x                 Urinalysis Basic - ( 27 Feb 2024 04:07 )    Color: x / Appearance: x / SG: x / pH: x  Gluc: 279 mg/dL / Ketone: x  / Bili: x / Urobili: x   Blood: x / Protein: x / Nitrite: x   Leuk Esterase: x / RBC: x / WBC x   Sq Epi: x / Non Sq Epi: x / Bacteria: x              Chart, Consultant(s) Notes Reviewed:  [x ] YES  [ ] NO  Care Discussed with Consultants/Other Providers/ Housestaff [ x] YES  [ ] NO  Radiology, labs, old available records personally reviewed.

## 2024-02-28 NOTE — PHYSICAL THERAPY INITIAL EVALUATION ADULT - GENERAL OBSERVATIONS, REHAB EVAL
8:40-9:10 Pt encountered semifowler in bed in NAD. + ace wrap R knee. Pt reports 5/10 pain RLE, reports improved since took pain meds. Pt reports hx of tingling to B feet 2* to neuropathy.

## 2024-02-28 NOTE — PHYSICAL THERAPY INITIAL EVALUATION ADULT - GAIT TRAINING, PT EVAL
by discharge: 150 ft x 2 with RW distant supervision ; 1 flight of steps with supervision 1 rail by discharge: 150 ft x 2 with RW with supervision ; 1 flight of steps with supervision 1 rail

## 2024-02-28 NOTE — PHYSICAL THERAPY INITIAL EVALUATION ADULT - PERTINENT HX OF CURRENT PROBLEM, REHAB EVAL
Pt is a  58-year-old female with a past medical history of diabetes, bipolar disorder, hypertension, and chronic back pain recent admission for fall (related to taking too much baclofen as per pt) and uncontrolled BS. Pt presents to the ED for evaluation of right lower extremity pain status post fall. The patient reports that she missed a step going down the stairs lost her balance and subsequently fell 3 steps.  Patient reports when she fell she landed on her left side and was able to get up on her own.  Patient denies head injury, LOC, Pt presented to the ED S/P Fall and was found to have a likely sub-acute fibular fracture. The patient does report pain in the R fibular region for the past "several months" which worsens when she steps on the foot and there was no recent worsening of the pain S/P fall. In addition patient does note that her leg was slightly more swollen than usual.

## 2024-02-28 NOTE — PROGRESS NOTE ADULT - ASSESSMENT
58-year-old female with a past medical history of diabetes, bipolar disorder, hypertension, and chronic back pain recent admission for fall (related to taking too much baclofen as per pt) and uncontrolled BS. Pt presents to the ED for evaluation of right lower extremity pain status post fall.     #Mechanical fall  #Fibular fracture sub-acute  - Pt reports her last fall was due to taking more Baclofen than prescribed leading to lethargy  - current fall is mechanical  - Pain control with the following   - Tylenol 975 Q 8 PRN mild pain   - Ketorolac 15 Q 8 PRN for mod pain to be titrated off to home dose celecoxib once pain is better controlled  - C/W home dose baclofen 20 Q 8  - C/W Lyrica 150 Q 8 (home dose)  - check orthostatics    #DM  - C/W home insulin regimen   - 45 Lantus in am  - 20 TID lispro pre meals  - SSI +1  - adjust PRN to keep -180    #Chronic pain 2/2 lumbar radiculopathy, neuropathy  - c/w baclofen and pregabalin     #HTN, stable  - c/w losartan 50 Qd and metoprolol 100 QD    #Hypothyroidism  - c/w levothyroxine 50mcg qd   - TSH elevated on 2/2 - 5.52, has been on levothyroxine with a hx of non-compliance; pt needs repeat thyroid studies in March     DVT ppx: Lovenox  Diet: DASH with CC    #Progress Note Handoff  Pending: Clinical improvement and stability__x___  Pt/Family discussion: Pt informed and agrees with the current plan  Disposition: Safe dispo    My note supersedes the residents note should a discrepancy arise.    Chart and notes personally reviewed.  Care Discussed with Consultants/Other Providers/ Housestaff [ x] YES [ ] NO   Radiology, labs, old records personally reviewed.    discussed w/ housestaff, nursing, case management, PT        Time-based billing (NON-critical care).     35 minutes spent on total encounter. The necessity of the time spent during the encounter on this date of service was due to:     time spent on review of labs, imaging studies, old records, obtaining history, personally examining patient, counselling and communicating with patient/ family, entering orders for medications/tests/etc, discussions with other health care providers, documentation in electronic health records, independent interpretation of labs, imaging/procedure results and care coordination.

## 2024-02-29 LAB
GLUCOSE BLDC GLUCOMTR-MCNC: 104 MG/DL — HIGH (ref 70–99)
GLUCOSE BLDC GLUCOMTR-MCNC: 124 MG/DL — HIGH (ref 70–99)
GLUCOSE BLDC GLUCOMTR-MCNC: 125 MG/DL — HIGH (ref 70–99)
GLUCOSE BLDC GLUCOMTR-MCNC: 167 MG/DL — HIGH (ref 70–99)
GLUCOSE BLDC GLUCOMTR-MCNC: 94 MG/DL — SIGNIFICANT CHANGE UP (ref 70–99)

## 2024-02-29 PROCEDURE — 99232 SBSQ HOSP IP/OBS MODERATE 35: CPT

## 2024-02-29 RX ORDER — NICOTINE POLACRILEX 2 MG
1 GUM BUCCAL DAILY
Refills: 0 | Status: DISCONTINUED | OUTPATIENT
Start: 2024-02-29 | End: 2024-03-07

## 2024-02-29 RX ADMIN — Medication 150 MILLIGRAM(S): at 13:01

## 2024-02-29 RX ADMIN — Medication 20 UNIT(S): at 07:39

## 2024-02-29 RX ADMIN — Medication 20 MILLIGRAM(S): at 05:33

## 2024-02-29 RX ADMIN — Medication 20 UNIT(S): at 16:40

## 2024-02-29 RX ADMIN — Medication 1 PATCH: at 19:24

## 2024-02-29 RX ADMIN — Medication 50 MICROGRAM(S): at 05:44

## 2024-02-29 RX ADMIN — Medication 1 PATCH: at 15:45

## 2024-02-29 RX ADMIN — Medication 20 UNIT(S): at 11:31

## 2024-02-29 RX ADMIN — Medication 150 MILLIGRAM(S): at 21:15

## 2024-02-29 RX ADMIN — Medication 20 MILLIGRAM(S): at 13:00

## 2024-02-29 RX ADMIN — LOSARTAN POTASSIUM 50 MILLIGRAM(S): 100 TABLET, FILM COATED ORAL at 05:33

## 2024-02-29 RX ADMIN — Medication 150 MILLIGRAM(S): at 05:33

## 2024-02-29 RX ADMIN — Medication 100 MILLIGRAM(S): at 05:33

## 2024-02-29 RX ADMIN — INSULIN GLARGINE 45 UNIT(S): 100 INJECTION, SOLUTION SUBCUTANEOUS at 07:38

## 2024-02-29 RX ADMIN — Medication 20 MILLIGRAM(S): at 21:15

## 2024-02-29 RX ADMIN — ENOXAPARIN SODIUM 40 MILLIGRAM(S): 100 INJECTION SUBCUTANEOUS at 13:00

## 2024-02-29 NOTE — PROGRESS NOTE ADULT - ASSESSMENT
58-year-old female with a past medical history of diabetes, bipolar disorder, hypertension, and chronic back pain recent admission for fall (related to taking too much baclofen as per pt) and uncontrolled BS. Pt presents to the ED for evaluation of right lower extremity pain status post fall.     #Mechanical fall  #Subacute Fibular fracture   - Pt reports her last fall was due to taking more Baclofen than prescribed leading to lethargy  - current fall is mechanical  - Pain control with the following   - Tylenol 975 Q 8 PRN mild pain   - Ketorolac 15 Q 8 PRN for mod pain to be titrated off to home dose celecoxib once pain is better controlled  - C/W home dose baclofen 20 Q 8  - C/W Lyrica 150 Q 8 (home dose)    #DM  - C/W home insulin regimen   - 45 Lantus in am  - 20 TID lispro pre meals  - SSI +1  - adjust PRN to keep -180    #Chronic pain 2/2 lumbar radiculopathy, neuropathy  - c/w baclofen and pregabalin     #HTN, stable  - c/w losartan 50 Qd and metoprolol 100 QD    #Hypothyroidism  - c/w levothyroxine 50mcg qd   - TSH elevated on 2/2 - 5.52, has been on levothyroxine with a hx of non-compliance; pt needs repeat thyroid studies in March     DVT ppx: Lovenox  Diet: DASH with CC    #Progress Note Handoff  Pending: Clinical improvement and stability__x___  Pt/Family discussion: Pt informed and agrees with the current plan  Disposition: Safe dispo    My note supersedes the residents note should a discrepancy arise.    Chart and notes personally reviewed.  Care Discussed with Consultants/Other Providers/ Housestaff [ x] YES [ ] NO   Radiology, labs, old records personally reviewed.    discussed w/ housestaff, nursing, case management    Time-based billing (NON-critical care).     35 minutes spent on total encounter. The necessity of the time spent during the encounter on this date of service was due to:     time spent on review of labs, imaging studies, old records, obtaining history, personally examining patient, counselling and communicating with patient/ family, entering orders for medications/tests/etc, discussions with other health care providers, documentation in electronic health records, independent interpretation of labs, imaging/procedure results and care coordination.

## 2024-02-29 NOTE — PROGRESS NOTE ADULT - SUBJECTIVE AND OBJECTIVE BOX
Patient is a 58y old  Female who presents with a chief complaint of Fall (27 Feb 2024 16:14)    INTERVAL HPI/OVERNIGHT EVENTS: Patient was examined and seen at bedside. This morning pt is resting comfortably in bed and reports no new issues or overnight events. No complaints, feels well  ROS: Denies CP, SOB, AP, new weakness  All other systems reviewed and are within normal limits.  InitialHPI:   58-year-old female with a past medical history of diabetes, bipolar disorder, hypertension, and chronic back pain recent admission for fall (related to taking too much baclofen as per pt) and uncontrolled BS. Pt presents to the ED for evaluation of right lower extremity pain status post fall. The patient reports that she missed a step going down the stairs lost her balance and subsequently fell 3 steps.  Patient reports when she fell she landed on her left side and was able to get up on her own.  Patient denies head injury, LOC, Pt presented to the ED S/P Fall and was found to have a likely sub-acute fibular fracture. The patient does report pain in the R fibular region for the past "several months" which worsens when she steps on the foot and there was no recent worsening of the pain S/P fall. In addition patient does note that her leg was slightly more swollen than usual.       Pts VSS remained stable on admission and will be admitted for ortho consult, PT eval for possible placement.             (27 Feb 2024 12:42)    PAST MEDICAL & SURGICAL HISTORY:  Hypertension      Diabetes mellitus      Thyroid disease      Anemia      S/P lumbar discectomy  2006.      S/P tonsillectomy          General: NAD, AAO3  HEENT:  EOMI, no LAD  CV: S1 S2  Resp: decreased breath sounds at bases  GI: NT/ND/S +BS  MS: no clubbing/cyanosis/edema, + pulses b/l  Neuro: nonfocal, +reflexes thruout          Home Medications:  baclofen 20 mg oral tablet: 1 tab(s) orally every 8 hours (15 Feb 2024 16:45)  celecoxib 200 mg oral capsule: 1 cap(s) orally 2 times a day as needed for  moderate pain (15 Feb 2024 16:45)  insulin lispro 100 units/mL injectable solution: 20 unit(s) injectable 3 times a day (before meals) (21 Feb 2024 11:22)  levothyroxine 50 mcg (0.05 mg) oral tablet: 1 tab(s) orally once a day (15 Feb 2024 16:45)  losartan 50 mg oral tablet: 1 tab(s) orally once a day (15 Feb 2024 16:45)  metoprolol succinate 100 mg oral capsule, extended release: 1 cap(s) orally once a day (15 Feb 2024 16:45)  pregabalin 150 mg oral capsule: 1 cap(s) orally every 8 hours (15 Feb 2024 16:45)    MEDICATIONS  (STANDING):  baclofen 20 milliGRAM(s) Oral every 8 hours  dextrose 5%. 1000 milliLiter(s) (50 mL/Hr) IV Continuous <Continuous>  dextrose 5%. 1000 milliLiter(s) (100 mL/Hr) IV Continuous <Continuous>  dextrose 50% Injectable 12.5 Gram(s) IV Push once  dextrose 50% Injectable 25 Gram(s) IV Push once  dextrose 50% Injectable 25 Gram(s) IV Push once  enoxaparin Injectable 40 milliGRAM(s) SubCutaneous every 24 hours  glucagon  Injectable 1 milliGRAM(s) IntraMuscular once  influenza   Vaccine 0.5 milliLiter(s) IntraMuscular once  insulin glargine Injectable (LANTUS) 45 Unit(s) SubCutaneous every morning  insulin lispro (ADMELOG) corrective regimen sliding scale   SubCutaneous three times a day before meals  insulin lispro Injectable (ADMELOG) 20 Unit(s) SubCutaneous three times a day before meals  levothyroxine 50 MICROGram(s) Oral daily  losartan 50 milliGRAM(s) Oral daily  metoprolol succinate  milliGRAM(s) Oral daily  nicotine -  14 mG/24Hr(s) Patch 1 Patch Transdermal daily  pregabalin 150 milliGRAM(s) Oral every 8 hours    MEDICATIONS  (PRN):  acetaminophen     Tablet .. 975 milliGRAM(s) Oral every 8 hours PRN Mild Pain (1 - 3)  dextrose Oral Gel 15 Gram(s) Oral once PRN Blood Glucose LESS THAN 70 milliGRAM(s)/deciliter  ketorolac   Injectable 15 milliGRAM(s) IV Push three times a day PRN Moderate Pain (4 - 6)    Vital Signs Last 24 Hrs  T(C): 36.7 (29 Feb 2024 13:04), Max: 36.7 (29 Feb 2024 13:04)  T(F): 98 (29 Feb 2024 13:04), Max: 98 (29 Feb 2024 13:04)  HR: 81 (29 Feb 2024 13:04) (74 - 81)  BP: 130/62 (29 Feb 2024 13:04) (116/58 - 142/67)  BP(mean): 96 (29 Feb 2024 05:23) (83 - 96)  RR: 18 (29 Feb 2024 13:04) (18 - 18)  SpO2: 98% (29 Feb 2024 13:04) (98% - 98%)    Parameters below as of 29 Feb 2024 13:04  Patient On (Oxygen Delivery Method): room air      CAPILLARY BLOOD GLUCOSE      POCT Blood Glucose.: 125 mg/dL (29 Feb 2024 16:14)  POCT Blood Glucose.: 94 mg/dL (29 Feb 2024 11:09)  POCT Blood Glucose.: 124 mg/dL (29 Feb 2024 07:35)  POCT Blood Glucose.: 167 mg/dL (29 Feb 2024 00:15)  POCT Blood Glucose.: 88 mg/dL (28 Feb 2024 20:20)    LABS:                            Consultant Notes Reviewed:  [x ] YES  [ ] NO  Care Discussed with Consultants/Other Providers/ Housestaff [ x] YES  [ ] NO  Radiology, labs, EKGs, new studies personally reviewed.

## 2024-03-01 LAB
GLUCOSE BLDC GLUCOMTR-MCNC: 109 MG/DL — HIGH (ref 70–99)
GLUCOSE BLDC GLUCOMTR-MCNC: 117 MG/DL — HIGH (ref 70–99)
GLUCOSE BLDC GLUCOMTR-MCNC: 221 MG/DL — HIGH (ref 70–99)
GLUCOSE BLDC GLUCOMTR-MCNC: 92 MG/DL — SIGNIFICANT CHANGE UP (ref 70–99)

## 2024-03-01 PROCEDURE — 99232 SBSQ HOSP IP/OBS MODERATE 35: CPT

## 2024-03-01 RX ORDER — ZOLPIDEM TARTRATE 10 MG/1
5 TABLET ORAL AT BEDTIME
Refills: 0 | Status: DISCONTINUED | OUTPATIENT
Start: 2024-03-01 | End: 2024-03-07

## 2024-03-01 RX ADMIN — Medication 150 MILLIGRAM(S): at 06:10

## 2024-03-01 RX ADMIN — Medication 100 MILLIGRAM(S): at 05:26

## 2024-03-01 RX ADMIN — Medication 20 MILLIGRAM(S): at 21:27

## 2024-03-01 RX ADMIN — Medication 150 MILLIGRAM(S): at 21:27

## 2024-03-01 RX ADMIN — LOSARTAN POTASSIUM 50 MILLIGRAM(S): 100 TABLET, FILM COATED ORAL at 05:27

## 2024-03-01 RX ADMIN — Medication 15 MILLIGRAM(S): at 13:00

## 2024-03-01 RX ADMIN — ZOLPIDEM TARTRATE 5 MILLIGRAM(S): 10 TABLET ORAL at 21:27

## 2024-03-01 RX ADMIN — Medication 20 UNIT(S): at 12:02

## 2024-03-01 RX ADMIN — INSULIN GLARGINE 45 UNIT(S): 100 INJECTION, SOLUTION SUBCUTANEOUS at 08:45

## 2024-03-01 RX ADMIN — Medication 1 PATCH: at 12:02

## 2024-03-01 RX ADMIN — Medication 20 UNIT(S): at 17:31

## 2024-03-01 RX ADMIN — Medication 20 MILLIGRAM(S): at 13:23

## 2024-03-01 RX ADMIN — Medication 15 MILLIGRAM(S): at 12:30

## 2024-03-01 RX ADMIN — ENOXAPARIN SODIUM 40 MILLIGRAM(S): 100 INJECTION SUBCUTANEOUS at 13:23

## 2024-03-01 RX ADMIN — Medication 1 PATCH: at 19:45

## 2024-03-01 RX ADMIN — Medication 50 MICROGRAM(S): at 05:26

## 2024-03-01 RX ADMIN — Medication 2: at 08:19

## 2024-03-01 RX ADMIN — Medication 150 MILLIGRAM(S): at 13:23

## 2024-03-01 RX ADMIN — Medication 20 UNIT(S): at 08:19

## 2024-03-01 RX ADMIN — Medication 975 MILLIGRAM(S): at 05:26

## 2024-03-01 RX ADMIN — Medication 20 MILLIGRAM(S): at 06:11

## 2024-03-01 NOTE — PROGRESS NOTE ADULT - SUBJECTIVE AND OBJECTIVE BOX
Patient is a 58y old  Female who presents with a chief complaint of Fall (27 Feb 2024 16:14)    INTERVAL HPI/OVERNIGHT EVENTS: Patient was examined and seen at bedside. This morning pt is resting comfortably in bed and reports no new issues or overnight events. No complaints, feels well  ROS: Denies CP, SOB, AP, new weakness  All other systems reviewed and are within normal limits.  InitialHPI:   58-year-old female with a past medical history of diabetes, bipolar disorder, hypertension, and chronic back pain recent admission for fall (related to taking too much baclofen as per pt) and uncontrolled BS. Pt presents to the ED for evaluation of right lower extremity pain status post fall. The patient reports that she missed a step going down the stairs lost her balance and subsequently fell 3 steps.  Patient reports when she fell she landed on her left side and was able to get up on her own.  Patient denies head injury, LOC, Pt presented to the ED S/P Fall and was found to have a likely sub-acute fibular fracture. The patient does report pain in the R fibular region for the past "several months" which worsens when she steps on the foot and there was no recent worsening of the pain S/P fall. In addition patient does note that her leg was slightly more swollen than usual.       Pts VSS remained stable on admission and will be admitted for ortho consult, PT eval for possible placement.             (27 Feb 2024 12:42)    PAST MEDICAL & SURGICAL HISTORY:  Hypertension      Diabetes mellitus      Thyroid disease      Anemia      S/P lumbar discectomy  2006.      S/P tonsillectomy          General: NAD, AAO3  HEENT:  EOMI, no LAD  CV: S1 S2  Resp: decreased breath sounds at bases  GI: NT/ND/S +BS  MS: no clubbing/cyanosis/edema, + pulses b/l  Neuro: nonfocal, +reflexes thruout          Home Medications:  baclofen 20 mg oral tablet: 1 tab(s) orally every 8 hours (15 Feb 2024 16:45)  celecoxib 200 mg oral capsule: 1 cap(s) orally 2 times a day as needed for  moderate pain (15 Feb 2024 16:45)  insulin lispro 100 units/mL injectable solution: 20 unit(s) injectable 3 times a day (before meals) (21 Feb 2024 11:22)  levothyroxine 50 mcg (0.05 mg) oral tablet: 1 tab(s) orally once a day (15 Feb 2024 16:45)  losartan 50 mg oral tablet: 1 tab(s) orally once a day (15 Feb 2024 16:45)  metoprolol succinate 100 mg oral capsule, extended release: 1 cap(s) orally once a day (15 Feb 2024 16:45)  pregabalin 150 mg oral capsule: 1 cap(s) orally every 8 hours (15 Feb 2024 16:45)    MEDICATIONS  (STANDING):  baclofen 20 milliGRAM(s) Oral every 8 hours  dextrose 5%. 1000 milliLiter(s) (100 mL/Hr) IV Continuous <Continuous>  dextrose 5%. 1000 milliLiter(s) (50 mL/Hr) IV Continuous <Continuous>  dextrose 50% Injectable 25 Gram(s) IV Push once  dextrose 50% Injectable 25 Gram(s) IV Push once  dextrose 50% Injectable 12.5 Gram(s) IV Push once  enoxaparin Injectable 40 milliGRAM(s) SubCutaneous every 24 hours  glucagon  Injectable 1 milliGRAM(s) IntraMuscular once  influenza   Vaccine 0.5 milliLiter(s) IntraMuscular once  insulin glargine Injectable (LANTUS) 45 Unit(s) SubCutaneous every morning  insulin lispro (ADMELOG) corrective regimen sliding scale   SubCutaneous three times a day before meals  insulin lispro Injectable (ADMELOG) 20 Unit(s) SubCutaneous three times a day before meals  levothyroxine 50 MICROGram(s) Oral daily  losartan 50 milliGRAM(s) Oral daily  metoprolol succinate  milliGRAM(s) Oral daily  nicotine -  14 mG/24Hr(s) Patch 1 Patch Transdermal daily  pregabalin 150 milliGRAM(s) Oral every 8 hours    MEDICATIONS  (PRN):  acetaminophen     Tablet .. 975 milliGRAM(s) Oral every 8 hours PRN Mild Pain (1 - 3)  dextrose Oral Gel 15 Gram(s) Oral once PRN Blood Glucose LESS THAN 70 milliGRAM(s)/deciliter  ketorolac   Injectable 15 milliGRAM(s) IV Push three times a day PRN Moderate Pain (4 - 6)    Vital Signs Last 24 Hrs  T(C): 36.6 (01 Mar 2024 13:37), Max: 36.6 (01 Mar 2024 13:37)  T(F): 97.8 (01 Mar 2024 13:37), Max: 97.8 (01 Mar 2024 13:37)  HR: 81 (01 Mar 2024 13:37) (79 - 81)  BP: 111/64 (01 Mar 2024 13:37) (111/64 - 137/76)  BP(mean): 81 (01 Mar 2024 13:37) (81 - 98)  RR: 18 (01 Mar 2024 13:37) (18 - 18)  SpO2: 98% (01 Mar 2024 13:37) (98% - 98%)    Parameters below as of 01 Mar 2024 13:37  Patient On (Oxygen Delivery Method): room air      CAPILLARY BLOOD GLUCOSE      POCT Blood Glucose.: 117 mg/dL (01 Mar 2024 11:04)  POCT Blood Glucose.: 221 mg/dL (01 Mar 2024 07:47)  POCT Blood Glucose.: 104 mg/dL (29 Feb 2024 21:34)    LABS:                            Consultant Notes Reviewed:  [x ] YES  [ ] NO  Care Discussed with Consultants/Other Providers/ Housestaff [ x] YES  [ ] NO  Radiology, labs, EKGs, new studies personally reviewed.

## 2024-03-01 NOTE — PROGRESS NOTE ADULT - ASSESSMENT
58-year-old female with a past medical history of diabetes, bipolar disorder, hypertension, and chronic back pain recent admission for fall (related to taking too much baclofen as per pt) and uncontrolled BS. Pt presents to the ED for evaluation of right lower extremity pain status post fall.     #Mechanical fall  #Subacute Fibular fracture   - Pt reports her last fall was due to taking more Baclofen than prescribed leading to lethargy  - current fall is mechanical  - Pain control with the following   - Tylenol 975 Q 8 PRN mild pain   - Ketorolac 15 Q 8 PRN for mod pain to be titrated off to home dose celecoxib once pain is better controlled  - C/W home dose baclofen 20 Q 8  - C/W Lyrica 150 Q 8 (home dose)    #DM  - C/W home insulin regimen   - 45 Lantus in am  - 20 TID lispro pre meals  - SSI +1  - adjust PRN to keep -180    #Chronic pain 2/2 lumbar radiculopathy, neuropathy  - c/w baclofen and pregabalin     #HTN, stable  - c/w losartan 50 Qd and metoprolol 100 QD    #Hypothyroidism  - c/w levothyroxine 50mcg qd   - TSH elevated on 2/2 - 5.52, has been on levothyroxine with a hx of non-compliance; pt needs repeat thyroid studies in March     DVT ppx: Lovenox  Diet: DASH with CC    #Progress Note Handoff  Pending: Auth  Pt/Family discussion: Pt informed and agrees with the current plan  Disposition: Safe dispo    My note supersedes the residents note should a discrepancy arise.    Chart and notes personally reviewed.  Care Discussed with Consultants/Other Providers/ Housestaff [ x] YES [ ] NO   Radiology, labs, old records personally reviewed.    discussed w/ housestaff, nursing, case management    Time-based billing (NON-critical care).     35 minutes spent on total encounter. The necessity of the time spent during the encounter on this date of service was due to:     time spent on review of labs, imaging studies, old records, obtaining history, personally examining patient, counselling and communicating with patient/ family, entering orders for medications/tests/etc, discussions with other health care providers, documentation in electronic health records, independent interpretation of labs, imaging/procedure results and care coordination.

## 2024-03-02 LAB — GLUCOSE BLDC GLUCOMTR-MCNC: 142 MG/DL — HIGH (ref 70–99)

## 2024-03-02 RX ADMIN — Medication 975 MILLIGRAM(S): at 03:32

## 2024-03-02 RX ADMIN — Medication 1 PATCH: at 17:37

## 2024-03-02 RX ADMIN — Medication 975 MILLIGRAM(S): at 00:39

## 2024-03-02 RX ADMIN — Medication 150 MILLIGRAM(S): at 22:15

## 2024-03-02 RX ADMIN — Medication 15 MILLIGRAM(S): at 20:29

## 2024-03-02 RX ADMIN — Medication 150 MILLIGRAM(S): at 13:48

## 2024-03-02 RX ADMIN — Medication 975 MILLIGRAM(S): at 22:10

## 2024-03-02 RX ADMIN — ZOLPIDEM TARTRATE 5 MILLIGRAM(S): 10 TABLET ORAL at 22:11

## 2024-03-02 RX ADMIN — Medication 20 MILLIGRAM(S): at 22:11

## 2024-03-02 RX ADMIN — Medication 20 UNIT(S): at 08:32

## 2024-03-02 RX ADMIN — ENOXAPARIN SODIUM 40 MILLIGRAM(S): 100 INJECTION SUBCUTANEOUS at 12:59

## 2024-03-02 RX ADMIN — Medication 100 MILLIGRAM(S): at 05:25

## 2024-03-02 RX ADMIN — Medication 50 MICROGRAM(S): at 05:25

## 2024-03-02 RX ADMIN — Medication 1 PATCH: at 11:33

## 2024-03-02 RX ADMIN — INSULIN GLARGINE 45 UNIT(S): 100 INJECTION, SOLUTION SUBCUTANEOUS at 08:37

## 2024-03-02 RX ADMIN — Medication 150 MILLIGRAM(S): at 05:25

## 2024-03-02 RX ADMIN — LOSARTAN POTASSIUM 50 MILLIGRAM(S): 100 TABLET, FILM COATED ORAL at 05:25

## 2024-03-02 RX ADMIN — Medication 20 UNIT(S): at 11:32

## 2024-03-02 RX ADMIN — Medication 20 MILLIGRAM(S): at 13:46

## 2024-03-02 RX ADMIN — Medication 20 MILLIGRAM(S): at 05:26

## 2024-03-02 RX ADMIN — Medication 1 PATCH: at 11:38

## 2024-03-02 NOTE — PROGRESS NOTE ADULT - ASSESSMENT
58-year-old female with a past medical history of diabetes, bipolar disorder, hypertension, and chronic back pain recent admission for fall (related to taking too much baclofen as per pt) and uncontrolled BS. Pt presents to the ED for evaluation of right lower extremity pain status post fall.     #Mechanical fall  #Subacute Fibular fracture   - Pt reports her last fall was due to taking more Baclofen than prescribed leading to lethargy  - current fall is mechanical  - Pain control with the following   - Tylenol 975 Q 8 PRN mild pain   - Ketorolac 15 Q 8 PRN for mod pain to be titrated off to home dose celecoxib once pain is better controlled  - C/W home dose baclofen 20 Q 8  - C/W Lyrica 150 Q 8 (home dose)    #DM  - C/W home insulin regimen   - 45 Lantus in am  - 20 TID lispro pre meals  - SSI +1  - adjust PRN to keep -180    #Chronic pain 2/2 lumbar radiculopathy, neuropathy  - c/w baclofen and pregabalin     #HTN, stable  - c/w losartan 50 Qd and metoprolol 100 QD    #Hypothyroidism  - c/w levothyroxine 50mcg qd   - TSH elevated on 2/2 - 5.52, has been on levothyroxine with a hx of non-compliance; pt needs repeat thyroid studies in March     DVT ppx: Lovenox  Diet: DASH with CC    #Progress Note Handoff  Pending: Auth  Pt/Family discussion: Pt informed and agrees with the current plan  Disposition: Safe dispo

## 2024-03-02 NOTE — PROGRESS NOTE ADULT - SUBJECTIVE AND OBJECTIVE BOX
TIFFANI DANISH  58y  Female      Patient is a 58y old  Female who presents with a chief complaint of Fall (01 Mar 2024 16:16)      INTERVAL HPI/OVERNIGHT EVENTS:  Pt feels well, denied any new issues. No acute overnight events,    Vital Signs Last 24 Hrs  T(C): 36.1 (02 Mar 2024 04:45), Max: 36.6 (01 Mar 2024 13:37)  T(F): 97 (02 Mar 2024 04:45), Max: 97.8 (01 Mar 2024 13:37)  HR: 86 (02 Mar 2024 04:45) (79 - 86)  BP: 146/79 (02 Mar 2024 04:45) (111/64 - 146/79)  BP(mean): 92 (02 Mar 2024 04:45) (81 - 92)  RR: 18 (02 Mar 2024 04:45) (18 - 18)  SpO2: 97% (02 Mar 2024 04:45) (97% - 98%)    Parameters below as of 02 Mar 2024 04:45  Patient On (Oxygen Delivery Method): room air      03-01-24 @ 07:01  -  03-02-24 @ 07:00  --------------------------------------------------------  IN: 0 mL / OUT: 1 mL / NET: -1 mL      PHYSICAL EXAM:  General: NAD, AAO3  HEENT:  EOMI, no LAD  CV: S1 S2  Resp: decreased breath sounds at bases  GI: NT/ND/S +BS  MS: no clubbing/cyanosis/edema, + pulses b/l  Neuro: nonfocal, +reflexes thruout      Consultant(s) Notes Reviewed:  [x ] YES  [ ] NO        POCT Blood Glucose.: 111 mg/dL (02 Mar 2024 11:30)

## 2024-03-03 LAB
GLUCOSE BLDC GLUCOMTR-MCNC: 136 MG/DL — HIGH (ref 70–99)
GLUCOSE BLDC GLUCOMTR-MCNC: 264 MG/DL — HIGH (ref 70–99)
GLUCOSE BLDC GLUCOMTR-MCNC: 72 MG/DL — SIGNIFICANT CHANGE UP (ref 70–99)

## 2024-03-03 PROCEDURE — 99254 IP/OBS CNSLTJ NEW/EST MOD 60: CPT | Mod: GC

## 2024-03-03 PROCEDURE — 99232 SBSQ HOSP IP/OBS MODERATE 35: CPT

## 2024-03-03 RX ADMIN — Medication 1 PATCH: at 20:22

## 2024-03-03 RX ADMIN — Medication 15 MILLIGRAM(S): at 20:41

## 2024-03-03 RX ADMIN — LOSARTAN POTASSIUM 50 MILLIGRAM(S): 100 TABLET, FILM COATED ORAL at 06:23

## 2024-03-03 RX ADMIN — Medication 50 MICROGRAM(S): at 06:53

## 2024-03-03 RX ADMIN — Medication 1 PATCH: at 11:23

## 2024-03-03 RX ADMIN — Medication 20 MILLIGRAM(S): at 14:16

## 2024-03-03 RX ADMIN — Medication 100 MILLIGRAM(S): at 06:28

## 2024-03-03 RX ADMIN — Medication 20 UNIT(S): at 17:27

## 2024-03-03 RX ADMIN — Medication 20 UNIT(S): at 08:03

## 2024-03-03 RX ADMIN — ZOLPIDEM TARTRATE 5 MILLIGRAM(S): 10 TABLET ORAL at 21:09

## 2024-03-03 RX ADMIN — Medication 20 MILLIGRAM(S): at 06:23

## 2024-03-03 RX ADMIN — Medication 150 MILLIGRAM(S): at 06:23

## 2024-03-03 RX ADMIN — Medication 150 MILLIGRAM(S): at 14:16

## 2024-03-03 RX ADMIN — Medication 3: at 17:27

## 2024-03-03 RX ADMIN — Medication 1: at 08:03

## 2024-03-03 RX ADMIN — INSULIN GLARGINE 45 UNIT(S): 100 INJECTION, SOLUTION SUBCUTANEOUS at 08:02

## 2024-03-03 RX ADMIN — Medication 150 MILLIGRAM(S): at 21:05

## 2024-03-03 RX ADMIN — Medication 20 MILLIGRAM(S): at 21:05

## 2024-03-03 RX ADMIN — ENOXAPARIN SODIUM 40 MILLIGRAM(S): 100 INJECTION SUBCUTANEOUS at 14:16

## 2024-03-03 RX ADMIN — Medication 15 MILLIGRAM(S): at 20:11

## 2024-03-03 NOTE — CONSULT NOTE ADULT - ASSESSMENT
This 58 y old female with the above PMH referred to neurology for "Pt with hx of multiple falls, b/l neuropathic pain" The patient has many risk factors for neuropathy includes DM (poorly controlled with A1c 13.8), and history or alcohol and substance use. She also has asymmetric reflexes with vascular risk factors, that require r/o a stroke which may contribute to her balance and asymmetric reflexes.       Recommendations:   - B12, and B6 levels   - Better control for her DM, and hypothyroidism  - B1 level collection followed with B1 supplementation until the level is back   - MR brain wo   - Pain management consult   - Suggest gradually changing pregabalin to Cymbalta; however, this need time and can be done as outpatient     Thank you for sharing this patient with me; please do not hesitate to contact me in case of any question.         
A/P: 58y Female with R chronic fibular neck fracture. Patient is currently walking unassisted with minimal pain. XR appears to demonstrate callus formation at fracture site, consistent with remote injury. No appreciated ankle tenderness or XR findings.     - WBAT with assistance as needed  - Pain control per primary team  - compressive dressing as needed for comfort  - Extremity icing/elevation  - Patient instructed to return to ED if any worsening pain, numbness, tingling, fevers, chills or any other concerning symptoms  - F/U with Dr. Gallardo in 2 weeks if symptoms persist. Please call [phone].

## 2024-03-03 NOTE — PROGRESS NOTE ADULT - SUBJECTIVE AND OBJECTIVE BOX
T H I S   I S    N O  T   A    F I N A L I Z E D   N O T DANISH MANN  58y, Female  Allergy: No Known Allergies    Hospital Day: 5d    Patient seen and examined earlier today.     PMH/PSH:  PAST MEDICAL & SURGICAL HISTORY:  Hypertension      Diabetes mellitus      Thyroid disease      Anemia      S/P lumbar discectomy  2006.      S/P tonsillectomy          LAST 24-Hr EVENTS:    VITALS:  T(F): 95.8 (03-03-24 @ 04:45), Max: 98 (03-02-24 @ 13:22)  HR: 79 (03-03-24 @ 04:45)  BP: 129/60 (03-03-24 @ 04:45) (110/63 - 133/88)  RR: 18 (03-02-24 @ 21:10)  SpO2: 98% (03-02-24 @ 21:10)          TESTS & MEASUREMENTS:  Weight/BMI  77.1 (03-01-24 @ 16:16)  27.4 (03-01-24 @ 16:16)    03-01-24 @ 07:01  -  03-02-24 @ 07:00  --------------------------------------------------------  IN: 0 mL / OUT: 1 mL / NET: -1 mL                                    COVID-19 PCR: NotDetec (02-28-24 @ 11:00)        A1C with Estimated Average Glucose Result: 13.8 % (02-03-24 @ 06:33)  A1C with Estimated Average Glucose Result: >15.5 % (02-02-24 @ 06:28)          RADIOLOGY, ECG, & ADDITIONAL TESTS:  12 Lead ECG:   Ventricular Rate 91 BPM    Atrial Rate 91 BPM    P-R Interval 134 ms    QRS Duration 82 ms    Q-T Interval 386 ms    QTC Calculation(Bazett) 474 ms    P Axis 47 degrees    R Axis 18 degrees    T Axis 84 degrees    Diagnosis Line Normal sinus rhythm  Nonspecific ST abnormality  Abnormal ECG    Confirmed by Omero Castillo ([id number]) on 2/15/2024 2:56:09 PM (02-15-24 @ 14:19)      RECENT DIAGNOSTIC ORDERS:      MEDICATIONS:  MEDICATIONS  (STANDING):  baclofen 20 milliGRAM(s) Oral every 8 hours  dextrose 5%. 1000 milliLiter(s) (100 mL/Hr) IV Continuous <Continuous>  dextrose 5%. 1000 milliLiter(s) (50 mL/Hr) IV Continuous <Continuous>  dextrose 50% Injectable 25 Gram(s) IV Push once  dextrose 50% Injectable 12.5 Gram(s) IV Push once  dextrose 50% Injectable 25 Gram(s) IV Push once  enoxaparin Injectable 40 milliGRAM(s) SubCutaneous every 24 hours  glucagon  Injectable 1 milliGRAM(s) IntraMuscular once  influenza   Vaccine 0.5 milliLiter(s) IntraMuscular once  insulin glargine Injectable (LANTUS) 45 Unit(s) SubCutaneous every morning  insulin lispro (ADMELOG) corrective regimen sliding scale   SubCutaneous three times a day before meals  insulin lispro Injectable (ADMELOG) 20 Unit(s) SubCutaneous three times a day before meals  levothyroxine 50 MICROGram(s) Oral daily  losartan 50 milliGRAM(s) Oral daily  metoprolol succinate  milliGRAM(s) Oral daily  nicotine -  14 mG/24Hr(s) Patch 1 Patch Transdermal daily  pregabalin 150 milliGRAM(s) Oral every 8 hours    MEDICATIONS  (PRN):  acetaminophen     Tablet .. 975 milliGRAM(s) Oral every 8 hours PRN Mild Pain (1 - 3)  dextrose Oral Gel 15 Gram(s) Oral once PRN Blood Glucose LESS THAN 70 milliGRAM(s)/deciliter  ketorolac   Injectable 15 milliGRAM(s) IV Push three times a day PRN Moderate Pain (4 - 6)  zolpidem 5 milliGRAM(s) Oral at bedtime PRN Insomnia      HOME MEDICATIONS:  baclofen 20 mg oral tablet (02-15)  celecoxib 200 mg oral capsule (02-15)  insulin glargine 100 units/mL subcutaneous solution (02-22)  insulin lispro 100 units/mL injectable solution (02-21)  Jardiance 25 mg oral tablet (02-15)  levothyroxine 50 mcg (0.05 mg) oral tablet (02-15)  losartan 50 mg oral tablet (02-15)  metoprolol succinate 100 mg oral capsule, extended release (02-15)  pregabalin 150 mg oral capsule (02-15)      PHYSICAL EXAM:  GENERAL:   CHEST/LUNG:   HEART:   ABDOMEN:   EXTREMITIES:               DANISH NIXON  58y, Female  Allergy: No Known Allergies    Hospital Day: 5d    Patient seen and examined earlier today. she stated that only lyrica help her for the chronic b/l neuropathic pain     PMH/PSH:  PAST MEDICAL & SURGICAL HISTORY:  Hypertension      Diabetes mellitus      Thyroid disease      Anemia      S/P lumbar discectomy  2006.      S/P tonsillectomy          LAST 24-Hr EVENTS:    VITALS:  T(F): 95.8 (03-03-24 @ 04:45), Max: 98 (03-02-24 @ 13:22)  HR: 79 (03-03-24 @ 04:45)  BP: 129/60 (03-03-24 @ 04:45) (110/63 - 133/88)  RR: 18 (03-02-24 @ 21:10)  SpO2: 98% (03-02-24 @ 21:10)          TESTS & MEASUREMENTS:  Weight/BMI  77.1 (03-01-24 @ 16:16)  27.4 (03-01-24 @ 16:16)    03-01-24 @ 07:01  -  03-02-24 @ 07:00  --------------------------------------------------------  IN: 0 mL / OUT: 1 mL / NET: -1 mL                                    COVID-19 PCR: Timmy (02-28-24 @ 11:00)        A1C with Estimated Average Glucose Result: 13.8 % (02-03-24 @ 06:33)  A1C with Estimated Average Glucose Result: >15.5 % (02-02-24 @ 06:28)          RADIOLOGY, ECG, & ADDITIONAL TESTS:  12 Lead ECG:   Ventricular Rate 91 BPM    Atrial Rate 91 BPM    P-R Interval 134 ms    QRS Duration 82 ms    Q-T Interval 386 ms    QTC Calculation(Bazett) 474 ms    P Axis 47 degrees    R Axis 18 degrees    T Axis 84 degrees    Diagnosis Line Normal sinus rhythm  Nonspecific ST abnormality  Abnormal ECG    Confirmed by Omero Castillo ([id number]) on 2/15/2024 2:56:09 PM (02-15-24 @ 14:19)      RECENT DIAGNOSTIC ORDERS:      MEDICATIONS:  MEDICATIONS  (STANDING):  baclofen 20 milliGRAM(s) Oral every 8 hours  dextrose 5%. 1000 milliLiter(s) (100 mL/Hr) IV Continuous <Continuous>  dextrose 5%. 1000 milliLiter(s) (50 mL/Hr) IV Continuous <Continuous>  dextrose 50% Injectable 25 Gram(s) IV Push once  dextrose 50% Injectable 12.5 Gram(s) IV Push once  dextrose 50% Injectable 25 Gram(s) IV Push once  enoxaparin Injectable 40 milliGRAM(s) SubCutaneous every 24 hours  glucagon  Injectable 1 milliGRAM(s) IntraMuscular once  influenza   Vaccine 0.5 milliLiter(s) IntraMuscular once  insulin glargine Injectable (LANTUS) 45 Unit(s) SubCutaneous every morning  insulin lispro (ADMELOG) corrective regimen sliding scale   SubCutaneous three times a day before meals  insulin lispro Injectable (ADMELOG) 20 Unit(s) SubCutaneous three times a day before meals  levothyroxine 50 MICROGram(s) Oral daily  losartan 50 milliGRAM(s) Oral daily  metoprolol succinate  milliGRAM(s) Oral daily  nicotine -  14 mG/24Hr(s) Patch 1 Patch Transdermal daily  pregabalin 150 milliGRAM(s) Oral every 8 hours    MEDICATIONS  (PRN):  acetaminophen     Tablet .. 975 milliGRAM(s) Oral every 8 hours PRN Mild Pain (1 - 3)  dextrose Oral Gel 15 Gram(s) Oral once PRN Blood Glucose LESS THAN 70 milliGRAM(s)/deciliter  ketorolac   Injectable 15 milliGRAM(s) IV Push three times a day PRN Moderate Pain (4 - 6)  zolpidem 5 milliGRAM(s) Oral at bedtime PRN Insomnia      HOME MEDICATIONS:  baclofen 20 mg oral tablet (02-15)  celecoxib 200 mg oral capsule (02-15)  insulin glargine 100 units/mL subcutaneous solution (02-22)  insulin lispro 100 units/mL injectable solution (02-21)  Jardiance 25 mg oral tablet (02-15)  levothyroxine 50 mcg (0.05 mg) oral tablet (02-15)  losartan 50 mg oral tablet (02-15)  metoprolol succinate 100 mg oral capsule, extended release (02-15)  pregabalin 150 mg oral capsule (02-15)      PHYSICAL EXAM:  On exam  General: awake, alert, NAD, chronic ill appearance  Lungs:  clear to ausculations b/l, normal resp effort  Heart: regular ryhthm   Abdomen: soft, non tender non distended  Ext: trace edema, can move all  his extremities , R knee ace wrap   neuro grossly non focal

## 2024-03-03 NOTE — PROGRESS NOTE ADULT - ASSESSMENT
58-year-old female with a past medical history of diabetes, bipolar disorder, hypertension, and chronic back pain recent admission for fall (related to taking too much baclofen as per pt) and uncontrolled BS. Pt presents to the ED for evaluation of right lower extremity pain status post fall.   of note Pt has recent hospital admissions for DKA then for generalized weakness     []Mechanical fall  []Subacute Fibular fracture R  []Chronic pain 2/2 lumbar radiculopathy, neuropathy b/l LE     - Pt reports her last fall was due to taking more Baclofen than prescribed leading to lethargy  - current fall is mechanical  - Pain control with the following   - Tylenol 975 Q 8 PRN mild pain   - Ketorolac 15 Q 8 PRN for mod pain to be titrated off to home dose celecoxib once pain is better controlled  - C/W home dose baclofen 20 Q 8  - C/W Lyrica 150 Q 8 (home dose)  IF SYMPTOMS PERSIST WOULD GET Neurology and ? MRI LS     As per Ortho - [Pt has R chronic fibular neck fracture. Patient is currently walking unassisted with minimal pain. XR appears to demonstrate callus formation at fracture site, consistent with remote injury. No appreciated ankle tenderness or XR findings.   - WBAT with assistance as needed  - Pain control per primary team  - compressive dressing as needed for comfort  - Extremity icing/elevation  - Patient instructed to return to ED if any worsening pain, numbness, tingling, fevers, chills or any other concerning symptoms  - F/U with Dr. Gallardo in 2 weeks if symptoms persist. Please call [phone].]  PT /OT             #DM  - C/W home insulin regimen   - 45 Lantus in am  - 20 TID lispro pre meals  - SSI +1  - adjust PRN to keep -180           #HTN, stable  - c/w losartan 50 Qd and metoprolol 100 QD    #Hypothyroidism  - c/w levothyroxine 50mcg qd   - TSH elevated on 2/2 - 5.52, has been on levothyroxine with a hx of non-compliance; pt needs repeat thyroid studies in March     DVT ppx: Lovenox  Diet: DASH with CC    #Progress Note Handoff  Pending: monitor symptoms if recur or not improving, consider neurology  Pt/Family discussion: Pt informed and agrees with the current plan  Disposition: Safe dispo- pending auth    58-year-old female with a past medical history of diabetes, bipolar disorder, hypertension, and chronic back pain recent admission for fall (related to taking too much baclofen as per pt) and uncontrolled BS. Pt presents to the ED for evaluation of right lower extremity pain status post fall.   of note Pt has recent hospital admissions for DKA then for generalized weakness     []Mechanical fall  []Subacute Fibular fracture R  []Chronic pain 2/2 lumbar radiculopathy, neuropathy b/l LE     - Pt reports her last fall was due to taking more Baclofen than prescribed leading to lethargy  - current fall is mechanical  - Pain control with the following   - Tylenol 975 Q 8 PRN mild pain   - Ketorolac 15 Q 8 PRN for mod pain to be titrated off to home dose celecoxib once pain is better controlled  - C/W home dose baclofen 20 Q 8  - C/W Lyrica 150 Q 8 (home dose)  would consult neurology      As per Ortho - [Pt has R chronic fibular neck fracture. Patient is currently walking unassisted with minimal pain. XR appears to demonstrate callus formation at fracture site, consistent with remote injury. No appreciated ankle tenderness or XR findings.   - WBAT with assistance as needed  - Pain control per primary team  - compressive dressing as needed for comfort  - Extremity icing/elevation  - Patient instructed to return to ED if any worsening pain, numbness, tingling, fevers, chills or any other concerning symptoms  - F/U with Dr. Gallardo in 2 weeks if symptoms persist. Please call [phone].]  PT /OT             #DM  - C/W home insulin regimen   - 45 Lantus in am  - 20 TID lispro pre meals  - SSI +1  - adjust PRN to keep -180           #HTN, stable  - c/w losartan 50 Qd and metoprolol 100 QD    #Hypothyroidism  - c/w levothyroxine 50mcg qd   - TSH elevated on 2/2 - 5.52, has been on levothyroxine with a hx of non-compliance; pt needs repeat thyroid studies in March     DVT ppx: Lovenox  Diet: DASH with CC    #Progress Note Handoff  Pending: neurology   Pt/Family discussion: Pt informed and agrees with the current plan  Disposition: Safe dispo- pending auth

## 2024-03-03 NOTE — CONSULT NOTE ADULT - ATTENDING COMMENTS
This 58 y old female with the above PMH referred to neurology for "Pt with hx of multiple falls, b/l neuropathic pain" The patient has many risk factors for neuropathy includes DM (poorly controlled with A1c 13.8), and history or alcohol and substance use. She also has asymmetric reflexes with vascular risk factors, that require r/o a stroke which may contribute to her balance and asymmetric reflexes.     Patient was seen at bedside.   Patient as Diabetic neuropathy and is noted to have uncontrolled diabetes.   agree with plan as above.   Symptoms will improve once Diabetes becomes controlled.

## 2024-03-04 LAB
GLUCOSE BLDC GLUCOMTR-MCNC: 102 MG/DL — HIGH (ref 70–99)
GLUCOSE BLDC GLUCOMTR-MCNC: 173 MG/DL — HIGH (ref 70–99)
GLUCOSE BLDC GLUCOMTR-MCNC: 240 MG/DL — HIGH (ref 70–99)

## 2024-03-04 PROCEDURE — 99231 SBSQ HOSP IP/OBS SF/LOW 25: CPT

## 2024-03-04 RX ADMIN — Medication 975 MILLIGRAM(S): at 23:27

## 2024-03-04 RX ADMIN — Medication 2: at 08:27

## 2024-03-04 RX ADMIN — Medication 20 UNIT(S): at 12:10

## 2024-03-04 RX ADMIN — LOSARTAN POTASSIUM 50 MILLIGRAM(S): 100 TABLET, FILM COATED ORAL at 06:00

## 2024-03-04 RX ADMIN — Medication 100 MILLIGRAM(S): at 06:01

## 2024-03-04 RX ADMIN — Medication 150 MILLIGRAM(S): at 21:13

## 2024-03-04 RX ADMIN — ENOXAPARIN SODIUM 40 MILLIGRAM(S): 100 INJECTION SUBCUTANEOUS at 14:39

## 2024-03-04 RX ADMIN — Medication 150 MILLIGRAM(S): at 14:37

## 2024-03-04 RX ADMIN — Medication 1 PATCH: at 07:21

## 2024-03-04 RX ADMIN — Medication 1: at 17:09

## 2024-03-04 RX ADMIN — ZOLPIDEM TARTRATE 5 MILLIGRAM(S): 10 TABLET ORAL at 23:28

## 2024-03-04 RX ADMIN — Medication 20 UNIT(S): at 17:08

## 2024-03-04 RX ADMIN — Medication 20 MILLIGRAM(S): at 21:13

## 2024-03-04 RX ADMIN — Medication 20 UNIT(S): at 08:26

## 2024-03-04 RX ADMIN — Medication 150 MILLIGRAM(S): at 06:01

## 2024-03-04 RX ADMIN — Medication 20 MILLIGRAM(S): at 14:37

## 2024-03-04 RX ADMIN — Medication 50 MICROGRAM(S): at 06:00

## 2024-03-04 RX ADMIN — INSULIN GLARGINE 45 UNIT(S): 100 INJECTION, SOLUTION SUBCUTANEOUS at 08:28

## 2024-03-04 RX ADMIN — Medication 1 PATCH: at 12:05

## 2024-03-04 RX ADMIN — Medication 20 MILLIGRAM(S): at 06:00

## 2024-03-04 NOTE — PROGRESS NOTE ADULT - SUBJECTIVE AND OBJECTIVE BOX
24H events:    Patient is a 58y old Female who presents with a chief complaint of Fall (03 Mar 2024 17:09)    Primary diagnosis of Frequent falls      Day 1:  Day 2:  Day 3:     Today is hospital day 6d. This morning patient was seen and examined at bedside, resting comfortably in bed.    No acute or major events overnight.    Code Status:    Family communication:  Contact date:  Name of person contacted:  Relationship to patient:  Communication details:  What matters most:    PAST MEDICAL & SURGICAL HISTORY  Hypertension    Diabetes mellitus    Thyroid disease    Anemia    S/P lumbar discectomy  2006.    S/P tonsillectomy      SOCIAL HISTORY:  Social History:      ALLERGIES:  No Known Allergies    MEDICATIONS:  STANDING MEDICATIONS  baclofen 20 milliGRAM(s) Oral every 8 hours  dextrose 5%. 1000 milliLiter(s) IV Continuous <Continuous>  dextrose 5%. 1000 milliLiter(s) IV Continuous <Continuous>  dextrose 50% Injectable 12.5 Gram(s) IV Push once  dextrose 50% Injectable 25 Gram(s) IV Push once  dextrose 50% Injectable 25 Gram(s) IV Push once  enoxaparin Injectable 40 milliGRAM(s) SubCutaneous every 24 hours  glucagon  Injectable 1 milliGRAM(s) IntraMuscular once  influenza   Vaccine 0.5 milliLiter(s) IntraMuscular once  insulin glargine Injectable (LANTUS) 45 Unit(s) SubCutaneous every morning  insulin lispro (ADMELOG) corrective regimen sliding scale   SubCutaneous three times a day before meals  insulin lispro Injectable (ADMELOG) 20 Unit(s) SubCutaneous three times a day before meals  levothyroxine 50 MICROGram(s) Oral daily  losartan 50 milliGRAM(s) Oral daily  metoprolol succinate  milliGRAM(s) Oral daily  nicotine -  14 mG/24Hr(s) Patch 1 Patch Transdermal daily  pregabalin 150 milliGRAM(s) Oral every 8 hours    PRN MEDICATIONS  acetaminophen     Tablet .. 975 milliGRAM(s) Oral every 8 hours PRN  dextrose Oral Gel 15 Gram(s) Oral once PRN  zolpidem 5 milliGRAM(s) Oral at bedtime PRN    VITALS:   T(F): 96.2  HR: 80  BP: 123/60  RR: 18  SpO2: --    PHYSICAL EXAM:  GENERAL:   (  ) NAD, lying in bed comfortably     (  ) obtunded     (  ) lethargic     (  ) somnolent    HEAD:   (  ) Atraumatic     (  ) hematoma     (  ) laceration (specify location:       )     NECK:  (  ) Supple     (  ) neck stiffness     (  ) nuchal rigidity     (  )  no JVD     (  ) JVD present ( -- cm)    HEART:  Rate -->     (  ) normal rate     (  ) bradycardic     (  ) tachycardic  Rhythm -->     (  ) regular     (  ) regularly irregular     (  ) irregularly irregular  Murmurs -->     (  ) normal s1s2     (  ) systolic murmur     (  ) diastolic murmur     (  ) continuous murmur      (  ) S3 present     (  ) S4 present    LUNGS:   (  )Unlabored respirations     (  ) tachypnea  (  ) B/L air entry     (  ) decreased breath sounds in:  (location     )    (  ) no adventitious sound     (  ) crackles     (  ) wheezing      (  ) rhonchi      (specify location:       )  (  ) chest wall tenderness (specify location:       )    ABDOMEN:   (  ) Soft     (  ) tense   |   (  ) nondistended     (  ) distended   |   (  ) +BS     (  ) hypoactive bowel sounds     (  ) hyperactive bowel sounds  (  ) nontender     (  ) RUQ tenderness     (  ) RLQ tenderness     (  ) LLQ tenderness     (  ) epigastric tenderness     (  ) diffuse tenderness  (  ) Splenomegaly      (  ) Hepatomegaly      (  ) Jaundice     (  ) ecchymosis     EXTREMITIES:  (  ) Normal     (  ) Rash     (  ) ecchymosis     (  ) varicose veins      (  ) pitting edema     (  ) non-pitting edema   (  ) ulceration     (  ) gangrene:     (location:     )    NERVOUS SYSTEM:    (  ) A&Ox3     (  ) confused     (  ) lethargic  CN II-XII:     (  ) Intact     (  ) deficits found     (Specify:     )   Upper extremities:     (  ) no sensorimotor deficits     (  ) weakness     (  ) loss of proprioception/vibration     (  ) loss of touch/temperature (specify:    )  Lower extremities:     (  ) no sensorimotor deficits     (  ) weakness     (  ) loss of proprioception/vibration     (  ) loss of touch/temperature (specify:    )    SKIN:   (  ) No rashes or lesions     (  ) maculopapular rash     (  ) pustules     (  ) vesicles     (  ) ulcer     (  ) ecchymosis     (specify location:     )    AMPAC score:    (  ) Indwelling Shannon Catheter:   Date insterted:    Reason (  ) Critical illness     (  ) urinary retention    (  ) Accurate Ins/Outs Monitoring     (  ) CMO patient    (  ) Central Line:   Date inserted:  Location: (  ) Right IJ     (  ) Left IJ     (  ) Right Fem     (  ) Left Fem    (  ) SPC        (  ) pigtail       (  ) PEG tube       (  ) colostomy       (  ) jejunostomy  (  ) U-Dall    LABS:                        RADIOLOGY:           24H events:    Patient is a 58y old Female who presents with a chief complaint of Fall (03 Mar 2024 17:09)    Primary diagnosis of Frequent falls    Today is hospital day 6d. This morning patient was seen and examined at bedside, resting comfortably in bed.    No acute or major events overnight.    PAST MEDICAL & SURGICAL HISTORY  Hypertension    Diabetes mellitus    Thyroid disease    Anemia    S/P lumbar discectomy  2006.    S/P tonsillectomy      SOCIAL HISTORY:  Social History:      ALLERGIES:  No Known Allergies    MEDICATIONS:  STANDING MEDICATIONS  baclofen 20 milliGRAM(s) Oral every 8 hours  dextrose 5%. 1000 milliLiter(s) IV Continuous <Continuous>  dextrose 5%. 1000 milliLiter(s) IV Continuous <Continuous>  dextrose 50% Injectable 12.5 Gram(s) IV Push once  dextrose 50% Injectable 25 Gram(s) IV Push once  dextrose 50% Injectable 25 Gram(s) IV Push once  enoxaparin Injectable 40 milliGRAM(s) SubCutaneous every 24 hours  glucagon  Injectable 1 milliGRAM(s) IntraMuscular once  influenza   Vaccine 0.5 milliLiter(s) IntraMuscular once  insulin glargine Injectable (LANTUS) 45 Unit(s) SubCutaneous every morning  insulin lispro (ADMELOG) corrective regimen sliding scale   SubCutaneous three times a day before meals  insulin lispro Injectable (ADMELOG) 20 Unit(s) SubCutaneous three times a day before meals  levothyroxine 50 MICROGram(s) Oral daily  losartan 50 milliGRAM(s) Oral daily  metoprolol succinate  milliGRAM(s) Oral daily  nicotine -  14 mG/24Hr(s) Patch 1 Patch Transdermal daily  pregabalin 150 milliGRAM(s) Oral every 8 hours    PRN MEDICATIONS  acetaminophen     Tablet .. 975 milliGRAM(s) Oral every 8 hours PRN  dextrose Oral Gel 15 Gram(s) Oral once PRN  zolpidem 5 milliGRAM(s) Oral at bedtime PRN    VITALS:   T(F): 96.2  HR: 80  BP: 123/60  RR: 18  SpO2: --    PHYSICAL EXAM:    CONSTITUTIONAL: Mild distress from paresthesia sensation  HEAD: Normocephalic; atraumatic.  EYES: Pupils equal round reactive to light, conjunctiva and sclera clear.  NECK: Supple; non tender. No rigidity  CARD: Regular rate and rhythm. Normal S1, S2  RESP: Lungs clear to auscultation bilaterally. No wheezes  ABD: Abdomen soft; non-tender; non-distended  EXT: No clubbing or cyanosis. R knee ace wrap  NEURO: Alert and oriented x 3    LABS:                        RADIOLOGY:

## 2024-03-04 NOTE — PROGRESS NOTE ADULT - SUBJECTIVE AND OBJECTIVE BOX
Patient is a 58y old  Female who presents with a chief complaint of Fall (27 Feb 2024 16:14)    INTERVAL HPI/OVERNIGHT EVENTS: Patient was examined and seen at bedside. This morning pt is resting comfortably in bed and reports no new issues or overnight events. Only c/o b/l paresthesias. Refuses MRI (even w/ sedation).  ROS: Denies CP, SOB, AP, new weakness  All other systems reviewed and are within normal limits.  InitialHPI:   58-year-old female with a past medical history of diabetes, bipolar disorder, hypertension, and chronic back pain recent admission for fall (related to taking too much baclofen as per pt) and uncontrolled BS. Pt presents to the ED for evaluation of right lower extremity pain status post fall. The patient reports that she missed a step going down the stairs lost her balance and subsequently fell 3 steps.  Patient reports when she fell she landed on her left side and was able to get up on her own.  Patient denies head injury, LOC, Pt presented to the ED S/P Fall and was found to have a likely sub-acute fibular fracture. The patient does report pain in the R fibular region for the past "several months" which worsens when she steps on the foot and there was no recent worsening of the pain S/P fall. In addition patient does note that her leg was slightly more swollen than usual.       Pts VSS remained stable on admission and will be admitted for ortho consult, PT eval for possible placement.             (27 Feb 2024 12:42)    PAST MEDICAL & SURGICAL HISTORY:  Hypertension      Diabetes mellitus      Thyroid disease      Anemia      S/P lumbar discectomy  2006.      S/P tonsillectomy          General: NAD, AAO3  HEENT:  EOMI, no LAD  CV: S1 S2  Resp: decreased breath sounds at bases  GI: NT/ND/S +BS  MS: no clubbing/cyanosis/edema, + pulses b/l  Neuro: nonfocal, +reflexes thruout          Home Medications:  baclofen 20 mg oral tablet: 1 tab(s) orally every 8 hours (15 Feb 2024 16:45)  celecoxib 200 mg oral capsule: 1 cap(s) orally 2 times a day as needed for  moderate pain (15 Feb 2024 16:45)  insulin lispro 100 units/mL injectable solution: 20 unit(s) injectable 3 times a day (before meals) (21 Feb 2024 11:22)  levothyroxine 50 mcg (0.05 mg) oral tablet: 1 tab(s) orally once a day (15 Feb 2024 16:45)  losartan 50 mg oral tablet: 1 tab(s) orally once a day (15 Feb 2024 16:45)  metoprolol succinate 100 mg oral capsule, extended release: 1 cap(s) orally once a day (15 Feb 2024 16:45)  pregabalin 150 mg oral capsule: 1 cap(s) orally every 8 hours (15 Feb 2024 16:45)    MEDICATIONS  (STANDING):  baclofen 20 milliGRAM(s) Oral every 8 hours  dextrose 5%. 1000 milliLiter(s) (100 mL/Hr) IV Continuous <Continuous>  dextrose 5%. 1000 milliLiter(s) (50 mL/Hr) IV Continuous <Continuous>  dextrose 50% Injectable 25 Gram(s) IV Push once  dextrose 50% Injectable 25 Gram(s) IV Push once  dextrose 50% Injectable 12.5 Gram(s) IV Push once  enoxaparin Injectable 40 milliGRAM(s) SubCutaneous every 24 hours  glucagon  Injectable 1 milliGRAM(s) IntraMuscular once  influenza   Vaccine 0.5 milliLiter(s) IntraMuscular once  insulin glargine Injectable (LANTUS) 45 Unit(s) SubCutaneous every morning  insulin lispro (ADMELOG) corrective regimen sliding scale   SubCutaneous three times a day before meals  insulin lispro Injectable (ADMELOG) 20 Unit(s) SubCutaneous three times a day before meals  levothyroxine 50 MICROGram(s) Oral daily  losartan 50 milliGRAM(s) Oral daily  metoprolol succinate  milliGRAM(s) Oral daily  nicotine -  14 mG/24Hr(s) Patch 1 Patch Transdermal daily  pregabalin 150 milliGRAM(s) Oral every 8 hours    MEDICATIONS  (PRN):  acetaminophen     Tablet .. 975 milliGRAM(s) Oral every 8 hours PRN Mild Pain (1 - 3)  dextrose Oral Gel 15 Gram(s) Oral once PRN Blood Glucose LESS THAN 70 milliGRAM(s)/deciliter  ketorolac   Injectable 15 milliGRAM(s) IV Push three times a day PRN Moderate Pain (4 - 6)    Vital Signs Last 24 Hrs  T(C): 36.6 (01 Mar 2024 13:37), Max: 36.6 (01 Mar 2024 13:37)  T(F): 97.8 (01 Mar 2024 13:37), Max: 97.8 (01 Mar 2024 13:37)  HR: 81 (01 Mar 2024 13:37) (79 - 81)  BP: 111/64 (01 Mar 2024 13:37) (111/64 - 137/76)  BP(mean): 81 (01 Mar 2024 13:37) (81 - 98)  RR: 18 (01 Mar 2024 13:37) (18 - 18)  SpO2: 98% (01 Mar 2024 13:37) (98% - 98%)    Parameters below as of 01 Mar 2024 13:37  Patient On (Oxygen Delivery Method): room air      CAPILLARY BLOOD GLUCOSE      POCT Blood Glucose.: 117 mg/dL (01 Mar 2024 11:04)  POCT Blood Glucose.: 221 mg/dL (01 Mar 2024 07:47)  POCT Blood Glucose.: 104 mg/dL (29 Feb 2024 21:34)    LABS:                            Consultant Notes Reviewed:  [x ] YES  [ ] NO  Care Discussed with Consultants/Other Providers/ Housestaff [ x] YES  [ ] NO  Radiology, labs, EKGs, new studies personally reviewed.

## 2024-03-04 NOTE — PROGRESS NOTE ADULT - ASSESSMENT
58-year-old female with a past medical history of diabetes, bipolar disorder, hypertension, and chronic back pain recent admission for fall (related to taking too much baclofen as per pt) and uncontrolled BS. Pt presents to the ED for evaluation of right lower extremity pain status post fall.     #Mechanical fall  #Subacute Fibular fracture   #DM Neuropathy  - Pt reports her last fall was due to taking more Baclofen than prescribed leading to lethargy  - current fall is mechanical  - Pain control with the following   - Tylenol 975 Q 8 PRN mild pain   - Ketorolac 15 Q 8 PRN for mod pain to be titrated off to home dose celecoxib once pain is better controlled  - C/W home dose baclofen 20 Q 8  - C/W Lyrica 150 Q 8 (home dose)  3/4 pt declines MRI. SPoke to neuro (Dr. Aponte's team) and they are ok with outpt open MRI as low suspicion for stroke    #DM  - C/W home insulin regimen   - 45 Lantus in am  - 20 TID lispro pre meals  - SSI +1  - adjust PRN to keep -180  - pt counselled    #Chronic pain 2/2 lumbar radiculopathy, neuropathy  - c/w baclofen and pregabalin     #HTN, stable  - c/w losartan 50 Qd and metoprolol 100 QD    #Hypothyroidism  - c/w levothyroxine 50mcg qd   - TSH elevated on 2/2 - 5.52, has been on levothyroxine with a hx of non-compliance; pt needs repeat thyroid studies in March     DVT ppx: Lovenox  Diet: DASH with CC    #Progress Note Handoff  Pending: Auth  Pt/Family discussion: Pt informed and agrees with the current plan  Disposition: Safe dispo    My note supersedes the residents note should a discrepancy arise.    Chart and notes personally reviewed.  Care Discussed with Consultants/Other Providers/ Housestaff [ x] YES [ ] NO   Radiology, labs, old records personally reviewed.    discussed w/ housestaff, nursing, case management    Time-based billing (NON-critical care).     35 minutes spent on total encounter. The necessity of the time spent during the encounter on this date of service was due to:     time spent on review of labs, imaging studies, old records, obtaining history, personally examining patient, counselling and communicating with patient/ family, entering orders for medications/tests/etc, discussions with other health care providers, documentation in electronic health records, independent interpretation of labs, imaging/procedure results and care coordination.

## 2024-03-04 NOTE — PROGRESS NOTE ADULT - ASSESSMENT
58-year-old female with a past medical history of diabetes, bipolar disorder, hypertension, and chronic back pain recent admission for fall (related to taking too much baclofen as per pt) and uncontrolled BS. Pt presents to the ED for evaluation of right lower extremity pain status post fall.   of note Pt has recent hospital admissions for DKA then for generalized weakness     []Mechanical fall  []Subacute Fibular fracture R  []Chronic pain 2/2 lumbar radiculopathy, neuropathy b/l LE     - Pt reports her last fall was due to taking more Baclofen than prescribed leading to lethargy  - current fall is mechanical  - Pain control with the following   - Tylenol 975 Q 8 PRN mild pain   - Ketorolac 15 Q 8 PRN for mod pain to be titrated off to home dose celecoxib once pain is better controlled  - C/W home dose baclofen 20 Q 8  - C/W Lyrica 150 Q 8 (home dose)  would consult neurology      As per Ortho - [Pt has R chronic fibular neck fracture. Patient is currently walking unassisted with minimal pain. XR appears to demonstrate callus formation at fracture site, consistent with remote injury. No appreciated ankle tenderness or XR findings.   - WBAT with assistance as needed  - Pain control per primary team  - compressive dressing as needed for comfort  - Extremity icing/elevation  - Patient instructed to return to ED if any worsening pain, numbness, tingling, fevers, chills or any other concerning symptoms  - F/U with Dr. Gallardo in 2 weeks if symptoms persist. Please call [phone].]  PT /OT             #DM  - C/W home insulin regimen   - 45 Lantus in am  - 20 TID lispro pre meals  - SSI +1  - adjust PRN to keep -180           #HTN, stable  - c/w losartan 50 Qd and metoprolol 100 QD    #Hypothyroidism  - c/w levothyroxine 50mcg qd   - TSH elevated on 2/2 - 5.52, has been on levothyroxine with a hx of non-compliance; pt needs repeat thyroid studies in March     DVT ppx: Lovenox  Diet: DASH with CC    #Progress Note Handoff  Pending: neurology   Pt/Family discussion: Pt informed and agrees with the current plan  Disposition: Safe dispo- pending auth    58-year-old female with a past medical history of diabetes, bipolar disorder, hypertension, and chronic back pain recent admission for fall (related to taking too much baclofen as per pt) and uncontrolled BS. Pt presents to the ED for evaluation of right lower extremity pain status post fall. O note Pt has recent hospital admissions for DKA then for generalized weakness.    #Mechanical fall  #Subacute Fibular fracture R  #Chronic pain 2/2 lumbar radiculopathy, neuropathy b/l LE     - Pt reports her last fall was due to taking more Baclofen than prescribed leading to lethargy  - current fall is mechanical  - Pain control with the following   - Tylenol 975 Q 8 PRN mild pain   - Ketorolac 15 Q 8 PRN for mod pain to be titrated off to home dose celecoxib once pain is better controlled  - C/W home dose baclofen 20 Q 8  - C/W Lyrica 150 Q 8 (home dose)  would consult neurology    - As per Ortho -> Pt has R chronic fibular neck fracture. Patient is currently walking unassisted with minimal pain. XR appears to demonstrate callus formation at fracture site, consistent with remote injury. No appreciated ankle tenderness or XR findings. WBAT with assistance as needed, compressive dressing as needed for comfort, extremity icing/elevation, f/u with Dr. Gallardo in 2 weeks if symptoms persist.  - PT /OT     #DM  - C/W home insulin regimen   - 45 Lantus in am  - 20 TID lispro pre meals  - SSI +1  - adjust PRN to keep -180    #HTN, stable  - c/w losartan 50 Qd and metoprolol 100 QD    #Hypothyroidism  - c/w levothyroxine 50mcg qd   - TSH elevated on 2/2 - 5.52, has been on levothyroxine with a hx of non-compliance; pt needs repeat thyroid studies in March     #DVT ppx: Lovenox  #Diet: DASH with CC    #Progress Note Handoff  #Pending: MR head & Pain management recs     58-year-old female with a past medical history of diabetes, bipolar disorder, hypertension, and chronic back pain recent admission for fall (related to taking too much baclofen as per pt) and uncontrolled BS. Pt presents to the ED for evaluation of right lower extremity pain status post fall. O note Pt has recent hospital admissions for DKA then for generalized weakness.    #Mechanical fall  #Subacute Fibular fracture R  #Chronic pain 2/2 lumbar radiculopathy, neuropathy b/l LE     - Pt reports her last fall was due to taking more Baclofen than prescribed leading to lethargy  - current fall is mechanical  - Pain control with the following   - Tylenol 975 Q 8 PRN mild pain   - Ketorolac 15 Q 8 PRN for mod pain to be titrated off to home dose celecoxib once pain is better controlled  - C/W home dose baclofen 20 Q 8  - C/W Lyrica 150 Q 8 (home dose)  would consult neurology    - As per Ortho -> Pt has R chronic fibular neck fracture. Patient is currently walking unassisted with minimal pain. XR appears to demonstrate callus formation at fracture site, consistent with remote injury. No appreciated ankle tenderness or XR findings. WBAT with assistance as needed, compressive dressing as needed for comfort, extremity icing/elevation, f/u with Dr. Gallardo in 2 weeks if symptoms persist.  - PT /OT     #DM  - C/W home insulin regimen   - 45 Lantus in am  - 20 TID lispro pre meals  - SSI +1  - adjust PRN to keep -180    #HTN, stable  - c/w losartan 50 Qd and metoprolol 100 QD    #Hypothyroidism  - c/w levothyroxine 50mcg qd   - TSH elevated on 2/2 - 5.52, has been on levothyroxine with a hx of non-compliance; pt needs repeat thyroid studies in March     #DVT ppx: Lovenox  #Diet: DASH with CC    #Progress Note Handoff  #Pending: Pt is stable for discharge to long term care NH. Patient is in agreement to go to [facility] for long term placement.

## 2024-03-04 NOTE — CONSULT NOTE ADULT - SUBJECTIVE AND OBJECTIVE BOX
ORTHOPAEDIC SURGERY CONSULT NOTE    Reason for Consult: Fibular fracture    HPI: 58yFemale presents with pain in R knee after fall yesterday. Of note, patient reports having pain in right knee for up to previous 7 months. Patient is currently walking unassisted without pain. Denies inciting trauma at that specific time, however is currently admitted to medicine service for w/u of repeated falls.  Patient denies head trauma or LOC. Denies pain elsewhere, including R ankle. Patient has bilateral LE paresthesias consistent with prior history of Diabetic neuropathy. History significant for DM, diabetic neuropathy, HTN, bipolar disorder, chronic pain.     PAST MEDICAL & SURGICAL HISTORY:  Hypertension      Diabetes mellitus      Thyroid disease      Anemia      S/P lumbar discectomy  2006.      S/P tonsillectomy        Allergies: No Known Allergies    Medications: acetaminophen     Tablet .. 975 milliGRAM(s) Oral every 8 hours PRN  baclofen 20 milliGRAM(s) Oral every 8 hours  dextrose 5%. 1000 milliLiter(s) IV Continuous <Continuous>  dextrose 5%. 1000 milliLiter(s) IV Continuous <Continuous>  dextrose 50% Injectable 25 Gram(s) IV Push once  dextrose 50% Injectable 25 Gram(s) IV Push once  dextrose 50% Injectable 12.5 Gram(s) IV Push once  dextrose Oral Gel 15 Gram(s) Oral once PRN  enoxaparin Injectable 40 milliGRAM(s) SubCutaneous every 24 hours  glucagon  Injectable 1 milliGRAM(s) IntraMuscular once  influenza   Vaccine 0.5 milliLiter(s) IntraMuscular once  insulin glargine Injectable (LANTUS) 45 Unit(s) SubCutaneous every morning  insulin lispro (ADMELOG) corrective regimen sliding scale   SubCutaneous three times a day before meals  insulin lispro Injectable (ADMELOG) 20 Unit(s) SubCutaneous three times a day before meals  ketorolac   Injectable 15 milliGRAM(s) IV Push three times a day PRN  levothyroxine 50 MICROGram(s) Oral daily  losartan 50 milliGRAM(s) Oral daily  metoprolol succinate  milliGRAM(s) Oral daily  morphine  - Injectable 2 milliGRAM(s) IV Push every 6 hours PRN  pregabalin 150 milliGRAM(s) Oral every 8 hours      PHYSICAL EXAM:  Vital Signs Last 24 Hrs  T(C): 36.7 (27 Feb 2024 07:31), Max: 36.7 (27 Feb 2024 07:31)  T(F): 98.1 (27 Feb 2024 07:31), Max: 98.1 (27 Feb 2024 07:31)  HR: 96 (27 Feb 2024 07:31) (91 - 97)  BP: 121/65 (27 Feb 2024 07:31) (121/65 - 160/89)  BP(mean): 85 (27 Feb 2024 07:31) (85 - 85)  RR: 99 (27 Feb 2024 07:31) (18 - 99)  SpO2: 98% (26 Feb 2024 20:04) (98% - 98%)        Physical Exam:  Alert, NAD  Resp: NLB on RA.    PELVIS: No open skin or wounds. Pelvis stable to AP and Lat compression. Able to actively SLR bilaterally.     RUE:  No open skin or wounds  NTTP shoulder, upper arm, elbow, forearm, wrist or hand  Full baseline painless ROM at shoulder, elbow, wrist, and   SILT in axillary, musculocutaneous,  radial, median, and ulnar distributions.   AIN/PIN/U motor intact  2+ radial pulse with brisk cap refill at distal finger tips.   Compartments soft and compressible.    LUE:  No open skin or wounds  NTTP shoulder, upper arm, elbow, forearm, wrist or hand  Full baseline painless ROM at shoulder, elbow, wrist, and   SILT in axillary, musculocutaneous,  radial, median, and ulnar distributions.   AIN/PIN/U motor intact  2+ radial pulse with brisk cap refill at distal finger tips.   Compartments soft and compressible.    RLE:  No open skin or wounds  NTTP hip, thigh, ankle or foot. Pain over R fibular neck, mild medial knee joint line pain, no other leg or knee pain.   Full baseline painless ROM at hip, knee, ankle and toes   No pain with log-roll or axial compression  Able to actively SLR.  SILT DP/SP/T/Smyth/Sa with symmetric paresthesias, unchanged from baseline  EHL/FHL/TA/Gs motor intact.  2+ DP/PT pulses with brisk cap refill distally.  Compartments soft and compressible.   No pain on passive stretch.    LLE:   No open skin or wounds  NTTP hip, thigh, knee, leg, ankle or foot.   Full baseline painless ROM at hip, knee, ankle and toes   No pain with log-roll or axial compression  Able to actively SLR.  SILT DP/SP/T/Smyth/Sa. symmetric paresthesias, unchanged from baseline  EHL/FHL/TA/Gs motor intact.  2+ DP/PT pulses with brisk cap refill distally.  Compartments soft and compressible. No pain on passive stretch.    Labs:                        13.9   6.20  )-----------( 236      ( 27 Feb 2024 04:07 )             42.6     02-27    142  |  104  |  21<H>  ----------------------------<  279<H>  4.3   |  27  |  0.7    Ca    10.1      27 Feb 2024 04:07  Mg     2.3     02-27    TPro  7.1  /  Alb  4.1  /  TBili  0.2  /  DBili  x   /  AST  20  /  ALT  22  /  AlkPhos  220<H>  02-27        Imaging:  XR: R fibular neck fracture visualized, with callus formation present    
PAIN MANAGEMENT CONSULT NOTE    Chief Complaint: neuropathy LE    HPI:   58-year-old female with a past medical history of diabetes, bipolar disorder, hypertension, and chronic back pain recent admission for fall (related to taking too much baclofen as per pt) and uncontrolled BS. Pt presents to the ED for evaluation of right lower extremity pain status post fall. The patient reports that she missed a step going down the stairs lost her balance and subsequently fell 3 steps.  Patient reports when she fell she landed on her left side and was able to get up on her own.  Patient denies head injury, LOC, Pt presented to the ED S/P Fall and was found to have a likely sub-acute fibular fracture. The patient does report pain in the R fibular region for the past "several months" which worsens when she steps on the foot and there was no recent worsening of the pain S/P fall. In addition patient does note that her leg was slightly more swollen than usual.   Pain is sharp and stabbing in b/l lower extremities.            (27 Feb 2024 12:42)      PAST MEDICAL & SURGICAL HISTORY:  Hypertension      Diabetes mellitus      Thyroid disease      Anemia      S/P lumbar discectomy  2006.      S/P tonsillectomy          FAMILY HISTORY:      SOCIAL HISTORY:  [x ] Denies Smoking, Alcohol, or Drug Use    HOME MEDICATIONS:   Please refer to initial HNP    PAIN HOME MEDICATIONS:    Allergies    No Known Allergies    Intolerances        PAIN MEDICATIONS:  acetaminophen     Tablet .. 975 milliGRAM(s) Oral every 8 hours PRN  baclofen 20 milliGRAM(s) Oral every 8 hours  pregabalin 150 milliGRAM(s) Oral every 8 hours  zolpidem 5 milliGRAM(s) Oral at bedtime PRN    Heme:  enoxaparin Injectable 40 milliGRAM(s) SubCutaneous every 24 hours    Antibiotics:    Cardiovascular:  losartan 50 milliGRAM(s) Oral daily  metoprolol succinate  milliGRAM(s) Oral daily    GI:    Endocrine:  dextrose 50% Injectable 25 Gram(s) IV Push once  dextrose 50% Injectable 25 Gram(s) IV Push once  dextrose 50% Injectable 12.5 Gram(s) IV Push once  dextrose Oral Gel 15 Gram(s) Oral once PRN  glucagon  Injectable 1 milliGRAM(s) IntraMuscular once  insulin glargine Injectable (LANTUS) 45 Unit(s) SubCutaneous every morning  insulin lispro (ADMELOG) corrective regimen sliding scale   SubCutaneous three times a day before meals  insulin lispro Injectable (ADMELOG) 20 Unit(s) SubCutaneous three times a day before meals  levothyroxine 50 MICROGram(s) Oral daily    All Other Medications:  dextrose 5%. 1000 milliLiter(s) IV Continuous <Continuous>  dextrose 5%. 1000 milliLiter(s) IV Continuous <Continuous>  influenza   Vaccine 0.5 milliLiter(s) IntraMuscular once  nicotine -  14 mG/24Hr(s) Patch 1 Patch Transdermal daily      Vital Signs Last 24 Hrs  T(C): 36.4 (04 Mar 2024 14:12), Max: 36.7 (03 Mar 2024 20:39)  T(F): 97.6 (04 Mar 2024 14:12), Max: 98 (03 Mar 2024 20:39)  HR: 85 (04 Mar 2024 14:12) (80 - 98)  BP: 145/80 (04 Mar 2024 14:12) (123/60 - 145/80)  BP(mean): 106 (04 Mar 2024 14:12) (85 - 106)  RR: 18 (04 Mar 2024 14:12) (18 - 18)  SpO2: --    Parameters below as of 03 Mar 2024 20:39  Patient On (Oxygen Delivery Method): room air            REVIEW OF SYSTEMS:  see hpi    PHYSICAL EXAM  GENERAL: Laying in bed, NAD  Neuro:  EOMI  Cranial nerves grossly intact  CHEST/LUNG: no audible wheeze, no accessory muscle usage  EXTREMITIES: No clubbing, cyanosis  SKIN: No rashes or lesions      ASSESSMENT:   neuropathic pain  leg pain    PLAN:   - Pain:  change tylenol to 975 q8hr  c/w lyrica 150mg q8hr  c/w balcofen 20mg q8hr  start celebrex 200mg BID  start oxycodone 5mg q8hr prn    
Neurology Consult    Patient is a 58y old  Female with PMH of  hypothyroidism, diabetes, bipolar disorder, hypertension, and chronic back pain recent admission for fall, who presents with a Fall. Patient was referred to neurology for "Pt with hx of multiple falls, b/l neuropathic pain". The patient states that she has this pain for years, and started to worsen one year ago. She is being treated with pregabalin 150 mg TID, and Baclofen by her PCP, and follow with a neurologist in [place]. She states that she had 3 falls over the last few weeks, all of them were described as loss of balance. She denied any LOC during any of these falls, or incontinence; however, she mentioned that she felt her Rt side is weaker than the Lt.   She denied vision loss, diplopia, speech disturbance, or vertigo. Pt denied headache , neck pain or any recent head or neck trauma     HPI:   58-year-old female with a past medical history of diabetes, bipolar disorder, hypertension, and chronic back pain recent admission for fall (related to taking too much baclofen as per pt) and uncontrolled BS. Pt presents to the ED for evaluation of right lower extremity pain status post fall. The patient reports that she missed a step going down the stairs lost her balance and subsequently fell 3 steps.  Patient reports when she fell she landed on her left side and was able to get up on her own.  Patient denies head injury, LOC, Pt presented to the ED S/P Fall and was found to have a likely sub-acute fibular fracture. The patient does report pain in the R fibular region for the past "several months" which worsens when she steps on the foot and there was no recent worsening of the pain S/P fall. In addition patient does note that her leg was slightly more swollen than usual.       Pts VSS remained stable on admission and will be admitted for ortho consult, PT eval for possible placement.             (27 Feb 2024 12:42)      PAST MEDICAL & SURGICAL HISTORY:  Hypertension      Diabetes mellitus      Thyroid disease      Anemia      S/P lumbar discectomy  2006.      S/P tonsillectomy          FAMILY HISTORY:      Social History: smooker   Ex alcoholic (used to drink 1 pint of Vodka once or twice per week), and stated last drink was two years ago, when she was admitted to the psych unit for alcohol dependence   Used to use cocaine but last time was one year ago as per the patient     Allergies    No Known Allergies    Intolerances        MEDICATIONS  (STANDING):  baclofen 20 milliGRAM(s) Oral every 8 hours  dextrose 5%. 1000 milliLiter(s) (50 mL/Hr) IV Continuous <Continuous>  dextrose 5%. 1000 milliLiter(s) (100 mL/Hr) IV Continuous <Continuous>  dextrose 50% Injectable 12.5 Gram(s) IV Push once  dextrose 50% Injectable 25 Gram(s) IV Push once  dextrose 50% Injectable 25 Gram(s) IV Push once  enoxaparin Injectable 40 milliGRAM(s) SubCutaneous every 24 hours  glucagon  Injectable 1 milliGRAM(s) IntraMuscular once  influenza   Vaccine 0.5 milliLiter(s) IntraMuscular once  insulin glargine Injectable (LANTUS) 45 Unit(s) SubCutaneous every morning  insulin lispro (ADMELOG) corrective regimen sliding scale   SubCutaneous three times a day before meals  insulin lispro Injectable (ADMELOG) 20 Unit(s) SubCutaneous three times a day before meals  levothyroxine 50 MICROGram(s) Oral daily  losartan 50 milliGRAM(s) Oral daily  metoprolol succinate  milliGRAM(s) Oral daily  nicotine -  14 mG/24Hr(s) Patch 1 Patch Transdermal daily  pregabalin 150 milliGRAM(s) Oral every 8 hours    MEDICATIONS  (PRN):  acetaminophen     Tablet .. 975 milliGRAM(s) Oral every 8 hours PRN Mild Pain (1 - 3)  dextrose Oral Gel 15 Gram(s) Oral once PRN Blood Glucose LESS THAN 70 milliGRAM(s)/deciliter  ketorolac   Injectable 15 milliGRAM(s) IV Push three times a day PRN Moderate Pain (4 - 6)  zolpidem 5 milliGRAM(s) Oral at bedtime PRN Insomnia      Review of systems:    Constitutional: as per HPI  Eyes: No eye pain or discharge  ENMT:  No difficulty hearing; No sinus or throat pain  Neck: No pain or stiffness  Respiratory: No cough, wheezing, chills or hemoptysis  Cardiovascular: No chest pain, palpitations, shortness of breath, dyspnea on exertion  Gastrointestinal: No abdominal pain, nausea, vomiting or hematemesis; No diarrhea or constipation.   Genitourinary: No dysuria, frequency, hematuria or incontinence  Neurological: As per HPI  Skin: No rashes or lesions   Endocrine: No heat or cold intolerance; No hair loss  Musculoskeletal: No joint pain or swelling  Psychiatric: No depression, anxiety, mood swings  Heme/Lymph: No easy bruising or bleeding gums    Vital Signs Last 24 Hrs  T(C): 36.8 (03 Mar 2024 13:29), Max: 36.8 (03 Mar 2024 13:29)  T(F): 98.2 (03 Mar 2024 13:29), Max: 98.2 (03 Mar 2024 13:29)  HR: 94 (03 Mar 2024 13:29) (69 - 94)  BP: 120/59 (03 Mar 2024 13:29) (120/59 - 133/88)  BP(mean): 106 (02 Mar 2024 21:10) (106 - 106)  RR: 20 (03 Mar 2024 13:29) (18 - 20)  SpO2: 98% (02 Mar 2024 21:10) (98% - 98%)    Parameters below as of 03 Mar 2024 13:29  Patient On (Oxygen Delivery Method): room air        Examination:  General:  Appearance is consistent with chronologic age.  No abnormal facies.  Gross skin survey within normal limits.    Cognitive/Language:  The patient is oriented to person, place, time and date. Language with normal repetition, comprehension and naming.  Nondysarthric.    Eyes: intact VFF.  EOMI w/o nystagmus, skew or reported double vision.  PERRL.  No ptosis/weakness of eyelid closure.    Face:  Facial sensation normal V1 - 3, no facial asymmetry.    Ears/Nose/Throat:  Hearing grossly intact b/l.  Palate elevates midline.  Tongue and uvula midline.   Motor examination:   Normal tone, bulk and range of motion.  No tenderness, twitching, tremors or involuntary movements.  Formal Muscle Strength Testing: (MRC grade R/L) 5/5 UE; 5/5 LE.  No observable drift.  Reflexes:   2+ on the Rt +1 on the Lt, except achillis absent bilaterally.  Plantar response downgoing b/l.   Sensory examination: Intact to light touch and proprioception in all extremities.  Cerebellum: FTN/HKS intact with normal ELEUTERIO in all limbs.  No dysmetria or dysdiadokinesia.    Gait mildly wide base, and she was not able to do tandem gait         Labs:                     [facility]  [facility]     03-03-24 @ 17:11

## 2024-03-05 ENCOUNTER — TRANSCRIPTION ENCOUNTER (OUTPATIENT)
Age: 59
End: 2024-03-05

## 2024-03-05 LAB
GLUCOSE BLDC GLUCOMTR-MCNC: 101 MG/DL — HIGH (ref 70–99)
GLUCOSE BLDC GLUCOMTR-MCNC: 122 MG/DL — HIGH (ref 70–99)
GLUCOSE BLDC GLUCOMTR-MCNC: 142 MG/DL — HIGH (ref 70–99)
GLUCOSE BLDC GLUCOMTR-MCNC: 85 MG/DL — SIGNIFICANT CHANGE UP (ref 70–99)
GLUCOSE BLDC GLUCOMTR-MCNC: 95 MG/DL — SIGNIFICANT CHANGE UP (ref 70–99)

## 2024-03-05 PROCEDURE — 99231 SBSQ HOSP IP/OBS SF/LOW 25: CPT

## 2024-03-05 RX ORDER — LOSARTAN POTASSIUM 100 MG/1
1 TABLET, FILM COATED ORAL
Refills: 0 | DISCHARGE

## 2024-03-05 RX ORDER — CELECOXIB 200 MG/1
1 CAPSULE ORAL
Qty: 0 | Refills: 0 | DISCHARGE
Start: 2024-03-05

## 2024-03-05 RX ORDER — ACETAMINOPHEN 500 MG
3 TABLET ORAL
Qty: 0 | Refills: 0 | DISCHARGE
Start: 2024-03-05

## 2024-03-05 RX ORDER — CELECOXIB 200 MG/1
200 CAPSULE ORAL
Refills: 0 | Status: DISCONTINUED | OUTPATIENT
Start: 2024-03-05 | End: 2024-03-05

## 2024-03-05 RX ORDER — LOSARTAN POTASSIUM 100 MG/1
1 TABLET, FILM COATED ORAL
Qty: 0 | Refills: 0 | DISCHARGE
Start: 2024-03-05

## 2024-03-05 RX ORDER — CELECOXIB 200 MG/1
200 CAPSULE ORAL
Refills: 0 | Status: DISCONTINUED | OUTPATIENT
Start: 2024-03-05 | End: 2024-03-07

## 2024-03-05 RX ORDER — NICOTINE POLACRILEX 2 MG
14 GUM BUCCAL
Qty: 30 | Refills: 0
Start: 2024-03-05 | End: 2024-04-03

## 2024-03-05 RX ADMIN — Medication 20 UNIT(S): at 08:35

## 2024-03-05 RX ADMIN — CELECOXIB 200 MILLIGRAM(S): 200 CAPSULE ORAL at 18:10

## 2024-03-05 RX ADMIN — Medication 1 PATCH: at 11:51

## 2024-03-05 RX ADMIN — Medication 150 MILLIGRAM(S): at 21:19

## 2024-03-05 RX ADMIN — INSULIN GLARGINE 45 UNIT(S): 100 INJECTION, SOLUTION SUBCUTANEOUS at 08:34

## 2024-03-05 RX ADMIN — Medication 20 MILLIGRAM(S): at 21:19

## 2024-03-05 RX ADMIN — ENOXAPARIN SODIUM 40 MILLIGRAM(S): 100 INJECTION SUBCUTANEOUS at 13:08

## 2024-03-05 RX ADMIN — Medication 150 MILLIGRAM(S): at 13:09

## 2024-03-05 RX ADMIN — LOSARTAN POTASSIUM 50 MILLIGRAM(S): 100 TABLET, FILM COATED ORAL at 05:16

## 2024-03-05 RX ADMIN — Medication 1 PATCH: at 07:53

## 2024-03-05 RX ADMIN — Medication 1 PATCH: at 18:35

## 2024-03-05 RX ADMIN — Medication 1 PATCH: at 11:47

## 2024-03-05 RX ADMIN — Medication 20 MILLIGRAM(S): at 13:08

## 2024-03-05 RX ADMIN — Medication 20 UNIT(S): at 11:34

## 2024-03-05 RX ADMIN — Medication 150 MILLIGRAM(S): at 05:16

## 2024-03-05 RX ADMIN — Medication 100 MILLIGRAM(S): at 05:16

## 2024-03-05 RX ADMIN — Medication 50 MICROGRAM(S): at 05:16

## 2024-03-05 RX ADMIN — Medication 20 MILLIGRAM(S): at 05:16

## 2024-03-05 RX ADMIN — CELECOXIB 200 MILLIGRAM(S): 200 CAPSULE ORAL at 18:40

## 2024-03-05 RX ADMIN — Medication 20 UNIT(S): at 18:09

## 2024-03-05 RX ADMIN — ZOLPIDEM TARTRATE 5 MILLIGRAM(S): 10 TABLET ORAL at 21:38

## 2024-03-05 NOTE — DISCHARGE NOTE PROVIDER - CARE PROVIDERS DIRECT ADDRESSES
[email or web address]

## 2024-03-05 NOTE — DISCHARGE NOTE PROVIDER - NSDCCPCAREPLAN_GEN_ALL_CORE_FT
PRINCIPAL DISCHARGE DIAGNOSIS  Diagnosis: Accident due to mechanical fall without injury  Assessment and Plan of Treatment: you fell initially and were admitted to the hospital for care. This was possibly due to taking more baclofen than prescribed. please be careful to take medications as prescribed. Neurology recommended an MRI of the head to see if you had a stroke although there is low suspicion for a stroke. you can always follow as outpatient with them.         SECONDARY DISCHARGE DIAGNOSES  Diagnosis: Hypothyroidism  Assessment and Plan of Treatment: - c/w levothyroxine 50mcg qd   - please repeat a TSH at your primary care doctor     PRINCIPAL DISCHARGE DIAGNOSIS  Diagnosis: Accident due to mechanical fall without injury  Assessment and Plan of Treatment: you fell initially and were admitted to the hospital for care. This was possibly due to taking more baclofen than prescribed. please be careful to take medications as prescribed. Neurology recommended an MRI of the head to see if you had a stroke although there is low suspicion for a stroke. you can always follow as outpatient with them.         SECONDARY DISCHARGE DIAGNOSES  Diagnosis: Hypothyroidism  Assessment and Plan of Treatment: - c/w levothyroxine 50mcg qd   - please repeat a TSH at your primary care doctor    Diagnosis: Closed fibular fracture  Assessment and Plan of Treatment: we found that you have an old fracture of your fibular. please follow up with orthopedics regarding this.    Diagnosis: Diabetes mellitus  Assessment and Plan of Treatment: you have significantly high sugars and require high doses of insulin. please follow up with endocrinology     PRINCIPAL DISCHARGE DIAGNOSIS  Diagnosis: Accident due to mechanical fall without injury  Assessment and Plan of Treatment: you fell initially and were admitted to the hospital for care. Please be careful to take medications as prescribed. Neurology recommended an MRI of the head. Please obtain an open MRI at earliest convenience.         SECONDARY DISCHARGE DIAGNOSES  Diagnosis: Hypothyroidism  Assessment and Plan of Treatment: - c/w levothyroxine 50mcg qd   - please repeat a TSH at your primary care doctor    Diagnosis: Closed fibular fracture  Assessment and Plan of Treatment: we found that you have an old fracture of your fibular. please follow up with orthopedics regarding this.    Diagnosis: Diabetes mellitus  Assessment and Plan of Treatment: you have significantly high sugars and require high doses of insulin. please monitor your sugars

## 2024-03-05 NOTE — DISCHARGE NOTE PROVIDER - NSDCMRMEDTOKEN_GEN_ALL_CORE_FT
acetaminophen 325 mg oral tablet: 3 tab(s) orally every 8 hours As needed Mild Pain (1 - 3)  baclofen 20 mg oral tablet: 1 tab(s) orally every 8 hours  celecoxib 200 mg oral capsule: 1 cap(s) orally 2 times a day as needed for  moderate pain  insulin glargine 100 units/mL subcutaneous solution: 45 unit(s) subcutaneous once a day (in the morning)  insulin lispro 100 units/mL injectable solution: 20 unit(s) injectable 3 times a day (before meals)  Jardiance 25 mg oral tablet: 1 tab(s) orally once a day  levothyroxine 50 mcg (0.05 mg) oral tablet: 1 tab(s) orally once a day  losartan 50 mg oral tablet: 1 tab(s) orally once a day  metoprolol succinate 100 mg oral capsule, extended release: 1 cap(s) orally once a day  nicotine 14 mg/24 hr transdermal film, extended release: 14 milligram(s) transdermal once a day  pregabalin 150 mg oral capsule: 1 cap(s) orally every 8 hours   acetaminophen 325 mg oral tablet: 3 tab(s) orally every 8 hours As needed Mild Pain (1 - 3)  baclofen 20 mg oral tablet: 1 tab(s) orally every 8 hours  calcium carbonate 500 mg (200 mg elemental calcium) oral tablet, chewable: 1 tab(s) orally 2 times a day  celecoxib 200 mg oral capsule: 1 cap(s) orally 2 times a day as needed for  moderate pain  cholecalciferol oral tablet: 400 unit(s) orally 2 times a day  insulin glargine 100 units/mL subcutaneous solution: 45 unit(s) subcutaneous once a day (in the morning)  insulin lispro 100 units/mL injectable solution: 20 unit(s) injectable 3 times a day (before meals)  Jardiance 25 mg oral tablet: 1 tab(s) orally once a day  levothyroxine 50 mcg (0.05 mg) oral tablet: 1 tab(s) orally once a day  losartan 50 mg oral tablet: 1 tab(s) orally once a day  metoprolol succinate 100 mg oral capsule, extended release: 1 cap(s) orally once a day  nicotine 14 mg/24 hr transdermal film, extended release: 14 milligram(s) transdermal once a day  pregabalin 150 mg oral capsule: 1 cap(s) orally every 8 hours

## 2024-03-05 NOTE — PROGRESS NOTE ADULT - SUBJECTIVE AND OBJECTIVE BOX
Patient is a 58y old  Female who presents with a chief complaint of Fall (27 Feb 2024 16:14)    INTERVAL HPI/OVERNIGHT EVENTS: Patient was examined and seen at bedside. This morning pt is resting comfortably in bed and reports no new issues or overnight events. Only c/o b/l paresthesias.     ROS: Denies CP, SOB, AP, new weakness  All other systems reviewed and are within normal limits.  InitialHPI:   58-year-old female with a past medical history of diabetes, bipolar disorder, hypertension, and chronic back pain recent admission for fall (related to taking too much baclofen as per pt) and uncontrolled BS. Pt presents to the ED for evaluation of right lower extremity pain status post fall. The patient reports that she missed a step going down the stairs lost her balance and subsequently fell 3 steps.  Patient reports when she fell she landed on her left side and was able to get up on her own.  Patient denies head injury, LOC, Pt presented to the ED S/P Fall and was found to have a likely sub-acute fibular fracture. The patient does report pain in the R fibular region for the past "several months" which worsens when she steps on the foot and there was no recent worsening of the pain S/P fall. In addition patient does note that her leg was slightly more swollen than usual.       Pts VSS remained stable on admission and will be admitted for ortho consult, PT eval for possible placement.             (27 Feb 2024 12:42)    PAST MEDICAL & SURGICAL HISTORY:  Hypertension      Diabetes mellitus      Thyroid disease      Anemia      S/P lumbar discectomy  2006.      S/P tonsillectomy          General: NAD, AAO3  HEENT:  EOMI, no LAD  CV: S1 S2  Resp: decreased breath sounds at bases  GI: NT/ND/S +BS  MS: no clubbing/cyanosis/edema, + pulses b/l  Neuro: nonfocal, +reflexes thruout            Home Medications:  acetaminophen 325 mg oral tablet: 3 tab(s) orally every 8 hours As needed Mild Pain (1 - 3) (05 Mar 2024 11:09)  baclofen 20 mg oral tablet: 1 tab(s) orally every 8 hours (15 Feb 2024 16:45)  celecoxib 200 mg oral capsule: 1 cap(s) orally 2 times a day as needed for  moderate pain (05 Mar 2024 11:38)  insulin lispro 100 units/mL injectable solution: 20 unit(s) injectable 3 times a day (before meals) (21 Feb 2024 11:22)  levothyroxine 50 mcg (0.05 mg) oral tablet: 1 tab(s) orally once a day (15 Feb 2024 16:45)  losartan 50 mg oral tablet: 1 tab(s) orally once a day (05 Mar 2024 11:09)  metoprolol succinate 100 mg oral capsule, extended release: 1 cap(s) orally once a day (15 Feb 2024 16:45)  pregabalin 150 mg oral capsule: 1 cap(s) orally every 8 hours (15 Feb 2024 16:45)    MEDICATIONS  (STANDING):  baclofen 20 milliGRAM(s) Oral every 8 hours  dextrose 5%. 1000 milliLiter(s) (50 mL/Hr) IV Continuous <Continuous>  dextrose 5%. 1000 milliLiter(s) (100 mL/Hr) IV Continuous <Continuous>  dextrose 50% Injectable 12.5 Gram(s) IV Push once  dextrose 50% Injectable 25 Gram(s) IV Push once  dextrose 50% Injectable 25 Gram(s) IV Push once  enoxaparin Injectable 40 milliGRAM(s) SubCutaneous every 24 hours  glucagon  Injectable 1 milliGRAM(s) IntraMuscular once  influenza   Vaccine 0.5 milliLiter(s) IntraMuscular once  insulin glargine Injectable (LANTUS) 45 Unit(s) SubCutaneous every morning  insulin lispro (ADMELOG) corrective regimen sliding scale   SubCutaneous three times a day before meals  insulin lispro Injectable (ADMELOG) 20 Unit(s) SubCutaneous three times a day before meals  levothyroxine 50 MICROGram(s) Oral daily  losartan 50 milliGRAM(s) Oral daily  metoprolol succinate  milliGRAM(s) Oral daily  nicotine -  14 mG/24Hr(s) Patch 1 Patch Transdermal daily  pregabalin 150 milliGRAM(s) Oral every 8 hours    MEDICATIONS  (PRN):  acetaminophen     Tablet .. 975 milliGRAM(s) Oral every 8 hours PRN Mild Pain (1 - 3)  celecoxib 200 milliGRAM(s) Oral two times a day PRN Severe Pain (7 - 10)  dextrose Oral Gel 15 Gram(s) Oral once PRN Blood Glucose LESS THAN 70 milliGRAM(s)/deciliter  zolpidem 5 milliGRAM(s) Oral at bedtime PRN Insomnia    Vital Signs Last 24 Hrs  T(C): 37.3 (05 Mar 2024 13:14), Max: 37.3 (05 Mar 2024 13:14)  T(F): 99.1 (05 Mar 2024 13:14), Max: 99.1 (05 Mar 2024 13:14)  HR: 83 (05 Mar 2024 13:14) (83 - 95)  BP: 128/61 (05 Mar 2024 13:14) (128/61 - 158/81)  BP(mean): 105 (05 Mar 2024 13:14) (105 - 109)  RR: 18 (05 Mar 2024 13:14) (18 - 18)  SpO2: 100% (05 Mar 2024 13:14) (97% - 100%)    Parameters below as of 05 Mar 2024 13:14  Patient On (Oxygen Delivery Method): room air      CAPILLARY BLOOD GLUCOSE      POCT Blood Glucose.: 95 mg/dL (05 Mar 2024 17:08)  POCT Blood Glucose.: 101 mg/dL (05 Mar 2024 15:23)  POCT Blood Glucose.: 142 mg/dL (05 Mar 2024 11:08)  POCT Blood Glucose.: 122 mg/dL (05 Mar 2024 07:13)    LABS:                            Consultant Notes Reviewed:  [x ] YES  [ ] NO  Care Discussed with Consultants/Other Providers/ Housestaff [ x] YES  [ ] NO  Radiology, labs, EKGs, new studies personally reviewed.

## 2024-03-05 NOTE — PROGRESS NOTE ADULT - SUBJECTIVE AND OBJECTIVE BOX
24H events:    Patient is a 58y old Female who presents with a chief complaint of Fall (04 Mar 2024 23:00)    Primary diagnosis of Frequent falls      Day 1:  Day 2:  Day 3:     Today is hospital day 7d. This morning patient was seen and examined at bedside, resting comfortably in bed.    No acute or major events overnight.    Code Status:    Family communication:  Contact date:  Name of person contacted:  Relationship to patient:  Communication details:  What matters most:    PAST MEDICAL & SURGICAL HISTORY  Hypertension    Diabetes mellitus    Thyroid disease    Anemia    S/P lumbar discectomy  2006.    S/P tonsillectomy      SOCIAL HISTORY:  Social History:      ALLERGIES:  No Known Allergies    MEDICATIONS:  STANDING MEDICATIONS  baclofen 20 milliGRAM(s) Oral every 8 hours  dextrose 5%. 1000 milliLiter(s) IV Continuous <Continuous>  dextrose 5%. 1000 milliLiter(s) IV Continuous <Continuous>  dextrose 50% Injectable 25 Gram(s) IV Push once  dextrose 50% Injectable 25 Gram(s) IV Push once  dextrose 50% Injectable 12.5 Gram(s) IV Push once  enoxaparin Injectable 40 milliGRAM(s) SubCutaneous every 24 hours  glucagon  Injectable 1 milliGRAM(s) IntraMuscular once  influenza   Vaccine 0.5 milliLiter(s) IntraMuscular once  insulin glargine Injectable (LANTUS) 45 Unit(s) SubCutaneous every morning  insulin lispro (ADMELOG) corrective regimen sliding scale   SubCutaneous three times a day before meals  insulin lispro Injectable (ADMELOG) 20 Unit(s) SubCutaneous three times a day before meals  levothyroxine 50 MICROGram(s) Oral daily  losartan 50 milliGRAM(s) Oral daily  metoprolol succinate  milliGRAM(s) Oral daily  nicotine -  14 mG/24Hr(s) Patch 1 Patch Transdermal daily  pregabalin 150 milliGRAM(s) Oral every 8 hours    PRN MEDICATIONS  acetaminophen     Tablet .. 975 milliGRAM(s) Oral every 8 hours PRN  dextrose Oral Gel 15 Gram(s) Oral once PRN  zolpidem 5 milliGRAM(s) Oral at bedtime PRN    VITALS:   T(F): 96.3  HR: 88  BP: 158/81  RR: 18  SpO2: 97%    PHYSICAL EXAM:  GENERAL:   (  ) NAD, lying in bed comfortably     (  ) obtunded     (  ) lethargic     (  ) somnolent    HEAD:   (  ) Atraumatic     (  ) hematoma     (  ) laceration (specify location:       )     NECK:  (  ) Supple     (  ) neck stiffness     (  ) nuchal rigidity     (  )  no JVD     (  ) JVD present ( -- cm)    HEART:  Rate -->     (  ) normal rate     (  ) bradycardic     (  ) tachycardic  Rhythm -->     (  ) regular     (  ) regularly irregular     (  ) irregularly irregular  Murmurs -->     (  ) normal s1s2     (  ) systolic murmur     (  ) diastolic murmur     (  ) continuous murmur      (  ) S3 present     (  ) S4 present    LUNGS:   (  )Unlabored respirations     (  ) tachypnea  (  ) B/L air entry     (  ) decreased breath sounds in:  (location     )    (  ) no adventitious sound     (  ) crackles     (  ) wheezing      (  ) rhonchi      (specify location:       )  (  ) chest wall tenderness (specify location:       )    ABDOMEN:   (  ) Soft     (  ) tense   |   (  ) nondistended     (  ) distended   |   (  ) +BS     (  ) hypoactive bowel sounds     (  ) hyperactive bowel sounds  (  ) nontender     (  ) RUQ tenderness     (  ) RLQ tenderness     (  ) LLQ tenderness     (  ) epigastric tenderness     (  ) diffuse tenderness  (  ) Splenomegaly      (  ) Hepatomegaly      (  ) Jaundice     (  ) ecchymosis     EXTREMITIES:  (  ) Normal     (  ) Rash     (  ) ecchymosis     (  ) varicose veins      (  ) pitting edema     (  ) non-pitting edema   (  ) ulceration     (  ) gangrene:     (location:     )    NERVOUS SYSTEM:    (  ) A&Ox3     (  ) confused     (  ) lethargic  CN II-XII:     (  ) Intact     (  ) deficits found     (Specify:     )   Upper extremities:     (  ) no sensorimotor deficits     (  ) weakness     (  ) loss of proprioception/vibration     (  ) loss of touch/temperature (specify:    )  Lower extremities:     (  ) no sensorimotor deficits     (  ) weakness     (  ) loss of proprioception/vibration     (  ) loss of touch/temperature (specify:    )    SKIN:   (  ) No rashes or lesions     (  ) maculopapular rash     (  ) pustules     (  ) vesicles     (  ) ulcer     (  ) ecchymosis     (specify location:     )    AMPAC score:    (  ) Indwelling Shannon Catheter:   Date insterted:    Reason (  ) Critical illness     (  ) urinary retention    (  ) Accurate Ins/Outs Monitoring     (  ) CMO patient    (  ) Central Line:   Date inserted:  Location: (  ) Right IJ     (  ) Left IJ     (  ) Right Fem     (  ) Left Fem    (  ) SPC        (  ) pigtail       (  ) PEG tube       (  ) colostomy       (  ) jejunostomy  (  ) U-Dall    LABS:                        RADIOLOGY:

## 2024-03-05 NOTE — DISCHARGE NOTE PROVIDER - HOSPITAL COURSE
58-year-old female with a past medical history of diabetes, bipolar disorder, hypertension, and chronic back pain recent admission for fall (related to taking too much baclofen as per pt) and uncontrolled BS. Pt presents to the ED for evaluation of right lower extremity pain status post fall. The patient reports that she missed a step going down the stairs lost her balance and subsequently fell 3 steps.  Patient reports when she fell she landed on her left side and was able to get up on her own.  Patient denies head injury, LOC, Pt presented to the ED S/P Fall and was found to have a likely sub-acute fibular fracture. The patient does report pain in the R fibular region for the past "several months" which worsens when she steps on the foot and there was no recent worsening of the pain S/P fall. In addition patient does note that her leg was slightly more swollen than usual.     Pts VSS remained stable on admission and will be admitted for ortho consult, PT eval for possible placement.     #Mechanical fall  #Subacute Fibular fracture   #DM Neuropathy  - Pt reports her last fall was due to taking more Baclofen than prescribed leading to lethargy  - current fall is mechanical  - Tylenol 975 Q 8 PRN mild pain   - s/p Ketorolac 15 Q 8 PRN for mod pain, titrated off as pain better controlled  - C/W home dose baclofen 20 Q 8  - C/W Lyrica 150 Q 8 (home dose)  3/4 pt declines MRI. Spoke to neuro (Dr. Aponte's team) and they are ok with outpt open MRI as low suspicion for stroke    #DM  - C/W home insulin regimen   - 45 Lantus in am  - 20 TID lispro pre meals  - SSI +1  - pt counselled    #Chronic pain 2/2 lumbar radiculopathy, neuropathy  - c/w baclofen and pregabalin     #HTN, stable  - c/w losartan 50 Qd and metoprolol 100 QD    #Hypothyroidism  - c/w levothyroxine 50mcg qd   - TSH elevated on 2/2 - 5.52, has been on levothyroxine with a hx of non-compliance; pt needs repeat thyroid studies outpt     Pt stable for DC

## 2024-03-05 NOTE — DISCHARGE NOTE PROVIDER - PROVIDER TOKENS
PROVIDER:[TOKEN:[[id number]]],PROVIDER:[TOKEN:[[id number]]],PROVIDER:[TOKEN:[[id number]]] PROVIDER:[TOKEN:[[id number]]],PROVIDER:[TOKEN:[[id number]]],PROVIDER:[TOKEN:[[id number]]],PROVIDER:[TOKEN:[[id number]]] PROVIDER:[TOKEN:[[id number]],FOLLOWUP:[2 weeks]],PROVIDER:[TOKEN:[[id number]],FOLLOWUP:[2 weeks]],PROVIDER:[TOKEN:[[id number]],FOLLOWUP:[2 weeks]],PROVIDER:[TOKEN:[[id number]],FOLLOWUP:[2 weeks]]

## 2024-03-05 NOTE — DISCHARGE NOTE PROVIDER - CARE PROVIDER_API CALL
Jil Garner  Internal Medicine  [address] [place] [address]  Phone: [phone]  Fax: [phone]  Follow Up Time:     Panda Aponte  Neurology  [address] [facility] [address] [place] [address]  Phone: [phone]  Fax: [phone]  Follow Up Time:     Gera Gallardo  Orthopaedic Surgery  [address] [place] [address]  Phone: [phone]  Fax: [phone]  Follow Up Time:    Jil Garner  Internal Medicine  [address] [place] [address]  Phone: [phone]  Fax: [phone]  Follow Up Time:     Panda Aponte  Neurology  [address] [facility], [address] [place] [address]  Phone: [phone]  Fax: [phone]  Follow Up Time:     Gera Gallardo  Orthopaedic Surgery  [address] [place] [address]  Phone: [phone]  Fax: [phone]  Follow Up Time:     Mariel Pantoja  Endocrinology/Metab/Diabetes  [address] [place] [address]  Phone: [phone]  Fax: [phone]  Follow Up Time:    Jil Garner  Internal Medicine  [address] [place] [address]  Phone: [phone]  Fax: [phone]  Follow Up Time: 2 weeks    Panda Aponte  Neurology  [address] [facility], [address] [place] [address]  Phone: [phone]  Fax: [phone]  Follow Up Time: 2 weeks    Gera Gallardo  Orthopaedic Surgery  [facility] [place] [address]  Phone: [phone]  Fax: [phone]  Follow Up Time: 2 weeks    Mariel Pantoja  Endocrinology/Metab/Diabetes  [address] [place] [address]  Phone: [phone]  Fax: [phone]  Follow Up Time: 2 weeks

## 2024-03-05 NOTE — DISCHARGE NOTE PROVIDER - NSDCFUSCHEDAPPT_GEN_ALL_CORE_FT
[facility] Physician [facility]  [address]  Scheduled Appointment: 03/07/2024

## 2024-03-05 NOTE — PROGRESS NOTE ADULT - ASSESSMENT
58-year-old female with a past medical history of diabetes, bipolar disorder, hypertension, and chronic back pain recent admission for fall (related to taking too much baclofen as per pt) and uncontrolled BS. Pt presents to the ED for evaluation of right lower extremity pain status post fall.     #Mechanical fall  #Subacute Fibular fracture   #DM Neuropathy  - Pt reports her last fall was due to taking more Baclofen than prescribed leading to lethargy  - current fall is mechanical  - Pain control with the following   - Tylenol 975 Q 8 PRN mild pain   - Ketorolac 15 Q 8 PRN for mod pain to be titrated off to home dose celecoxib once pain is better controlled  - C/W home dose baclofen 20 Q 8  - C/W Lyrica 150 Q 8 (home dose)  3/4 pt declines MRI. SPoke to neuro (Dr. Aponte's team) and they are ok with outpt open MRI as low suspicion for stroke  3/5 added Celebrex. Would use narcotics only as the last resort as pt is not very reliable in terms of taking her meds (recent overuse of Baclofen)    #DM  - C/W home insulin regimen   - 45 Lantus in am  - 20 TID lispro pre meals  - SSI +1  - adjust PRN to keep -180  - pt counselled    #Chronic pain 2/2 lumbar radiculopathy, neuropathy  - c/w baclofen and pregabalin     #HTN, stable  - c/w losartan 50 Qd and metoprolol 100 QD    #Hypothyroidism  - c/w levothyroxine 50mcg qd   - TSH elevated on 2/2 - 5.52, has been on levothyroxine with a hx of non-compliance; pt needs repeat thyroid studies in March     DVT ppx: Lovenox  Diet: DASH with CC    #Progress Note Handoff  Pending: Auth  Pt/Family discussion: Pt informed and agrees with the current plan  Disposition: Safe dispo    My note supersedes the residents note should a discrepancy arise.    Chart and notes personally reviewed.  Care Discussed with Consultants/Other Providers/ Housestaff [ x] YES [ ] NO   Radiology, labs, old records personally reviewed.    discussed w/ housestaff, nursing, case management    Time-based billing (NON-critical care).     35 minutes spent on total encounter. The necessity of the time spent during the encounter on this date of service was due to:     time spent on review of labs, imaging studies, old records, obtaining history, personally examining patient, counselling and communicating with patient/ family, entering orders for medications/tests/etc, discussions with other health care providers, documentation in electronic health records, independent interpretation of labs, imaging/procedure results and care coordination.

## 2024-03-05 NOTE — PROGRESS NOTE ADULT - ASSESSMENT
58-year-old female with a past medical history of diabetes, bipolar disorder, hypertension, and chronic back pain recent admission for fall (related to taking too much baclofen as per pt) and uncontrolled BS. Pt presents to the ED for evaluation of right lower extremity pain status post fall. O note Pt has recent hospital admissions for DKA then for generalized weakness.    #Mechanical fall  #Subacute Fibular fracture R  #Chronic pain 2/2 lumbar radiculopathy, neuropathy b/l LE     - Pt reports her last fall was due to taking more Baclofen than prescribed leading to lethargy  - current fall is mechanical  - Pain control with the following   - Tylenol 975 Q 8 PRN mild pain   - Ketorolac 15 Q 8 PRN for mod pain to be titrated off to home dose celecoxib once pain is better controlled  - C/W home dose baclofen 20 Q 8  - C/W Lyrica 150 Q 8 (home dose)  would consult neurology    - As per Ortho -> Pt has R chronic fibular neck fracture. Patient is currently walking unassisted with minimal pain. XR appears to demonstrate callus formation at fracture site, consistent with remote injury. No appreciated ankle tenderness or XR findings. WBAT with assistance as needed, compressive dressing as needed for comfort, extremity icing/elevation, f/u with Dr. Gallardo in 2 weeks if symptoms persist.  - PT /OT     #DM  - C/W home insulin regimen   - 45 Lantus in am  - 20 TID lispro pre meals  - SSI +1  - adjust PRN to keep -180    #HTN, stable  - c/w losartan 50 Qd and metoprolol 100 QD    #Hypothyroidism  - c/w levothyroxine 50mcg qd   - TSH elevated on 2/2 - 5.52, has been on levothyroxine with a hx of non-compliance; pt needs repeat thyroid studies in March     #DVT ppx: Lovenox  #Diet: DASH with CC    #Progress Note Handoff  #Pending: Pt is stable for discharge to long term care NH. Patient is in agreement to go to [facility] for long term placement.

## 2024-03-06 LAB
GLUCOSE BLDC GLUCOMTR-MCNC: 104 MG/DL — HIGH (ref 70–99)
GLUCOSE BLDC GLUCOMTR-MCNC: 105 MG/DL — HIGH (ref 70–99)
GLUCOSE BLDC GLUCOMTR-MCNC: 133 MG/DL — HIGH (ref 70–99)
GLUCOSE BLDC GLUCOMTR-MCNC: 141 MG/DL — HIGH (ref 70–99)

## 2024-03-06 PROCEDURE — 99232 SBSQ HOSP IP/OBS MODERATE 35: CPT

## 2024-03-06 RX ORDER — CHOLECALCIFEROL (VITAMIN D3) 125 MCG
400 CAPSULE ORAL
Qty: 60 | Refills: 0
Start: 2024-03-06 | End: 2024-04-04

## 2024-03-06 RX ORDER — CHOLECALCIFEROL (VITAMIN D3) 125 MCG
400 CAPSULE ORAL
Refills: 0 | Status: DISCONTINUED | OUTPATIENT
Start: 2024-03-06 | End: 2024-03-07

## 2024-03-06 RX ORDER — CALCIUM CARBONATE 500(1250)
1 TABLET ORAL DAILY
Refills: 0 | Status: DISCONTINUED | OUTPATIENT
Start: 2024-03-06 | End: 2024-03-07

## 2024-03-06 RX ORDER — CALCIUM CARBONATE 500(1250)
1 TABLET ORAL
Qty: 60 | Refills: 0
Start: 2024-03-06 | End: 2024-04-04

## 2024-03-06 RX ADMIN — CELECOXIB 200 MILLIGRAM(S): 200 CAPSULE ORAL at 11:27

## 2024-03-06 RX ADMIN — Medication 100 MILLIGRAM(S): at 05:53

## 2024-03-06 RX ADMIN — INSULIN GLARGINE 45 UNIT(S): 100 INJECTION, SOLUTION SUBCUTANEOUS at 08:04

## 2024-03-06 RX ADMIN — Medication 20 MILLIGRAM(S): at 21:40

## 2024-03-06 RX ADMIN — Medication 20 UNIT(S): at 17:15

## 2024-03-06 RX ADMIN — Medication 150 MILLIGRAM(S): at 21:40

## 2024-03-06 RX ADMIN — Medication 975 MILLIGRAM(S): at 11:27

## 2024-03-06 RX ADMIN — Medication 20 UNIT(S): at 08:04

## 2024-03-06 RX ADMIN — Medication 50 MICROGRAM(S): at 05:54

## 2024-03-06 RX ADMIN — Medication 20 UNIT(S): at 11:47

## 2024-03-06 RX ADMIN — Medication 20 MILLIGRAM(S): at 15:11

## 2024-03-06 RX ADMIN — LOSARTAN POTASSIUM 50 MILLIGRAM(S): 100 TABLET, FILM COATED ORAL at 05:53

## 2024-03-06 RX ADMIN — ZOLPIDEM TARTRATE 5 MILLIGRAM(S): 10 TABLET ORAL at 21:46

## 2024-03-06 RX ADMIN — Medication 150 MILLIGRAM(S): at 16:24

## 2024-03-06 RX ADMIN — Medication 1 TABLET(S): at 11:27

## 2024-03-06 RX ADMIN — Medication 400 UNIT(S): at 18:06

## 2024-03-06 RX ADMIN — ENOXAPARIN SODIUM 40 MILLIGRAM(S): 100 INJECTION SUBCUTANEOUS at 15:11

## 2024-03-06 RX ADMIN — Medication 20 MILLIGRAM(S): at 05:53

## 2024-03-06 RX ADMIN — Medication 1 PATCH: at 11:26

## 2024-03-06 NOTE — PROGRESS NOTE ADULT - SUBJECTIVE AND OBJECTIVE BOX
24H events:    Patient is a 58y old Female who presents with a chief complaint of Fall (05 Mar 2024 19:55)    Primary diagnosis of Accident due to mechanical fall without injury      Day 1:  Day 2:  Day 3:     Today is hospital day 8d. This morning patient was seen and examined at bedside, resting comfortably in bed.    No acute or major events overnight.    Code Status:    Family communication:  Contact date:  Name of person contacted:  Relationship to patient:  Communication details:  What matters most:    PAST MEDICAL & SURGICAL HISTORY  Hypertension    Diabetes mellitus    Thyroid disease    Anemia    S/P lumbar discectomy  2006.    S/P tonsillectomy      SOCIAL HISTORY:  Social History:      ALLERGIES:  No Known Allergies    MEDICATIONS:  STANDING MEDICATIONS  baclofen 20 milliGRAM(s) Oral every 8 hours  calcium carbonate    500 mG (Tums) Chewable 1 Tablet(s) Chew daily  cholecalciferol 400 Unit(s) Oral two times a day  dextrose 5%. 1000 milliLiter(s) IV Continuous <Continuous>  dextrose 5%. 1000 milliLiter(s) IV Continuous <Continuous>  dextrose 50% Injectable 25 Gram(s) IV Push once  dextrose 50% Injectable 12.5 Gram(s) IV Push once  dextrose 50% Injectable 25 Gram(s) IV Push once  enoxaparin Injectable 40 milliGRAM(s) SubCutaneous every 24 hours  glucagon  Injectable 1 milliGRAM(s) IntraMuscular once  influenza   Vaccine 0.5 milliLiter(s) IntraMuscular once  insulin glargine Injectable (LANTUS) 45 Unit(s) SubCutaneous every morning  insulin lispro (ADMELOG) corrective regimen sliding scale   SubCutaneous three times a day before meals  insulin lispro Injectable (ADMELOG) 20 Unit(s) SubCutaneous three times a day before meals  levothyroxine 50 MICROGram(s) Oral daily  losartan 50 milliGRAM(s) Oral daily  metoprolol succinate  milliGRAM(s) Oral daily  nicotine -  14 mG/24Hr(s) Patch 1 Patch Transdermal daily    PRN MEDICATIONS  acetaminophen     Tablet .. 975 milliGRAM(s) Oral every 8 hours PRN  celecoxib 200 milliGRAM(s) Oral two times a day PRN  dextrose Oral Gel 15 Gram(s) Oral once PRN  zolpidem 5 milliGRAM(s) Oral at bedtime PRN    VITALS:   T(F): 96.9  HR: 93  BP: 133/63  RR: 18  SpO2: --    PHYSICAL EXAM:  CONSTITUTIONAL: No acute distress  HEAD: Normocephalic; atraumatic.  EYES: Pupils equal round reactive to light, conjunctiva and sclera clear.  NECK: Supple; non tender.  CARD: Regular rate and rhythm. Normal S1, S2  RESP: + b/l breath sounds  ABD: Abdomen soft; non-tender; non-distended  EXT: No clubbing or cyanosis. R knee ace wrap  NEURO: Alert and oriented x 3      LABS:                        RADIOLOGY:           24H events:    Patient is a 58y old Female who presents with a chief complaint of Fall (05 Mar 2024 19:55)    Primary diagnosis of Accident due to mechanical fall without injury    Today is hospital day 8d. This morning patient was seen and examined at bedside, resting comfortably in bed.    No acute or major events overnight.          PAST MEDICAL & SURGICAL HISTORY  Hypertension    Diabetes mellitus    Thyroid disease    Anemia    S/P lumbar discectomy  2006.    S/P tonsillectomy      SOCIAL HISTORY:  Social History:      ALLERGIES:  No Known Allergies    MEDICATIONS:  STANDING MEDICATIONS  baclofen 20 milliGRAM(s) Oral every 8 hours  calcium carbonate    500 mG (Tums) Chewable 1 Tablet(s) Chew daily  cholecalciferol 400 Unit(s) Oral two times a day  dextrose 5%. 1000 milliLiter(s) IV Continuous <Continuous>  dextrose 5%. 1000 milliLiter(s) IV Continuous <Continuous>  dextrose 50% Injectable 25 Gram(s) IV Push once  dextrose 50% Injectable 12.5 Gram(s) IV Push once  dextrose 50% Injectable 25 Gram(s) IV Push once  enoxaparin Injectable 40 milliGRAM(s) SubCutaneous every 24 hours  glucagon  Injectable 1 milliGRAM(s) IntraMuscular once  influenza   Vaccine 0.5 milliLiter(s) IntraMuscular once  insulin glargine Injectable (LANTUS) 45 Unit(s) SubCutaneous every morning  insulin lispro (ADMELOG) corrective regimen sliding scale   SubCutaneous three times a day before meals  insulin lispro Injectable (ADMELOG) 20 Unit(s) SubCutaneous three times a day before meals  levothyroxine 50 MICROGram(s) Oral daily  losartan 50 milliGRAM(s) Oral daily  metoprolol succinate  milliGRAM(s) Oral daily  nicotine -  14 mG/24Hr(s) Patch 1 Patch Transdermal daily    PRN MEDICATIONS  acetaminophen     Tablet .. 975 milliGRAM(s) Oral every 8 hours PRN  celecoxib 200 milliGRAM(s) Oral two times a day PRN  dextrose Oral Gel 15 Gram(s) Oral once PRN  zolpidem 5 milliGRAM(s) Oral at bedtime PRN    VITALS:   T(F): 96.9  HR: 93  BP: 133/63  RR: 18  SpO2: --    PHYSICAL EXAM:  CONSTITUTIONAL: No acute distress  HEAD: Normocephalic; atraumatic.  EYES: Pupils equal round reactive to light, conjunctiva and sclera clear.  NECK: Supple; non tender.  CARD: Regular rate and rhythm. Normal S1, S2  RESP: + b/l breath sounds  ABD: Abdomen soft; non-tender; non-distended  EXT: No clubbing or cyanosis. R knee ace wrap  NEURO: Alert and oriented x 3

## 2024-03-06 NOTE — PROGRESS NOTE ADULT - ATTENDING COMMENTS
Patient is a 59 y/o  female with PMH of diabetes, bipolar disorder, hypertension, and chronic back pain recent admission for fall (related to taking too much baclofen as per pt) and uncontrolled BS. Pt presents to the ED for evaluation of right lower extremity pain status post fall.     #Mechanical fall  #Subacute Fibular fracture - WBAT, pain management, outpatient Ortho team f/up if pain will worsen.   #DM Neuropathy  - continue current main management   3/4 pt declines MRI.   3/5 added Celebrex. Would use narcotics only as the last resort as pt is not very reliable in terms of taking her meds (recent overuse of Baclofen)  - daily PT/OT as tolerated, pending  d/c to STR     #DM- bsfs with insulin co.     #HTN, stable - c/w losartan 50 Qd and metoprolol 100 QD    #Hypothyroidism - c/w levothyroxine 50mcg qd   - TSH elevated on 2/2 - 5.52, has been on levothyroxine with a hx of non-compliance; pt needs repeat thyroid studies in March     DVT ppx: Lovenox  Diet: DASH with CC    #Progress Note Handoff: Pending to STR once bed is available   Family discussion: current d/c planning d/w patient by bedside Disposition: STR     Total time spent to complete patient's bedside assessment, review medical chart, discuss medical plan of care with covering medical team was more than 35 minutes with >50% of time spent face to face with patient, discussion with patient/family and/or coordination of care

## 2024-03-06 NOTE — PROGRESS NOTE ADULT - ASSESSMENT
58-year-old female with a past medical history of diabetes, bipolar disorder, hypertension, and chronic back pain recent admission for fall (related to taking too much baclofen as per pt) and uncontrolled BS. Pt presents to the ED for evaluation of right lower extremity pain status post fall.     #Mechanical fall  #Subacute Fibular fracture   #DM Neuropathy  - Pt reports her last fall was due to taking more Baclofen than prescribed leading to lethargy  - current fall is mechanical  - Pain control with the following   - Tylenol 975 Q 8 PRN mild pain   - Ketorolac 15 Q 8 PRN for mod pain to be titrated off to home dose celecoxib once pain is better controlled  - C/W home dose baclofen 20 Q 8  - C/W Lyrica 150 Q 8 (home dose)  3/4 pt declines MRI. SPoke to neuro (Dr. Aponte's team) and they are ok with outpt open MRI as low suspicion for stroke  3/5 added Celebrex. Would use narcotics only as the last resort as pt is not very reliable in terms of taking her meds (recent overuse of Baclofen)    #DM  - C/W home insulin regimen   - 45 Lantus in am  - 20 TID lispro pre meals  - SSI +1  - adjust PRN to keep -180  - pt counselled    #Chronic pain 2/2 lumbar radiculopathy, neuropathy  - c/w baclofen and pregabalin     #HTN, stable  - c/w losartan 50 Qd and metoprolol 100 QD    #Hypothyroidism  - c/w levothyroxine 50mcg qd   - TSH elevated on 2/2 - 5.52, has been on levothyroxine with a hx of non-compliance; pt needs repeat thyroid studies in March     DVT ppx: Lovenox  Diet: DASH with CC    #Progress Note Handoff  Pending: Auth

## 2024-03-07 ENCOUNTER — TRANSCRIPTION ENCOUNTER (OUTPATIENT)
Age: 59
End: 2024-03-07

## 2024-03-07 ENCOUNTER — APPOINTMENT (OUTPATIENT)
Dept: ORTHOPEDIC SURGERY | Facility: CLINIC | Age: 59
End: 2024-03-07

## 2024-03-07 VITALS — WEIGHT: 169.98 LBS | HEIGHT: 66 IN

## 2024-03-07 LAB
GLUCOSE BLDC GLUCOMTR-MCNC: 128 MG/DL — HIGH (ref 70–99)
GLUCOSE BLDC GLUCOMTR-MCNC: 223 MG/DL — HIGH (ref 70–99)

## 2024-03-07 PROCEDURE — 99239 HOSP IP/OBS DSCHRG MGMT >30: CPT

## 2024-03-07 RX ORDER — BACLOFEN 100 %
1 POWDER (GRAM) MISCELLANEOUS
Refills: 0 | DISCHARGE

## 2024-03-07 RX ORDER — BACLOFEN 100 %
1 POWDER (GRAM) MISCELLANEOUS
Qty: 0 | Refills: 0 | DISCHARGE
Start: 2024-03-07

## 2024-03-07 RX ADMIN — LOSARTAN POTASSIUM 50 MILLIGRAM(S): 100 TABLET, FILM COATED ORAL at 05:42

## 2024-03-07 RX ADMIN — Medication 20 UNIT(S): at 08:28

## 2024-03-07 RX ADMIN — Medication 20 MILLIGRAM(S): at 14:10

## 2024-03-07 RX ADMIN — Medication 150 MILLIGRAM(S): at 05:43

## 2024-03-07 RX ADMIN — Medication 150 MILLIGRAM(S): at 14:10

## 2024-03-07 RX ADMIN — Medication 20 UNIT(S): at 11:36

## 2024-03-07 RX ADMIN — Medication 20 MILLIGRAM(S): at 05:43

## 2024-03-07 RX ADMIN — INSULIN GLARGINE 45 UNIT(S): 100 INJECTION, SOLUTION SUBCUTANEOUS at 08:28

## 2024-03-07 RX ADMIN — Medication 2: at 08:29

## 2024-03-07 RX ADMIN — Medication 50 MICROGRAM(S): at 05:43

## 2024-03-07 RX ADMIN — Medication 1 PATCH: at 11:16

## 2024-03-07 RX ADMIN — Medication 1 TABLET(S): at 11:16

## 2024-03-07 RX ADMIN — Medication 400 UNIT(S): at 05:48

## 2024-03-07 RX ADMIN — Medication 100 MILLIGRAM(S): at 05:42

## 2024-03-07 NOTE — PROGRESS NOTE ADULT - ASSESSMENT
Patient is a 59 y/o  female with PMH of diabetes, bipolar disorder, hypertension, and chronic back pain recent admission for fall (related to taking too much baclofen as per pt) and uncontrolled BS. Pt presents to the ED for evaluation of right lower extremity pain status post fall.     #Mechanical fall  #Subacute Fibular fracture - WBAT, pain management, outpatient Ortho team f/up if pain will worsen.   #DM Neuropathy  - continue current main management   3/4 pt declines MRI.   3/5 added Celebrex. Would use narcotics only as the last resort as pt is not very reliable in terms of taking her meds (recent overuse of Baclofen)  - daily PT/OT as tolerated, pending  d/c to STR     #DM- bsfs with insulin co.     #HTN, stable - c/w losartan 50 Qd and metoprolol 100 QD    #Hypothyroidism - c/w levothyroxine 50mcg qd   - TSH elevated on 2/2 - 5.52, has been on levothyroxine with a hx of non-compliance; pt needs repeat thyroid studies in March     DVT ppx: Lovenox  Diet: DASH with CC    #Progress Note Handoff: Pending to STR once bed is available   Family discussion: current d/c planning d/w patient by bedside Disposition: STR

## 2024-03-07 NOTE — PROGRESS NOTE ADULT - SUBJECTIVE AND OBJECTIVE BOX
Patient is a 58y old  Female who presents with a chief complaint of Fall (27 Feb 2024 16:14)    INTERVAL HPI/OVERNIGHT EVENTS: Patient was examined and seen at bedside. This morning pt is resting comfortably in bed and reports no new issues or overnight events. Only c/o b/l paresthesias.     ROS: Denies CP, SOB, AP, new weakness  All other systems reviewed and are within normal limits.  InitialHPI:   58-year-old female with a past medical history of diabetes, bipolar disorder, hypertension, and chronic back pain recent admission for fall (related to taking too much baclofen as per pt) and uncontrolled BS. Pt presents to the ED for evaluation of right lower extremity pain status post fall. The patient reports that she missed a step going down the stairs lost her balance and subsequently fell 3 steps.  Patient reports when she fell she landed on her left side and was able to get up on her own.  Patient denies head injury, LOC, Pt presented to the ED S/P Fall and was found to have a likely sub-acute fibular fracture. The patient does report pain in the R fibular region for the past "several months" which worsens when she steps on the foot and there was no recent worsening of the pain S/P fall. In addition patient does note that her leg was slightly more swollen than usual.       Pts VSS remained stable on admission and will be admitted for ortho consult, PT eval for possible placement.             (27 Feb 2024 12:42)    PAST MEDICAL & SURGICAL HISTORY:  Hypertension      Diabetes mellitus      Thyroid disease      Anemia      S/P lumbar discectomy  2006.      S/P tonsillectomy          General: NAD, AAO3  HEENT:  EOMI, no LAD  CV: S1 S2  Resp: decreased breath sounds at bases  GI: NT/ND/S +BS  MS: no clubbing/cyanosis/edema, + pulses b/l  Neuro: nonfocal, +reflexes thruout. Decreased vibr, proprioc b/l LE          Home Medications:  acetaminophen 325 mg oral tablet: 3 tab(s) orally every 8 hours As needed Mild Pain (1 - 3) (05 Mar 2024 11:09)  baclofen 20 mg oral tablet: 1 tab(s) orally every 8 hours (07 Mar 2024 13:08)  celecoxib 200 mg oral capsule: 1 cap(s) orally 2 times a day as needed for  moderate pain (05 Mar 2024 11:38)  insulin lispro 100 units/mL injectable solution: 20 unit(s) injectable 3 times a day (before meals) (21 Feb 2024 11:22)  levothyroxine 50 mcg (0.05 mg) oral tablet: 1 tab(s) orally once a day (15 Feb 2024 16:45)  losartan 50 mg oral tablet: 1 tab(s) orally once a day (05 Mar 2024 11:09)  metoprolol succinate 100 mg oral capsule, extended release: 1 cap(s) orally once a day (15 Feb 2024 16:45)  pregabalin 150 mg oral capsule: 1 cap(s) orally every 8 hours (07 Mar 2024 10:09)    MEDICATIONS  (STANDING):  baclofen 20 milliGRAM(s) Oral every 8 hours  calcium carbonate    500 mG (Tums) Chewable 1 Tablet(s) Chew daily  cholecalciferol 400 Unit(s) Oral two times a day  dextrose 5%. 1000 milliLiter(s) (100 mL/Hr) IV Continuous <Continuous>  dextrose 5%. 1000 milliLiter(s) (50 mL/Hr) IV Continuous <Continuous>  dextrose 50% Injectable 25 Gram(s) IV Push once  dextrose 50% Injectable 12.5 Gram(s) IV Push once  dextrose 50% Injectable 25 Gram(s) IV Push once  enoxaparin Injectable 40 milliGRAM(s) SubCutaneous every 24 hours  glucagon  Injectable 1 milliGRAM(s) IntraMuscular once  influenza   Vaccine 0.5 milliLiter(s) IntraMuscular once  insulin glargine Injectable (LANTUS) 45 Unit(s) SubCutaneous every morning  insulin lispro (ADMELOG) corrective regimen sliding scale   SubCutaneous three times a day before meals  insulin lispro Injectable (ADMELOG) 20 Unit(s) SubCutaneous three times a day before meals  levothyroxine 50 MICROGram(s) Oral daily  losartan 50 milliGRAM(s) Oral daily  metoprolol succinate  milliGRAM(s) Oral daily  nicotine -  14 mG/24Hr(s) Patch 1 Patch Transdermal daily  pregabalin 150 milliGRAM(s) Oral every 8 hours    MEDICATIONS  (PRN):  acetaminophen     Tablet .. 975 milliGRAM(s) Oral every 8 hours PRN Mild Pain (1 - 3)  celecoxib 200 milliGRAM(s) Oral two times a day PRN Severe Pain (7 - 10)  dextrose Oral Gel 15 Gram(s) Oral once PRN Blood Glucose LESS THAN 70 milliGRAM(s)/deciliter  zolpidem 5 milliGRAM(s) Oral at bedtime PRN Insomnia    Vital Signs Last 24 Hrs  T(C): 36.8 (07 Mar 2024 13:39), Max: 36.8 (07 Mar 2024 13:39)  T(F): 98.3 (07 Mar 2024 13:39), Max: 98.3 (07 Mar 2024 13:39)  HR: 83 (07 Mar 2024 13:39) (80 - 83)  BP: 113/57 (07 Mar 2024 13:39) (113/57 - 151/72)  BP(mean): 104 (07 Mar 2024 05:19) (104 - 104)  RR: 16 (07 Mar 2024 13:39) (16 - 18)  SpO2: --    Parameters below as of 07 Mar 2024 13:39  Patient On (Oxygen Delivery Method): room air      CAPILLARY BLOOD GLUCOSE      POCT Blood Glucose.: 128 mg/dL (07 Mar 2024 11:23)  POCT Blood Glucose.: 223 mg/dL (07 Mar 2024 07:40)    LABS:                            Consultant Notes Reviewed:  [x ] YES  [ ] NO  Care Discussed with Consultants/Other Providers/ Housestaff [ x] YES  [ ] NO  Radiology, labs, EKGs, new studies personally reviewed.

## 2024-03-07 NOTE — PROGRESS NOTE ADULT - WEIGHT IN KG
77.1
Bleeding that does not stop/Nausea and vomiting that does not stop/Unable to urinate/Inability to tolerate liquids or foods

## 2024-03-07 NOTE — DISCHARGE NOTE NURSING/CASE MANAGEMENT/SOCIAL WORK - PATIENT PORTAL LINK FT
You can access the FollowMyHealth Patient Portal offered by [facility] by registering at the following website: [email or web address]. By joining [facility]’s FollowMyHealth portal, you will also be able to view your health information using other applications (apps) compatible with our system.

## 2024-03-07 NOTE — PROGRESS NOTE ADULT - SUBJECTIVE AND OBJECTIVE BOX
24H events:    Patient is a 58y old Female who presents with a chief complaint of Fall (06 Mar 2024 13:14)    Primary diagnosis of Accident due to mechanical fall without injury      Day 1:  Day 2:  Day 3:     Today is hospital day 9d. This morning patient was seen and examined at bedside, resting comfortably in bed.    No acute or major events overnight.    Code Status:    Family communication:  Contact date:  Name of person contacted:  Relationship to patient:  Communication details:  What matters most:    PAST MEDICAL & SURGICAL HISTORY  Hypertension    Diabetes mellitus    Thyroid disease    Anemia    S/P lumbar discectomy  2006.    S/P tonsillectomy      SOCIAL HISTORY:  Social History:      ALLERGIES:  No Known Allergies    MEDICATIONS:  STANDING MEDICATIONS  baclofen 20 milliGRAM(s) Oral every 8 hours  calcium carbonate    500 mG (Tums) Chewable 1 Tablet(s) Chew daily  cholecalciferol 400 Unit(s) Oral two times a day  dextrose 5%. 1000 milliLiter(s) IV Continuous <Continuous>  dextrose 5%. 1000 milliLiter(s) IV Continuous <Continuous>  dextrose 50% Injectable 25 Gram(s) IV Push once  dextrose 50% Injectable 12.5 Gram(s) IV Push once  dextrose 50% Injectable 25 Gram(s) IV Push once  enoxaparin Injectable 40 milliGRAM(s) SubCutaneous every 24 hours  glucagon  Injectable 1 milliGRAM(s) IntraMuscular once  influenza   Vaccine 0.5 milliLiter(s) IntraMuscular once  insulin glargine Injectable (LANTUS) 45 Unit(s) SubCutaneous every morning  insulin lispro (ADMELOG) corrective regimen sliding scale   SubCutaneous three times a day before meals  insulin lispro Injectable (ADMELOG) 20 Unit(s) SubCutaneous three times a day before meals  levothyroxine 50 MICROGram(s) Oral daily  losartan 50 milliGRAM(s) Oral daily  metoprolol succinate  milliGRAM(s) Oral daily  nicotine -  14 mG/24Hr(s) Patch 1 Patch Transdermal daily  pregabalin 150 milliGRAM(s) Oral every 8 hours    PRN MEDICATIONS  acetaminophen     Tablet .. 975 milliGRAM(s) Oral every 8 hours PRN  celecoxib 200 milliGRAM(s) Oral two times a day PRN  dextrose Oral Gel 15 Gram(s) Oral once PRN  zolpidem 5 milliGRAM(s) Oral at bedtime PRN    VITALS:   T(F): 96.2  HR: 80  BP: 151/72  RR: 18  SpO2: 98%    PHYSICAL EXAM:  GENERAL:   (  ) NAD, lying in bed comfortably     (  ) obtunded     (  ) lethargic     (  ) somnolent    HEAD:   (  ) Atraumatic     (  ) hematoma     (  ) laceration (specify location:       )     NECK:  (  ) Supple     (  ) neck stiffness     (  ) nuchal rigidity     (  )  no JVD     (  ) JVD present ( -- cm)    HEART:  Rate -->     (  ) normal rate     (  ) bradycardic     (  ) tachycardic  Rhythm -->     (  ) regular     (  ) regularly irregular     (  ) irregularly irregular  Murmurs -->     (  ) normal s1s2     (  ) systolic murmur     (  ) diastolic murmur     (  ) continuous murmur      (  ) S3 present     (  ) S4 present    LUNGS:   (  )Unlabored respirations     (  ) tachypnea  (  ) B/L air entry     (  ) decreased breath sounds in:  (location     )    (  ) no adventitious sound     (  ) crackles     (  ) wheezing      (  ) rhonchi      (specify location:       )  (  ) chest wall tenderness (specify location:       )    ABDOMEN:   (  ) Soft     (  ) tense   |   (  ) nondistended     (  ) distended   |   (  ) +BS     (  ) hypoactive bowel sounds     (  ) hyperactive bowel sounds  (  ) nontender     (  ) RUQ tenderness     (  ) RLQ tenderness     (  ) LLQ tenderness     (  ) epigastric tenderness     (  ) diffuse tenderness  (  ) Splenomegaly      (  ) Hepatomegaly      (  ) Jaundice     (  ) ecchymosis     EXTREMITIES:  (  ) Normal     (  ) Rash     (  ) ecchymosis     (  ) varicose veins      (  ) pitting edema     (  ) non-pitting edema   (  ) ulceration     (  ) gangrene:     (location:     )    NERVOUS SYSTEM:    (  ) A&Ox3     (  ) confused     (  ) lethargic  CN II-XII:     (  ) Intact     (  ) deficits found     (Specify:     )   Upper extremities:     (  ) no sensorimotor deficits     (  ) weakness     (  ) loss of proprioception/vibration     (  ) loss of touch/temperature (specify:    )  Lower extremities:     (  ) no sensorimotor deficits     (  ) weakness     (  ) loss of proprioception/vibration     (  ) loss of touch/temperature (specify:    )    SKIN:   (  ) No rashes or lesions     (  ) maculopapular rash     (  ) pustules     (  ) vesicles     (  ) ulcer     (  ) ecchymosis     (specify location:     )    AMPAC score:    (  ) Indwelling Shannon Catheter:   Date insterted:    Reason (  ) Critical illness     (  ) urinary retention    (  ) Accurate Ins/Outs Monitoring     (  ) CMO patient    (  ) Central Line:   Date inserted:  Location: (  ) Right IJ     (  ) Left IJ     (  ) Right Fem     (  ) Left Fem    (  ) SPC        (  ) pigtail       (  ) PEG tube       (  ) colostomy       (  ) jejunostomy  (  ) U-Dall    LABS:                        RADIOLOGY:

## 2024-03-07 NOTE — PROGRESS NOTE ADULT - PROVIDER SPECIALTY LIST ADULT
Hospitalist
Hospitalist
Internal Medicine
Hospitalist
Hospitalist
Internal Medicine
Hospitalist
Internal Medicine
Internal Medicine
Hospitalist

## 2024-04-25 ENCOUNTER — APPOINTMENT (OUTPATIENT)
Dept: OBGYN | Facility: CLINIC | Age: 59
End: 2024-04-25

## 2024-04-26 ENCOUNTER — INPATIENT (INPATIENT)
Facility: HOSPITAL | Age: 59
LOS: 13 days | Discharge: HOME | DRG: 876 | End: 2024-05-10
Attending: HOSPITALIST | Admitting: STUDENT IN AN ORGANIZED HEALTH CARE EDUCATION/TRAINING PROGRAM
Payer: MEDICAID

## 2024-04-26 VITALS
HEART RATE: 120 BPM | RESPIRATION RATE: 19 BRPM | DIASTOLIC BLOOD PRESSURE: 104 MMHG | TEMPERATURE: 98 F | SYSTOLIC BLOOD PRESSURE: 184 MMHG | HEIGHT: 66 IN | OXYGEN SATURATION: 98 %

## 2024-04-26 LAB
ALBUMIN SERPL ELPH-MCNC: 4.4 G/DL — SIGNIFICANT CHANGE UP (ref 3.5–5.2)
ALP SERPL-CCNC: 163 U/L — HIGH (ref 30–115)
ALT FLD-CCNC: 17 U/L — SIGNIFICANT CHANGE UP (ref 0–41)
ANION GAP SERPL CALC-SCNC: 18 MMOL/L — HIGH (ref 7–14)
AST SERPL-CCNC: 21 U/L — SIGNIFICANT CHANGE UP (ref 0–41)
BASOPHILS # BLD AUTO: 0.02 K/UL — SIGNIFICANT CHANGE UP (ref 0–0.2)
BASOPHILS NFR BLD AUTO: 0.3 % — SIGNIFICANT CHANGE UP (ref 0–1)
BILIRUB SERPL-MCNC: 0.8 MG/DL — SIGNIFICANT CHANGE UP (ref 0.2–1.2)
BUN SERPL-MCNC: 15 MG/DL — SIGNIFICANT CHANGE UP (ref 10–20)
CALCIUM SERPL-MCNC: 10.3 MG/DL — SIGNIFICANT CHANGE UP (ref 8.4–10.5)
CHLORIDE SERPL-SCNC: 101 MMOL/L — SIGNIFICANT CHANGE UP (ref 98–110)
CO2 SERPL-SCNC: 19 MMOL/L — SIGNIFICANT CHANGE UP (ref 17–32)
CREAT SERPL-MCNC: 0.5 MG/DL — LOW (ref 0.7–1.5)
D DIMER BLD IA.RAPID-MCNC: 414 NG/ML DDU — HIGH
EGFR: 108 ML/MIN/1.73M2 — SIGNIFICANT CHANGE UP
EOSINOPHIL # BLD AUTO: 0.1 K/UL — SIGNIFICANT CHANGE UP (ref 0–0.7)
EOSINOPHIL NFR BLD AUTO: 1.3 % — SIGNIFICANT CHANGE UP (ref 0–8)
GLUCOSE SERPL-MCNC: 357 MG/DL — HIGH (ref 70–99)
HCG SERPL QL: SIGNIFICANT CHANGE UP
HCT VFR BLD CALC: 38.9 % — SIGNIFICANT CHANGE UP (ref 37–47)
HGB BLD-MCNC: 13.4 G/DL — SIGNIFICANT CHANGE UP (ref 12–16)
IMM GRANULOCYTES NFR BLD AUTO: 0.3 % — SIGNIFICANT CHANGE UP (ref 0.1–0.3)
LYMPHOCYTES # BLD AUTO: 1.82 K/UL — SIGNIFICANT CHANGE UP (ref 1.2–3.4)
LYMPHOCYTES # BLD AUTO: 24.4 % — SIGNIFICANT CHANGE UP (ref 20.5–51.1)
MAGNESIUM SERPL-MCNC: 2.1 MG/DL — SIGNIFICANT CHANGE UP (ref 1.8–2.4)
MCHC RBC-ENTMCNC: 29.5 PG — SIGNIFICANT CHANGE UP (ref 27–31)
MCHC RBC-ENTMCNC: 34.4 G/DL — SIGNIFICANT CHANGE UP (ref 32–37)
MCV RBC AUTO: 85.5 FL — SIGNIFICANT CHANGE UP (ref 81–99)
MONOCYTES # BLD AUTO: 0.68 K/UL — HIGH (ref 0.1–0.6)
MONOCYTES NFR BLD AUTO: 9.1 % — SIGNIFICANT CHANGE UP (ref 1.7–9.3)
NEUTROPHILS # BLD AUTO: 4.83 K/UL — SIGNIFICANT CHANGE UP (ref 1.4–6.5)
NEUTROPHILS NFR BLD AUTO: 64.6 % — SIGNIFICANT CHANGE UP (ref 42.2–75.2)
NRBC # BLD: 0 /100 WBCS — SIGNIFICANT CHANGE UP (ref 0–0)
NT-PROBNP SERPL-SCNC: 326 PG/ML — HIGH (ref 0–300)
PLATELET # BLD AUTO: 203 K/UL — SIGNIFICANT CHANGE UP (ref 130–400)
PMV BLD: 9.7 FL — SIGNIFICANT CHANGE UP (ref 7.4–10.4)
POTASSIUM SERPL-MCNC: 3.6 MMOL/L — SIGNIFICANT CHANGE UP (ref 3.5–5)
POTASSIUM SERPL-SCNC: 3.6 MMOL/L — SIGNIFICANT CHANGE UP (ref 3.5–5)
PROT SERPL-MCNC: 7.7 G/DL — SIGNIFICANT CHANGE UP (ref 6–8)
RBC # BLD: 4.55 M/UL — SIGNIFICANT CHANGE UP (ref 4.2–5.4)
RBC # FLD: 14 % — SIGNIFICANT CHANGE UP (ref 11.5–14.5)
SODIUM SERPL-SCNC: 138 MMOL/L — SIGNIFICANT CHANGE UP (ref 135–146)
TROPONIN T, HIGH SENSITIVITY RESULT: 9 NG/L — SIGNIFICANT CHANGE UP (ref 6–13)
WBC # BLD: 7.47 K/UL — SIGNIFICANT CHANGE UP (ref 4.8–10.8)
WBC # FLD AUTO: 7.47 K/UL — SIGNIFICANT CHANGE UP (ref 4.8–10.8)

## 2024-04-26 PROCEDURE — 99285 EMERGENCY DEPT VISIT HI MDM: CPT

## 2024-04-26 PROCEDURE — 71045 X-RAY EXAM CHEST 1 VIEW: CPT | Mod: 26

## 2024-04-26 PROCEDURE — 93010 ELECTROCARDIOGRAM REPORT: CPT

## 2024-04-26 RX ORDER — HALOPERIDOL DECANOATE 100 MG/ML
5 INJECTION INTRAMUSCULAR ONCE
Refills: 0 | Status: COMPLETED | OUTPATIENT
Start: 2024-04-26 | End: 2024-04-26

## 2024-04-26 NOTE — ED PROVIDER NOTE - OBJECTIVE STATEMENT
58 yold female to Ed from [facility], Bipolar disorder, Htn, chronic LBP; pt states she was very upset this am because family not notified of recent psych stay; pt had elevated bp and HR and c/o chest pain pressure, sob, bilat lower ext swelling; cp/sob resolved;

## 2024-04-26 NOTE — ED ADULT TRIAGE NOTE - CHIEF COMPLAINT QUOTE
Patient sent in from [facility] due to hypertension and tachycardia. Patient denies chest pain, SOB, headache

## 2024-04-26 NOTE — ED PROVIDER NOTE - CARE PLAN
1 Principal Discharge DX:	Confusion  Secondary Diagnosis:	Altered mental status  Secondary Diagnosis:	Agitation  Secondary Diagnosis:	Bipolar disorder

## 2024-04-26 NOTE — ED PROVIDER NOTE - QRS
Progress Note    Patient: Shobha Ballesteros Date: 12/26/2023   female, 62 year old  Admit Date: 12/24/2023   Attending: Katie Deutsch MD      Interval History  No acute events    Subjective:  Shobha reports continued abdominal pain, most notably in LLQ. Denies blood per rectum.    Physical Exam:  Visit Vitals  /53 (BP Location: LUE - Left upper extremity, Patient Position: Semi-Ríos's)   Pulse 74   Temp 98.3 °F (36.8 °C) (Oral)   Resp 17   Ht 5' 7\" (1.702 m)   Wt 123 kg (271 lb 2.7 oz)   LMP 01/01/1990   SpO2 95%   BMI 42.47 kg/m²     General:  Appears comfortable laying in bed  Cardiovascular:  RRR, no murmur  Chest:  Regular effort, CTAB  Abdomen:  Soft, normal bowel sounds, tenderness to palpation of L quadrants  Extremities:  No pedal edema  Neurologic: alert and coherent without FND    Labs:  Recent Labs   Lab 12/26/23  0530 12/25/23  0416 12/24/23  2215 12/24/23  1228   WBC 4.7 5.6  --  9.2   RBC 3.33* 3.54*  --  3.85*   HGB 10.7* 11.4* 11.8* 12.3   HCT 33.5* 35.6* 36.4 37.8    230  --  276   SEG  --   --   --  73     Recent Labs   Lab 12/26/23  0530 12/25/23  0416 12/24/23  1228   SODIUM 136 137 136   POTASSIUM 3.7 3.9 4.1   CHLORIDE 105 107 106   CO2 29 29 24   BUN 10 17 21*   CREATININE 1.21* 1.33* 1.20*   GLUCOSE 99 99 101*   CALCIUM 8.4 8.6 9.1   ALBUMIN 3.0* 3.4* 3.9   AST 14 16 17   GPT 17 18 20   BILIRUBIN 0.3 0.5 0.5   ALKPT 70 88 100       Assessment & Plan:     GI bleeding likely 2/2 Colitis; exacerbated by Eliquis  Colitis of descending and proximal sigmoid colon  CTA abdomen and pelvis: No aortic aneurysm or dissection. Mesenteric vessels unremarkable. Findings consistent with colitis involving the descending and proximal sigmoid colon.  - GI following: recommend full liquid diet, can advance to low residual if tolerated   - Discontinue C diff, GI pathogen panel due to no bowel movements  - Hold eliquis for now  - Monitor CBC  - Continue ciprofloxacin, metronidazole  for now  - Pain regimen: adjusted PRN frequencies      COPD  Stable  - albuterol p.r.n.  - Incruse Ellipta     PE  On home Eliquis  - will hold Eliquis for now due to active GI bleeding     Hypothyroidism  - levothyroxine     Depression  - duloxetine  - Wellbutrin     Fibromyalgia  - Flexeril p.r.n.  - gabapentin     malignant hyperthermia  - will clinically monitor     CKD 3  Creatinine. stable  - Monitor    FEN: Full liquid  PPx: SCDs  Dispo:  - Code status FUL  - No guardian/POAHC  - Discharge pending stabilization of hemoglobin, improvement in abdominal pain    Admission Status: Observation, anticipate 1-3 more days       Katie Deutsch MD  Hospitalist  12/26/2023  7:30 PM               84

## 2024-04-26 NOTE — ED PROVIDER NOTE - ATTENDING APP SHARED VISIT CONTRIBUTION OF CARE
see mdm 59 yo female, PMHx of diabetes, bipolar disorder, hypertension, and chronic back pain, presenting for evaluation sent from [facility]. Patient sent in for tachycardia and hypertension. Patient endorsed she was upset this morning and when they took her vitals, her HR and BP were elevated. At that time, patient had some chest pain and shortness of breath because she was upset but everything has subsided since then. Denies current chest pain, shortness of breath, nausea, vomiting, headache, dizziness, abdominal pain. Well appearing on exam. bilateral lower extremity edema 1+ without open wounds. Patient asked for her  to be called but unable to reach with multiple attempts. Unable to reach staff at [facility] for collateral. Per pre-arrival note, patient has AMS. Patient attempting to walk out requiring security and sedation.

## 2024-04-26 NOTE — ED PROVIDER NOTE - CLINICAL SUMMARY MEDICAL DECISION MAKING FREE TEXT BOX
59 yo female, PMHx of diabetes, bipolar disorder, hypertension, and chronic back pain, presenting for evaluation sent from [facility]. Patient sent in for tachycardia and hypertension. Patient endorsed she was upset this morning and when they took her vitals, her HR and BP were elevated. At that time, patient had some chest pain and shortness of breath because she was upset but everything has subsided since then. Denies current chest pain, shortness of breath, nausea, vomiting, headache, dizziness, abdominal pain. Well appearing on exam. bilateral lower extremity edema 1+ without open wounds. Patient asked for her  to be called but unable to reach with multiple attempts. Unable to reach staff at [facility] for collateral. 59 yo female, PMHx of diabetes, bipolar disorder, hypertension, and chronic back pain, presenting for evaluation sent from [facility]. Patient sent in for tachycardia and hypertension. Patient endorsed she was upset this morning and when they took her vitals, her HR and BP were elevated. At that time, patient had some chest pain and shortness of breath because she was upset but everything has subsided since then. Denies current chest pain, shortness of breath, nausea, vomiting, headache, dizziness, abdominal pain. Well appearing on exam. bilateral lower extremity edema 1+ without open wounds. Patient asked for her  to be called but unable to reach with multiple attempts. Unable to reach staff at [facility] for collateral. Labs and EKG were ordered and reviewed.  Imaging was ordered but patient refused CXR stating she does not have chest pain. Independently interpreted by Keyonna Cross MD: EKG: NSR, no LORIE.    Appropriate medications for patient's presenting complaints were ordered and effects were reassessed.  Patient's records (prior hospital, ED visit, and/or nursing home notes if available) were reviewed.  Escalation to admission/observation was considered.  1) However patient feels much better and is comfortable with discharge.  Appropriate follow-up was arranged. 2) Patient requires inpatient hospitalization - monitored setting. 59 yo female, PMHx of diabetes, bipolar disorder, hypertension, and chronic back pain, presenting for evaluation sent from [facility]. Patient sent in for tachycardia and hypertension. Patient endorsed she was upset this morning and when they took her vitals, her HR and BP were elevated. At that time, patient had some chest pain and shortness of breath because she was upset but everything has subsided since then. Denies current chest pain, shortness of breath, nausea, vomiting, headache, dizziness, abdominal pain. Well appearing on exam. bilateral lower extremity edema 1+ without open wounds. Patient asked for her  to be called but unable to reach with multiple attempts. Unable to reach staff at [facility] for collateral. Labs and EKG were ordered and reviewed. XR interpretation: no evidence of acute cardiopulmonary disease  Imaging was ordered and reviewed. Independently interpreted by Keyonna Cross MD: EKG: NSR, no LORIE.    Appropriate medications for patient's presenting complaints were ordered and effects were reassessed.  Patient's records (prior hospital, ED visit, and/or nursing home notes if available) were reviewed.  Escalation to admission/observation was considered.  1) However patient feels much better and is comfortable with discharge.  Appropriate follow-up was arranged. 2) Patient requires inpatient hospitalization - monitored setting. 57 yo female, PMHx of diabetes, bipolar disorder, hypertension, and chronic back pain, presenting for evaluation sent from [facility]. Patient sent in for tachycardia and hypertension. Patient endorsed she was upset this morning and when they took her vitals, her HR and BP were elevated. At that time, patient had some chest pain and shortness of breath because she was upset but everything has subsided since then. Denies current chest pain, shortness of breath, nausea, vomiting, headache, dizziness, abdominal pain. Well appearing on exam. bilateral lower extremity edema 1+ without open wounds. Patient asked for her  to be called but unable to reach with multiple attempts. Unable to reach staff at [facility] for collateral. Per pre-arrival note, patient has AMS. Patient attempting to walk out requiring security and sedation. Labs and EKG were ordered and reviewed.  Imaging was ordered and reviewed by me.  Appropriate medications for patient's presenting complaints were ordered and effects were reassessed.  Patient's records (prior hospital, ED visit, and/or nursing home notes if available) were reviewed.  Escalation to admission/observation was considered. Patient requires inpatient hospitalization.

## 2024-04-26 NOTE — ED PROVIDER NOTE - PHYSICAL EXAMINATION
Constitutional: Well developed, well nourished. NAD  Head: Normocephalic, atraumatic.  Eyes: PERRL, EOMI.  ENT: No nasal discharge. Mucous membranes dry.  Neck: Supple. Painless ROM.  Cardiovascular: Regular rate and rhythm.    Pulmonary: Lungs clear to auscultation bilaterally. No wheezing, rales, or rhonchi.  Abdominal: Soft. Nondistended. No rebound, guarding, rigidity.  Extremities. Pelvis stable. No lower extremity edema, symmetric calves.  Skin: No rashes, cyanosis.  Neuro: AAOx3. No focal neurological deficits.  Psych: Normal mood. Normal affect.

## 2024-04-27 DIAGNOSIS — R41.0 DISORIENTATION, UNSPECIFIED: ICD-10-CM

## 2024-04-27 LAB
APAP SERPL-MCNC: <5 UG/ML — LOW (ref 10–30)
B-OH-BUTYR SERPL-SCNC: 0.8 MMOL/L — HIGH
ETHANOL SERPL-MCNC: <10 MG/DL — SIGNIFICANT CHANGE UP
GLUCOSE BLDC GLUCOMTR-MCNC: 192 MG/DL — HIGH (ref 70–99)
GLUCOSE BLDC GLUCOMTR-MCNC: 207 MG/DL — HIGH (ref 70–99)
HCG SERPL QL: NEGATIVE — SIGNIFICANT CHANGE UP
SALICYLATES SERPL-MCNC: <0.3 MG/DL — LOW (ref 4–30)

## 2024-04-27 PROCEDURE — 80048 BASIC METABOLIC PNL TOTAL CA: CPT

## 2024-04-27 PROCEDURE — 87661 TRICHOMONAS VAGINALIS AMPLIF: CPT

## 2024-04-27 PROCEDURE — 86850 RBC ANTIBODY SCREEN: CPT

## 2024-04-27 PROCEDURE — 71275 CT ANGIOGRAPHY CHEST: CPT | Mod: 26,MC

## 2024-04-27 PROCEDURE — 86300 IMMUNOASSAY TUMOR CA 15-3: CPT

## 2024-04-27 PROCEDURE — 87801 DETECT AGNT MULT DNA AMPLI: CPT

## 2024-04-27 PROCEDURE — 85610 PROTHROMBIN TIME: CPT

## 2024-04-27 PROCEDURE — 73562 X-RAY EXAM OF KNEE 3: CPT | Mod: 50

## 2024-04-27 PROCEDURE — 86301 IMMUNOASSAY TUMOR CA 19-9: CPT

## 2024-04-27 PROCEDURE — 72192 CT PELVIS W/O DYE: CPT | Mod: MC

## 2024-04-27 PROCEDURE — 87591 N.GONORRHOEAE DNA AMP PROB: CPT

## 2024-04-27 PROCEDURE — 88305 TISSUE EXAM BY PATHOLOGIST: CPT

## 2024-04-27 PROCEDURE — 77012 CT SCAN FOR NEEDLE BIOPSY: CPT

## 2024-04-27 PROCEDURE — A9579: CPT

## 2024-04-27 PROCEDURE — 74177 CT ABD & PELVIS W/CONTRAST: CPT | Mod: MC

## 2024-04-27 PROCEDURE — 84443 ASSAY THYROID STIM HORMONE: CPT

## 2024-04-27 PROCEDURE — 86900 BLOOD TYPING SEROLOGIC ABO: CPT

## 2024-04-27 PROCEDURE — 84439 ASSAY OF FREE THYROXINE: CPT

## 2024-04-27 PROCEDURE — 72197 MRI PELVIS W/O & W/DYE: CPT | Mod: MC

## 2024-04-27 PROCEDURE — 82378 CARCINOEMBRYONIC ANTIGEN: CPT

## 2024-04-27 PROCEDURE — 97162 PT EVAL MOD COMPLEX 30 MIN: CPT | Mod: GP

## 2024-04-27 PROCEDURE — 87491 CHLMYD TRACH DNA AMP PROBE: CPT

## 2024-04-27 PROCEDURE — 82105 ALPHA-FETOPROTEIN SERUM: CPT

## 2024-04-27 PROCEDURE — 78306 BONE IMAGING WHOLE BODY: CPT | Mod: MC

## 2024-04-27 PROCEDURE — 82232 ASSAY OF BETA-2 PROTEIN: CPT

## 2024-04-27 PROCEDURE — 88342 IMHCHEM/IMCYTCHM 1ST ANTB: CPT

## 2024-04-27 PROCEDURE — 88341 IMHCHEM/IMCYTCHM EA ADD ANTB: CPT

## 2024-04-27 PROCEDURE — 87086 URINE CULTURE/COLONY COUNT: CPT

## 2024-04-27 PROCEDURE — 84165 PROTEIN E-PHORESIS SERUM: CPT

## 2024-04-27 PROCEDURE — 88333 PATH CONSLTJ SURG CYTO XM 1: CPT

## 2024-04-27 PROCEDURE — 97116 GAIT TRAINING THERAPY: CPT | Mod: GP

## 2024-04-27 PROCEDURE — 82962 GLUCOSE BLOOD TEST: CPT

## 2024-04-27 PROCEDURE — 86901 BLOOD TYPING SEROLOGIC RH(D): CPT

## 2024-04-27 PROCEDURE — 85025 COMPLETE CBC W/AUTO DIFF WBC: CPT

## 2024-04-27 PROCEDURE — 83615 LACTATE (LD) (LDH) ENZYME: CPT

## 2024-04-27 PROCEDURE — 83036 HEMOGLOBIN GLYCOSYLATED A1C: CPT

## 2024-04-27 PROCEDURE — 99222 1ST HOSP IP/OBS MODERATE 55: CPT

## 2024-04-27 PROCEDURE — 86334 IMMUNOFIX E-PHORESIS SERUM: CPT

## 2024-04-27 PROCEDURE — 70450 CT HEAD/BRAIN W/O DYE: CPT | Mod: 26,MC

## 2024-04-27 PROCEDURE — 85730 THROMBOPLASTIN TIME PARTIAL: CPT

## 2024-04-27 PROCEDURE — 80053 COMPREHEN METABOLIC PANEL: CPT

## 2024-04-27 PROCEDURE — A9503: CPT

## 2024-04-27 PROCEDURE — 20225 BONE BIOPSY TROCAR/NDL DEEP: CPT | Mod: LT

## 2024-04-27 PROCEDURE — 83735 ASSAY OF MAGNESIUM: CPT

## 2024-04-27 PROCEDURE — 97530 THERAPEUTIC ACTIVITIES: CPT | Mod: GP

## 2024-04-27 PROCEDURE — C1830: CPT

## 2024-04-27 PROCEDURE — 78803 RP LOCLZJ TUM SPECT 1 AREA: CPT

## 2024-04-27 PROCEDURE — 87798 DETECT AGENT NOS DNA AMP: CPT

## 2024-04-27 PROCEDURE — 83521 IG LIGHT CHAINS FREE EACH: CPT

## 2024-04-27 PROCEDURE — 84155 ASSAY OF PROTEIN SERUM: CPT

## 2024-04-27 PROCEDURE — 86304 IMMUNOASSAY TUMOR CA 125: CPT

## 2024-04-27 PROCEDURE — 81001 URINALYSIS AUTO W/SCOPE: CPT

## 2024-04-27 PROCEDURE — 36415 COLL VENOUS BLD VENIPUNCTURE: CPT

## 2024-04-27 RX ORDER — LOSARTAN POTASSIUM 100 MG/1
50 TABLET, FILM COATED ORAL DAILY
Refills: 0 | Status: DISCONTINUED | OUTPATIENT
Start: 2024-04-27 | End: 2024-05-10

## 2024-04-27 RX ORDER — SODIUM CHLORIDE 9 MG/ML
1000 INJECTION, SOLUTION INTRAVENOUS
Refills: 0 | Status: DISCONTINUED | OUTPATIENT
Start: 2024-04-27 | End: 2024-05-10

## 2024-04-27 RX ORDER — GLUCAGON INJECTION, SOLUTION 0.5 MG/.1ML
1 INJECTION, SOLUTION SUBCUTANEOUS ONCE
Refills: 0 | Status: DISCONTINUED | OUTPATIENT
Start: 2024-04-27 | End: 2024-05-10

## 2024-04-27 RX ORDER — INSULIN LISPRO 100/ML
25 VIAL (ML) SUBCUTANEOUS
Refills: 0 | Status: DISCONTINUED | OUTPATIENT
Start: 2024-04-27 | End: 2024-04-30

## 2024-04-27 RX ORDER — NICOTINE POLACRILEX 2 MG
1 GUM BUCCAL DAILY
Refills: 0 | Status: DISCONTINUED | OUTPATIENT
Start: 2024-04-27 | End: 2024-05-01

## 2024-04-27 RX ORDER — INSULIN LISPRO 100/ML
VIAL (ML) SUBCUTANEOUS
Refills: 0 | Status: DISCONTINUED | OUTPATIENT
Start: 2024-04-27 | End: 2024-05-10

## 2024-04-27 RX ORDER — DEXTROSE 50 % IN WATER 50 %
12.5 SYRINGE (ML) INTRAVENOUS ONCE
Refills: 0 | Status: DISCONTINUED | OUTPATIENT
Start: 2024-04-27 | End: 2024-05-10

## 2024-04-27 RX ORDER — METOPROLOL TARTRATE 50 MG
50 TABLET ORAL
Refills: 0 | Status: DISCONTINUED | OUTPATIENT
Start: 2024-04-27 | End: 2024-05-10

## 2024-04-27 RX ORDER — INSULIN GLARGINE 100 [IU]/ML
45 INJECTION, SOLUTION SUBCUTANEOUS AT BEDTIME
Refills: 0 | Status: DISCONTINUED | OUTPATIENT
Start: 2024-04-27 | End: 2024-05-10

## 2024-04-27 RX ORDER — LEVOTHYROXINE SODIUM 125 MCG
50 TABLET ORAL DAILY
Refills: 0 | Status: DISCONTINUED | OUTPATIENT
Start: 2024-04-27 | End: 2024-05-07

## 2024-04-27 RX ORDER — ENOXAPARIN SODIUM 100 MG/ML
40 INJECTION SUBCUTANEOUS EVERY 24 HOURS
Refills: 0 | Status: DISCONTINUED | OUTPATIENT
Start: 2024-04-27 | End: 2024-05-08

## 2024-04-27 RX ORDER — DEXTROSE 50 % IN WATER 50 %
15 SYRINGE (ML) INTRAVENOUS ONCE
Refills: 0 | Status: DISCONTINUED | OUTPATIENT
Start: 2024-04-27 | End: 2024-05-10

## 2024-04-27 RX ORDER — DEXTROSE 10 % IN WATER 10 %
125 INTRAVENOUS SOLUTION INTRAVENOUS ONCE
Refills: 0 | Status: DISCONTINUED | OUTPATIENT
Start: 2024-04-27 | End: 2024-05-10

## 2024-04-27 RX ORDER — DIPHENHYDRAMINE HCL 50 MG
50 CAPSULE ORAL ONCE
Refills: 0 | Status: COMPLETED | OUTPATIENT
Start: 2024-04-27 | End: 2024-04-27

## 2024-04-27 RX ORDER — DEXTROSE 50 % IN WATER 50 %
25 SYRINGE (ML) INTRAVENOUS ONCE
Refills: 0 | Status: DISCONTINUED | OUTPATIENT
Start: 2024-04-27 | End: 2024-05-10

## 2024-04-27 RX ORDER — BACLOFEN 100 %
5 POWDER (GRAM) MISCELLANEOUS EVERY 8 HOURS
Refills: 0 | Status: DISCONTINUED | OUTPATIENT
Start: 2024-04-27 | End: 2024-05-10

## 2024-04-27 RX ADMIN — Medication 50 MILLIGRAM(S): at 13:39

## 2024-04-27 RX ADMIN — Medication 4: at 17:58

## 2024-04-27 RX ADMIN — Medication 25 UNIT(S): at 17:58

## 2024-04-27 RX ADMIN — Medication 150 MILLIGRAM(S): at 22:35

## 2024-04-27 RX ADMIN — LOSARTAN POTASSIUM 50 MILLIGRAM(S): 100 TABLET, FILM COATED ORAL at 13:39

## 2024-04-27 RX ADMIN — Medication 50 MILLIGRAM(S): at 00:40

## 2024-04-27 RX ADMIN — HALOPERIDOL DECANOATE 5 MILLIGRAM(S): 100 INJECTION INTRAMUSCULAR at 00:00

## 2024-04-27 RX ADMIN — Medication 50 MILLIGRAM(S): at 17:57

## 2024-04-27 RX ADMIN — Medication 2 MILLIGRAM(S): at 00:00

## 2024-04-27 RX ADMIN — Medication 150 MILLIGRAM(S): at 13:39

## 2024-04-27 RX ADMIN — INSULIN GLARGINE 45 UNIT(S): 100 INJECTION, SOLUTION SUBCUTANEOUS at 22:00

## 2024-04-27 RX ADMIN — Medication 1 PATCH: at 22:37

## 2024-04-27 NOTE — H&P ADULT - ATTENDING COMMENTS
Medicine Attending Addendum  Patient was seen and examined with medicine team.  Nursing records reviewed. I agree with the resident/PA/NP's note including past medical history, home medications, social history, allergies, surgical history, family history, and review of system. I have reviewed relevant vitals, laboratory values, imaging studies, and microbiology.   - Above resident's note was edited by me  - rest of A/P as per detailed housestaff note above except above modifications

## 2024-04-27 NOTE — BH CONSULTATION LIAISON ASSESSMENT NOTE - HPI (INCLUDE ILLNESS QUALITY, SEVERITY, DURATION, TIMING, CONTEXT, MODIFYING FACTORS, ASSOCIATED SIGNS AND SYMPTOMS)
Patient is a 57yo woman,  w/ one adult daughter (lives in [place]), [place] [facility], unemployed, w/ PMHx of IDDM with neuropathy, hypertension, hyperlipidemia, hypothyroidism, recent fall with R fibula fracture in Feb 2024 requiring ongoing rehab, and w/ PPHx of Bipolar d/o and substance use (cocaine), hx of 3 prior inpatient psychiatric hospitalizations (last at [facility] in Nov 2022 for psychosis), denies any prior NSSIB/SI/SA/HI/HA, not currently on psychotropic medication for mood or psychosis (previously on Abilify), followed [facility] (patient cannot recall name), who was BIB EMS sent by her rehab for hypertension, tachycardia, and feelings of "being overwhelmed". Psychiatry was consulted for medication recommendations.    On approach patient was lying in bed but was amenable to being interviewed. Patient had constant observer present. Patient was calm, cooperative, and alert and oriented to self, location, and situation. Patient states that she is "frustrated" about rehab process and going to assisted living soon because her "sessions are over". Patient endorses feeling "lonely" because her daughter and grandchildren live in [place] and her  is currently up there with them, leaving her alone in the rehab facility. Patient states that she used to be on "psychiatric meds" but cannot recall what they were or what theory were prescribed for. Patient states she does believe they may have helped, but currently "I don't want any psychiatric medications, I don't think I need them". Discussed benefits and risks of mood stabilizing medication in the long term treatment of bipolar disorder and patient stated that she could seek help if needed and that she would follow up with her outpatient provider at the rehab (who she cannot recall name).    On psychiatric ROS, patient is currently denying thoughts of suicide and is reporting their mood to be "frustrated". Patient is denying thoughts of wanting to harm other people or homicide. Patient is sleeping and eating okay. Patient is not endorsing anxiety at this time. Patient is not endorsing symptoms of carlton or bipolar disorder at this time. Patient is not endorsing symptoms of PTSD at this time. Patient is not endorsing symptoms of psychosis at this time, specifically denying audio or visual hallucinations, and feelings of paranoia or distressing thoughts.

## 2024-04-27 NOTE — BH CONSULTATION LIAISON ASSESSMENT NOTE - SUMMARY
Patient is a 59yo woman,  w/ one adult daughter (lives in [place]), [facility], unemployed, w/ PMHx of IDDM with neuropathy, hypertension, hyperlipidemia, hypothyroidism, recent fall with R fibula fracture in Feb 2024 requiring ongoing rehab, and w/ PPHx of Bipolar d/o and substance use (cocaine), hx of 3 prior inpatient psychiatric hospitalizations (last at [facility] in Nov 2022 for psychosis), denies any prior NSSIB/SI/SA/HI/HA, not currently on psychotropic medication for mood or psychosis (previously on abilify), followed by outpatient psychiatrist at [facility] (patient cannot recall name), who was BIB EMS sent by her rehab for hypertension, tachycardia, and feelings of "being overwhelmed". Psychiatry was consulted for medication recommendations.    Upon assessment, patient is linear, cooperative, AOx3. Patient reports some mild frustration and irritability in the context of ongoing rehab and associated pains, family being in [place] (including ), and not being thrilled with going to assisted living after rehab. But patient denies thoughts of self harm or suicide. Patient is future-oriented, hopeful, has many protective factors as listed below. Although patient has had prior inpatient psychiatric hospitalizations, patient is not currently suicidal, homicidal, manic, psychotic. In regards to starting patient on mood stabilizer or antidepressant, patient is reporting fair mood and has not endorsed any recent symptoms concerning of decompensation of previously diagnosed bipolar or other mood disorders, and so restarting Abilify or other mood stabilizer is not recommended currently as she has not been taking medication for over a year and has not had a recurrence of symptoms. Patient agreeable to outpatient psychiatry services and is connected at her rehab. Patient does not meet criteria for inpatient psychiatric hospitalization at this time, and does not require constant observation. No psychiatric contraindications to discharge.  psychiatry will sign off on this patient, please feel free to call back with any questions.     Plan  - no IPP recommended at this time  - no mood stabilizer recommended at this time (was previously on Abilify), patient not willing and not overtly symptomatic  	- if patient changes mind, can consider offering Abilify 5mg PO daily or Risperidone 0.5mg PO BID   - please call back  psychiatry PRN, no psychiatric contraindications to d/c

## 2024-04-27 NOTE — BH CONSULTATION LIAISON ASSESSMENT NOTE - NSBHCONSULTMEDPRNREASON_PSY_A_CORE
Contacted pt in regards to getting his EMG scheduled for his extremity weakness. I Scheduled pt to complete EMG on March 13 at 8:00 am and pt also stated that he will need his Oxycodone called into the [facility] in [place]. I stated to pt that I will let Negra know. Pt voiced understanding    other prn reason...

## 2024-04-27 NOTE — BH CONSULTATION LIAISON ASSESSMENT NOTE - NSBHREFERDETAILS_PSY_A_CORE_FT
Consult for “patient w PMH of mood disorder w numerous IPP placements, NOT on medication/does not know what medications she takes. Please assess for possible mood stabilizer”

## 2024-04-27 NOTE — H&P ADULT - NSHPLABSRESULTS_GEN_ALL_CORE
Complete Blood Count + Automated Diff (04.26.24 @ 21:27)   WBC Count: 7.47 K/uL  RBC Count: 4.55 M/uL  Hemoglobin: 13.4 g/dL  Hematocrit: 38.9 %  Mean Cell Volume: 85.5 fL  Mean Cell Hemoglobin: 29.5 pg  Mean Cell Hemoglobin Conc: 34.4 g/dL  Red Cell Distrib Width: 14.0 %  Platelet Count - Automated: 203 K/uL  MPV: 9.7 fL  Auto Neutrophil #: 4.83 K/uL  Auto Lymphocyte #: 1.82 K/uL  Auto Monocyte #: 0.68 K/uL  Auto Eosinophil #: 0.10 K/uL  Auto Basophil #: 0.02 K/uL  Auto Neutrophil %: 64.6:   Auto Lymphocyte %: 24.4 %  Auto Monocyte %: 9.1 %  Auto Eosinophil %: 1.3 %  Auto Basophil %: 0.3 %  Auto Immature Granulocyte %: 0.3:     Comprehensive Metabolic Panel (04.26.24 @ 21:27)   Sodium: 138 mmol/L  Potassium: 3.6: Slighty Hemolyzed use with Caution mmol/L  Chloride: 101 mmol/L  Carbon Dioxide: 19 mmol/L  Anion Gap: 18 mmol/L  Blood Urea Nitrogen: 15 mg/dL  Creatinine: 0.5 mg/dL  Glucose: 357 mg/dL  Calcium: 10.3 mg/dL  Protein Total: 7.7 g/dL  Albumin: 4.4 g/dL  Bilirubin Total: 0.8 mg/dL  Alkaline Phosphatase: 163 U/L  Aspartate Aminotransferase (AST/SGOT): 21: Hemolyzed. Interpret with caution U/L  Alanine Aminotransferase (ALT/SGPT): 17 U/L  eGFR: 108:    Troponin T, High Sensitivity Result: 9:  Magnesium: 2.1 mg/dL  Pro-Brain Natriuretic Peptide: 326 pg/mL  D-Dimer Assay, Quantitative: 414:    < from: CT Angio Chest PE Protocol w/ IV Cont (04.27.24 @ 02:47) >    IMPRESSION:    No pulmonary embolism. No acute thoracic pathology.    Multinodular enlargedthyroid gland with largest nodule measuring up to   2.4 cm on the right. An outpatient thyroid ultrasound may be performed if   clinically indicated.    < end of copied text >    < from: CT Head No Cont (04.27.24 @ 02:30) >    IMPRESSION:    No acute intracranial pathology.    < end of copied text >

## 2024-04-27 NOTE — H&P ADULT - NSHPREVIEWOFSYSTEMS_GEN_ALL_CORE
REVIEW OF SYSTEMS:    CONSTITUTIONAL: No weakness, fevers or chills  EYES/ENT: No visual changes;  No vertigo or throat pain   NECK: No pain or stiffness  RESPIRATORY: No cough, wheezing, hemoptysis; SOB RESOLVED   CARDIOVASCULAR: No chest pain or palpitations  GASTROINTESTINAL: No epigastric pain. No nausea, vomiting, or hematemesis; No diarrhea or constipation. No melena or hematochezia.  GENITOURINARY: No dysuria, frequency or hematuria  NEUROLOGICAL: No numbness or weakness  SKIN: No itching, rashes

## 2024-04-27 NOTE — BH CONSULTATION LIAISON ASSESSMENT NOTE - CURRENT MEDICATION
MEDICATIONS  (STANDING):  dextrose 10% Bolus 125 milliLiter(s) IV Bolus once  dextrose 5%. 1000 milliLiter(s) (100 mL/Hr) IV Continuous <Continuous>  dextrose 5%. 1000 milliLiter(s) (50 mL/Hr) IV Continuous <Continuous>  dextrose 50% Injectable 25 Gram(s) IV Push once  dextrose 50% Injectable 12.5 Gram(s) IV Push once  enoxaparin Injectable 40 milliGRAM(s) SubCutaneous every 24 hours  glucagon  Injectable 1 milliGRAM(s) IntraMuscular once  insulin glargine Injectable (LANTUS) 45 Unit(s) SubCutaneous at bedtime  insulin lispro (ADMELOG) corrective regimen sliding scale   SubCutaneous three times a day before meals  insulin lispro Injectable (ADMELOG) 25 Unit(s) SubCutaneous three times a day before meals  levothyroxine 50 MICROGram(s) Oral daily  losartan 50 milliGRAM(s) Oral daily  metoprolol tartrate 50 milliGRAM(s) Oral two times a day  nicotine -  14 mG/24Hr(s) Patch 1 Patch Transdermal daily  pregabalin 150 milliGRAM(s) Oral every 8 hours    MEDICATIONS  (PRN):  baclofen 5 milliGRAM(s) Oral every 8 hours PRN Musculoskeletal Pain  dextrose Oral Gel 15 Gram(s) Oral once PRN Blood Glucose LESS THAN 70 milliGRAM(s)/deciliter

## 2024-04-27 NOTE — ED ADULT NURSE NOTE - CHIEF COMPLAINT QUOTE
Patient sent in from [facility] due to hypertension and tachycardia. Patient denies chest pain, SOB, headache

## 2024-04-27 NOTE — H&P ADULT - NSHPPHYSICALEXAM_GEN_ALL_CORE
Limited PE, patient laying still w eyes closed, reclined to sit up or roll over so unable to properly assess lungs, spine or CVA     T(C): 36.7 (04-27-24 @ 11:55), Max: 37 (04-27-24 @ 00:43)  HR: 108 (04-27-24 @ 11:55) (94 - 120)  BP: 160/70 (04-27-24 @ 11:55) (140/74 - 184/104)  RR: 18 (04-27-24 @ 11:55) (16 - 20)  SpO2: 98% (04-27-24 @ 11:55) (97% - 100%)    CONSTITUTIONAL: Well groomed, no apparent distress, laying flat in bed, still, keeps eyes closed as much as possible.   EYES: No conjunctival or scleral injection, non-icteric  ENMT: Oral mucosa with moist membranes. Normal dentition; no pharyngeal injection or exudates             NECK: Supple, symmetric and without tracheal deviation, palpable Thiroid nodule, non tender, tyroid mobile    RESP: No respiratory distress, no use of accessory muscles  CV: tachycardia (manual to 104), no murmur or gallop, peripheral pulses intact, BL dorsalis pedis   GI: soft, ND, no rebound, no guarding;  no hepatosplenomegaly; mild suprapubic tenderness (patient reports needing to void)   LYMPH: No cervical LAD or tenderness  MSK:  No digital clubbing or cyanosis; able to move all 4 limbs, declining to do hand squeeze, lift legs or dosiflex   SKIN: No rashes or ulcers noted; no subcutaneous nodules or induration palpable  NEURO: decreased sensation in BL LE   PSYCH: A&Ox4, did not participate in physical exam, poor historian and recent memory (didn't recall hospitalization last month)

## 2024-04-27 NOTE — BH CONSULTATION LIAISON ASSESSMENT NOTE - NSBHCHARTREVIEWVS_PSY_A_CORE FT
OB/GYN
Vital Signs Last 24 Hrs  T(C): 36.7 (27 Apr 2024 11:55), Max: 37 (27 Apr 2024 00:43)  T(F): 98 (27 Apr 2024 11:55), Max: 98.6 (27 Apr 2024 00:43)  HR: 108 (27 Apr 2024 11:55) (94 - 120)  BP: 160/70 (27 Apr 2024 11:55) (140/74 - 184/104)  BP(mean): 101 (27 Apr 2024 05:43) (101 - 101)  RR: 18 (27 Apr 2024 11:55) (16 - 20)  SpO2: 98% (27 Apr 2024 11:55) (97% - 100%)    Parameters below as of 27 Apr 2024 05:43  Patient On (Oxygen Delivery Method): room air

## 2024-04-27 NOTE — BH CONSULTATION LIAISON ASSESSMENT NOTE - OTHER PAST PSYCHIATRIC HISTORY (INCLUDE DETAILS REGARDING ONSET, COURSE OF ILLNESS, INPATIENT/OUTPATIENT TREATMENT)
Patient reports she has been hospitalized in inpatient psychiatric setting three times. Once in November 2022 and two other times after, last in 2023 [place]. Patient reports dx of bipolar disorder, however, did not follow up outpatient and denies medication compliance post discharge. Reports rehab at [place] after first inpatient hospitalization in Nov 2022.

## 2024-04-27 NOTE — H&P ADULT - ASSESSMENT
Ms Godinez is a 58 VICK w a PMH of HTN, HLD, IDDM noncompliant with her insulin, neuropathy, HTN, hypothyroidism, mood disorder with 3 prior inpatient psychiatric hospitalizations, recent admission from 2/1 - 2/6 for DKA and a mechanical fall in March 2024 presents to the ED w "feeling overwhelmed" and associated SOB. In the ED vitals notable for /79 w , SpO2 97% on RA. Labs notable for Trop 9, Dimer of 414, gluc of 375 w AG of 18 and ProBNP of 326. CT PE protocol negative for PE but showed at 2.4 cm nodule in a multinodular thyroid. Patient aware of nodule and states it has never been worked up because "its never caused me any problems". Patient denies chest pain/pressure, diaphroesis, neck/jaw/arm pain, cough, fever, rash, HA, vertiog, N/V, diarrhea, weakness, recent travel or change in medications. Patient is a poor historian and does not know if she had any recent changes in medication, vaccinations or hospitalizations. Did not recall her past admission for fall.  Patient admitted for observation and further assessment.     Problem List:  #SOB RESOLVED   #'overwhelming feeling" RESOLVED   #Ho nicotine use   #Ho HTN  #Ho DLD  #Ho mood disorder (currently in patient psych)  given troponin negative, CT PE protocol negative for PE/consolidations/effusion, Trace atalectasis reported but not seen more likely anxiety driven then physical cause  SpO2 97% on RA  Patient reports being very displeased w her Psych facility and that they had to 4 point restrain her  no leukocytosis  Dimer 414  ProBNP 326, clinically euvolemic  vitally stable  -No Abx or supplimentary O2 at this time  -No suspicion Pneumonia  -PE r/o by CT and clinical   -tachycardia on presentation of 112 and BP of 175/79 likely due to anxiety  -restart home medications w Met succinate change to tartrate BID  Met Tar 50mg PO BID  Lostartan 50mg once daily  -nicotine patch 14mg once daily ordered  -if SOB returns please obtain CXR   -if desaturation please obtain rainbow labs and ABG  -patient does not recall what her medications are or if she takes any mood stabilizers  -will need out patient psych for mood stabilizers   -f/u CBC, CMP once daily  -if antipsych started get EKG for QTc baseline and monitoring q48hr           #IDDM  #Ho peripheral neuropathy  #Ho DKA   per chart poor compliance, per patient she takes all her meds every day   -c/w home Lantus 45U at bedtime  -c/w Lispro 20mg TID  -jardiance 25 on hold  -moderate dose sliding scale  -patient does not recall any hypoglycemic events BUT is poor historian     #2.4 cm thyroid nodule  #multinodular thyroid  #Ho Hypothyroid  -out patient endocrine referral for nodular w/u   -c/w Home levothyroxine 50mg once daily    Misc:  DVT proph: Lovenox 40 once daily  GI Proph: NA  Diet: Carb consist, DASH/TLC  activity: as tolerated  dispo: stable    CODE: FULL  Ms Godinez is a 58 VICK w a PMH of HTN, HLD, IDDM noncompliant with her insulin, neuropathy, HTN, hypothyroidism, mood disorder with 3 prior inpatient psychiatric hospitalizations, recent admission from 2/1 - 2/6 for DKA and a mechanical fall in March 2024 presents to the ED w "feeling overwhelmed" and associated SOB. In the ED vitals notable for /79 w , SpO2 97% on RA. Labs notable for Trop 9, Dimer of 414, gluc of 375 w AG of 18 and ProBNP of 326. CT PE protocol negative for PE but showed at 2.4 cm nodule in a multinodular thyroid. Patient aware of nodule and states it has never been worked up because "its never caused me any problems". Patient denies chest pain/pressure, diaphroesis, neck/jaw/arm pain, cough, fever, rash, HA, vertiog, N/V, diarrhea, weakness, recent travel or change in medications. Patient is a poor historian and does not know if she had any recent changes in medication, vaccinations or hospitalizations. Did not recall her past admission for fall.  Patient admitted for observation and further assessment.     Problem List:  #SOB RESOLVED   #'overwhelming feeling" RESOLVED   #Ho nicotine use   #Ho HTN  #Ho DLD  #Ho mood disorder (currently in patient psych)  given troponin negative, CT PE protocol negative for PE/consolidations/effusion, Trace atalectasis reported but not seen more likely anxiety driven then physical cause  SpO2 97% on RA  Patient reports being very displeased w her Psych facility and that they had to 4 point restrain her  no leukocytosis  Dimer 414  ProBNP 326, clinically euvolemic  vitally stable  -No Abx or supplimentary O2 at this time  -No suspicion Pneumonia  -PE r/o by CT and clinical   -tachycardia on presentation of 112 and BP of 175/79 likely due to anxiety  -restart home medications w Met succinate change to tartrate BID  Met Tar 50mg PO BID  Lostartan 50mg once daily  -nicotine patch 14mg once daily ordered  -if SOB returns please obtain CXR   -if desaturation please obtain rainbow labs and ABG  -patient does not recall what her medications are or if she takes any mood stabilizers  -will need out patient psych for mood stabilizers   -f/u CBC, CMP once daily  -if antipsych started get EKG for QTc baseline and monitoring q48hr           #IDDM  #AG 18  #Ho peripheral neuropathy  #Ho DKA   per chart poor compliance, per patient she takes all her meds every day   -c/w home Lantus 45U at bedtime  -c/w Lispro 20mg TID  -jardiance 25 on hold  -moderate dose sliding scale  -patient does not recall any hypoglycemic events BUT is poor historian   -f/u Beta hydroxy (low suspicion DKA given type II DM and no acidosis but given Gluc>300 will assess)     #2.4 cm thyroid nodule  #multinodular thyroid  #Ho Hypothyroid  -out patient endocrine referral for nodular w/u   -c/w Home levothyroxine 50mg once daily    Misc:  DVT proph: Lovenox 40 once daily  GI Proph: NA  Diet: Carb consist, DASH/TLC  activity: as tolerated  dispo: stable    CODE: FULL  Ms Godinez is a 58 VICK w a PMH of HTN, HLD, IDDM noncompliant with her insulin, neuropathy, HTN, hypothyroidism, mood disorder with 3 prior inpatient psychiatric hospitalizations, recent admission from 2/1 - 2/6 for DKA and a mechanical fall in March 2024 presents to the ED w "feeling overwhelmed" and associated SOB. In the ED vitals notable for /79 w , SpO2 97% on RA. Labs notable for Trop 9, Dimer of 414, gluc of 375 w AG of 18 and ProBNP of 326. CT PE protocol negative for PE but showed at 2.4 cm nodule in a multinodular thyroid. Patient aware of nodule and states it has never been worked up because "its never caused me any problems". Patient denies chest pain/pressure, diaphroesis, neck/jaw/arm pain, cough, fever, rash, HA, vertiog, N/V, diarrhea, weakness, recent travel or change in medications. Patient is a poor historian and does not know if she had any recent changes in medication, vaccinations or hospitalizations. Did not recall her past admission for fall.  Patient admitted for observation and further assessment.     Problem List:  #SOB, RESOLVED   #'overwhelming feeling," RESOLVED   #Ho nicotine use   #Ho HTN  #Ho DLD  #Ho mood disorder (currently in patient psych)  given troponin negative, CT PE protocol negative for PE/consolidations/effusion, Trace atalectasis reported but not seen more likely anxiety driven then physical cause  SpO2 97% on RA  Patient reports being very displeased w her Psych facility and that they had to 4 point restrain her  no leukocytosis  Dimer 414  ProBNP 326, clinically euvolemic  vitally stable  -No Abx or supplimentary O2 at this time  -No suspicion Pneumonia  -PE r/o by CT and clinical   -tachycardia on presentation of 112 and BP of 175/79 likely due to anxiety  -restart home medications w Met succinate change to tartrate BID  Met Tar 50mg PO BID  Lostartan 50mg once daily  -nicotine patch 14mg once daily ordered  -if SOB returns please obtain CXR   -if desaturation please obtain rainbow labs and ABG  -patient does not recall what her medications are or if she takes any mood stabilizers  -will need out patient psych for mood stabilizers   -f/u CBC, CMP once daily  -if antipsych started get EKG for QTc baseline and monitoring q48hr     #IDDM  #AG 18  #Ho peripheral neuropathy  #Ho DKA   per chart poor compliance, per patient she takes all her meds every day   -c/w home Lantus 45U at bedtime  -c/w Lispro 20mg TID  -jardiance 25 on hold  -moderate dose sliding scale  -patient does not recall any hypoglycemic events BUT is poor historian   -f/u Beta hydroxy (low suspicion DKA given type II DM and no acidosis but given Gluc>300 will assess)     #2.4 cm thyroid nodule  #multinodular thyroid  #Ho Hypothyroid  -out patient endocrine referral for nodular w/u   -c/w Home levothyroxine 50mg once daily    Misc:  DVT proph: Lovenox 40 once daily  GI Proph: NA  Diet: Carb consist, DASH/TLC  activity: as tolerated  dispo: stable    CODE: FULL

## 2024-04-27 NOTE — H&P ADULT - NSICDXPASTMEDICALHX_GEN_ALL_CORE_FT
PAST MEDICAL HISTORY:  Anemia     Diabetes mellitus     Fibroid uterus     H/O diabetic neuropathy     History of mood disorder     Hypertension     Thyroid disease

## 2024-04-27 NOTE — H&P ADULT - HISTORY OF PRESENT ILLNESS
Ms Godinez is a 58 VICK w a PMH of HTN, HLD, IDDM noncompliant with her insulin, neuropathy, HTN, hypothyroidism, mood disorder with 3 prior inpatient psychiatric hospitalizations, recent admission from 2/1 - 2/6 for DKA and a mechanical fall in March 2024 presents to the ED w chest pain and associated SOB. In the ED vitals notable for /79 w , SpO2 97% on RA. Labs notable for Trop 9, Dimer of 414, gluc of 375 w AG of 18 and ProBNP of 326. CT PE protocol negative for PE but showed at 2.4 cm nodule in a multinodular thyroid. Patient admitted for observation and further assessment.  Ms Godinez is a 58 VICK w a PMH of HTN, HLD, IDDM noncompliant with her insulin, neuropathy, HTN, hypothyroidism, mood disorder with 3 prior inpatient psychiatric hospitalizations, recent admission from 2/1 - 2/6 for DKA and a mechanical fall in March 2024 presents to the ED w "feeling overwhelmed" and associated SOB. In the ED vitals notable for /79 w , SpO2 97% on RA. Labs notable for Trop 9, Dimer of 414, gluc of 375 w AG of 18 and ProBNP of 326. CT PE protocol negative for PE but showed at 2.4 cm nodule in a multinodular thyroid. Patient aware of nodule and states it has never been worked up because "its never caused me any problems". Patient denies chest pain/pressure, diaphroesis, neck/jaw/arm pain, cough, fever, rash, HA, vertiog, N/V, diarrhea, weakness, recent travel or change in medications. Patient is a poor historian and does not know if she had any recent changes in medication, vaccinations or hospitalizations. Did not recall her past admission for fall.  Patient admitted for observation and further assessment.

## 2024-04-28 LAB
GLUCOSE BLDC GLUCOMTR-MCNC: 142 MG/DL — HIGH (ref 70–99)
GLUCOSE BLDC GLUCOMTR-MCNC: 176 MG/DL — HIGH (ref 70–99)
GLUCOSE BLDC GLUCOMTR-MCNC: 217 MG/DL — HIGH (ref 70–99)
GLUCOSE BLDC GLUCOMTR-MCNC: 249 MG/DL — HIGH (ref 70–99)
GLUCOSE BLDC GLUCOMTR-MCNC: 66 MG/DL — LOW (ref 70–99)

## 2024-04-28 PROCEDURE — 99232 SBSQ HOSP IP/OBS MODERATE 35: CPT

## 2024-04-28 RX ORDER — HYDROXYZINE HCL 10 MG
50 TABLET ORAL ONCE
Refills: 0 | Status: COMPLETED | OUTPATIENT
Start: 2024-04-28 | End: 2024-04-28

## 2024-04-28 RX ORDER — NYSTATIN CREAM 100000 [USP'U]/G
1 CREAM TOPICAL
Refills: 0 | Status: DISCONTINUED | OUTPATIENT
Start: 2024-04-28 | End: 2024-05-10

## 2024-04-28 RX ADMIN — INSULIN GLARGINE 45 UNIT(S): 100 INJECTION, SOLUTION SUBCUTANEOUS at 21:51

## 2024-04-28 RX ADMIN — Medication 50 MILLIGRAM(S): at 21:59

## 2024-04-28 RX ADMIN — Medication 4: at 08:07

## 2024-04-28 RX ADMIN — NYSTATIN CREAM 1 APPLICATION(S): 100000 CREAM TOPICAL at 22:11

## 2024-04-28 RX ADMIN — Medication 150 MILLIGRAM(S): at 06:11

## 2024-04-28 RX ADMIN — Medication 1 PATCH: at 19:38

## 2024-04-28 RX ADMIN — Medication 4: at 17:27

## 2024-04-28 RX ADMIN — Medication 150 MILLIGRAM(S): at 21:48

## 2024-04-28 RX ADMIN — Medication 50 MILLIGRAM(S): at 06:09

## 2024-04-28 RX ADMIN — Medication 150 MILLIGRAM(S): at 13:44

## 2024-04-28 RX ADMIN — Medication 25 UNIT(S): at 08:07

## 2024-04-28 RX ADMIN — LOSARTAN POTASSIUM 50 MILLIGRAM(S): 100 TABLET, FILM COATED ORAL at 06:09

## 2024-04-28 RX ADMIN — Medication 1 PATCH: at 11:37

## 2024-04-28 RX ADMIN — Medication 1 PATCH: at 07:39

## 2024-04-28 RX ADMIN — Medication 1 PATCH: at 22:11

## 2024-04-28 RX ADMIN — Medication 50 MICROGRAM(S): at 06:08

## 2024-04-28 RX ADMIN — Medication 50 MILLIGRAM(S): at 17:34

## 2024-04-28 NOTE — CONSULT NOTE ADULT - SUBJECTIVE AND OBJECTIVE BOX
[facility]  INITIAL CONSULT NOTE  --------------------------------------------------------------------------------  HPI:  Pt presented to the ED with increased anxiety and shortness of breath. Pt is being seen by Dr. Jose for hypertension and recent onset of edema  Pt seen this AM, calm, conversant and in no respiratory distress    PAST HISTORY  --------------------------------------------------------------------------------  PAST MEDICAL & SURGICAL HISTORY:  Hypertension      Diabetes mellitus      Thyroid disease      Anemia      History of mood disorder      H/O diabetic neuropathy      Fibroid uterus      S/P lumbar discectomy  2006.      S/P tonsillectomy        FAMILY HISTORY:    PAST SOCIAL HISTORY:    ALLERGIES & MEDICATIONS  --------------------------------------------------------------------------------  Allergies    No Known Allergies    Intolerances      Standing Inpatient Medications  dextrose 10% Bolus 125 milliLiter(s) IV Bolus once  dextrose 5%. 1000 milliLiter(s) IV Continuous <Continuous>  dextrose 5%. 1000 milliLiter(s) IV Continuous <Continuous>  dextrose 50% Injectable 25 Gram(s) IV Push once  dextrose 50% Injectable 12.5 Gram(s) IV Push once  enoxaparin Injectable 40 milliGRAM(s) SubCutaneous every 24 hours  glucagon  Injectable 1 milliGRAM(s) IntraMuscular once  insulin glargine Injectable (LANTUS) 45 Unit(s) SubCutaneous at bedtime  insulin lispro (ADMELOG) corrective regimen sliding scale   SubCutaneous three times a day before meals  insulin lispro Injectable (ADMELOG) 25 Unit(s) SubCutaneous three times a day before meals  levothyroxine 50 MICROGram(s) Oral daily  losartan 50 milliGRAM(s) Oral daily  metoprolol tartrate 50 milliGRAM(s) Oral two times a day  nicotine -  14 mG/24Hr(s) Patch 1 Patch Transdermal daily  pregabalin 150 milliGRAM(s) Oral every 8 hours    PRN Inpatient Medications  baclofen 5 milliGRAM(s) Oral every 8 hours PRN  dextrose Oral Gel 15 Gram(s) Oral once PRN      REVIEW OF SYSTEMS  --------------------------------------------------------------------------------  All other systems were reviewed and are negative, except as noted.    VITALS/PHYSICAL EXAM  --------------------------------------------------------------------------------  T(C): 36.5 (04-28-24 @ 05:38), Max: 36.5 (04-28-24 @ 05:38)  HR: 93 (04-28-24 @ 11:55) (91 - 100)  BP: 145/74 (04-28-24 @ 11:55) (135/69 - 152/69)  RR: 14 (04-28-24 @ 11:55) (14 - 18)  SpO2: 100% (04-28-24 @ 11:55) (98% - 100%)  Wt(kg): --  Height (cm): 167.6 (04-27-24 @ 08:00)  Weight (kg): 85.4 (04-27-24 @ 08:00)  BMI (kg/m2): 30.4 (04-27-24 @ 08:00)  BSA (m2): 1.95 (04-27-24 @ 08:00)      04-27-24 @ 07:01 - 04-28-24 @ 07:00  --------------------------------------------------------  IN: 320 mL / OUT: 0 mL / NET: 320 mL      Physical Exam:  	Gen: NAD  	HEENT: No JVD  	Pulm: CTA B/L  	CV: RRR  	Abd: soft, nontender/nondistended  	: No suprapubic tenderness  	trace peda edema    LABS/STUDIES  --------------------------------------------------------------------------------              13.4   7.47  >-----------<  203      [04-26-24 @ 21:27]              38.9     138  |  101  |  15  ----------------------------<  357      [04-26-24 @ 21:27]  3.6   |  19  |  0.5        Ca     10.3     [04-26-24 @ 21:27]      Mg     2.1     [04-26-24 @ 21:27]    TPro  7.7  /  Alb  4.4  /  TBili  0.8  /  DBili  x   /  AST  21  /  ALT  17  /  AlkPhos  163  [04-26-24 @ 21:27]    UA 4/26 - pending results  UA 4/5 - no protein  LFT's normal    TSH 5.95      [02-17-24 @ 08:53]  Lipid: chol 153, , HDL 38, LDL --      [02-02-24 @ 06:28]

## 2024-04-28 NOTE — CONSULT NOTE ADULT - ASSESSMENT
Impression:  HTN - improved   pedal edema - possible secondary to lyrica    Suggest:  same meds for now  await UA results  if d/c tomorrow further w/u as outpatient

## 2024-04-28 NOTE — PROGRESS NOTE ADULT - SUBJECTIVE AND OBJECTIVE BOX
COLON, DANISH  58y  Female      Patient is a 58y old  Female who presents with a chief complaint of anxiety w SOB.      INTERVAL HPI/OVERNIGHT EVENTS:      ******************************* REVIEW OF SYSTEMS:**********************************************  Feeling better today.   All other review of systems negative    *********************** VITALS ******************************************    T(F): 97.7 (04-28-24 @ 05:38)  HR: 93 (04-28-24 @ 11:55) (91 - 100)  BP: 145/74 (04-28-24 @ 11:55) (135/69 - 162/70)  RR: 14 (04-28-24 @ 11:55) (14 - 18)  SpO2: 100% (04-28-24 @ 11:55) (98% - 100%)    04-27-24 @ 07:01  -  04-28-24 @ 07:00  --------------------------------------------------------  IN: 320 mL / OUT: 0 mL / NET: 320 mL            04-27-24 @ 07:01  -  04-28-24 @ 07:00  --------------------------------------------------------  IN: 320 mL / OUT: 0 mL / NET: 320 mL        ******************************** PHYSICAL EXAM:**************************************************  GENERAL: NAD    PSYCH: no agitation, baseline mentation  HEENT:     NERVOUS SYSTEM:  Alert & Oriented X3,     PULMONARY: KODAK, CTA    CARDIOVASCULAR: S1S2 RRR    GI: Soft, NT, ND; BS present.    EXTREMITIES:  2+ Peripheral Pulses,  trace LE  edema    LYMPH: No lymphadenopathy noted    SKIN: No rashes or lesions      **************************** LABS *******************************************************                          13.4   7.47  )-----------( 203      ( 26 Apr 2024 21:27 )             38.9     04-26    138  |  101  |  15  ----------------------------<  357<H>  3.6   |  19  |  0.5<L>    Ca    10.3      26 Apr 2024 21:27  Mg     2.1     04-26    TPro  7.7  /  Alb  4.4  /  TBili  0.8  /  DBili  x   /  AST  21  /  ALT  17  /  AlkPhos  163<H>  04-26      Urinalysis Basic - ( 26 Apr 2024 21:27 )    Color: x / Appearance: x / SG: x / pH: x  Gluc: 357 mg/dL / Ketone: x  / Bili: x / Urobili: x   Blood: x / Protein: x / Nitrite: x   Leuk Esterase: x / RBC: x / WBC x   Sq Epi: x / Non Sq Epi: x / Bacteria: x        Lactate Trend        CAPILLARY BLOOD GLUCOSE      POCT Blood Glucose.: 142 mg/dL (28 Apr 2024 12:08)          **************************Active Medications *******************************************  No Known Allergies      baclofen 5 milliGRAM(s) Oral every 8 hours PRN  dextrose 10% Bolus 125 milliLiter(s) IV Bolus once  dextrose 5%. 1000 milliLiter(s) IV Continuous <Continuous>  dextrose 5%. 1000 milliLiter(s) IV Continuous <Continuous>  dextrose 50% Injectable 25 Gram(s) IV Push once  dextrose 50% Injectable 12.5 Gram(s) IV Push once  dextrose Oral Gel 15 Gram(s) Oral once PRN  enoxaparin Injectable 40 milliGRAM(s) SubCutaneous every 24 hours  glucagon  Injectable 1 milliGRAM(s) IntraMuscular once  insulin glargine Injectable (LANTUS) 45 Unit(s) SubCutaneous at bedtime  insulin lispro (ADMELOG) corrective regimen sliding scale   SubCutaneous three times a day before meals  insulin lispro Injectable (ADMELOG) 25 Unit(s) SubCutaneous three times a day before meals  levothyroxine 50 MICROGram(s) Oral daily  losartan 50 milliGRAM(s) Oral daily  metoprolol tartrate 50 milliGRAM(s) Oral two times a day  nicotine -  14 mG/24Hr(s) Patch 1 Patch Transdermal daily  pregabalin 150 milliGRAM(s) Oral every 8 hours      ***************************************************  RADIOLOGY & ADDITIONAL TESTS:    Imaging Personally Reviewed:  [ ] YES  [ ] NO    HEALTH ISSUES - PROBLEM Dx:

## 2024-04-28 NOTE — PROGRESS NOTE ADULT - ASSESSMENT
Ms Godinez is a 58 VICK w a PMH of HTN, HLD, IDDM noncompliant with her insulin, neuropathy, HTN, hypothyroidism, mood disorder with 3 prior inpatient psychiatric hospitalizations, recent admission from 2/1 - 2/6 for DKA and a mechanical fall in March 2024 presents to the ED w "feeling overwhelmed" and associated SOB. In the ED vitals notable for /79 w , SpO2 97% on RA. Labs notable for Trop 9, Dimer of 414, gluc of 375 w AG of 18 and ProBNP of 326. CT PE protocol negative for PE but showed at 2.4 cm nodule in a multinodular thyroid. Patient aware of nodule and states it has never been worked up because "its never caused me any problems". Patient denies chest pain/pressure, diaphroesis, neck/jaw/arm pain, cough, fever, rash, HA, vertiog, N/V, diarrhea, weakness, recent travel or change in medications. Patient is a poor historian and does not know if she had any recent changes in medication, vaccinations or hospitalizations. Did not recall her past admission for fall.  Patient admitted for observation and further assessment.     A/P :    # Possible Panic attack with SOB and anxiety.   #Ho nicotine use   #Ho HTN  #Ho DLD  #Ho mood disorder (currently in patient psych)  given troponin negative, CT PE protocol negative for PE/consolidations/effusion, Trace atalectasis reported but not seen more likely anxiety driven then physical cause  SpO2 97% on RA  Patient reports being very displeased w her Psych facility and that they had to 4 point restrain her  no leukocytosis  Dimer 414  ProBNP 326, clinically euvolemic  vitally stable  -No Abx or supplemental O2 at this time  -No suspicion Pneumonia  -PE r/o by CT and clinical   -tachycardia on presentation of 112 and BP of 175/79 likely due to anxiety  -restart home medications w Met succinate change to tartrate BID  Met Tar 50mg PO BID  Losartan 50mg once daily  -nicotine patch 14mg once daily ordered  -if SOB returns please obtain CXR   -if desaturation please obtain rainbow labs and ABG  -patient does not recall what her medications are or if she takes any mood stabilizers  -will need out patient psych for mood stabilizers     #IDDM  #AG 18  #Ho peripheral neuropathy  #Ho DKA   per chart poor compliance, per patient she takes all her meds every day   -c/w home Lantus 45U at bedtime  -c/w Lispro 20mg TID  -jardiance 25 on hold  -moderate dose sliding scale  -patient does not recall any hypoglycemic events BUT is poor historian   -f/u Beta hydroxy (low suspicion DKA given type II DM and no acidosis but given Gluc>300 will assess)     #2.4 cm thyroid nodule  #multinodular thyroid  #Ho Hypothyroid  -out patient endocrine referral for nodular w/u   -c/w Home levothyroxine 50mg once daily    Misc:  DVT proph: Lovenox 40 once daily  GI Proph: NA  Diet: Carb consist, DASH/TLC  activity: as tolerated      CODE: FULL     Pending: Clinical stability   Dispo: STR   Anticipate for DC in 24 hrs .   Plan d/w the patient.

## 2024-04-29 LAB
A1C WITH ESTIMATED AVERAGE GLUCOSE RESULT: 7.1 % — HIGH (ref 4–5.6)
ESTIMATED AVERAGE GLUCOSE: 157 MG/DL — HIGH (ref 68–114)
GLUCOSE BLDC GLUCOMTR-MCNC: 166 MG/DL — HIGH (ref 70–99)
GLUCOSE BLDC GLUCOMTR-MCNC: 198 MG/DL — HIGH (ref 70–99)
GLUCOSE BLDC GLUCOMTR-MCNC: 233 MG/DL — HIGH (ref 70–99)
GLUCOSE BLDC GLUCOMTR-MCNC: 264 MG/DL — HIGH (ref 70–99)

## 2024-04-29 PROCEDURE — 99232 SBSQ HOSP IP/OBS MODERATE 35: CPT

## 2024-04-29 RX ORDER — DIPHENHYDRAMINE HCL 50 MG
25 CAPSULE ORAL ONCE
Refills: 0 | Status: COMPLETED | OUTPATIENT
Start: 2024-04-29 | End: 2024-04-29

## 2024-04-29 RX ADMIN — Medication 50 MICROGRAM(S): at 05:29

## 2024-04-29 RX ADMIN — Medication 1 PATCH: at 08:14

## 2024-04-29 RX ADMIN — Medication 25 MILLIGRAM(S): at 21:18

## 2024-04-29 RX ADMIN — Medication 150 MILLIGRAM(S): at 21:18

## 2024-04-29 RX ADMIN — Medication 6: at 08:14

## 2024-04-29 RX ADMIN — Medication 2: at 12:26

## 2024-04-29 RX ADMIN — Medication 1 PATCH: at 11:34

## 2024-04-29 RX ADMIN — Medication 150 MILLIGRAM(S): at 14:36

## 2024-04-29 RX ADMIN — Medication 2: at 17:29

## 2024-04-29 RX ADMIN — INSULIN GLARGINE 45 UNIT(S): 100 INJECTION, SOLUTION SUBCUTANEOUS at 21:48

## 2024-04-29 RX ADMIN — ENOXAPARIN SODIUM 40 MILLIGRAM(S): 100 INJECTION SUBCUTANEOUS at 21:19

## 2024-04-29 RX ADMIN — NYSTATIN CREAM 1 APPLICATION(S): 100000 CREAM TOPICAL at 06:04

## 2024-04-29 RX ADMIN — Medication 150 MILLIGRAM(S): at 05:29

## 2024-04-29 RX ADMIN — Medication 50 MILLIGRAM(S): at 17:31

## 2024-04-29 RX ADMIN — LOSARTAN POTASSIUM 50 MILLIGRAM(S): 100 TABLET, FILM COATED ORAL at 05:28

## 2024-04-29 RX ADMIN — Medication 50 MILLIGRAM(S): at 05:28

## 2024-04-29 NOTE — PROGRESS NOTE ADULT - ASSESSMENT
Ms Godinez is a 58 VICK w a PMH of HTN, HLD, IDDM noncompliant with her insulin, neuropathy, HTN, hypothyroidism, mood disorder with 3 prior inpatient psychiatric hospitalizations, recent admission from 2/1 - 2/6 for DKA and a mechanical fall in March 2024 presents to the ED w "feeling overwhelmed" and associated SOB. In the ED vitals notable for /79 w , SpO2 97% on RA. Labs notable for Trop 9, Dimer of 414, gluc of 375 w AG of 18 and ProBNP of 326. CT PE protocol negative for PE but showed at 2.4 cm nodule in a multinodular thyroid. Patient aware of nodule and states it has never been worked up because "its never caused me any problems". Patient denies chest pain/pressure, diaphroesis, neck/jaw/arm pain, cough, fever, rash, HA, vertiog, N/V, diarrhea, weakness, recent travel or change in medications. Patient is a poor historian and does not know if she had any recent changes in medication, vaccinations or hospitalizations. Did not recall her past admission for fall.  Patient admitted for observation and further assessment.     A/P :    # Possible Panic attack with SOB and anxiety.   #Ho nicotine use   #Ho HTN  #Ho DLD  #Ho mood disorder (currently in patient psych)  given troponin negative, CT PE protocol negative for PE/consolidations/effusion, Trace atalectasis reported but not seen more likely anxiety driven then physical cause  SpO2 97% on RA  Patient reports being very displeased w her Psych facility and that they had to 4 point restrain her  no leukocytosis  Dimer 414  ProBNP 326, clinically euvolemic  vitally stable  -No Abx or supplemental O2 at this time  -No suspicion Pneumonia  -PE r/o by CT and clinical   -tachycardia on presentation of 112 and BP of 175/79 likely due to anxiety  -restart home medications w Met succinate change to tartrate BID  Met Tar 50mg PO BID  Losartan 50mg once daily  -nicotine patch 14mg once daily ordered  -if SOB returns please obtain CXR   -if desaturation please obtain rainbow labs and ABG  -patient does not recall what her medications are or if she takes any mood stabilizers  -will need out patient psych for mood stabilizers     #IDDM  #Ho peripheral neuropathy  #Ho DKA   per chart poor compliance, per patient she takes all her meds every day   -c/w home Lantus 45U at bedtime  -c/w Lispro 20mg TID  -jardiance 25 on hold  -moderate dose sliding scale  -patient does not recall any hypoglycemic events BUT is poor historian   - Beta hydroxy 0.8  (low suspicion DKA )      A1c 7.1     #2.4 cm thyroid nodule  #multinodular thyroid  #Ho Hypothyroid  -out patient endocrine referral for nodular w/u   -c/w Home levothyroxine 50mg once daily    Misc:  DVT proph: Lovenox 40 once daily  GI Proph: NA  Diet: Carb consist, DASH/TLC  activity: as tolerated      CODE: FULL     DC planning.   Dispo: STR    Plan d/w the patient.

## 2024-04-29 NOTE — PROGRESS NOTE ADULT - ASSESSMENT
Assessment    Ms Godinez is a 58 VICK w a PMH of HTN, HLD, IDDM noncompliant with her insulin, neuropathy, HTN, hypothyroidism, mood disorder with 3 prior inpatient psychiatric hospitalizations, recent admission from 2/1 - 2/6 for DKA and a mechanical fall in March 2024 presents to the ED w "feeling overwhelmed" and associated SOB. In the ED vitals notable for /79 w , SpO2 97% on RA. Labs notable for Trop 9, Dimer of 414, gluc of 375 w AG of 18 and ProBNP of 326. CT PE protocol negative for PE but showed at 2.4 cm nodule in a multinodular thyroid. Patient aware of nodule and states it has never been worked up because "its never caused me any problems". Patient denies chest pain/pressure, diaphoresis neck/jaw/arm pain, cough, fever, rash, HA, vertigo, N/V, diarrhea, weakness, recent travel or change in medications. Patient is a poor historian and does not know if she had any recent changes in medication, vaccinations or hospitalizations. Did not recall her past admission for fall.  Patient admitted for observation and further assessment.     Problem List:  #SOB, RESOLVED   #'overwhelming feeling," RESOLVED   #Ho nicotine use   #Ho HTN  #Ho DLD  #Ho mood disorder (currently in patient psych)  given troponin negative, CT PE protocol negative for PE/consolidations/effusion, Trace atalectasis reported but not seen more likely anxiety driven then physical cause  SpO2 97% on RA  Patient reports being very displeased w her Psych facility and that they had to 4 point restrain her  no leukocytosis  Dimer 414  ProBNP 326, clinically euvolemic  vitally stable  -No Abx or supplimentary O2 at this time  -No suspicion Pneumonia  -PE r/o by CT and clinical   -tachycardia on presentation of 112 and BP of 175/79 likely due to anxiety  -restart home medications w Met succinate change to tartrate BID  Met Tar 50mg PO BID  Lostartan 50mg once daily  -nicotine patch 14mg once daily ordered  -if SOB returns please obtain CXR   -if desaturation please obtain rainbow labs and ABG  -patient does not recall what her medications are or if she takes any mood stabilizers  -will need out patient psych for mood stabilizers   -f/u CBC, CMP once daily  -if antipsych started get EKG for QTc baseline and monitoring q48hr     #IDDM  #AG 18  #Ho peripheral neuropathy  #Ho DKA   per chart poor compliance, per patient she takes all her meds every day   -c/w home Lantus 45U at bedtime  -c/w Lispro 20mg TID  -jardiance 25 on hold  -moderate dose sliding scale  -patient does not recall any hypoglycemic events BUT is poor historian   -f/u Beta hydroxy (low suspicion DKA given type II DM and no acidosis but given Gluc>300 will assess)     #2.4 cm thyroid nodule  #multinodular thyroid  #Ho Hypothyroid  -out patient endocrine referral for nodular w/u   -c/w Home levothyroxine 50mg once daily  -pt complaining of night sweats (4/29)    Misc:  DVT proph: Lovenox 40 once daily  GI Proph: NA  Diet: Carb consist, DASH/TLC  activity: as tolerated  dispo: stable    pending: pt requested walker to help ambulate    CODE: FULL

## 2024-04-29 NOTE — PROGRESS NOTE ADULT - SUBJECTIVE AND OBJECTIVE BOX
COLON, DANISH  58y  Female      Patient is a 58y old  Female who presents with a chief complaint of anxiety w SOB.       INTERVAL HPI/OVERNIGHT EVENTS:      ******************************* REVIEW OF SYSTEMS:**********************************************    All other review of systems negative    *********************** VITALS ******************************************    T(F): 97.9 (04-29-24 @ 04:24)  HR: 92 (04-29-24 @ 04:24) (92 - 105)  BP: 163/79 (04-29-24 @ 04:24) (137/75 - 171/83)  RR: 18 (04-29-24 @ 04:24) (18 - 19)  SpO2: 96% (04-29-24 @ 04:24) (96% - 100%)    04-28-24 @ 07:01  -  04-29-24 @ 07:00  --------------------------------------------------------  IN: 920 mL / OUT: 0 mL / NET: 920 mL            04-28-24 @ 07:01  -  04-29-24 @ 07:00  --------------------------------------------------------  IN: 920 mL / OUT: 0 mL / NET: 920 mL        ******************************** PHYSICAL EXAM:**************************************************  GENERAL: NAD    PSYCH: no agitation, baseline mentation  HEENT:     NERVOUS SYSTEM:  Alert & Oriented X3,     PULMONARY: KODAK, CTA    CARDIOVASCULAR: S1S2 RRR    GI: Soft, NT, ND; BS present.    EXTREMITIES:  2+ Peripheral Pulses, No clubbing, cyanosis, or edema    LYMPH: No lymphadenopathy noted    SKIN: No rashes or lesions      **************************** LABS *******************************************************                  Lactate Trend        CAPILLARY BLOOD GLUCOSE      POCT Blood Glucose.: 166 mg/dL (29 Apr 2024 11:47)          **************************Active Medications *******************************************  No Known Allergies      baclofen 5 milliGRAM(s) Oral every 8 hours PRN  dextrose 10% Bolus 125 milliLiter(s) IV Bolus once  dextrose 5%. 1000 milliLiter(s) IV Continuous <Continuous>  dextrose 5%. 1000 milliLiter(s) IV Continuous <Continuous>  dextrose 50% Injectable 25 Gram(s) IV Push once  dextrose 50% Injectable 12.5 Gram(s) IV Push once  dextrose Oral Gel 15 Gram(s) Oral once PRN  enoxaparin Injectable 40 milliGRAM(s) SubCutaneous every 24 hours  glucagon  Injectable 1 milliGRAM(s) IntraMuscular once  insulin glargine Injectable (LANTUS) 45 Unit(s) SubCutaneous at bedtime  insulin lispro (ADMELOG) corrective regimen sliding scale   SubCutaneous three times a day before meals  insulin lispro Injectable (ADMELOG) 25 Unit(s) SubCutaneous three times a day before meals  levothyroxine 50 MICROGram(s) Oral daily  losartan 50 milliGRAM(s) Oral daily  metoprolol tartrate 50 milliGRAM(s) Oral two times a day  nicotine -  14 mG/24Hr(s) Patch 1 Patch Transdermal daily  nystatin Cream 1 Application(s) Topical two times a day  pregabalin 150 milliGRAM(s) Oral every 8 hours      ***************************************************  RADIOLOGY & ADDITIONAL TESTS:    Imaging Personally Reviewed:  [ ] YES  [ ] NO    HEALTH ISSUES - PROBLEM Dx:

## 2024-04-29 NOTE — PROGRESS NOTE ADULT - SUBJECTIVE AND OBJECTIVE BOX
24H events:    Patient is a 58y old Female who presents with a chief complaint of anxiety w SOB (28 Apr 2024 14:30). Primary diagnosis of Confusion    Today is hospital day 2d. This morning patient was seen and examined at bedside, resting comfortably in bed.  Overnight patient had an episode of hypoglycemia (POCT Blood Glucose.: 66 mg/dL) . This morning on exam patient has b/l LE swelling. Patient is sleeping, eating, and voiding well but not ambulating and wants a walker.     PAST MEDICAL & SURGICAL HISTORY  Hypertension    Diabetes mellitus    Thyroid disease    Anemia    History of mood disorder    H/O diabetic neuropathy    Fibroid uterus    S/P lumbar discectomy  2006.    S/P tonsillectomy      SOCIAL HISTORY:  Social History:  from [facility] (27 Apr 2024 11:37)      ALLERGIES:  No Known Allergies    MEDICATIONS:  STANDING MEDICATIONS  dextrose 10% Bolus 125 milliLiter(s) IV Bolus once  dextrose 5%. 1000 milliLiter(s) IV Continuous <Continuous>  dextrose 5%. 1000 milliLiter(s) IV Continuous <Continuous>  dextrose 50% Injectable 25 Gram(s) IV Push once  dextrose 50% Injectable 12.5 Gram(s) IV Push once  enoxaparin Injectable 40 milliGRAM(s) SubCutaneous every 24 hours  glucagon  Injectable 1 milliGRAM(s) IntraMuscular once  insulin glargine Injectable (LANTUS) 45 Unit(s) SubCutaneous at bedtime  insulin lispro (ADMELOG) corrective regimen sliding scale   SubCutaneous three times a day before meals  insulin lispro Injectable (ADMELOG) 25 Unit(s) SubCutaneous three times a day before meals  levothyroxine 50 MICROGram(s) Oral daily  losartan 50 milliGRAM(s) Oral daily  metoprolol tartrate 50 milliGRAM(s) Oral two times a day  nicotine -  14 mG/24Hr(s) Patch 1 Patch Transdermal daily  nystatin Cream 1 Application(s) Topical two times a day  pregabalin 150 milliGRAM(s) Oral every 8 hours    PRN MEDICATIONS  baclofen 5 milliGRAM(s) Oral every 8 hours PRN  dextrose Oral Gel 15 Gram(s) Oral once PRN    VITALS:   T(F): 97.9  HR: 92  BP: 163/79  RR: 18  SpO2: 96%    PHYSICAL EXAM:  GENERAL:   ( x ) NAD, lying in bed comfortably     (  ) obtunded     (  ) lethargic     (  ) somnolent    HEAD:   (x  ) Atraumatic     (  ) hematoma     (  ) laceration (specify location:       )     NECK:  ( x Supple     (  ) neck stiffness     (  ) nuchal rigidity     (x  )  no JVD     (  ) JVD present ( -- cm)    HEART:  Rate -->     (x ) normal rate     (  ) bradycardic     (  ) tachycardic  Rhythm -->     ( x ) regular     (  ) regularly irregular     (  ) irregularly irregular  Murmurs -->     (x  ) normal s1s2     (  ) systolic murmur     (  ) diastolic murmur     (  ) continuous murmur      (  ) S3 present     (  ) S4 present    LUNGS:   (x  )Unlabored respirations     (  ) tachypnea  (  x) B/L air entry     (  ) decreased breath sounds in:  (location     )    ( x ) no adventitious sound     (  ) crackles     (  ) wheezing      (  ) rhonchi      (specify location:       )  (  ) chest wall tenderness (specify location:       )    ABDOMEN:   ( x ) Soft     (  ) tense   |   (  ) nondistended     (  ) distended   |   (  ) +BS     (  ) hypoactive bowel sounds     (  ) hyperactive bowel sounds  (  ) nontender     (  ) RUQ tenderness     (x  ) RLQ tenderness     (  ) LLQ tenderness     (  ) epigastric tenderness     (  ) diffuse tenderness  (  ) Splenomegaly      (  ) Hepatomegaly      (  ) Jaundice     (  ) ecchymosis     EXTREMITIES:  (  ) Normal     (  ) Rash     (  ) ecchymosis     (  ) varicose veins      (x  ) pitting edema     (  ) non-pitting edema   (  ) ulceration     (  ) gangrene:     (location:     )    NERVOUS SYSTEM:    ( x ) A&Ox3     (  ) confused     (  ) lethargic  CN II-XII:     (  ) Intact     (  ) deficits found     (Specify:     )   Upper extremities:     (  ) no sensorimotor deficits     (  ) weakness     (  ) loss of proprioception/vibration     (  ) loss of touch/temperature (specify:    )  Lower extremities:     (  ) no sensorimotor deficits     (  ) weakness     (  ) loss of proprioception/vibration     (  ) loss of touch/temperature (specify:    )    SKIN:   (  ) No rashes or lesions     (  ) maculopapular rash     (  ) pustules     (  ) vesicles     (  ) ulcer     (  ) ecchymosis     (specify location:     )    AMPAC score:    (  ) Indwelling Shannon Catheter:   Date insterted:    Reason (  ) Critical illness     (  ) urinary retention    (  ) Accurate Ins/Outs Monitoring     (  ) CMO patient    (  ) Central Line:   Date inserted:  Location: (  ) Right IJ     (  ) Left IJ     (  ) Right Fem     (  ) Left Fem    (  ) SPC        (  ) pigtail       (  ) PEG tube       (  ) colostomy       (  ) jejunostomy  (  ) U-Dall    LABS:    POCT Blood Glucose.: 264 mg/dL (04.29.24 @ 08:00)        EXAM:  CT ANGIO CHEST PULM ART WAWIC   PROCEDURE DATE:  04/27/2024    INTERPRETATION:  CLINICAL INDICATION: Chest pain/pressure, shortness of   breath, bilateral lower extremity swelling, tachycardia, altered mental   status.  TECHNIQUE:  CTA of the thorax was performed after administration of  65 cc of   Omnipaque 350 intravenous contrast per the PE protocol. Sagittal and   coronal reformats were performed.  COMPARISON: None.  INTERPRETATION:  PULMONARY ARTERIES: No pulmonary emboli.  AIRWAYS, LUNGS AND PLEURA: Patent central tracheobronchial tree.   Bibasilar atelectasis. No pleural effusion. No pneumothorax.  MEDIASTINUM: No enlarged mediastinal, hilar or axillary lymph nodes.   Multinodular enlarged thyroid gland with largest nodule measuring up to   2.4 cm on the right (8/1).  HEART AND VESSELS: Unremarkable heart size. Trace aortic and LAD coronary   calcifications. Normal caliber thoracic aorta. No pericardial effusion.  PARTIALLY VISUALIZED UPPER ABDOMEN: No focal abnormality..  BONES AND SOFT TISSUES: Degenerative changes of the spine.  TUBES AND LINES: None.  IMPRESSION:  No pulmonary embolism. No acute thoracic pathology.  Multinodular enlargedthyroid gland with largest nodule measuring up to   2.4 cm on the right. An outpatient thyroid ultrasound may be performed if   clinically indicated.      RADIOLOGY:           24H events:    Patient is a 58y old Female who presents with a chief complaint of anxiety w SOB (28 Apr 2024 14:30). Primary diagnosis of Confusion    Today is hospital day 2d. This morning patient was seen and examined at bedside, resting comfortably in bed.  Overnight patient had an episode of hypoglycemia (POCT Blood Glucose.: 66 mg/dL) . This morning on exam patient has b/l LE swelling. Patient is sleeping, eating, and voiding well but not ambulating and wants a walker. .     PAST MEDICAL & SURGICAL HISTORY  Hypertension    Diabetes mellitus    Thyroid disease    Anemia    History of mood disorder    H/O diabetic neuropathy    Fibroid uterus    S/P lumbar discectomy  2006.    S/P tonsillectomy      SOCIAL HISTORY:  Social History:  from [facility] (27 Apr 2024 11:37)      ALLERGIES:  No Known Allergies    MEDICATIONS:  STANDING MEDICATIONS  dextrose 10% Bolus 125 milliLiter(s) IV Bolus once  dextrose 5%. 1000 milliLiter(s) IV Continuous <Continuous>  dextrose 5%. 1000 milliLiter(s) IV Continuous <Continuous>  dextrose 50% Injectable 25 Gram(s) IV Push once  dextrose 50% Injectable 12.5 Gram(s) IV Push once  enoxaparin Injectable 40 milliGRAM(s) SubCutaneous every 24 hours  glucagon  Injectable 1 milliGRAM(s) IntraMuscular once  insulin glargine Injectable (LANTUS) 45 Unit(s) SubCutaneous at bedtime  insulin lispro (ADMELOG) corrective regimen sliding scale   SubCutaneous three times a day before meals  insulin lispro Injectable (ADMELOG) 25 Unit(s) SubCutaneous three times a day before meals  levothyroxine 50 MICROGram(s) Oral daily  losartan 50 milliGRAM(s) Oral daily  metoprolol tartrate 50 milliGRAM(s) Oral two times a day  nicotine -  14 mG/24Hr(s) Patch 1 Patch Transdermal daily  nystatin Cream 1 Application(s) Topical two times a day  pregabalin 150 milliGRAM(s) Oral every 8 hours    PRN MEDICATIONS  baclofen 5 milliGRAM(s) Oral every 8 hours PRN  dextrose Oral Gel 15 Gram(s) Oral once PRN    VITALS:   T(F): 97.9  HR: 92  BP: 163/79  RR: 18  SpO2: 96%    PHYSICAL EXAM:  GENERAL:   ( x ) NAD, lying in bed comfortably     (  ) obtunded     (  ) lethargic     (  ) somnolent    HEAD:   (x  ) Atraumatic     (  ) hematoma     (  ) laceration (specify location:       )     NECK:  ( x Supple     (  ) neck stiffness     (  ) nuchal rigidity     (x  )  no JVD     (  ) JVD present ( -- cm)    HEART:  Rate -->     (x ) normal rate     (  ) bradycardic     (  ) tachycardic  Rhythm -->     ( x ) regular     (  ) regularly irregular     (  ) irregularly irregular  Murmurs -->     (x  ) normal s1s2     (  ) systolic murmur     (  ) diastolic murmur     (  ) continuous murmur      (  ) S3 present     (  ) S4 present    LUNGS:   (x  )Unlabored respirations     (  ) tachypnea  (  x) B/L air entry     (  ) decreased breath sounds in:  (location     )    ( x ) no adventitious sound     (  ) crackles     (  ) wheezing      (  ) rhonchi      (specify location:       )  (  ) chest wall tenderness (specify location:       )    ABDOMEN:   ( x ) Soft     (  ) tense   |   (  ) nondistended     (  ) distended   |   (  ) +BS     (  ) hypoactive bowel sounds     (  ) hyperactive bowel sounds  (  ) nontender     (  ) RUQ tenderness     (x  ) RLQ tenderness     (  ) LLQ tenderness     (  ) epigastric tenderness     (  ) diffuse tenderness  (  ) Splenomegaly      (  ) Hepatomegaly      (  ) Jaundice     (  ) ecchymosis     EXTREMITIES:  (  ) Normal     (  ) Rash     (  ) ecchymosis     (  ) varicose veins      (x  ) pitting edema     (  ) non-pitting edema   (  ) ulceration     (  ) gangrene:     (location:     )    NERVOUS SYSTEM:    ( x ) A&Ox3     (  ) confused     (  ) lethargic  CN II-XII:     (  ) Intact     (  ) deficits found     (Specify:     )   Upper extremities:     (  ) no sensorimotor deficits     (  ) weakness     (  ) loss of proprioception/vibration     (  ) loss of touch/temperature (specify:    )  Lower extremities:     (  ) no sensorimotor deficits     (  ) weakness     (  ) loss of proprioception/vibration     (  ) loss of touch/temperature (specify:    )    SKIN:   (  ) No rashes or lesions     (  ) maculopapular rash     (  ) pustules     (  ) vesicles     (  ) ulcer     (  ) ecchymosis     (specify location:     )    AMPAC score:    (  ) Indwelling Shannon Catheter:   Date insterted:    Reason (  ) Critical illness     (  ) urinary retention    (  ) Accurate Ins/Outs Monitoring     (  ) CMO patient    (  ) Central Line:   Date inserted:  Location: (  ) Right IJ     (  ) Left IJ     (  ) Right Fem     (  ) Left Fem    (  ) SPC        (  ) pigtail       (  ) PEG tube       (  ) colostomy       (  ) jejunostomy  (  ) U-Dall    LABS:    POCT Blood Glucose.: 264 mg/dL (04.29.24 @ 08:00)        EXAM:  CT ANGIO CHEST PULM ART WAWIC   PROCEDURE DATE:  04/27/2024    INTERPRETATION:  CLINICAL INDICATION: Chest pain/pressure, shortness of   breath, bilateral lower extremity swelling, tachycardia, altered mental   status.  TECHNIQUE:  CTA of the thorax was performed after administration of  65 cc of   Omnipaque 350 intravenous contrast per the PE protocol. Sagittal and   coronal reformats were performed.  COMPARISON: None.  INTERPRETATION:  PULMONARY ARTERIES: No pulmonary emboli.  AIRWAYS, LUNGS AND PLEURA: Patent central tracheobronchial tree.   Bibasilar atelectasis. No pleural effusion. No pneumothorax.  MEDIASTINUM: No enlarged mediastinal, hilar or axillary lymph nodes.   Multinodular enlarged thyroid gland with largest nodule measuring up to   2.4 cm on the right (8/1).  HEART AND VESSELS: Unremarkable heart size. Trace aortic and LAD coronary   calcifications. Normal caliber thoracic aorta. No pericardial effusion.  PARTIALLY VISUALIZED UPPER ABDOMEN: No focal abnormality..  BONES AND SOFT TISSUES: Degenerative changes of the spine.  TUBES AND LINES: None.  IMPRESSION:  No pulmonary embolism. No acute thoracic pathology.  Multinodular enlargedthyroid gland with largest nodule measuring up to   2.4 cm on the right. An outpatient thyroid ultrasound may be performed if   clinically indicated.      RADIOLOGY:

## 2024-04-30 LAB
ALBUMIN SERPL ELPH-MCNC: 3.8 G/DL — SIGNIFICANT CHANGE UP (ref 3.5–5.2)
ALP SERPL-CCNC: 155 U/L — HIGH (ref 30–115)
ALT FLD-CCNC: 18 U/L — SIGNIFICANT CHANGE UP (ref 0–41)
ANION GAP SERPL CALC-SCNC: 6 MMOL/L — LOW (ref 7–14)
AST SERPL-CCNC: 18 U/L — SIGNIFICANT CHANGE UP (ref 0–41)
BASOPHILS # BLD AUTO: 0.02 K/UL — SIGNIFICANT CHANGE UP (ref 0–0.2)
BASOPHILS NFR BLD AUTO: 0.4 % — SIGNIFICANT CHANGE UP (ref 0–1)
BILIRUB SERPL-MCNC: 0.4 MG/DL — SIGNIFICANT CHANGE UP (ref 0.2–1.2)
BUN SERPL-MCNC: 16 MG/DL — SIGNIFICANT CHANGE UP (ref 10–20)
CALCIUM SERPL-MCNC: 9.8 MG/DL — SIGNIFICANT CHANGE UP (ref 8.4–10.5)
CHLORIDE SERPL-SCNC: 103 MMOL/L — SIGNIFICANT CHANGE UP (ref 98–110)
CO2 SERPL-SCNC: 30 MMOL/L — SIGNIFICANT CHANGE UP (ref 17–32)
CREAT SERPL-MCNC: 0.5 MG/DL — LOW (ref 0.7–1.5)
EGFR: 108 ML/MIN/1.73M2 — SIGNIFICANT CHANGE UP
EOSINOPHIL # BLD AUTO: 0.18 K/UL — SIGNIFICANT CHANGE UP (ref 0–0.7)
EOSINOPHIL NFR BLD AUTO: 3.4 % — SIGNIFICANT CHANGE UP (ref 0–8)
GLUCOSE BLDC GLUCOMTR-MCNC: 117 MG/DL — HIGH (ref 70–99)
GLUCOSE BLDC GLUCOMTR-MCNC: 145 MG/DL — HIGH (ref 70–99)
GLUCOSE BLDC GLUCOMTR-MCNC: 161 MG/DL — HIGH (ref 70–99)
GLUCOSE BLDC GLUCOMTR-MCNC: 203 MG/DL — HIGH (ref 70–99)
GLUCOSE BLDC GLUCOMTR-MCNC: 247 MG/DL — HIGH (ref 70–99)
GLUCOSE SERPL-MCNC: 139 MG/DL — HIGH (ref 70–99)
HCT VFR BLD CALC: 39 % — SIGNIFICANT CHANGE UP (ref 37–47)
HGB BLD-MCNC: 13.1 G/DL — SIGNIFICANT CHANGE UP (ref 12–16)
IMM GRANULOCYTES NFR BLD AUTO: 0.2 % — SIGNIFICANT CHANGE UP (ref 0.1–0.3)
LYMPHOCYTES # BLD AUTO: 2.13 K/UL — SIGNIFICANT CHANGE UP (ref 1.2–3.4)
LYMPHOCYTES # BLD AUTO: 40.6 % — SIGNIFICANT CHANGE UP (ref 20.5–51.1)
MAGNESIUM SERPL-MCNC: 1.9 MG/DL — SIGNIFICANT CHANGE UP (ref 1.8–2.4)
MCHC RBC-ENTMCNC: 29.2 PG — SIGNIFICANT CHANGE UP (ref 27–31)
MCHC RBC-ENTMCNC: 33.6 G/DL — SIGNIFICANT CHANGE UP (ref 32–37)
MCV RBC AUTO: 87.1 FL — SIGNIFICANT CHANGE UP (ref 81–99)
MONOCYTES # BLD AUTO: 0.47 K/UL — SIGNIFICANT CHANGE UP (ref 0.1–0.6)
MONOCYTES NFR BLD AUTO: 9 % — SIGNIFICANT CHANGE UP (ref 1.7–9.3)
NEUTROPHILS # BLD AUTO: 2.43 K/UL — SIGNIFICANT CHANGE UP (ref 1.4–6.5)
NEUTROPHILS NFR BLD AUTO: 46.4 % — SIGNIFICANT CHANGE UP (ref 42.2–75.2)
NRBC # BLD: 0 /100 WBCS — SIGNIFICANT CHANGE UP (ref 0–0)
PLATELET # BLD AUTO: 231 K/UL — SIGNIFICANT CHANGE UP (ref 130–400)
PMV BLD: 9.4 FL — SIGNIFICANT CHANGE UP (ref 7.4–10.4)
POTASSIUM SERPL-MCNC: 4.1 MMOL/L — SIGNIFICANT CHANGE UP (ref 3.5–5)
POTASSIUM SERPL-SCNC: 4.1 MMOL/L — SIGNIFICANT CHANGE UP (ref 3.5–5)
PROT SERPL-MCNC: 6.9 G/DL — SIGNIFICANT CHANGE UP (ref 6–8)
RBC # BLD: 4.48 M/UL — SIGNIFICANT CHANGE UP (ref 4.2–5.4)
RBC # FLD: 13.8 % — SIGNIFICANT CHANGE UP (ref 11.5–14.5)
SODIUM SERPL-SCNC: 139 MMOL/L — SIGNIFICANT CHANGE UP (ref 135–146)
WBC # BLD: 5.24 K/UL — SIGNIFICANT CHANGE UP (ref 4.8–10.8)
WBC # FLD AUTO: 5.24 K/UL — SIGNIFICANT CHANGE UP (ref 4.8–10.8)

## 2024-04-30 PROCEDURE — 99232 SBSQ HOSP IP/OBS MODERATE 35: CPT

## 2024-04-30 RX ORDER — HYDROXYZINE HCL 10 MG
50 TABLET ORAL ONCE
Refills: 0 | Status: COMPLETED | OUTPATIENT
Start: 2024-04-30 | End: 2024-04-30

## 2024-04-30 RX ORDER — CHLORHEXIDINE GLUCONATE 213 G/1000ML
1 SOLUTION TOPICAL
Refills: 0 | Status: DISCONTINUED | OUTPATIENT
Start: 2024-04-30 | End: 2024-05-10

## 2024-04-30 RX ORDER — ACETAMINOPHEN 500 MG
650 TABLET ORAL EVERY 6 HOURS
Refills: 0 | Status: DISCONTINUED | OUTPATIENT
Start: 2024-04-30 | End: 2024-05-08

## 2024-04-30 RX ADMIN — INSULIN GLARGINE 45 UNIT(S): 100 INJECTION, SOLUTION SUBCUTANEOUS at 21:07

## 2024-04-30 RX ADMIN — Medication 4: at 17:13

## 2024-04-30 RX ADMIN — Medication 50 MILLIGRAM(S): at 22:44

## 2024-04-30 RX ADMIN — Medication 150 MILLIGRAM(S): at 15:39

## 2024-04-30 RX ADMIN — NYSTATIN CREAM 1 APPLICATION(S): 100000 CREAM TOPICAL at 18:33

## 2024-04-30 RX ADMIN — Medication 650 MILLIGRAM(S): at 22:00

## 2024-04-30 RX ADMIN — Medication 4: at 12:00

## 2024-04-30 RX ADMIN — Medication 150 MILLIGRAM(S): at 21:08

## 2024-04-30 RX ADMIN — Medication 650 MILLIGRAM(S): at 01:20

## 2024-04-30 RX ADMIN — Medication 5 MILLIGRAM(S): at 04:13

## 2024-04-30 RX ADMIN — Medication 150 MILLIGRAM(S): at 05:43

## 2024-04-30 RX ADMIN — Medication 650 MILLIGRAM(S): at 12:38

## 2024-04-30 RX ADMIN — Medication 650 MILLIGRAM(S): at 00:55

## 2024-04-30 RX ADMIN — NYSTATIN CREAM 1 APPLICATION(S): 100000 CREAM TOPICAL at 05:45

## 2024-04-30 RX ADMIN — Medication 50 MILLIGRAM(S): at 18:33

## 2024-04-30 RX ADMIN — Medication 1 PATCH: at 20:09

## 2024-04-30 RX ADMIN — LOSARTAN POTASSIUM 50 MILLIGRAM(S): 100 TABLET, FILM COATED ORAL at 05:44

## 2024-04-30 RX ADMIN — Medication 50 MILLIGRAM(S): at 05:44

## 2024-04-30 RX ADMIN — Medication 50 MICROGRAM(S): at 05:44

## 2024-04-30 RX ADMIN — ENOXAPARIN SODIUM 40 MILLIGRAM(S): 100 INJECTION SUBCUTANEOUS at 21:07

## 2024-04-30 RX ADMIN — Medication 650 MILLIGRAM(S): at 21:07

## 2024-04-30 RX ADMIN — Medication 1 PATCH: at 12:41

## 2024-04-30 NOTE — PHYSICAL THERAPY INITIAL EVALUATION ADULT - GENERAL OBSERVATIONS, REHAB EVAL
Pt encountered sitting in chair, NAD, +2:1 sit, +call bell, reports L knee pain 5/10, however was agreeable to PT.

## 2024-04-30 NOTE — PHYSICAL THERAPY INITIAL EVALUATION ADULT - ADDITIONAL COMMENTS
Pt reports coming in from [facility] prior to being admitted to the hospital. Pt reports using a RW, and sustained a fall due to not having a RW in the psych [facility].

## 2024-04-30 NOTE — PHYSICAL THERAPY INITIAL EVALUATION ADULT - GAIT DEVIATIONS NOTED, PT EVAL
narrow ED/decreased brandon/increased time in double stance/decreased velocity of limb motion/decreased step length/decreased stride length/decreased weight-shifting ability

## 2024-04-30 NOTE — PROGRESS NOTE ADULT - SUBJECTIVE AND OBJECTIVE BOX
24H events:    Patient is a 58y old Female who presents with a chief complaint of anxiety w SOB (29 Apr 2024 12:23)    Primary diagnosis of Confusion  Today is hospital day 3d. This morning patient was seen and examined at bedside, resting comfortably in bed.    No acute or major events overnight.    Code Status:    Family communication:  Contact date:  Name of person contacted:  Relationship to patient:  Communication details:  What matters most:    PAST MEDICAL & SURGICAL HISTORY  Hypertension    Diabetes mellitus    Thyroid disease    Anemia    History of mood disorder    H/O diabetic neuropathy    Fibroid uterus    S/P lumbar discectomy  2006.    S/P tonsillectomy      SOCIAL HISTORY:  Social History:  from [facility] (27 Apr 2024 11:37)      ALLERGIES:  No Known Allergies    MEDICATIONS:  STANDING MEDICATIONS  chlorhexidine 2% Cloths 1 Application(s) Topical <User Schedule>  dextrose 10% Bolus 125 milliLiter(s) IV Bolus once  dextrose 5%. 1000 milliLiter(s) IV Continuous <Continuous>  dextrose 5%. 1000 milliLiter(s) IV Continuous <Continuous>  dextrose 50% Injectable 25 Gram(s) IV Push once  dextrose 50% Injectable 12.5 Gram(s) IV Push once  enoxaparin Injectable 40 milliGRAM(s) SubCutaneous every 24 hours  glucagon  Injectable 1 milliGRAM(s) IntraMuscular once  insulin glargine Injectable (LANTUS) 45 Unit(s) SubCutaneous at bedtime  insulin lispro (ADMELOG) corrective regimen sliding scale   SubCutaneous three times a day before meals  levothyroxine 50 MICROGram(s) Oral daily  losartan 50 milliGRAM(s) Oral daily  metoprolol tartrate 50 milliGRAM(s) Oral two times a day  nicotine -  14 mG/24Hr(s) Patch 1 Patch Transdermal daily  nystatin Cream 1 Application(s) Topical two times a day  pregabalin 150 milliGRAM(s) Oral every 8 hours    PRN MEDICATIONS  acetaminophen     Tablet .. 650 milliGRAM(s) Oral every 6 hours PRN  baclofen 5 milliGRAM(s) Oral every 8 hours PRN  dextrose Oral Gel 15 Gram(s) Oral once PRN    VITALS:   T(F): 96.4  HR: 97  BP: 140/68  RR: 18  SpO2: 93%    PHYSICAL EXAM:    GENERAL:   ( x ) NAD, lying in bed comfortably     (  ) obtunded     (  ) lethargic     (  ) somnolent    HEAD:   (x  ) Atraumatic     (  ) hematoma     (  ) laceration (specify location:       )     NECK:  ( x Supple     (  ) neck stiffness     (  ) nuchal rigidity     (x  )  no JVD     (  ) JVD present ( -- cm)    HEART:  Rate -->     (x ) normal rate     (  ) bradycardic     (  ) tachycardic  Rhythm -->     ( x ) regular     (  ) regularly irregular     (  ) irregularly irregular  Murmurs -->     (x  ) normal s1s2     (  ) systolic murmur     (  ) diastolic murmur     (  ) continuous murmur      (  ) S3 present     (  ) S4 present    LUNGS:   (x  )Unlabored respirations     (  ) tachypnea  (  x) B/L air entry     (  ) decreased breath sounds in:  (location     )    ( x ) no adventitious sound     (  ) crackles     (  ) wheezing      (  ) rhonchi      (specify location:       )  (  ) chest wall tenderness (specify location:       )    ABDOMEN:   ( x ) Soft     (  ) tense   |   (  ) nondistended     (  ) distended   |   (  ) +BS     (  ) hypoactive bowel sounds     (  ) hyperactive bowel sounds  (  ) nontender     (  ) RUQ tenderness     (x  ) RLQ tenderness     (  ) LLQ tenderness     (  ) epigastric tenderness     (  ) diffuse tenderness  (  ) Splenomegaly      (  ) Hepatomegaly      (  ) Jaundice     (  ) ecchymosis     EXTREMITIES:  (  ) Normal     (  ) Rash     (  ) ecchymosis     (  ) varicose veins      (x  ) pitting edema     (  ) non-pitting edema   (  ) ulceration     (  ) gangrene:     (location:     )    NERVOUS SYSTEM:    ( x ) A&Ox3     (  ) confused     (  ) lethargic  CN II-XII:     (  ) Intact     (  ) deficits found     (Specify:     )   Upper extremities:     (  ) no sensorimotor deficits     (  ) weakness     (  ) loss of proprioception/vibration     (  ) loss of touch/temperature (specify:    )  Lower extremities:     (  ) no sensorimotor deficits     (  ) weakness     (  ) loss of proprioception/vibration     (  ) loss of touch/temperature (specify:    )    SKIN:   (  ) No rashes or lesions     (  ) maculopapular rash     (  ) pustules     (  ) vesicles     (  ) ulcer     (  ) ecchymosis     (specify location:     )    AMPAC score:        (  ) Indwelling Shannon Catheter:   Date insterted:    Reason (  ) Critical illness     (  ) urinary retention    (  ) Accurate Ins/Outs Monitoring     (  ) CMO patient    (  ) Central Line:   Date inserted:  Location: (  ) Right IJ     (  ) Left IJ     (  ) Right Fem     (  ) Left Fem    (  ) SPC        (  ) pigtail       (  ) PEG tube       (  ) colostomy       (  ) jejunostomy  (  ) U-Dall    LABS:                        13.1   5.24  )-----------( 231      ( 30 Apr 2024 07:04 )             39.0     04-30    139  |  103  |  16  ----------------------------<  139<H>  4.1   |  30  |  0.5<L>    Ca    9.8      30 Apr 2024 07:04  Mg     1.9     04-30    TPro  6.9  /  Alb  3.8  /  TBili  0.4  /  DBili  x   /  AST  18  /  ALT  18  /  AlkPhos  155<H>  04-30      Urinalysis Basic - ( 30 Apr 2024 07:04 )    Color: x / Appearance: x / SG: x / pH: x  Gluc: 139 mg/dL / Ketone: x  / Bili: x / Urobili: x   Blood: x / Protein: x / Nitrite: x   Leuk Esterase: x / RBC: x / WBC x   Sq Epi: x / Non Sq Epi: x / Bacteria: x                RADIOLOGY:

## 2024-04-30 NOTE — PROGRESS NOTE ADULT - SUBJECTIVE AND OBJECTIVE BOX
COLON, DANISH  58y  Female      Patient is a 58y old  Female who presents with a chief complaint of anxiety w SOB.       INTERVAL HPI/OVERNIGHT EVENTS:      ******************************* REVIEW OF SYSTEMS:**********************************************    All other review of systems negative    *********************** VITALS ******************************************    T(F): 96.4 (04-30-24 @ 04:56)  HR: 97 (04-30-24 @ 04:56) (79 - 97)  BP: 140/68 (04-30-24 @ 04:56) (140/68 - 153/71)  RR: 18 (04-30-24 @ 04:56) (18 - 18)  SpO2: 93% (04-30-24 @ 04:56) (93% - 99%)            ******************************** PHYSICAL EXAM:**************************************************  GENERAL: NAD    PSYCH: no agitation, baseline mentation  HEENT:     NERVOUS SYSTEM:  Alert & Oriented X3,   PULMONARY: KODAK, CTA    CARDIOVASCULAR: S1S2 RRR    GI: Soft, NT, ND; BS present.    EXTREMITIES:  2+ Peripheral Pulses, No clubbing, cyanosis, or edema    LYMPH: No lymphadenopathy noted    SKIN: No rashes or lesions      **************************** LABS *******************************************************                          13.1   5.24  )-----------( 231      ( 30 Apr 2024 07:04 )             39.0     04-30    139  |  103  |  16  ----------------------------<  139<H>  4.1   |  30  |  0.5<L>    Ca    9.8      30 Apr 2024 07:04  Mg     1.9     04-30    TPro  6.9  /  Alb  3.8  /  TBili  0.4  /  DBili  x   /  AST  18  /  ALT  18  /  AlkPhos  155<H>  04-30      Urinalysis Basic - ( 30 Apr 2024 07:04 )    Color: x / Appearance: x / SG: x / pH: x  Gluc: 139 mg/dL / Ketone: x  / Bili: x / Urobili: x   Blood: x / Protein: x / Nitrite: x   Leuk Esterase: x / RBC: x / WBC x   Sq Epi: x / Non Sq Epi: x / Bacteria: x        Lactate Trend        CAPILLARY BLOOD GLUCOSE      POCT Blood Glucose.: 203 mg/dL (30 Apr 2024 11:12)          **************************Active Medications *******************************************  No Known Allergies      acetaminophen     Tablet .. 650 milliGRAM(s) Oral every 6 hours PRN  baclofen 5 milliGRAM(s) Oral every 8 hours PRN  chlorhexidine 2% Cloths 1 Application(s) Topical <User Schedule>  dextrose 10% Bolus 125 milliLiter(s) IV Bolus once  dextrose 5%. 1000 milliLiter(s) IV Continuous <Continuous>  dextrose 5%. 1000 milliLiter(s) IV Continuous <Continuous>  dextrose 50% Injectable 25 Gram(s) IV Push once  dextrose 50% Injectable 12.5 Gram(s) IV Push once  dextrose Oral Gel 15 Gram(s) Oral once PRN  enoxaparin Injectable 40 milliGRAM(s) SubCutaneous every 24 hours  glucagon  Injectable 1 milliGRAM(s) IntraMuscular once  insulin glargine Injectable (LANTUS) 45 Unit(s) SubCutaneous at bedtime  insulin lispro (ADMELOG) corrective regimen sliding scale   SubCutaneous three times a day before meals  levothyroxine 50 MICROGram(s) Oral daily  losartan 50 milliGRAM(s) Oral daily  metoprolol tartrate 50 milliGRAM(s) Oral two times a day  nicotine -  14 mG/24Hr(s) Patch 1 Patch Transdermal daily  nystatin Cream 1 Application(s) Topical two times a day  pregabalin 150 milliGRAM(s) Oral every 8 hours      ***************************************************  RADIOLOGY & ADDITIONAL TESTS:    Imaging Personally Reviewed:  [ ] YES  [ ] NO    HEALTH ISSUES - PROBLEM Dx:

## 2024-04-30 NOTE — PROGRESS NOTE ADULT - ASSESSMENT
Ms Godinez is a 58 VICK w a PMH of HTN, HLD, IDDM noncompliant with her insulin, neuropathy, HTN, hypothyroidism, mood disorder with 3 prior inpatient psychiatric hospitalizations, recent admission from 2/1 - 2/6 for DKA and a mechanical fall in March 2024 presents to the ED w "feeling overwhelmed" and associated SOB. In the ED vitals notable for /79 w , SpO2 97% on RA. Labs notable for Trop 9, Dimer of 414, gluc of 375 w AG of 18 and ProBNP of 326. CT PE protocol negative for PE but showed at 2.4 cm nodule in a multinodular thyroid. Patient aware of nodule and states it has never been worked up because "its never caused me any problems". Patient denies chest pain/pressure, diaphoresis neck/jaw/arm pain, cough, fever, rash, HA, vertigo, N/V, diarrhea, weakness, recent travel or change in medications. Patient is a poor historian and does not know if she had any recent changes in medication, vaccinations or hospitalizations. Did not recall her past admission for fall.  Patient admitted for observation and further assessment.     Problem List:  #SOB, RESOLVED   #'overwhelming feeling," RESOLVED   #Ho nicotine use   #Ho HTN  #Ho DLD  #Ho mood disorder (currently in patient psych)  given troponin negative, CT PE protocol negative for PE/consolidations/effusion, Trace atalectasis reported but not seen more likely anxiety driven then physical cause  SpO2 97% on RA  Patient reports being very displeased w her Psych facility and that they had to 4 point restrain her  no leukocytosis  Dimer 414  ProBNP 326, clinically euvolemic  vitally stable  -No Abx or supplimentary O2 at this time  -No suspicion Pneumonia  -PE r/o by CT and clinical   -tachycardia on presentation of 112 and BP of 175/79 likely due to anxiety  -restart home medications w Met succinate change to tartrate BID  Met Tar 50mg PO BID  Lostartan 50mg once daily  -nicotine patch 14mg once daily ordered  -if SOB returns please obtain CXR   -if desaturation please obtain rainbow labs and ABG  -patient does not recall what her medications are or if she takes any mood stabilizers  -will need out patient psych for mood stabilizers   -f/u CBC, CMP once daily  -if antipsych started get EKG for QTc baseline and monitoring q48hr     #IDDM  #AG 18  #Ho peripheral neuropathy  #Ho DKA   per chart poor compliance, per patient she takes all her meds every day   -c/w home Lantus 45U at bedtime  -c/w Lispro 20mg TID  -jardiance 25 on hold  -moderate dose sliding scale  -patient does not recall any hypoglycemic events BUT is poor historian   -f/u Beta hydroxy (low suspicion DKA given type II DM and no acidosis but given Gluc>300 will assess)     #2.4 cm thyroid nodule  #multinodular thyroid  #Ho Hypothyroid  -out patient endocrine referral for nodular w/u   -c/w Home levothyroxine 50mg once daily  -pt complaining of night sweats (4/29)    Misc:  DVT proph: Lovenox 40 once daily  GI Proph: NA  Diet: Carb consist, DASH/TLC  activity: as tolerated  dispo: stable    pending: pt requested walker to help ambulate    CODE: FULL

## 2024-04-30 NOTE — PHYSICAL THERAPY INITIAL EVALUATION ADULT - PERTINENT HX OF CURRENT PROBLEM, REHAB EVAL
Ms Godinez is a 58 VICK w a PMH of HTN, HLD, IDDM noncompliant with her insulin, neuropathy, HTN, hypothyroidism, mood disorder with 3 prior inpatient psychiatric hospitalizations, recent admission from 2/1 - 2/6 for DKA and a mechanical fall in March 2024 presents to the ED w "feeling overwhelmed" and associated SOB. In the ED vitals notable for /79 w , SpO2 97% on RA. Labs notable for Trop 9, Dimer of 414, gluc of 375 w AG of 18 and ProBNP of 326. CT PE protocol negative for PE but showed at 2.4 cm nodule in a multinodular thyroid. Patient aware of nodule and states it has never been worked up because "its never caused me any problems". Patient denies chest pain/pressure, diaphroesis, neck/jaw/arm pain, cough, fever, rash, HA, vertigo, N/V, diarrhea, weakness, recent travel or change in medications. Patient is a poor historian and does not know if she had any recent changes in medication, vaccinations or hospitalizations. Did not recall her past admission for fall.  Patient admitted for observation and further assessment.

## 2024-05-01 ENCOUNTER — TRANSCRIPTION ENCOUNTER (OUTPATIENT)
Age: 59
End: 2024-05-01

## 2024-05-01 LAB
GLUCOSE BLDC GLUCOMTR-MCNC: 142 MG/DL — HIGH (ref 70–99)
GLUCOSE BLDC GLUCOMTR-MCNC: 177 MG/DL — HIGH (ref 70–99)
GLUCOSE BLDC GLUCOMTR-MCNC: 215 MG/DL — HIGH (ref 70–99)
GLUCOSE BLDC GLUCOMTR-MCNC: 226 MG/DL — HIGH (ref 70–99)

## 2024-05-01 PROCEDURE — 72192 CT PELVIS W/O DYE: CPT | Mod: 26

## 2024-05-01 PROCEDURE — 99239 HOSP IP/OBS DSCHRG MGMT >30: CPT

## 2024-05-01 RX ORDER — SIMETHICONE 80 MG/1
80 TABLET, CHEWABLE ORAL ONCE
Refills: 0 | Status: DISCONTINUED | OUTPATIENT
Start: 2024-05-01 | End: 2024-05-01

## 2024-05-01 RX ORDER — DIPHENHYDRAMINE HCL 50 MG
25 CAPSULE ORAL ONCE
Refills: 0 | Status: COMPLETED | OUTPATIENT
Start: 2024-05-01 | End: 2024-05-01

## 2024-05-01 RX ORDER — PANTOPRAZOLE SODIUM 20 MG/1
20 TABLET, DELAYED RELEASE ORAL ONCE
Refills: 0 | Status: DISCONTINUED | OUTPATIENT
Start: 2024-05-01 | End: 2024-05-01

## 2024-05-01 RX ORDER — ONDANSETRON 8 MG/1
4 TABLET, FILM COATED ORAL ONCE
Refills: 0 | Status: DISCONTINUED | OUTPATIENT
Start: 2024-05-01 | End: 2024-05-01

## 2024-05-01 RX ORDER — NICOTINE POLACRILEX 2 MG
1 GUM BUCCAL ONCE
Refills: 0 | Status: COMPLETED | OUTPATIENT
Start: 2024-05-01 | End: 2024-05-01

## 2024-05-01 RX ADMIN — Medication 4: at 11:23

## 2024-05-01 RX ADMIN — NYSTATIN CREAM 1 APPLICATION(S): 100000 CREAM TOPICAL at 05:39

## 2024-05-01 RX ADMIN — INSULIN GLARGINE 45 UNIT(S): 100 INJECTION, SOLUTION SUBCUTANEOUS at 21:04

## 2024-05-01 RX ADMIN — Medication 650 MILLIGRAM(S): at 17:53

## 2024-05-01 RX ADMIN — Medication 150 MILLIGRAM(S): at 05:39

## 2024-05-01 RX ADMIN — Medication 1 PATCH: at 19:27

## 2024-05-01 RX ADMIN — Medication 650 MILLIGRAM(S): at 04:00

## 2024-05-01 RX ADMIN — Medication 150 MILLIGRAM(S): at 21:06

## 2024-05-01 RX ADMIN — Medication 1 PATCH: at 11:24

## 2024-05-01 RX ADMIN — NYSTATIN CREAM 1 APPLICATION(S): 100000 CREAM TOPICAL at 17:20

## 2024-05-01 RX ADMIN — CHLORHEXIDINE GLUCONATE 1 APPLICATION(S): 213 SOLUTION TOPICAL at 05:39

## 2024-05-01 RX ADMIN — Medication 650 MILLIGRAM(S): at 11:27

## 2024-05-01 RX ADMIN — Medication 50 MICROGRAM(S): at 05:38

## 2024-05-01 RX ADMIN — Medication 4: at 17:19

## 2024-05-01 RX ADMIN — Medication 50 MILLIGRAM(S): at 17:18

## 2024-05-01 RX ADMIN — LOSARTAN POTASSIUM 50 MILLIGRAM(S): 100 TABLET, FILM COATED ORAL at 05:38

## 2024-05-01 RX ADMIN — Medication 25 MILLIGRAM(S): at 21:31

## 2024-05-01 RX ADMIN — Medication 650 MILLIGRAM(S): at 17:23

## 2024-05-01 RX ADMIN — Medication 650 MILLIGRAM(S): at 03:06

## 2024-05-01 RX ADMIN — Medication 650 MILLIGRAM(S): at 10:57

## 2024-05-01 RX ADMIN — Medication 50 MILLIGRAM(S): at 05:38

## 2024-05-01 RX ADMIN — Medication 30 MILLILITER(S): at 01:58

## 2024-05-01 RX ADMIN — Medication 150 MILLIGRAM(S): at 13:01

## 2024-05-01 NOTE — PROGRESS NOTE ADULT - SUBJECTIVE AND OBJECTIVE BOX
Nephrology progress note     no specific complaints    ON events/Subjective:     Allergies:  No Known Allergies    Hospital Medications:   MEDICATIONS  (STANDING):  chlorhexidine 2% Cloths 1 Application(s) Topical <User Schedule>  dextrose 10% Bolus 125 milliLiter(s) IV Bolus once  dextrose 5%. 1000 milliLiter(s) (100 mL/Hr) IV Continuous <Continuous>  dextrose 5%. 1000 milliLiter(s) (50 mL/Hr) IV Continuous <Continuous>  dextrose 50% Injectable 25 Gram(s) IV Push once  dextrose 50% Injectable 12.5 Gram(s) IV Push once  enoxaparin Injectable 40 milliGRAM(s) SubCutaneous every 24 hours  glucagon  Injectable 1 milliGRAM(s) IntraMuscular once  insulin glargine Injectable (LANTUS) 45 Unit(s) SubCutaneous at bedtime  insulin lispro (ADMELOG) corrective regimen sliding scale   SubCutaneous three times a day before meals  levothyroxine 50 MICROGram(s) Oral daily  losartan 50 milliGRAM(s) Oral daily  metoprolol tartrate 50 milliGRAM(s) Oral two times a day  nicotine -  14 mG/24Hr(s) Patch 1 Patch Transdermal once  nystatin Cream 1 Application(s) Topical two times a day  pregabalin 150 milliGRAM(s) Oral every 8 hours    REVIEW OF SYSTEMS:  CONSTITUTIONAL: No weakness, fevers or chills  EYES/ENT: No visual changes;  No vertigo or throat pain   NECK: No pain or stiffness  RESPIRATORY: No cough, wheezing, hemoptysis; No shortness of breath  CARDIOVASCULAR: No chest pain or palpitations.  GASTROINTESTINAL: No abdominal or epigastric pain. No nausea, vomiting, or hematemesis; No diarrhea or constipation. No melena or hematochezia.  GENITOURINARY: No dysuria, frequency, foamy urine, urinary urgency, incontinence or hematuria  NEUROLOGICAL: No numbness or weakness  SKIN: No itching, burning, rashes, or lesions   VASCULAR: No bilateral lower extremity edema.   All other review of systems is negative unless indicated above.    VITALS:  T(F): 97.2 (05-01-24 @ 05:10), Max: 97.9 (04-30-24 @ 12:00)  HR: 66 (05-01-24 @ 07:30)  BP: 127/60 (05-01-24 @ 07:30)  RR: 18 (05-01-24 @ 05:10)  SpO2: 99% (05-01-24 @ 07:30)  Wt(kg): --      PHYSICAL EXAM:  Constitutional: NAD  HEENT: anicteric sclera, oropharynx clear, MMM  Neck: No JVD  Respiratory: CTAB, no wheezes, rales or rhonchi  Cardiovascular: S1, S2, RRR  Gastrointestinal: BS+, soft, NT/ND  Extremities: No cyanosis or clubbing. No peripheral edema  Neurological: A/O x 3, no focal deficits  Psychiatric: Normal mood, normal affect  : No CVA tenderness. No white.   Skin: No rashes  Vascular Access:    LABS:  04-30    139  |  103  |  16  ----------------------------<  139<H>  4.1   |  30  |  0.5<L>    Ca    9.8      30 Apr 2024 07:04  Mg     1.9     04-30    TPro  6.9  /  Alb  3.8  /  TBili  0.4  /  DBili      /  AST  18  /  ALT  18  /  AlkPhos  155<H>  04-30                          13.1   5.24  )-----------( 231      ( 30 Apr 2024 07:04 )             39.0       Urine Studies:  Creatinine Trend: 0.5<--, 0.5<--  Urinalysis Basic - ( 30 Apr 2024 07:04 )    Color:  / Appearance:  / SG:  / pH:   Gluc: 139 mg/dL / Ketone:   / Bili:  / Urobili:    Blood:  / Protein:  / Nitrite:    Leuk Esterase:  / RBC:  / WBC    Sq Epi:  / Non Sq Epi:  / Bacteria:         ·	Stable renal function  ·	BP now controlled on metoprolol and losartan  ·	per psychiatry no need IPP or change in medications  ·	pt on NH with pelvis pain on walking - prior admission in [facility] reported right fibular neck fx but no imaging of pelvis done    1) consider CT pelvis and hips - non contrast  2) continue BP meds  3) d.c planning per medicine  d/w medical resident

## 2024-05-01 NOTE — DISCHARGE NOTE PROVIDER - CARE PROVIDER_API CALL
La Ramirez  Geriatric Medicine  [address] [place] [address]  Phone: [phone]  Fax: [phone]  Follow Up Time: 2 weeks

## 2024-05-01 NOTE — DISCHARGE NOTE PROVIDER - NSDCCPCAREPLAN_GEN_ALL_CORE_FT
PRINCIPAL DISCHARGE DIAGNOSIS  Diagnosis: Panic attack  Assessment and Plan of Treatment: you were admitted and managed for Panic attack...  A panic attack is a sudden episode of intense fear that triggers severe physical reactions when there is no real danger or apparent cause. Panic attacks can be very frightening. When panic attacks occur, you might think you're losing control, having a heart attack or even dying.  Many people have just one or two panic attacks in their lifetimes, and the problem goes away, perhaps when a stressful situation ends. But if you've had recurrent, unexpected panic attacks and spent long periods in constant fear of another attack, you may have a condition called panic disorder.  Although panic attacks themselves aren't life-threatening, they can be frightening and significantly affect your quality of life. But treatment can be very effective.      SECONDARY DISCHARGE DIAGNOSES  Diagnosis: Altered mental status  Assessment and Plan of Treatment:     Diagnosis: Agitation  Assessment and Plan of Treatment:     Diagnosis: Bipolar disorder  Assessment and Plan of Treatment:

## 2024-05-01 NOTE — PROGRESS NOTE ADULT - SUBJECTIVE AND OBJECTIVE BOX
24H events:    Patient is a 58y old Female who presents with a chief complaint of anxiety w SOB (01 May 2024 10:04)    Primary diagnosis of Confusion  Today is hospital day 4d. This morning patient was seen and examined at bedside, resting comfortably in bed.    No acute or major events overnight.    Code Status:    Family communication:  Contact date:  Name of person contacted:  Relationship to patient:  Communication details:  What matters most:    PAST MEDICAL & SURGICAL HISTORY  Hypertension    Diabetes mellitus    Thyroid disease    Anemia    History of mood disorder    H/O diabetic neuropathy    Fibroid uterus    S/P lumbar discectomy  2006.    S/P tonsillectomy      SOCIAL HISTORY:  Social History:  from [facility] (27 Apr 2024 11:37)      ALLERGIES:  No Known Allergies    MEDICATIONS:  STANDING MEDICATIONS  chlorhexidine 2% Cloths 1 Application(s) Topical <User Schedule>  dextrose 10% Bolus 125 milliLiter(s) IV Bolus once  dextrose 5%. 1000 milliLiter(s) IV Continuous <Continuous>  dextrose 5%. 1000 milliLiter(s) IV Continuous <Continuous>  dextrose 50% Injectable 25 Gram(s) IV Push once  dextrose 50% Injectable 12.5 Gram(s) IV Push once  enoxaparin Injectable 40 milliGRAM(s) SubCutaneous every 24 hours  glucagon  Injectable 1 milliGRAM(s) IntraMuscular once  insulin glargine Injectable (LANTUS) 45 Unit(s) SubCutaneous at bedtime  insulin lispro (ADMELOG) corrective regimen sliding scale   SubCutaneous three times a day before meals  levothyroxine 50 MICROGram(s) Oral daily  losartan 50 milliGRAM(s) Oral daily  metoprolol tartrate 50 milliGRAM(s) Oral two times a day  nicotine -  14 mG/24Hr(s) Patch 1 Patch Transdermal once  nystatin Cream 1 Application(s) Topical two times a day  pregabalin 150 milliGRAM(s) Oral every 8 hours    PRN MEDICATIONS  acetaminophen     Tablet .. 650 milliGRAM(s) Oral every 6 hours PRN  baclofen 5 milliGRAM(s) Oral every 8 hours PRN  dextrose Oral Gel 15 Gram(s) Oral once PRN    VITALS:   T(F): 97.2  HR: 66  BP: 127/60  RR: 18  SpO2: 99%    PHYSICAL EXAM:      GENERAL:   ( x ) NAD, lying in bed comfortably     (  ) obtunded     (  ) lethargic     (  ) somnolent    HEAD:   (x  ) Atraumatic     (  ) hematoma     (  ) laceration (specify location:       )     NECK:  ( x Supple     (  ) neck stiffness     (  ) nuchal rigidity     (x  )  no JVD     (  ) JVD present ( -- cm)    HEART:  Rate -->     (x ) normal rate     (  ) bradycardic     (  ) tachycardic  Rhythm -->     ( x ) regular     (  ) regularly irregular     (  ) irregularly irregular  Murmurs -->     (x  ) normal s1s2     (  ) systolic murmur     (  ) diastolic murmur     (  ) continuous murmur      (  ) S3 present     (  ) S4 present    LUNGS:   (x  )Unlabored respirations     (  ) tachypnea  (  x) B/L air entry     (  ) decreased breath sounds in:  (location     )    ( x ) no adventitious sound     (  ) crackles     (  ) wheezing      (  ) rhonchi      (specify location:       )  (  ) chest wall tenderness (specify location:       )    ABDOMEN:   ( x ) Soft     (  ) tense   |   (  ) nondistended     (  ) distended   |   (  ) +BS     (  ) hypoactive bowel sounds     (  ) hyperactive bowel sounds  (  ) nontender     (  ) RUQ tenderness     (x  ) RLQ tenderness     (  ) LLQ tenderness     (  ) epigastric tenderness     (  ) diffuse tenderness  (  ) Splenomegaly      (  ) Hepatomegaly      (  ) Jaundice     (  ) ecchymosis     EXTREMITIES:  (  ) Normal     (  ) Rash     (  ) ecchymosis     (  ) varicose veins      (x  ) pitting edema     (  ) non-pitting edema   (  ) ulceration     (  ) gangrene:     (location:     )    NERVOUS SYSTEM:    ( x ) A&Ox3     (  ) confused     (  ) lethargic  CN II-XII:     (  ) Intact     (  ) deficits found     (Specify:     )   Upper extremities:     (  ) no sensorimotor deficits     (  ) weakness     (  ) loss of proprioception/vibration     (  ) loss of touch/temperature (specify:    )  Lower extremities:     (  ) no sensorimotor deficits     (  ) weakness     (  ) loss of proprioception/vibration     (  ) loss of touch/temperature (specify:    )    SKIN:   (  ) No rashes or lesions     (  ) maculopapular rash     (  ) pustules     (  ) vesicles     (  ) ulcer     (  ) ecchymosis     (specify location:     )    AMPAC score:            (  ) Indwelling Shannon Catheter:   Date insterted:    Reason (  ) Critical illness     (  ) urinary retention    (  ) Accurate Ins/Outs Monitoring     (  ) CMO patient    (  ) Central Line:   Date inserted:  Location: (  ) Right IJ     (  ) Left IJ     (  ) Right Fem     (  ) Left Fem    (  ) SPC        (  ) pigtail       (  ) PEG tube       (  ) colostomy       (  ) jejunostomy  (  ) U-Dall    LABS:                        13.1   5.24  )-----------( 231      ( 30 Apr 2024 07:04 )             39.0     04-30    139  |  103  |  16  ----------------------------<  139<H>  4.1   |  30  |  0.5<L>    Ca    9.8      30 Apr 2024 07:04  Mg     1.9     04-30    TPro  6.9  /  Alb  3.8  /  TBili  0.4  /  DBili  x   /  AST  18  /  ALT  18  /  AlkPhos  155<H>  04-30      Urinalysis Basic - ( 30 Apr 2024 07:04 )    Color: x / Appearance: x / SG: x / pH: x  Gluc: 139 mg/dL / Ketone: x  / Bili: x / Urobili: x   Blood: x / Protein: x / Nitrite: x   Leuk Esterase: x / RBC: x / WBC x   Sq Epi: x / Non Sq Epi: x / Bacteria: x                RADIOLOGY:

## 2024-05-01 NOTE — PROGRESS NOTE ADULT - ASSESSMENT
Ms Godinez is a 58 VICK w a PMH of HTN, HLD, IDDM noncompliant with her insulin, neuropathy, HTN, hypothyroidism, mood disorder with 3 prior inpatient psychiatric hospitalizations, recent admission from 2/1 - 2/6 for DKA and a mechanical fall in March 2024 presents to the ED w "feeling overwhelmed" and associated SOB. In the ED vitals notable for /79 w , SpO2 97% on RA. Labs notable for Trop 9, Dimer of 414, gluc of 375 w AG of 18 and ProBNP of 326. CT PE protocol negative for PE but showed at 2.4 cm nodule in a multinodular thyroid. Patient aware of nodule and states it has never been worked up because "its never caused me any problems". Patient denies chest pain/pressure, diaphoresis neck/jaw/arm pain, cough, fever, rash, HA, vertigo, N/V, diarrhea, weakness, recent travel or change in medications. Patient is a poor historian and does not know if she had any recent changes in medication, vaccinations or hospitalizations. Did not recall her past admission for fall.  Patient admitted for observation and further assessment.     Problem List:  #SOB, RESOLVED   #'overwhelming feeling," RESOLVED   #Ho nicotine use   #Ho HTN  #Ho DLD  #Ho mood disorder (currently in patient psych)  given troponin negative, CT PE protocol negative for PE/consolidations/effusion, Trace atalectasis reported but not seen more likely anxiety driven then physical cause  SpO2 97% on RA  Patient reports being very displeased w her Psych facility and that they had to 4 point restrain her  no leukocytosis  Dimer 414  ProBNP 326, clinically euvolemic  vitally stable  -No Abx or supplimentary O2 at this time  -No suspicion Pneumonia  -PE r/o by CT and clinical   -tachycardia on presentation of 112 and BP of 175/79 likely due to anxiety  -restart home medications w Met succinate change to tartrate BID  Met Tar 50mg PO BID  Lostartan 50mg once daily  -nicotine patch 14mg once daily ordered  -if SOB returns please obtain CXR   -if desaturation please obtain rainbow labs and ABG  -patient does not recall what her medications are or if she takes any mood stabilizers  -will need out patient psych for mood stabilizers   -f/u CBC, CMP once daily  -if antipsych started get EKG for QTc baseline and monitoring q48hr     #Hip Pain  #Pelvic pain  #Hx of fall  - Pt complains of hip pain. Has ha hx of fall on previous admission with finula fracture  - Will order CT hip.         #IDDM  #AG 18  #Ho peripheral neuropathy  #Ho DKA   per chart poor compliance, per patient she takes all her meds every day   -c/w home Lantus 45U at bedtime  -c/w Lispro 20mg TID  -jardiance 25 on hold  -moderate dose sliding scale  -patient does not recall any hypoglycemic events BUT is poor historian   -f/u Beta hydroxy (low suspicion DKA given type II DM and no acidosis but given Gluc>300 will assess)     #2.4 cm thyroid nodule  #multinodular thyroid  #Ho Hypothyroid  -out patient endocrine referral for nodular w/u   -c/w Home levothyroxine 50mg once daily  -pt complaining of night sweats (4/29)    Misc:  DVT proph: Lovenox 40 once daily  GI Proph: NA  Diet: Carb consist, DASH/TLC  activity: as tolerated  dispo: stable    pending: pt requested walker to help ambulate    CODE: FULL

## 2024-05-01 NOTE — DISCHARGE NOTE PROVIDER - HOSPITAL COURSE
Ms Godinez is a 58 VICK w a PMH of HTN, HLD, IDDM noncompliant with her insulin, neuropathy, HTN, hypothyroidism, mood disorder with 3 prior inpatient psychiatric hospitalizations, recent admission from 2/1 - 2/6 for DKA and a mechanical fall in March 2024 presents to the ED w "feeling overwhelmed" and associated SOB. In the ED vitals notable for /79 w , SpO2 97% on RA. Labs notable for Trop 9, Dimer of 414, gluc of 375 w AG of 18 and ProBNP of 326. CT PE protocol negative for PE but showed at 2.4 cm nodule in a multinodular thyroid. Patient aware of nodule and states it has never been worked up because "its never caused me any problems". Patient denies chest pain/pressure, diaphroesis, neck/jaw/arm pain, cough, fever, rash, HA, vertiog, N/V, diarrhea, weakness, recent travel or change in medications. Patient is a poor historian and does not know if she had any recent changes in medication, vaccinations or hospitalizations. Did not recall her past admission for fall.  Patient admitted for observation and further assessment.       #SOB, RESOLVED   #'overwhelming feeling," RESOLVED   #Ho nicotine use   #Ho HTN  #Ho DLD  #Ho mood disorder (currently in patient psych)  given troponin negative, CT PE protocol negative for PE/consolidations/effusion, Trace atalectasis reported but not seen more likely anxiety driven then physical cause  SpO2 97% on RA  Patient reports being very displeased w her Psych facility and that they had to 4 point restrain her  no leukocytosis  Dimer 414  ProBNP 326, clinically euvolemic  vitally stable  -No Abx or supplimentary O2 at this time  -No suspicion Pneumonia  -PE r/o by CT and clinical   -tachycardia on presentation of 112 and BP of 175/79 likely due to anxiety  -restart home medications w Met succinate change to tartrate BID  Met Tar 50mg PO BID  Lostartan 50mg once daily  -nicotine patch 14mg once daily ordered  -if SOB returns please obtain CXR   -if desaturation please obtain rainbow labs and ABG  -patient does not recall what her medications are or if she takes any mood stabilizers  -will need out patient psych for mood stabilizers   -f/u CBC, CMP once daily  -if antipsych started get EKG for QTc baseline and monitoring q48hr     #Hip Pain  #Pelvic pain  #Hx of fall  - Pt complains of hip pain. Has ha hx of fall on previous admission with finula fracture  - Will order CT hip.         #IDDM  #AG 18  #Ho peripheral neuropathy  #Ho DKA   per chart poor compliance, per patient she takes all her meds every day   -c/w home Lantus 45U at bedtime  -c/w Lispro 20mg TID  -jardiance 25 on hold  -moderate dose sliding scale  -patient does not recall any hypoglycemic events BUT is poor historian   -f/u Beta hydroxy (low suspicion DKA given type II DM and no acidosis but given Gluc>300 will assess)     #2.4 cm thyroid nodule  #multinodular thyroid  #Ho Hypothyroid  -out patient endocrine referral for nodular w/u   -c/w Home levothyroxine 50mg once daily  -pt complaining of night sweats (4/29)      Patient is medically stable for discharge.

## 2024-05-02 LAB
GLUCOSE BLDC GLUCOMTR-MCNC: 129 MG/DL — HIGH (ref 70–99)
GLUCOSE BLDC GLUCOMTR-MCNC: 173 MG/DL — HIGH (ref 70–99)
GLUCOSE BLDC GLUCOMTR-MCNC: 196 MG/DL — HIGH (ref 70–99)
GLUCOSE BLDC GLUCOMTR-MCNC: 271 MG/DL — HIGH (ref 70–99)

## 2024-05-02 PROCEDURE — 99232 SBSQ HOSP IP/OBS MODERATE 35: CPT

## 2024-05-02 PROCEDURE — 73562 X-RAY EXAM OF KNEE 3: CPT | Mod: 26,50

## 2024-05-02 RX ORDER — IBUPROFEN 200 MG
200 TABLET ORAL ONCE
Refills: 0 | Status: COMPLETED | OUTPATIENT
Start: 2024-05-02 | End: 2024-05-02

## 2024-05-02 RX ORDER — DIPHENHYDRAMINE HCL 50 MG
25 CAPSULE ORAL AT BEDTIME
Refills: 0 | Status: COMPLETED | OUTPATIENT
Start: 2024-05-02 | End: 2024-05-02

## 2024-05-02 RX ORDER — DIPHENHYDRAMINE HCL 50 MG
25 CAPSULE ORAL ONCE
Refills: 0 | Status: COMPLETED | OUTPATIENT
Start: 2024-05-02 | End: 2024-05-02

## 2024-05-02 RX ADMIN — LOSARTAN POTASSIUM 50 MILLIGRAM(S): 100 TABLET, FILM COATED ORAL at 05:09

## 2024-05-02 RX ADMIN — Medication 650 MILLIGRAM(S): at 08:23

## 2024-05-02 RX ADMIN — CHLORHEXIDINE GLUCONATE 1 APPLICATION(S): 213 SOLUTION TOPICAL at 05:11

## 2024-05-02 RX ADMIN — Medication 650 MILLIGRAM(S): at 08:53

## 2024-05-02 RX ADMIN — Medication 50 MILLIGRAM(S): at 17:08

## 2024-05-02 RX ADMIN — Medication 1 PATCH: at 07:43

## 2024-05-02 RX ADMIN — Medication 650 MILLIGRAM(S): at 01:29

## 2024-05-02 RX ADMIN — Medication 50 MILLIGRAM(S): at 05:09

## 2024-05-02 RX ADMIN — INSULIN GLARGINE 45 UNIT(S): 100 INJECTION, SOLUTION SUBCUTANEOUS at 21:30

## 2024-05-02 RX ADMIN — Medication 25 MILLIGRAM(S): at 05:22

## 2024-05-02 RX ADMIN — NYSTATIN CREAM 1 APPLICATION(S): 100000 CREAM TOPICAL at 05:09

## 2024-05-02 RX ADMIN — Medication 200 MILLIGRAM(S): at 11:17

## 2024-05-02 RX ADMIN — ENOXAPARIN SODIUM 40 MILLIGRAM(S): 100 INJECTION SUBCUTANEOUS at 21:30

## 2024-05-02 RX ADMIN — Medication 50 MICROGRAM(S): at 05:09

## 2024-05-02 RX ADMIN — Medication 2: at 08:23

## 2024-05-02 RX ADMIN — Medication 25 MILLIGRAM(S): at 22:30

## 2024-05-02 RX ADMIN — Medication 1 PATCH: at 11:17

## 2024-05-02 RX ADMIN — NYSTATIN CREAM 1 APPLICATION(S): 100000 CREAM TOPICAL at 17:07

## 2024-05-02 RX ADMIN — Medication 150 MILLIGRAM(S): at 21:31

## 2024-05-02 RX ADMIN — Medication 5 MILLIGRAM(S): at 23:15

## 2024-05-02 RX ADMIN — Medication 150 MILLIGRAM(S): at 05:09

## 2024-05-02 NOTE — PROGRESS NOTE ADULT - SUBJECTIVE AND OBJECTIVE BOX
Nephrology progress note     no specific complaints    ON events/Subjective:     Allergies:  No Known Allergies    Hospital Medications:   MEDICATIONS  (STANDING):  chlorhexidine 2% Cloths 1 Application(s) Topical <User Schedule>  dextrose 10% Bolus 125 milliLiter(s) IV Bolus once  dextrose 5%. 1000 milliLiter(s) (100 mL/Hr) IV Continuous <Continuous>  dextrose 5%. 1000 milliLiter(s) (50 mL/Hr) IV Continuous <Continuous>  dextrose 50% Injectable 25 Gram(s) IV Push once  dextrose 50% Injectable 12.5 Gram(s) IV Push once  enoxaparin Injectable 40 milliGRAM(s) SubCutaneous every 24 hours  glucagon  Injectable 1 milliGRAM(s) IntraMuscular once  insulin glargine Injectable (LANTUS) 45 Unit(s) SubCutaneous at bedtime  insulin lispro (ADMELOG) corrective regimen sliding scale   SubCutaneous three times a day before meals  levothyroxine 50 MICROGram(s) Oral daily  losartan 50 milliGRAM(s) Oral daily  metoprolol tartrate 50 milliGRAM(s) Oral two times a day  nystatin Cream 1 Application(s) Topical two times a day  pregabalin 150 milliGRAM(s) Oral every 8 hours    REVIEW OF SYSTEMS:  CONSTITUTIONAL: No weakness, fevers or chills  EYES/ENT: No visual changes;  No vertigo or throat pain   NECK: No pain or stiffness  RESPIRATORY: No cough, wheezing, hemoptysis; No shortness of breath  CARDIOVASCULAR: No chest pain or palpitations.  GASTROINTESTINAL: No abdominal or epigastric pain. No nausea, vomiting, or hematemesis; No diarrhea or constipation. No melena or hematochezia.  GENITOURINARY: No dysuria, frequency, foamy urine, urinary urgency, incontinence or hematuria  NEUROLOGICAL: No numbness or weakness  SKIN: No itching, burning, rashes, or lesions   VASCULAR: No bilateral lower extremity edema.   All other review of systems is negative unless indicated above.    VITALS:  T(F): 96.5 (05-02-24 @ 05:00), Max: 98.4 (05-01-24 @ 19:06)  HR: 86 (05-02-24 @ 05:00)  BP: 128/68 (05-02-24 @ 05:00)  RR: 18 (05-02-24 @ 05:00)  SpO2: 98% (05-02-24 @ 07:30)  Wt(kg): --      PHYSICAL EXAM:  Constitutional: NAD  HEENT: anicteric sclera, oropharynx clear, MMM  Neck: No JVD  Respiratory: CTAB, no wheezes, rales or rhonchi  Cardiovascular: S1, S2, RRR  Gastrointestinal: BS+, soft, NT/ND  Extremities: No cyanosis or clubbing. No peripheral edema  Neurological: A/O x 3, no focal deficits  Psychiatric: Normal mood, normal affect  : No CVA tenderness. No white.   Skin: No rashes  Vascular Access:    LABS:            Urine Studies:  Creatinine Trend: 0.5<--, 0.5<--  Urinalysis Basic - ( 30 Apr 2024 07:04 )    Color:  / Appearance:  / SG:  / pH:   Gluc: 139 mg/dL / Ketone:   / Bili:  / Urobili:    Blood:  / Protein:  / Nitrite:    Leuk Esterase:  / RBC:  / WBC    Sq Epi:  / Non Sq Epi:  / Bacteria:     ·Stable renal function  ·BP now controlled on metoprolol and losartan  ·per psychiatry no need IPP or change in medications  ·pelvic CT wo fx - left sacral sclerotic lesion    1) d/w Dr Valderrama of radiology - suggest mri of sacrum to evalaute sclerotic lesion - please consider inpatient as very difficult to obtain as outpatient  2) continue BP meds  d/w medical resident Dr Hensley

## 2024-05-02 NOTE — PROGRESS NOTE ADULT - ASSESSMENT
Ms Godinez is a 58 VICK w a PMH of HTN, HLD, IDDM noncompliant with her insulin, neuropathy, HTN, hypothyroidism, mood disorder with 3 prior inpatient psychiatric hospitalizations, recent admission from 2/1 - 2/6 for DKA and a mechanical fall in March 2024 presents to the ED w "feeling overwhelmed" and associated SOB. In the ED vitals notable for /79 w , SpO2 97% on RA. Labs notable for Trop 9, Dimer of 414, gluc of 375 w AG of 18 and ProBNP of 326. CT PE protocol negative for PE but showed at 2.4 cm nodule in a multinodular thyroid. Patient aware of nodule and states it has never been worked up because "its never caused me any problems". Patient denies chest pain/pressure, diaphoresis neck/jaw/arm pain, cough, fever, rash, HA, vertigo, N/V, diarrhea, weakness, recent travel or change in medications. Patient is a poor historian and does not know if she had any recent changes in medication, vaccinations or hospitalizations. Did not recall her past admission for fall.  Patient admitted for observation and further assessment.     Problem List:  #SOB, RESOLVED   #'overwhelming feeling," RESOLVED   #Ho nicotine use   #Ho HTN  #Ho DLD  #Ho mood disorder (currently in patient psych)  given troponin negative, CT PE protocol negative for PE/consolidations/effusion, Trace atalectasis reported but not seen more likely anxiety driven then physical cause  SpO2 97% on RA  Patient reports being very displeased w her Psych facility and that they had to 4 point restrain her  no leukocytosis  Dimer 414  ProBNP 326, clinically euvolemic  vitally stable  -No Abx or supplimentary O2 at this time  -No suspicion Pneumonia  -PE r/o by CT and clinical   -tachycardia on presentation of 112 and BP of 175/79 likely due to anxiety  -restart home medications w Met succinate change to tartrate BID  Met Tar 50mg PO BID  Lostartan 50mg once daily  -nicotine patch 14mg once daily ordered  -if SOB returns please obtain CXR   -if desaturation please obtain rainbow labs and ABG  -patient does not recall what her medications are or if she takes any mood stabilizers  -will need out patient psych for mood stabilizers   -f/u CBC, CMP once daily  -if antipsych started get EKG for QTc baseline and monitoring q48hr     #Hip Pain  #Pelvic pain  #Hx of fall  - Pt complains of hip pain. Has ha hx of fall on previous admission with finula fracture  - Will order CT hip. < from: CT Pelvis No Cont (05.01.24 @ 11:24) > IMPRESSION: Since 7/28/2020. 2.2 cm sclerotic lesion left sacral ala, previously 1.6 cm. Recommend pre  and postcontrast MRI No acute fractures. Stable myomatous uterus      #IDDM  #AG 18  #Ho peripheral neuropathy  #Ho DKA   per chart poor compliance, per patient she takes all her meds every day   -c/w home Lantus 45U at bedtime  -c/w Lispro 20mg TID  -jardiance 25 on hold  -moderate dose sliding scale  -patient does not recall any hypoglycemic events BUT is poor historian   -f/u Beta hydroxy (low suspicion DKA given type II DM and no acidosis but given Gluc>300 will assess)     #2.4 cm thyroid nodule  #multinodular thyroid  #Ho Hypothyroid  -out patient endocrine referral for nodular w/u   -c/w Home levothyroxine 50mg once daily  -pt complaining of night sweats (4/29)    Misc:  DVT proph: Lovenox 40 once daily  GI Proph: NA  Diet: Carb consist, DASH/TLC  activity: as tolerated  dispo: stable    pending: pt requested walker to help ambulate    CODE: FULL

## 2024-05-02 NOTE — PROGRESS NOTE ADULT - SUBJECTIVE AND OBJECTIVE BOX
24H events:    Patient is a 58y old Female who presents with a chief complaint of anxiety w SOB (02 May 2024 10:35)    Primary diagnosis of Panic attack  Today is hospital day 5d. This morning patient was seen and examined at bedside, resting comfortably in bed.    No acute or major events overnight.    Code Status:    Family communication:  Contact date:  Name of person contacted:  Relationship to patient:  Communication details:  What matters most:    PAST MEDICAL & SURGICAL HISTORY  Hypertension    Diabetes mellitus    Thyroid disease    Anemia    History of mood disorder    H/O diabetic neuropathy    Fibroid uterus    S/P lumbar discectomy  2006.    S/P tonsillectomy      SOCIAL HISTORY:  Social History:  from [facility] (27 Apr 2024 11:37)      ALLERGIES:  No Known Allergies    MEDICATIONS:  STANDING MEDICATIONS  chlorhexidine 2% Cloths 1 Application(s) Topical <User Schedule>  dextrose 10% Bolus 125 milliLiter(s) IV Bolus once  dextrose 5%. 1000 milliLiter(s) IV Continuous <Continuous>  dextrose 5%. 1000 milliLiter(s) IV Continuous <Continuous>  dextrose 50% Injectable 25 Gram(s) IV Push once  dextrose 50% Injectable 12.5 Gram(s) IV Push once  enoxaparin Injectable 40 milliGRAM(s) SubCutaneous every 24 hours  glucagon  Injectable 1 milliGRAM(s) IntraMuscular once  insulin glargine Injectable (LANTUS) 45 Unit(s) SubCutaneous at bedtime  insulin lispro (ADMELOG) corrective regimen sliding scale   SubCutaneous three times a day before meals  levothyroxine 50 MICROGram(s) Oral daily  losartan 50 milliGRAM(s) Oral daily  metoprolol tartrate 50 milliGRAM(s) Oral two times a day  nystatin Cream 1 Application(s) Topical two times a day  pregabalin 150 milliGRAM(s) Oral every 8 hours    PRN MEDICATIONS  acetaminophen     Tablet .. 650 milliGRAM(s) Oral every 6 hours PRN  baclofen 5 milliGRAM(s) Oral every 8 hours PRN  dextrose Oral Gel 15 Gram(s) Oral once PRN    VITALS:   T(F): 96.5  HR: 86  BP: 128/68  RR: 18  SpO2: 98%    PHYSICAL EXAM:        GENERAL:   ( x ) NAD, lying in bed comfortably     (  ) obtunded     (  ) lethargic     (  ) somnolent    HEAD:   (x  ) Atraumatic     (  ) hematoma     (  ) laceration (specify location:       )     NECK:  ( x Supple     (  ) neck stiffness     (  ) nuchal rigidity     (x  )  no JVD     (  ) JVD present ( -- cm)    HEART:  Rate -->     (x ) normal rate     (  ) bradycardic     (  ) tachycardic  Rhythm -->     ( x ) regular     (  ) regularly irregular     (  ) irregularly irregular  Murmurs -->     (x  ) normal s1s2     (  ) systolic murmur     (  ) diastolic murmur     (  ) continuous murmur      (  ) S3 present     (  ) S4 present    LUNGS:   (x  )Unlabored respirations     (  ) tachypnea  (  x) B/L air entry     (  ) decreased breath sounds in:  (location     )    ( x ) no adventitious sound     (  ) crackles     (  ) wheezing      (  ) rhonchi      (specify location:       )  (  ) chest wall tenderness (specify location:       )    ABDOMEN:   ( x ) Soft     (  ) tense   |   (  ) nondistended     (  ) distended   |   (  ) +BS     (  ) hypoactive bowel sounds     (  ) hyperactive bowel sounds  (  ) nontender     (  ) RUQ tenderness     (x  ) RLQ tenderness     (  ) LLQ tenderness     (  ) epigastric tenderness     (  ) diffuse tenderness  (  ) Splenomegaly      (  ) Hepatomegaly      (  ) Jaundice     (  ) ecchymosis     EXTREMITIES:  (  ) Normal     (  ) Rash     (  ) ecchymosis     (  ) varicose veins      (x  ) pitting edema     (  ) non-pitting edema   (  ) ulceration     (  ) gangrene:     (location:     )    NERVOUS SYSTEM:    ( x ) A&Ox3     (  ) confused     (  ) lethargic  CN II-XII:     (  ) Intact     (  ) deficits found     (Specify:     )   Upper extremities:     (  ) no sensorimotor deficits     (  ) weakness     (  ) loss of proprioception/vibration     (  ) loss of touch/temperature (specify:    )  Lower extremities:     (  ) no sensorimotor deficits     (  ) weakness     (  ) loss of proprioception/vibration     (  ) loss of touch/temperature (specify:    )    SKIN:   (  ) No rashes or lesions     (  ) maculopapular rash     (  ) pustules     (  ) vesicles     (  ) ulcer     (  ) ecchymosis     (specify location:     )    AMPAC score:        (  ) Indwelling Shannon Catheter:   Date insterted:    Reason (  ) Critical illness     (  ) urinary retention    (  ) Accurate Ins/Outs Monitoring     (  ) CMO patient    (  ) Central Line:   Date inserted:  Location: (  ) Right IJ     (  ) Left IJ     (  ) Right Fem     (  ) Left Fem    (  ) SPC        (  ) pigtail       (  ) PEG tube       (  ) colostomy       (  ) jejunostomy  (  ) U-Dall    LABS:                        RADIOLOGY:           24H events:    Patient is a 58y old Female who presents with a chief complaint of anxiety w SOB (02 May 2024 10:35)    Primary diagnosis of Panic attack  Today is hospital day 5d. This morning patient was seen and examined at bedside, resting comfortably in bed.    No acute or major events overnight. has knee pain ( left>right)       PAST MEDICAL & SURGICAL HISTORY  Hypertension    Diabetes mellitus    Thyroid disease    Anemia    History of mood disorder    H/O diabetic neuropathy    Fibroid uterus    S/P lumbar discectomy  2006.    S/P tonsillectomy      SOCIAL HISTORY:  Social History:  from [facility] (27 Apr 2024 11:37)      ALLERGIES:  No Known Allergies    MEDICATIONS:  STANDING MEDICATIONS  chlorhexidine 2% Cloths 1 Application(s) Topical <User Schedule>  dextrose 10% Bolus 125 milliLiter(s) IV Bolus once  dextrose 5%. 1000 milliLiter(s) IV Continuous <Continuous>  dextrose 5%. 1000 milliLiter(s) IV Continuous <Continuous>  dextrose 50% Injectable 25 Gram(s) IV Push once  dextrose 50% Injectable 12.5 Gram(s) IV Push once  enoxaparin Injectable 40 milliGRAM(s) SubCutaneous every 24 hours  glucagon  Injectable 1 milliGRAM(s) IntraMuscular once  insulin glargine Injectable (LANTUS) 45 Unit(s) SubCutaneous at bedtime  insulin lispro (ADMELOG) corrective regimen sliding scale   SubCutaneous three times a day before meals  levothyroxine 50 MICROGram(s) Oral daily  losartan 50 milliGRAM(s) Oral daily  metoprolol tartrate 50 milliGRAM(s) Oral two times a day  nystatin Cream 1 Application(s) Topical two times a day  pregabalin 150 milliGRAM(s) Oral every 8 hours    PRN MEDICATIONS  acetaminophen     Tablet .. 650 milliGRAM(s) Oral every 6 hours PRN  baclofen 5 milliGRAM(s) Oral every 8 hours PRN  dextrose Oral Gel 15 Gram(s) Oral once PRN    VITALS:   T(F): 96.5  HR: 86  BP: 128/68  RR: 18  SpO2: 98%    PHYSICAL EXAM:        GENERAL:   ( x ) NAD, lying in bed comfortably     (  ) obtunded     (  ) lethargic     (  ) somnolent    HEAD:   (x  ) Atraumatic     (  ) hematoma     (  ) laceration (specify location:       )     NECK:  ( x Supple     (  ) neck stiffness     (  ) nuchal rigidity     (x  )  no JVD     (  ) JVD present ( -- cm)    HEART:  Rate -->     (x ) normal rate     (  ) bradycardic     (  ) tachycardic  Rhythm -->     ( x ) regular     (  ) regularly irregular     (  ) irregularly irregular  Murmurs -->     (x  ) normal s1s2     (  ) systolic murmur     (  ) diastolic murmur     (  ) continuous murmur      (  ) S3 present     (  ) S4 present    LUNGS:   (x  )Unlabored respirations     (  ) tachypnea  (  x) B/L air entry     (  ) decreased breath sounds in:  (location     )    ( x ) no adventitious sound     (  ) crackles     (  ) wheezing      (  ) rhonchi      (specify location:       )  (  ) chest wall tenderness (specify location:       )    ABDOMEN:   ( x ) Soft     (  ) tense   |   (  ) nondistended     (  ) distended   |   (  ) +BS     (  ) hypoactive bowel sounds     (  ) hyperactive bowel sounds  (  ) nontender     (  ) RUQ tenderness     (x  ) RLQ tenderness     (  ) LLQ tenderness     (  ) epigastric tenderness     (  ) diffuse tenderness  (  ) Splenomegaly      (  ) Hepatomegaly      (  ) Jaundice     (  ) ecchymosis     EXTREMITIES:  (  ) Normal     (  ) Rash     (  ) ecchymosis     (  ) varicose veins      (x  ) pitting edema     (  ) non-pitting edema   (  ) ulceration     (  ) gangrene:     (location:     )    NERVOUS SYSTEM:    ( x ) A&Ox3     (  ) confused     (  ) lethargic  CN II-XII:     (  ) Intact     (  ) deficits found     (Specify:     )   Upper extremities:     (  ) no sensorimotor deficits     (  ) weakness     (  ) loss of proprioception/vibration     (  ) loss of touch/temperature (specify:    )  Lower extremities:     (  ) no sensorimotor deficits     (  ) weakness     (  ) loss of proprioception/vibration     (  ) loss of touch/temperature (specify:    )    SKIN:   (  ) No rashes or lesions     (  ) maculopapular rash     (  ) pustules     (  ) vesicles     (  ) ulcer     (  ) ecchymosis     (specify location:     )    AMPAC score:        (  ) Indwelling Shannon Catheter:   Date insterted:    Reason (  ) Critical illness     (  ) urinary retention    (  ) Accurate Ins/Outs Monitoring     (  ) CMO patient    (  ) Central Line:   Date inserted:  Location: (  ) Right IJ     (  ) Left IJ     (  ) Right Fem     (  ) Left Fem    (  ) SPC        (  ) pigtail       (  ) PEG tube       (  ) colostomy       (  ) jejunostomy  (  ) U-Dall    LABS:                        RADIOLOGY:

## 2024-05-03 LAB
GLUCOSE BLDC GLUCOMTR-MCNC: 121 MG/DL — HIGH (ref 70–99)
GLUCOSE BLDC GLUCOMTR-MCNC: 175 MG/DL — HIGH (ref 70–99)
GLUCOSE BLDC GLUCOMTR-MCNC: 231 MG/DL — HIGH (ref 70–99)
GLUCOSE BLDC GLUCOMTR-MCNC: 273 MG/DL — HIGH (ref 70–99)

## 2024-05-03 PROCEDURE — 99232 SBSQ HOSP IP/OBS MODERATE 35: CPT

## 2024-05-03 PROCEDURE — 72197 MRI PELVIS W/O & W/DYE: CPT | Mod: 26

## 2024-05-03 RX ORDER — SIMETHICONE 80 MG/1
80 TABLET, CHEWABLE ORAL ONCE
Refills: 0 | Status: COMPLETED | OUTPATIENT
Start: 2024-05-03 | End: 2024-05-03

## 2024-05-03 RX ORDER — DIPHENHYDRAMINE HCL 50 MG
25 CAPSULE ORAL AT BEDTIME
Refills: 0 | Status: COMPLETED | OUTPATIENT
Start: 2024-05-03 | End: 2024-05-03

## 2024-05-03 RX ORDER — LIDOCAINE 4 G/100G
1 CREAM TOPICAL DAILY
Refills: 0 | Status: DISCONTINUED | OUTPATIENT
Start: 2024-05-03 | End: 2024-05-10

## 2024-05-03 RX ORDER — POLYETHYLENE GLYCOL 3350 17 G/17G
17 POWDER, FOR SOLUTION ORAL
Refills: 0 | Status: DISCONTINUED | OUTPATIENT
Start: 2024-05-03 | End: 2024-05-10

## 2024-05-03 RX ADMIN — INSULIN GLARGINE 45 UNIT(S): 100 INJECTION, SOLUTION SUBCUTANEOUS at 21:15

## 2024-05-03 RX ADMIN — Medication 150 MILLIGRAM(S): at 05:45

## 2024-05-03 RX ADMIN — NYSTATIN CREAM 1 APPLICATION(S): 100000 CREAM TOPICAL at 17:27

## 2024-05-03 RX ADMIN — SIMETHICONE 80 MILLIGRAM(S): 80 TABLET, CHEWABLE ORAL at 02:53

## 2024-05-03 RX ADMIN — Medication 25 MILLIGRAM(S): at 23:45

## 2024-05-03 RX ADMIN — CHLORHEXIDINE GLUCONATE 1 APPLICATION(S): 213 SOLUTION TOPICAL at 05:48

## 2024-05-03 RX ADMIN — Medication 5 MILLIGRAM(S): at 20:46

## 2024-05-03 RX ADMIN — Medication 650 MILLIGRAM(S): at 17:45

## 2024-05-03 RX ADMIN — Medication 2: at 11:58

## 2024-05-03 RX ADMIN — Medication 150 MILLIGRAM(S): at 15:27

## 2024-05-03 RX ADMIN — ENOXAPARIN SODIUM 40 MILLIGRAM(S): 100 INJECTION SUBCUTANEOUS at 21:15

## 2024-05-03 RX ADMIN — Medication 50 MILLIGRAM(S): at 17:26

## 2024-05-03 RX ADMIN — Medication 150 MILLIGRAM(S): at 21:15

## 2024-05-03 RX ADMIN — Medication 650 MILLIGRAM(S): at 18:42

## 2024-05-03 RX ADMIN — LIDOCAINE 1 PATCH: 4 CREAM TOPICAL at 20:50

## 2024-05-03 RX ADMIN — Medication 50 MILLIGRAM(S): at 05:47

## 2024-05-03 RX ADMIN — Medication 50 MICROGRAM(S): at 05:46

## 2024-05-03 RX ADMIN — LOSARTAN POTASSIUM 50 MILLIGRAM(S): 100 TABLET, FILM COATED ORAL at 05:46

## 2024-05-03 RX ADMIN — NYSTATIN CREAM 1 APPLICATION(S): 100000 CREAM TOPICAL at 05:46

## 2024-05-03 RX ADMIN — POLYETHYLENE GLYCOL 3350 17 GRAM(S): 17 POWDER, FOR SOLUTION ORAL at 17:27

## 2024-05-03 RX ADMIN — Medication 4: at 17:26

## 2024-05-03 NOTE — PROGRESS NOTE ADULT - SUBJECTIVE AND OBJECTIVE BOX
Nephrology progress note     alert to baseline    ON events/Subjective:     Allergies:  No Known Allergies    Hospital Medications:   MEDICATIONS  (STANDING):  chlorhexidine 2% Cloths 1 Application(s) Topical <User Schedule>  dextrose 10% Bolus 125 milliLiter(s) IV Bolus once  dextrose 5%. 1000 milliLiter(s) (50 mL/Hr) IV Continuous <Continuous>  dextrose 5%. 1000 milliLiter(s) (100 mL/Hr) IV Continuous <Continuous>  dextrose 50% Injectable 25 Gram(s) IV Push once  dextrose 50% Injectable 12.5 Gram(s) IV Push once  enoxaparin Injectable 40 milliGRAM(s) SubCutaneous every 24 hours  glucagon  Injectable 1 milliGRAM(s) IntraMuscular once  insulin glargine Injectable (LANTUS) 45 Unit(s) SubCutaneous at bedtime  insulin lispro (ADMELOG) corrective regimen sliding scale   SubCutaneous three times a day before meals  levothyroxine 50 MICROGram(s) Oral daily  losartan 50 milliGRAM(s) Oral daily  metoprolol tartrate 50 milliGRAM(s) Oral two times a day  nystatin Cream 1 Application(s) Topical two times a day  polyethylene glycol 3350 17 Gram(s) Oral two times a day  pregabalin 150 milliGRAM(s) Oral every 8 hours    REVIEW OF SYSTEMS:  CONSTITUTIONAL: No weakness, fevers or chills  EYES/ENT: No visual changes;  No vertigo or throat pain   NECK: No pain or stiffness  RESPIRATORY: No cough, wheezing, hemoptysis; No shortness of breath  CARDIOVASCULAR: No chest pain or palpitations.  GASTROINTESTINAL: No abdominal or epigastric pain. No nausea, vomiting, or hematemesis; No diarrhea or constipation. No melena or hematochezia.  GENITOURINARY: No dysuria, frequency, foamy urine, urinary urgency, incontinence or hematuria  NEUROLOGICAL: No numbness or weakness  SKIN: No itching, burning, rashes, or lesions   VASCULAR: No bilateral lower extremity edema.   All other review of systems is negative unless indicated above.    VITALS:  T(F): 97.7 (05-03-24 @ 05:24), Max: 98.1 (05-02-24 @ 20:33)  HR: 94 (05-03-24 @ 05:24)  BP: 134/77 (05-03-24 @ 05:24)  RR: 19 (05-03-24 @ 05:24)  SpO2: 98% (05-03-24 @ 05:24)  Wt(kg): --      PHYSICAL EXAM:  Constitutional: NAD  HEENT: anicteric sclera, oropharynx clear, MMM  Neck: No JVD  Respiratory: CTAB, no wheezes, rales or rhonchi  Cardiovascular: S1, S2, RRR  Gastrointestinal: BS+, soft, NT/ND  Extremities: No cyanosis or clubbing. No peripheral edema  Neurological: A/O x 3, no focal deficits  Psychiatric: Normal mood, normal affect  : No CVA tenderness. No white.   Skin: No rashes  Vascular Access:    LABS:            Urine Studies:  Creatinine Trend: 0.5<--, 0.5<--  Urinalysis Basic - ( 30 Apr 2024 07:04 )    Color:  / Appearance:  / SG:  / pH:   Gluc: 139 mg/dL / Ketone:   / Bili:  / Urobili:    Blood:  / Protein:  / Nitrite:    Leuk Esterase:  / RBC:  / WBC    Sq Epi:  / Non Sq Epi:  / Bacteria:     ·Stable renal function  ·BP now controlled on metoprolol and losartan  ·per psychiatry no need IPP or change in medications  ·pelvic CT wo fx - left sacral sclerotic lesion  possible panic attack on admission    1) for MRI af sacrum  2) continue BP meds  discharge planning per medicine

## 2024-05-04 LAB
GLUCOSE BLDC GLUCOMTR-MCNC: 107 MG/DL — HIGH (ref 70–99)
GLUCOSE BLDC GLUCOMTR-MCNC: 180 MG/DL — HIGH (ref 70–99)
GLUCOSE BLDC GLUCOMTR-MCNC: 204 MG/DL — HIGH (ref 70–99)
GLUCOSE BLDC GLUCOMTR-MCNC: 247 MG/DL — HIGH (ref 70–99)
LDH SERPL L TO P-CCNC: 193 — SIGNIFICANT CHANGE UP (ref 50–242)

## 2024-05-04 PROCEDURE — 99232 SBSQ HOSP IP/OBS MODERATE 35: CPT

## 2024-05-04 RX ORDER — LANOLIN ALCOHOL/MO/W.PET/CERES
3 CREAM (GRAM) TOPICAL ONCE
Refills: 0 | Status: COMPLETED | OUTPATIENT
Start: 2024-05-04 | End: 2024-05-04

## 2024-05-04 RX ADMIN — Medication 150 MILLIGRAM(S): at 05:24

## 2024-05-04 RX ADMIN — CHLORHEXIDINE GLUCONATE 1 APPLICATION(S): 213 SOLUTION TOPICAL at 05:22

## 2024-05-04 RX ADMIN — POLYETHYLENE GLYCOL 3350 17 GRAM(S): 17 POWDER, FOR SOLUTION ORAL at 05:20

## 2024-05-04 RX ADMIN — Medication 50 MILLIGRAM(S): at 05:20

## 2024-05-04 RX ADMIN — Medication 50 MICROGRAM(S): at 05:20

## 2024-05-04 RX ADMIN — Medication 150 MILLIGRAM(S): at 21:10

## 2024-05-04 RX ADMIN — Medication 50 MILLIGRAM(S): at 18:26

## 2024-05-04 RX ADMIN — Medication 150 MILLIGRAM(S): at 13:17

## 2024-05-04 RX ADMIN — Medication 3 MILLIGRAM(S): at 22:14

## 2024-05-04 RX ADMIN — Medication 650 MILLIGRAM(S): at 05:29

## 2024-05-04 RX ADMIN — Medication 4: at 11:44

## 2024-05-04 RX ADMIN — NYSTATIN CREAM 1 APPLICATION(S): 100000 CREAM TOPICAL at 05:22

## 2024-05-04 RX ADMIN — LOSARTAN POTASSIUM 50 MILLIGRAM(S): 100 TABLET, FILM COATED ORAL at 05:20

## 2024-05-04 RX ADMIN — INSULIN GLARGINE 45 UNIT(S): 100 INJECTION, SOLUTION SUBCUTANEOUS at 21:09

## 2024-05-04 RX ADMIN — Medication 650 MILLIGRAM(S): at 15:19

## 2024-05-04 RX ADMIN — NYSTATIN CREAM 1 APPLICATION(S): 100000 CREAM TOPICAL at 18:26

## 2024-05-04 RX ADMIN — Medication 2: at 17:28

## 2024-05-04 NOTE — PROGRESS NOTE ADULT - SUBJECTIVE AND OBJECTIVE BOX
pt seen and examined.     My notes supersede resident's notes in case of discrepancy       ROS: no cp, no sob, no n/v, no fever    Vital Signs Last 24 Hrs  T(C): 36.6 (04 May 2024 05:16), Max: 36.8 (03 May 2024 21:00)  T(F): 97.9 (04 May 2024 05:16), Max: 98.3 (03 May 2024 21:00)  HR: 71 (04 May 2024 05:16) (71 - 74)  BP: 143/81 (04 May 2024 05:16) (121/76 - 143/81)  BP(mean): --  RR: 19 (04 May 2024 05:16) (18 - 19)  SpO2: 98% (04 May 2024 05:16) (97% - 98%)    Parameters below as of 03 May 2024 21:00  Patient On (Oxygen Delivery Method): room air        physical exam  constitutional NAD, AAOX3, Respiratory  lungs CTA, CVS heart RRR, GI: abdomen Soft NT, ND, BS+, skin: intact  neuro exam no focal deficit     MEDICATIONS  (STANDING):  chlorhexidine 2% Cloths 1 Application(s) Topical <User Schedule>  dextrose 10% Bolus 125 milliLiter(s) IV Bolus once  dextrose 5%. 1000 milliLiter(s) (100 mL/Hr) IV Continuous <Continuous>  dextrose 5%. 1000 milliLiter(s) (50 mL/Hr) IV Continuous <Continuous>  dextrose 50% Injectable 25 Gram(s) IV Push once  dextrose 50% Injectable 12.5 Gram(s) IV Push once  enoxaparin Injectable 40 milliGRAM(s) SubCutaneous every 24 hours  glucagon  Injectable 1 milliGRAM(s) IntraMuscular once  insulin glargine Injectable (LANTUS) 45 Unit(s) SubCutaneous at bedtime  insulin lispro (ADMELOG) corrective regimen sliding scale   SubCutaneous three times a day before meals  levothyroxine 50 MICROGram(s) Oral daily  losartan 50 milliGRAM(s) Oral daily  metoprolol tartrate 50 milliGRAM(s) Oral two times a day  nystatin Cream 1 Application(s) Topical two times a day  polyethylene glycol 3350 17 Gram(s) Oral two times a day  pregabalin 150 milliGRAM(s) Oral every 8 hours    MEDICATIONS  (PRN):  acetaminophen     Tablet .. 650 milliGRAM(s) Oral every 6 hours PRN Mild Pain (1 - 3)  baclofen 5 milliGRAM(s) Oral every 8 hours PRN Musculoskeletal Pain  dextrose Oral Gel 15 Gram(s) Oral once PRN Blood Glucose LESS THAN 70 milliGRAM(s)/deciliter  lidocaine   4% Patch 1 Patch Transdermal daily PRN knee pain    D-Dimer Assay, Quantitative: 414 ng/mL DDU (04-26-24 @ 21:27)    < from: MR Pelvis w/wo IV Cont (05.03.24 @ 14:25) >    1. Multiple osseous masses highly suspicious for neoplastic/metastatic   disease.  2. Enlarged bilateral inguinal lymph nodes.    < end of copied text >    a/p  58 F with PMH of HTN, HLD, IDDM noncompliant with her insulin, neuropathy, HTN, hypothyroidism, mood disorder with 3 prior inpatient psychiatric hospitalizations, recent admission from 2/1 - 2/6 for DKA and a mechanical fall in March 2024 presents to the ED w "feeling overwhelmed" and associated SOB. In the ED vitals notable for /79 w , SpO2 97% on RA. Labs notable for Trop 9, Dimer of 414, gluc of 375 w AG of 18 and ProBNP of 326. CT PE protocol negative for PE but showed at 2.4 cm nodule in a multinodular thyroid. Patient aware of nodule and states it has never been worked up because "its never caused me any problems". Patient denies chest pain/pressure, diaphroesis, neck/jaw/arm pain, cough, fever, rash, HA, vertiog, N/V, diarrhea, weakness, recent travel or change in medications. Patient is a poor historian and does not know if she had any recent changes in medication, vaccinations or hospitalizations. Did not recall her past admission for fall.  Patient admitted for observation and further assessment.     A/P :  # hip pain, ct is not diagnostic, MRI suspicious for neoplastic disease, with multiple lymph nodes,   oncology consult     # SOB , Possible Panic attack with SOB and anxiety. now calm and stable   #Ho mood disorder now stable   cont meds   # DM , with diabetic neuropathy,  controlled, ( not optimal ) hx of dka ,   POCT Blood Glucose.: 204 mg/dL (05-04-24 @ 11:28)  POCT Blood Glucose.: 107 mg/dL (05-04-24 @ 07:51)  POCT Blood Glucose.: 273 mg/dL (05-03-24 @ 21:13)  POCT Blood Glucose.: 231 mg/dL (05-03-24 @ 16:55)  POCT Blood Glucose.: 175 mg/dL (05-03-24 @ 11:50)    A1C with Estimated Average Glucose Result: 7.1(04.28.24 @ 06:51)    -c/w home Lantus 45U at bedtime  -c/w Lispro 20mg TID  -jardiance 25 on hold  -moderate dose sliding scale  -patient does not recall any hypoglycemic events BUT is poor historian   - Beta hydroxy 0.8  (low suspicion DKA )      #2.4 cm thyroid nodule  #multinodular thyroid  #Ho Hypothyroid  -out patient endocrine referral for nodular w/u   - LE edema attributed to Hypothyroid state.   -c/w Home levothyroxine 50mg once daily, Check TSH ( 5.95 in Feb '24)      # left knee pain, probably OA, xray left knee, conservative management     # right knee < from: Xray Tibia + Fibula 2 Views, Right (02.26.24 @ 22:34) >  Proximal fibular neck fracture with mild callus formation, possibly   subacute.  Swelling around the lateral malleolus with no definite fracture.  < end of copied text >  no pain on the right, repeat xray for followup,   consider knee brace     #Progress Note Handoff    Pending : oncology consult   Family discussion: dw pt   Disposition: NH when medically cleared   code status: full code .

## 2024-05-05 LAB
CEA SERPL-MCNC: 3.7 NG/ML — SIGNIFICANT CHANGE UP (ref 0–3.8)
GLUCOSE BLDC GLUCOMTR-MCNC: 134 MG/DL — HIGH (ref 70–99)
GLUCOSE BLDC GLUCOMTR-MCNC: 166 MG/DL — HIGH (ref 70–99)
GLUCOSE BLDC GLUCOMTR-MCNC: 230 MG/DL — HIGH (ref 70–99)
GLUCOSE BLDC GLUCOMTR-MCNC: 259 MG/DL — HIGH (ref 70–99)
GLUCOSE BLDC GLUCOMTR-MCNC: 277 MG/DL — HIGH (ref 70–99)
LDH SERPL L TO P-CCNC: 214 — SIGNIFICANT CHANGE UP (ref 50–242)

## 2024-05-05 PROCEDURE — 99232 SBSQ HOSP IP/OBS MODERATE 35: CPT

## 2024-05-05 PROCEDURE — 99253 IP/OBS CNSLTJ NEW/EST LOW 45: CPT

## 2024-05-05 RX ORDER — IOHEXOL 300 MG/ML
30 INJECTION, SOLUTION INTRAVENOUS ONCE
Refills: 0 | Status: COMPLETED | OUTPATIENT
Start: 2024-05-05 | End: 2024-05-05

## 2024-05-05 RX ADMIN — Medication 650 MILLIGRAM(S): at 01:42

## 2024-05-05 RX ADMIN — Medication 650 MILLIGRAM(S): at 09:02

## 2024-05-05 RX ADMIN — ENOXAPARIN SODIUM 40 MILLIGRAM(S): 100 INJECTION SUBCUTANEOUS at 22:07

## 2024-05-05 RX ADMIN — CHLORHEXIDINE GLUCONATE 1 APPLICATION(S): 213 SOLUTION TOPICAL at 05:32

## 2024-05-05 RX ADMIN — NYSTATIN CREAM 1 APPLICATION(S): 100000 CREAM TOPICAL at 05:30

## 2024-05-05 RX ADMIN — Medication 650 MILLIGRAM(S): at 10:00

## 2024-05-05 RX ADMIN — Medication 4: at 18:06

## 2024-05-05 RX ADMIN — LIDOCAINE 1 PATCH: 4 CREAM TOPICAL at 17:35

## 2024-05-05 RX ADMIN — Medication 50 MILLIGRAM(S): at 05:30

## 2024-05-05 RX ADMIN — Medication 50 MICROGRAM(S): at 05:30

## 2024-05-05 RX ADMIN — Medication 150 MILLIGRAM(S): at 22:06

## 2024-05-05 RX ADMIN — Medication 650 MILLIGRAM(S): at 00:55

## 2024-05-05 RX ADMIN — Medication 6: at 12:31

## 2024-05-05 RX ADMIN — Medication 50 MILLIGRAM(S): at 17:36

## 2024-05-05 RX ADMIN — LOSARTAN POTASSIUM 50 MILLIGRAM(S): 100 TABLET, FILM COATED ORAL at 05:30

## 2024-05-05 RX ADMIN — Medication 150 MILLIGRAM(S): at 14:06

## 2024-05-05 RX ADMIN — Medication 650 MILLIGRAM(S): at 17:20

## 2024-05-05 RX ADMIN — Medication 650 MILLIGRAM(S): at 16:46

## 2024-05-05 RX ADMIN — Medication 150 MILLIGRAM(S): at 06:33

## 2024-05-05 RX ADMIN — NYSTATIN CREAM 1 APPLICATION(S): 100000 CREAM TOPICAL at 17:36

## 2024-05-05 RX ADMIN — INSULIN GLARGINE 45 UNIT(S): 100 INJECTION, SOLUTION SUBCUTANEOUS at 22:07

## 2024-05-05 NOTE — CONSULT NOTE ADULT - ASSESSMENT
Ms Godinez is a 58 VICK w a PMH of HTN, HLD, IDDM noncompliant with her insulin, neuropathy, HTN, hypothyroidism, mood disorder with 3 prior inpatient psychiatric hospitalizations, recent admission from 2/1 - 2/6 for DKA and a mechanical fall in March 2024 presents to the ED w "feeling overwhelmed" and associated SOB. In the ED vitals notable for /79 w , SpO2 97% on RA. Labs notable for Trop 9, Dimer of 414, gluc of 375 w AG of 18 and ProBNP of 326.     MRI pelvis for hip pain revealed sclerotic bone lesions r/o neoplastic disease. heme Onc consulted for neoplastic work up    Impression:    # Sclerotic pelvic bone lesions with enlarged inguinal lymph nodes r/o underlying malignancy    - Pt complains of hip pain. Has ha hx of fall on previous admission with fibula fracture  - CT Pelvis No Cont (05.01.24 @ 11:24) > IMPRESSION: Since 7/28/2020. 2.2 cm sclerotic lesion left sacral ala, previously 1.6 cm. Stable myomatous uterus  - MRI suspicious for neoplastic disease, with enlarged bilateral inguinal lymph nodes  - CT chest:  No acute thoracic pathology. Multinodular enlargedthyroid gland with largest nodule measuring up to 2.4 cm on the right.   - No anemia, hypercalcemia, renal dysfunction    # 2.4 cm thyroid nodule  # multinodular thyroid  # Ho Hypothyroid    -c/w Home levothyroxine 50mg once daily    Recommendations:    - Surgery consult for excisional biopsy of inguinal lymph nodes. If not possible - would recommend CT guided biopsy of one of the pelvic bone lesions  - check serum CA-125, CEA, CA 19-9, SPEP, UPEP, Serum DAVIS, LDH, Beta-2 Microglobulin  - Please call back once above work up is completed.        Ms Godinez is a 58 VICK w a PMH of HTN, HLD, IDDM noncompliant with her insulin, neuropathy, HTN, hypothyroidism, mood disorder with 3 prior inpatient psychiatric hospitalizations, recent admission from 2/1 - 2/6 for DKA and a mechanical fall in March 2024 presents to the ED w "feeling overwhelmed" and associated SOB. In the ED vitals notable for /79 w , SpO2 97% on RA. Labs notable for Trop 9, Dimer of 414, gluc of 375 w AG of 18 and ProBNP of 326.     MRI pelvis for hip pain revealed sclerotic bone lesions r/o neoplastic disease. heme Onc consulted for neoplastic work up    Impression:    # Sclerotic pelvic bone lesions with enlarged inguinal lymph nodes r/o underlying malignancy    - Pt complains of hip pain. Has ha hx of fall on previous admission with fibula fracture  - CT Pelvis No Cont (05.01.24 @ 11:24) > IMPRESSION: Since 7/28/2020. 2.2 cm sclerotic lesion left sacral ala, previously 1.6 cm. Stable myomatous uterus  - MRI suspicious for neoplastic disease, with enlarged bilateral inguinal lymph nodes  - CT chest:  No acute thoracic pathology. Multinodular enlargedthyroid gland with largest nodule measuring up to 2.4 cm on the right.   - No anemia, hypercalcemia, renal dysfunction  - no palpable mass on breast exam    # 2.4 cm thyroid nodule  # multinodular thyroid  # Ho Hypothyroid    -c/w Home levothyroxine 50mg once daily    Recommendations:    - check CT Abdomen w/w/o IV contrast to complete work up  - recommend CT guided biopsy of one of the pelvic bone lesions  - check serum CA-125, CEA, CA 19-9, SPEP, AFP UPEP, Serum DAVIS, LDH, Beta-2 Microglobulin  - Please call back once above work up is completed.        Ms Godinez is a 58 VICK w a PMH of HTN, HLD, IDDM noncompliant with her insulin, neuropathy, HTN, hypothyroidism, mood disorder with 3 prior inpatient psychiatric hospitalizations, recent admission from 2/1 - 2/6 for DKA and a mechanical fall in March 2024 presents to the ED w "feeling overwhelmed" and associated SOB. In the ED vitals notable for /79 w , SpO2 97% on RA. Labs notable for Trop 9, Dimer of 414, gluc of 375 w AG of 18 and ProBNP of 326.     MRI pelvis for hip pain revealed sclerotic bone lesions r/o neoplastic disease. heme Onc consulted for neoplastic work up    Impression:    # Sclerotic pelvic bone lesions with enlarged inguinal lymph nodes r/o underlying malignancy    - Pt complains of hip pain. Has ha hx of fall on previous admission with fibula fracture  - CT Pelvis No Cont (05.01.24 @ 11:24) > IMPRESSION: Since 7/28/2020. 2.2 cm sclerotic lesion left sacral ala, previously 1.6 cm. Stable myomatous uterus  - MRI suspicious for neoplastic disease, with enlarged bilateral inguinal lymph nodes  - CT chest:  No acute thoracic pathology. Multinodular enlarged thyroid gland with largest nodule measuring up to 2.4 cm on the right.   - No anemia, hypercalcemia, renal dysfunction  - no palpable mass on breast exam    # 2.4 cm thyroid nodule  # multinodular thyroid  # Ho Hypothyroid    -c/w Home levothyroxine 50mg once daily    Recommendations:    - check CT Abdomen w/w/o IV contrast to complete work up  - recommend CT guided biopsy of one of the pelvic bone lesions  - check serum CA-125, CEA, CA 19-9, SPEP, Ca15-3, AFP UPEP, Serum DAVIS, LDH, Beta-2 Microglobulin  - Please call back once above work up is completed.

## 2024-05-05 NOTE — PROGRESS NOTE ADULT - ASSESSMENT
Ms Godinez is a 58 VICK w a PMH of HTN, HLD, IDDM noncompliant with her insulin, neuropathy, HTN, hypothyroidism, mood disorder with 3 prior inpatient psychiatric hospitalizations, recent admission from 2/1 - 2/6 for DKA and a mechanical fall in March 2024 presents to the ED w "feeling overwhelmed" and associated SOB. In the ED vitals notable for /79 w , SpO2 97% on RA. Labs notable for Trop 9, Dimer of 414, gluc of 375 w AG of 18 and ProBNP of 326. CT PE protocol negative for PE but showed at 2.4 cm nodule in a multinodular thyroid. Patient aware of nodule and states it has never been worked up because "its never caused me any problems". Patient denies chest pain/pressure, diaphoresis neck/jaw/arm pain, cough, fever, rash, HA, vertigo, N/V, diarrhea, weakness, recent travel or change in medications. Patient is a poor historian and does not know if she had any recent changes in medication, vaccinations or hospitalizations. Did not recall her past admission for fall.  Patient admitted for observation and further assessment.     Problem List:  #SOB, RESOLVED   #'overwhelming feeling," RESOLVED   #Ho nicotine use   #Ho HTN  #Ho DLD  #Ho mood disorder (currently in patient psych)  given troponin negative, CT PE protocol negative for PE/consolidations/effusion, Trace atalectasis reported but not seen more likely anxiety driven then physical cause  SpO2 97% on RA  Patient reports being very displeased w her Psych facility and that they had to 4 point restrain her  no leukocytosis  Dimer 414  ProBNP 326, clinically euvolemic  vitally stable  -No Abx or supplimentary O2 at this time  -No suspicion Pneumonia  -PE r/o by CT and clinical   -tachycardia on presentation of 112 and BP of 175/79 likely due to anxiety  -restart home medications w Met succinate change to tartrate BID  Met Tar 50mg PO BID  Lostartan 50mg once daily  -nicotine patch 14mg once daily ordered  -if SOB returns please obtain CXR   -if desaturation please obtain rainbow labs and ABG  -patient does not recall what her medications are or if she takes any mood stabilizers  -will need out patient psych for mood stabilizers   -f/u CBC, CMP once daily  -if antipsych started get EKG for QTc baseline and monitoring q48hr     #Hip Pain  #Pelvic pain  #Hx of fall  - Pt complains of hip pain. Has ha hx of fall on previous admission with finula fracture  - Will order CT hip. < from: CT Pelvis No Cont (05.01.24 @ 11:24) > IMPRESSION: Since 7/28/2020. 2.2 cm sclerotic lesion left sacral ala, previously 1.6 cm. Recommend pre  and postcontrast MRI No acute fractures. Stable myomatous uterus      #IDDM  #AG 18  #Ho peripheral neuropathy  #Ho DKA   per chart poor compliance, per patient she takes all her meds every day   -c/w home Lantus 45U at bedtime  -c/w Lispro 20mg TID  -jardiance 25 on hold  -moderate dose sliding scale  -patient does not recall any hypoglycemic events BUT is poor historian   -f/u Beta hydroxy (low suspicion DKA given type II DM and no acidosis but given Gluc>300 will assess)     #2.4 cm thyroid nodule  #multinodular thyroid  #Ho Hypothyroid  -out patient endocrine referral for nodular w/u   -c/w Home levothyroxine 50mg once daily  -pt complaining of night sweats (4/29)    Misc:  DVT proph: Lovenox 40 once daily  GI Proph: NA  Diet: Carb consist, DASH/TLC  activity: as tolerated  dispo: stable    pending: oncology consult      CODE: FULL        Ms Godinez is a 58 VICK w a PMH of HTN, HLD, IDDM noncompliant with her insulin, neuropathy, HTN, hypothyroidism, mood disorder with 3 prior inpatient psychiatric hospitalizations, recent admission from 2/1 - 2/6 for DKA and a mechanical fall in March 2024 presents to the ED w "feeling overwhelmed" and associated SOB. In the ED vitals notable for /79 w , SpO2 97% on RA. Labs notable for Trop 9, Dimer of 414, gluc of 375 w AG of 18 and ProBNP of 326. CT PE protocol negative for PE but showed at 2.4 cm nodule in a multinodular thyroid. Patient aware of nodule and states it has never been worked up because "its never caused me any problems". Patient denies chest pain/pressure, diaphoresis neck/jaw/arm pain, cough, fever, rash, HA, vertigo, N/V, diarrhea, weakness, recent travel or change in medications. Patient is a poor historian and does not know if she had any recent changes in medication, vaccinations or hospitalizations. Did not recall her past admission for fall.  Patient admitted for observation and further assessment.     Problem List:  #SOB, RESOLVED   #'overwhelming feeling," RESOLVED   #Ho nicotine use   #Ho HTN  #Ho DLD  #Ho mood disorder (currently in patient psych)  given troponin negative, CT PE protocol negative for PE/consolidations/effusion, Trace atalectasis reported but not seen more likely anxiety driven then physical cause  SpO2 97% on RA  Patient reports being very displeased w her Psych facility and that they had to 4 point restrain her  no leukocytosis  Dimer 414  ProBNP 326, clinically euvolemic  vitally stable  -No Abx or supplimentary O2 at this time  -No suspicion Pneumonia  -PE r/o by CT and clinical   -tachycardia on presentation of 112 and BP of 175/79 likely due to anxiety  -restart home medications w Met succinate change to tartrate BID  Met Tar 50mg PO BID  Lostartan 50mg once daily  -nicotine patch 14mg once daily ordered  -if SOB returns please obtain CXR   -if desaturation please obtain rainbow labs and ABG  -patient does not recall what her medications are or if she takes any mood stabilizers  -will need out patient psych for mood stabilizers   -f/u CBC, CMP once daily  -if antipsych started get EKG for QTc baseline and monitoring q48hr     #Hip Pain  #Pelvic pain  #Hx of fall  - Pt complains of hip pain. Has ha hx of fall on previous admission with finula fracture  - Will order CT hip. < from: CT Pelvis No Cont (05.01.24 @ 11:24) > IMPRESSION: Since 7/28/2020. 2.2 cm sclerotic lesion left sacral ala, previously 1.6 cm. Recommend pre  and postcontrast MRI No acute fractures. Stable myomatous uterus  - MRI suspicious for neoplastic disease, with multiple lymph nodes,   - oncology consult       #IDDM  #AG 18  #Ho peripheral neuropathy  #Ho DKA   per chart poor compliance, per patient she takes all her meds every day   -c/w home Lantus 45U at bedtime  -c/w Lispro 20mg TID  -jardiance 25 on hold  -moderate dose sliding scale  -patient does not recall any hypoglycemic events BUT is poor historian   -f/u Beta hydroxy (low suspicion DKA given type II DM and no acidosis but given Gluc>300 will assess)     #2.4 cm thyroid nodule  #multinodular thyroid  #Ho Hypothyroid  -out patient endocrine referral for nodular w/u   -c/w Home levothyroxine 50mg once daily  -pt complaining of night sweats (4/29)    Misc:  DVT proph: Lovenox 40 once daily  GI Proph: NA  Diet: Carb consist, DASH/TLC  activity: as tolerated  dispo: stable    pending: oncology consult      CODE: FULL

## 2024-05-05 NOTE — CONSULT NOTE ADULT - SUBJECTIVE AND OBJECTIVE BOX
Hematology Consult Note    HPI:  Ms Godinez is a 58 VICK w a PMH of HTN, HLD, IDDM noncompliant with her insulin, neuropathy, HTN, hypothyroidism, mood disorder with 3 prior inpatient psychiatric hospitalizations, recent admission from 2/1 - 2/6 for DKA and a mechanical fall in March 2024 presents to the ED w "feeling overwhelmed" and associated SOB. In the ED vitals notable for /79 w , SpO2 97% on RA. Labs notable for Trop 9, Dimer of 414, gluc of 375 w AG of 18 and ProBNP of 326. CT PE protocol negative for PE but showed at 2.4 cm nodule in a multinodular thyroid. Patient aware of nodule and states it has never been worked up because "its never caused me any problems". Patient denies chest pain/pressure, diaphroesis, neck/jaw/arm pain, cough, fever, rash, HA, vertiog, N/V, diarrhea, weakness, recent travel or change in medications. Patient is a poor historian and does not know if she had any recent changes in medication, vaccinations or hospitalizations. Did not recall her past admission for fall.  Patient admitted for observation and further assessment.  (27 Apr 2024 11:37)      Allergies    No Known Allergies    Intolerances        MEDICATIONS  (STANDING):  chlorhexidine 2% Cloths 1 Application(s) Topical <User Schedule>  dextrose 10% Bolus 125 milliLiter(s) IV Bolus once  dextrose 5%. 1000 milliLiter(s) (100 mL/Hr) IV Continuous <Continuous>  dextrose 5%. 1000 milliLiter(s) (50 mL/Hr) IV Continuous <Continuous>  dextrose 50% Injectable 25 Gram(s) IV Push once  dextrose 50% Injectable 12.5 Gram(s) IV Push once  enoxaparin Injectable 40 milliGRAM(s) SubCutaneous every 24 hours  glucagon  Injectable 1 milliGRAM(s) IntraMuscular once  insulin glargine Injectable (LANTUS) 45 Unit(s) SubCutaneous at bedtime  insulin lispro (ADMELOG) corrective regimen sliding scale   SubCutaneous three times a day before meals  levothyroxine 50 MICROGram(s) Oral daily  losartan 50 milliGRAM(s) Oral daily  metoprolol tartrate 50 milliGRAM(s) Oral two times a day  nystatin Cream 1 Application(s) Topical two times a day  polyethylene glycol 3350 17 Gram(s) Oral two times a day    MEDICATIONS  (PRN):  acetaminophen     Tablet .. 650 milliGRAM(s) Oral every 6 hours PRN Mild Pain (1 - 3)  baclofen 5 milliGRAM(s) Oral every 8 hours PRN Musculoskeletal Pain  dextrose Oral Gel 15 Gram(s) Oral once PRN Blood Glucose LESS THAN 70 milliGRAM(s)/deciliter  lidocaine   4% Patch 1 Patch Transdermal daily PRN knee pain      PAST MEDICAL & SURGICAL HISTORY:  Hypertension      Diabetes mellitus      Thyroid disease      Anemia      History of mood disorder      H/O diabetic neuropathy      Fibroid uterus      S/P lumbar discectomy  2006.      S/P tonsillectomy          FAMILY HISTORY:      SOCIAL HISTORY: No EtOH, no tobacco    REVIEW OF SYSTEMS:    CONSTITUTIONAL: No weakness, fevers or chills  EYES/ENT: No visual changes;  No vertigo or throat pain   NECK: No pain or stiffness  RESPIRATORY: No cough, wheezing, hemoptysis; No shortness of breath  CARDIOVASCULAR: No chest pain or palpitations  GASTROINTESTINAL: No abdominal or epigastric pain. No nausea, vomiting, or hematemesis; No diarrhea or constipation. No melena or hematochezia.  GENITOURINARY: No dysuria, frequency or hematuria  NEUROLOGICAL: No numbness or weakness  SKIN: No itching, burning, rashes, or lesions   All other review of systems is negative unless indicated above.        T(F): --  HR: 91 (05-04-24 @ 18:33)  BP: 116/68 (05-04-24 @ 18:33)  RR: --  SpO2: --  Wt(kg): --    GENERAL: NAD, well-developed  HEAD:  Atraumatic, Normocephalic  EYES: EOMI, PERRLA, conjunctiva and sclera clear  NECK: Supple, No JVD  CHEST/LUNG: Clear to auscultation bilaterally; No wheeze  HEART: Regular rate and rhythm; No murmurs, rubs, or gallops  ABDOMEN: Soft, Nontender, Nondistended; Bowel sounds present  EXTREMITIES:  2+ Peripheral Pulses, No clubbing, cyanosis, or edema  NEUROLOGY: non-focal  SKIN: No rashes or lesions                Lactate Dehydrogenase, Serum: 193 (05-04 @ 11:43)

## 2024-05-05 NOTE — PROGRESS NOTE ADULT - SUBJECTIVE AND OBJECTIVE BOX
pt seen and examined.     My notes supersede resident's notes in case of discrepancy       ROS: no cp, no sob, no n/v, no fever    Vital Signs Last 24 Hrs  T(C): 36.4 (05 May 2024 05:00), Max: 36.4 (05 May 2024 05:00)  T(F): 97.5 (05 May 2024 05:00), Max: 97.5 (05 May 2024 05:00)  HR: 86 (05 May 2024 05:00) (86 - 91)  BP: 122/78 (05 May 2024 05:00) (116/68 - 122/78)  BP(mean): --  RR: 18 (05 May 2024 05:00) (18 - 18)  SpO2: 98% (05 May 2024 05:00) (98% - 98%)    Parameters below as of 05 May 2024 05:00  Patient On (Oxygen Delivery Method): room air        physical exam  constitutional NAD, AAOX3, Respiratory  lungs CTA, CVS heart RRR, GI: abdomen Soft NT, ND, BS+, skin: intact  neuro exam no focal deficit     MEDICATIONS  (STANDING):  chlorhexidine 2% Cloths 1 Application(s) Topical <User Schedule>  dextrose 10% Bolus 125 milliLiter(s) IV Bolus once  dextrose 5%. 1000 milliLiter(s) (50 mL/Hr) IV Continuous <Continuous>  dextrose 5%. 1000 milliLiter(s) (100 mL/Hr) IV Continuous <Continuous>  dextrose 50% Injectable 25 Gram(s) IV Push once  dextrose 50% Injectable 12.5 Gram(s) IV Push once  enoxaparin Injectable 40 milliGRAM(s) SubCutaneous every 24 hours  glucagon  Injectable 1 milliGRAM(s) IntraMuscular once  insulin glargine Injectable (LANTUS) 45 Unit(s) SubCutaneous at bedtime  insulin lispro (ADMELOG) corrective regimen sliding scale   SubCutaneous three times a day before meals  iohexol 300 mG (iodine)/mL Oral Solution 30 milliLiter(s) Oral once  levothyroxine 50 MICROGram(s) Oral daily  losartan 50 milliGRAM(s) Oral daily  metoprolol tartrate 50 milliGRAM(s) Oral two times a day  nystatin Cream 1 Application(s) Topical two times a day  polyethylene glycol 3350 17 Gram(s) Oral two times a day  pregabalin 150 milliGRAM(s) Oral every 8 hours    MEDICATIONS  (PRN):  acetaminophen     Tablet .. 650 milliGRAM(s) Oral every 6 hours PRN Mild Pain (1 - 3)  baclofen 5 milliGRAM(s) Oral every 8 hours PRN Musculoskeletal Pain  dextrose Oral Gel 15 Gram(s) Oral once PRN Blood Glucose LESS THAN 70 milliGRAM(s)/deciliter  lidocaine   4% Patch 1 Patch Transdermal daily PRN knee pain    D-Dimer Assay, Quantitative: 414 ng/mL DDU (04-26-24 @ 21:27)    < from: CT Pelvis No Cont (05.01.24 @ 11:24) >  2.2 cm sclerotic lesion left sacral ala, previously 1.6 cm. Recommend pre   and postcontrast MRI    No acute fractures.    Stable myomatous uterus    < end of copied text >    < from: MR Pelvis w/wo IV Cont (05.03.24 @ 14:25) >  1. Multiple osseous masses highly suspicious for neoplastic/metastatic   disease.  2. Enlarged bilateral inguinal lymph nodes.        Spoke with JOHN FAIRBANKS on 5/3/2024 2:57 PM with readback.    < end of copied text >    < from: Xray Knee 3 Views, Bilateral (05.02.24 @ 13:56) >  Right: Again seen is subacute to chronic right fibular neck fracture now   demonstrating more mature callus formation and partial to near complete   bony bridging. No joint effusion. No acute fracture or dislocation. No   significant degenerative change.    Left: Diffuse osteopenia. No acute fracture or dislocation. Mild medial   compartment joint space narrowing. Small joint effusion.    < end of copied text >    a/p  a/p  58 F with PMH of HTN, HLD, IDDM noncompliant with her insulin, neuropathy, HTN, hypothyroidism, mood disorder with 3 prior inpatient psychiatric hospitalizations, recent admission from 2/1 - 2/6 for DKA and a mechanical fall in March 2024 presents to the ED w "feeling overwhelmed" and associated SOB. In the ED vitals notable for /79 w , SpO2 97% on RA. Labs notable for Trop 9, Dimer of 414, gluc of 375 w AG of 18 and ProBNP of 326. CT PE protocol negative for PE but showed at 2.4 cm nodule in a multinodular thyroid. Patient aware of nodule and states it has never been worked up because "its never caused me any problems". Patient denies chest pain/pressure, diaphroesis, neck/jaw/arm pain, cough, fever, rash, HA, vertiog, N/V, diarrhea, weakness, recent travel or change in medications. Patient is a poor historian and does not know if she had any recent changes in medication, vaccinations or hospitalizations. Did not recall her past admission for fall.  Patient admitted for observation and further assessment.     # hip pain, ct is not diagnostic, MRI suspicious for neoplastic disease, with multiple lymph nodes, thyroid nodule   oncology consult appreciated   - CT Abdomen w/w/o IV contrast to complete work up  - IR consult for CT guided biopsy of one of the pelvic bone lesions  - serum CA-125, CEA, CA 19-9, SPEP, Ca15-3, AFP UPEP, Serum DAVIS, LDH, Beta-2 Microglobulin    # SOB , Possible Panic attack with SOB and anxiety. now calm and stable   #Ho mood disorder now stable   cont meds     # DM , with diabetic neuropathy,  controlled, ( not optimal ) hx of dka ,   CAPILLARY BLOOD GLUCOSE    POCT Blood Glucose.: 134 mg/dL (05 May 2024 08:17)  POCT Blood Glucose.: 247 mg/dL (04 May 2024 20:50)  POCT Blood Glucose.: 180 mg/dL (04 May 2024 17:03)  POCT Blood Glucose.: 204 mg/dL (04 May 2024 11:28)    A1C with Estimated Average Glucose Result: 7.1(04.28.24 @ 06:51)    -c/w home Lantus 45U at bedtime  -c/w Lispro 20mg TID  -jardiance 25 on hold  -moderate dose sliding scale  -patient does not recall any hypoglycemic events BUT is poor historian   - Beta hydroxy 0.8  (low suspicion DKA )      #2.4 cm thyroid nodule  #multinodular thyroid  #Ho Hypothyroid  Thyroid Stimulating Hormone, Serum: 5.95 uIU/mL (02.17.24 @ 08:53)  endocrine referral for nodular w/u   -c/w Home levothyroxine 50 daily,     # left knee pain, probably OA, xray left knee reviewed , conservative management     # right knee  xray knee reviewed Proximal fibular neck fracture with mild callus formation, possibly   subacute.  Swelling around the lateral malleolus with no definite fracture.  no pain on the right, repeat xray for followup,   consider knee brace     #Progress Note Handoff    Pending : IR consult , CT abd pelvis, endo consult , - serum CA-125, CEA, CA 19-9, SPEP, Ca15-3, AFP UPEP, Serum DAVIS, LDH, Beta-2 Microglobulin  Family discussion: dw pt   Disposition: NH when medically cleared   code status: full code .

## 2024-05-05 NOTE — PROGRESS NOTE ADULT - SUBJECTIVE AND OBJECTIVE BOX
24H events:    Patient is a 58y old Female who presents with a chief complaint of anxiety w SOB (04 May 2024 11:44)    Primary diagnosis of Panic attack  Today is hospital day 8d. This morning patient was seen and examined at bedside, resting comfortably in bed.    No acute or major events overnight.    Code Status:    Family communication:  Contact date:  Name of person contacted:  Relationship to patient:  Communication details:  What matters most:    PAST MEDICAL & SURGICAL HISTORY  Hypertension    Diabetes mellitus    Thyroid disease    Anemia    History of mood disorder    H/O diabetic neuropathy    Fibroid uterus    S/P lumbar discectomy  2006.    S/P tonsillectomy      SOCIAL HISTORY:  Social History:  from [facility] (27 Apr 2024 11:37)      ALLERGIES:  No Known Allergies    MEDICATIONS:  STANDING MEDICATIONS  chlorhexidine 2% Cloths 1 Application(s) Topical <User Schedule>  dextrose 10% Bolus 125 milliLiter(s) IV Bolus once  dextrose 5%. 1000 milliLiter(s) IV Continuous <Continuous>  dextrose 5%. 1000 milliLiter(s) IV Continuous <Continuous>  dextrose 50% Injectable 25 Gram(s) IV Push once  dextrose 50% Injectable 12.5 Gram(s) IV Push once  enoxaparin Injectable 40 milliGRAM(s) SubCutaneous every 24 hours  glucagon  Injectable 1 milliGRAM(s) IntraMuscular once  insulin glargine Injectable (LANTUS) 45 Unit(s) SubCutaneous at bedtime  insulin lispro (ADMELOG) corrective regimen sliding scale   SubCutaneous three times a day before meals  levothyroxine 50 MICROGram(s) Oral daily  losartan 50 milliGRAM(s) Oral daily  metoprolol tartrate 50 milliGRAM(s) Oral two times a day  nystatin Cream 1 Application(s) Topical two times a day  polyethylene glycol 3350 17 Gram(s) Oral two times a day    PRN MEDICATIONS  acetaminophen     Tablet .. 650 milliGRAM(s) Oral every 6 hours PRN  baclofen 5 milliGRAM(s) Oral every 8 hours PRN  dextrose Oral Gel 15 Gram(s) Oral once PRN  lidocaine   4% Patch 1 Patch Transdermal daily PRN    VITALS:   T(F): 97.9  HR: 91  BP: 116/68  RR: 19  SpO2: 98%    PHYSICAL EXAM:            GENERAL:   ( x ) NAD, lying in bed comfortably     (  ) obtunded     (  ) lethargic     (  ) somnolent    HEAD:   (x  ) Atraumatic     (  ) hematoma     (  ) laceration (specify location:       )     NECK:  ( x Supple     (  ) neck stiffness     (  ) nuchal rigidity     (x  )  no JVD     (  ) JVD present ( -- cm)    HEART:  Rate -->     (x ) normal rate     (  ) bradycardic     (  ) tachycardic  Rhythm -->     ( x ) regular     (  ) regularly irregular     (  ) irregularly irregular  Murmurs -->     (x  ) normal s1s2     (  ) systolic murmur     (  ) diastolic murmur     (  ) continuous murmur      (  ) S3 present     (  ) S4 present    LUNGS:   (x  )Unlabored respirations     (  ) tachypnea  (  x) B/L air entry     (  ) decreased breath sounds in:  (location     )    ( x ) no adventitious sound     (  ) crackles     (  ) wheezing      (  ) rhonchi      (specify location:       )  (  ) chest wall tenderness (specify location:       )    ABDOMEN:   ( x ) Soft     (  ) tense   |   (  ) nondistended     (  ) distended   |   (  ) +BS     (  ) hypoactive bowel sounds     (  ) hyperactive bowel sounds  (  ) nontender     (  ) RUQ tenderness     (x  ) RLQ tenderness     (  ) LLQ tenderness     (  ) epigastric tenderness     (  ) diffuse tenderness  (  ) Splenomegaly      (  ) Hepatomegaly      (  ) Jaundice     (  ) ecchymosis     EXTREMITIES:  (  ) Normal     (  ) Rash     (  ) ecchymosis     (  ) varicose veins      (x  ) pitting edema     (  ) non-pitting edema   (  ) ulceration     (  ) gangrene:     (location:     )    NERVOUS SYSTEM:    ( x ) A&Ox3     (  ) confused     (  ) lethargic  CN II-XII:     (  ) Intact     (  ) deficits found     (Specify:     )   Upper extremities:     (  ) no sensorimotor deficits     (  ) weakness     (  ) loss of proprioception/vibration     (  ) loss of touch/temperature (specify:    )  Lower extremities:     (  ) no sensorimotor deficits     (  ) weakness     (  ) loss of proprioception/vibration     (  ) loss of touch/temperature (specify:    )    SKIN:   (  ) No rashes or lesions     (  ) maculopapular rash     (  ) pustules     (  ) vesicles     (  ) ulcer     (  ) ecchymosis     (specify location:     )    AMPAC score:        (  ) Indwelling Shannon Catheter:   Date insterted:    Reason (  ) Critical illness     (  ) urinary retention    (  ) Accurate Ins/Outs Monitoring     (  ) CMO patient    (  ) Central Line:   Date inserted:  Location: (  ) Right IJ     (  ) Left IJ     (  ) Right Fem     (  ) Left Fem    (  ) SPC        (  ) pigtail       (  ) PEG tube       (  ) colostomy       (  ) jejunostomy  (  ) U-Dall    LABS:                        RADIOLOGY:

## 2024-05-06 LAB
AFP-TM SERPL-MCNC: <1.8 NG/ML — SIGNIFICANT CHANGE UP
APPEARANCE UR: CLEAR — SIGNIFICANT CHANGE UP
B2 MICROGLOB SERPL-MCNC: 1.6 MG/L — SIGNIFICANT CHANGE UP (ref 0.8–2.2)
BACTERIA # UR AUTO: NEGATIVE /HPF — SIGNIFICANT CHANGE UP
BCA 255 TISS QL IMSTN: 11.7 U/ML — SIGNIFICANT CHANGE UP
BILIRUB UR-MCNC: NEGATIVE — SIGNIFICANT CHANGE UP
CANCER AG125 SERPL-ACNC: 16 U/ML — SIGNIFICANT CHANGE UP
CANCER AG19-9 SERPL-ACNC: <2 U/ML — SIGNIFICANT CHANGE UP
CAST: 0 /LPF — SIGNIFICANT CHANGE UP (ref 0–4)
CEA SERPL-MCNC: 4.1 NG/ML — HIGH (ref 0–3.8)
COLOR SPEC: YELLOW — SIGNIFICANT CHANGE UP
DIFF PNL FLD: NEGATIVE — SIGNIFICANT CHANGE UP
GLUCOSE BLDC GLUCOMTR-MCNC: 141 MG/DL — HIGH (ref 70–99)
GLUCOSE BLDC GLUCOMTR-MCNC: 203 MG/DL — HIGH (ref 70–99)
GLUCOSE BLDC GLUCOMTR-MCNC: 230 MG/DL — HIGH (ref 70–99)
GLUCOSE BLDC GLUCOMTR-MCNC: 268 MG/DL — HIGH (ref 70–99)
GLUCOSE UR QL: NEGATIVE MG/DL — SIGNIFICANT CHANGE UP
KETONES UR-MCNC: NEGATIVE MG/DL — SIGNIFICANT CHANGE UP
LEUKOCYTE ESTERASE UR-ACNC: ABNORMAL
NITRITE UR-MCNC: NEGATIVE — SIGNIFICANT CHANGE UP
PH UR: 7 — SIGNIFICANT CHANGE UP (ref 5–8)
PROT UR-MCNC: NEGATIVE MG/DL — SIGNIFICANT CHANGE UP
RBC CASTS # UR COMP ASSIST: 1 /HPF — SIGNIFICANT CHANGE UP (ref 0–4)
SP GR SPEC: 1.01 — SIGNIFICANT CHANGE UP (ref 1–1.03)
SQUAMOUS # UR AUTO: 1 /HPF — SIGNIFICANT CHANGE UP (ref 0–5)
UROBILINOGEN FLD QL: 0.2 MG/DL — SIGNIFICANT CHANGE UP (ref 0.2–1)
WBC UR QL: 0 /HPF — SIGNIFICANT CHANGE UP (ref 0–5)

## 2024-05-06 PROCEDURE — 99232 SBSQ HOSP IP/OBS MODERATE 35: CPT

## 2024-05-06 PROCEDURE — 99253 IP/OBS CNSLTJ NEW/EST LOW 45: CPT

## 2024-05-06 RX ORDER — LANOLIN ALCOHOL/MO/W.PET/CERES
3 CREAM (GRAM) TOPICAL AT BEDTIME
Refills: 0 | Status: DISCONTINUED | OUTPATIENT
Start: 2024-05-06 | End: 2024-05-10

## 2024-05-06 RX ADMIN — Medication 150 MILLIGRAM(S): at 05:35

## 2024-05-06 RX ADMIN — Medication 50 MILLIGRAM(S): at 05:36

## 2024-05-06 RX ADMIN — Medication 50 MICROGRAM(S): at 05:35

## 2024-05-06 RX ADMIN — LOSARTAN POTASSIUM 50 MILLIGRAM(S): 100 TABLET, FILM COATED ORAL at 05:37

## 2024-05-06 RX ADMIN — Medication 650 MILLIGRAM(S): at 14:38

## 2024-05-06 RX ADMIN — Medication 4: at 11:50

## 2024-05-06 RX ADMIN — Medication 3 MILLIGRAM(S): at 02:01

## 2024-05-06 RX ADMIN — NYSTATIN CREAM 1 APPLICATION(S): 100000 CREAM TOPICAL at 05:39

## 2024-05-06 RX ADMIN — CHLORHEXIDINE GLUCONATE 1 APPLICATION(S): 213 SOLUTION TOPICAL at 05:39

## 2024-05-06 RX ADMIN — Medication 5 MILLIGRAM(S): at 02:00

## 2024-05-06 RX ADMIN — Medication 650 MILLIGRAM(S): at 02:01

## 2024-05-06 RX ADMIN — Medication 150 MILLIGRAM(S): at 21:23

## 2024-05-06 RX ADMIN — Medication 50 MILLIGRAM(S): at 17:06

## 2024-05-06 RX ADMIN — Medication 4: at 17:07

## 2024-05-06 RX ADMIN — Medication 3 MILLIGRAM(S): at 21:23

## 2024-05-06 RX ADMIN — INSULIN GLARGINE 45 UNIT(S): 100 INJECTION, SOLUTION SUBCUTANEOUS at 21:23

## 2024-05-06 RX ADMIN — Medication 650 MILLIGRAM(S): at 14:08

## 2024-05-06 RX ADMIN — NYSTATIN CREAM 1 APPLICATION(S): 100000 CREAM TOPICAL at 17:06

## 2024-05-06 RX ADMIN — Medication 150 MILLIGRAM(S): at 13:00

## 2024-05-06 NOTE — PROGRESS NOTE ADULT - SUBJECTIVE AND OBJECTIVE BOX
24H events:    Patient is a 58y old Female who presents with a chief complaint of anxiety w SOB (06 May 2024 10:41)    Primary diagnosis of Panic attack    Today is hospital day 9d. This morning patient was seen and examined at bedside, resting comfortably in bed.    No acute or major events overnight.    Code Status:    Family communication:  Contact date:  Name of person contacted:  Relationship to patient:  Communication details:  What matters most:    PAST MEDICAL & SURGICAL HISTORY  Hypertension    Diabetes mellitus    Thyroid disease    Anemia    History of mood disorder    H/O diabetic neuropathy    Fibroid uterus    S/P lumbar discectomy  .    S/P tonsillectomy      SOCIAL HISTORY:  Social History:  from [facility] (2024 11:37)      ALLERGIES:  No Known Allergies    MEDICATIONS:  STANDING MEDICATIONS  chlorhexidine 2% Cloths 1 Application(s) Topical <User Schedule>  dextrose 10% Bolus 125 milliLiter(s) IV Bolus once  dextrose 5%. 1000 milliLiter(s) IV Continuous <Continuous>  dextrose 5%. 1000 milliLiter(s) IV Continuous <Continuous>  dextrose 50% Injectable 25 Gram(s) IV Push once  dextrose 50% Injectable 12.5 Gram(s) IV Push once  enoxaparin Injectable 40 milliGRAM(s) SubCutaneous every 24 hours  glucagon  Injectable 1 milliGRAM(s) IntraMuscular once  insulin glargine Injectable (LANTUS) 45 Unit(s) SubCutaneous at bedtime  insulin lispro (ADMELOG) corrective regimen sliding scale   SubCutaneous three times a day before meals  levothyroxine 50 MICROGram(s) Oral daily  losartan 50 milliGRAM(s) Oral daily  melatonin 3 milliGRAM(s) Oral at bedtime  metoprolol tartrate 50 milliGRAM(s) Oral two times a day  nystatin Cream 1 Application(s) Topical two times a day  polyethylene glycol 3350 17 Gram(s) Oral two times a day  pregabalin 150 milliGRAM(s) Oral every 8 hours    PRN MEDICATIONS  acetaminophen     Tablet .. 650 milliGRAM(s) Oral every 6 hours PRN  baclofen 5 milliGRAM(s) Oral every 8 hours PRN  dextrose Oral Gel 15 Gram(s) Oral once PRN  lidocaine   4% Patch 1 Patch Transdermal daily PRN    VITALS:   T(F): 97.5  HR: 85  BP: 136/80  RR: 19  SpO2: 98%    PHYSICAL EXAM:        GENERAL:   ( x ) NAD, lying in bed comfortably     (  ) obtunded     (  ) lethargic     (  ) somnolent    HEAD:   (x  ) Atraumatic     (  ) hematoma     (  ) laceration (specify location:       )     NECK:  ( x Supple     (  ) neck stiffness     (  ) nuchal rigidity     (x  )  no JVD     (  ) JVD present ( -- cm)    HEART:  Rate -->     (x ) normal rate     (  ) bradycardic     (  ) tachycardic  Rhythm -->     ( x ) regular     (  ) regularly irregular     (  ) irregularly irregular  Murmurs -->     (x  ) normal s1s2     (  ) systolic murmur     (  ) diastolic murmur     (  ) continuous murmur      (  ) S3 present     (  ) S4 present    LUNGS:   (x  )Unlabored respirations     (  ) tachypnea  (  x) B/L air entry     (  ) decreased breath sounds in:  (location     )    ( x ) no adventitious sound     (  ) crackles     (  ) wheezing      (  ) rhonchi      (specify location:       )  (  ) chest wall tenderness (specify location:       )    ABDOMEN:   ( x ) Soft     (  ) tense   |   (  ) nondistended     (  ) distended   |   (  ) +BS     (  ) hypoactive bowel sounds     (  ) hyperactive bowel sounds  (  ) nontender     (  ) RUQ tenderness     (x  ) RLQ tenderness     (  ) LLQ tenderness     (  ) epigastric tenderness     (  ) diffuse tenderness  (  ) Splenomegaly      (  ) Hepatomegaly      (  ) Jaundice     (  ) ecchymosis     EXTREMITIES:  (  ) Normal     (  ) Rash     (  ) ecchymosis     (  ) varicose veins      (x  ) pitting edema     (  ) non-pitting edema   (  ) ulceration     (  ) gangrene:     (location:     )    NERVOUS SYSTEM:    ( x ) A&Ox3     (  ) confused     (  ) lethargic  CN II-XII:     (  ) Intact     (  ) deficits found     (Specify:     )   Upper extremities:     (  ) no sensorimotor deficits     (  ) weakness     (  ) loss of proprioception/vibration     (  ) loss of touch/temperature (specify:    )  Lower extremities:     (  ) no sensorimotor deficits     (  ) weakness     (  ) loss of proprioception/vibration     (  ) loss of touch/temperature (specify:    )    SKIN:   (  ) No rashes or lesions     (  ) maculopapular rash     (  ) pustules     (  ) vesicles     (  ) ulcer     (  ) ecchymosis     (specify location:     )    AMPAC score:              (  ) Indwelling Shannon Catheter:   Date insterted:    Reason (  ) Critical illness     (  ) urinary retention    (  ) Accurate Ins/Outs Monitoring     (  ) CMO patient    (  ) Central Line:   Date inserted:  Location: (  ) Right IJ     (  ) Left IJ     (  ) Right Fem     (  ) Left Fem    (  ) SPC        (  ) pigtail       (  ) PEG tube       (  ) colostomy       (  ) jejunostomy  (  ) U-Dall    LABS:            Urinalysis Basic - ( 06 May 2024 11:05 )    Color: Yellow / Appearance: Clear / S.015 / pH: x  Gluc: x / Ketone: Negative mg/dL  / Bili: Negative / Urobili: 0.2 mg/dL   Blood: x / Protein: Negative mg/dL / Nitrite: Negative   Leuk Esterase: Small / RBC: x / WBC x   Sq Epi: x / Non Sq Epi: x / Bacteria: x            Urinalysis with Rflx Culture (collected 06 May 2024 11:05)          RADIOLOGY:

## 2024-05-06 NOTE — PROGRESS NOTE ADULT - ASSESSMENT
Ms Godinez is a 58 VICK w a PMH of HTN, HLD, IDDM noncompliant with her insulin, neuropathy, HTN, hypothyroidism, mood disorder with 3 prior inpatient psychiatric hospitalizations, recent admission from 2/1 - 2/6 for DKA and a mechanical fall in March 2024 presents to the ED w "feeling overwhelmed" and associated SOB. In the ED vitals notable for /79 w , SpO2 97% on RA. Labs notable for Trop 9, Dimer of 414, gluc of 375 w AG of 18 and ProBNP of 326. CT PE protocol negative for PE but showed at 2.4 cm nodule in a multinodular thyroid. Patient aware of nodule and states it has never been worked up because "its never caused me any problems". Patient denies chest pain/pressure, diaphoresis neck/jaw/arm pain, cough, fever, rash, HA, vertigo, N/V, diarrhea, weakness, recent travel or change in medications. Patient is a poor historian and does not know if she had any recent changes in medication, vaccinations or hospitalizations. Did not recall her past admission for fall.  Patient admitted for observation and further assessment.     Problem List:  #SOB, RESOLVED   #'overwhelming feeling," RESOLVED   #Ho nicotine use   #Ho HTN  #Ho DLD  #Ho mood disorder (currently in patient psych)  given troponin negative, CT PE protocol negative for PE/consolidations/effusion, Trace atalectasis reported but not seen more likely anxiety driven then physical cause  SpO2 97% on RA  Patient reports being very displeased w her Psych facility and that they had to 4 point restrain her  no leukocytosis  Dimer 414  ProBNP 326, clinically euvolemic  vitally stable  -No Abx or supplimentary O2 at this time  -No suspicion Pneumonia  -PE r/o by CT and clinical   -tachycardia on presentation of 112 and BP of 175/79 likely due to anxiety  -restart home medications w Met succinate change to tartrate BID  Met Tar 50mg PO BID  Lostartan 50mg once daily  -nicotine patch 14mg once daily ordered  -if SOB returns please obtain CXR   -if desaturation please obtain rainbow labs and ABG  -patient does not recall what her medications are or if she takes any mood stabilizers  -will need out patient psych for mood stabilizers   -f/u CBC, CMP once daily  -if antipsych started get EKG for QTc baseline and monitoring q48hr     #Hip Pain  #Pelvic pain  #Hx of fall  - Pt complains of hip pain. Has ha hx of fall on previous admission with finula fracture  - Will order CT hip. < from: CT Pelvis No Cont (05.01.24 @ 11:24) > IMPRESSION: Since 7/28/2020. 2.2 cm sclerotic lesion left sacral ala, previously 1.6 cm. Recommend pre  and postcontrast MRI No acute fractures. Stable myomatous uterus  - MRI suspicious for neoplastic disease, with multiple lymph nodes,   - oncology consult  -  check CT Abdomen w/w/o IV contrast to complete work up  - recommend CT guided biopsy of one of the pelvic bone lesions  - check serum CA-125, CEA, CA 19-9, SPEP, Ca15-3, AFP UPEP, Serum DAVIS, LDH, Beta-2 Microglobulin       #IDDM  #AG 18  #Ho peripheral neuropathy  #Ho DKA   per chart poor compliance, per patient she takes all her meds every day   -c/w home Lantus 45U at bedtime  -c/w Lispro 20mg TID  -jardiance 25 on hold  -moderate dose sliding scale  -patient does not recall any hypoglycemic events BUT is poor historian   -f/u Beta hydroxy (low suspicion DKA given type II DM and no acidosis but given Gluc>300 will assess)     #2.4 cm thyroid nodule  #multinodular thyroid  #Ho Hypothyroid  -out patient endocrine referral for nodular w/u   -c/w Home levothyroxine 50mg once daily  -pt complaining of night sweats (4/29)    Misc:  DVT proph: Lovenox 40 once daily  GI Proph: NA  Diet: Carb consist, DASH/TLC  activity: as tolerated  dispo: stable

## 2024-05-06 NOTE — PROGRESS NOTE ADULT - SUBJECTIVE AND OBJECTIVE BOX
Nephrology progress note     no specific complaints    ON events/Subjective:     Allergies:  No Known Allergies    Hospital Medications:   MEDICATIONS  (STANDING):  chlorhexidine 2% Cloths 1 Application(s) Topical <User Schedule>  dextrose 10% Bolus 125 milliLiter(s) IV Bolus once  dextrose 5%. 1000 milliLiter(s) (100 mL/Hr) IV Continuous <Continuous>  dextrose 5%. 1000 milliLiter(s) (50 mL/Hr) IV Continuous <Continuous>  dextrose 50% Injectable 25 Gram(s) IV Push once  dextrose 50% Injectable 12.5 Gram(s) IV Push once  enoxaparin Injectable 40 milliGRAM(s) SubCutaneous every 24 hours  glucagon  Injectable 1 milliGRAM(s) IntraMuscular once  insulin glargine Injectable (LANTUS) 45 Unit(s) SubCutaneous at bedtime  insulin lispro (ADMELOG) corrective regimen sliding scale   SubCutaneous three times a day before meals  levothyroxine 50 MICROGram(s) Oral daily  losartan 50 milliGRAM(s) Oral daily  melatonin 3 milliGRAM(s) Oral at bedtime  metoprolol tartrate 50 milliGRAM(s) Oral two times a day  nystatin Cream 1 Application(s) Topical two times a day  polyethylene glycol 3350 17 Gram(s) Oral two times a day  pregabalin 150 milliGRAM(s) Oral every 8 hours    REVIEW OF SYSTEMS:  CONSTITUTIONAL: No weakness, fevers or chills  EYES/ENT: No visual changes;  No vertigo or throat pain   NECK: No pain or stiffness  RESPIRATORY: No cough, wheezing, hemoptysis; No shortness of breath  CARDIOVASCULAR: No chest pain or palpitations.  GASTROINTESTINAL: No abdominal or epigastric pain. No nausea, vomiting, or hematemesis; No diarrhea or constipation. No melena or hematochezia.  GENITOURINARY: No dysuria, frequency, foamy urine, urinary urgency, incontinence or hematuria  NEUROLOGICAL: No numbness or weakness  SKIN: No itching, burning, rashes, or lesions   VASCULAR: No bilateral lower extremity edema.   All other review of systems is negative unless indicated above.    VITALS:  T(F): 97.5 (05-06-24 @ 05:13), Max: 98.7 (05-05-24 @ 17:44)  HR: 85 (05-06-24 @ 05:13)  BP: 136/80 (05-06-24 @ 05:13)  RR: 19 (05-06-24 @ 05:13)  SpO2: 98% (05-06-24 @ 05:13)  Wt(kg): --    05-04 @ 07:01  -  05-05 @ 07:00  --------------------------------------------------------  IN: 0 mL / OUT: 600 mL / NET: -600 mL    05-05 @ 07:01  -  05-06 @ 07:00  --------------------------------------------------------  IN: 800 mL / OUT: 0 mL / NET: 800 mL        PHYSICAL EXAM:  Constitutional: NAD  HEENT: anicteric sclera, oropharynx clear, MMM  Neck: No JVD  Respiratory: CTAB, no wheezes, rales or rhonchi  Cardiovascular: S1, S2, RRR  Gastrointestinal: BS+, soft, NT/ND  Extremities: No cyanosis or clubbing. No peripheral edema  Neurological: A/O x 3, no focal deficits  Psychiatric: Normal mood, normal affect  : No CVA tenderness. No white.   Skin: No rashes  Vascular Access:    LABS:            Urine Studies:  Creatinine Trend: 0.5<--, 0.5<--  Urinalysis Basic - ( 30 Apr 2024 07:04 )    Color:  / Appearance:  / SG:  / pH:   Gluc: 139 mg/dL / Ketone:   / Bili:  / Urobili:    Blood:  / Protein:  / Nitrite:    Leuk Esterase:  / RBC:  / WBC    Sq Epi:  / Non Sq Epi:  / Bacteria:     ·Stable (relatively normal) renal function  ·BP now controlled on metoprolol and losartan  ·per psychiatry no need IPP or change in medications  ·pelvic CT wo fx - left sacral sclerotic lesion and MRI with other lesions  seen by oncology - for CT   possible panic attack on admission    1) consider IVF NS 6 hours prior and post iv CT scan  2) continue BP meds

## 2024-05-06 NOTE — CONSULT NOTE ADULT - SUBJECTIVE AND OBJECTIVE BOX
PGY-1    Chief Complaint: malodorous vaginal discharge    HPI: 57 y/o post-menopausal, with PMHx of HTN, HLD, IDDM noncompliant with her insulin, neuropathy, HTN, hypothyroidism, mood disorder with 3 prior inpatient psychiatric hospitalizations, recent admission from  -  for DKA and a mechanical fall in 2024 presented to the ED  "feeling overwhelmed" and associated SOB, GYN consulted per patient request for malodorous vaginal discharge. Reports intermittent vaginal itching and foul vaginal odor for the past 1-2 months. Denies abnormal vaginal discharge, postmenopausal bleeding. No abdominal pain, bloating or unintentional weight loss. Has not seen GYN in >8 years. Known h/o enlarged fibroid uterus. Poor historian.     Ob/Gyn History:  , 2x FT, , 1x sAb                 LMP - 53 y/o  Denies history of ovarian cysts, abnormal paps. H/o gonorrhea approx 6 years ago s/p tx.   Last Pap Smear - >8 years ago  Last Mammogram - >8 years ago, normal per pt  Last Colonoscopy - unclear timing, pt states had benign polyps    Denies the following: constitutional symptoms, visual symptoms, cardiovascular symptoms, respiratory symptoms, GI symptoms, musculoskeletal symptoms, skin symptoms, neurologic symptoms, hematologic symptoms, allergic symptoms, psychiatric symptoms  Except any pertinent positives listed.     Home Medications:  acetaminophen 325 mg oral tablet: 3 tab(s) orally every 8 hours As needed Mild Pain (1 - 3) (05 Mar 2024 11:09)  baclofen 20 mg oral tablet: 1 tab(s) orally every 8 hours (07 Mar 2024 13:08)  celecoxib 200 mg oral capsule: 1 cap(s) orally 2 times a day as needed for  moderate pain (05 Mar 2024 11:38)  insulin lispro 100 units/mL injectable solution: 20 unit(s) injectable 3 times a day (before meals) (2024 11:22)  levothyroxine 50 mcg (0.05 mg) oral tablet: 1 tab(s) orally once a day (15 Feb 2024 16:45)  losartan 50 mg oral tablet: 1 tab(s) orally once a day (05 Mar 2024 11:09)  metoprolol succinate 100 mg oral capsule, extended release: 1 cap(s) orally once a day (15 Feb 2024 16:45)  pregabalin 150 mg oral capsule: 1 cap(s) orally every 8 hours (07 Mar 2024 10:09)    Allergies: No Known Allergies    PAST MEDICAL & SURGICAL HISTORY:  Hypertension  Diabetes mellitus  Thyroid disease  Anemia  History of mood disorder  H/O diabetic neuropathy  Fibroid uterus  S/P lumbar [id number].  S/P tonsillectomy      SOCIAL HISTORY: Smoker, approx 7 cigarettes per day. Occasional alcohol use. Denies illicit drug use.     Vital Signs Last 24 Hrs  T(F): 97.6 (06 May 2024 11:35), Max: 98.7 (05 May 2024 17:44)  HR: 72 (06 May 2024 11:35) (72 - 114)  BP: 136/78 (06 May 2024 11:35) (115/73 - 136/80)  RR: 15 (06 May 2024 11:35) (15 - 19)    General Appearance - AAOx3, NAD  Abdomen - Soft, nontender, nondistended, no rebound, no rigidity, no guarding, bowel sounds present. Approx 16w sized uterus with irregular contour.   GYN/Pelvis:  External genitalia: normal, no excoriations or rashes  Vagina: normal, no discharge  Cervix: no masses, no CMT  Uterus: approx 16-week sized uterus with irregular contour mostly on right   Adnexa: non-palpable, nontender    LABS:  Urinalysis Basic - ( 06 May 2024 11:05 )    Color: Yellow / Appearance: Clear / S.015 / pH: x  Gluc: x / Ketone: Negative mg/dL  / Bili: Negative / Urobili: 0.2 mg/dL   Blood: x / Protein: Negative mg/dL / Nitrite: Negative   Leuk Esterase: Small / RBC: 1 /HPF / WBC 0 /HPF   Sq Epi: x / Non Sq Epi: 1 /HPF / Bacteria: Negative /HPF    POCT Blood Glucose.: 203 mg/dL (06 May 2024 11:48    Urinalysis Basic - ( 06 May 2024 11:05 )    Color: Yellow / Appearance: Clear / S.015 / pH: x  Gluc: x / Ketone: Negative mg/dL  / Bili: Negative / Urobili: 0.2 mg/dL   Blood: x / Protein: Negative mg/dL / Nitrite: Negative   Leuk Esterase: Small / RBC: 1 /HPF / WBC 0 /HPF   Sq Epi: x / Non Sq Epi: 1 /HPF / Bacteria: Negative /HPF    RADIOLOGY & ADDITIONAL STUDIES:  < from: MR Pelvis w/wo IV Cont (24 @ 14:25) >    ACC: [id number] EXAM:  MR PELVIS WAW IC   ORDERED BY: JOHN FAIRBANKS     PROCEDURE DATE:  2024          INTERPRETATION:  CLINICAL INFORMATION: Abnormal CT findings. Sclerotic   lesion in the left sacral ala.    COMPARISON: CT of 2024 and 2020.    CONTRAST/COMPLICATIONS:  IV Contrast: Gadavist, 8 cc administered, 2 cc discarded.  Oral Contrast: None.  Complications: None    PROCEDURE:  MRI of the pelvis was performed with and without intravenous contrast.      FINDINGS:    Sclerotic mass within the left sacral ala with mild surrounding edema and   enhancement corresponds to the abnormality noted on the CT. Multiple   additional osseous masses noted throughout the pelvis with examples as   follows:  Additional T1 hypointense, T2 hyperintense enhancing mass within the   right ilium (series 5/13).  Similar more subtle lesion noted in the right sacral ala (series 8/12).  Additional focal edema and enhancement in the posterior left ilium with   trace T1 signal abnormality (series 9/36 and series 3/36).    Degenerative change in the visualized spine. Degenerative change in both   hips. No significant hip joint effusion.    Large uterine fibroids. Enlarged bilateral inguinal lymph nodes.      IMPRESSION:    1. Multiple osseous masses highly suspicious for neoplastic/metastatic   disease.  2. Enlarged bilateral inguinal lymph nodes.    Spoke with JOHN FAIRBANKS on 5/3/2024 2:57 PM with readback.    --- End of Report ---    SÁNCHEZ DONALDSON MD; Attending Radiologist  This document has been electronically signed. May  3 2024  2:58PM    < end of copied text >   PGY-1    Chief Complaint: malodorous vaginal discharge    HPI: 57 y/o post-menopausal, P2 with PMHx of HTN, HLD, IDDM noncompliant with her insulin, neuropathy, HTN, hypothyroidism, mood disorder with 3 prior inpatient psychiatric hospitalizations, chronic smoker, recent admission for DKA and a mechanical fall 3/2024 presented to the ED  "feeling overwhelmed" with associated SOB, GYN consulted per patient request for malodorous vaginal discharge. Reports intermittent vaginal itching and foul vaginal odor for the past 1-2 months. Denies abnormal vaginal discharge, postmenopausal bleeding. No abdominal pain, bloating or unintentional weight loss. Has not seen GYN in >8 years. Known h/o enlarged fibroid uterus. Poor historian. Lives in nursing home currently.     Ob/Gyn History:  , 2x FT, , 1x sAb                 LMP - 53 y/o  Denies history of ovarian cysts, abnormal paps. H/o gonorrhea approx 6 years ago s/p tx.   Last Pap Smear - >8 years ago  Last Mammogram - >8 years ago, normal per pt  Last Colonoscopy - unclear timing, pt states had benign polyps    Denies the following: constitutional symptoms, visual symptoms, cardiovascular symptoms, respiratory symptoms, GI symptoms, musculoskeletal symptoms, skin symptoms, neurologic symptoms, hematologic symptoms, allergic symptoms, psychiatric symptoms  Except any pertinent positives listed.     Home Medications:  acetaminophen 325 mg oral tablet: 3 tab(s) orally every 8 hours As needed Mild Pain (1 - 3) (05 Mar 2024 11:09)  baclofen 20 mg oral tablet: 1 tab(s) orally every 8 hours (07 Mar 2024 13:08)  celecoxib 200 mg oral capsule: 1 cap(s) orally 2 times a day as needed for  moderate pain (05 Mar 2024 11:38)  insulin lispro 100 units/mL injectable solution: 20 unit(s) injectable 3 times a day (before meals) (2024 11:22)  levothyroxine 50 mcg (0.05 mg) oral tablet: 1 tab(s) orally once a day (15 Feb 2024 16:45)  losartan 50 mg oral tablet: 1 tab(s) orally once a day (05 Mar 2024 11:09)  metoprolol succinate 100 mg oral capsule, extended release: 1 cap(s) orally once a day (15 Feb 2024 16:45)  pregabalin 150 mg oral capsule: 1 cap(s) orally every 8 hours (07 Mar 2024 10:09)    Allergies: No Known Allergies    PAST MEDICAL & SURGICAL HISTORY:  Hypertension  Diabetes mellitus  Thyroid disease  Anemia  History of mood disorder  H/O diabetic neuropathy  Fibroid uterus  S/P lumbar [id number].  S/P tonsillectomy      SOCIAL HISTORY: Smoker, approx 7 cigarettes per day. Occasional alcohol use. Denies illicit drug use.     Vital Signs Last 24 Hrs  T(F): 97.6 (06 May 2024 11:35), Max: 98.7 (05 May 2024 17:44)  HR: 72 (06 May 2024 11:35) (72 - 114)  BP: 136/78 (06 May 2024 11:35) (115/73 - 136/80)  RR: 15 (06 May 2024 11:35) (15 - 19)    General Appearance - AAOx3, NAD  Abdomen - Soft, nontender, nondistended, no rebound, no rigidity, no guarding, bowel sounds present. Approx 16w sized uterus with irregular contour.   GYN/Pelvis:  External genitalia: normal, no excoriations or rashes  Vagina: normal, no discharge  Cervix: no masses, no CMT  Uterus: approx 16-week sized uterus with irregular contour mostly on right   Adnexa: non-palpable, nontender    LABS:  Urinalysis Basic - ( 06 May 2024 11:05 )    Color: Yellow / Appearance: Clear / S.015 / pH: x  Gluc: x / Ketone: Negative mg/dL  / Bili: Negative / Urobili: 0.2 mg/dL   Blood: x / Protein: Negative mg/dL / Nitrite: Negative   Leuk Esterase: Small / RBC: 1 /HPF / WBC 0 /HPF   Sq Epi: x / Non Sq Epi: 1 /HPF / Bacteria: Negative /HPF    POCT Blood Glucose.: 203 mg/dL (06 May 2024 11:48    Urinalysis Basic - ( 06 May 2024 11:05 )    Color: Yellow / Appearance: Clear / S.015 / pH: x  Gluc: x / Ketone: Negative mg/dL  / Bili: Negative / Urobili: 0.2 mg/dL   Blood: x / Protein: Negative mg/dL / Nitrite: Negative   Leuk Esterase: Small / RBC: 1 /HPF / WBC 0 /HPF   Sq Epi: x / Non Sq Epi: 1 /HPF / Bacteria: Negative /HPF    RADIOLOGY & ADDITIONAL STUDIES:  < from: MR Pelvis w/wo IV Cont (24 @ 14:25) >    ACC: [id number] EXAM:  MR PELVIS WAW IC   ORDERED BY: JOHN FAIRBANKS     PROCEDURE DATE:  2024          INTERPRETATION:  CLINICAL INFORMATION: Abnormal CT findings. Sclerotic   lesion in the left sacral ala.    COMPARISON: CT of 2024 and 2020.    CONTRAST/COMPLICATIONS:  IV Contrast: Gadavist, 8 cc administered, 2 cc discarded.  Oral Contrast: None.  Complications: None    PROCEDURE:  MRI of the pelvis was performed with and without intravenous contrast.      FINDINGS:    Sclerotic mass within the left sacral ala with mild surrounding edema and   enhancement corresponds to the abnormality noted on the CT. Multiple   additional osseous masses noted throughout the pelvis with examples as   follows:  Additional T1 hypointense, T2 hyperintense enhancing mass within the   right ilium (series 5/13).  Similar more subtle lesion noted in the right sacral ala (series 8/12).  Additional focal edema and enhancement in the posterior left ilium with   trace T1 signal abnormality (series 9/36 and series 3/36).    Degenerative change in the visualized spine. Degenerative change in both   hips. No significant hip joint effusion.    Large uterine fibroids. Enlarged bilateral inguinal lymph nodes.      IMPRESSION:    1. Multiple osseous masses highly suspicious for neoplastic/metastatic   disease.  2. Enlarged bilateral inguinal lymph nodes.    Spoke with JOHN FAIRBANKS on 5/3/2024 2:57 PM with readback.    --- End of Report ---    SÁNCHEZ DONALDSON MD; Attending Radiologist  This document has been electronically signed. May  3 2024  2:58PM    < end of copied text >

## 2024-05-06 NOTE — PROGRESS NOTE ADULT - SUBJECTIVE AND OBJECTIVE BOX
pt seen and examined.     My notes supersede resident's notes in case of discrepancy       ROS: no cp, no sob, no n/v, no fever    Vital Signs Last 24 Hrs  T(C): 36.4 (06 May 2024 05:13), Max: 37.1 (05 May 2024 17:44)  T(F): 97.5 (06 May 2024 05:13), Max: 98.7 (05 May 2024 17:44)  HR: 85 (06 May 2024 05:13) (76 - 114)  BP: 136/80 (06 May 2024 05:13) (115/73 - 136/80)  BP(mean): --  RR: 19 (06 May 2024 05:13) (18 - 19)  SpO2: 98% (06 May 2024 05:13) (98% - 99%)    Parameters below as of 06 May 2024 05:13  Patient On (Oxygen Delivery Method): room air    physical exam  constitutional NAD, AAOX3, Respiratory  lungs CTA, CVS heart RRR, GI: abdomen Soft NT, ND, BS+, skin: intact  neuro exam no focal deficit     MEDICATIONS  (STANDING):  chlorhexidine 2% Cloths 1 Application(s) Topical <User Schedule>  dextrose 10% Bolus 125 milliLiter(s) IV Bolus once  dextrose 5%. 1000 milliLiter(s) (50 mL/Hr) IV Continuous <Continuous>  dextrose 5%. 1000 milliLiter(s) (100 mL/Hr) IV Continuous <Continuous>  dextrose 50% Injectable 25 Gram(s) IV Push once  dextrose 50% Injectable 12.5 Gram(s) IV Push once  enoxaparin Injectable 40 milliGRAM(s) SubCutaneous every 24 hours  glucagon  Injectable 1 milliGRAM(s) IntraMuscular once  insulin glargine Injectable (LANTUS) 45 Unit(s) SubCutaneous at bedtime  insulin lispro (ADMELOG) corrective regimen sliding scale   SubCutaneous three times a day before meals  levothyroxine 50 MICROGram(s) Oral daily  losartan 50 milliGRAM(s) Oral daily  melatonin 3 milliGRAM(s) Oral at bedtime  metoprolol tartrate 50 milliGRAM(s) Oral two times a day  nystatin Cream 1 Application(s) Topical two times a day  polyethylene glycol 3350 17 Gram(s) Oral two times a day  pregabalin 150 milliGRAM(s) Oral every 8 hours    MEDICATIONS  (PRN):  acetaminophen     Tablet .. 650 milliGRAM(s) Oral every 6 hours PRN Mild Pain (1 - 3)  baclofen 5 milliGRAM(s) Oral every 8 hours PRN Musculoskeletal Pain  dextrose Oral Gel 15 Gram(s) Oral once PRN Blood Glucose LESS THAN 70 milliGRAM(s)/deciliter  lidocaine   4% Patch 1 Patch Transdermal daily PRN knee pain    D-Dimer Assay, Quantitative: 414 ng/mL DDU (04-26-24 @ 21:27)    Urine Microscopic-Add On (NC) (05.06.24 @ 11:05)    Red Blood Cell - Urine: 1 /HPF   White Blood Cell - Urine: 0 /HPF   Bacteria: Negative /HPF   Epithelial Cells: 1 /HPF   Cast: 0 /LPF    Urinalysis with Rflx Culture (05.06.24 @ 11:05)    Urine Appearance: Clear   Color: Yellow   Specific Gravity: 1.015   pH Urine: 7.0   Protein, Urine: Negative mg/dL   Glucose Qualitative, Urine: Negative mg/dL   Ketone - Urine: Negative mg/dL   Blood, Urine: Negative   Bilirubin: Negative   Urobilinogen: 0.2 mg/dL   Leukocyte Esterase Concentration: Small   Nitrite: Negative    a/p  58 F with PMH of HTN, HLD, IDDM noncompliant with her insulin, neuropathy, HTN, hypothyroidism, mood disorder with 3 prior inpatient psychiatric hospitalizations, recent admission from 2/1 - 2/6 for DKA and a mechanical fall in March 2024 presents to the ED w "feeling overwhelmed" and associated SOB. In the ED vitals notable for /79 w , SpO2 97% on RA. Labs notable for Trop 9, Dimer of 414, gluc of 375 w AG of 18 and ProBNP of 326. CT PE protocol negative for PE but showed at 2.4 cm nodule in a multinodular thyroid. Patient aware of nodule and states it has never been worked up because "its never caused me any problems". Patient denies chest pain/pressure, diaphroesis, neck/jaw/arm pain, cough, fever, rash, HA, vertiog, N/V, diarrhea, weakness, recent travel or change in medications. Patient is a poor historian and does not know if she had any recent changes in medication, vaccinations or hospitalizations. Did not recall her past admission for fall.  Patient admitted for observation and further assessment.     # hip pain, ct is not diagnostic, MRI suspicious for neoplastic disease, with multiple lymph nodes, thyroid nodule   oncology consult appreciated   - CT Abdomen w/w/o IV contrast to complete work up  - IR consult for CT guided biopsy of one of the pelvic bone lesions  - serum CA-125, CEA, CA 19-9, SPEP, Ca15-3, AFP UPEP, Serum DAVIS, LDH, Beta-2 Microglobulin    # SOB , Possible Panic attack with SOB and anxiety. now calm and stable   #Ho mood disorder now stable   cont meds     # pt is complaining of urinary symptoms / dysuria and foul smelling vaginal discharge  ua neg  wants to see gyn     # DM , with diabetic neuropathy,  controlled, ( not optimal ) hx of dka ,   POCT Blood Glucose.: 203 mg/dL (05-06-24 @ 11:48)  POCT Blood Glucose.: 141 mg/dL (05-06-24 @ 07:54)  POCT Blood Glucose.: 166 mg/dL (05-05-24 @ 21:53)  POCT Blood Glucose.: 230 mg/dL (05-05-24 @ 18:02)    A1C with Estimated Average Glucose Result: 7.1(04.28.24 @ 06:51)    -c/w home Lantus 45U at bedtime  -c/w Lispro 20mg TID  -jardiance 25 on hold  -moderate dose sliding scale  -patient does not recall any hypoglycemic events BUT is poor historian   - Beta hydroxy 0.8  (low suspicion DKA )      #2.4 cm thyroid nodule  #multinodular thyroid  #Ho Hypothyroid  Thyroid Stimulating Hormone, Serum: 5.95 uIU/mL (02.17.24 @ 08:53)  endocrine referral for nodular w/u   -c/w Home levothyroxine 50 daily,     # left knee pain, probably OA, xray left knee reviewed , conservative management     # right knee  xray knee reviewed Proximal fibular neck fracture with mild callus formation, possibly   subacute.  Swelling around the lateral malleolus with no definite fracture.  no pain on the right, repeat xray for followup,   consider knee brace     #Progress Note Handoff    Pending : IR consult , CT abd pelvis, endo consult , - serum CA-125, CEA, CA 19-9, SPEP, Ca15-3, AFP UPEP, Serum DAVIS, LDH, Beta-2 Microglobulin, uc, gyn consult   Family discussion: dw pt   Disposition: NH when medically cleared   code status: full code

## 2024-05-06 NOTE — CONSULT NOTE ADULT - ASSESSMENT
59 y/o post-menopausal, with PMHx of HTN, HLD, IDDM noncompliant with her insulin, neuropathy, HTN, hypothyroidism, mood disorder, known h/o fibroids with malodorous vaginal discharge.     - f/u vaginitis  - recommend f/u tumor markers  - please provide [facility] information ([address], [phone] to establish care on discharge  - no further acute GYN intervention  - care per primary team    Discussed with Dr. Flores and Dr. Serra.

## 2024-05-07 LAB
ANION GAP SERPL CALC-SCNC: 8 MMOL/L — SIGNIFICANT CHANGE UP (ref 7–14)
BASOPHILS # BLD AUTO: 0.02 K/UL — SIGNIFICANT CHANGE UP (ref 0–0.2)
BASOPHILS NFR BLD AUTO: 0.3 % — SIGNIFICANT CHANGE UP (ref 0–1)
BUN SERPL-MCNC: 19 MG/DL — SIGNIFICANT CHANGE UP (ref 10–20)
CALCIUM SERPL-MCNC: 10.2 MG/DL — SIGNIFICANT CHANGE UP (ref 8.4–10.5)
CHLORIDE SERPL-SCNC: 102 MMOL/L — SIGNIFICANT CHANGE UP (ref 98–110)
CO2 SERPL-SCNC: 29 MMOL/L — SIGNIFICANT CHANGE UP (ref 17–32)
CREAT SERPL-MCNC: 0.8 MG/DL — SIGNIFICANT CHANGE UP (ref 0.7–1.5)
CULTURE RESULTS: SIGNIFICANT CHANGE UP
EGFR: 85 ML/MIN/1.73M2 — SIGNIFICANT CHANGE UP
EOSINOPHIL # BLD AUTO: 0.16 K/UL — SIGNIFICANT CHANGE UP (ref 0–0.7)
EOSINOPHIL NFR BLD AUTO: 2.6 % — SIGNIFICANT CHANGE UP (ref 0–8)
GLUCOSE BLDC GLUCOMTR-MCNC: 120 MG/DL — HIGH (ref 70–99)
GLUCOSE BLDC GLUCOMTR-MCNC: 197 MG/DL — HIGH (ref 70–99)
GLUCOSE BLDC GLUCOMTR-MCNC: 218 MG/DL — HIGH (ref 70–99)
GLUCOSE BLDC GLUCOMTR-MCNC: 228 MG/DL — HIGH (ref 70–99)
GLUCOSE BLDC GLUCOMTR-MCNC: 234 MG/DL — HIGH (ref 70–99)
GLUCOSE SERPL-MCNC: 246 MG/DL — HIGH (ref 70–99)
HCT VFR BLD CALC: 41.8 % — SIGNIFICANT CHANGE UP (ref 37–47)
HGB BLD-MCNC: 13.9 G/DL — SIGNIFICANT CHANGE UP (ref 12–16)
IMM GRANULOCYTES NFR BLD AUTO: 0.3 % — SIGNIFICANT CHANGE UP (ref 0.1–0.3)
LYMPHOCYTES # BLD AUTO: 1.64 K/UL — SIGNIFICANT CHANGE UP (ref 1.2–3.4)
LYMPHOCYTES # BLD AUTO: 26.4 % — SIGNIFICANT CHANGE UP (ref 20.5–51.1)
MAGNESIUM SERPL-MCNC: 2 MG/DL — SIGNIFICANT CHANGE UP (ref 1.8–2.4)
MCHC RBC-ENTMCNC: 28.9 PG — SIGNIFICANT CHANGE UP (ref 27–31)
MCHC RBC-ENTMCNC: 33.3 G/DL — SIGNIFICANT CHANGE UP (ref 32–37)
MCV RBC AUTO: 86.9 FL — SIGNIFICANT CHANGE UP (ref 81–99)
MONOCYTES # BLD AUTO: 0.49 K/UL — SIGNIFICANT CHANGE UP (ref 0.1–0.6)
MONOCYTES NFR BLD AUTO: 7.9 % — SIGNIFICANT CHANGE UP (ref 1.7–9.3)
NEUTROPHILS # BLD AUTO: 3.88 K/UL — SIGNIFICANT CHANGE UP (ref 1.4–6.5)
NEUTROPHILS NFR BLD AUTO: 62.5 % — SIGNIFICANT CHANGE UP (ref 42.2–75.2)
NRBC # BLD: 0 /100 WBCS — SIGNIFICANT CHANGE UP (ref 0–0)
PLATELET # BLD AUTO: 268 K/UL — SIGNIFICANT CHANGE UP (ref 130–400)
PMV BLD: 9.6 FL — SIGNIFICANT CHANGE UP (ref 7.4–10.4)
POTASSIUM SERPL-MCNC: 4.7 MMOL/L — SIGNIFICANT CHANGE UP (ref 3.5–5)
POTASSIUM SERPL-SCNC: 4.7 MMOL/L — SIGNIFICANT CHANGE UP (ref 3.5–5)
RBC # BLD: 4.81 M/UL — SIGNIFICANT CHANGE UP (ref 4.2–5.4)
RBC # FLD: 13.8 % — SIGNIFICANT CHANGE UP (ref 11.5–14.5)
SODIUM SERPL-SCNC: 139 MMOL/L — SIGNIFICANT CHANGE UP (ref 135–146)
SPECIMEN SOURCE: SIGNIFICANT CHANGE UP
WBC # BLD: 6.21 K/UL — SIGNIFICANT CHANGE UP (ref 4.8–10.8)
WBC # FLD AUTO: 6.21 K/UL — SIGNIFICANT CHANGE UP (ref 4.8–10.8)

## 2024-05-07 PROCEDURE — 99232 SBSQ HOSP IP/OBS MODERATE 35: CPT

## 2024-05-07 PROCEDURE — 74177 CT ABD & PELVIS W/CONTRAST: CPT | Mod: 26

## 2024-05-07 PROCEDURE — 99254 IP/OBS CNSLTJ NEW/EST MOD 60: CPT | Mod: GC

## 2024-05-07 RX ORDER — DIPHENHYDRAMINE HCL 50 MG
25 CAPSULE ORAL ONCE
Refills: 0 | Status: COMPLETED | OUTPATIENT
Start: 2024-05-07 | End: 2024-05-08

## 2024-05-07 RX ORDER — LEVOTHYROXINE SODIUM 125 MCG
75 TABLET ORAL DAILY
Refills: 0 | Status: DISCONTINUED | OUTPATIENT
Start: 2024-05-07 | End: 2024-05-08

## 2024-05-07 RX ORDER — INSULIN LISPRO 100/ML
8 VIAL (ML) SUBCUTANEOUS
Refills: 0 | Status: DISCONTINUED | OUTPATIENT
Start: 2024-05-07 | End: 2024-05-10

## 2024-05-07 RX ORDER — SIMETHICONE 80 MG/1
80 TABLET, CHEWABLE ORAL ONCE
Refills: 0 | Status: COMPLETED | OUTPATIENT
Start: 2024-05-07 | End: 2024-05-08

## 2024-05-07 RX ADMIN — Medication 150 MILLIGRAM(S): at 05:27

## 2024-05-07 RX ADMIN — Medication 650 MILLIGRAM(S): at 12:30

## 2024-05-07 RX ADMIN — NYSTATIN CREAM 1 APPLICATION(S): 100000 CREAM TOPICAL at 18:54

## 2024-05-07 RX ADMIN — ENOXAPARIN SODIUM 40 MILLIGRAM(S): 100 INJECTION SUBCUTANEOUS at 21:23

## 2024-05-07 RX ADMIN — CHLORHEXIDINE GLUCONATE 1 APPLICATION(S): 213 SOLUTION TOPICAL at 05:31

## 2024-05-07 RX ADMIN — Medication 50 MICROGRAM(S): at 05:27

## 2024-05-07 RX ADMIN — LOSARTAN POTASSIUM 50 MILLIGRAM(S): 100 TABLET, FILM COATED ORAL at 05:27

## 2024-05-07 RX ADMIN — NYSTATIN CREAM 1 APPLICATION(S): 100000 CREAM TOPICAL at 05:28

## 2024-05-07 RX ADMIN — Medication 650 MILLIGRAM(S): at 04:00

## 2024-05-07 RX ADMIN — Medication 150 MILLIGRAM(S): at 13:13

## 2024-05-07 RX ADMIN — Medication 50 MILLIGRAM(S): at 05:27

## 2024-05-07 RX ADMIN — Medication 650 MILLIGRAM(S): at 03:03

## 2024-05-07 RX ADMIN — Medication 3 MILLIGRAM(S): at 21:24

## 2024-05-07 RX ADMIN — Medication 50 MILLIGRAM(S): at 17:30

## 2024-05-07 RX ADMIN — INSULIN GLARGINE 45 UNIT(S): 100 INJECTION, SOLUTION SUBCUTANEOUS at 21:22

## 2024-05-07 RX ADMIN — POLYETHYLENE GLYCOL 3350 17 GRAM(S): 17 POWDER, FOR SOLUTION ORAL at 05:26

## 2024-05-07 RX ADMIN — Medication 150 MILLIGRAM(S): at 21:22

## 2024-05-07 RX ADMIN — Medication 2: at 13:13

## 2024-05-07 RX ADMIN — Medication 4: at 17:34

## 2024-05-07 NOTE — PROGRESS NOTE ADULT - SUBJECTIVE AND OBJECTIVE BOX
24H events:    Patient is a 58y old Female who presents with a chief complaint of anxiety w SOB (07 May 2024 10:43)    Primary diagnosis of Panic attack    Today is hospital day 10d. This morning patient was seen and examined at bedside, resting comfortably in bed.    No acute or major events overnight.    Code Status: Full code    PAST MEDICAL & SURGICAL HISTORY  Hypertension    Diabetes mellitus    Thyroid disease    Anemia    History of mood disorder    H/O diabetic neuropathy    Fibroid uterus    S/P lumbar discectomy  .    S/P tonsillectomy      SOCIAL HISTORY:  Social History:  from [facility] (2024 11:37)      ALLERGIES:  No Known Allergies    MEDICATIONS:  STANDING MEDICATIONS  chlorhexidine 2% Cloths 1 Application(s) Topical <User Schedule>  dextrose 10% Bolus 125 milliLiter(s) IV Bolus once  dextrose 5%. 1000 milliLiter(s) IV Continuous <Continuous>  dextrose 5%. 1000 milliLiter(s) IV Continuous <Continuous>  dextrose 50% Injectable 25 Gram(s) IV Push once  dextrose 50% Injectable 12.5 Gram(s) IV Push once  enoxaparin Injectable 40 milliGRAM(s) SubCutaneous every 24 hours  glucagon  Injectable 1 milliGRAM(s) IntraMuscular once  insulin glargine Injectable (LANTUS) 45 Unit(s) SubCutaneous at bedtime  insulin lispro (ADMELOG) corrective regimen sliding scale   SubCutaneous three times a day before meals  levothyroxine 50 MICROGram(s) Oral daily  losartan 50 milliGRAM(s) Oral daily  melatonin 3 milliGRAM(s) Oral at bedtime  metoprolol tartrate 50 milliGRAM(s) Oral two times a day  nystatin Cream 1 Application(s) Topical two times a day  polyethylene glycol 3350 17 Gram(s) Oral two times a day  pregabalin 150 milliGRAM(s) Oral every 8 hours    PRN MEDICATIONS  acetaminophen     Tablet .. 650 milliGRAM(s) Oral every 6 hours PRN  baclofen 5 milliGRAM(s) Oral every 8 hours PRN  dextrose Oral Gel 15 Gram(s) Oral once PRN  lidocaine   4% Patch 1 Patch Transdermal daily PRN    VITALS:   T(F): 97.9  HR: 89  BP: 130/64  RR: 20  SpO2: 97%    PHYSICAL EXAM:  CONSTITUTIONAL: Well groomed, no apparent distress, laying flat in bed  EYES: No conjunctival or scleral injection, non-icteric  ENMT: Oral mucosa with moist membranes. Normal dentition; no pharyngeal injection or exudates  RESP: No respiratory distress, no use of accessory muscles  CV:no murmur or gallop, peripheral pulses intact, BL dorsalis pedis   GI: soft, ND, no rebound, no guarding;  no hepatosplenomegaly; mild suprapubic tenderness   LYMPH: No cervical LAD or tenderness  MSK:  No digital clubbing or cyanosis; able to move all 4 limbs,  PSYCH: A&Ox4,    LABS:            Urinalysis Basic - ( 06 May 2024 11:05 )    Color: Yellow / Appearance: Clear / S.015 / pH: x  Gluc: x / Ketone: Negative mg/dL  / Bili: Negative / Urobili: 0.2 mg/dL   Blood: x / Protein: Negative mg/dL / Nitrite: Negative   Leuk Esterase: Small / RBC: 1 /HPF / WBC 0 /HPF   Sq Epi: x / Non Sq Epi: 1 /HPF / Bacteria: Negative /HPF            Urinalysis with Rflx Culture (collected 06 May 2024 11:05)          RADIOLOGY:

## 2024-05-07 NOTE — PROGRESS NOTE ADULT - ASSESSMENT
Ms Godinez is a 58 VICK w a PMH of HTN, HLD, IDDM noncompliant with her insulin, neuropathy, HTN, hypothyroidism, mood disorder with 3 prior inpatient psychiatric hospitalizations, recent admission from 2/1 - 2/6 for DKA and a mechanical fall in March 2024 presents to the ED w "feeling overwhelmed" and associated SOB. In the ED vitals notable for /79 w , SpO2 97% on RA. Labs notable for Trop 9, Dimer of 414, gluc of 375 w AG of 18 and ProBNP of 326. CT PE protocol negative for PE but showed at 2.4 cm nodule in a multinodular thyroid. Patient aware of nodule and states it has never been worked up because "its never caused me any problems". Patient denies chest pain/pressure, diaphoresis neck/jaw/arm pain, cough, fever, rash, HA, vertigo, N/V, diarrhea, weakness, recent travel or change in medications. Patient is a poor historian and does not know if she had any recent changes in medication, vaccinations or hospitalizations. Did not recall her past admission for fall.  Patient admitted for observation and further assessment.     #SOB, RESOLVED   #'overwhelming feeling," RESOLVED   #Ho nicotine use   #Ho HTN  #Ho DLD  #Ho mood disorder (currently in patient psych)  given troponin negative, CT PE protocol negative for PE/consolidations/effusion, Trace atalectasis reported but not seen more likely anxiety driven then physical cause  SpO2 97% on RA  Patient reports being very displeased w her Psych facility and that they had to 4 point restrain her  no leukocytosis  Dimer 414  ProBNP 326, clinically euvolemic  vitally stable  -No Abx or supplimentary O2 at this time  -No suspicion Pneumonia  -PE r/o by CT and clinical   -tachycardia on presentation of 112 and BP of 175/79 likely due to anxiety  -restart home medications w Met succinate change to tartrate BID  Met Tar 50mg PO BID  Lostartan 50mg once daily  -nicotine patch 14mg once daily ordered  -if SOB returns please obtain CXR   -if desaturation please obtain rainbow labs and ABG  -patient does not recall what her medications are or if she takes any mood stabilizers  -will need out patient psych for mood stabilizers     #Hip Pain  #Pelvic pain  #Hx of fall  - Pt complains of hip pain. Has ha hx of fall on previous admission with finula fracture  - Will order CT hip. < from: CT Pelvis No Cont (05.01.24 @ 11:24) > IMPRESSION: Since 7/28/2020. 2.2 cm sclerotic lesion left sacral ala, previously 1.6 cm. Recommend pre  and postcontrast MRI No acute fractures. Stable myomatous uterus  - MRI suspicious for neoplastic disease, with multiple lymph nodes,   - oncology consult  -  pending CT Abdomen w/w/o IV contrast to complete work up  - recommend CT guided biopsy of one of the pelvic bone lesions. f/u IR  - serum CA-125, CEA, CA 19-9, SPEP, Ca15-3, AFP UPEP, Serum DAVIS, LDH, Beta-2 Microglobulin neg      #IDDM  #AG 18  #Ho peripheral neuropathy  #Ho DKA   per chart poor compliance, per patient she takes all her meds every day   -c/w home Lantus 45U at bedtime  -c/w Lispro 20mg TID  -jardiance 25 on hold  -moderate dose sliding scale  -patient does not recall any hypoglycemic events BUT is poor historian   -f/u Beta hydroxy (low suspicion DKA given type II DM and no acidosis but given Gluc>300 will assess)     #2.4 cm thyroid nodule  #multinodular thyroid  #Ho Hypothyroid  -out patient endocrine referral for nodular w/u   -c/w Home levothyroxine 50mg once daily  -pt complaining of night sweats (4/29)    Misc:  DVT proph: Lovenox 40 once daily  GI Proph: NA  Diet: Carb consist, DASH/TLC  activity: as tolerated  dispo: stable     Ms Godinez is a 58 VICK w a PMH of HTN, HLD, IDDM noncompliant with her insulin, neuropathy, HTN, hypothyroidism, mood disorder with 3 prior inpatient psychiatric hospitalizations, recent admission from 2/1 - 2/6 for DKA and a mechanical fall in March 2024 presents to the ED w "feeling overwhelmed" and associated SOB. In the ED vitals notable for /79 w , SpO2 97% on RA. Labs notable for Trop 9, Dimer of 414, gluc of 375 w AG of 18 and ProBNP of 326. CT PE protocol negative for PE but showed at 2.4 cm nodule in a multinodular thyroid. Patient aware of nodule and states it has never been worked up because "its never caused me any problems". Patient denies chest pain/pressure, diaphoresis neck/jaw/arm pain, cough, fever, rash, HA, vertigo, N/V, diarrhea, weakness, recent travel or change in medications. Patient is a poor historian and does not know if she had any recent changes in medication, vaccinations or hospitalizations. Did not recall her past admission for fall.  Patient admitted for observation and further assessment.     #SOB, RESOLVED   #'overwhelming feeling," RESOLVED   #Ho nicotine use   #Ho HTN  #Ho DLD  #Ho mood disorder (currently in patient psych)  given troponin negative, CT PE protocol negative for PE/consolidations/effusion, Trace atalectasis reported but not seen more likely anxiety driven then physical cause  SpO2 97% on RA  Patient reports being very displeased w her Psych facility and that they had to 4 point restrain her  no leukocytosis  Dimer 414  ProBNP 326, clinically euvolemic  vitally stable  -No Abx or supplimentary O2 at this time  -No suspicion Pneumonia  -PE r/o by CT and clinical   -tachycardia on presentation of 112 and BP of 175/79 likely due to anxiety  -restart home medications w Met succinate change to tartrate BID  Met Tar 50mg PO BID  Lostartan 50mg once daily  -nicotine patch 14mg once daily ordered  -if SOB returns please obtain CXR   -if desaturation please obtain rainbow labs and ABG  -patient does not recall what her medications are or if she takes any mood stabilizers  -will need out patient psych for mood stabilizers     #Hip Pain  #Pelvic pain  #Hx of fall  - Pt complains of hip pain. Has ha hx of fall on previous admission with finula fracture  - Will order CT hip. < from: CT Pelvis No Cont (05.01.24 @ 11:24) > IMPRESSION: Since 7/28/2020. 2.2 cm sclerotic lesion left sacral ala, previously 1.6 cm. Recommend pre  and postcontrast MRI No acute fractures. Stable myomatous uterus  - MRI suspicious for neoplastic disease, with multiple lymph nodes,   - oncology consult  -  pending CT Abdomen w/w/o IV contrast to complete work up  - recommend CT guided biopsy of one of the pelvic bone lesions.   - serum CA-125, CEA, CA 19-9, SPEP, Ca15-3, AFP UPEP, Serum DAVIS, LDH, Beta-2 Microglobulin neg  -neuroIR c/s- need bone scan before biopsy.  -f/u IR after bone scan    #IDDM  #AG 18  #Ho peripheral neuropathy  #Ho DKA   per chart poor compliance, per patient she takes all her meds every day   -c/w home Lantus 45U at bedtime  -jardiance 25 on hold  -moderate dose sliding scale  -patient does not recall any hypoglycemic events BUT is poor historian   -endo c/s appreciated, c/w lantus 45, start lispro 8 TIDAC      #2.4 cm thyroid nodule  #multinodular thyroid  #Ho Hypothyroid  -out patient endocrine referral for nodular w/u   -levothyroxine 50mg once daily  -pt complaining of night sweats (4/29)  -endo c/s- increase levothyroxine to 75mcg    Misc:  DVT proph: Lovenox 40 once daily  GI Proph: NA  Diet: Carb consist, DASH/TLC  activity: as tolerated  dispo: stable

## 2024-05-07 NOTE — CONSULT NOTE ADULT - ATTENDING COMMENTS
57 y/o with foul smelling discharge  vaginitis panel collected  f/u [facility] for outpatient appointment
Agree with above A/P
59 y/o F w/ PMHx of HTN, HLD, IDDM noncompliant with her insulin, neuropathy, hypothyroidism, mood disorder with 3 prior inpatient psychiatric hospitalizations, recent admission from 2/1 - 2/6 for DKA and a mechanical fall in March 2024 presents to the ED w/ SOB. CT PE protocol negative for PE but showed at 2.4 cm nodule in a multinodular thyroid. Endocrinology has been consulted for management of Hypothyroidism & multinodular thyroid.    #Multinodular Thyroid  / Multiple osseous masses highly suspicious for neoplastic/metastatic disease.  - CTA PE showed multinodular enlarged thyroid gland with largest nodule measuring up to 2.4 cm on the right, on exam bilateral enlargement biggest on the right   - Last TFTs: TSH 5.95 (2/17/2023), no FT4  - Poor historian, w/ inconsistencies in history but states she has known about nodules for years, had OP w/u w/ thyroid U/S, never had a bx performed  - will need work up with thyroid US and FNA ( thyroid cancer depending on type can but infrequently metastasis to bone , alkphos high ,  she needs work up for her bone lesion with  bone scan and maybe biopsy )      # hypothyroidism   - TSH 5 on Lt4 50 mcg daily , per patient always getting it at NH And not skipping   - can increase Lt4 to 75 mcg daily         #Uncontrolled DMII, improving  - A1c 15.5% (2/2/2024) >> 7.1% (4/28/2024)  - Admitted for episode of DKA in 2/2024  - On Lantus 45 units in the morning, Lispro 20 units TID & Jardiance 25 mg OP per surescripts; Had also been on Trulicity   - Patient reports compliance currently  - C/w Lantus 45 units qhs  - Start Lispro 8 units TIDAC  - continue with sliding scale   - can resume home regimen on discharge       can follow up in [phone] upon discharge

## 2024-05-07 NOTE — CONSULT NOTE ADULT - ASSESSMENT
# Hypothyroidism  - Last TFTs:   - please start Levothyroxine:    - Please take levothyroxine in the empty stomach in the morning at least one hour before other medications and two hours before breatkfast.  - Discharge recommendations to be discussed.   - Patient can follow up at discharge with [facility] by calling [phone] to make an appointment.           57 y/o F w/ PMHx of HTN, HLD, IDDM noncompliant with her insulin, neuropathy, hypothyroidism, mood disorder with 3 prior inpatient psychiatric hospitalizations, recent admission from 2/1 - 2/6 for DKA and a mechanical fall in March 2024 presents to the ED w/ SOB. CT PE protocol negative for PE but showed at 2.4 cm nodule in a multinodular thyroid. Endocrinology has been consulted for management of Hypothyroidism & multinodular thyroid.      #Hypothyroidism, likely Hashimoto's  #Multinodular Thyroid  - CTA PE showed multinodular enlargedthyroid gland with largest nodule measuring up to 2.4 cm on the right.   - Last TFTs: TSH 5.95 (2/17/2023), no FT4  - Currently on levothyroxine 50 mcg; states she had previously been on a higher dose of 85 mcg, unclear why it was reduced  - Poor historian, w/ inconsistencies in history but states she has known about nodules for years, had OP w/u w/ thyroid U/S, never had a bx performed  - Reports compliance w/ meds; she takes thyroid medication at least an hour prior to eating, and on an empty stomach; does not take w/ any PPI, calcium, iodine  - No fhx of thyroid ca.  - No hx of neck surgery or HEENT radiation  - No hx of amiodarone or lithium use  Plan:  - Increase levothyroxine dose to 75 mcg  - OP Thyroid U/S to further assess nodules and need for FNA  - OP TFTs   - Please take levothyroxine in the empty stomach in the morning at least one hour before other medications and two hours before breatkfast.  - Patient can follow up at discharge with endocrinology at [facility] in 3 months    #Uncontrolled DMII, improving  - A1c 15.5% (2/2/2024) >> 7.1% (4/28/2024)  - Admitted for episode of DKA in 2/2024  - On Lantus 45 units in the morning, Lispro 20 units TID & Jardiance 25 mg OP per surescripts; Had also been on Trulicity q1 week  - Patient reports compliance currently  - No episodes of hypoglycemia IP  - FS in AM appears well controlled, however during the day FS ~ 200s  - Currently on Lantus 45 units qhs + +2 SS  Plan:  - C/w Lantus 45 units qhs  - Start Lispro 8 units TIDAC  - C/w + 2 SS

## 2024-05-07 NOTE — PROGRESS NOTE ADULT - SUBJECTIVE AND OBJECTIVE BOX
Nephrology progress note     no complaints    ON events/Subjective:     Allergies:  No Known Allergies    Hospital Medications:   MEDICATIONS  (STANDING):  chlorhexidine 2% Cloths 1 Application(s) Topical <User Schedule>  dextrose 10% Bolus 125 milliLiter(s) IV Bolus once  dextrose 5%. 1000 milliLiter(s) (50 mL/Hr) IV Continuous <Continuous>  dextrose 5%. 1000 milliLiter(s) (100 mL/Hr) IV Continuous <Continuous>  dextrose 50% Injectable 25 Gram(s) IV Push once  dextrose 50% Injectable 12.5 Gram(s) IV Push once  enoxaparin Injectable 40 milliGRAM(s) SubCutaneous every 24 hours  glucagon  Injectable 1 milliGRAM(s) IntraMuscular once  insulin glargine Injectable (LANTUS) 45 Unit(s) SubCutaneous at bedtime  insulin lispro (ADMELOG) corrective regimen sliding scale   SubCutaneous three times a day before meals  levothyroxine 50 MICROGram(s) Oral daily  losartan 50 milliGRAM(s) Oral daily  melatonin 3 milliGRAM(s) Oral at bedtime  metoprolol tartrate 50 milliGRAM(s) Oral two times a day  nystatin Cream 1 Application(s) Topical two times a day  polyethylene glycol 3350 17 Gram(s) Oral two times a day  pregabalin 150 milliGRAM(s) Oral every 8 hours    REVIEW OF SYSTEMS:  CONSTITUTIONAL: No weakness, fevers or chills  EYES/ENT: No visual changes;  No vertigo or throat pain   NECK: No pain or stiffness  RESPIRATORY: No cough, wheezing, hemoptysis; No shortness of breath  CARDIOVASCULAR: No chest pain or palpitations.  GASTROINTESTINAL: No abdominal or epigastric pain. No nausea, vomiting, or hematemesis; No diarrhea or constipation. No melena or hematochezia.  GENITOURINARY: No dysuria, frequency, foamy urine, urinary urgency, incontinence or hematuria  NEUROLOGICAL: No numbness or weakness  SKIN: No itching, burning, rashes, or lesions   VASCULAR: No bilateral lower extremity edema.   All other review of systems is negative unless indicated above.    VITALS:  T(F): 97.9 (24 @ 04:24), Max: 97.9 (24 @ 04:24)  HR: 89 (24 @ 04:24)  BP: 130/64 (24 @ 04:24)  RR: 20 (24 @ 04:24)  SpO2: 97% (24 @ 04:24)  Wt(kg): --     @ 07:01  -   @ 07:00  --------------------------------------------------------  IN: 800 mL / OUT: 0 mL / NET: 800 mL        PHYSICAL EXAM:  Constitutional: NAD  HEENT: anicteric sclera, oropharynx clear, MMM  Neck: No JVD  Respiratory: CTAB, no wheezes, rales or rhonchi  Cardiovascular: S1, S2, RRR  Gastrointestinal: BS+, soft, NT/ND  Extremities: No cyanosis or clubbing. No peripheral edema  Neurological: A/O x 3, no focal deficits  Psychiatric: Normal mood, normal affect  : No CVA tenderness. No white.   Skin: No rashes  Vascular Access:    LABS:            Urine Studies:  Creatinine Trend: 0.5<--, 0.5<--  Urinalysis Basic - ( 06 May 2024 11:05 )    Color: Yellow / Appearance: Clear / S.015 / pH:   Gluc:  / Ketone: Negative mg/dL  / Bili: Negative / Urobili: 0.2 mg/dL   Blood:  / Protein: Negative mg/dL / Nitrite: Negative   Leuk Esterase: Small / RBC: 1 /HPF / WBC 0 /HPF   Sq Epi:  / Non Sq Epi: 1 /HPF / Bacteria: Negative /HPF    ·Stable (relatively normal) renal function  ·BP now controlled on metoprolol and losartan  ·per psychiatry no need IPP or change in medications  ·pelvic CT wo fx - left sacral sclerotic lesion and MRI with other lesions  seen by oncology - for CT   possible panic attack on admission    1) consider IVF NS 6 hours prior and post iv CT scan  2) continue BP meds  labs

## 2024-05-07 NOTE — CONSULT NOTE ADULT - SUBJECTIVE AND OBJECTIVE BOX
HISTORY OF PRESENT ILLNESS  DANISH NIXON is a 59 y/o F w/ PMHx of HTN, HLD, IDDM noncompliant with her insulin, neuropathy, hypothyroidism, mood disorder with 3 prior inpatient psychiatric hospitalizations, recent admission from 2/1 - 2/6 for DKA and a mechanical fall in March 2024 presents to the ED w "feeling overwhelmed" and associated SOB. In the ED vitals notable for /79 w , SpO2 97% on RA. Labs notable for Trop 9, Dimer of 414, gluc of 375 w AG of 18 and ProBNP of 326. CT PE protocol negative for PE but showed at 2.4 cm nodule in a multinodular thyroid. Patient aware of nodule and states it has never been worked up because "its never caused me any problems". Patient denies chest pain/pressure, diaphroesis, neck/jaw/arm pain, cough, fever, rash, HA, vertiog, N/V, diarrhea, weakness, recent travel or change in medications. Patient is a poor historian and does not know if she had any recent changes in medication, vaccinations or hospitalizations. Did not recall her past admission for fall.  Patient admitted for observation and further assessment.       Endocrinology has been consulted for Management of Hypothyroidism.    Age of diagnosis:  S/s at time of diagnosis:  Last TFTs were checked in:  Current Therapy:  Outpatient managment by:    FAMILY HISTORY  - Diabetes:  - Thyroid:  - Autoimmune:  - Other:    SOCIAL HISTORY  - Work:  - Alcohol:  - Smoking:  - Recreational Drugs:    ALLERGIES  No Known Allergies      CURRENT MEDICATIONS  acetaminophen     Tablet .. 650 milliGRAM(s) Oral every 6 hours PRN  baclofen 5 milliGRAM(s) Oral every 8 hours PRN  chlorhexidine 2% Cloths 1 Application(s) Topical <User Schedule>  dextrose 10% Bolus 125 milliLiter(s) IV Bolus once  dextrose 5%. 1000 milliLiter(s) IV Continuous <Continuous>  dextrose 5%. 1000 milliLiter(s) IV Continuous <Continuous>  dextrose 50% Injectable 25 Gram(s) IV Push once  dextrose 50% Injectable 12.5 Gram(s) IV Push once  dextrose Oral Gel 15 Gram(s) Oral once PRN  enoxaparin Injectable 40 milliGRAM(s) SubCutaneous every 24 hours  glucagon  Injectable 1 milliGRAM(s) IntraMuscular once  insulin glargine Injectable (LANTUS) 45 Unit(s) SubCutaneous at bedtime  insulin lispro (ADMELOG) corrective regimen sliding scale   SubCutaneous three times a day before meals  levothyroxine 50 MICROGram(s) Oral daily  lidocaine   4% Patch 1 Patch Transdermal daily PRN  losartan 50 milliGRAM(s) Oral daily  melatonin 3 milliGRAM(s) Oral at bedtime  metoprolol tartrate 50 milliGRAM(s) Oral two times a day  nystatin Cream 1 Application(s) Topical two times a day  polyethylene glycol 3350 17 Gram(s) Oral two times a day  pregabalin 150 milliGRAM(s) Oral every 8 hours      REVIEW OF SYSTEMS  Constitutional:  Negative fever, chills or loss of appetite.  Eyes:  Negative blurry vision or double vision.  Cardiovascular:  Negative for chest pain or palpitations.  Respiratory:  Negative for cough, wheezing, or shortness of breath.   Gastrointestinal:  Negative for nausea, vomiting, diarrhea, constipation, or abdominal pain.  Genitourinary:  Negative frequency, urgency or dysuria.  Neurologic:  No headache, confusion, dizziness, lightheadedness.    PHYSICAL EXAM  Vital Signs Last 24 Hrs  T(C): 36.6 (07 May 2024 04:24), Max: 36.6 (07 May 2024 04:24)  T(F): 97.9 (07 May 2024 04:24), Max: 97.9 (07 May 2024 04:24)  HR: 89 (07 May 2024 04:24) (89 - 97)  BP: 130/64 (07 May 2024 04:24) (125/76 - 145/76)  BP(mean): 90 (06 May 2024 17:10) (90 - 90)  RR: 20 (07 May 2024 04:24) (16 - 20)  SpO2: 97% (07 May 2024 04:24) (97% - 100%)    Parameters below as of 07 May 2024 04:24  Patient On (Oxygen Delivery Method): room air    Constitutional: Awake, alert, in no acute distress.   HEENT: Normocephalic, atraumatic, LISY, no proptosis or lid retraction.   Neck: supple, no acanthosis, no thyromegaly or palpable thyroid nodules.  Respiratory: Lungs clear to ausculation bilaterally.   Cardiovascular: regular rhythm, normal S1 and S2, no audible murmurs.   GI: soft, non-tender, non-distended, bowel sounds present, no masses appreciated.  Extremities: No lower extremity edema, peripheral pulses present.   Skin: no rashes.   Psychiatric: AAO x 3. Normal affect/mood.     LABS  CBC - WBC/HGB/HTC/PLT: 6.21/13.9/41.8/268 (05-07-24)  BMP: Na/K/Cl/Bicarb/BUN/Cr/Gluc: 139/4.7/102/29/19/0.8/246 (05-07-24)  Anion Gap: 8 (05-07-24)  eGFR: 85 (05-07-24)  Calcium: 10.2 (05-07-24)  Phosphorus: -- (05-07-24)  Magnesium: 2.0 (05-07-24)      Thyroid Stimulating Hormone, Serum: 5.95 (02-17-24)    CBC - WBC/HGB/HTC/PLT: 6.21/13.9/41.8/268 (05-07-24)BMP: Na/K/Cl/Bicarb/BUN/Cr/Gluc: 139/4.7/102/29/19/0.8/246 (05-07-24)  Anion Gap: 8 (05-07-24)  eGFR: 85 (05-07-24)  Calcium: 10.2 (05-07-24)  Phosphorus: -- (05-07-24)  Magnesium: 2.0 (05-07-24)    CAPILLARY BLOOD GLUCOSE & INSULIN RECEIVED  141 mg/dL (05-06 @ 07:54)  203 mg/dL (05-06 @ 11:48)  230 mg/dL (05-06 @ 16:56)  268 mg/dL (05-06 @ 20:55)  120 mg/dL (05-07 @ 07:57)  234 mg/dL (05-07 @ 12:06)          ASSESSMENT / RECOMMENDATIONS    A1C: 7.1 %  BUN: 19  Creatinine: 0.8  GFR: 85  Weight: 85.4  BMI: 30.4 HISTORY OF PRESENT ILLNESS  DANISH NIXON is a 59 y/o F w/ PMHx of HTN, HLD, IDDM noncompliant with her insulin, neuropathy, hypothyroidism, mood disorder with 3 prior inpatient psychiatric hospitalizations, recent admission from 2/1 - 2/6 for DKA and a mechanical fall in March 2024 presents to the ED w "feeling overwhelmed" and associated SOB. In the ED vitals notable for /79 w , SpO2 97% on RA. Labs notable for Trop 9, Dimer of 414, gluc of 375 w AG of 18 and ProBNP of 326. CT PE protocol negative for PE but showed at 2.4 cm nodule in a multinodular thyroid. Patient aware of nodule and states it has never been worked up because "its never caused me any problems". Patient denies chest pain/pressure, diaphroesis, neck/jaw/arm pain, cough, fever, rash, HA, vertiog, N/V, diarrhea, weakness, recent travel or change in medications. Patient is a poor historian and does not know if she had any recent changes in medication, vaccinations or hospitalizations. Did not recall her past admission for fall.  Patient admitted for observation and further assessment.       Endocrinology has been consulted for Management of Hypothyroidism.    Age of diagnosis: Unknown  S/s at time of diagnosis: Unknown  Last TFTs were checked in: TSH 5.95 (2/17/2023)  Current Therapy: Currently on Levothyroxine 50 MCG; Previously on as high a dose as   Outpatient managment by:    FAMILY HISTORY  - Diabetes:  - Thyroid:  - Autoimmune:  - Other:    SOCIAL HISTORY  - Work:  - Alcohol:  - Smoking:  - Recreational Drugs:    ALLERGIES  No Known Allergies      CURRENT MEDICATIONS  acetaminophen     Tablet .. 650 milliGRAM(s) Oral every 6 hours PRN  baclofen 5 milliGRAM(s) Oral every 8 hours PRN  chlorhexidine 2% Cloths 1 Application(s) Topical <User Schedule>  dextrose 10% Bolus 125 milliLiter(s) IV Bolus once  dextrose 5%. 1000 milliLiter(s) IV Continuous <Continuous>  dextrose 5%. 1000 milliLiter(s) IV Continuous <Continuous>  dextrose 50% Injectable 25 Gram(s) IV Push once  dextrose 50% Injectable 12.5 Gram(s) IV Push once  dextrose Oral Gel 15 Gram(s) Oral once PRN  enoxaparin Injectable 40 milliGRAM(s) SubCutaneous every 24 hours  glucagon  Injectable 1 milliGRAM(s) IntraMuscular once  insulin glargine Injectable (LANTUS) 45 Unit(s) SubCutaneous at bedtime  insulin lispro (ADMELOG) corrective regimen sliding scale   SubCutaneous three times a day before meals  levothyroxine 50 MICROGram(s) Oral daily  lidocaine   4% Patch 1 Patch Transdermal daily PRN  losartan 50 milliGRAM(s) Oral daily  melatonin 3 milliGRAM(s) Oral at bedtime  metoprolol tartrate 50 milliGRAM(s) Oral two times a day  nystatin Cream 1 Application(s) Topical two times a day  polyethylene glycol 3350 17 Gram(s) Oral two times a day  pregabalin 150 milliGRAM(s) Oral every 8 hours      REVIEW OF SYSTEMS  Constitutional:  Negative fever, chills or loss of appetite.  Eyes:  Negative blurry vision or double vision.  Cardiovascular:  Negative for chest pain or palpitations.  Respiratory:  Negative for cough, wheezing, or shortness of breath.   Gastrointestinal:  Negative for nausea, vomiting, diarrhea, constipation, or abdominal pain.  Genitourinary:  Negative frequency, urgency or dysuria.  Neurologic:  No headache, confusion, dizziness, lightheadedness.    PHYSICAL EXAM  Vital Signs Last 24 Hrs  T(C): 36.6 (07 May 2024 04:24), Max: 36.6 (07 May 2024 04:24)  T(F): 97.9 (07 May 2024 04:24), Max: 97.9 (07 May 2024 04:24)  HR: 89 (07 May 2024 04:24) (89 - 97)  BP: 130/64 (07 May 2024 04:24) (125/76 - 145/76)  BP(mean): 90 (06 May 2024 17:10) (90 - 90)  RR: 20 (07 May 2024 04:24) (16 - 20)  SpO2: 97% (07 May 2024 04:24) (97% - 100%)    Parameters below as of 07 May 2024 04:24  Patient On (Oxygen Delivery Method): room air    Constitutional: Awake, alert, in no acute distress.   HEENT: Normocephalic, atraumatic, LISY, no proptosis or lid retraction.   Neck: supple, no acanthosis, no thyromegaly or palpable thyroid nodules.  Respiratory: Lungs clear to ausculation bilaterally.   Cardiovascular: regular rhythm, normal S1 and S2, no audible murmurs.   GI: soft, non-tender, non-distended, bowel sounds present, no masses appreciated.  Extremities: No lower extremity edema, peripheral pulses present.   Skin: no rashes.   Psychiatric: AAO x 3. Normal affect/mood.     LABS  CBC - WBC/HGB/HTC/PLT: 6.21/13.9/41.8/268 (05-07-24)  BMP: Na/K/Cl/Bicarb/BUN/Cr/Gluc: 139/4.7/102/29/19/0.8/246 (05-07-24)  Anion Gap: 8 (05-07-24)  eGFR: 85 (05-07-24)  Calcium: 10.2 (05-07-24)  Phosphorus: -- (05-07-24)  Magnesium: 2.0 (05-07-24)      Thyroid Stimulating Hormone, Serum: 5.95 (02-17-24)    CBC - WBC/HGB/HTC/PLT: 6.21/13.9/41.8/268 (05-07-24)BMP: Na/K/Cl/Bicarb/BUN/Cr/Gluc: 139/4.7/102/29/19/0.8/246 (05-07-24)  Anion Gap: 8 (05-07-24)  eGFR: 85 (05-07-24)  Calcium: 10.2 (05-07-24)  Phosphorus: -- (05-07-24)  Magnesium: 2.0 (05-07-24)    CAPILLARY BLOOD GLUCOSE & INSULIN RECEIVED  141 mg/dL (05-06 @ 07:54)  203 mg/dL (05-06 @ 11:48)  230 mg/dL (05-06 @ 16:56)  268 mg/dL (05-06 @ 20:55)  120 mg/dL (05-07 @ 07:57)  234 mg/dL (05-07 @ 12:06)          ASSESSMENT / RECOMMENDATIONS    A1C: 7.1 %  BUN: 19  Creatinine: 0.8  GFR: 85  Weight: 85.4  BMI: 30.4 HISTORY OF PRESENT ILLNESS  DANISH NIXON is a 59 y/o F w/ PMHx of HTN, HLD, IDDM noncompliant with her insulin, neuropathy, hypothyroidism, mood disorder with 3 prior inpatient psychiatric hospitalizations, recent admission from 2/1 - 2/6 for DKA and a mechanical fall in March 2024 presents to the ED w "feeling overwhelmed" and associated SOB. In the ED vitals notable for /79 w , SpO2 97% on RA. Labs notable for Trop 9, Dimer of 414, gluc of 375 w AG of 18 and ProBNP of 326. CT PE protocol negative for PE but showed at 2.4 cm nodule in a multinodular thyroid. Patient aware of nodule and states it has never been worked up because "its never caused me any problems". Patient denies chest pain/pressure, diaphroesis, neck/jaw/arm pain, cough, fever, rash, HA, vertiog, N/V, diarrhea, weakness, recent travel or change in medications. Patient is a poor historian and does not know if she had any recent changes in medication, vaccinations or hospitalizations. Did not recall her past admission for fall.  Patient admitted for observation and further assessment.       Endocrinology has been consulted for Management of Hypothyroidism.    Age of diagnosis: Unknown  S/s at time of diagnosis: Unknown  Last TFTs were checked in: TSH 5.95 (2/17/2023)  Current Therapy: Currently on Levothyroxine 50 MCG; Previously on as high a dose as   Outpatient managment by: PCP    FAMILY HISTORY  - Thyroid: Maternal grandfather  - No fhx of thyroid ca. per pt    ALLERGIES  No Known Allergies      CURRENT MEDICATIONS  acetaminophen     Tablet .. 650 milliGRAM(s) Oral every 6 hours PRN  baclofen 5 milliGRAM(s) Oral every 8 hours PRN  chlorhexidine 2% Cloths 1 Application(s) Topical <User Schedule>  dextrose 10% Bolus 125 milliLiter(s) IV Bolus once  dextrose 5%. 1000 milliLiter(s) IV Continuous <Continuous>  dextrose 5%. 1000 milliLiter(s) IV Continuous <Continuous>  dextrose 50% Injectable 25 Gram(s) IV Push once  dextrose 50% Injectable 12.5 Gram(s) IV Push once  dextrose Oral Gel 15 Gram(s) Oral once PRN  enoxaparin Injectable 40 milliGRAM(s) SubCutaneous every 24 hours  glucagon  Injectable 1 milliGRAM(s) IntraMuscular once  insulin glargine Injectable (LANTUS) 45 Unit(s) SubCutaneous at bedtime  insulin lispro (ADMELOG) corrective regimen sliding scale   SubCutaneous three times a day before meals  levothyroxine 50 MICROGram(s) Oral daily  lidocaine   4% Patch 1 Patch Transdermal daily PRN  losartan 50 milliGRAM(s) Oral daily  melatonin 3 milliGRAM(s) Oral at bedtime  metoprolol tartrate 50 milliGRAM(s) Oral two times a day  nystatin Cream 1 Application(s) Topical two times a day  polyethylene glycol 3350 17 Gram(s) Oral two times a day  pregabalin 150 milliGRAM(s) Oral every 8 hours      REVIEW OF SYSTEMS  Constitutional:  Negative fever, chills or loss of appetite.  Eyes:  Negative blurry vision or double vision.  Cardiovascular:  Negative for chest pain or palpitations.  Respiratory:  Negative for cough, wheezing, or shortness of breath.   Gastrointestinal:  Negative for nausea, vomiting, diarrhea, constipation, or abdominal pain.  Genitourinary:  Negative frequency, urgency or dysuria.  Neurologic:  No headache, confusion, dizziness, lightheadedness.    PHYSICAL EXAM  Vital Signs Last 24 Hrs  T(C): 36.6 (07 May 2024 04:24), Max: 36.6 (07 May 2024 04:24)  T(F): 97.9 (07 May 2024 04:24), Max: 97.9 (07 May 2024 04:24)  HR: 89 (07 May 2024 04:24) (89 - 97)  BP: 130/64 (07 May 2024 04:24) (125/76 - 145/76)  BP(mean): 90 (06 May 2024 17:10) (90 - 90)  RR: 20 (07 May 2024 04:24) (16 - 20)  SpO2: 97% (07 May 2024 04:24) (97% - 100%)    Parameters below as of 07 May 2024 04:24  Patient On (Oxygen Delivery Method): room air    Constitutional: Awake, alert, in no acute distress.   HEENT: Normocephalic, atraumatic, LISY, no proptosis or lid retraction.   Neck: + thyromegaly +nodular   Respiratory: Lungs clear to ausculation bilaterally.   Cardiovascular: regular rhythm, normal S1 and S2, no audible murmurs.   GI: soft, non-tender, non-distended.  Extremities: No lower extremity edema.   Psychiatric: AAO x 3. Normal affect/mood.     LABS  CBC - WBC/HGB/HTC/PLT: 6.21/13.9/41.8/268 (05-07-24)  BMP: Na/K/Cl/Bicarb/BUN/Cr/Gluc: 139/4.7/102/29/19/0.8/246 (05-07-24)  Anion Gap: 8 (05-07-24)  eGFR: 85 (05-07-24)  Calcium: 10.2 (05-07-24)  Phosphorus: -- (05-07-24)  Magnesium: 2.0 (05-07-24)      Thyroid Stimulating Hormone, Serum: 5.95 (02-17-24)    CBC - WBC/HGB/HTC/PLT: 6.21/13.9/41.8/268 (05-07-24)BMP: Na/K/Cl/Bicarb/BUN/Cr/Gluc: 139/4.7/102/29/19/0.8/246 (05-07-24)  Anion Gap: 8 (05-07-24)  eGFR: 85 (05-07-24)  Calcium: 10.2 (05-07-24)  Phosphorus: -- (05-07-24)  Magnesium: 2.0 (05-07-24)    CAPILLARY BLOOD GLUCOSE & INSULIN RECEIVED  141 mg/dL (05-06 @ 07:54)  203 mg/dL (05-06 @ 11:48)  230 mg/dL (05-06 @ 16:56)  268 mg/dL (05-06 @ 20:55)  120 mg/dL (05-07 @ 07:57)  234 mg/dL (05-07 @ 12:06)          ASSESSMENT / RECOMMENDATIONS    A1C: 7.1 %  BUN: 19  Creatinine: 0.8  GFR: 85  Weight: 85.4  BMI: 30.4 HISTORY OF PRESENT ILLNESS  DANISH NIXON is a 59 y/o F w/ PMHx of HTN, HLD, IDDM noncompliant with her insulin, neuropathy, hypothyroidism, mood disorder with 3 prior inpatient psychiatric hospitalizations, recent admission from 2/1 - 2/6 for DKA and a mechanical fall in March 2024 presents to the ED w "feeling overwhelmed" and associated SOB. In the ED vitals notable for /79 w , SpO2 97% on RA. Labs notable for Trop 9, Dimer of 414, gluc of 375 w AG of 18 and ProBNP of 326. CT PE protocol negative for PE but showed at 2.4 cm nodule in a multinodular thyroid. Patient aware of nodule and states it has never been worked up because "its never caused me any problems". Patient denies chest pain/pressure, diaphroesis, neck/jaw/arm pain, cough, fever, rash, HA, vertiog, N/V, diarrhea, weakness, recent travel or change in medications. Patient is a poor historian and does not know if she had any recent changes in medication, vaccinations or hospitalizations. Did not recall her past admission for fall.  Patient admitted for observation and further assessment.       Endocrinology has been consulted for Management of Hypothyroidism.    Age of diagnosis: Unknown  S/s at time of diagnosis: Unknown  Last TFTs were checked in: TSH 5.95 (2/17/2023)  Current Therapy: Currently on Levothyroxine 50 MCG; Previously on as high a dose as   Outpatient managment by: PCP    FAMILY HISTORY  - Thyroid: Maternal grandfather  - No fhx of thyroid ca. per pt    ALLERGIES  No Known Allergies      CURRENT MEDICATIONS  acetaminophen     Tablet .. 650 milliGRAM(s) Oral every 6 hours PRN  baclofen 5 milliGRAM(s) Oral every 8 hours PRN  chlorhexidine 2% Cloths 1 Application(s) Topical <User Schedule>  dextrose 10% Bolus 125 milliLiter(s) IV Bolus once  dextrose 5%. 1000 milliLiter(s) IV Continuous <Continuous>  dextrose 5%. 1000 milliLiter(s) IV Continuous <Continuous>  dextrose 50% Injectable 25 Gram(s) IV Push once  dextrose 50% Injectable 12.5 Gram(s) IV Push once  dextrose Oral Gel 15 Gram(s) Oral once PRN  enoxaparin Injectable 40 milliGRAM(s) SubCutaneous every 24 hours  glucagon  Injectable 1 milliGRAM(s) IntraMuscular once  insulin glargine Injectable (LANTUS) 45 Unit(s) SubCutaneous at bedtime  insulin lispro (ADMELOG) corrective regimen sliding scale   SubCutaneous three times a day before meals  levothyroxine 50 MICROGram(s) Oral daily  lidocaine   4% Patch 1 Patch Transdermal daily PRN  losartan 50 milliGRAM(s) Oral daily  melatonin 3 milliGRAM(s) Oral at bedtime  metoprolol tartrate 50 milliGRAM(s) Oral two times a day  nystatin Cream 1 Application(s) Topical two times a day  polyethylene glycol 3350 17 Gram(s) Oral two times a day  pregabalin 150 milliGRAM(s) Oral every 8 hours      REVIEW OF SYSTEMS  Constitutional:  Negative fever, chills or loss of appetite.  Eyes:  Negative blurry vision or double vision.  Cardiovascular:  Negative for chest pain or palpitations.  Respiratory:  Negative for cough, wheezing, or shortness of breath.   Gastrointestinal:  Negative for nausea, vomiting, diarrhea, constipation, or abdominal pain.  Genitourinary:  Negative frequency, urgency or dysuria.  Neurologic:  No headache, confusion, dizziness, lightheadedness.    PHYSICAL EXAM  Vital Signs Last 24 Hrs  T(C): 36.6 (07 May 2024 04:24), Max: 36.6 (07 May 2024 04:24)  T(F): 97.9 (07 May 2024 04:24), Max: 97.9 (07 May 2024 04:24)  HR: 89 (07 May 2024 04:24) (89 - 97)  BP: 130/64 (07 May 2024 04:24) (125/76 - 145/76)  BP(mean): 90 (06 May 2024 17:10) (90 - 90)  RR: 20 (07 May 2024 04:24) (16 - 20)  SpO2: 97% (07 May 2024 04:24) (97% - 100%)    Parameters below as of 07 May 2024 04:24  Patient On (Oxygen Delivery Method): room air    Constitutional: Awake, alert, in no acute distress.   HEENT: Normocephalic, atraumatic, LISY, no proptosis or lid retraction.   Neck: + thyromegaly bilateral   Respiratory: Lungs clear to ausculation bilaterally.   Cardiovascular: regular rhythm, normal S1 and S2, no audible murmurs.   GI: soft, non-tender, non-distended.  Extremities: No lower extremity edema.   Psychiatric: AAO x 3. Normal affect/mood.     LABS  CBC - WBC/HGB/HTC/PLT: 6.21/13.9/41.8/268 (05-07-24)  BMP: Na/K/Cl/Bicarb/BUN/Cr/Gluc: 139/4.7/102/29/19/0.8/246 (05-07-24)  Anion Gap: 8 (05-07-24)  eGFR: 85 (05-07-24)  Calcium: 10.2 (05-07-24)  Phosphorus: -- (05-07-24)  Magnesium: 2.0 (05-07-24)      Thyroid Stimulating Hormone, Serum: 5.95 (02-17-24)    CBC - WBC/HGB/HTC/PLT: 6.21/13.9/41.8/268 (05-07-24)BMP: Na/K/Cl/Bicarb/BUN/Cr/Gluc: 139/4.7/102/29/19/0.8/246 (05-07-24)  Anion Gap: 8 (05-07-24)  eGFR: 85 (05-07-24)  Calcium: 10.2 (05-07-24)  Phosphorus: -- (05-07-24)  Magnesium: 2.0 (05-07-24)    CAPILLARY BLOOD GLUCOSE & INSULIN RECEIVED  141 mg/dL (05-06 @ 07:54)  203 mg/dL (05-06 @ 11:48)  230 mg/dL (05-06 @ 16:56)  268 mg/dL (05-06 @ 20:55)  120 mg/dL (05-07 @ 07:57)  234 mg/dL (05-07 @ 12:06)          ASSESSMENT / RECOMMENDATIONS    A1C: 7.1 %  BUN: 19  Creatinine: 0.8  GFR: 85  Weight: 85.4  BMI: 30.4            < from: MR Pelvis w/wo IV Cont (05.03.24 @ 14:25) >  1. Multiple osseous masses highly suspicious for neoplastic/metastatic   disease.  2. Enlarged bilateral inguinal lymph nodes.    < end of copied text >  < from: CT Abdomen and Pelvis w/ IV Cont (05.07.24 @ 13:57) >  1. A 2.2 cm sclerotic lesion within the left sacral ala is better   evaluated on reference MRI.  2. Otherwise, no definite evidence of intra-abdominal or pelvic   metastasis.  3. Enlarged calcified fibroid uterus, similar to that seen on reference   exam of July 2020.    < end of copied text >  < from: CT Angio Chest PE Protocol w/ IV Cont (04.27.24 @ 02:47) >  MEDIASTINUM: No enlarged mediastinal, hilar or axillary lymph nodes.   Multinodular enlarged thyroid gland with largest nodule measuring up to   2.4 cm on the right (8/1).    < end of copied text >

## 2024-05-07 NOTE — PROGRESS NOTE ADULT - SUBJECTIVE AND OBJECTIVE BOX
pt seen and examined.     My notes supersede resident's notes in case of discrepancy       ROS: no cp, no sob, no n/v, no fever    Vital Signs Last 24 Hrs  T(C): 36.6 (07 May 2024 04:24), Max: 36.6 (07 May 2024 04:24)  T(F): 97.9 (07 May 2024 04:24), Max: 97.9 (07 May 2024 04:24)  HR: 89 (07 May 2024 04:24) (89 - 97)  BP: 130/64 (07 May 2024 04:24) (125/76 - 145/76)  BP(mean): 90 (06 May 2024 17:10) (90 - 90)  RR: 20 (07 May 2024 04:24) (16 - 20)  SpO2: 97% (07 May 2024 04:24) (97% - 100%)    Parameters below as of 07 May 2024 04:24  Patient On (Oxygen Delivery Method): room air    physical exam  constitutional NAD, AAOX3, Respiratory  lungs CTA, CVS heart RRR, GI: abdomen Soft NT, ND, BS+, skin: intact  neuro exam no focal deficit     MEDICATIONS  (STANDING):  chlorhexidine 2% Cloths 1 Application(s) Topical <User Schedule>  dextrose 10% Bolus 125 milliLiter(s) IV Bolus once  dextrose 5%. 1000 milliLiter(s) (100 mL/Hr) IV Continuous <Continuous>  dextrose 5%. 1000 milliLiter(s) (50 mL/Hr) IV Continuous <Continuous>  dextrose 50% Injectable 25 Gram(s) IV Push once  dextrose 50% Injectable 12.5 Gram(s) IV Push once  enoxaparin Injectable 40 milliGRAM(s) SubCutaneous every 24 hours  glucagon  Injectable 1 milliGRAM(s) IntraMuscular once  insulin glargine Injectable (LANTUS) 45 Unit(s) SubCutaneous at bedtime  insulin lispro (ADMELOG) corrective regimen sliding scale   SubCutaneous three times a day before meals  levothyroxine 50 MICROGram(s) Oral daily  losartan 50 milliGRAM(s) Oral daily  melatonin 3 milliGRAM(s) Oral at bedtime  metoprolol tartrate 50 milliGRAM(s) Oral two times a day  nystatin Cream 1 Application(s) Topical two times a day  polyethylene glycol 3350 17 Gram(s) Oral two times a day  pregabalin 150 milliGRAM(s) Oral every 8 hours    MEDICATIONS  (PRN):  acetaminophen     Tablet .. 650 milliGRAM(s) Oral every 6 hours PRN Mild Pain (1 - 3)  baclofen 5 milliGRAM(s) Oral every 8 hours PRN Musculoskeletal Pain  dextrose Oral Gel 15 Gram(s) Oral once PRN Blood Glucose LESS THAN 70 milliGRAM(s)/deciliter  lidocaine   4% Patch 1 Patch Transdermal daily PRN knee pain                            13.9   6.21  )-----------( 268      ( 07 May 2024 10:50 )             41.8     05-07    139  |  102  |  19  ----------------------------<  246<H>  4.7   |  29  |  0.8    Ca    10.2      07 May 2024 10:50  Mg     2.0     05-07      D-Dimer Assay, Quantitative: 414 ng/mL DDU (04-26-24 @ 21:27)    Urinalysis with Rflx Culture (collected 06 May 2024 11:05)    Cancer Antigen, Breast Ca 15.3: 11.7 (05.05.24 @ 15:44)    Cancer Antigen, GI Ca 19-9: <2 (05.05.24 @ 15:44)    Cancer Antigen, 125: 16 (05.05.24 @ 15:44)    a/p  58 F with PMH of HTN, HLD, IDDM noncompliant with her insulin, neuropathy, HTN, hypothyroidism, mood disorder with 3 prior inpatient psychiatric hospitalizations, recent admission from 2/1 - 2/6 for DKA and a mechanical fall in March 2024 presents to the ED w "feeling overwhelmed" and associated SOB. In the ED vitals notable for /79 w , SpO2 97% on RA. Labs notable for Trop 9, Dimer of 414, gluc of 375 w AG of 18 and ProBNP of 326. CT PE protocol negative for PE but showed at 2.4 cm nodule in a multinodular thyroid. Patient aware of nodule and states it has never been worked up because "its never caused me any problems". Patient denies chest pain/pressure, diaphroesis, neck/jaw/arm pain, cough, fever, rash, HA, vertiog, N/V, diarrhea, weakness, recent travel or change in medications. Patient is a poor historian and does not know if she had any recent changes in medication, vaccinations or hospitalizations. Did not recall her past admission for fall.  Patient admitted for observation and further assessment.     # hip pain, ct is not diagnostic, MRI suspicious for neoplastic disease, with multiple lymph nodes, thyroid nodule   oncology consult appreciated   - CT Abdomen w/w/o IV contrast to complete work up  - IR consult for CT guided biopsy of one of the pelvic bone lesions  - serum CA-125, CEA, CA 19-9, SPEP, Ca15-3, AFP UPEP, Serum DAVIS, LDH, Beta-2 Microglobulin    # SOB , Possible Panic attack with SOB and anxiety. now calm and stable   #Ho mood disorder now stable   cont meds     # pt is complaining of urinary symptoms / dysuria and foul smelling vaginal discharge  ua neg  wants to see gyn   gyn consult appreciated. outpt fu is recommended     # DM , with diabetic neuropathy,  uncontrolled,  hx of dka ,   CAPILLARY BLOOD GLUCOSE  POCT Blood Glucose.: 234 mg/dL (07 May 2024 12:06)  POCT Blood Glucose.: 120 mg/dL (07 May 2024 07:57)  POCT Blood Glucose.: 268 mg/dL (06 May 2024 20:55)  POCT Blood Glucose.: 230 mg/dL (06 May 2024 16:56)    A1C with Estimated Average Glucose Result: 7.1(04.28.24 @ 06:51)    -c/w home Lantus 45U at bedtime  -c/w Lispro 20mg TID  -jardiance 25 on hold  -moderate dose sliding scale  -patient does not recall any hypoglycemic events BUT is poor historian   - Beta hydroxy 0.8  (low suspicion DKA )      #2.4 cm thyroid nodule  #multinodular thyroid  #Ho Hypothyroid  Thyroid Stimulating Hormone, Serum: 5.95 uIU/mL (02.17.24 @ 08:53)  endocrine referral for nodular w/u   -c/w Home levothyroxine 50 daily,     # left knee pain, probably OA, xray left knee reviewed , conservative management     # right knee  xray knee reviewed Proximal fibular neck fracture with mild callus formation, possibly   subacute.  Swelling around the lateral malleolus with no definite fracture.  no pain on the right, repeat xray for followup,   consider knee brace     #Progress Note Handoff    Pending : IR consult , CT abd pelvis  Family discussion: dw pt   Disposition: NH when medically cleared   code status: full code

## 2024-05-07 NOTE — CONSULT NOTE ADULT - SUBJECTIVE AND OBJECTIVE BOX
NEURORADIOLOGY CONSULT:     Procedure Requested: bone biopsy    HPI:  Ms Godinez is a 58 VICK w a PMH of HTN, HLD, IDDM noncompliant with her insulin, neuropathy, HTN, hypothyroidism, mood disorder with 3 prior inpatient psychiatric hospitalizations, recent admission from 2/1 - 2/6 for DKA and a mechanical fall in March 2024 presents to the ED w "feeling overwhelmed" and associated SOB. In the ED vitals notable for /79 w , SpO2 97% on RA. Labs notable for Trop 9, Dimer of 414, gluc of 375 w AG of 18 and ProBNP of 326. CT PE protocol negative for PE but showed at 2.4 cm nodule in a multinodular thyroid. Patient aware of nodule and states it has never been worked up because "its never caused me any problems". Patient denies chest pain/pressure, diaphroesis, neck/jaw/arm pain, cough, fever, rash, HA, vertiog, N/V, diarrhea, weakness, recent travel or change in medications. Patient is a poor historian and does not know if she had any recent changes in medication, vaccinations or hospitalizations. Did not recall her past admission for fall.  Patient admitted for observation and further assessment.  (27 Apr 2024 11:37)      PAST MEDICAL & SURGICAL HISTORY:  Hypertension      Diabetes mellitus      Thyroid disease      Anemia      History of mood disorder      H/O diabetic neuropathy      Fibroid uterus      S/P lumbar discectomy  2006.      S/P tonsillectomy      MEDICATIONS  (STANDING):  chlorhexidine 2% Cloths 1 Application(s) Topical <User Schedule>  dextrose 10% Bolus 125 milliLiter(s) IV Bolus once  dextrose 5%. 1000 milliLiter(s) (100 mL/Hr) IV Continuous <Continuous>  dextrose 5%. 1000 milliLiter(s) (50 mL/Hr) IV Continuous <Continuous>  dextrose 50% Injectable 25 Gram(s) IV Push once  dextrose 50% Injectable 12.5 Gram(s) IV Push once  enoxaparin Injectable 40 milliGRAM(s) SubCutaneous every 24 hours  glucagon  Injectable 1 milliGRAM(s) IntraMuscular once  insulin glargine Injectable (LANTUS) 45 Unit(s) SubCutaneous at bedtime  insulin lispro (ADMELOG) corrective regimen sliding scale   SubCutaneous three times a day before meals  insulin lispro Injectable (ADMELOG) 8 Unit(s) SubCutaneous three times a day before meals  levothyroxine 75 MICROGram(s) Oral daily  losartan 50 milliGRAM(s) Oral daily  melatonin 3 milliGRAM(s) Oral at bedtime  metoprolol tartrate 50 milliGRAM(s) Oral two times a day  nystatin Cream 1 Application(s) Topical two times a day  polyethylene glycol 3350 17 Gram(s) Oral two times a day  pregabalin 150 milliGRAM(s) Oral every 8 hours    MEDICATIONS  (PRN):  acetaminophen     Tablet .. 650 milliGRAM(s) Oral every 6 hours PRN Mild Pain (1 - 3)  baclofen 5 milliGRAM(s) Oral every 8 hours PRN Musculoskeletal Pain  dextrose Oral Gel 15 Gram(s) Oral once PRN Blood Glucose LESS THAN 70 milliGRAM(s)/deciliter  lidocaine   4% Patch 1 Patch Transdermal daily PRN knee pain      Allergies    No Known Allergies      Physical Exam:   Vital Signs Last 24 Hrs  T(C): 36.4 (07 May 2024 13:00), Max: 36.6 (07 May 2024 04:24)  T(F): 97.6 (07 May 2024 13:00), Max: 97.9 (07 May 2024 04:24)  HR: 80 (07 May 2024 13:00) (80 - 97)  BP: 149/94 (07 May 2024 13:00) (125/76 - 149/94)  BP(mean): --  RR: 17 (07 May 2024 13:00) (16 - 20)  SpO2: 99% (07 May 2024 13:00) (97% - 100%)    Parameters below as of 07 May 2024 13:00  Patient On (Oxygen Delivery Method): room air      Labs:                         13.9   6.21  )-----------( 268      ( 07 May 2024 10:50 )             41.8     05-07    139  |  102  |  19  ----------------------------<  246<H>  4.7   |  29  |  0.8    Ca    10.2      07 May 2024 10:50  Mg     2.0     05-07        Radiology & Additional Studies:     Radiology imaging reviewed.       ASSESSMENT AND PLAN:    Neuroradiology/IR consulted for bone biopsy    -Please obtain bone scan for planning prior to any possible intervention.    Thank you for the courtesy of this consult, please call [phone] ([phone] after hours) with any further questions.

## 2024-05-08 LAB
APTT BLD: 31.8 SEC — SIGNIFICANT CHANGE UP (ref 27–39.2)
GLUCOSE BLDC GLUCOMTR-MCNC: 185 MG/DL — HIGH (ref 70–99)
GLUCOSE BLDC GLUCOMTR-MCNC: 213 MG/DL — HIGH (ref 70–99)
GLUCOSE BLDC GLUCOMTR-MCNC: 224 MG/DL — HIGH (ref 70–99)
GLUCOSE BLDC GLUCOMTR-MCNC: 71 MG/DL — SIGNIFICANT CHANGE UP (ref 70–99)
INR BLD: 0.98 RATIO — SIGNIFICANT CHANGE UP (ref 0.65–1.3)
PROTHROM AB SERPL-ACNC: 11.2 SEC — SIGNIFICANT CHANGE UP (ref 9.95–12.87)
T4 FREE SERPL-MCNC: 1.2 NG/DL — SIGNIFICANT CHANGE UP (ref 0.9–1.8)
TSH SERPL-MCNC: 4.17 UIU/ML — SIGNIFICANT CHANGE UP (ref 0.27–4.2)

## 2024-05-08 PROCEDURE — 99232 SBSQ HOSP IP/OBS MODERATE 35: CPT

## 2024-05-08 PROCEDURE — 78803 RP LOCLZJ TUM SPECT 1 AREA: CPT | Mod: 26

## 2024-05-08 PROCEDURE — 78306 BONE IMAGING WHOLE BODY: CPT | Mod: 26

## 2024-05-08 RX ORDER — ACETAMINOPHEN 500 MG
975 TABLET ORAL EVERY 8 HOURS
Refills: 0 | Status: DISCONTINUED | OUTPATIENT
Start: 2024-05-08 | End: 2024-05-10

## 2024-05-08 RX ORDER — LEVOTHYROXINE SODIUM 125 MCG
50 TABLET ORAL DAILY
Refills: 0 | Status: DISCONTINUED | OUTPATIENT
Start: 2024-05-09 | End: 2024-05-10

## 2024-05-08 RX ADMIN — Medication 150 MILLIGRAM(S): at 21:48

## 2024-05-08 RX ADMIN — SIMETHICONE 80 MILLIGRAM(S): 80 TABLET, CHEWABLE ORAL at 00:14

## 2024-05-08 RX ADMIN — Medication 3 MILLIGRAM(S): at 21:48

## 2024-05-08 RX ADMIN — INSULIN GLARGINE 45 UNIT(S): 100 INJECTION, SOLUTION SUBCUTANEOUS at 21:51

## 2024-05-08 RX ADMIN — Medication 75 MICROGRAM(S): at 05:09

## 2024-05-08 RX ADMIN — Medication 4: at 08:53

## 2024-05-08 RX ADMIN — Medication 50 MILLIGRAM(S): at 18:28

## 2024-05-08 RX ADMIN — Medication 50 MILLIGRAM(S): at 05:09

## 2024-05-08 RX ADMIN — Medication 8 UNIT(S): at 18:28

## 2024-05-08 RX ADMIN — NYSTATIN CREAM 1 APPLICATION(S): 100000 CREAM TOPICAL at 05:10

## 2024-05-08 RX ADMIN — Medication 150 MILLIGRAM(S): at 13:40

## 2024-05-08 RX ADMIN — LOSARTAN POTASSIUM 50 MILLIGRAM(S): 100 TABLET, FILM COATED ORAL at 05:08

## 2024-05-08 RX ADMIN — NYSTATIN CREAM 1 APPLICATION(S): 100000 CREAM TOPICAL at 18:29

## 2024-05-08 RX ADMIN — CHLORHEXIDINE GLUCONATE 1 APPLICATION(S): 213 SOLUTION TOPICAL at 05:11

## 2024-05-08 RX ADMIN — Medication 150 MILLIGRAM(S): at 05:08

## 2024-05-08 RX ADMIN — Medication 5 MILLIGRAM(S): at 21:48

## 2024-05-08 RX ADMIN — Medication 25 MILLIGRAM(S): at 00:14

## 2024-05-08 RX ADMIN — Medication 975 MILLIGRAM(S): at 19:55

## 2024-05-08 RX ADMIN — Medication 975 MILLIGRAM(S): at 20:25

## 2024-05-08 RX ADMIN — Medication 5 MILLIGRAM(S): at 05:09

## 2024-05-08 RX ADMIN — POLYETHYLENE GLYCOL 3350 17 GRAM(S): 17 POWDER, FOR SOLUTION ORAL at 05:08

## 2024-05-08 RX ADMIN — Medication 650 MILLIGRAM(S): at 02:12

## 2024-05-08 RX ADMIN — Medication 8 UNIT(S): at 08:54

## 2024-05-08 NOTE — PROGRESS NOTE ADULT - SUBJECTIVE AND OBJECTIVE BOX
Nephrology progress note     no complaints    ON events/Subjective:     Allergies:  No Known Allergies    Hospital Medications:   MEDICATIONS  (STANDING):  chlorhexidine 2% Cloths 1 Application(s) Topical <User Schedule>  dextrose 10% Bolus 125 milliLiter(s) IV Bolus once  dextrose 5%. 1000 milliLiter(s) (100 mL/Hr) IV Continuous <Continuous>  dextrose 5%. 1000 milliLiter(s) (50 mL/Hr) IV Continuous <Continuous>  dextrose 50% Injectable 25 Gram(s) IV Push once  dextrose 50% Injectable 12.5 Gram(s) IV Push once  glucagon  Injectable 1 milliGRAM(s) IntraMuscular once  insulin glargine Injectable (LANTUS) 45 Unit(s) SubCutaneous at bedtime  insulin lispro (ADMELOG) corrective regimen sliding scale   SubCutaneous three times a day before meals  insulin lispro Injectable (ADMELOG) 8 Unit(s) SubCutaneous three times a day before meals  losartan 50 milliGRAM(s) Oral daily  melatonin 3 milliGRAM(s) Oral at bedtime  metoprolol tartrate 50 milliGRAM(s) Oral two times a day  nystatin Cream 1 Application(s) Topical two times a day  polyethylene glycol 3350 17 Gram(s) Oral two times a day  pregabalin 150 milliGRAM(s) Oral every 8 hours    REVIEW OF SYSTEMS:  CONSTITUTIONAL: No weakness, fevers or chills  EYES/ENT: No visual changes;  No vertigo or throat pain   NECK: No pain or stiffness  RESPIRATORY: No cough, wheezing, hemoptysis; No shortness of breath  CARDIOVASCULAR: No chest pain or palpitations.  GASTROINTESTINAL: No abdominal or epigastric pain. No nausea, vomiting, or hematemesis; No diarrhea or constipation. No melena or hematochezia.  GENITOURINARY: No dysuria, frequency, foamy urine, urinary urgency, incontinence or hematuria  NEUROLOGICAL: No numbness or weakness  SKIN: No itching, burning, rashes, or lesions   VASCULAR: No bilateral lower extremity edema.   All other review of systems is negative unless indicated above.    VITALS:  T(F): 99.4 (05-08-24 @ 13:04), Max: 99.4 (05-08-24 @ 13:04)  HR: 92 (05-08-24 @ 13:04)  BP: 130/74 (05-08-24 @ 13:04)  RR: 18 (05-08-24 @ 13:04)  SpO2: 98% (05-08-24 @ 13:04)  Wt(kg): --      PHYSICAL EXAM:  Constitutional: NAD  HEENT: anicteric sclera, oropharynx clear, MMM  Neck: No JVD  Respiratory: CTAB, no wheezes, rales or rhonchi  Cardiovascular: S1, S2, RRR  Gastrointestinal: BS+, soft, NT/ND  Extremities: No cyanosis or clubbing. No peripheral edema  Neurological: A/O x 3, no focal deficits  Psychiatric: Normal mood, normal affect  : No CVA tenderness. No white.   Skin: No rashes  Vascular Access:    LABS:  05-07    139  |  102  |  19  ----------------------------<  246<H>  4.7   |  29  |  0.8    Ca    10.2      07 May 2024 10:50  Mg     2.0     05-07                            13.9   6.21  )-----------( 268      ( 07 May 2024 10:50 )             41.8       Urine Studies:  Creatinine Trend: 0.8<--, 0.5<--, 0.5<--  Urinalysis Basic - ( 07 May 2024 10:50 )    Color:  / Appearance:  / SG:  / pH:   Gluc: 246 mg/dL / Ketone:   / Bili:  / Urobili:    Blood:  / Protein:  / Nitrite:    Leuk Esterase:  / RBC:  / WBC    Sq Epi:  / Non Sq Epi:  / Bacteria:     ·Stable (relatively normal) renal function  ·BP now controlled on metoprolol and losartan  ·per psychiatry no need IPP or change in medications  ·pelvic CT wo fx - left sacral sclerotic lesion and MRI with other lesions  CT scan with no other masses identified  seen by oncology - for CT   possible panic attack on admission    1) continue BP meds  2) bone scan today and then biopsy of lesion (TBD)

## 2024-05-08 NOTE — PROGRESS NOTE ADULT - ASSESSMENT
Ms Godinez is a 58 VICK w a PMH of HTN, HLD, IDDM noncompliant with her insulin, neuropathy, HTN, hypothyroidism, mood disorder with 3 prior inpatient psychiatric hospitalizations, recent admission from 2/1 - 2/6 for DKA and a mechanical fall in March 2024 presents to the ED w "feeling overwhelmed" and associated SOB. In the ED vitals notable for /79 w , SpO2 97% on RA. Labs notable for Trop 9, Dimer of 414, gluc of 375 w AG of 18 and ProBNP of 326. CT PE protocol negative for PE but showed at 2.4 cm nodule in a multinodular thyroid. Patient aware of nodule and states it has never been worked up because "its never caused me any problems". Patient denies chest pain/pressure, diaphoresis neck/jaw/arm pain, cough, fever, rash, HA, vertigo, N/V, diarrhea, weakness, recent travel or change in medications. Patient is a poor historian and does not know if she had any recent changes in medication, vaccinations or hospitalizations. Did not recall her past admission for fall.  Patient admitted for observation and further assessment.     #SOB, RESOLVED   #'overwhelming feeling," RESOLVED   #Ho nicotine use   #Ho HTN  #Ho DLD  #Ho mood disorder (currently in patient psych)  given troponin negative, CT PE protocol negative for PE/consolidations/effusion, Trace atalectasis reported but not seen more likely anxiety driven then physical cause  SpO2 97% on RA  Patient reports being very displeased w her Psych facility and that they had to 4 point restrain her  no leukocytosis  Dimer 414  ProBNP 326, clinically euvolemic  vitally stable  -No Abx or supplimentary O2 at this time  -No suspicion Pneumonia  -PE r/o by CT and clinical   -tachycardia on presentation of 112 and BP of 175/79 likely due to anxiety  -restart home medications w Met succinate change to tartrate BID  Met Tar 50mg PO BID  Lostartan 50mg once daily  -nicotine patch 14mg once daily ordered  -if SOB returns please obtain CXR   -if desaturation please obtain rainbow labs and ABG  -patient does not recall what her medications are or if she takes any mood stabilizers  -will need out patient psych for mood stabilizers     #Hip Pain  #Pelvic pain  #Hx of fall  - Pt complains of hip pain. Has ha hx of fall on previous admission with finula fracture  - CT hip. < from: CT Pelvis No Cont (05.01.24 @ 11:24) > IMPRESSION: Since 7/28/2020. 2.2 cm sclerotic lesion left sacral ala, previously 1.6 cm. Recommend pre  and postcontrast MRI No acute fractures. Stable myomatous uterus  - MRI suspicious for neoplastic disease, with multiple lymph nodes,   - oncology consult  -CT 2.2cm sclerotic lesion. no sign of metastasis  - recommend CT guided biopsy of one of the pelvic bone lesions.   - serum CA-125, CEA, CA 19-9, SPEP, Ca15-3, AFP UPEP, Serum DAVIS, LDH, Beta-2 Microglobulin neg  -neuroIR c/s- need bone scan before biopsy.  -bone scan today, NPO @midnight for biopsy tomorrow 5/8/24    #IDDM  #AG 18  #Ho peripheral neuropathy  #Ho DKA   per chart poor compliance, per patient she takes all her meds every day   -c/w home Lantus 45U at bedtime  -jardiance 25 on hold  -moderate dose sliding scale  -patient does not recall any hypoglycemic events BUT is poor historian   -endo c/s appreciated, c/w lantus 45, start lispro 8 TIDAC      #2.4 cm thyroid nodule  #multinodular thyroid  #Ho Hypothyroid  -out patient endocrine referral for nodular w/u   -levothyroxine 50mg once daily  -pt complaining of night sweats (4/29)  -endo c/s- increase levothyroxine to 75mcg but will f/u repeat TFTs before increasing dose    Misc:  DVT proph: Lovenox 40 once daily  GI Proph: NA  Diet: Carb consist, DASH/TLC  activity: as tolerated  dispo: stable Ms Godinez is a 58 VICK w a PMH of HTN, HLD, IDDM noncompliant with her insulin, neuropathy, HTN, hypothyroidism, mood disorder with 3 prior inpatient psychiatric hospitalizations, recent admission from 2/1 - 2/6 for DKA and a mechanical fall in March 2024 presents to the ED w "feeling overwhelmed" and associated SOB. In the ED vitals notable for /79 w , SpO2 97% on RA. Labs notable for Trop 9, Dimer of 414, gluc of 375 w AG of 18 and ProBNP of 326. CT PE protocol negative for PE but showed at 2.4 cm nodule in a multinodular thyroid. Patient aware of nodule and states it has never been worked up because "its never caused me any problems". Patient denies chest pain/pressure, diaphoresis neck/jaw/arm pain, cough, fever, rash, HA, vertigo, N/V, diarrhea, weakness, recent travel or change in medications. Patient is a poor historian and does not know if she had any recent changes in medication, vaccinations or hospitalizations. Did not recall her past admission for fall.  Patient admitted for observation and further assessment.     #SOB, RESOLVED   #'overwhelming feeling," RESOLVED   #Ho nicotine use   #Ho HTN  #Ho DLD  #Ho mood disorder (currently in patient psych)  given troponin negative, CT PE protocol negative for PE/consolidations/effusion, Trace atalectasis reported but not seen more likely anxiety driven then physical cause  SpO2 97% on RA  Patient reports being very displeased w her Psych facility and that they had to 4 point restrain her  no leukocytosis  Dimer 414  ProBNP 326, clinically euvolemic  vitally stable  -No Abx or supplimentary O2 at this time  -No suspicion Pneumonia  -PE r/o by CT and clinical   -tachycardia on presentation of 112 and BP of 175/79 likely due to anxiety  -restart home medications w Met succinate change to tartrate BID  Met Tar 50mg PO BID  Lostartan 50mg once daily  -nicotine patch 14mg once daily ordered  -if SOB returns please obtain CXR   -if desaturation please obtain rainbow labs and ABG  -patient does not recall what her medications are or if she takes any mood stabilizers  -will need out patient psych for mood stabilizers     #Hip Pain  #Pelvic pain  #Hx of fall  - Pt complains of hip pain. Has ha hx of fall on previous admission with finula fracture  - CT hip. < from: CT Pelvis No Cont (05.01.24 @ 11:24) > IMPRESSION: Since 7/28/2020. 2.2 cm sclerotic lesion left sacral ala, previously 1.6 cm. Recommend pre  and postcontrast MRI No acute fractures. Stable myomatous uterus  - MRI suspicious for neoplastic disease, with multiple lymph nodes,   - oncology consult  -CT 2.2cm sclerotic lesion. no sign of metastasis  - recommend CT guided biopsy of one of the pelvic bone lesions.   - serum CA-125, CEA, CA 19-9, SPEP, Ca15-3, AFP UPEP, Serum DAVIS, LDH, Beta-2 Microglobulin neg  -neuroIR c/s- need bone scan before biopsy.  -bone scan today, NPO @midnight for biopsy tomorrow 5/8/24, DVT ppx on hold    #IDDM  #AG 18  #Ho peripheral neuropathy  #Ho DKA   per chart poor compliance, per patient she takes all her meds every day   -c/w home Lantus 45U at bedtime  -jardiance 25 on hold  -moderate dose sliding scale  -patient does not recall any hypoglycemic events BUT is poor historian   -endo c/s appreciated, c/w lantus 45, start lispro 8 TIDAC      #2.4 cm thyroid nodule  #multinodular thyroid  #Ho Hypothyroid  -out patient endocrine referral for nodular w/u   -levothyroxine 50mg once daily  -pt complaining of night sweats (4/29)  -endo c/s- increase levothyroxine to 75mcg but will f/u repeat TFTs before increasing dose    Misc:  DVT proph: Lovenox 40 once daily  GI Proph: NA  Diet: Carb consist, DASH/TLC  activity: as tolerated  dispo: stable

## 2024-05-08 NOTE — PROGRESS NOTE ADULT - ATTENDING COMMENTS
58 F with PMH of HTN, HLD, IDDM noncompliant with her insulin, neuropathy, HTN, hypothyroidism, mood disorder with 3 prior inpatient psychiatric hospitalizations, recent admission from 2/1 - 2/6 for DKA and a mechanical fall in March 2024 presents to the ED w "feeling overwhelmed" and associated SOB. In the ED vitals notable for /79 w , SpO2 97% on RA. Labs notable for Trop 9, Dimer of 414, gluc of 375 w AG of 18 and ProBNP of 326. CT PE protocol negative for PE but showed at 2.4 cm nodule in a multinodular thyroid. Patient aware of nodule and states it has never been worked up because "its never caused me any problems". Patient denies chest pain/pressure, diaphroesis, neck/jaw/arm pain, cough, fever, rash, HA, vertiog, N/V, diarrhea, weakness, recent travel or change in medications. Patient is a poor historian and does not know if she had any recent changes in medication, vaccinations or hospitalizations. Did not recall her past admission for fall.  Patient admitted for observation and further assessment.     A/P :    # sob, Possible Panic attack with SOB and anxiety. now calm and stable   #Ho mood disorder now stable   cont meds   # DM , with diabetic neuropathy,  controlled, ( not optimal ) hx of dka ,     POCT Blood Glucose.: 215 mg/dL (05-01-24 @ 11:05)  POCT Blood Glucose.: 142 mg/dL (05-01-24 @ 08:00)  POCT Blood Glucose.: 161 mg/dL (04-30-24 @ 21:06)  POCT Blood Glucose.: 247 mg/dL (04-30-24 @ 17:09)  A1C with Estimated Average Glucose Result: 7.1(04.28.24 @ 06:51)    -c/w home Lantus 45U at bedtime  -c/w Lispro 20mg TID  -jardiance 25 on hold  -moderate dose sliding scale  -patient does not recall any hypoglycemic events BUT is poor historian   - Beta hydroxy 0.8  (low suspicion DKA )      #2.4 cm thyroid nodule  #multinodular thyroid  #Ho Hypothyroid  -out patient endocrine referral for nodular w/u   - LE edema attributed to Hypothyroid state.   -c/w Home levothyroxine 50mg once daily, Check TSH ( 5.95 in Feb '24)    #Progress Note Handoff    Pending :  discharge   Family discussion: rosaura pt   Disposition: [facility]  code status: full code
a/p  58 F with PMH of HTN, HLD, IDDM noncompliant with her insulin, neuropathy, HTN, hypothyroidism, mood disorder with 3 prior inpatient psychiatric hospitalizations, recent admission from 2/1 - 2/6 for DKA and a mechanical fall in March 2024 presents to the ED w "feeling overwhelmed" and associated SOB. In the ED vitals notable for /79 w , SpO2 97% on RA. Labs notable for Trop 9, Dimer of 414, gluc of 375 w AG of 18 and ProBNP of 326. CT PE protocol negative for PE but showed at 2.4 cm nodule in a multinodular thyroid. Patient aware of nodule and states it has never been worked up because "its never caused me any problems". Patient denies chest pain/pressure, diaphroesis, neck/jaw/arm pain, cough, fever, rash, HA, vertiog, N/V, diarrhea, weakness, recent travel or change in medications. Patient is a poor historian and does not know if she had any recent changes in medication, vaccinations or hospitalizations. Did not recall her past admission for fall.  Patient admitted for observation and further assessment.     A/P :    # sob, Possible Panic attack with SOB and anxiety. now calm and stable   #Ho mood disorder now stable   cont meds   # DM , with diabetic neuropathy,  controlled, ( not optimal ) hx of dka ,     POCT Blood Glucose.: 215 mg/dL (05-01-24 @ 11:05)  POCT Blood Glucose.: 142 mg/dL (05-01-24 @ 08:00)  POCT Blood Glucose.: 161 mg/dL (04-30-24 @ 21:06)  POCT Blood Glucose.: 247 mg/dL (04-30-24 @ 17:09)  A1C with Estimated Average Glucose Result: 7.1(04.28.24 @ 06:51)    -c/w home Lantus 45U at bedtime  -c/w Lispro 20mg TID  -jardiance 25 on hold  -moderate dose sliding scale  -patient does not recall any hypoglycemic events BUT is poor historian   - Beta hydroxy 0.8  (low suspicion DKA )      #2.4 cm thyroid nodule  #multinodular thyroid  #Ho Hypothyroid  -out patient endocrine referral for nodular w/u   - LE edema attributed to Hypothyroid state.   -c/w Home levothyroxine 50mg once daily, Check TSH ( 5.95 in Feb '24)    # hip pain, ct is not diagnostic, fu MRI   # left knee pain, probably OA, xray left knee, conservative management     # right knee < from: Xray Tibia + Fibula 2 Views, Right (02.26.24 @ 22:34) >  Proximal fibular neck fracture with mild callus formation, possibly   subacute.  Swelling around the lateral malleolus with no definite fracture.  < end of copied text >  no pain on the right, repeat xray for followup,   consider knee brace     #Progress Note Handoff    Pending :  MRI hip, xray bilat knee  Family discussion: rosaura pt   Disposition: [facility]  code status: full code .
58 F with PMH of HTN, HLD, IDDM noncompliant with her insulin, neuropathy, HTN, hypothyroidism, mood disorder with 3 prior inpatient psychiatric hospitalizations, recent admission from 2/1 - 2/6 for DKA and a mechanical fall in March 2024 presents to the ED w "feeling overwhelmed" and associated SOB. In the ED vitals notable for /79 w , SpO2 97% on RA. Labs notable for Trop 9, Dimer of 414, gluc of 375 w AG of 18 and ProBNP of 326. CT PE protocol negative for PE but showed at 2.4 cm nodule in a multinodular thyroid. Patient aware of nodule and states it has never been worked up because "its never caused me any problems". Patient denies chest pain/pressure, diaphroesis, neck/jaw/arm pain, cough, fever, rash, HA, vertiog, N/V, diarrhea, weakness, recent travel or change in medications. Patient is a poor historian and does not know if she had any recent changes in medication, vaccinations or hospitalizations. Did not recall her past admission for fall.  Patient admitted for observation and further assessment.     A/P :    # sob, Possible Panic attack with SOB and anxiety. now calm and stable   #Ho mood disorder now stable   cont meds   # DM , with diabetic neuropathy,  controlled, ( not optimal ) hx of dka ,   CAPILLARY BLOOD GLUCOSE  POCT Blood Glucose.: 175 mg/dL (03 May 2024 11:50)  POCT Blood Glucose.: 121 mg/dL (03 May 2024 07:19)  POCT Blood Glucose.: 271 mg/dL (02 May 2024 20:59)  POCT Blood Glucose.: 129 mg/dL (02 May 2024 16:56)    A1C with Estimated Average Glucose Result: 7.1(04.28.24 @ 06:51)    -c/w home Lantus 45U at bedtime  -c/w Lispro 20mg TID  -jardiance 25 on hold  -moderate dose sliding scale  -patient does not recall any hypoglycemic events BUT is poor historian   - Beta hydroxy 0.8  (low suspicion DKA )      #2.4 cm thyroid nodule  #multinodular thyroid  #Ho Hypothyroid  -out patient endocrine referral for nodular w/u   - LE edema attributed to Hypothyroid state.   -c/w Home levothyroxine 50mg once daily, Check TSH ( 5.95 in Feb '24)    # hip pain, ct is not diagnostic, awaiting MRI report     # left knee pain, probably OA, xray left knee, conservative management     # right knee < from: Xray Tibia + Fibula 2 Views, Right (02.26.24 @ 22:34) >  Proximal fibular neck fracture with mild callus formation, possibly   subacute.  Swelling around the lateral malleolus with no definite fracture.  < end of copied text >  no pain on the right, repeat xray for followup,   consider knee brace     #Progress Note Handoff    Pending : MRI report   Family discussion: dw pt   Disposition: NH   code status: full code .
Patient is a 59 y/o  Female with PMH of HTN, HLD, IDDM noncompliant with her insulin, neuropathy, HTN, hypothyroidism, mood disorder with 3 prior inpatient psychiatric hospitalizations, recent admission from 2/1 - 2/6 for DKA and a mechanical fall in March 2024 presents to the ED w "feeling overwhelmed" and associated SOB. In the ED vitals notable for /79 w , SpO2 97% on RA. Labs notable for Trop 9, Dimer of 414, gluc of 375 w AG of 18 and ProBNP of 326. CT PE protocol negative for PE but showed at 2.4 cm nodule in a multinodular thyroid. Patient was x. with bone lesion, pending completion of diagnostic biopsy and bone scan.     # Pelvic bone lesions , MRI suspicious for neoplastic disease, with multiple lymph nodes, thyroid nodule   - s/p ct abd/pelvis < from: CT Abdomen and Pelvis w/ IV Cont (05.07.24 @ 13:57) > IMPRESSION: 1. A 2.2 cm sclerotic lesion within the left sacral ala is better evaluated on reference MRI.  2. Otherwise, no definite evidence of intra-abdominal or pelvic  metastasis.  3. Enlarged calcified fibroid uterus, similar to that seen on reference exam of July 2020. --- End of Report ---  - IR consulted  for CT guided biopsy of one of the pelvic bone lesions- on 5/9/24    # SOB , Possible Panic attack with SOB and anxiety. now calm and stable   #Ho mood disorder now stable   cont meds     # DM , with diabetic neuropathy,  controlled ,  hx of dka ,   hgA1C with Estimated Average Glucose Result: 7.1(04.28.24 @ 06:51)    -c/w home Lantus 45U at bedtime  -c/w Lispro 20mg TID  -jardiance 25 on hold      #2.4 cm thyroid nodule  #multinodular thyroid- normal thyroid function   #Ho Hypothyroid  outpatient endocrine referral for nodular w/u   -c/w Home levothyroxine 50 daily    # left knee pain, probably OA, xray left knee reviewed , conservative management     # right knee  xray knee reviewed Proximal fibular neck fracture with mild callus formation, possibly   subacute.  Swelling around the lateral malleolus with no definite fracture.  no pain on the right, repeat xray for followup,   consider knee brace     #Progress Note Handoff: Pending IR dx. bone biopsy , dx. bone scan study completion   current medical plan of tx. d/w pt. by bedside   Disposition: NH when medically cleared   code status: full code    Total time spent to complete patient's bedside assessment, review medical chart, discuss medical plan of care with covering medical team was more than 35 minutes with >50% of time spent face to face with patient, discussion with patient/family and/or coordination of care

## 2024-05-08 NOTE — PROGRESS NOTE ADULT - SUBJECTIVE AND OBJECTIVE BOX
24H events:    Patient is a 58y old Female who presents with a chief complaint of anxiety w SOB (07 May 2024 17:24)    Primary diagnosis of Panic attack    Today is hospital day 11d. This morning patient was seen and examined at bedside, resting comfortably in bed.    No acute or major events overnight.      PAST MEDICAL & SURGICAL HISTORY  Hypertension    Diabetes mellitus    Thyroid disease    Anemia    History of mood disorder    H/O diabetic neuropathy    Fibroid uterus    S/P lumbar discectomy  2006.    S/P tonsillectomy      SOCIAL HISTORY:  Social History:  from [facility] (27 Apr 2024 11:37)      ALLERGIES:  No Known Allergies    MEDICATIONS:  STANDING MEDICATIONS  chlorhexidine 2% Cloths 1 Application(s) Topical <User Schedule>  dextrose 10% Bolus 125 milliLiter(s) IV Bolus once  dextrose 5%. 1000 milliLiter(s) IV Continuous <Continuous>  dextrose 5%. 1000 milliLiter(s) IV Continuous <Continuous>  dextrose 50% Injectable 25 Gram(s) IV Push once  dextrose 50% Injectable 12.5 Gram(s) IV Push once  enoxaparin Injectable 40 milliGRAM(s) SubCutaneous every 24 hours  glucagon  Injectable 1 milliGRAM(s) IntraMuscular once  insulin glargine Injectable (LANTUS) 45 Unit(s) SubCutaneous at bedtime  insulin lispro (ADMELOG) corrective regimen sliding scale   SubCutaneous three times a day before meals  insulin lispro Injectable (ADMELOG) 8 Unit(s) SubCutaneous three times a day before meals  losartan 50 milliGRAM(s) Oral daily  melatonin 3 milliGRAM(s) Oral at bedtime  metoprolol tartrate 50 milliGRAM(s) Oral two times a day  nystatin Cream 1 Application(s) Topical two times a day  polyethylene glycol 3350 17 Gram(s) Oral two times a day  pregabalin 150 milliGRAM(s) Oral every 8 hours    PRN MEDICATIONS  acetaminophen     Tablet .. 975 milliGRAM(s) Oral every 8 hours PRN  baclofen 5 milliGRAM(s) Oral every 8 hours PRN  dextrose Oral Gel 15 Gram(s) Oral once PRN  lidocaine   4% Patch 1 Patch Transdermal daily PRN    VITALS:   T(F): 99.4  HR: 92  BP: 130/74  RR: 18  SpO2: 98%    PHYSICAL EXAM:  CONSTITUTIONAL: Well groomed, no apparent distress, laying flat in bed  EYES: No conjunctival or scleral injection, non-icteric  ENMT: Oral mucosa with moist membranes. Normal dentition; no pharyngeal injection or exudates  RESP: No respiratory distress, no use of accessory muscles  CV:no murmur or gallop, peripheral pulses intact, BL dorsalis pedis   GI: soft, ND, no rebound, no guarding;  no hepatosplenomegaly; mild suprapubic tenderness   LYMPH: No cervical LAD or tenderness  MSK:  No digital clubbing or cyanosis; able to move all 4 limbs,  PSYCH: A&Ox4,      LABS:                        13.9   6.21  )-----------( 268      ( 07 May 2024 10:50 )             41.8     05-07    139  |  102  |  19  ----------------------------<  246<H>  4.7   |  29  |  0.8    Ca    10.2      07 May 2024 10:50  Mg     2.0     05-07      PT/INR - ( 08 May 2024 11:07 )   PT: 11.20 sec;   INR: 0.98 ratio         PTT - ( 08 May 2024 11:07 )  PTT:31.8 sec  Urinalysis Basic - ( 07 May 2024 10:50 )    Color: x / Appearance: x / SG: x / pH: x  Gluc: 246 mg/dL / Ketone: x  / Bili: x / Urobili: x   Blood: x / Protein: x / Nitrite: x   Leuk Esterase: x / RBC: x / WBC x   Sq Epi: x / Non Sq Epi: x / Bacteria: x            Urinalysis with Rflx Culture (collected 06 May 2024 11:05)    Culture - Urine (collected 06 May 2024 11:05)  Source: Clean Catch None  Final Report (07 May 2024 14:45):    >=3 organisms. Probable collection contamination.          RADIOLOGY:

## 2024-05-09 ENCOUNTER — RESULT REVIEW (OUTPATIENT)
Age: 59
End: 2024-05-09

## 2024-05-09 ENCOUNTER — TRANSCRIPTION ENCOUNTER (OUTPATIENT)
Age: 59
End: 2024-05-09

## 2024-05-09 LAB
A VAGINAE DNA VAG QL NAA+PROBE: SIGNIFICANT CHANGE UP
ALBUMIN SERPL ELPH-MCNC: 4.1 G/DL — SIGNIFICANT CHANGE UP (ref 3.5–5.2)
ALP SERPL-CCNC: 172 U/L — HIGH (ref 30–115)
ALT FLD-CCNC: 12 U/L — SIGNIFICANT CHANGE UP (ref 0–41)
ANION GAP SERPL CALC-SCNC: 13 MMOL/L — SIGNIFICANT CHANGE UP (ref 7–14)
APTT BLD: 30.9 SEC — SIGNIFICANT CHANGE UP (ref 27–39.2)
AST SERPL-CCNC: 13 U/L — SIGNIFICANT CHANGE UP (ref 0–41)
BASOPHILS # BLD AUTO: 0.04 K/UL — SIGNIFICANT CHANGE UP (ref 0–0.2)
BASOPHILS NFR BLD AUTO: 0.6 % — SIGNIFICANT CHANGE UP (ref 0–1)
BILIRUB SERPL-MCNC: <0.2 MG/DL — SIGNIFICANT CHANGE UP (ref 0.2–1.2)
BUN SERPL-MCNC: 19 MG/DL — SIGNIFICANT CHANGE UP (ref 10–20)
BVAB2 DNA VAG QL NAA+PROBE: SIGNIFICANT CHANGE UP
C ALBICANS DNA VAG QL NAA+PROBE: NEGATIVE — SIGNIFICANT CHANGE UP
C GLABRATA DNA VAG QL NAA+PROBE: NEGATIVE — SIGNIFICANT CHANGE UP
C KRUSEI DNA VAG QL NAA+PROBE: NEGATIVE — SIGNIFICANT CHANGE UP
C LUSITANIAE DNA VAG QL NAA+PROBE: NEGATIVE — SIGNIFICANT CHANGE UP
C TRACH RRNA SPEC QL NAA+PROBE: NEGATIVE — SIGNIFICANT CHANGE UP
CALCIUM SERPL-MCNC: 10.2 MG/DL — SIGNIFICANT CHANGE UP (ref 8.4–10.5)
CANDIDA DNA VAG QL NAA+PROBE: NEGATIVE — SIGNIFICANT CHANGE UP
CHLORIDE SERPL-SCNC: 104 MMOL/L — SIGNIFICANT CHANGE UP (ref 98–110)
CO2 SERPL-SCNC: 23 MMOL/L — SIGNIFICANT CHANGE UP (ref 17–32)
CREAT SERPL-MCNC: 0.7 MG/DL — SIGNIFICANT CHANGE UP (ref 0.7–1.5)
EGFR: 100 ML/MIN/1.73M2 — SIGNIFICANT CHANGE UP
EOSINOPHIL # BLD AUTO: 0.21 K/UL — SIGNIFICANT CHANGE UP (ref 0–0.7)
EOSINOPHIL NFR BLD AUTO: 3.1 % — SIGNIFICANT CHANGE UP (ref 0–8)
GLUCOSE BLDC GLUCOMTR-MCNC: 103 MG/DL — HIGH (ref 70–99)
GLUCOSE BLDC GLUCOMTR-MCNC: 164 MG/DL — HIGH (ref 70–99)
GLUCOSE BLDC GLUCOMTR-MCNC: 256 MG/DL — HIGH (ref 70–99)
GLUCOSE SERPL-MCNC: 161 MG/DL — HIGH (ref 70–99)
HCT VFR BLD CALC: 38.4 % — SIGNIFICANT CHANGE UP (ref 37–47)
HGB BLD-MCNC: 12.9 G/DL — SIGNIFICANT CHANGE UP (ref 12–16)
IMM GRANULOCYTES NFR BLD AUTO: 0.3 % — SIGNIFICANT CHANGE UP (ref 0.1–0.3)
INR BLD: 0.99 RATIO — SIGNIFICANT CHANGE UP (ref 0.65–1.3)
LYMPHOCYTES # BLD AUTO: 2.41 K/UL — SIGNIFICANT CHANGE UP (ref 1.2–3.4)
LYMPHOCYTES # BLD AUTO: 35.3 % — SIGNIFICANT CHANGE UP (ref 20.5–51.1)
MAGNESIUM SERPL-MCNC: 2 MG/DL — SIGNIFICANT CHANGE UP (ref 1.8–2.4)
MCHC RBC-ENTMCNC: 29.3 PG — SIGNIFICANT CHANGE UP (ref 27–31)
MCHC RBC-ENTMCNC: 33.6 G/DL — SIGNIFICANT CHANGE UP (ref 32–37)
MCV RBC AUTO: 87.3 FL — SIGNIFICANT CHANGE UP (ref 81–99)
MEGA1 DNA VAG QL NAA+PROBE: SIGNIFICANT CHANGE UP
MONOCYTES # BLD AUTO: 0.56 K/UL — SIGNIFICANT CHANGE UP (ref 0.1–0.6)
MONOCYTES NFR BLD AUTO: 8.2 % — SIGNIFICANT CHANGE UP (ref 1.7–9.3)
N GONORRHOEA RRNA SPEC QL NAA+PROBE: NEGATIVE — SIGNIFICANT CHANGE UP
NEUTROPHILS # BLD AUTO: 3.59 K/UL — SIGNIFICANT CHANGE UP (ref 1.4–6.5)
NEUTROPHILS NFR BLD AUTO: 52.5 % — SIGNIFICANT CHANGE UP (ref 42.2–75.2)
NRBC # BLD: 0 /100 WBCS — SIGNIFICANT CHANGE UP (ref 0–0)
PLATELET # BLD AUTO: 221 K/UL — SIGNIFICANT CHANGE UP (ref 130–400)
PMV BLD: 9.6 FL — SIGNIFICANT CHANGE UP (ref 7.4–10.4)
POTASSIUM SERPL-MCNC: 4 MMOL/L — SIGNIFICANT CHANGE UP (ref 3.5–5)
POTASSIUM SERPL-SCNC: 4 MMOL/L — SIGNIFICANT CHANGE UP (ref 3.5–5)
PROT SERPL-MCNC: 6.9 G/DL — SIGNIFICANT CHANGE UP (ref 6–8)
PROTHROM AB SERPL-ACNC: 11.3 SEC — SIGNIFICANT CHANGE UP (ref 9.95–12.87)
RBC # BLD: 4.4 M/UL — SIGNIFICANT CHANGE UP (ref 4.2–5.4)
RBC # FLD: 13.8 % — SIGNIFICANT CHANGE UP (ref 11.5–14.5)
SODIUM SERPL-SCNC: 140 MMOL/L — SIGNIFICANT CHANGE UP (ref 135–146)
T VAGINALIS RRNA SPEC QL NAA+PROBE: NEGATIVE — SIGNIFICANT CHANGE UP
WBC # BLD: 6.83 K/UL — SIGNIFICANT CHANGE UP (ref 4.8–10.8)
WBC # FLD AUTO: 6.83 K/UL — SIGNIFICANT CHANGE UP (ref 4.8–10.8)

## 2024-05-09 PROCEDURE — 20225 BONE BIOPSY TROCAR/NDL DEEP: CPT | Mod: LT

## 2024-05-09 PROCEDURE — 99232 SBSQ HOSP IP/OBS MODERATE 35: CPT

## 2024-05-09 PROCEDURE — 88342 IMHCHEM/IMCYTCHM 1ST ANTB: CPT | Mod: 26

## 2024-05-09 PROCEDURE — 88333 PATH CONSLTJ SURG CYTO XM 1: CPT | Mod: 26

## 2024-05-09 PROCEDURE — 88305 TISSUE EXAM BY PATHOLOGIST: CPT | Mod: 26

## 2024-05-09 PROCEDURE — 77012 CT SCAN FOR NEEDLE BIOPSY: CPT | Mod: 26

## 2024-05-09 PROCEDURE — 88341 IMHCHEM/IMCYTCHM EA ADD ANTB: CPT | Mod: 26

## 2024-05-09 RX ADMIN — CHLORHEXIDINE GLUCONATE 1 APPLICATION(S): 213 SOLUTION TOPICAL at 05:22

## 2024-05-09 RX ADMIN — NYSTATIN CREAM 1 APPLICATION(S): 100000 CREAM TOPICAL at 05:23

## 2024-05-09 RX ADMIN — Medication 150 MILLIGRAM(S): at 13:34

## 2024-05-09 RX ADMIN — Medication 50 MILLIGRAM(S): at 17:33

## 2024-05-09 RX ADMIN — Medication 6: at 17:32

## 2024-05-09 RX ADMIN — Medication 975 MILLIGRAM(S): at 16:00

## 2024-05-09 RX ADMIN — Medication 8 UNIT(S): at 17:33

## 2024-05-09 RX ADMIN — Medication 150 MILLIGRAM(S): at 21:26

## 2024-05-09 RX ADMIN — Medication 5 MILLIGRAM(S): at 21:55

## 2024-05-09 RX ADMIN — Medication 3 MILLIGRAM(S): at 21:26

## 2024-05-09 RX ADMIN — Medication 975 MILLIGRAM(S): at 16:30

## 2024-05-09 RX ADMIN — Medication 150 MILLIGRAM(S): at 05:21

## 2024-05-09 RX ADMIN — Medication 50 MICROGRAM(S): at 05:21

## 2024-05-09 RX ADMIN — INSULIN GLARGINE 45 UNIT(S): 100 INJECTION, SOLUTION SUBCUTANEOUS at 21:27

## 2024-05-09 NOTE — DISCHARGE NOTE PROVIDER - PROVIDER TOKENS
PROVIDER:[TOKEN:[[id number]],FOLLOWUP:[2 weeks]] PROVIDER:[TOKEN:[[id number]],FOLLOWUP:[2 weeks]],PROVIDER:[TOKEN:[[id number]],FOLLOWUP:[2 weeks]]

## 2024-05-09 NOTE — PROGRESS NOTE ADULT - ASSESSMENT
Ms Godinez is a 58 VICK w a PMH of HTN, HLD, IDDM noncompliant with her insulin, neuropathy, HTN, hypothyroidism, mood disorder with 3 prior inpatient psychiatric hospitalizations, recent admission from 2/1 - 2/6 for DKA and a mechanical fall in March 2024 presents to the ED w "feeling overwhelmed" and associated SOB. In the ED vitals notable for /79 w , SpO2 97% on RA. Labs notable for Trop 9, Dimer of 414, gluc of 375 w AG of 18 and ProBNP of 326. CT PE protocol negative for PE but showed at 2.4 cm nodule in a multinodular thyroid. Patient aware of nodule and states it has never been worked up because "its never caused me any problems". Patient denies chest pain/pressure, diaphoresis neck/jaw/arm pain, cough, fever, rash, HA, vertigo, N/V, diarrhea, weakness, recent travel or change in medications. Patient is a poor historian and does not know if she had any recent changes in medication, vaccinations or hospitalizations. Did not recall her past admission for fall.  Patient admitted for observation and further assessment.     #SOB, RESOLVED   #'overwhelming feeling," RESOLVED   #Ho nicotine use   #Ho HTN  #Ho DLD  #Ho mood disorder (currently in patient psych)  given troponin negative, CT PE protocol negative for PE/consolidations/effusion, Trace atalectasis reported but not seen more likely anxiety driven then physical cause  SpO2 97% on RA  Patient reports being very displeased w her Psych facility and that they had to 4 point restrain her  no leukocytosis  Dimer 414  ProBNP 326, clinically euvolemic  vitally stable  -No Abx or supplimentary O2 at this time  -No suspicion Pneumonia  -PE r/o by CT and clinical   -tachycardia on presentation of 112 and BP of 175/79 likely due to anxiety  -restart home medications w Met succinate change to tartrate BID  Met Tar 50mg PO BID  Lostartan 50mg once daily  -nicotine patch 14mg once daily ordered  -if SOB returns please obtain CXR   -if desaturation please obtain rainbow labs and ABG  -patient does not recall what her medications are or if she takes any mood stabilizers  -will need out patient psych for mood stabilizers     #Hip Pain  #Pelvic pain  #Hx of fall  - Pt complains of hip pain. Has ha hx of fall on previous admission with finula fracture  - CT hip. < from: CT Pelvis No Cont (05.01.24 @ 11:24) > IMPRESSION: Since 7/28/2020. 2.2 cm sclerotic lesion left sacral ala, previously 1.6 cm. Recommend pre  and postcontrast MRI No acute fractures. Stable myomatous uterus  - MRI suspicious for neoplastic disease, with multiple lymph nodes,   - oncology consult  -CT 2.2cm sclerotic lesion. no sign of metastasis  - recommend CT guided biopsy of one of the pelvic bone lesions.   - serum CA-125, CEA, CA 19-9, SPEP, Ca15-3, AFP UPEP, Serum DAVIS, LDH, Beta-2 Microglobulin neg  -neuroIR c/s- need bone scan before biopsy.  -s/p bone scan and biospy 5/9/24. f/u outpatient oncology for results    #IDDM  #AG 18  #Ho peripheral neuropathy  #Ho DKA   per chart poor compliance, per patient she takes all her meds every day   -c/w home Lantus 45U at bedtime  -jardiance 25 on hold  -moderate dose sliding scale  -patient does not recall any hypoglycemic events BUT is poor historian   -endo c/s appreciated, c/w lantus 45, start lispro 8 TIDAC      #2.4 cm thyroid nodule  #multinodular thyroid  #Ho Hypothyroid  -out patient endocrine referral for nodular w/u   -levothyroxine 50mg once daily  -pt complaining of night sweats (4/29)  -endo c/s- increase levothyroxine to 75mcg but will f/u repeat TFTs before increasing dose    Misc:  DVT proph: Lovenox 40 once daily  GI Proph: NA  Diet: Carb consist, DASH/TLC  activity: as tolerated  dispo: stable  Pending: dc planning

## 2024-05-09 NOTE — DISCHARGE NOTE PROVIDER - CARE PROVIDER_API CALL
Yonas Zafar  Hematology  [address] [place] [address]  Phone: [phone]  Fax: [phone]  Follow Up Time: 2 weeks   Yonas Zafar  Hematology  [address] [address]  Phone: [phone]  Fax: [phone]  Follow Up Time: 2 weeks    Lissy Serra  Obstetrics and Gynecology  [address] [place] [address]  Phone: [phone]  Fax: [phone]  Follow Up Time: 2 weeks

## 2024-05-09 NOTE — DISCHARGE NOTE PROVIDER - NSDCCPCAREPLAN_GEN_ALL_CORE_FT
PRINCIPAL DISCHARGE DIAGNOSIS  Diagnosis: Panic attack  Assessment and Plan of Treatment: you were admitted and managed for Panic attack...  A panic attack is a sudden episode of intense fear that triggers severe physical reactions when there is no real danger or apparent cause. Panic attacks can be very frightening. When panic attacks occur, you might think you're losing control, having a heart attack or even dying.  Many people have just one or two panic attacks in their lifetimes, and the problem goes away, perhaps when a stressful situation ends. But if you've had recurrent, unexpected panic attacks and spent long periods in constant fear of another attack, you may have a condition called panic disorder.  Although panic attacks themselves aren't life-threatening, they can be frightening and significantly affect your quality of life. But treatment can be very effective.  You were also evaluated in the hospital for lesion found on your pelvic bone and had biopsy done. Please follow up with primary doctor for results of biopsy.  After discharge, you will need to:   - Follow up with your primary care doctor within 1-2 weeks  - Follow up with oncology within 2 weeks.  - Take all the medications you were discharged with, unless otherwise instructed by your healthcare provider(s).   Please follow up with your providers by calling them to make an appointment so that you can see them in 1-2weeks; bring your paperwork from this hospital stay to that visit. You can access your visit information by signing up for an account for the patient portal at [email or web address]   Seek immediate medical attention if you develop fevers, chills, chest pain, shortness of breath, nausea and vomiting, abdominal pain, passing out, weakness or numbness or tingling on one side of your body, or any other concerning signs or symptoms.        SECONDARY DISCHARGE DIAGNOSES  Diagnosis: Altered mental status  Assessment and Plan of Treatment:     Diagnosis: Agitation  Assessment and Plan of Treatment:     Diagnosis: Bipolar disorder  Assessment and Plan of Treatment:      PRINCIPAL DISCHARGE DIAGNOSIS  Diagnosis: Panic attack  Assessment and Plan of Treatment: you were admitted and managed for Panic attack...  A panic attack is a sudden episode of intense fear that triggers severe physical reactions when there is no real danger or apparent cause. Panic attacks can be very frightening. When panic attacks occur, you might think you're losing control, having a heart attack or even dying.  Many people have just one or two panic attacks in their lifetimes, and the problem goes away, perhaps when a stressful situation ends. But if you've had recurrent, unexpected panic attacks and spent long periods in constant fear of another attack, you may have a condition called panic disorder.  Although panic attacks themselves aren't life-threatening, they can be frightening and significantly affect your quality of life. But treatment can be very effective.  You were also evaluated in the hospital for lesion found on your pelvic bone and had biopsy done. Please follow up with primary doctor for results of biopsy.  After discharge, you will need to:   - Follow up with your primary care doctor within 1-2 weeks  - Follow up with oncology within 2 weeks.  -follow up with gynecology within 2 weeks.  - Take all the medications you were discharged with, unless otherwise instructed by your healthcare provider(s).   Please follow up with your providers by calling them to make an appointment so that you can see them in 1-2weeks; bring your paperwork from this hospital stay to that visit. You can access your visit information by signing up for an account for the patient portal at [email or web address]   Seek immediate medical attention if you develop fevers, chills, chest pain, shortness of breath, nausea and vomiting, abdominal pain, passing out, weakness or numbness or tingling on one side of your body, or any other concerning signs or symptoms.        SECONDARY DISCHARGE DIAGNOSES  Diagnosis: Altered mental status  Assessment and Plan of Treatment:     Diagnosis: Agitation  Assessment and Plan of Treatment:     Diagnosis: Bipolar disorder  Assessment and Plan of Treatment:

## 2024-05-09 NOTE — DISCHARGE NOTE PROVIDER - HOSPITAL COURSE
Ms Godinez is a 58 VICK w a PMH of HTN, HLD, IDDM noncompliant with her insulin, neuropathy, HTN, hypothyroidism, mood disorder with 3 prior inpatient psychiatric hospitalizations, recent admission from 2/1 - 2/6 for DKA and a mechanical fall in March 2024 presents to the ED w "feeling overwhelmed" and associated SOB. In the ED vitals notable for /79 w , SpO2 97% on RA. Labs notable for Trop 9, Dimer of 414, gluc of 375 w AG of 18 and ProBNP of 326. CT PE protocol negative for PE but showed at 2.4 cm nodule in a multinodular thyroid. Patient aware of nodule and states it has never been worked up because "its never caused me any problems". Patient denies chest pain/pressure, diaphroesis, neck/jaw/arm pain, cough, fever, rash, HA, vertiog, N/V, diarrhea, weakness, recent travel or change in medications. Patient is a poor historian and does not know if she had any recent changes in medication, vaccinations or hospitalizations. Did not recall her past admission for fall.  Patient admitted for observation and further assessment.     Patient was admitted for work of SOB likely possible panic attack with SOB and anxiety. now calm and stable. Patient had imaging done showing sclerotic Pelvic bone lesions , MRI suspicious for neoplastic disease, with multiple lymph nodes, thyroid nodule. s/p ct abd/pelvis wtih no definite evidence of intra-abdominal or pelvic  metastasis.  IR consulted  for CT guided biopsy of one of the pelvic bone lesions- on 5/9/24    # DM , with diabetic neuropathy,  controlled ,  hx of dka ,   hgA1C with Estimated Average Glucose Result: 7.1(04.28.24 @ 06:51)    -c/w home Lantus 45U at bedtime  -c/w Lispro 8units TID  -jardiance 25 on hold      #2.4 cm thyroid nodule  #multinodular thyroid- normal thyroid function   #Ho Hypothyroid  outpatient endocrine referral for nodular w/u   -c/w Home levothyroxine 50 daily    # left knee pain, probably OA, xray left knee reviewed , conservative management     # right knee  xray knee reviewed Proximal fibular neck fracture with mild callus formation, possibly   subacute. Swelling around the lateral malleolus with no definite fracture.  no pain on the right, repeat xray for followup,   consider knee brace     Patient medically stable for discharge. Ms Godinez is a 58 VICK w a PMH of HTN, HLD, IDDM noncompliant with her insulin, neuropathy, HTN, hypothyroidism, mood disorder with 3 prior inpatient psychiatric hospitalizations, recent admission from 2/1 - 2/6 for DKA and a mechanical fall in March 2024 presents to the ED w "feeling overwhelmed" and associated SOB. In the ED vitals notable for /79 w , SpO2 97% on RA. Labs notable for Trop 9, Dimer of 414, gluc of 375 w AG of 18 and ProBNP of 326. CT PE protocol negative for PE but showed at 2.4 cm nodule in a multinodular thyroid. Patient aware of nodule and states it has never been worked up because "its never caused me any problems". Patient denies chest pain/pressure, diaphroesis, neck/jaw/arm pain, cough, fever, rash, HA, vertiog, N/V, diarrhea, weakness, recent travel or change in medications. Patient is a poor historian and does not know if she had any recent changes in medication, vaccinations or hospitalizations. Did not recall her past admission for fall.  Patient admitted for observation and further assessment.     Patient was admitted for work of SOB likely possible panic attack with SOB and anxiety. now calm and stable. Patient had imaging done showing sclerotic Pelvic bone lesions , MRI suspicious for neoplastic disease, with multiple lymph nodes, thyroid nodule. s/p ct abd/pelvis wtih no definite evidence of intra-abdominal or pelvic  metastasis.  IR consulted  for CT guided biopsy of one of the pelvic bone lesions- on 5/9/24    # DM , with diabetic neuropathy,  controlled ,  hx of dka ,   hgA1C with Estimated Average Glucose Result: 7.1(04.28.24 @ 06:51)    -c/w home Lantus 45U at bedtime  -c/w Lispro 8units TID  -jardiance 25 on hold      #2.4 cm thyroid nodule  #multinodular thyroid- normal thyroid function   #Ho Hypothyroid  outpatient endocrine referral for nodular w/u   -c/w Home levothyroxine 50 daily    # left knee pain, probably OA, xray left knee reviewed , conservative management     # right knee  xray knee reviewed Proximal fibular neck fracture with mild callus formation, possibly   subacute. Swelling around the lateral malleolus with no definite fracture.  no pain on the right, repeat xray for followup,   consider knee brace     #Malodourous  discharge  -vaginitis panel neg  -outpatient follow up with gyn    Patient medically stable for discharge. Ms Godinez is a 58 VICK w a PMH of HTN, HLD, IDDM noncompliant with her insulin, neuropathy, HTN, hypothyroidism, mood disorder with 3 prior inpatient psychiatric hospitalizations, recent admission from 2/1 - 2/6 for DKA and a mechanical fall in March 2024 presents to the ED w "feeling overwhelmed" and associated SOB. In the ED vitals notable for /79 w , SpO2 97% on RA. Labs notable for Trop 9, Dimer of 414, gluc of 375 w AG of 18 and ProBNP of 326. CT PE protocol negative for PE but showed at 2.4 cm nodule in a multinodular thyroid. Patient aware of nodule and states it has never been worked up because "its never caused me any problems". Patient denies chest pain/pressure, diaphroesis, neck/jaw/arm pain, cough, fever, rash, HA, vertiog, N/V, diarrhea, weakness, recent travel or change in medications. Patient is a poor historian and does not know if she had any recent changes in medication, vaccinations or hospitalizations. Did not recall her past admission for fall.  Patient admitted for observation and further assessment.     Patient was admitted for work of SOB likely possible panic attack with SOB and anxiety. now calm and stable. Patient had imaging done showing sclerotic Pelvic bone lesions , MRI suspicious for neoplastic disease, with multiple lymph nodes, thyroid nodule. s/p ct abd/pelvis wtih no definite evidence of intra-abdominal or pelvic  metastasis.  IR consulted  and completed CT guided biopsy of left pelvic bone lesions- on 5/9/24, Patient was instructed to review results of bone biopsy with Oncology specialist post d/c to rehab    # DM , well controlled with diabetic neuropathy,  controlled ,  hx of dka ,   hgA1C with Estimated Average Glucose Result: 7.1(04.28.24 @ 06:51)    -c/w home Lantus 45U at bedtime  -c/w Lispro 8units TID  -jardiance 25 on hold      #2.4 cm thyroid nodule  #multinodular thyroid- normal thyroid function   #Ho Hypothyroid  outpatient endocrine referral for nodular w/u   -c/w Home levothyroxine 50 daily    # left knee pain, probably OA, xray left knee reviewed , conservative management     # right knee  xray knee reviewed Proximal fibular neck fracture with mild callus formation, possibly   subacute. Swelling around the lateral malleolus with no definite fracture.  no pain on the right, repeat xray for followup,   consider knee brace     #Malodourous  discharge  -vaginitis panel neg  -outpatient follow up with gyn    Patient medically stable for discharge.

## 2024-05-09 NOTE — CHART NOTE - NSCHARTNOTEFT_GEN_A_CORE
Plan for CT-guided biopsy of left sacral lesion today with anesthesia.    Risks, benefits, and alternatives of the procedure discussed with the patient at length and she would like to proceed
Please obtain nuclear med bone scan.  IR will schedule biopsy after the scan is complete.  If completed today keep NPO at midnight for possible biopsy tomorrow.
Pt is a 58 year old female who was sent to the ED from [facility] with a c/o HTN and tachycardia.  Per pre-arrival Note, pt has AMS (as per the ED Provider Note).  Pt has a PMH of DM, Bipolar Disorder, HTN, chronic LBP.  SW attempted to speak with pt; she told SW that she already has a SW, did not want SW in the room and that she wanted to speak with Security.  ED Charge RN updated.

## 2024-05-09 NOTE — PRE-ANESTHESIA EVALUATION ADULT - NSANTHOBSERVEDRD_ENT_A_CORE
Prepped: groin. Prepped with: ChloraPrep. The site was clipped. The patient was draped in a sterile fashion. No

## 2024-05-09 NOTE — DISCHARGE NOTE PROVIDER - NSDCMRMEDTOKEN_GEN_ALL_CORE_FT
acetaminophen 325 mg oral tablet: 3 tab(s) orally every 8 hours As needed Mild Pain (1 - 3)  baclofen 20 mg oral tablet: 1 tab(s) orally every 8 hours  celecoxib 200 mg oral capsule: 1 cap(s) orally 2 times a day as needed for  moderate pain  insulin glargine 100 units/mL subcutaneous solution: 45 unit(s) subcutaneous once a day (in the morning)  insulin lispro 100 units/mL injectable solution: 20 unit(s) injectable 3 times a day (before meals)  Jardiance 25 mg oral tablet: 1 tab(s) orally once a day  levothyroxine 50 mcg (0.05 mg) oral tablet: 1 tab(s) orally once a day  losartan 50 mg oral tablet: 1 tab(s) orally once a day  metoprolol succinate 100 mg oral capsule, extended release: 1 cap(s) orally once a day  nicotine 14 mg/24 hr transdermal film, extended release: 14 milligram(s) transdermal once a day  pregabalin 150 mg oral capsule: 1 cap(s) orally every 8 hours  
acetaminophen 325 mg oral tablet: 3 tab(s) orally every 8 hours As needed Mild Pain (1 - 3)  baclofen 20 mg oral tablet: 1 tab(s) orally every 8 hours  celecoxib 200 mg oral capsule: 1 cap(s) orally 2 times a day as needed for  moderate pain  insulin glargine 100 units/mL subcutaneous solution: 45 unit(s) subcutaneous once a day (in the morning)  insulin lispro 100 units/mL injectable solution: 8 unit(s) injectable 3 times a day (before meals)  Jardiance 25 mg oral tablet: 1 tab(s) orally once a day  levothyroxine 50 mcg (0.05 mg) oral tablet: 1 tab(s) orally once a day  losartan 50 mg oral tablet: 1 tab(s) orally once a day  metoprolol succinate 100 mg oral capsule, extended release: 1 cap(s) orally once a day  nicotine 14 mg/24 hr transdermal film, extended release: 14 milligram(s) transdermal once a day  pregabalin 150 mg oral capsule: 1 cap(s) orally every 8 hours

## 2024-05-09 NOTE — DISCHARGE NOTE PROVIDER - CARE PROVIDERS DIRECT ADDRESSES
[email or web address]

## 2024-05-09 NOTE — PROGRESS NOTE ADULT - SUBJECTIVE AND OBJECTIVE BOX
PROCEDURE: CT-guided biopsy    Procedural Personnel  Attending physician(s): Royce Martinez  Fellow physician(s): None  Resident physician(s): None  Advanced practice provider(s): None    Pre-procedure diagnosis: Left sacral ala sclerotic lesion  Post-procedure diagnosis: Same  Indication: Histopathologic diagnosis  Previous biopsy of same target (QCDR): No  Additional clinical history: None    Complications: No immediate complications.    IMPRESSION:    CT-guided biopsy of left sacral ala sclerotic lesion.    Plan:     Specimen(s) sent for evaluation.  _______________________________________________________________    PROCEDURE SUMMARY:  - Percutaneous CT-guided coaxial core needle biopsy  - Additional procedure(s): None    PROCEDURE DETAILS:    Pre-procedure  Reference imaging for biopsy target: CT abdomen/pelvis from 5/7/2024  Consent: Informed consent for the procedure including risks, benefits and alternatives was obtained and time-out was performed prior to the procedure.  Preparation: The site was prepared and draped using maximal sterile barrier technique including cutaneous antisepsis.    Anesthesia/sedation  Level of anesthesia/sedation: Deep sedation  Anesthesia/sedation administered by: Anesthesiology    Imaging prior to biopsy  The patient was positioned prone. Initial imaging was performed using noncontrast CT.  Biopsy target:  Left sacral ala sclerotic lesion.    Biopsy   Local anesthesia was administered. Under CT guidance, the biopsy needle was advanced to the target and biopsy was performed.  Coaxial needle: 11 gauge    Core needle biopsy device: OnControl    Number of core specimens: 2     A bone marrow sample was also obtained.    On-site biopsy touch preparation: Yes    Additional sampling recommendations: None  Preliminary assessment of sample adequacy: Adequate    Needle removal  The biopsy needle was removed and a sterile dressing was applied.  Tract embolization: None    Imaging following biopsy  Immediate post-biopsy imaging was performed using noncontrast CT.  Post-biopsy imaging findings: Expected post biopsy findings without evidence of hematoma.      Additional Details  Additional description of procedure: None  Equipment details: None  Specimens removed: Biopsy samples as detailed above  Estimated blood loss (mL): Less than 10

## 2024-05-09 NOTE — PROGRESS NOTE ADULT - SUBJECTIVE AND OBJECTIVE BOX
Nephrology progress note     no complaints    ON events/Subjective:     Allergies:  No Known Allergies    Hospital Medications:   MEDICATIONS  (STANDING):  chlorhexidine 2% Cloths 1 Application(s) Topical <User Schedule>  dextrose 10% Bolus 125 milliLiter(s) IV Bolus once  dextrose 5%. 1000 milliLiter(s) (50 mL/Hr) IV Continuous <Continuous>  dextrose 5%. 1000 milliLiter(s) (100 mL/Hr) IV Continuous <Continuous>  dextrose 50% Injectable 25 Gram(s) IV Push once  dextrose 50% Injectable 12.5 Gram(s) IV Push once  glucagon  Injectable 1 milliGRAM(s) IntraMuscular once  insulin glargine Injectable (LANTUS) 45 Unit(s) SubCutaneous at bedtime  insulin lispro (ADMELOG) corrective regimen sliding scale   SubCutaneous three times a day before meals  insulin lispro Injectable (ADMELOG) 8 Unit(s) SubCutaneous three times a day before meals  levothyroxine 50 MICROGram(s) Oral daily  losartan 50 milliGRAM(s) Oral daily  melatonin 3 milliGRAM(s) Oral at bedtime  metoprolol tartrate 50 milliGRAM(s) Oral two times a day  nystatin Cream 1 Application(s) Topical two times a day  polyethylene glycol 3350 17 Gram(s) Oral two times a day  pregabalin 150 milliGRAM(s) Oral every 8 hours    REVIEW OF SYSTEMS:  CONSTITUTIONAL: No weakness, fevers or chills  EYES/ENT: No visual changes;  No vertigo or throat pain   NECK: No pain or stiffness  RESPIRATORY: No cough, wheezing, hemoptysis; No shortness of breath  CARDIOVASCULAR: No chest pain or palpitations.  GASTROINTESTINAL: No abdominal or epigastric pain. No nausea, vomiting, or hematemesis; No diarrhea or constipation. No melena or hematochezia.  GENITOURINARY: No dysuria, frequency, foamy urine, urinary urgency, incontinence or hematuria  NEUROLOGICAL: No numbness or weakness  SKIN: No itching, burning, rashes, or lesions   VASCULAR: No bilateral lower extremity edema.   All other review of systems is negative unless indicated above.    VITALS:  T(F): 97.8 (05-09-24 @ 05:00), Max: 99.4 (05-08-24 @ 13:04)  HR: 95 (05-09-24 @ 05:00)  BP: 127/81 (05-09-24 @ 05:00)  RR: 18 (05-09-24 @ 05:00)  SpO2: 97% (05-09-24 @ 05:00)  Wt(kg): --      PHYSICAL EXAM:  Constitutional: NAD  HEENT: anicteric sclera, oropharynx clear, MMM  Neck: No JVD  Respiratory: CTAB, no wheezes, rales or rhonchi  Cardiovascular: S1, S2, RRR  Gastrointestinal: BS+, soft, NT/ND  Extremities: No cyanosis or clubbing. No peripheral edema  Neurological: A/O x 3, no focal deficits  Psychiatric: Normal mood, normal affect  : No CVA tenderness. No white.   Skin: No rashes  Vascular Access:    LABS:  05-08    140  |  104  |  19  ----------------------------<  161<H>  4.0   |  23  |  0.7    Ca    10.2      08 May 2024 20:00  Mg     2.0     05-08    TPro  6.9  /  Alb  4.1  /  TBili  <0.2  /  DBili      /  AST  13  /  ALT  12  /  AlkPhos  172<H>  05-08                          12.9   6.83  )-----------( 221      ( 08 May 2024 20:00 )             38.4       Urine Studies:  Creatinine Trend: 0.7<--, 0.8<--, 0.5<--, 0.5<--  Urinalysis Basic - ( 08 May 2024 20:00 )    Color:  / Appearance:  / SG:  / pH:   Gluc: 161 mg/dL / Ketone:   / Bili:  / Urobili:    Blood:  / Protein:  / Nitrite:    Leuk Esterase:  / RBC:  / WBC    Sq Epi:  / Non Sq Epi:  / Bacteria:     ·Stable (relatively normal) renal function  ·BP now controlled on metoprolol and losartan  ·per psychiatry no need IPP or change in medications  ·pelvic CT wo fx - left sacral sclerotic lesion and MRI with other lesions  CT scan with no other masses identified  s/p bone scan  seen by oncology - for CT   possible panic attack on admission    1) continue BP meds  2) fu/u bone scan results  3) bone biopsy hopefully today

## 2024-05-09 NOTE — PROGRESS NOTE ADULT - SUBJECTIVE AND OBJECTIVE BOX
24H events:    Patient is a 58y old Female who presents with a chief complaint of anxiety w SOB (09 May 2024 13:38)    Primary diagnosis of Panic attack    Today is hospital day 12d. This morning patient was seen and examined at bedside, resting comfortably in bed.    No acute or major events overnight.      PAST MEDICAL & SURGICAL HISTORY  Hypertension    Diabetes mellitus    Thyroid disease    Anemia    History of mood disorder    H/O diabetic neuropathy    Fibroid uterus    S/P lumbar discectomy  2006.    S/P tonsillectomy      SOCIAL HISTORY:  Social History:  from [facility] (27 Apr 2024 11:37)      ALLERGIES:  No Known Allergies    MEDICATIONS:  STANDING MEDICATIONS  chlorhexidine 2% Cloths 1 Application(s) Topical <User Schedule>  dextrose 10% Bolus 125 milliLiter(s) IV Bolus once  dextrose 5%. 1000 milliLiter(s) IV Continuous <Continuous>  dextrose 5%. 1000 milliLiter(s) IV Continuous <Continuous>  dextrose 50% Injectable 25 Gram(s) IV Push once  dextrose 50% Injectable 12.5 Gram(s) IV Push once  glucagon  Injectable 1 milliGRAM(s) IntraMuscular once  insulin glargine Injectable (LANTUS) 45 Unit(s) SubCutaneous at bedtime  insulin lispro (ADMELOG) corrective regimen sliding scale   SubCutaneous three times a day before meals  insulin lispro Injectable (ADMELOG) 8 Unit(s) SubCutaneous three times a day before meals  levothyroxine 50 MICROGram(s) Oral daily  losartan 50 milliGRAM(s) Oral daily  melatonin 3 milliGRAM(s) Oral at bedtime  metoprolol tartrate 50 milliGRAM(s) Oral two times a day  nystatin Cream 1 Application(s) Topical two times a day  polyethylene glycol 3350 17 Gram(s) Oral two times a day  pregabalin 150 milliGRAM(s) Oral every 8 hours    PRN MEDICATIONS  acetaminophen     Tablet .. 975 milliGRAM(s) Oral every 8 hours PRN  baclofen 5 milliGRAM(s) Oral every 8 hours PRN  dextrose Oral Gel 15 Gram(s) Oral once PRN  lidocaine   4% Patch 1 Patch Transdermal daily PRN    VITALS:   T(F): 97.4  HR: 93  BP: 121/68  RR: 18  SpO2: 100%    PHYSICAL EXAM:  CONSTITUTIONAL: Well groomed, no apparent distress, laying flat in bed  EYES: No conjunctival or scleral injection, non-icteric  ENMT: Oral mucosa with moist membranes. Normal dentition; no pharyngeal injection or exudates  RESP: No respiratory distress, no use of accessory muscles  CV:no murmur or gallop, peripheral pulses intact, BL dorsalis pedis   GI: soft, ND, no rebound, no guarding;  no hepatosplenomegaly; mild suprapubic tenderness   LYMPH: No cervical LAD or tenderness  MSK:  No digital clubbing or cyanosis; able to move all 4 limbs,  PSYCH: A&Ox4,    LABS:                        12.9   6.83  )-----------( 221      ( 08 May 2024 20:00 )             38.4     05-08    140  |  104  |  19  ----------------------------<  161<H>  4.0   |  23  |  0.7    Ca    10.2      08 May 2024 20:00  Mg     2.0     05-08    TPro  6.9  /  Alb  4.1  /  TBili  <0.2  /  DBili  x   /  AST  13  /  ALT  12  /  AlkPhos  172<H>  05-08    PT/INR - ( 08 May 2024 20:00 )   PT: 11.30 sec;   INR: 0.99 ratio         PTT - ( 08 May 2024 20:00 )  PTT:30.9 sec  Urinalysis Basic - ( 08 May 2024 20:00 )    Color: x / Appearance: x / SG: x / pH: x  Gluc: 161 mg/dL / Ketone: x  / Bili: x / Urobili: x   Blood: x / Protein: x / Nitrite: x   Leuk Esterase: x / RBC: x / WBC x   Sq Epi: x / Non Sq Epi: x / Bacteria: x                RADIOLOGY:

## 2024-05-09 NOTE — DISCHARGE NOTE PROVIDER - NSDCFUSCHEDAPPT_GEN_ALL_CORE_FT
Unknown, Doctor  [facility] [place]  [facility] PreAdmits  Scheduled Appointment: 05/28/2024    [facility] Physician Eunice REARDON [address] [place]  Scheduled Appointment: 05/28/2024    
Unknown, Doctor  [facility] [place]  [facility] PreAdmits  Scheduled Appointment: 05/28/2024    [facility] Physician Eunice REARDON [address] [place]  Scheduled Appointment: 05/28/2024

## 2024-05-09 NOTE — PROGRESS NOTE ADULT - SUBJECTIVE AND OBJECTIVE BOX
COLON, DANISH  [facility]-N 3A 023 C ([facility]-N 3A)      Patient was evaluated and examined  by bedside, no active complains, NPO for bone lesion biopsy today       REVIEW OF SYSTEMS:  please see pertinent positives mentioned above, all other 12 ROS negative      T(C): , Max: 36.7 (05-08-24 @ 20:02)  HR: 94 (05-09-24 @ 12:52)  BP: 130/66 (05-09-24 @ 12:52)  RR: 18 (05-09-24 @ 12:52)  SpO2: 99% (05-09-24 @ 12:52)  CAPILLARY BLOOD GLUCOSE      POCT Blood Glucose.: 103 mg/dL (09 May 2024 07:40)  POCT Blood Glucose.: 224 mg/dL (08 May 2024 21:49)  POCT Blood Glucose.: 185 mg/dL (08 May 2024 18:18)      PHYSICAL EXAM:  General: NAD, AAOX3, patient is laying comfortably in bed  HEENT: AT, NC, Supple, NO JVD, NO CB  Lungs: CTA B/L, no wheezing, no rhonchi  CVS: normal S1, S2, RRR, NO M/G/R  Abdomen: soft, bowel sounds present, non-tender, non-distended  Extremities: no edema, no clubbing, no cyanosis, positive peripheral pulses b/l  Neuro: no acute focal neurological deficits  Skin: no rash, no ecchymosis      LAB  CBC  Date: 05-08-24 @ 20:00  Mean cell Kasruzsojy95.3  Mean cell Hemoglobin Conc33.6  Mean cell Volum 87.3  Platelet count-Automate 221  RBC Count 4.40  Red Cell Distrib Width13.8  WBC Count6.83  % Albumin, Urine--  Hematocrit 38.4  Hemoglobin 12.9  CBC  Date: 05-07-24 @ 10:50  Mean cell Bgzfddajys30.9  Mean cell Hemoglobin Conc33.3  Mean cell Volum 86.9  Platelet count-Automate 268  RBC Count 4.81  Red Cell Distrib Width13.8  WBC Count6.21  % Albumin, Urine--  Hematocrit 41.8  Hemoglobin 13.9    [facility]  05-08-24 @ 20:00  Blood Gas Arterial-Calcium,Ionized--  Blood Urea Nitrogen, Serum 19 mg/dL [10 - 20]  Carbon Dioxide, Serum23 mmol/L [17 - 32]  Chloride, Ssrrx470 mmol/L [98 - 110]  Creatinie, Serum0.7 mg/dL [0.7 - 1.5]  Glucose, Rxdhz548 mg/dL<H> [70 - 99]  Potassium, Serum4.0 mmol/L [3.5 - 5.0]  Sodium, Serum 140 mmol/L [135 - 146]  BMP  05-07-24 @ 10:50  Blood Gas Arterial-Calcium,Ionized--  Blood Urea Nitrogen, Serum 19 mg/dL [10 - 20]  Carbon Dioxide, Serum29 mmol/L [17 - 32]  Chloride, Lasbd215 mmol/L [98 - 110]  Creatinie, Serum0.8 mg/dL [0.7 - 1.5]  Glucose, Lupre554 mg/dL<H> [70 - 99]  Potassium, Serum4.7 mmol/L [3.5 - 5.0]  Sodium, Serum 139 mmol/L [135 - 146]        PT/INR - ( 08 May 2024 20:00 )   PT: 11.30 sec;   INR: 0.99 ratio         PTT - ( 08 May 2024 20:00 )  PTT:30.9 sec      Microbiology:    Culture - Urine (collected 05-06-24 @ 11:05)  Source: Clean Catch None  Final Report (05-07-24 @ 14:45):    >=3 organisms. Probable collection contamination.    Urinalysis with Rflx Culture (collected 05-06-24 @ 11:05)        Medications:  acetaminophen     Tablet .. 975 milliGRAM(s) Oral every 8 hours PRN  baclofen 5 milliGRAM(s) Oral every 8 hours PRN  chlorhexidine 2% Cloths 1 Application(s) Topical <User Schedule>  dextrose 10% Bolus 125 milliLiter(s) IV Bolus once  dextrose 5%. 1000 milliLiter(s) IV Continuous <Continuous>  dextrose 5%. 1000 milliLiter(s) IV Continuous <Continuous>  dextrose 50% Injectable 25 Gram(s) IV Push once  dextrose 50% Injectable 12.5 Gram(s) IV Push once  dextrose Oral Gel 15 Gram(s) Oral once PRN  glucagon  Injectable 1 milliGRAM(s) IntraMuscular once  insulin glargine Injectable (LANTUS) 45 Unit(s) SubCutaneous at bedtime  insulin lispro (ADMELOG) corrective regimen sliding scale   SubCutaneous three times a day before meals  insulin lispro Injectable (ADMELOG) 8 Unit(s) SubCutaneous three times a day before meals  levothyroxine 50 MICROGram(s) Oral daily  lidocaine   4% Patch 1 Patch Transdermal daily PRN  losartan 50 milliGRAM(s) Oral daily  melatonin 3 milliGRAM(s) Oral at bedtime  metoprolol tartrate 50 milliGRAM(s) Oral two times a day  nystatin Cream 1 Application(s) Topical two times a day  polyethylene glycol 3350 17 Gram(s) Oral two times a day  pregabalin 150 milliGRAM(s) Oral every 8 hours        Assessment and Plan:  Patient is a 57 y/o  Female with PMH of HTN, HLD, IDDM noncompliant with her insulin, neuropathy, HTN, hypothyroidism, mood disorder with 3 prior inpatient psychiatric hospitalizations, recent admission from 2/1 - 2/6 for DKA and a mechanical fall in March 2024 presents to the ED w "feeling overwhelmed" and associated SOB. In the ED vitals notable for /79 w , SpO2 97% on RA. Labs notable for Trop 9, Dimer of 414, gluc of 375 w AG of 18 and ProBNP of 326. CT PE protocol negative for PE but showed at 2.4 cm nodule in a multinodular thyroid. Patient was x. with bone lesion, pending completion of diagnostic biopsy and bone scan.     # Pelvic bone lesions , MRI suspicious for neoplastic disease, with multiple lymph nodes, thyroid nodule   - s/p ct abd/pelvis < from: CT Abdomen and Pelvis w/ IV Cont (05.07.24 @ 13:57) > IMPRESSION: 1. A 2.2 cm sclerotic lesion within the left sacral ala is better evaluated on reference MRI.  2. Otherwise, no definite evidence of intra-abdominal or pelvic  metastasis.  3. Enlarged calcified fibroid uterus, similar to that seen on reference exam of July 2020. --- End of Report ---  - IR consulted s/p CT guided biopsy of left  pelvic bone lesions- on 5/9/24- f/up results  -s/p bone scan - shows increased uptake in same area where biopsy was obtained.    # SOB , Possible Panic attack with SOB and anxiety. now calm and stable   #Ho mood disorder now stable   cont meds     # DM , with diabetic neuropathy,  controlled ,  hx of dka ,   hgA1C with Estimated Average Glucose Result: 7.1(04.28.24 @ 06:51)    -c/w home Lantus 45U at bedtime  -c/w Lispro 20mg TID  -jardiance 25 on hold      #2.4 cm thyroid nodule  #multinodular thyroid- normal thyroid function   #Ho Hypothyroid  outpatient endocrine referral for nodular w/u   -c/w Home levothyroxine 50 daily    # left knee pain, probably OA, xray left knee reviewed , conservative management     # right knee  xray knee reviewed Proximal fibular neck fracture with mild callus formation, possibly   subacute.  Swelling around the lateral malleolus with no definite fracture.  no pain on the right, repeat xray for followup,   consider knee brace     #Progress Note Handoff: Post  IR dx. bone biopsy , f/up results, start d/c to STR planning with outpatient Oncology team f/up   current medical plan of tx. d/w pt. by bedside   Disposition: NH in 24 hours   code status: full code    Total time spent to complete patient's bedside assessment, review medical chart, discuss medical plan of care with covering medical team was more than 35 minutes with >50% of time spent face to face with patient, discussion with patient/family and/or coordination of care .

## 2024-05-10 ENCOUNTER — TRANSCRIPTION ENCOUNTER (OUTPATIENT)
Age: 59
End: 2024-05-10

## 2024-05-10 VITALS
HEART RATE: 100 BPM | TEMPERATURE: 98 F | SYSTOLIC BLOOD PRESSURE: 126 MMHG | RESPIRATION RATE: 18 BRPM | OXYGEN SATURATION: 99 % | DIASTOLIC BLOOD PRESSURE: 77 MMHG

## 2024-05-10 LAB
GLUCOSE BLDC GLUCOMTR-MCNC: 120 MG/DL — HIGH (ref 70–99)
GLUCOSE BLDC GLUCOMTR-MCNC: 165 MG/DL — HIGH (ref 70–99)
GLUCOSE BLDC GLUCOMTR-MCNC: 182 MG/DL — HIGH (ref 70–99)

## 2024-05-10 PROCEDURE — 99239 HOSP IP/OBS DSCHRG MGMT >30: CPT

## 2024-05-10 RX ADMIN — Medication 2: at 11:58

## 2024-05-10 RX ADMIN — NYSTATIN CREAM 1 APPLICATION(S): 100000 CREAM TOPICAL at 05:20

## 2024-05-10 RX ADMIN — Medication 50 MICROGRAM(S): at 05:15

## 2024-05-10 RX ADMIN — Medication 975 MILLIGRAM(S): at 00:16

## 2024-05-10 RX ADMIN — LOSARTAN POTASSIUM 50 MILLIGRAM(S): 100 TABLET, FILM COATED ORAL at 05:15

## 2024-05-10 RX ADMIN — Medication 150 MILLIGRAM(S): at 05:15

## 2024-05-10 RX ADMIN — NYSTATIN CREAM 1 APPLICATION(S): 100000 CREAM TOPICAL at 17:06

## 2024-05-10 RX ADMIN — CHLORHEXIDINE GLUCONATE 1 APPLICATION(S): 213 SOLUTION TOPICAL at 05:19

## 2024-05-10 RX ADMIN — Medication 50 MILLIGRAM(S): at 05:15

## 2024-05-10 RX ADMIN — Medication 8 UNIT(S): at 12:01

## 2024-05-10 RX ADMIN — Medication 150 MILLIGRAM(S): at 13:03

## 2024-05-10 RX ADMIN — Medication 50 MILLIGRAM(S): at 17:09

## 2024-05-10 RX ADMIN — Medication 8 UNIT(S): at 17:17

## 2024-05-10 RX ADMIN — POLYETHYLENE GLYCOL 3350 17 GRAM(S): 17 POWDER, FOR SOLUTION ORAL at 05:16

## 2024-05-10 RX ADMIN — POLYETHYLENE GLYCOL 3350 17 GRAM(S): 17 POWDER, FOR SOLUTION ORAL at 17:06

## 2024-05-10 NOTE — PROGRESS NOTE ADULT - REASON FOR ADMISSION
anxiety w SOB

## 2024-05-10 NOTE — PROGRESS NOTE ADULT - SUBJECTIVE AND OBJECTIVE BOX
Nephrology progress note     no complaints      ON events/Subjective:     Allergies:  No Known Allergies    Hospital Medications:   MEDICATIONS  (STANDING):  chlorhexidine 2% Cloths 1 Application(s) Topical <User Schedule>  dextrose 10% Bolus 125 milliLiter(s) IV Bolus once  dextrose 5%. 1000 milliLiter(s) (100 mL/Hr) IV Continuous <Continuous>  dextrose 5%. 1000 milliLiter(s) (50 mL/Hr) IV Continuous <Continuous>  dextrose 50% Injectable 25 Gram(s) IV Push once  dextrose 50% Injectable 12.5 Gram(s) IV Push once  glucagon  Injectable 1 milliGRAM(s) IntraMuscular once  insulin glargine Injectable (LANTUS) 45 Unit(s) SubCutaneous at bedtime  insulin lispro (ADMELOG) corrective regimen sliding scale   SubCutaneous three times a day before meals  insulin lispro Injectable (ADMELOG) 8 Unit(s) SubCutaneous three times a day before meals  levothyroxine 50 MICROGram(s) Oral daily  losartan 50 milliGRAM(s) Oral daily  melatonin 3 milliGRAM(s) Oral at bedtime  metoprolol tartrate 50 milliGRAM(s) Oral two times a day  nystatin Cream 1 Application(s) Topical two times a day  polyethylene glycol 3350 17 Gram(s) Oral two times a day  pregabalin 150 milliGRAM(s) Oral every 8 hours    REVIEW OF SYSTEMS:  CONSTITUTIONAL: No weakness, fevers or chills  EYES/ENT: No visual changes;  No vertigo or throat pain   NECK: No pain or stiffness  RESPIRATORY: No cough, wheezing, hemoptysis; No shortness of breath  CARDIOVASCULAR: No chest pain or palpitations.  GASTROINTESTINAL: No abdominal or epigastric pain. No nausea, vomiting, or hematemesis; No diarrhea or constipation. No melena or hematochezia.  GENITOURINARY: No dysuria, frequency, foamy urine, urinary urgency, incontinence or hematuria  NEUROLOGICAL: No numbness or weakness  SKIN: No itching, burning, rashes, or lesions   VASCULAR: No bilateral lower extremity edema.   All other review of systems is negative unless indicated above.    VITALS:  T(F): 98 (05-10-24 @ 04:42), Max: 98 (05-10-24 @ 04:42)  HR: 84 (05-10-24 @ 08:30)  BP: 136/82 (05-10-24 @ 08:30)  RR: 17 (05-10-24 @ 08:30)  SpO2: 99% (05-10-24 @ 08:30)  Wt(kg): --    Height (cm): 167.6 (05-09 @ 11:36)  Weight (kg): 85.4 (05-09 @ 11:36)  BMI (kg/m2): 30.4 (05-09 @ 11:36)  BSA (m2): 1.95 (05-09 @ 11:36)  PHYSICAL EXAM:  Constitutional: NAD  HEENT: anicteric sclera, oropharynx clear, MMM  Neck: No JVD  Respiratory: CTAB, no wheezes, rales or rhonchi  Cardiovascular: S1, S2, RRR  Gastrointestinal: BS+, soft, NT/ND  Extremities: No cyanosis or clubbing. No peripheral edema  Neurological: A/O x 3, no focal deficits  Psychiatric: Normal mood, normal affect  : No CVA tenderness. No white.   Skin: No rashes  Vascular Access:    LABS:  05-08    140  |  104  |  19  ----------------------------<  161<H>  4.0   |  23  |  0.7    Ca    10.2      08 May 2024 20:00  Mg     2.0     05-08    TPro  6.9  /  Alb  4.1  /  TBili  <0.2  /  DBili      /  AST  13  /  ALT  12  /  AlkPhos  172<H>  05-08                          12.9   6.83  )-----------( 221      ( 08 May 2024 20:00 )             38.4       Urine Studies:  Creatinine Trend: 0.7<--, 0.8<--, 0.5<--, 0.5<--  Urinalysis Basic - ( 08 May 2024 20:00 )    Color:  / Appearance:  / SG:  / pH:   Gluc: 161 mg/dL / Ketone:   / Bili:  / Urobili:    Blood:  / Protein:  / Nitrite:    Leuk Esterase:  / RBC:  / WBC    Sq Epi:  / Non Sq Epi:  / Bacteria:     ·Stable (relatively normal) renal function  ·BP now controlled on metoprolol and losartan  ·per psychiatry no need IPP or change in medications  ·pelvic CT wo fx - left sacral sclerotic lesion and MRI with other lesions  CT scan with no other masses identified  s/p bone scan with 1 pelvic lesion  s/p bone biopsy of sacrum lesion  seen by oncology - for CT   possible panic attack on admission    1) continue BP meds  2) d/c planning per medicine

## 2024-05-10 NOTE — DISCHARGE NOTE NURSING/CASE MANAGEMENT/SOCIAL WORK - PATIENT PORTAL LINK FT
You can access the FollowMyHealth Patient Portal offered by [facility] by registering at the following website: [email or web address]. By joining [facility]’s FollowMyHealth portal, you will also be able to view your health information using other applications (apps) compatible with our system.

## 2024-05-10 NOTE — PROGRESS NOTE ADULT - SUBJECTIVE AND OBJECTIVE BOX
COLON, DANISH  [facility]-N 3A 023 C ([facility]-N 3A)      Patient was evaluated and examined  by bedside, no active complains        REVIEW OF SYSTEMS:  please see pertinent positives mentioned above, all other 12 ROS negative      T(C): , Max: 36.7 (05-10-24 @ 04:42)  HR: 100 (05-10-24 @ 13:57)  BP: 126/77 (05-10-24 @ 13:57)  RR: 18 (05-10-24 @ 13:57)  SpO2: 99% (05-10-24 @ 13:57)  CAPILLARY BLOOD GLUCOSE      POCT Blood Glucose.: 165 mg/dL (10 May 2024 11:10)  POCT Blood Glucose.: 120 mg/dL (10 May 2024 07:49)  POCT Blood Glucose.: 164 mg/dL (09 May 2024 21:24)  POCT Blood Glucose.: 256 mg/dL (09 May 2024 17:03)      PHYSICAL EXAM:  General: NAD, AAOX3, patient is laying comfortably in bed  HEENT: AT, NC, Supple, NO JVD, NO CB  Lungs: CTA B/L, no wheezing, no rhonchi  CVS: normal S1, S2, RRR, NO M/G/R  Abdomen: soft, bowel sounds present, non-tender, non-distended  Extremities: trace b/l lower ext. edema, no clubbing, no cyanosis, positive peripheral pulses b/l  Neuro: no acute focal neurological deficits  Skin: no rash, no ecchymosis      LAB  CBC  Date: 05-08-24 @ 20:00  Mean cell Zfccbbppcw35.3  Mean cell Hemoglobin Conc33.6  Mean cell Volum 87.3  Platelet count-Automate 221  RBC Count 4.40  Red Cell Distrib Width13.8  WBC Count6.83  % Albumin, Urine--  Hematocrit 38.4  Hemoglobin 12.9  CBC  Date: 05-07-24 @ 10:50  Mean cell Wneugiqcig61.9  Mean cell Hemoglobin Conc33.3  Mean cell Volum 86.9  Platelet count-Automate 268  RBC Count 4.81  Red Cell Distrib Width13.8  WBC Count6.21  % Albumin, Urine--  Hematocrit 41.8  Hemoglobin 13.9    BMP  05-08-24 @ 20:00  Blood Gas Arterial-Calcium,Ionized--  Blood Urea Nitrogen, Serum 19 mg/dL [10 - 20]  Carbon Dioxide, Serum23 mmol/L [17 - 32]  Chloride, Etzbf256 mmol/L [98 - 110]  Creatinie, Serum0.7 mg/dL [0.7 - 1.5]  Glucose, Xdglr903 mg/dL<H> [70 - 99]  Potassium, Serum4.0 mmol/L [3.5 - 5.0]  Sodium, Serum 140 mmol/L [135 - 146]  BMP  05-07-24 @ 10:50  Blood Gas Arterial-Calcium,Ionized--  Blood Urea Nitrogen, Serum 19 mg/dL [10 - 20]  Carbon Dioxide, Serum29 mmol/L [17 - 32]  Chloride, Ajsxr778 mmol/L [98 - 110]  Creatinie, Serum0.8 mg/dL [0.7 - 1.5]  Glucose, Eowbg445 mg/dL<H> [70 - 99]  Potassium, Serum4.7 mmol/L [3.5 - 5.0]  Sodium, Serum 139 mmol/L [135 - 146]        PT/INR - ( 08 May 2024 20:00 )   PT: 11.30 sec;   INR: 0.99 ratio         PTT - ( 08 May 2024 20:00 )  PTT:30.9 sec      Microbiology:    Culture - Urine (collected 05-06-24 @ 11:05)  Source: Clean Catch None  Final Report (05-07-24 @ 14:45):    >=3 organisms. Probable collection contamination.    Urinalysis with Rflx Culture (collected 05-06-24 @ 11:05)        Medications:  acetaminophen     Tablet .. 975 milliGRAM(s) Oral every 8 hours PRN  baclofen 5 milliGRAM(s) Oral every 8 hours PRN  chlorhexidine 2% Cloths 1 Application(s) Topical <User Schedule>  dextrose 10% Bolus 125 milliLiter(s) IV Bolus once  dextrose 5%. 1000 milliLiter(s) IV Continuous <Continuous>  dextrose 5%. 1000 milliLiter(s) IV Continuous <Continuous>  dextrose 50% Injectable 25 Gram(s) IV Push once  dextrose 50% Injectable 12.5 Gram(s) IV Push once  dextrose Oral Gel 15 Gram(s) Oral once PRN  glucagon  Injectable 1 milliGRAM(s) IntraMuscular once  insulin glargine Injectable (LANTUS) 45 Unit(s) SubCutaneous at bedtime  insulin lispro (ADMELOG) corrective regimen sliding scale   SubCutaneous three times a day before meals  insulin lispro Injectable (ADMELOG) 8 Unit(s) SubCutaneous three times a day before meals  levothyroxine 50 MICROGram(s) Oral daily  lidocaine   4% Patch 1 Patch Transdermal daily PRN  losartan 50 milliGRAM(s) Oral daily  melatonin 3 milliGRAM(s) Oral at bedtime  metoprolol tartrate 50 milliGRAM(s) Oral two times a day  nystatin Cream 1 Application(s) Topical two times a day  polyethylene glycol 3350 17 Gram(s) Oral two times a day  pregabalin 150 milliGRAM(s) Oral every 8 hours        Assessment and Plan:  Patient is a 57 y/o  Female with PMH of HTN, HLD, IDDM noncompliant with her insulin, neuropathy, HTN, hypothyroidism, mood disorder with 3 prior inpatient psychiatric hospitalizations, recent admission from 2/1 - 2/6 for DKA and a mechanical fall in March 2024 presents to the ED w "feeling overwhelmed" and associated SOB. In the ED vitals notable for /79 w , SpO2 97% on RA. Labs notable for Trop 9, Dimer of 414, gluc of 375 w AG of 18 and ProBNP of 326. CT PE protocol negative for PE but showed at 2.4 cm nodule in a multinodular thyroid. Patient was x. with bone lesion, pending completion of diagnostic biopsy and bone scan.     # Pelvic bone lesions , MRI suspicious for neoplastic disease, with multiple lymph nodes, thyroid nodule   - s/p ct abd/pelvis < from: CT Abdomen and Pelvis w/ IV Cont (05.07.24 @ 13:57) > IMPRESSION: 1. A 2.2 cm sclerotic lesion within the left sacral ala is better evaluated on reference MRI.  2. Otherwise, no definite evidence of intra-abdominal or pelvic  metastasis.  3. Enlarged calcified fibroid uterus, similar to that seen on reference exam of July 2020. --- End of Report ---  - IR consulted s/p CT guided biopsy of left  pelvic bone lesions- on 5/9/24- f/up results  -s/p bone scan - shows increased uptake in same area where biopsy was obtained.    # SOB , Possible Panic attack with SOB and anxiety. now calm and stable   #Ho mood disorder now stable   cont meds     # DM well controlled,  with diabetic neuropathy ,  hx of dka ,   hgA1C with Estimated Average Glucose Result: 7.1(04.28.24 @ 06:51)    -c/w home Lantus 45U at bedtime  -c/w Lispro 8 mg TID  -jardiance 25 on hold      #2.4 cm thyroid nodule  #multinodular thyroid- normal thyroid function   #Ho Hypothyroid  outpatient endocrine referral for nodular w/u   -c/w Home levothyroxine 50 daily    # left knee pain, probably OA, xray left knee reviewed , conservative management     # right knee  xray knee reviewed Proximal fibular neck fracture with mild callus formation, possibly   subacute.  Swelling around the lateral malleolus with no definite fracture.  no pain on the right, repeat xray for followup,   consider knee brace     # Vaginal malodorous changes- post GYN eval, vaginal swab 5/6- no bacterial growth, outpatient GYN f/up     #Progress Note Handoff: Patient was medically optimized, start d/c to STR planning with outpatient Oncology team f/up to review results of bone tissue   current medical plan of tx. d/w pt. by bedside   Disposition: NH today   code status: full code    Total time spent to complete patient's bedside assessment, review medical chart, discuss medical plan of care with covering medical team was more than 35 minutes with >50% of time spent face to face with patient, discussion with patient/family and/or coordination of care .

## 2024-05-11 LAB
% ALBUMIN: 51.8 % — SIGNIFICANT CHANGE UP
% ALPHA 1: 4.8 % — SIGNIFICANT CHANGE UP
% ALPHA 2: 11.6 % — SIGNIFICANT CHANGE UP
% BETA: 13.6 % — SIGNIFICANT CHANGE UP
% GAMMA: 18.2 % — SIGNIFICANT CHANGE UP
ALBUMIN SERPL ELPH-MCNC: 4 G/DL — SIGNIFICANT CHANGE UP (ref 3.6–5.5)
ALBUMIN/GLOB SERPL ELPH: 1.1 RATIO — SIGNIFICANT CHANGE UP
ALPHA1 GLOB SERPL ELPH-MCNC: 0.4 G/DL — SIGNIFICANT CHANGE UP (ref 0.1–0.4)
ALPHA2 GLOB SERPL ELPH-MCNC: 0.9 G/DL — SIGNIFICANT CHANGE UP (ref 0.5–1)
B-GLOBULIN SERPL ELPH-MCNC: 1 G/DL — SIGNIFICANT CHANGE UP (ref 0.5–1)
GAMMA GLOBULIN: 1.4 G/DL — SIGNIFICANT CHANGE UP (ref 0.6–1.6)
INTERPRETATION SERPL IFE-IMP: SIGNIFICANT CHANGE UP
PROT PATTERN SERPL ELPH-IMP: SIGNIFICANT CHANGE UP
PROT SERPL-MCNC: 7.7 G/DL — SIGNIFICANT CHANGE UP (ref 6–8.3)
PROT SERPL-MCNC: 7.7 G/DL — SIGNIFICANT CHANGE UP (ref 6–8.3)

## 2024-05-13 ENCOUNTER — NON-APPOINTMENT (OUTPATIENT)
Age: 59
End: 2024-05-13

## 2024-05-13 DIAGNOSIS — F41.0 PANIC DISORDER [EPISODIC PAROXYSMAL ANXIETY]: ICD-10-CM

## 2024-05-13 DIAGNOSIS — F31.9 BIPOLAR DISORDER, UNSPECIFIED: ICD-10-CM

## 2024-05-13 DIAGNOSIS — F41.9 ANXIETY DISORDER, UNSPECIFIED: ICD-10-CM

## 2024-05-13 DIAGNOSIS — E03.9 HYPOTHYROIDISM, UNSPECIFIED: ICD-10-CM

## 2024-05-13 DIAGNOSIS — D64.9 ANEMIA, UNSPECIFIED: ICD-10-CM

## 2024-05-13 DIAGNOSIS — E11.649 TYPE 2 DIABETES MELLITUS WITH HYPOGLYCEMIA WITHOUT COMA: ICD-10-CM

## 2024-05-13 DIAGNOSIS — E78.5 HYPERLIPIDEMIA, UNSPECIFIED: ICD-10-CM

## 2024-05-13 DIAGNOSIS — E04.2 NONTOXIC MULTINODULAR GOITER: ICD-10-CM

## 2024-05-13 DIAGNOSIS — I10 ESSENTIAL (PRIMARY) HYPERTENSION: ICD-10-CM

## 2024-05-13 DIAGNOSIS — F17.213 NICOTINE DEPENDENCE, CIGARETTES, WITH WITHDRAWAL: ICD-10-CM

## 2024-05-13 DIAGNOSIS — E11.42 TYPE 2 DIABETES MELLITUS WITH DIABETIC POLYNEUROPATHY: ICD-10-CM

## 2024-05-13 LAB
KAPPA LC SER QL IFE: 2.31 MG/DL — HIGH (ref 0.33–1.94)
KAPPA/LAMBDA FREE LIGHT CHAIN RATIO, SERUM: 1.65 RATIO — SIGNIFICANT CHANGE UP (ref 0.26–1.65)
LAMBDA LC SER QL IFE: 1.4 MG/DL — SIGNIFICANT CHANGE UP (ref 0.57–2.63)

## 2024-05-15 DIAGNOSIS — M89.9 DISORDER OF BONE, UNSPECIFIED: ICD-10-CM

## 2024-05-16 PROBLEM — D25.9 LEIOMYOMA OF UTERUS, UNSPECIFIED: Chronic | Status: ACTIVE | Noted: 2024-04-27

## 2024-05-16 PROBLEM — Z86.39 PERSONAL HISTORY OF OTHER ENDOCRINE, NUTRITIONAL AND METABOLIC DISEASE: Chronic | Status: ACTIVE | Noted: 2024-04-27

## 2024-05-16 PROBLEM — Z86.59 PERSONAL HISTORY OF OTHER MENTAL AND BEHAVIORAL DISORDERS: Chronic | Status: ACTIVE | Noted: 2024-04-27

## 2024-05-17 LAB — NON-GYNECOLOGICAL CYTOLOGY STUDY: SIGNIFICANT CHANGE UP

## 2024-05-29 ENCOUNTER — APPOINTMENT (OUTPATIENT)
Dept: OBGYN | Facility: CLINIC | Age: 59
End: 2024-05-29
Payer: MEDICAID

## 2024-05-29 ENCOUNTER — OUTPATIENT (OUTPATIENT)
Dept: OUTPATIENT SERVICES | Facility: HOSPITAL | Age: 59
LOS: 1 days | End: 2024-05-29
Payer: MEDICAID

## 2024-05-29 VITALS
WEIGHT: 198.4 LBS | HEART RATE: 81 BPM | SYSTOLIC BLOOD PRESSURE: 168 MMHG | RESPIRATION RATE: 20 BRPM | DIASTOLIC BLOOD PRESSURE: 85 MMHG | BODY MASS INDEX: 31.88 KG/M2 | TEMPERATURE: 98.1 F | HEIGHT: 66 IN | OXYGEN SATURATION: 100 %

## 2024-05-29 DIAGNOSIS — Z00.00 ENCOUNTER FOR GENERAL ADULT MEDICAL EXAMINATION W/OUT ABNORMAL FINDINGS: ICD-10-CM

## 2024-05-29 DIAGNOSIS — Z00.00 ENCOUNTER FOR GENERAL ADULT MEDICAL EXAMINATION WITHOUT ABNORMAL FINDINGS: ICD-10-CM

## 2024-05-29 PROCEDURE — 87624 HPV HI-RISK TYP POOLED RSLT: CPT

## 2024-05-29 PROCEDURE — 99386 PREV VISIT NEW AGE 40-64: CPT

## 2024-05-29 PROCEDURE — 99459 PELVIC EXAMINATION: CPT

## 2024-05-29 PROCEDURE — 88142 CYTOPATH C/V THIN LAYER: CPT

## 2024-05-29 PROCEDURE — 81513 NFCT DS BV RNA VAG FLU ALG: CPT

## 2024-05-29 PROCEDURE — 87491 CHLMYD TRACH DNA AMP PROBE: CPT

## 2024-05-29 PROCEDURE — 87661 TRICHOMONAS VAGINALIS AMPLIF: CPT

## 2024-05-29 PROCEDURE — 99396 PREV VISIT EST AGE 40-64: CPT

## 2024-05-29 PROCEDURE — 87591 N.GONORRHOEAE DNA AMP PROB: CPT

## 2024-05-29 PROCEDURE — 87481 CANDIDA DNA AMP PROBE: CPT

## 2024-05-29 RX ORDER — HYDROCORTISONE 10 MG/G
1 OINTMENT TOPICAL TWICE DAILY
Qty: 1 | Refills: 0 | Status: ACTIVE | COMMUNITY
Start: 2024-05-29 | End: 1900-01-01

## 2024-05-29 NOTE — PHYSICAL EXAM
[Chaperone Present] : A chaperone was present in the examining room during all aspects of the physical examination [Appropriately responsive] : appropriately responsive [Examination Of The Breasts] : a normal appearance [No Masses] : no breast masses were palpable [Labia Majora] : normal [Labia Minora] : normal [Normal] : normal [Enlarged ___ wks] : enlarged [unfilled] ~Uweeks [Uterine Adnexae] : normal

## 2024-05-29 NOTE — HISTORY OF PRESENT ILLNESS
[FreeTextEntry1] : 57 y/o post-menopausal (5 years ago) P2 with PMHx of HTN, HLD, IDDM noncompliant with her insulin, neuropathy, HTN, hypothyroidism, mood disorder with 3 prior inpatient psychiatric hospitalizations, chronic smoker, recent admission for DKA, recent fall and fatigue. C/o vaginal itching externally. Denies abnormal vaginal discharge, postmenopausal bleeding. No abdominal pain, bloating or unintentional weight loss. Has not seen GYN in >8 years. Known h/o enlarged fibroid uterus. Pt requesting hysterectomy for discomfort. She was told by prior doctor, large fibroid uterus could be causing neuropathy in her feet. Poor historian. Lives in nursing home currently.   Ob/Gyn History: , 2x FT, , 1x sAb                 LMP - 55 y/o Denies history of ovarian cysts, abnormal paps. H/o gonorrhea approx 6 years ago s/p tx. Last Pap Smear - >8 years ago Last Mammogram - >8 years ago, normal per pt Last Colonoscopy - unclear timing, pt states had benign polyps  [Post-Menopause, No Sxs] : post-menopausal, currently without symptoms [No] : Patient does not have concerns regarding sex

## 2024-05-29 NOTE — PLAN
[FreeTextEntry1] : 59yo P2, postmenopausal, here for annual exam.   #HCM -Pap and HPV collected -Mammogram ordered -Colonoscopy referral   #Vaginal itching -Hydrocortisone 1& external ointment sent to pharmacy -Vaginitis collected  Fibroid uterus -Pt discussed request for hysterectomy due to large fibroid uterus. Referral to Dr. Anaya given for possible hysterectomy.

## 2024-05-30 ENCOUNTER — OUTPATIENT (OUTPATIENT)
Dept: OUTPATIENT SERVICES | Facility: HOSPITAL | Age: 59
LOS: 1 days | End: 2024-05-30

## 2024-05-30 DIAGNOSIS — Z00.00 ENCOUNTER FOR GENERAL ADULT MEDICAL EXAMINATION WITHOUT ABNORMAL FINDINGS: ICD-10-CM

## 2024-05-31 DIAGNOSIS — Z00.00 ENCOUNTER FOR GENERAL ADULT MEDICAL EXAMINATION WITHOUT ABNORMAL FINDINGS: ICD-10-CM

## 2024-06-04 LAB
BV BACTERIA RRNA VAG QL NAA+PROBE: NOT DETECTED
C GLABRATA RNA VAG QL NAA+PROBE: NOT DETECTED
C TRACH RRNA SPEC QL NAA+PROBE: NOT DETECTED
CANDIDA RRNA VAG QL PROBE: DETECTED
HPV HIGH+LOW RISK DNA PNL CVX: NOT DETECTED
N GONORRHOEA RRNA SPEC QL NAA+PROBE: NOT DETECTED
T VAGINALIS RRNA SPEC QL NAA+PROBE: NOT DETECTED

## 2024-06-04 RX ORDER — FLUCONAZOLE 150 MG/1
150 TABLET ORAL
Qty: 1 | Refills: 2 | Status: ACTIVE | COMMUNITY
Start: 2024-06-04 | End: 1900-01-01

## 2024-06-17 ENCOUNTER — OUTPATIENT (OUTPATIENT)
Dept: OUTPATIENT SERVICES | Facility: HOSPITAL | Age: 59
LOS: 1 days | End: 2024-06-17
Payer: MEDICAID

## 2024-06-17 ENCOUNTER — APPOINTMENT (OUTPATIENT)
Dept: OBGYN | Facility: CLINIC | Age: 59
End: 2024-06-17
Payer: MEDICAID

## 2024-06-17 VITALS
BODY MASS INDEX: 31.82 KG/M2 | HEIGHT: 66 IN | SYSTOLIC BLOOD PRESSURE: 138 MMHG | WEIGHT: 198 LBS | DIASTOLIC BLOOD PRESSURE: 70 MMHG

## 2024-06-17 DIAGNOSIS — Z00.00 ENCOUNTER FOR GENERAL ADULT MEDICAL EXAMINATION WITHOUT ABNORMAL FINDINGS: ICD-10-CM

## 2024-06-17 PROCEDURE — 99459 PELVIC EXAMINATION: CPT

## 2024-06-17 PROCEDURE — 99215 OFFICE O/P EST HI 40 MIN: CPT

## 2024-06-18 DIAGNOSIS — D25.9 LEIOMYOMA OF UTERUS, UNSPECIFIED: ICD-10-CM

## 2024-06-18 NOTE — PLAN
[FreeTextEntry1] : 16 week fibroid uterus - minimally symptomatic. Patient reports that her main complaint is tingling in her legs which I believe is diabetic neuropathy. I do not believe that a hysterectomy will help those symptoms. She also has mild abdominal discomfort. Hysterectomy may help these symptoms. Other options discussed including observation, [place]. I reviewed the CT scan that was recently done. I ordered a sonogram. Patient to think about everything we spoke about.  WIll review sonogram results in 2 weeks via telehealth visit and decide on further mgmt. She is a candidate for an RATLH +/- BSO Patient also counselled that I may need to perform an endometrial biopsy.

## 2024-06-18 NOTE — HISTORY OF PRESENT ILLNESS
[FreeTextEntry1] : 58 p2 here to discuss possible hysterectomy. She reports tingling on the legs and occasional non specific lower abdominal discomfort. She denies bleeding.  Patient currently lives in a nursing home and is accompanied by an AID. She has h/o diabetes and was recently admitted to the hospital for DKA with A1C of 13. Her most recent A1c is 7. She is currently on the following meds:  Jardiance Lantus Humulog synthroid Baclofen Lyrica - neuropathy  Pmhx: Bipolar - now off meds.  Pshx: No prior abdominal surgery

## 2024-06-18 NOTE — PHYSICAL EXAM
[Chaperone Present] : A chaperone was present in the examining room during all aspects of the physical examination [19858] : A chaperone was present during the pelvic exam. [Soft] : soft [Non-tender] : non-tender [Non-distended] : non-distended [No HSM] : No HSM [No Lesions] : no lesions [No Mass] : no mass [Labia Majora] : normal [Labia Minora] : normal [Normal] : normal [Enlarged ___ wks] : enlarged [unfilled] ~Uweeks [Uterine Adnexae] : normal [FreeTextEntry2] : Renea [FreeTextEntry7] : Uterus palpable 4 cm below umbilicus.

## 2024-06-21 NOTE — BH CONSULTATION LIAISON ASSESSMENT NOTE - [id number]_A_CORE
----- Message from Denisse Guthrie sent at 6/21/2024 10:47 AM CDT -----  Good morning,    The patient have a referral from his primary doctor with a diagnosis of Colon cancer screening/ Incontinence of feces, unspecified fecal incontinence type. Please assist with scheduling the patient.    Thank You   Unable to assess

## 2024-06-24 ENCOUNTER — APPOINTMENT (OUTPATIENT)
Age: 59
End: 2024-06-24
Payer: MEDICAID

## 2024-06-24 ENCOUNTER — OUTPATIENT (OUTPATIENT)
Dept: OUTPATIENT SERVICES | Facility: HOSPITAL | Age: 59
LOS: 1 days | End: 2024-06-24
Payer: MEDICAID

## 2024-06-24 VITALS
HEART RATE: 83 BPM | WEIGHT: 200 LBS | DIASTOLIC BLOOD PRESSURE: 73 MMHG | TEMPERATURE: 97.9 F | BODY MASS INDEX: 32.14 KG/M2 | HEIGHT: 66 IN | SYSTOLIC BLOOD PRESSURE: 121 MMHG

## 2024-06-24 DIAGNOSIS — D16.8 BENIGN NEOPLASM OF PELVIC BONES, SACRUM AND COCCYX: ICD-10-CM

## 2024-06-24 DIAGNOSIS — Z86.39 PERSONAL HISTORY OF OTHER ENDOCRINE, NUTRITIONAL AND METABOLIC DISEASE: ICD-10-CM

## 2024-06-24 DIAGNOSIS — M89.9 DISORDER OF BONE, UNSPECIFIED: ICD-10-CM

## 2024-06-24 PROCEDURE — 99215 OFFICE O/P EST HI 40 MIN: CPT

## 2024-06-24 PROCEDURE — G2211 COMPLEX E/M VISIT ADD ON: CPT | Mod: NC,1L

## 2024-06-25 DIAGNOSIS — D16.8 BENIGN NEOPLASM OF PELVIC BONES, SACRUM AND COCCYX: ICD-10-CM

## 2024-06-27 ENCOUNTER — RESULT REVIEW (OUTPATIENT)
Age: 59
End: 2024-06-27

## 2024-06-27 ENCOUNTER — OUTPATIENT (OUTPATIENT)
Dept: OUTPATIENT SERVICES | Facility: HOSPITAL | Age: 59
LOS: 1 days | End: 2024-06-27
Payer: COMMERCIAL

## 2024-06-27 DIAGNOSIS — Z12.31 ENCOUNTER FOR SCREENING MAMMOGRAM FOR MALIGNANT NEOPLASM OF BREAST: ICD-10-CM

## 2024-06-27 PROBLEM — M89.9 BONE LESION: Status: ACTIVE | Noted: 2024-06-27

## 2024-06-27 PROBLEM — Z86.39 HISTORY OF HYPOTHYROIDISM: Status: RESOLVED | Noted: 2024-06-27 | Resolved: 2024-06-27

## 2024-06-27 PROCEDURE — 77067 SCR MAMMO BI INCL CAD: CPT

## 2024-06-27 PROCEDURE — 77067 SCR MAMMO BI INCL CAD: CPT | Mod: 26

## 2024-06-27 PROCEDURE — 77063 BREAST TOMOSYNTHESIS BI: CPT | Mod: 26

## 2024-06-27 PROCEDURE — 77063 BREAST TOMOSYNTHESIS BI: CPT

## 2024-06-28 ENCOUNTER — RESULT REVIEW (OUTPATIENT)
Age: 59
End: 2024-06-28

## 2024-06-28 ENCOUNTER — OUTPATIENT (OUTPATIENT)
Dept: OUTPATIENT SERVICES | Facility: HOSPITAL | Age: 59
LOS: 1 days | End: 2024-06-28
Payer: MEDICAID

## 2024-06-28 DIAGNOSIS — Z12.31 ENCOUNTER FOR SCREENING MAMMOGRAM FOR MALIGNANT NEOPLASM OF BREAST: ICD-10-CM

## 2024-06-28 DIAGNOSIS — Z00.8 ENCOUNTER FOR OTHER GENERAL EXAMINATION: ICD-10-CM

## 2024-06-28 DIAGNOSIS — D25.9 LEIOMYOMA OF UTERUS, UNSPECIFIED: ICD-10-CM

## 2024-06-28 PROCEDURE — 76856 US EXAM PELVIC COMPLETE: CPT

## 2024-06-28 PROCEDURE — 76830 TRANSVAGINAL US NON-OB: CPT | Mod: 26

## 2024-06-28 PROCEDURE — 76856 US EXAM PELVIC COMPLETE: CPT | Mod: 26

## 2024-06-28 PROCEDURE — 76830 TRANSVAGINAL US NON-OB: CPT

## 2024-06-29 DIAGNOSIS — D25.9 LEIOMYOMA OF UTERUS, UNSPECIFIED: ICD-10-CM

## 2024-07-01 ENCOUNTER — APPOINTMENT (OUTPATIENT)
Dept: OBGYN | Facility: CLINIC | Age: 59
End: 2024-07-01

## 2024-07-01 ENCOUNTER — OUTPATIENT (OUTPATIENT)
Dept: OUTPATIENT SERVICES | Facility: HOSPITAL | Age: 59
LOS: 1 days | End: 2024-07-01
Payer: MEDICAID

## 2024-07-01 DIAGNOSIS — D25.9 LEIOMYOMA OF UTERUS, UNSPECIFIED: ICD-10-CM

## 2024-07-01 DIAGNOSIS — Z00.00 ENCOUNTER FOR GENERAL ADULT MEDICAL EXAMINATION WITHOUT ABNORMAL FINDINGS: ICD-10-CM

## 2024-07-01 PROCEDURE — 99214 OFFICE O/P EST MOD 30 MIN: CPT

## 2024-07-01 PROCEDURE — 99214 OFFICE O/P EST MOD 30 MIN: CPT | Mod: 95

## 2024-07-05 DIAGNOSIS — D25.9 LEIOMYOMA OF UTERUS, UNSPECIFIED: ICD-10-CM

## 2024-07-08 ENCOUNTER — RESULT REVIEW (OUTPATIENT)
Age: 59
End: 2024-07-08

## 2024-07-08 ENCOUNTER — OUTPATIENT (OUTPATIENT)
Dept: OUTPATIENT SERVICES | Facility: HOSPITAL | Age: 59
LOS: 1 days | End: 2024-07-08
Payer: MEDICAID

## 2024-07-08 DIAGNOSIS — R92.8 OTHER ABNORMAL AND INCONCLUSIVE FINDINGS ON DIAGNOSTIC IMAGING OF BREAST: ICD-10-CM

## 2024-07-08 PROCEDURE — 77065 DX MAMMO INCL CAD UNI: CPT | Mod: RT

## 2024-07-08 PROCEDURE — 77065 DX MAMMO INCL CAD UNI: CPT | Mod: 26,RT

## 2024-07-08 PROCEDURE — G0279: CPT

## 2024-07-08 PROCEDURE — 76642 ULTRASOUND BREAST LIMITED: CPT | Mod: RT

## 2024-07-08 PROCEDURE — 77061 BREAST TOMOSYNTHESIS UNI: CPT | Mod: 26

## 2024-07-08 PROCEDURE — 76642 ULTRASOUND BREAST LIMITED: CPT | Mod: 26,RT

## 2024-07-09 DIAGNOSIS — R92.8 OTHER ABNORMAL AND INCONCLUSIVE FINDINGS ON DIAGNOSTIC IMAGING OF BREAST: ICD-10-CM

## 2024-07-18 ENCOUNTER — APPOINTMENT (OUTPATIENT)
Dept: ANTEPARTUM | Facility: CLINIC | Age: 59
End: 2024-07-18

## 2024-08-01 ENCOUNTER — APPOINTMENT (OUTPATIENT)
Age: 59
End: 2024-08-01

## 2024-08-08 ENCOUNTER — OUTPATIENT (OUTPATIENT)
Dept: OUTPATIENT SERVICES | Facility: HOSPITAL | Age: 59
LOS: 1 days | End: 2024-08-08
Payer: MEDICAID

## 2024-08-08 VITALS
RESPIRATION RATE: 16 BRPM | TEMPERATURE: 98 F | OXYGEN SATURATION: 98 % | SYSTOLIC BLOOD PRESSURE: 125 MMHG | HEART RATE: 73 BPM | DIASTOLIC BLOOD PRESSURE: 81 MMHG | WEIGHT: 203.05 LBS | HEIGHT: 66 IN

## 2024-08-08 DIAGNOSIS — Z01.818 ENCOUNTER FOR OTHER PREPROCEDURAL EXAMINATION: ICD-10-CM

## 2024-08-08 DIAGNOSIS — D25.9 LEIOMYOMA OF UTERUS, UNSPECIFIED: ICD-10-CM

## 2024-08-08 LAB
A1C WITH ESTIMATED AVERAGE GLUCOSE RESULT: 7.9 % — HIGH (ref 4–5.6)
ALBUMIN SERPL ELPH-MCNC: 4.1 G/DL — SIGNIFICANT CHANGE UP (ref 3.5–5.2)
ALP SERPL-CCNC: 159 U/L — HIGH (ref 30–115)
ALT FLD-CCNC: 11 U/L — SIGNIFICANT CHANGE UP (ref 0–41)
ANION GAP SERPL CALC-SCNC: 10 MMOL/L — SIGNIFICANT CHANGE UP (ref 7–14)
APPEARANCE UR: CLEAR — SIGNIFICANT CHANGE UP
AST SERPL-CCNC: 15 U/L — SIGNIFICANT CHANGE UP (ref 0–41)
BACTERIA # UR AUTO: NEGATIVE /HPF — SIGNIFICANT CHANGE UP
BASOPHILS # BLD AUTO: 0.04 K/UL — SIGNIFICANT CHANGE UP (ref 0–0.2)
BASOPHILS NFR BLD AUTO: 0.6 % — SIGNIFICANT CHANGE UP (ref 0–1)
BILIRUB SERPL-MCNC: 0.5 MG/DL — SIGNIFICANT CHANGE UP (ref 0.2–1.2)
BILIRUB UR-MCNC: NEGATIVE — SIGNIFICANT CHANGE UP
BLD GP AB SCN SERPL QL: SIGNIFICANT CHANGE UP
BUN SERPL-MCNC: 19 MG/DL — SIGNIFICANT CHANGE UP (ref 10–20)
CALCIUM SERPL-MCNC: 9.7 MG/DL — SIGNIFICANT CHANGE UP (ref 8.4–10.5)
CAST: 0 /LPF — SIGNIFICANT CHANGE UP (ref 0–4)
CHLORIDE SERPL-SCNC: 104 MMOL/L — SIGNIFICANT CHANGE UP (ref 98–110)
CO2 SERPL-SCNC: 25 MMOL/L — SIGNIFICANT CHANGE UP (ref 17–32)
COLOR SPEC: YELLOW — SIGNIFICANT CHANGE UP
CREAT SERPL-MCNC: 0.8 MG/DL — SIGNIFICANT CHANGE UP (ref 0.7–1.5)
DIFF PNL FLD: ABNORMAL
EGFR: 85 ML/MIN/1.73M2 — SIGNIFICANT CHANGE UP
EOSINOPHIL # BLD AUTO: 0.2 K/UL — SIGNIFICANT CHANGE UP (ref 0–0.7)
EOSINOPHIL NFR BLD AUTO: 3.2 % — SIGNIFICANT CHANGE UP (ref 0–8)
ESTIMATED AVERAGE GLUCOSE: 180 MG/DL — HIGH (ref 68–114)
GLUCOSE SERPL-MCNC: 76 MG/DL — SIGNIFICANT CHANGE UP (ref 70–99)
GLUCOSE UR QL: 250 MG/DL
HCT VFR BLD CALC: 38.5 % — SIGNIFICANT CHANGE UP (ref 37–47)
HGB BLD-MCNC: 12.9 G/DL — SIGNIFICANT CHANGE UP (ref 12–16)
IMM GRANULOCYTES NFR BLD AUTO: 0.2 % — SIGNIFICANT CHANGE UP (ref 0.1–0.3)
KETONES UR-MCNC: ABNORMAL MG/DL
LEUKOCYTE ESTERASE UR-ACNC: NEGATIVE — SIGNIFICANT CHANGE UP
LYMPHOCYTES # BLD AUTO: 2.22 K/UL — SIGNIFICANT CHANGE UP (ref 1.2–3.4)
LYMPHOCYTES # BLD AUTO: 35.6 % — SIGNIFICANT CHANGE UP (ref 20.5–51.1)
MCHC RBC-ENTMCNC: 27.8 PG — SIGNIFICANT CHANGE UP (ref 27–31)
MCHC RBC-ENTMCNC: 33.5 G/DL — SIGNIFICANT CHANGE UP (ref 32–37)
MCV RBC AUTO: 83 FL — SIGNIFICANT CHANGE UP (ref 81–99)
MONOCYTES # BLD AUTO: 0.63 K/UL — HIGH (ref 0.1–0.6)
MONOCYTES NFR BLD AUTO: 10.1 % — HIGH (ref 1.7–9.3)
NEUTROPHILS # BLD AUTO: 3.14 K/UL — SIGNIFICANT CHANGE UP (ref 1.4–6.5)
NEUTROPHILS NFR BLD AUTO: 50.3 % — SIGNIFICANT CHANGE UP (ref 42.2–75.2)
NITRITE UR-MCNC: NEGATIVE — SIGNIFICANT CHANGE UP
NRBC # BLD: 0 /100 WBCS — SIGNIFICANT CHANGE UP (ref 0–0)
PH UR: 6 — SIGNIFICANT CHANGE UP (ref 5–8)
PLATELET # BLD AUTO: 228 K/UL — SIGNIFICANT CHANGE UP (ref 130–400)
PMV BLD: 10.3 FL — SIGNIFICANT CHANGE UP (ref 7.4–10.4)
POTASSIUM SERPL-MCNC: 4.2 MMOL/L — SIGNIFICANT CHANGE UP (ref 3.5–5)
POTASSIUM SERPL-SCNC: 4.2 MMOL/L — SIGNIFICANT CHANGE UP (ref 3.5–5)
PROT SERPL-MCNC: 7.4 G/DL — SIGNIFICANT CHANGE UP (ref 6–8)
PROT UR-MCNC: SIGNIFICANT CHANGE UP MG/DL
RBC # BLD: 4.64 M/UL — SIGNIFICANT CHANGE UP (ref 4.2–5.4)
RBC # FLD: 15.2 % — HIGH (ref 11.5–14.5)
RBC CASTS # UR COMP ASSIST: 12 /HPF — HIGH (ref 0–4)
SODIUM SERPL-SCNC: 139 MMOL/L — SIGNIFICANT CHANGE UP (ref 135–146)
SP GR SPEC: >1.03 — HIGH (ref 1–1.03)
SQUAMOUS # UR AUTO: 2 /HPF — SIGNIFICANT CHANGE UP (ref 0–5)
T3 SERPL-MCNC: 133 NG/DL — SIGNIFICANT CHANGE UP (ref 80–200)
T4 AB SER-ACNC: 5.8 UG/DL — SIGNIFICANT CHANGE UP (ref 4.6–12)
TSH SERPL-MCNC: 4.81 UIU/ML — HIGH (ref 0.27–4.2)
UROBILINOGEN FLD QL: 1 MG/DL — SIGNIFICANT CHANGE UP (ref 0.2–1)
WBC # BLD: 6.24 K/UL — SIGNIFICANT CHANGE UP (ref 4.8–10.8)
WBC # FLD AUTO: 6.24 K/UL — SIGNIFICANT CHANGE UP (ref 4.8–10.8)
WBC UR QL: 9 /HPF — HIGH (ref 0–5)

## 2024-08-08 PROCEDURE — 84436 ASSAY OF TOTAL THYROXINE: CPT

## 2024-08-08 PROCEDURE — 99214 OFFICE O/P EST MOD 30 MIN: CPT | Mod: 25

## 2024-08-08 PROCEDURE — 93010 ELECTROCARDIOGRAM REPORT: CPT

## 2024-08-08 PROCEDURE — 84443 ASSAY THYROID STIM HORMONE: CPT

## 2024-08-08 PROCEDURE — 86850 RBC ANTIBODY SCREEN: CPT

## 2024-08-08 PROCEDURE — 36415 COLL VENOUS BLD VENIPUNCTURE: CPT

## 2024-08-08 PROCEDURE — 87086 URINE CULTURE/COLONY COUNT: CPT

## 2024-08-08 PROCEDURE — 84480 ASSAY TRIIODOTHYRONINE (T3): CPT

## 2024-08-08 PROCEDURE — 80053 COMPREHEN METABOLIC PANEL: CPT

## 2024-08-08 PROCEDURE — 86900 BLOOD TYPING SEROLOGIC ABO: CPT

## 2024-08-08 PROCEDURE — 93005 ELECTROCARDIOGRAM TRACING: CPT

## 2024-08-08 PROCEDURE — 81001 URINALYSIS AUTO W/SCOPE: CPT

## 2024-08-08 PROCEDURE — 85025 COMPLETE CBC W/AUTO DIFF WBC: CPT

## 2024-08-08 PROCEDURE — 83036 HEMOGLOBIN GLYCOSYLATED A1C: CPT

## 2024-08-08 PROCEDURE — 86901 BLOOD TYPING SEROLOGIC RH(D): CPT

## 2024-08-08 NOTE — H&P PST ADULT - HISTORY OF PRESENT ILLNESS
Pt with hx of large fibroids for many years. Had ultrasound which revealed uterus to be 17.0 cm x 10.0 cm x 12.7 cm with calcifications and multiple uterine fibroids measuring up to 10.5 cm. Pt denies any symptoms but states she believes nerve pain in legs could be related to uterus compressing on a nerve. Now proceeding with above.    Denies any chest pain, difficulty breathing, SOB, palpitations, dysuria, URI, or any other infections in the last 2 weeks. Denies any suicidal or homicidal ideations. JESSICA reviewed with patient.     Endorses this is their full medical & surgical history including medications prescribed. Pt verbalized understanding of all pre-operative instructions and was given the opportunity to ask questions and have them answered. They were instructed to follow up with their surgeon/proceduralists office with any additional questions regarding their procedure.    Anesthesia Alert  NO--Difficult Airway  NO--History of neck surgery or radiation  NO--Limited ROM of neck  NO--History of Malignant hyperthermia  NO--No personal or family history of Pseudocholinesterase deficiency.  NO--Prior Anesthesia Complication  NO--Latex Allergy  NO--Loose teeth  NO--History of Rheumatoid Arthritis  NO--JESSICA  NO--Bleeding Risk  NO--Other_____    Revised Cardiac Risk Index for Pre-Operative Risk  RESULT SUMMARY:  2 points  Class III Risk    10.1 %  30-day risk of death, MI, or cardiac arrest<br><br>From [employer] 2017. These numbers are higher than those from the original study (Bonilla 1999). See Evidence for details.      INPUTS:  Elevated-risk surgery —> 1 = Yes  History of ischemic heart disease —> 0 = No  History of congestive heart failure —> 0 = No  History of cerebrovascular disease —> 0 = No  Pre-operative treatment with insulin —> 1 = Yes  Pre-operative creatinine >2 mg/dL / 176.8 µmol/L —> 0 = No    D[facility] Activity Status Index (DASI)  RESULT SUMMARY:  30.2 points  The higher the score (maximum 58.2), the higher the functional status.    6.45 METs      INPUTS:  Take care of self —> 2.75 = Yes  Walk indoors —> 1.75 = Yes  Walk 1&ndash;2 blocks on level ground —> 2.75 = Yes  Climb a flight of stairs or walk up a hill —> 5.5 = Yes  Run a short distance —> 0 = No  Do light work around the house —> 2.7 = Yes  Do moderate work around the house —> 3.5 = Yes  Do heavy work around the house —> 0 = No  Do yardwork —> 0 = No  Have sexual relations —> 5.25 = Yes  Participate in moderate recreational activities —> 6 = Yes  Participate in strenuous sports —> 0 = No   Pt with hx of large fibroids for many years. Had ultrasound which revealed uterus to be 17.0 cm x 10.0 cm x 12.7 cm with calcifications and multiple uterine fibroids measuring up to 10.5 cm. Pt denies any symptoms but states she believes nerve pain in legs could be related to uterus compressing on a nerve. Now proceeding with above.    Denies any chest pain, difficulty breathing, SOB, palpitations, dysuria, URI, or any other infections in the last 2 weeks. Denies any suicidal or homicidal ideations. JESSICA reviewed with patient.     Endorses this is their full medical & surgical history including medications prescribed. Pt provided with and educated on pre-procedural diabetes management form. Pt verbalized understanding of all pre-operative instructions and was given the opportunity to ask questions and have them answered. They were instructed to follow up with their surgeon/proceduralists office with any additional questions regarding their procedure.    Anesthesia Alert  NO--Difficult Airway  NO--History of neck surgery or radiation  NO--Limited ROM of neck  NO--History of Malignant hyperthermia  NO--No personal or family history of Pseudocholinesterase deficiency.  NO--Prior Anesthesia Complication  NO--Latex Allergy  NO--Loose teeth  NO--History of Rheumatoid Arthritis  NO--JESSICA  NO--Bleeding Risk  NO--Other_____    Revised Cardiac Risk Index for Pre-Operative Risk  RESULT SUMMARY:  2 points  Class III Risk    10.1 %  30-day risk of death, MI, or cardiac arrest<br><br>From [employer] 2017. These numbers are higher than those from the original study (Bonilla 1999). See Evidence for details.      INPUTS:  Elevated-risk surgery —> 1 = Yes  History of ischemic heart disease —> 0 = No  History of congestive heart failure —> 0 = No  History of cerebrovascular disease —> 0 = No  Pre-operative treatment with insulin —> 1 = Yes  Pre-operative creatinine >2 mg/dL / 176.8 µmol/L —> 0 = No    D[facility] Activity Status Index (DASI)  RESULT SUMMARY:  30.2 points  The higher the score (maximum 58.2), the higher the functional status.    6.45 METs      INPUTS:  Take care of self —> 2.75 = Yes  Walk indoors —> 1.75 = Yes  Walk 1&ndash;2 blocks on level ground —> 2.75 = Yes  Climb a flight of stairs or walk up a hill —> 5.5 = Yes  Run a short distance —> 0 = No  Do light work around the house —> 2.7 = Yes  Do moderate work around the house —> 3.5 = Yes  Do heavy work around the house —> 0 = No  Do yardwork —> 0 = No  Have sexual relations —> 5.25 = Yes  Participate in moderate recreational activities —> 6 = Yes  Participate in strenuous sports —> 0 = No

## 2024-08-08 NOTE — H&P PST ADULT - NSICDXPASTMEDICALHX_GEN_ALL_CORE_FT
PAST MEDICAL HISTORY:  Anemia     Bipolar mood disorder     Diabetes mellitus     Fibroid uterus     H/O diabetic neuropathy     Hypertension     Thyroid disease

## 2024-08-08 NOTE — H&P PST ADULT - NSICDXPASTSURGICALHX_GEN_ALL_CORE_FT
PAST SURGICAL HISTORY:  H/O arthroscopy of knee     S/P lumbar discectomy 2006.    S/P tonsillectomy

## 2024-08-08 NOTE — H&P PST ADULT - REASON FOR ADMISSION
-Oral Hydration.  -Warm salt water gargles.  -OTC Throat lozenges or spray (Cepacol).  -Tylenol and Motrin as directed for pain and fever.  -Hand Hygiene: Wash hands frequently with soap and water.    Follow up for persistent throat pain, increased swelling, persistent fevers, difficulty swallowing, shortness of breath, weakness, elevated heart rate, or any other concerns.    57 yo female presents for PAST in preparation for robotic assisted total laparoscopic hysterectomy possible bilateral salpingectomy possible bilateral oophorectomy exam under anesthesia possible cystoscopy on 8/20/2024 under general anesthesia by Dr. Anaya ([facility]).

## 2024-08-09 DIAGNOSIS — Z01.818 ENCOUNTER FOR OTHER PREPROCEDURAL EXAMINATION: ICD-10-CM

## 2024-08-09 DIAGNOSIS — D25.9 LEIOMYOMA OF UTERUS, UNSPECIFIED: ICD-10-CM

## 2024-08-10 LAB
CULTURE RESULTS: SIGNIFICANT CHANGE UP
SPECIMEN SOURCE: SIGNIFICANT CHANGE UP

## 2024-08-19 NOTE — ASU PATIENT PROFILE, ADULT - FALL HARM RISK - HARM RISK INTERVENTIONS

## 2024-08-20 ENCOUNTER — INPATIENT (INPATIENT)
Facility: HOSPITAL | Age: 59
LOS: 1 days | Discharge: HOME | DRG: 532 | End: 2024-08-22
Attending: OBSTETRICS & GYNECOLOGY | Admitting: OBSTETRICS & GYNECOLOGY
Payer: MEDICAID

## 2024-08-20 ENCOUNTER — TRANSCRIPTION ENCOUNTER (OUTPATIENT)
Age: 59
End: 2024-08-20

## 2024-08-20 ENCOUNTER — RESULT REVIEW (OUTPATIENT)
Age: 59
End: 2024-08-20

## 2024-08-20 VITALS — HEIGHT: 66 IN | WEIGHT: 202.83 LBS

## 2024-08-20 DIAGNOSIS — D25.9 LEIOMYOMA OF UTERUS, UNSPECIFIED: ICD-10-CM

## 2024-08-20 LAB
GLUCOSE BLDC GLUCOMTR-MCNC: 182 MG/DL — HIGH (ref 70–99)
GLUCOSE BLDC GLUCOMTR-MCNC: 187 MG/DL — HIGH (ref 70–99)
GLUCOSE BLDC GLUCOMTR-MCNC: 195 MG/DL — HIGH (ref 70–99)
GLUCOSE BLDC GLUCOMTR-MCNC: 235 MG/DL — HIGH (ref 70–99)

## 2024-08-20 PROCEDURE — 58573 TLH W/T/O UTERUS OVER 250 G: CPT

## 2024-08-20 PROCEDURE — 82962 GLUCOSE BLOOD TEST: CPT

## 2024-08-20 PROCEDURE — 36415 COLL VENOUS BLD VENIPUNCTURE: CPT

## 2024-08-20 PROCEDURE — C9399: CPT

## 2024-08-20 PROCEDURE — 88307 TISSUE EXAM BY PATHOLOGIST: CPT | Mod: 26

## 2024-08-20 PROCEDURE — 85025 COMPLETE CBC W/AUTO DIFF WBC: CPT

## 2024-08-20 RX ORDER — LEVOTHYROXINE SODIUM 100 MCG
75 TABLET ORAL DAILY
Refills: 0 | Status: DISCONTINUED | OUTPATIENT
Start: 2024-08-20 | End: 2024-08-22

## 2024-08-20 RX ORDER — BACLOFEN 10 MG/1
1 TABLET ORAL
Refills: 0 | DISCHARGE

## 2024-08-20 RX ORDER — ACETAMINOPHEN 325 MG/1
2 TABLET ORAL
Qty: 40 | Refills: 0
Start: 2024-08-20

## 2024-08-20 RX ORDER — OXYCODONE HYDROCHLORIDE 5 MG/1
5 TABLET ORAL ONCE
Refills: 0 | Status: DISCONTINUED | OUTPATIENT
Start: 2024-08-20 | End: 2024-08-20

## 2024-08-20 RX ORDER — OXYCODONE HYDROCHLORIDE 5 MG/1
1 TABLET ORAL
Qty: 6 | Refills: 0
Start: 2024-08-20

## 2024-08-20 RX ORDER — DEXTROSE 15 G/33 G
12.5 GEL IN PACKET (GRAM) ORAL ONCE
Refills: 0 | Status: DISCONTINUED | OUTPATIENT
Start: 2024-08-20 | End: 2024-08-22

## 2024-08-20 RX ORDER — HYDROMORPHONE HYDROCHLORIDE 2 MG/1
0.5 TABLET ORAL
Refills: 0 | Status: DISCONTINUED | OUTPATIENT
Start: 2024-08-20 | End: 2024-08-20

## 2024-08-20 RX ORDER — METOCLOPRAMIDE HCL 5 MG
10 TABLET ORAL ONCE
Refills: 0 | Status: DISCONTINUED | OUTPATIENT
Start: 2024-08-20 | End: 2024-08-20

## 2024-08-20 RX ORDER — ACETAMINOPHEN 325 MG/1
650 TABLET ORAL EVERY 6 HOURS
Refills: 0 | Status: DISCONTINUED | OUTPATIENT
Start: 2024-08-20 | End: 2024-08-22

## 2024-08-20 RX ORDER — IBUPROFEN 600 MG
1 TABLET ORAL
Qty: 20 | Refills: 0
Start: 2024-08-20

## 2024-08-20 RX ORDER — GLUCAGON INJECTION, SOLUTION 1 MG/.2ML
1 INJECTION, SOLUTION SUBCUTANEOUS ONCE
Refills: 0 | Status: DISCONTINUED | OUTPATIENT
Start: 2024-08-20 | End: 2024-08-22

## 2024-08-20 RX ORDER — LEVOTHYROXINE SODIUM 175 MCG
1 TABLET ORAL
Refills: 0 | DISCHARGE

## 2024-08-20 RX ORDER — PREGABALIN 75 MG/1
150 CAPSULE ORAL EVERY 8 HOURS
Refills: 0 | Status: DISCONTINUED | OUTPATIENT
Start: 2024-08-20 | End: 2024-08-22

## 2024-08-20 RX ORDER — TRAMADOL HYDROCHLORIDE 200 MG/1
1 TABLET, EXTENDED RELEASE ORAL
Refills: 0 | DISCHARGE

## 2024-08-20 RX ORDER — LATANOPROST 50 UG/ML
1 SOLUTION OPHTHALMIC
Refills: 0 | DISCHARGE

## 2024-08-20 RX ORDER — METOPROLOL TARTRATE 100 MG/1
100 TABLET ORAL DAILY
Refills: 0 | Status: DISCONTINUED | OUTPATIENT
Start: 2024-08-20 | End: 2024-08-22

## 2024-08-20 RX ORDER — LOSARTAN POTASSIUM 50 MG/1
50 TABLET ORAL DAILY
Refills: 0 | Status: DISCONTINUED | OUTPATIENT
Start: 2024-08-20 | End: 2024-08-22

## 2024-08-20 RX ORDER — DEXTROSE 15 G/33 G
25 GEL IN PACKET (GRAM) ORAL ONCE
Refills: 0 | Status: DISCONTINUED | OUTPATIENT
Start: 2024-08-20 | End: 2024-08-22

## 2024-08-20 RX ORDER — OXYCODONE HYDROCHLORIDE 5 MG/1
5 TABLET ORAL EVERY 6 HOURS
Refills: 0 | Status: DISCONTINUED | OUTPATIENT
Start: 2024-08-20 | End: 2024-08-22

## 2024-08-20 RX ORDER — ONDANSETRON 2 MG/ML
4 INJECTION, SOLUTION INTRAMUSCULAR; INTRAVENOUS ONCE
Refills: 0 | Status: COMPLETED | OUTPATIENT
Start: 2024-08-20 | End: 2024-08-20

## 2024-08-20 RX ORDER — ALBUTEROL 90 MCG
0 AEROSOL REFILL (GRAM) INHALATION
Refills: 0 | DISCHARGE

## 2024-08-20 RX ORDER — IBUPROFEN 600 MG
600 TABLET ORAL EVERY 6 HOURS
Refills: 0 | Status: DISCONTINUED | OUTPATIENT
Start: 2024-08-20 | End: 2024-08-22

## 2024-08-20 RX ORDER — DEXTROSE 15 G/33 G
15 GEL IN PACKET (GRAM) ORAL ONCE
Refills: 0 | Status: DISCONTINUED | OUTPATIENT
Start: 2024-08-20 | End: 2024-08-22

## 2024-08-20 RX ORDER — HYDROMORPHONE HYDROCHLORIDE 2 MG/1
1 TABLET ORAL
Refills: 0 | Status: DISCONTINUED | OUTPATIENT
Start: 2024-08-20 | End: 2024-08-20

## 2024-08-20 RX ORDER — NYSTATIN 100000/G
1 CREAM (GRAM) TOPICAL
Refills: 0 | DISCHARGE

## 2024-08-20 RX ADMIN — Medication 0: at 21:24

## 2024-08-20 RX ADMIN — PREGABALIN 150 MILLIGRAM(S): 75 CAPSULE ORAL at 21:21

## 2024-08-20 RX ADMIN — Medication 600 MILLIGRAM(S): at 17:46

## 2024-08-20 RX ADMIN — Medication 80 MILLIGRAM(S): at 13:25

## 2024-08-20 RX ADMIN — ACETAMINOPHEN 650 MILLIGRAM(S): 325 TABLET ORAL at 17:21

## 2024-08-20 RX ADMIN — Medication 600 MILLIGRAM(S): at 17:21

## 2024-08-20 RX ADMIN — Medication 1: at 17:22

## 2024-08-20 RX ADMIN — HYDROMORPHONE HYDROCHLORIDE 0.5 MILLIGRAM(S): 2 TABLET ORAL at 14:00

## 2024-08-20 RX ADMIN — PREGABALIN 150 MILLIGRAM(S): 75 CAPSULE ORAL at 13:58

## 2024-08-20 RX ADMIN — HYDROMORPHONE HYDROCHLORIDE 0.5 MILLIGRAM(S): 2 TABLET ORAL at 13:18

## 2024-08-20 RX ADMIN — HYDROMORPHONE HYDROCHLORIDE 0.5 MILLIGRAM(S): 2 TABLET ORAL at 13:13

## 2024-08-20 RX ADMIN — Medication 80 MILLIGRAM(S): at 21:21

## 2024-08-20 RX ADMIN — ACETAMINOPHEN 650 MILLIGRAM(S): 325 TABLET ORAL at 17:46

## 2024-08-20 RX ADMIN — HYDROMORPHONE HYDROCHLORIDE 0.5 MILLIGRAM(S): 2 TABLET ORAL at 13:31

## 2024-08-20 RX ADMIN — ONDANSETRON 4 MILLIGRAM(S): 2 INJECTION, SOLUTION INTRAMUSCULAR; INTRAVENOUS at 13:25

## 2024-08-20 RX ADMIN — Medication 80 MILLIGRAM(S): at 17:21

## 2024-08-20 NOTE — DISCHARGE NOTE PROVIDER - NSDCCPTREATMENT_GEN_ALL_CORE_FT
PRINCIPAL PROCEDURE  Procedure: Robot-assisted laparoscopic total hysterectomy for uterus greater than 250 grams  Findings and Treatment:       SECONDARY PROCEDURE  Procedure: Robot-assisted laparoscopic total hysterectomy for uterus greater than 250 grams  Findings and Treatment:

## 2024-08-20 NOTE — PATIENT PROFILE ADULT - FALL HARM RISK - HARM RISK INTERVENTIONS

## 2024-08-20 NOTE — PROGRESS NOTE ADULT - SUBJECTIVE AND OBJECTIVE BOX
Chief Complaint: post op    HPI: Patient seen at bedside, resting comfortably in bed. No complaints at this time. Denies dizziness, N/V, SOB, chest pain, severe abdominal pain, or vaginal bleeding.     ROS: Denies cardiovascular or respiratory symptoms    PAST MEDICAL & SURGICAL HISTORY:  Hypertension  Diabetes mellitus  Thyroid disease  Anemia  H/O diabetic neuropathy  Fibroid uterus  Bipolar mood disorder  S/P lumbar discectomy  2006.  S/P tonsillectomy  H/O arthroscopy of knee    Physical Exam  Vital Signs Last 24 Hrs  T(F): 97.8 (20 Aug 2024 16:27), Max: 98.2 (20 Aug 2024 14:58)  HR: 83 (20 Aug 2024 16:27) (73 - 83)  BP: 147/85 (20 Aug 2024 16:27) (140/72 - 156/75)  RR: 18 (20 Aug 2024 16:27) (16 - 20)    Physical exam:  General - AAOx3, NAD  Heart - S1S2, RRR  Lungs - CTA BL  Abdomen:  - Soft, nontender, nondistended, BS+  - Clean, dry, intact dressings over lsx incisions x5  Pelvis/Vagina - No bleeding  Extremities - No calf tenderness, no swelling

## 2024-08-20 NOTE — DISCHARGE NOTE PROVIDER - NSDCFUADDINST_GEN_ALL_CORE_FT
DIET  - You may resume your normal diet. Eat a well-balanced diet. You may prefer to eat light meals for the first few days after surgery.  Drink plenty of water (6-8 glasses a day).        ACTIVITY:   - No heavy lifting/pushing/pulling for 6 weeks. Do not lift anything more than 10 lbs (such as laundry, groceries, children, pets), vacuum, push heavy doors or grocery carts, etc, for 6 weeks.  - You may climb stairs as tolerated.  -  Do not put anything in the vagina for at least 6 weeks after surgery unless otherwise instructed by your doctor (including tampons, douching, sexual intercourse, etc).  -  No driving for 1 week after surgery and not while taking narcotic pain medication. Drive defensively when you are ready.  -  Avoid sitting or lying in bed for more than 2 hours at a time while you are awake to reduce your risk of blood clots.    WOUND CARE: You have 5 incisions that are covered with a dressing and dissolvable stithes.  After 48 hours you may remove the top dressing. Underneath, there are steri strips ( like bandaids), let them fall off on their own.   Do not submerge in water (no tub baths or pools), showering is OK.     PAIN MANAGEMENT:   Alternate Tylenol and ibuprofen/Motrin (if you are eligible). Each of these medications can be taken every six hours. Try to stagger them so that you are taking something for pain every three hours (ex. Take Motrin at 12:00, Tylenol at 3:00, Motrin at 6:00, etc.) to maximize pain relief.  - Tylenol – 500mg every 6 hours as needed. The maximum dose of Tylenol is 3000 mg in 24 hours.  - Motrin/Ibuprofen - 600 mg every 6 hours as needed (try to take with food). The maximum dose of Motrin/ibuprofen is 2400mg in 24 hours  - Oxycodone 5mg every 6 hours as needed for severe pain (limit use as this is a narcotic and can cause sedation, nausea and constipation.  -  A warm shower, heating pad, and/or walking may help.    WHAT TO EXPECT AT HOME  - Recovery from surgery is generally 2-4 weeks, but sometimes longer for more strenuous activity. It is normal to be very tired during this time.  - It is normal to have some drainage or a small amount of vaginal bleeding after surgery that would require the use of a light pantiliner. This discharge may last up to 6 weeks. The bleeding and discharge should be light and should have no odor.  - You may experience gas pain, abdominal swelling, or shoulder pain for 24-72 hours after surgery. This is from the carbon dioxide gas put into your abdomen to better visualize your organs. A warm shower, heating pad, and/or walking may help.    WHEN TO CALL YOUR DOCTOR:  - Fever (>100.4°F or 38.0°C) or chills  - Incision problems such as redness, warmth, swelling, or foul smelling drainage.  - Severe nausea or persistent vomiting.  - Bright red vaginal bleeding (soaking >1 pad/hour) or foul smelling vaginal drainage.  - Severe pain not relieved with pain medication.  - Pain with urination, cloudy urine, or foul smelling urine.  - Or if you have any other problems or questions.

## 2024-08-20 NOTE — BRIEF OPERATIVE NOTE - NSICDXBRIEFPROCEDURE_GEN_ALL_CORE_FT
PROCEDURES:  Robot-assisted laparoscopic total hysterectomy for uterus greater than 250 grams 20-Aug-2024 13:07:16  Yennifer Eubanks  Salpingo-oophorectomy, bilateral, robot-assisted 20-Aug-2024 13:08:21  Yennifer Eubanks

## 2024-08-20 NOTE — DISCHARGE NOTE PROVIDER - NSDCADMDATE_GEN_ALL_CORE_FT
Photo Preface (Leave Blank If You Do Not Want): Photograph were obtained today
Detail Level: Simple
20-Aug-2024 13:03

## 2024-08-20 NOTE — DISCHARGE NOTE PROVIDER - CARE PROVIDER_API CALL
Madi Anaya  Obstetrics and Gynecology  [address] [place] [address]  Phone: [phone]  Fax: [phone]  Follow Up Time: 2 weeks

## 2024-08-20 NOTE — PROGRESS NOTE ADULT - ASSESSMENT
57yo P2 PMHx HTN, Diabetes, Bipolar, Asthma s/p RA TLH BSO fibroid uterus EBL 100cc,    -CC diet  -FS ACQHS and ISS  -ambulate PRN  -vitals per routine  -pain management per protocol  -simethicone for gas pain  -home meds: glargine 30u bedtime + lispro 100 TID (ISS ordered instead), jardiance 25mg (not ordered), levo 75mcg (ordered), losartan 50mg (ordered), metoprolol 100mg (ordered), pregabalin 150mg q8 (ordered), albuterol prn (ordered)  -anticipate discharge tomorrow

## 2024-08-20 NOTE — BRIEF OPERATIVE NOTE - OPERATION/FINDINGS
Bulky fibroid uterus removed via manual extraction in a bag. Weight 730g. Normal tubes and ovaries.

## 2024-08-21 LAB
BASOPHILS # BLD AUTO: 0.03 K/UL — SIGNIFICANT CHANGE UP (ref 0–0.2)
BASOPHILS NFR BLD AUTO: 0.3 % — SIGNIFICANT CHANGE UP (ref 0–1)
EOSINOPHIL # BLD AUTO: 0.07 K/UL — SIGNIFICANT CHANGE UP (ref 0–0.7)
EOSINOPHIL NFR BLD AUTO: 0.8 % — SIGNIFICANT CHANGE UP (ref 0–8)
GLUCOSE BLDC GLUCOMTR-MCNC: 177 MG/DL — HIGH (ref 70–99)
GLUCOSE BLDC GLUCOMTR-MCNC: 220 MG/DL — HIGH (ref 70–99)
GLUCOSE BLDC GLUCOMTR-MCNC: 239 MG/DL — HIGH (ref 70–99)
GLUCOSE BLDC GLUCOMTR-MCNC: 260 MG/DL — HIGH (ref 70–99)
HCT VFR BLD CALC: 38.8 % — SIGNIFICANT CHANGE UP (ref 37–47)
HGB BLD-MCNC: 12.8 G/DL — SIGNIFICANT CHANGE UP (ref 12–16)
IMM GRANULOCYTES NFR BLD AUTO: 0.2 % — SIGNIFICANT CHANGE UP (ref 0.1–0.3)
LYMPHOCYTES # BLD AUTO: 2.41 K/UL — SIGNIFICANT CHANGE UP (ref 1.2–3.4)
LYMPHOCYTES # BLD AUTO: 26.4 % — SIGNIFICANT CHANGE UP (ref 20.5–51.1)
MCHC RBC-ENTMCNC: 28.3 PG — SIGNIFICANT CHANGE UP (ref 27–31)
MCHC RBC-ENTMCNC: 33 G/DL — SIGNIFICANT CHANGE UP (ref 32–37)
MCV RBC AUTO: 85.8 FL — SIGNIFICANT CHANGE UP (ref 81–99)
MONOCYTES # BLD AUTO: 0.68 K/UL — HIGH (ref 0.1–0.6)
MONOCYTES NFR BLD AUTO: 7.5 % — SIGNIFICANT CHANGE UP (ref 1.7–9.3)
NEUTROPHILS # BLD AUTO: 5.91 K/UL — SIGNIFICANT CHANGE UP (ref 1.4–6.5)
NEUTROPHILS NFR BLD AUTO: 64.8 % — SIGNIFICANT CHANGE UP (ref 42.2–75.2)
NRBC # BLD: 0 /100 WBCS — SIGNIFICANT CHANGE UP (ref 0–0)
PLATELET # BLD AUTO: 147 K/UL — SIGNIFICANT CHANGE UP (ref 130–400)
PMV BLD: 11.6 FL — HIGH (ref 7.4–10.4)
RBC # BLD: 4.52 M/UL — SIGNIFICANT CHANGE UP (ref 4.2–5.4)
RBC # FLD: 15.8 % — HIGH (ref 11.5–14.5)
WBC # BLD: 9.12 K/UL — SIGNIFICANT CHANGE UP (ref 4.8–10.8)
WBC # FLD AUTO: 9.12 K/UL — SIGNIFICANT CHANGE UP (ref 4.8–10.8)

## 2024-08-21 RX ORDER — SENNA 187 MG
2 TABLET ORAL DAILY
Refills: 0 | Status: DISCONTINUED | OUTPATIENT
Start: 2024-08-21 | End: 2024-08-22

## 2024-08-21 RX ORDER — GUAIFENESIN 100 MG/5ML
600 LIQUID ORAL EVERY 12 HOURS
Refills: 0 | Status: DISCONTINUED | OUTPATIENT
Start: 2024-08-21 | End: 2024-08-22

## 2024-08-21 RX ADMIN — OXYCODONE HYDROCHLORIDE 5 MILLIGRAM(S): 5 TABLET ORAL at 11:36

## 2024-08-21 RX ADMIN — ACETAMINOPHEN 650 MILLIGRAM(S): 325 TABLET ORAL at 11:37

## 2024-08-21 RX ADMIN — OXYCODONE HYDROCHLORIDE 5 MILLIGRAM(S): 5 TABLET ORAL at 17:56

## 2024-08-21 RX ADMIN — ACETAMINOPHEN 650 MILLIGRAM(S): 325 TABLET ORAL at 17:40

## 2024-08-21 RX ADMIN — Medication 600 MILLIGRAM(S): at 23:06

## 2024-08-21 RX ADMIN — PREGABALIN 150 MILLIGRAM(S): 75 CAPSULE ORAL at 17:42

## 2024-08-21 RX ADMIN — Medication 80 MILLIGRAM(S): at 22:06

## 2024-08-21 RX ADMIN — PREGABALIN 150 MILLIGRAM(S): 75 CAPSULE ORAL at 11:37

## 2024-08-21 RX ADMIN — Medication 80 MILLIGRAM(S): at 17:42

## 2024-08-21 RX ADMIN — ACETAMINOPHEN 650 MILLIGRAM(S): 325 TABLET ORAL at 23:06

## 2024-08-21 RX ADMIN — Medication 4: at 11:38

## 2024-08-21 RX ADMIN — Medication 80 MILLIGRAM(S): at 11:37

## 2024-08-21 RX ADMIN — Medication 600 MILLIGRAM(S): at 22:06

## 2024-08-21 RX ADMIN — ACETAMINOPHEN 650 MILLIGRAM(S): 325 TABLET ORAL at 22:06

## 2024-08-21 RX ADMIN — Medication 1: at 07:53

## 2024-08-21 RX ADMIN — LOSARTAN POTASSIUM 50 MILLIGRAM(S): 50 TABLET ORAL at 11:37

## 2024-08-21 RX ADMIN — Medication 600 MILLIGRAM(S): at 17:41

## 2024-08-21 RX ADMIN — Medication 600 MILLIGRAM(S): at 11:37

## 2024-08-21 RX ADMIN — METOPROLOL TARTRATE 100 MILLIGRAM(S): 100 TABLET ORAL at 05:09

## 2024-08-21 RX ADMIN — Medication 80 MILLIGRAM(S): at 02:39

## 2024-08-21 RX ADMIN — OXYCODONE HYDROCHLORIDE 5 MILLIGRAM(S): 5 TABLET ORAL at 02:39

## 2024-08-21 RX ADMIN — Medication 75 MICROGRAM(S): at 05:09

## 2024-08-21 RX ADMIN — Medication 2 TABLET(S): at 17:43

## 2024-08-21 RX ADMIN — Medication 1 UNIT(S): at 11:38

## 2024-08-21 RX ADMIN — Medication 6: at 17:41

## 2024-08-21 NOTE — PROGRESS NOTE ADULT - ASSESSMENT
57yo P2 PMHx HTN, Diabetes, Bipolar, Asthma s/p RA TLH BSO fibroid uterus EBL 100cc, POD 1, doing well.     -CC diet  -FS ACQHS and ISS  -ambulate as tolerated  -vitals per routine  -pain management per protocol  -simethicone + senna  -mucinex ordered  -home meds: glargine 30u bedtime + lispro 100 TID (ISS ordered instead), jardiance 25mg (not ordered), levo 75mcg (ordered), losartan 50mg (ordered), metoprolol 100mg (ordered), pregabalin 150mg q8 (ordered), albuterol prn (ordered)  -pt lives in assisted living facility, would like to stay another day    59yo P2 PMHx HTN, Diabetes, Bipolar, Asthma s/p RA TLH BSO fibroid uterus EBL 100cc, POD 1, doing well.     -CC diet  -FS ACQHS and ISS  -ambulate as tolerated  -vitals per routine  -pain management per protocol  -simethicone + senna  -mucinex ordered  -home meds: glargine 30u bedtime + lispro 100 TID (ISS ordered instead), jardiance 25mg (not ordered), levo 75mcg (ordered), losartan 50mg (ordered), metoprolol 100mg (ordered), pregabalin 150mg q8 (ordered), albuterol prn (ordered)  -pt lives in assisted living facility and with moderate pain control, anticipate d/c home tomorrow

## 2024-08-21 NOTE — PROGRESS NOTE ADULT - SUBJECTIVE AND OBJECTIVE BOX
PGY4 Note    POD #: 1  HD #: 2    Chief Complaint:  s/p RA TLH BSO fibroid uterus EBL 100cc    HPI: Pt doing well, pain well controlled. No overnight events, no acute complaints. Reports some mucous in her throat, denies fevers/CP/SOB/cough. Denies HA, nausea, vomiting, fevers, chills, urinary symptoms, changes in bowel habits. Ambulating, voiding spontaneously passing flatus, and tolerating PO diet.     PAST MEDICAL & SURGICAL HISTORY:  Hypertension  Diabetes mellitus  Thyroid disease  Anemia  H/O diabetic neuropathy  Fibroid uterus  Bipolar mood disorder  S/P lumbar discectomy 2006.  S/P tonsillectomy  H/O arthroscopy of knee    Physical Exam  Vital Signs Last 24 Hrs  T(F): 98.5 (21 Aug 2024 05:10), Max: 98.6 (21 Aug 2024 00:08)  HR: 90 (21 Aug 2024 05:10) (73 - 90)  BP: 148/78 (21 Aug 2024 05:10) (129/78 - 156/75)  RR: 18 (21 Aug 2024 05:10) (16 - 20)    Physical exam:  General - AAOx3, NAD  Abdomen:  - Soft, nontender, nondistended, BS+  - Clean, dry, intact lap dressing in place x5  Pelvis/Vagina - No bleeding  Extremities - No calf tenderness, no swelling   PGY4 Note    POD #: 1  HD #: 2    Chief Complaint:  s/p RA TLH BSO fibroid uterus EBL 100cc    HPI: Pt doing well, pain mostly well controlled. Feeling sore in her abdomen. Reports some mucous in her throat, denies fevers/CP/SOB/cough. Denies HA, nausea, vomiting, fevers, chills, urinary symptoms, changes in bowel habits. Ambulating, voiding spontaneously passing flatus, and tolerating PO diet.     PAST MEDICAL & SURGICAL HISTORY:  Hypertension  Diabetes mellitus  Thyroid disease  Anemia  H/O diabetic neuropathy  Fibroid uterus  Bipolar mood disorder  S/P lumbar discectomy 2006.  S/P tonsillectomy  H/O arthroscopy of knee    Physical Exam  Vital Signs Last 24 Hrs  T(F): 98.5 (21 Aug 2024 05:10), Max: 98.6 (21 Aug 2024 00:08)  HR: 90 (21 Aug 2024 05:10) (73 - 90)  BP: 148/78 (21 Aug 2024 05:10) (129/78 - 156/75)  RR: 18 (21 Aug 2024 05:10) (16 - 20)    Physical exam:  General - AAOx3, NAD  Abdomen:  - Soft, nontender, nondistended, BS+  - Clean, dry, intact lap dressing in place x5  Pelvis/Vagina - No bleeding  Extremities - No calf tenderness, no swelling

## 2024-08-21 NOTE — PROGRESS NOTE ADULT - ATTENDING COMMENTS
S/P RATLH/BSO Feels ok. C/O incisional pain. Not ready to go home. She lives in nursing home.  ABd: soft/minimal tenderness around incision site.      a/p Will keep today for pain control. Repeat CBC today.  anticipate d/c home in am

## 2024-08-22 ENCOUNTER — TRANSCRIPTION ENCOUNTER (OUTPATIENT)
Age: 59
End: 2024-08-22

## 2024-08-22 VITALS
TEMPERATURE: 98 F | DIASTOLIC BLOOD PRESSURE: 89 MMHG | HEART RATE: 79 BPM | SYSTOLIC BLOOD PRESSURE: 160 MMHG | RESPIRATION RATE: 18 BRPM | OXYGEN SATURATION: 97 %

## 2024-08-22 LAB — GLUCOSE BLDC GLUCOMTR-MCNC: 258 MG/DL — HIGH (ref 70–99)

## 2024-08-22 RX ADMIN — PREGABALIN 150 MILLIGRAM(S): 75 CAPSULE ORAL at 11:56

## 2024-08-22 RX ADMIN — Medication 80 MILLIGRAM(S): at 11:56

## 2024-08-22 RX ADMIN — Medication 600 MILLIGRAM(S): at 11:56

## 2024-08-22 RX ADMIN — Medication 80 MILLIGRAM(S): at 06:33

## 2024-08-22 RX ADMIN — LOSARTAN POTASSIUM 50 MILLIGRAM(S): 50 TABLET ORAL at 11:56

## 2024-08-22 RX ADMIN — OXYCODONE HYDROCHLORIDE 5 MILLIGRAM(S): 5 TABLET ORAL at 01:05

## 2024-08-22 RX ADMIN — ACETAMINOPHEN 650 MILLIGRAM(S): 325 TABLET ORAL at 04:17

## 2024-08-22 RX ADMIN — Medication 6: at 08:57

## 2024-08-22 RX ADMIN — ACETAMINOPHEN 650 MILLIGRAM(S): 325 TABLET ORAL at 11:56

## 2024-08-22 RX ADMIN — OXYCODONE HYDROCHLORIDE 5 MILLIGRAM(S): 5 TABLET ORAL at 00:05

## 2024-08-22 RX ADMIN — Medication 600 MILLIGRAM(S): at 04:17

## 2024-08-22 RX ADMIN — Medication 80 MILLIGRAM(S): at 01:18

## 2024-08-22 RX ADMIN — GUAIFENESIN 600 MILLIGRAM(S): 100 LIQUID ORAL at 06:33

## 2024-08-22 RX ADMIN — Medication 75 MICROGRAM(S): at 06:33

## 2024-08-22 RX ADMIN — METOPROLOL TARTRATE 100 MILLIGRAM(S): 100 TABLET ORAL at 06:33

## 2024-08-22 RX ADMIN — PREGABALIN 150 MILLIGRAM(S): 75 CAPSULE ORAL at 01:19

## 2024-08-22 NOTE — DISCHARGE NOTE NURSING/CASE MANAGEMENT/SOCIAL WORK - PATIENT PORTAL LINK FT
You can access the FollowMyHealth Patient Portal offered by [facility] by registering at the following website: [email or web address]. By joining [employer]’s FollowMyHealth portal, you will also be able to view your health information using other applications (apps) compatible with our system.

## 2024-08-22 NOTE — PROGRESS NOTE ADULT - SUBJECTIVE AND OBJECTIVE BOX
PGY4 Note    POD #: 2  HD #: 3    Chief Complaint:  s/p RA TLH BSO fibroid uterus EBL 100cc    HPI: Pt doing well, pain now well controlled. Denies HA, CP, SOB, nausea, vomiting, fevers, chills, urinary symptoms, changes in bowel habits. Ambulating, voiding spontaneously, passing flatus, and tolerating PO diet.     PAST MEDICAL & SURGICAL HISTORY:  Hypertension  Diabetes mellitus  Thyroid disease  Anemia  H/O diabetic neuropathy  Fibroid uterus  Bipolar mood disorder  S/P lumbar discectomy 2006.  S/P tonsillectomy  H/O arthroscopy of knee    Physical Exam  Vital Signs Last 24 Hrs  T(C): 36.8 (21 Aug 2024 23:57), Max: 37.1 (21 Aug 2024 09:07)  T(F): 98.3 (21 Aug 2024 23:57), Max: 98.7 (21 Aug 2024 09:07)  HR: 80 (21 Aug 2024 23:57) (75 - 95)  BP: 139/81 (21 Aug 2024 23:57) (120/73 - 147/81)  RR: 18 (21 Aug 2024 23:57) (18 - 18)  SpO2: 95% (21 Aug 2024 23:57) (95% - 98%)    General - AAOx3, NAD  Abdomen:  - Soft, nontender, nondistended  - Clean, dry, intact laparoscopic incision in place x5, dressing removed  Pelvis/Vagina - No bleeding  Extremities - No calf tenderness, no swelling

## 2024-08-22 NOTE — PROGRESS NOTE ADULT - ASSESSMENT
59yo P2 PMHx HTN, Diabetes, Bipolar, Asthma s/p RA TLH BSO fibroid uterus EBL 100cc, POD 2, doing well.     -CC diet  -FS ACQHS and ISS  -ambulate as tolerated  -vitals per routine  -pain management per protocol  -simethicone + senna  -mucinex ordered  -home meds: glargine 30u bedtime + lispro 100 TID (ISS ordered instead), jardiance 25mg (not ordered), levo 75mcg (ordered), losartan 50mg (ordered), metoprolol 100mg (ordered), pregabalin 150mg q8 (ordered), albuterol prn (ordered)  -discharge home today with f/u in 2 weeks

## 2024-08-26 LAB — SURGICAL PATHOLOGY STUDY: SIGNIFICANT CHANGE UP

## 2024-09-04 DIAGNOSIS — E07.9 DISORDER OF THYROID, UNSPECIFIED: ICD-10-CM

## 2024-09-04 DIAGNOSIS — J45.909 UNSPECIFIED ASTHMA, UNCOMPLICATED: ICD-10-CM

## 2024-09-04 DIAGNOSIS — D25.9 LEIOMYOMA OF UTERUS, UNSPECIFIED: ICD-10-CM

## 2024-09-04 DIAGNOSIS — Z79.51 LONG TERM (CURRENT) USE OF INHALED STEROIDS: ICD-10-CM

## 2024-09-04 DIAGNOSIS — Z79.890 HORMONE REPLACEMENT THERAPY: ICD-10-CM

## 2024-09-04 DIAGNOSIS — F31.9 BIPOLAR DISORDER, UNSPECIFIED: ICD-10-CM

## 2024-09-04 DIAGNOSIS — E11.9 TYPE 2 DIABETES MELLITUS WITHOUT COMPLICATIONS: ICD-10-CM

## 2024-09-04 DIAGNOSIS — I10 ESSENTIAL (PRIMARY) HYPERTENSION: ICD-10-CM

## 2024-09-09 ENCOUNTER — OUTPATIENT (OUTPATIENT)
Dept: OUTPATIENT SERVICES | Facility: HOSPITAL | Age: 59
LOS: 1 days | End: 2024-09-09
Payer: MEDICAID

## 2024-09-09 ENCOUNTER — APPOINTMENT (OUTPATIENT)
Dept: OBGYN | Facility: CLINIC | Age: 59
End: 2024-09-09
Payer: MEDICAID

## 2024-09-09 VITALS
DIASTOLIC BLOOD PRESSURE: 82 MMHG | BODY MASS INDEX: 32.62 KG/M2 | SYSTOLIC BLOOD PRESSURE: 120 MMHG | WEIGHT: 203 LBS | HEIGHT: 66 IN

## 2024-09-09 DIAGNOSIS — Z30.430 ENCOUNTER FOR INSERTION OF INTRAUTERINE CONTRACEPTIVE DEVICE: ICD-10-CM

## 2024-09-09 DIAGNOSIS — N76.0 ACUTE VAGINITIS: ICD-10-CM

## 2024-09-09 PROCEDURE — 99213 OFFICE O/P EST LOW 20 MIN: CPT

## 2024-09-09 PROCEDURE — 99213 OFFICE O/P EST LOW 20 MIN: CPT | Mod: 24

## 2024-09-09 PROCEDURE — 99459 PELVIC EXAMINATION: CPT

## 2024-09-09 RX ORDER — METRONIDAZOLE 500 MG/1
500 TABLET ORAL
Qty: 14 | Refills: 0 | Status: ACTIVE | COMMUNITY
Start: 2024-09-09 | End: 1900-01-01

## 2024-09-10 NOTE — HISTORY OF PRESENT ILLNESS
[Pain is well-controlled] : pain is well-controlled [Fever] : no fever [Chills] : no chills [Nausea] : no nausea [Vomiting] : no vomiting [Vaginal Bleeding] : vaginal bleeding [Vaginal Discharge] : vaginal discharge [Clean/Dry/Intact] : clean, dry and intact [Erythema] : not erythematous [Swelling] : swollen [Discharge] : noted to have a ~M discharge [Mild] : mild vaginal bleeding [Normal] : normal [Hematoma] : no vaginal hematoma [Abscess Formation] : no vaginal abscess [Pathology reviewed] : pathology reviewed [de-identified] : S/P RATLH/BSO for fibroid uterus. Weight > 800grams. Patient feels well. No fever. C/O mild foul-smelling discharge from vagina.

## 2024-09-10 NOTE — PLAN
[FreeTextEntry1] : Patient counselled that she has a mild cuff cellulitis. Cuff is closed. Start Flagyl. Patient counselled that she should contact me if she develops fever or worsening of her symptoms. f/u 4 weeks.

## 2024-09-16 DIAGNOSIS — N76.0 ACUTE VAGINITIS: ICD-10-CM

## 2024-09-23 ENCOUNTER — OUTPATIENT (OUTPATIENT)
Dept: OUTPATIENT SERVICES | Facility: HOSPITAL | Age: 59
LOS: 1 days | End: 2024-09-23
Payer: MEDICAID

## 2024-09-23 ENCOUNTER — APPOINTMENT (OUTPATIENT)
Dept: OBGYN | Facility: CLINIC | Age: 59
End: 2024-09-23
Payer: MEDICAID

## 2024-09-23 VITALS
SYSTOLIC BLOOD PRESSURE: 134 MMHG | HEIGHT: 66 IN | BODY MASS INDEX: 32.14 KG/M2 | DIASTOLIC BLOOD PRESSURE: 98 MMHG | WEIGHT: 200 LBS

## 2024-09-23 DIAGNOSIS — Z98.890 OTHER SPECIFIED POSTPROCEDURAL STATES: ICD-10-CM

## 2024-09-23 DIAGNOSIS — N93.9 ABNORMAL UTERINE AND VAGINAL BLEEDING, UNSPECIFIED: ICD-10-CM

## 2024-09-23 PROCEDURE — 99213 OFFICE O/P EST LOW 20 MIN: CPT

## 2024-09-23 PROCEDURE — 99459 PELVIC EXAMINATION: CPT

## 2024-09-23 PROCEDURE — 99213 OFFICE O/P EST LOW 20 MIN: CPT | Mod: 24

## 2024-09-23 NOTE — PLAN
[FreeTextEntry1] : The Vaginal mucosal edges were cauterized with silver nitrate. I reassured the patient that the area will heal and the bleeding should stop. Advised the patient to refrain from putting anything in the vagina including tampons, having sex or douching until healed. Scheduled a follow up in two weeks for a re-examination.

## 2024-09-23 NOTE — PHYSICAL EXAM
[Chaperone Present] : A chaperone was present in the examining room during all aspects of the physical examination [Labia Majora] : normal [Labia Minora] : normal [Absent] : absent [Normal] : normal [Uterine Adnexae] : absent [FreeTextEntry4] : Stitches are still visible. The vaginal mucosa is slightly  and the edges a slightly bleeding. IT was cauterized mariangel silver nitrate. THe vagina is intact. THere is no full thickness defect.

## 2024-09-23 NOTE — HISTORY OF PRESENT ILLNESS
[FreeTextEntry1] : PAtient is about 4 weeks s/p RATLH/BSO. C/O pinkish spotting from the vagina. THe discharge has resolved. PAtient completed a course of antibiotics for a cuff cellulitis. SHe denies fever/chills.

## 2024-09-27 DIAGNOSIS — N93.9 ABNORMAL UTERINE AND VAGINAL BLEEDING, UNSPECIFIED: ICD-10-CM

## 2024-10-07 ENCOUNTER — NON-APPOINTMENT (OUTPATIENT)
Age: 59
End: 2024-10-07

## 2024-10-07 ENCOUNTER — APPOINTMENT (OUTPATIENT)
Dept: OBGYN | Facility: CLINIC | Age: 59
End: 2024-10-07
Payer: MEDICAID

## 2024-10-07 ENCOUNTER — OUTPATIENT (OUTPATIENT)
Dept: OUTPATIENT SERVICES | Facility: HOSPITAL | Age: 59
LOS: 1 days | End: 2024-10-07
Payer: MEDICAID

## 2024-10-07 VITALS
DIASTOLIC BLOOD PRESSURE: 82 MMHG | HEIGHT: 66 IN | WEIGHT: 205 LBS | BODY MASS INDEX: 32.95 KG/M2 | SYSTOLIC BLOOD PRESSURE: 130 MMHG

## 2024-10-07 DIAGNOSIS — N93.9 ABNORMAL UTERINE AND VAGINAL BLEEDING, UNSPECIFIED: ICD-10-CM

## 2024-10-07 DIAGNOSIS — Z98.890 OTHER SPECIFIED POSTPROCEDURAL STATES: ICD-10-CM

## 2024-10-07 PROCEDURE — 99459 PELVIC EXAMINATION: CPT

## 2024-10-07 PROCEDURE — 99213 OFFICE O/P EST LOW 20 MIN: CPT | Mod: 24

## 2024-10-07 PROCEDURE — 99213 OFFICE O/P EST LOW 20 MIN: CPT

## 2024-10-09 DIAGNOSIS — N93.9 ABNORMAL UTERINE AND VAGINAL BLEEDING, UNSPECIFIED: ICD-10-CM

## 2024-10-21 ENCOUNTER — APPOINTMENT (OUTPATIENT)
Dept: OBGYN | Facility: CLINIC | Age: 59
End: 2024-10-21
Payer: MEDICAID

## 2024-10-21 ENCOUNTER — OUTPATIENT (OUTPATIENT)
Dept: OUTPATIENT SERVICES | Facility: HOSPITAL | Age: 59
LOS: 1 days | End: 2024-10-21
Payer: MEDICAID

## 2024-10-21 VITALS
BODY MASS INDEX: 32.87 KG/M2 | HEIGHT: 66 IN | WEIGHT: 204.5 LBS | DIASTOLIC BLOOD PRESSURE: 78 MMHG | SYSTOLIC BLOOD PRESSURE: 120 MMHG

## 2024-10-21 DIAGNOSIS — N76.0 ACUTE VAGINITIS: ICD-10-CM

## 2024-10-21 DIAGNOSIS — Z00.00 ENCOUNTER FOR GENERAL ADULT MEDICAL EXAMINATION WITHOUT ABNORMAL FINDINGS: ICD-10-CM

## 2024-10-21 PROCEDURE — 99213 OFFICE O/P EST LOW 20 MIN: CPT | Mod: 24

## 2024-10-21 PROCEDURE — 99213 OFFICE O/P EST LOW 20 MIN: CPT

## 2024-10-21 PROCEDURE — 99459 PELVIC EXAMINATION: CPT

## 2024-10-21 RX ORDER — METRONIDAZOLE 500 MG/1
500 TABLET ORAL
Qty: 10 | Refills: 4 | Status: ACTIVE | COMMUNITY
Start: 2024-10-21 | End: 1900-01-01

## 2024-10-22 DIAGNOSIS — N76.0 ACUTE VAGINITIS: ICD-10-CM

## 2024-11-04 NOTE — BH CONSULTATION LIAISON ASSESSMENT NOTE - NSBHCONSULTFOLLOW_PSY_ALL_CORE
I have personally seen and examined the patient. I have collaborated with and supervised the
Not applicable, patient requires inpatient psychiatric admission...

## 2024-12-02 ENCOUNTER — APPOINTMENT (OUTPATIENT)
Dept: OBGYN | Facility: CLINIC | Age: 59
End: 2024-12-02
Payer: MEDICAID

## 2024-12-02 ENCOUNTER — OUTPATIENT (OUTPATIENT)
Dept: OUTPATIENT SERVICES | Facility: HOSPITAL | Age: 59
LOS: 1 days | End: 2024-12-02
Payer: MEDICAID

## 2024-12-02 VITALS
HEIGHT: 66 IN | WEIGHT: 208.06 LBS | DIASTOLIC BLOOD PRESSURE: 72 MMHG | BODY MASS INDEX: 33.44 KG/M2 | SYSTOLIC BLOOD PRESSURE: 112 MMHG

## 2024-12-02 DIAGNOSIS — N95.2 POSTMENOPAUSAL ATROPHIC VAGINITIS: ICD-10-CM

## 2024-12-02 DIAGNOSIS — Z00.00 ENCOUNTER FOR GENERAL ADULT MEDICAL EXAMINATION WITHOUT ABNORMAL FINDINGS: ICD-10-CM

## 2024-12-02 DIAGNOSIS — N89.8 OTHER SPECIFIED NONINFLAMMATORY DISORDERS OF VAGINA: ICD-10-CM

## 2024-12-02 PROCEDURE — 87491 CHLMYD TRACH DNA AMP PROBE: CPT

## 2024-12-02 PROCEDURE — 87661 TRICHOMONAS VAGINALIS AMPLIF: CPT

## 2024-12-02 PROCEDURE — ZZZZZ: CPT

## 2024-12-02 PROCEDURE — 87481 CANDIDA DNA AMP PROBE: CPT

## 2024-12-02 PROCEDURE — 99459 PELVIC EXAMINATION: CPT

## 2024-12-02 PROCEDURE — 81513 NFCT DS BV RNA VAG FLU ALG: CPT

## 2024-12-02 PROCEDURE — 87591 N.GONORRHOEAE DNA AMP PROB: CPT

## 2024-12-02 RX ORDER — CLOTRIMAZOLE AND BETAMETHASONE DIPROPIONATE 10; .5 MG/G; MG/G
1-0.05 CREAM TOPICAL TWICE DAILY
Qty: 1 | Refills: 1 | Status: ACTIVE | COMMUNITY
Start: 2024-12-02 | End: 1900-01-01

## 2024-12-02 RX ORDER — ESTRADIOL 0.1 MG/G
0.1 CREAM VAGINAL
Qty: 42.5 | Refills: 6 | Status: ACTIVE | COMMUNITY
Start: 2024-12-02 | End: 1900-01-01

## 2024-12-03 DIAGNOSIS — N95.2 POSTMENOPAUSAL ATROPHIC VAGINITIS: ICD-10-CM

## 2024-12-03 DIAGNOSIS — N89.8 OTHER SPECIFIED NONINFLAMMATORY DISORDERS OF VAGINA: ICD-10-CM

## 2024-12-04 NOTE — ED ADULT NURSE NOTE - NSIMPLEMENTINTERV_GEN_ALL_ED
Detail Level: Detailed Detail Level: Zone Implemented All Universal Safety Interventions:  [place] to call system. Call bell, personal items and telephone within reach. Instruct patient to call for assistance. Room bathroom lighting operational. Non-slip footwear when patient is off stretcher. Physically safe environment: no spills, clutter or unnecessary equipment. Stretcher in lowest position, wheels locked, appropriate side rails in place.

## 2024-12-09 LAB
BV BACTERIA RRNA VAG QL NAA+PROBE: NOT DETECTED
C GLABRATA RNA VAG QL NAA+PROBE: NOT DETECTED
C TRACH RRNA SPEC QL NAA+PROBE: NOT DETECTED
CANDIDA RRNA VAG QL PROBE: NOT DETECTED
N GONORRHOEA RRNA SPEC QL NAA+PROBE: NOT DETECTED
T VAGINALIS RRNA SPEC QL NAA+PROBE: NOT DETECTED

## 2024-12-16 ENCOUNTER — APPOINTMENT (OUTPATIENT)
Dept: OBGYN | Facility: CLINIC | Age: 59
End: 2024-12-16

## 2025-02-12 NOTE — ED PROVIDER NOTE - NSTIMEPROVIDERCAREINITIATE_GEN_ER
02/12/25 0804   [facility] Disability Status   Are you deaf or do you have serious difficulty hearing? N   Are you blind or do you have serious difficulty seeing, even when wearing glasses? N   Because of a physical, mental, or emotional condition, do you have serious difficulty concentrating, remembering, or making decisions? (5 years old or older) N   Do you have serious difficulty walking or climbing stairs? N   Do you have serious difficulty dressing or bathing? N   Because of a physical, mental, or emotional condition, do you have serious difficulty doing errands alone such as visiting the doctor? N        26-Apr-2024 19:41

## 2025-03-01 NOTE — PATIENT PROFILE ADULT - FLU SEASON?
Patient has chronic hypothyroidism. TFTs reviewed-   Lab Results   Component Value Date    TSH 2.518 09/15/2024   . Will continue chronic levothyroxine and adjust for and clinical changes.       Yes...

## 2025-04-14 ENCOUNTER — APPOINTMENT (OUTPATIENT)
Dept: OBGYN | Facility: CLINIC | Age: 60
End: 2025-04-14

## 2025-06-18 NOTE — ED ADULT NURSE NOTE - CHIEF COMPLAINT
Detail Level: Zone Detail Level: Simple Prescription Strength Graduated Compression Stockings Recommendations: The patient was counseled that prescription strength graduated compression stockings should be worn for all waking hours. They will follow up with a venous specialist to monitor graduated compression stocking usage and their symptoms. The patient is a 55y Female complaining of see chief complaint quote.

## 2025-06-19 NOTE — PROGRESS NOTE BEHAVIORAL HEALTH - AFFECT CONGRUENCE
Quality 226: Preventive Care And Screening: Tobacco Use: Screening And Cessation Intervention: Patient screened for tobacco use and is an ex/non-smoker Detail Level: Detailed Congruent No

## 2025-06-28 ENCOUNTER — INPATIENT (INPATIENT)
Facility: HOSPITAL | Age: 60
LOS: 2 days | Discharge: NURSING, REHAB OR LONG-TERM CARE | DRG: 201 | End: 2025-07-01
Attending: STUDENT IN AN ORGANIZED HEALTH CARE EDUCATION/TRAINING PROGRAM | Admitting: INTERNAL MEDICINE
Payer: MEDICAID

## 2025-06-28 VITALS
OXYGEN SATURATION: 99 % | TEMPERATURE: 97 F | HEART RATE: 112 BPM | RESPIRATION RATE: 18 BRPM | DIASTOLIC BLOOD PRESSURE: 91 MMHG | SYSTOLIC BLOOD PRESSURE: 194 MMHG

## 2025-06-28 DIAGNOSIS — F29 UNSPECIFIED PSYCHOSIS NOT DUE TO A SUBSTANCE OR KNOWN PHYSIOLOGICAL CONDITION: ICD-10-CM

## 2025-06-28 LAB
ANION GAP SERPL CALC-SCNC: 16 MMOL/L — HIGH (ref 7–14)
APAP SERPL-MCNC: <5 UG/ML — LOW (ref 10–30)
BASOPHILS # BLD AUTO: 0.04 K/UL — SIGNIFICANT CHANGE UP (ref 0–0.2)
BASOPHILS NFR BLD AUTO: 0.4 % — SIGNIFICANT CHANGE UP (ref 0–1)
BUN SERPL-MCNC: 17 MG/DL — SIGNIFICANT CHANGE UP (ref 10–20)
CALCIUM SERPL-MCNC: 9.9 MG/DL — SIGNIFICANT CHANGE UP (ref 8.4–10.5)
CHLORIDE SERPL-SCNC: 101 MMOL/L — SIGNIFICANT CHANGE UP (ref 98–110)
CO2 SERPL-SCNC: 19 MMOL/L — SIGNIFICANT CHANGE UP (ref 17–32)
CREAT SERPL-MCNC: 0.5 MG/DL — LOW (ref 0.7–1.5)
EGFR: 107 ML/MIN/1.73M2 — SIGNIFICANT CHANGE UP
EGFR: 107 ML/MIN/1.73M2 — SIGNIFICANT CHANGE UP
EOSINOPHIL # BLD AUTO: 0.03 K/UL — SIGNIFICANT CHANGE UP (ref 0–0.7)
EOSINOPHIL NFR BLD AUTO: 0.3 % — SIGNIFICANT CHANGE UP (ref 0–8)
ETHANOL SERPL-MCNC: <10 MG/DL — SIGNIFICANT CHANGE UP
GLUCOSE SERPL-MCNC: 170 MG/DL — HIGH (ref 70–99)
HCT VFR BLD CALC: 43.3 % — SIGNIFICANT CHANGE UP (ref 37–47)
HGB BLD-MCNC: 14.3 G/DL — SIGNIFICANT CHANGE UP (ref 12–16)
IMM GRANULOCYTES NFR BLD AUTO: 0.3 % — SIGNIFICANT CHANGE UP (ref 0.1–0.3)
LYMPHOCYTES # BLD AUTO: 1.35 K/UL — SIGNIFICANT CHANGE UP (ref 1.2–3.4)
LYMPHOCYTES # BLD AUTO: 14.1 % — LOW (ref 20.5–51.1)
MCHC RBC-ENTMCNC: 29.2 PG — SIGNIFICANT CHANGE UP (ref 27–31)
MCHC RBC-ENTMCNC: 33 G/DL — SIGNIFICANT CHANGE UP (ref 32–37)
MCV RBC AUTO: 88.5 FL — SIGNIFICANT CHANGE UP (ref 81–99)
MONOCYTES # BLD AUTO: 0.57 K/UL — SIGNIFICANT CHANGE UP (ref 0.1–0.6)
MONOCYTES NFR BLD AUTO: 6 % — SIGNIFICANT CHANGE UP (ref 1.7–9.3)
NEUTROPHILS # BLD AUTO: 7.55 K/UL — HIGH (ref 1.4–6.5)
NEUTROPHILS NFR BLD AUTO: 78.9 % — HIGH (ref 42.2–75.2)
NRBC BLD AUTO-RTO: 0 /100 WBCS — SIGNIFICANT CHANGE UP (ref 0–0)
PLATELET # BLD AUTO: 237 K/UL — SIGNIFICANT CHANGE UP (ref 130–400)
PMV BLD: 10.2 FL — SIGNIFICANT CHANGE UP (ref 7.4–10.4)
POTASSIUM SERPL-MCNC: 4.1 MMOL/L — SIGNIFICANT CHANGE UP (ref 3.5–5)
POTASSIUM SERPL-SCNC: 4.1 MMOL/L — SIGNIFICANT CHANGE UP (ref 3.5–5)
RBC # BLD: 4.89 M/UL — SIGNIFICANT CHANGE UP (ref 4.2–5.4)
RBC # FLD: 14.4 % — SIGNIFICANT CHANGE UP (ref 11.5–14.5)
SALICYLATES SERPL-MCNC: <0.3 MG/DL — LOW (ref 4–30)
SARS-COV-2 RNA SPEC QL NAA+PROBE: SIGNIFICANT CHANGE UP
SODIUM SERPL-SCNC: 136 MMOL/L — SIGNIFICANT CHANGE UP (ref 135–146)
WBC # BLD: 9.57 K/UL — SIGNIFICANT CHANGE UP (ref 4.8–10.8)
WBC # FLD AUTO: 9.57 K/UL — SIGNIFICANT CHANGE UP (ref 4.8–10.8)

## 2025-06-28 PROCEDURE — 99285 EMERGENCY DEPT VISIT HI MDM: CPT

## 2025-06-28 RX ORDER — IBUPROFEN 200 MG
400 TABLET ORAL ONCE
Refills: 0 | Status: COMPLETED | OUTPATIENT
Start: 2025-06-28 | End: 2025-06-28

## 2025-06-28 RX ORDER — SODIUM CHLORIDE 9 G/1000ML
1000 INJECTION, SOLUTION INTRAVENOUS ONCE
Refills: 0 | Status: COMPLETED | OUTPATIENT
Start: 2025-06-28 | End: 2025-06-28

## 2025-06-28 RX ORDER — LORAZEPAM 4 MG/ML
1 VIAL (ML) INJECTION ONCE
Refills: 0 | Status: DISCONTINUED | OUTPATIENT
Start: 2025-06-28 | End: 2025-06-28

## 2025-06-28 RX ADMIN — Medication 1 MILLIGRAM(S): at 20:03

## 2025-06-28 RX ADMIN — SODIUM CHLORIDE 1000 MILLILITER(S): 9 INJECTION, SOLUTION INTRAVENOUS at 21:18

## 2025-06-28 RX ADMIN — Medication 400 MILLIGRAM(S): at 17:25

## 2025-06-28 NOTE — ED BEHAVIORAL HEALTH NOTE - BEHAVIORAL HEALTH NOTE
===================      PRE-HOSPITAL COURSE      ==================      SOURCE: Chart review      DETAILS: EMS had a difficult time redirecting her from NH but no issues during transport per collateral      ============      ED COURSE      ============      SOURCE: Chart review      ARRIVAL: EMS call from NH      BELONGINGS: Nothing of note      BEHAVIOR: Pt was compliant with good cooperation for entire process of wanding/search/ and gowning and was escorted to the  area with no issues. Nothing of note in pt’s belongings. Patient provided both urine specimen and routine blood work willingly. Hygiene is fair, denies SI/HI. Pt’s affect is sad, overwhelming, pt sent to the hospital for being combative to staff. Per note, pt told EMS she was worried about her dog. Eye contact is fair, denies AVH/delusions, no aggression or behavioral issues in ED, and is AOX4.      TREATMENT: Motirn 400mg oral      VISITORS: No friends or family at bedside      ========================       COLLATERAL      ========================      Collateral below has requested that the information provided remain confidential: Yes [  ] No [  x]      Collateral below has provided information that patient is/may be unaware of: Yes [  ] No [ x ]      Patient gives permission to obtain collateral from _____:      (  ) Yes      (  )  No      Rationale for overriding objection                (  ) Lack of capacity. Details: ________                ( x ) Assessing risk of danger to self/others. Details: ________      Rationale for selecting specific collateral source                (  ) Potential to impact risk of danger to self/others and no alternative equivalent. Details: _NH called 911-must understand the situation____      NAME: [facility]; Nathalia `	     NUMBER: [phone]     RELATIONSHIP: Nurse Supervisor      RELIABILITY: Good      HPI BASELINE FUNCTIONING: Mounika Godinez is a 58-year-old woman residing in a private residence. She is unemployed with a past medical history of insulin-dependent diabetes mellitus (IDDM) with neuropathy, hypertension, hypothyroidism, and a past psychiatric history (PPH) of an unspecified mood disorder. She reports at least three prior inpatient psychiatric hospitalizations and denies any prior suicide attempts, but has a substance use history of cocaine. According to collateral information, the patient has been in the [facility] for rehabilitation since March 7, 2024. While living there for the past year, she has been known to be very friendly, easy-going, and compliant with her medications.     DATE HPI STARTED: Last week     DECOMPENSATION: Last week, the patient was taken out of rehabilitation for two days to spend time with her family following her niece's graduation. Upon returning, she was not at her baseline, exhibiting verbal aggression and vaping. She was subsequently taken to [facility] and admitted for one week. She returned yesterday, and this morning, she was again yelling at and cursing at everyone, and staff were unable to redirect her. Given this is the opposite of her baseline, and they are not equipped to manage psychiatric issues, they do not feel she is safe to return. The patient is pending her disability approval for other placement. She denies suicidal or homicidal ideation.     SUICIDALITY: Reported none     VIOLENCE: Verbal aggression     SUBSTANCE: None currently

## 2025-06-28 NOTE — ED PROVIDER NOTE - CARE PLAN
1 Principal Discharge DX:	Agitation  Secondary Diagnosis:	Tachycardia  Secondary Diagnosis:	Anxiety

## 2025-06-28 NOTE — ED BEHAVIORAL HEALTH ASSESSMENT NOTE - PSYCHIATRIC ISSUES AND PLAN (INCLUDE STANDING AND PRN MEDICATION)
Start Risperdal 1 mg PO QHS for psychosis. PRNs/; Haldol 5 mg PO q6h for severe agitation, Ativan 2 mg PO q6h for severe anxiety/agitation if not medically contraindicated

## 2025-06-28 NOTE — ED BEHAVIORAL HEALTH ASSESSMENT NOTE - RISK ASSESSMENT
Risk Factors inc psychotic d/o, hx of aggressive behavior, treatment nonadherence, emotion/behavior dysregulation, hx of multiple past psychiatric hospitalizations.     Acutely risk is mitigated because pt currently denies SI/HI/VI/AVH/PI, has no hx of SA/NSSI, is future oriented with PFs/RFL, has strong family support, is help seeking, motivated for treatment, agreeable to treatment, compliant with treatment with positive therapeutic relationships, has no access to weapons/firearms, engaged in school, has no legal issues, no hx of aggression or violence, has no substance use issues, residential stability, in good physical health, has no acute affective or psychotic disorder, pt/parent engaged in safety planning and discussed lethal means restriction in the home.  Pt is not an acute danger to self/others, no acute indication for psych admission, safe for DC home with parent, appropriate for o/p level of care.  Reviewed to call 911 or go to nearest ED if acute safety concerns arise or symptoms worsen. Risk Factors inc psychotic d/o, hx of aggressive behavior, treatment nonadherence, emotion/behavior dysregulation, hx of multiple past psychiatric hospitalizations.     Acutely risk is mitigated because pt currently denies SI/HI, has no hx of SA/NSSI

## 2025-06-28 NOTE — ED BEHAVIORAL HEALTH ASSESSMENT NOTE - CURRENT MEDICATION
None No psych meds  Jiardiance 25mg Po qDay, melatonin 10mg PO qHS, cosopt 22.3mg-6.8mg/ml eye drops, 1 drop in each eye BID, latanoprost .005% eye drops in each eye daily, levothyroxine 125mcg daily, loratadine 10mg daily, cymbalta 40mg PO qDay, folate .4mg daily, insulin glargine 33 U qHS, novolog insulin sliding scale 4 times per day before meals and at bedtime, metformin RE 1000mg ER, gabapentin 600mg, BID, metoprolol ER 200mg PO qDay, senna, baclofen .5 BID, actos 45mg daily, nifedipine ER 30mg ER qDay, losartan 100mg daily, amoxicillin 875mg-125mg BID, asa 81mg

## 2025-06-28 NOTE — ED BEHAVIORAL HEALTH ASSESSMENT NOTE - NSSUICRSKFACTOR_PSY_ALL_CORE
Current and Past Psychiatric Diagnoses/Presenting Symptoms/Historical Factors/Treatment Related Factors Current and Past Psychiatric Diagnoses/Presenting Symptoms/Treatment Related Factors

## 2025-06-28 NOTE — ED PROVIDER NOTE - PROGRESS NOTE DETAILS
CO- pt seen by psych who rec IPP, pt still tachycardic. NH called and pt did get am metoprolol.  psych rec ativan 1mg as likely component of anxiety, pt endorsed to Dr. Cesar, pending improvement of tachycardia w/ fluids/anxiolytic, reassess, dispo FT: Received signout on patient pending repeat heart rate/accepting physician from psych.  Patient calm and cooperative in ED but heart rate persistently tachycardic.  Spoke with psych and they are unable to accept patient to psych floor with tachycardia.  Will admit to medicine.

## 2025-06-28 NOTE — ED PROVIDER NOTE - ATTENDING APP SHARED VISIT CONTRIBUTION OF CARE
58 yo f PMH of HTN, HLD, IDDM noncompliant with her insulin, neuropathy, HTN, hypothyroidism, mood disorder with  prior inpatient psychiatric hospitalizations  per triage note, pt was aggressive at facility to staff and ems. pt states she feels "overwhelmed" but denies hi/si.  pt denies any acute medical complaint. pt states she has chronic neuropathy to feet which is improving at facility.  no cp, ap, sob, palpitations, fevers, chills.     vs sinus tach   gen- NAD, aaox3  card-tachycardic  lungs-ctab, no wheezing or rhonchi  abd-sntnd, no guarding or rebound  neuro- full str/sensation, cn ii-xii grossly intact, normal coordination   le- no erythema to b/l feet, dp 2+

## 2025-06-28 NOTE — ED BEHAVIORAL HEALTH ASSESSMENT NOTE - TELEPSYCHIATRY?
HPI:    Patient ID: Maci Malloy is a 58year old male here for CPE    Has PAF linda with bursts of exerciase/pickleball. On Meds considering ablation thru [facility]. Review of Systems   Constitutional: Negative.     HENT:        Tonsillectomy, old BEDTIME 90 capsule 3   • ACCU-CHEK FASTCLIX LANCETS Does not apply Misc USE AS DIRECTED 102 each 3   • Blood Glucose Calibration (ACCU-CHEK GUIDE CONTROL) In Vitro Liquid 1 drop by In Vitro route every 3 (three) months.  Do control check per protocal 1 each with elevated PSA     Hemorrhoids, internal     Dyslipidemia     Lumbar herniated disc     Kidney stone on right side     Mitral valve disease     BPPV (benign paroxysmal positional vertigo)     Bilateral inguinal hernia     Status post mitral valve repair diverticulosis, internal hemorrhoids   • CORONARY ARTERY ANGIO S&I  1999    normal-Latasha   • CYSTOSCOPY,+URETEROSCOPY  1/25/13    Cystoscopy    • LASER SURGERY OF EYE  2009    left   • TONSILLECTOMY  3144   • UMBILICAL HERNIA REPAIR  3/11 Not on file    Relationships      Social connections:        Talks on phone: Not on file        Gets together: Not on file        Attends Zoroastrianism service: Not on file        Active member of club or organization: Not on file        Attends meetings of  Impaired  Metabolic syndrome (hypertension, lipid abnormality, obesity, pre-diabetic): Present  Mini-Mental Examination: Normal in conversation  Nutritional Screen: Reviewed and discussed   SAFE Questions (Domestic Violence): Negative  Exercise:  Active  Ad Hz: Yes Right Ear @ 25dBHL - 4000 Hz: Yes   Left Ear @ 40dBHL - 500 Hz: Yes Right Ear @ 40 dBHL - 500 Hz: Yes   Left Ear @ 40dBHL - 1000 Hz: Yes Right Ear @ 40 dBHL - 1000 Hz: Yes   Left Ear @ 40dBHL - 2000 Hz: Yes Right Ear @ 40 dBHL - 2000 Hz: Yes   Left Pneumo-23 today. Would hold on new shingles vax for now. CONCLUSIONS: Herb, your medical problems are under control with diet, exercise-weight and sugar reduction will help your prediabetes. Recheck FBS, A1C, CRP with Omegas added in-3 mos.  I will s Yes

## 2025-06-28 NOTE — ED BEHAVIORAL HEALTH ASSESSMENT NOTE - ACCOMPANIED BY
Refill Request on:     Disp Refills Start End    triamcinolone (ARISTOCORT) 0.1 % cream 15 g 1 2/4/2021     Sig - Route: Apply topically 2 times daily. - Topical    Sent to pharmacy as: Triamcinolone Acetonide 0.1 % External Cream (ARISTOCORT)    Class: Eprescribe      Last OV: 3/17/22  Next OV: none  PDMP reviewed: no     Medication refilled.   
Self

## 2025-06-28 NOTE — ED BEHAVIORAL HEALTH ASSESSMENT NOTE - HPI (INCLUDE ILLNESS QUALITY, SEVERITY, DURATION, TIMING, CONTEXT, MODIFYING FACTORS, ASSOCIATED SIGNS AND SYMPTOMS)
Patient is a 59 year old female, currently living at [facility] since 2024, with a PMH of HTN, IDDM, Hypothyroidism, and Neuropathy, with a PPH of psychosis vs bipolar d/o (per chart review), prior IPP admissions for psychosis, recently discharged from [facility] for psychosis, hx of being on Abilify, no current medications (was supposed to start Risperdal tonight), no reported hx of suicide attempts/non-suicidal self injury, hx of cocaine use, hx of verbal aggression, who was BIBEMS activated by the [facility] after pt was verbally aggressive with the staff.      Pt presented intermittently tearful, anxious and distressed, with avoidant eye contact. Reported feeling afraid of "God" as she used a nicotine vape today and broke a "promise" that she made to God. Reported being fearful of having to stay in the hospital as a consequence. When inquired about auditory/visual hallucinations, pt reluctantly stated that she hears the staff talking at the NH when she is in her room. Pt was dismissive when writer attempted to seek further clarity, however, denied CAH. Stated she also talks to herself but "everyone does that". Accused this writer of "playing a trick" to keep her in the hospital when attempted to inquire about thought insertion/withdrawal, ideas of reference. Denied any issues with sleep, appetite and energy. Pt expressed desire to go home to her  and dog "Brenda", however, later reported her  has been in [place], taking care of his sick father. Pt denied prior hx of suicide attempts/self harm. Denied current SI, HI, intent and plan. When inquired about medication trials, she reports "it's in the chart" and refused to answer any further questions.     Please see the SW note in the chart for collateral information.

## 2025-06-28 NOTE — ED PROVIDER NOTE - OBJECTIVE STATEMENT
Patient is a 59-year-old female PMH HTN, HLD, hypothyroidism, bipolar disorder with IPPA for psychosis in the past, who [facility] of combat[facility], anxiety, and mood change.  Patient is tearful and repeatedly saying "they are going to hurt me, they are going to torment me" while rocking back and forth.  When questioned who "they "is, patient does not give an answer.  Patient also reports feeling guilty because she smoked a vape but denies any other substance or drug use.  Denies SI or HI.  Denies visual or auditory hallucinations.  Denies any medical complaints at this time.

## 2025-06-28 NOTE — ED BEHAVIORAL HEALTH ASSESSMENT NOTE - SUMMARY
Patient is a 59 year old female, currently living at [facility] since 2024, with a PMH of HTN, IDDM, Hypothyroidism, and Neuropathy, with a PPH of psychosis vs bipolar d/o (per chart review), prior IPP admissions for psychosis, recently discharged from [facility] for psychosis, hx of being on Abilify, no current medications (was supposed to start Risperdal tonight), no reported hx of suicide attempts/non-suicidal self injury, hx of cocaine use, hx of verbal aggression, who was BIBEMS activated by the group home after pt was verbally aggressive with the staff.      Pt currently presents with a psychiatric decompensation with paranoid delusions and likely auditory hallucinations in the setting of medication nonadherence. She is at high risk of harm to herself and others and would benefit from an IPP admission for further observation and stabilization.     Plan:  - Admit to psychiatric unit  - Restart home medications   - Hold Cymbalta for now  - Start Risperdal 1 mg PO QHS for psychosis  - PRNs: Haldol 5 mg PO q6h for agitation and Ativan 2 mg PO q6h for severe anxiety and agitation if not medically contraindicated

## 2025-06-28 NOTE — ED PROVIDER NOTE - PHYSICAL EXAMINATION
VITAL SIGNS: I have reviewed nursing notes and confirm.  CONSTITUTIONAL: In no acute distress.  SKIN: Skin exam is warm and dry.  HEAD: Normocephalic; atraumatic.  EYES: PERRL, EOM intact; conjunctiva and sclera clear.  ENT: No nasal discharge; airway clear.  NECK: Supple; non tender.  CARD: S1, S2; Regular rate and rhythm.  RESP: CTAB. Speaking in full sentences.   ABD: Soft, non-tender.   EXT: Normal ROM.  NEURO: Alert, oriented. Grossly unremarkable. No focal deficits.   PSYCH: Cooperative but tearful and anxious; rocking back and forth.

## 2025-06-28 NOTE — ED ADULT TRIAGE NOTE - CHIEF COMPLAINT QUOTE
pt from NH, sent for being combative to staff  as per EMS. pt tearful, not answering questions. told ems she was "worried about her dog"

## 2025-06-28 NOTE — ED BEHAVIORAL HEALTH ASSESSMENT NOTE - DESCRIPTION
60 yo F, lives in a nursing home Pt presents superficially cooperative and anxious in the ED HTN, HLD, hypothyroidism, IDDM, neuropathy

## 2025-06-28 NOTE — ED PROVIDER NOTE - DIFFERENTIAL DIAGNOSIS
Electrolyte abnormalities, dehydration, STEFANIE, hyperglycemia, hypoglycemia, symptomatic anemia. Differential Diagnosis

## 2025-06-29 DIAGNOSIS — R00.0 TACHYCARDIA, UNSPECIFIED: ICD-10-CM

## 2025-06-29 LAB
GLUCOSE BLDC GLUCOMTR-MCNC: 202 MG/DL — HIGH (ref 70–99)
GLUCOSE BLDC GLUCOMTR-MCNC: 234 MG/DL — HIGH (ref 70–99)
GLUCOSE BLDC GLUCOMTR-MCNC: 253 MG/DL — HIGH (ref 70–99)
GLUCOSE BLDC GLUCOMTR-MCNC: 267 MG/DL — HIGH (ref 70–99)

## 2025-06-29 PROCEDURE — 80307 DRUG TEST PRSMV CHEM ANLYZR: CPT

## 2025-06-29 PROCEDURE — 80354 DRUG SCREENING FENTANYL: CPT

## 2025-06-29 PROCEDURE — 71045 X-RAY EXAM CHEST 1 VIEW: CPT

## 2025-06-29 PROCEDURE — 84443 ASSAY THYROID STIM HORMONE: CPT

## 2025-06-29 PROCEDURE — 82962 GLUCOSE BLOOD TEST: CPT

## 2025-06-29 PROCEDURE — 71045 X-RAY EXAM CHEST 1 VIEW: CPT | Mod: 26

## 2025-06-29 PROCEDURE — 85027 COMPLETE CBC AUTOMATED: CPT

## 2025-06-29 PROCEDURE — 99223 1ST HOSP IP/OBS HIGH 75: CPT

## 2025-06-29 PROCEDURE — 80346 BENZODIAZEPINES1-12: CPT

## 2025-06-29 PROCEDURE — 84439 ASSAY OF FREE THYROXINE: CPT

## 2025-06-29 PROCEDURE — 36415 COLL VENOUS BLD VENIPUNCTURE: CPT

## 2025-06-29 PROCEDURE — 80048 BASIC METABOLIC PNL TOTAL CA: CPT

## 2025-06-29 RX ORDER — LORAZEPAM 4 MG/ML
2 VIAL (ML) INJECTION EVERY 6 HOURS
Refills: 0 | Status: DISCONTINUED | OUTPATIENT
Start: 2025-06-29 | End: 2025-07-01

## 2025-06-29 RX ORDER — DEXTROSE 50 % IN WATER 50 %
12.5 SYRINGE (ML) INTRAVENOUS ONCE
Refills: 0 | Status: DISCONTINUED | OUTPATIENT
Start: 2025-06-29 | End: 2025-07-01

## 2025-06-29 RX ORDER — INSULIN LISPRO 100 U/ML
INJECTION, SOLUTION INTRAVENOUS; SUBCUTANEOUS
Refills: 0 | Status: DISCONTINUED | OUTPATIENT
Start: 2025-06-29 | End: 2025-07-01

## 2025-06-29 RX ORDER — METOPROLOL SUCCINATE 50 MG/1
1 TABLET, EXTENDED RELEASE ORAL
Refills: 0 | DISCHARGE

## 2025-06-29 RX ORDER — ACETAMINOPHEN 500 MG/5ML
650 LIQUID (ML) ORAL EVERY 6 HOURS
Refills: 0 | Status: DISCONTINUED | OUTPATIENT
Start: 2025-06-29 | End: 2025-07-01

## 2025-06-29 RX ORDER — METOPROLOL SUCCINATE 50 MG/1
200 TABLET, EXTENDED RELEASE ORAL DAILY
Refills: 0 | Status: DISCONTINUED | OUTPATIENT
Start: 2025-06-29 | End: 2025-07-01

## 2025-06-29 RX ORDER — LEVOTHYROXINE SODIUM 300 MCG
75 TABLET ORAL DAILY
Refills: 0 | Status: DISCONTINUED | OUTPATIENT
Start: 2025-06-29 | End: 2025-07-01

## 2025-06-29 RX ORDER — ASPIRIN 325 MG
81 TABLET ORAL DAILY
Refills: 0 | Status: DISCONTINUED | OUTPATIENT
Start: 2025-06-29 | End: 2025-07-01

## 2025-06-29 RX ORDER — LOSARTAN POTASSIUM 100 MG/1
1 TABLET, FILM COATED ORAL
Refills: 0 | DISCHARGE

## 2025-06-29 RX ORDER — LORAZEPAM 4 MG/ML
1 VIAL (ML) INJECTION ONCE
Refills: 0 | Status: DISCONTINUED | OUTPATIENT
Start: 2025-06-29 | End: 2025-06-29

## 2025-06-29 RX ORDER — ONDANSETRON HCL/PF 4 MG/2 ML
4 VIAL (ML) INJECTION EVERY 8 HOURS
Refills: 0 | Status: DISCONTINUED | OUTPATIENT
Start: 2025-06-29 | End: 2025-07-01

## 2025-06-29 RX ORDER — BACLOFEN 10 MG/20ML
5 INJECTION INTRATHECAL EVERY 12 HOURS
Refills: 0 | Status: DISCONTINUED | OUTPATIENT
Start: 2025-06-29 | End: 2025-07-01

## 2025-06-29 RX ORDER — MAGNESIUM, ALUMINUM HYDROXIDE 200-200 MG
30 TABLET,CHEWABLE ORAL EVERY 4 HOURS
Refills: 0 | Status: DISCONTINUED | OUTPATIENT
Start: 2025-06-29 | End: 2025-07-01

## 2025-06-29 RX ORDER — SODIUM CHLORIDE 9 G/1000ML
1000 INJECTION, SOLUTION INTRAVENOUS
Refills: 0 | Status: DISCONTINUED | OUTPATIENT
Start: 2025-06-29 | End: 2025-07-01

## 2025-06-29 RX ORDER — INSULIN GLARGINE-YFGN 100 [IU]/ML
15 INJECTION, SOLUTION SUBCUTANEOUS AT BEDTIME
Refills: 0 | Status: DISCONTINUED | OUTPATIENT
Start: 2025-06-29 | End: 2025-06-30

## 2025-06-29 RX ORDER — FOLIC ACID 1 MG/1
400 TABLET ORAL
Refills: 0 | DISCHARGE

## 2025-06-29 RX ORDER — MELATONIN 5 MG
3 TABLET ORAL AT BEDTIME
Refills: 0 | Status: DISCONTINUED | OUTPATIENT
Start: 2025-06-29 | End: 2025-07-01

## 2025-06-29 RX ORDER — HALOPERIDOL 10 MG/1
5 TABLET ORAL EVERY 6 HOURS
Refills: 0 | Status: DISCONTINUED | OUTPATIENT
Start: 2025-06-29 | End: 2025-07-01

## 2025-06-29 RX ORDER — DEXTROSE 50 % IN WATER 50 %
15 SYRINGE (ML) INTRAVENOUS ONCE
Refills: 0 | Status: DISCONTINUED | OUTPATIENT
Start: 2025-06-29 | End: 2025-07-01

## 2025-06-29 RX ORDER — PIOGLITAZONE HYDROCHLORIDE 15 MG/1
1 TABLET ORAL
Refills: 0 | DISCHARGE

## 2025-06-29 RX ORDER — RISPERIDONE 4 MG
1 TABLET ORAL DAILY
Refills: 0 | Status: DISCONTINUED | OUTPATIENT
Start: 2025-06-29 | End: 2025-07-01

## 2025-06-29 RX ORDER — INSULIN GLARGINE-YFGN 100 [IU]/ML
33 INJECTION, SOLUTION SUBCUTANEOUS AT BEDTIME
Refills: 0 | Status: DISCONTINUED | OUTPATIENT
Start: 2025-06-29 | End: 2025-06-29

## 2025-06-29 RX ORDER — LATANOPROST PF 0.05 MG/ML
1 SOLUTION/ DROPS OPHTHALMIC AT BEDTIME
Refills: 0 | Status: DISCONTINUED | OUTPATIENT
Start: 2025-06-29 | End: 2025-07-01

## 2025-06-29 RX ORDER — GABAPENTIN 400 MG/1
600 CAPSULE ORAL
Refills: 0 | Status: DISCONTINUED | OUTPATIENT
Start: 2025-06-29 | End: 2025-07-01

## 2025-06-29 RX ORDER — DEXTROSE 50 % IN WATER 50 %
25 SYRINGE (ML) INTRAVENOUS ONCE
Refills: 0 | Status: DISCONTINUED | OUTPATIENT
Start: 2025-06-29 | End: 2025-07-01

## 2025-06-29 RX ORDER — LOSARTAN POTASSIUM 100 MG/1
100 TABLET, FILM COATED ORAL DAILY
Refills: 0 | Status: DISCONTINUED | OUTPATIENT
Start: 2025-06-29 | End: 2025-07-01

## 2025-06-29 RX ORDER — GABAPENTIN 400 MG/1
1 CAPSULE ORAL
Refills: 0 | DISCHARGE

## 2025-06-29 RX ORDER — GLUCAGON 3 MG/1
1 POWDER NASAL ONCE
Refills: 0 | Status: DISCONTINUED | OUTPATIENT
Start: 2025-06-29 | End: 2025-07-01

## 2025-06-29 RX ORDER — ONDANSETRON HCL/PF 4 MG/2 ML
1 VIAL (ML) INJECTION
Refills: 0 | DISCHARGE

## 2025-06-29 RX ORDER — METFORMIN HYDROCHLORIDE 500 MG/1
1 TABLET ORAL
Refills: 0 | DISCHARGE

## 2025-06-29 RX ORDER — INSULIN GLARGINE-YFGN 100 [IU]/ML
15 INJECTION, SOLUTION SUBCUTANEOUS ONCE
Refills: 0 | Status: COMPLETED | OUTPATIENT
Start: 2025-06-29 | End: 2025-06-29

## 2025-06-29 RX ORDER — NIFEDIPINE 30 MG
30 TABLET, EXTENDED RELEASE 24 HR ORAL DAILY
Refills: 0 | Status: DISCONTINUED | OUTPATIENT
Start: 2025-06-29 | End: 2025-07-01

## 2025-06-29 RX ADMIN — INSULIN GLARGINE-YFGN 15 UNIT(S): 100 INJECTION, SOLUTION SUBCUTANEOUS at 22:42

## 2025-06-29 RX ADMIN — BACLOFEN 5 MILLIGRAM(S): 10 INJECTION INTRATHECAL at 17:23

## 2025-06-29 RX ADMIN — Medication 650 MILLIGRAM(S): at 03:51

## 2025-06-29 RX ADMIN — INSULIN LISPRO 6: 100 INJECTION, SOLUTION INTRAVENOUS; SUBCUTANEOUS at 17:23

## 2025-06-29 RX ADMIN — Medication 1 MILLIGRAM(S): at 02:13

## 2025-06-29 RX ADMIN — BACLOFEN 5 MILLIGRAM(S): 10 INJECTION INTRATHECAL at 06:08

## 2025-06-29 RX ADMIN — METOPROLOL SUCCINATE 200 MILLIGRAM(S): 50 TABLET, EXTENDED RELEASE ORAL at 06:09

## 2025-06-29 RX ADMIN — GABAPENTIN 600 MILLIGRAM(S): 400 CAPSULE ORAL at 06:09

## 2025-06-29 RX ADMIN — INSULIN GLARGINE-YFGN 15 UNIT(S): 100 INJECTION, SOLUTION SUBCUTANEOUS at 08:27

## 2025-06-29 RX ADMIN — Medication 2 MILLIGRAM(S): at 19:39

## 2025-06-29 RX ADMIN — Medication 650 MILLIGRAM(S): at 16:18

## 2025-06-29 RX ADMIN — INSULIN LISPRO 4: 100 INJECTION, SOLUTION INTRAVENOUS; SUBCUTANEOUS at 08:26

## 2025-06-29 RX ADMIN — LATANOPROST PF 1 DROP(S): 0.05 SOLUTION/ DROPS OPHTHALMIC at 22:41

## 2025-06-29 RX ADMIN — Medication 2 MILLIGRAM(S): at 11:59

## 2025-06-29 RX ADMIN — Medication 81 MILLIGRAM(S): at 09:02

## 2025-06-29 RX ADMIN — Medication 30 MILLIGRAM(S): at 06:09

## 2025-06-29 RX ADMIN — Medication 650 MILLIGRAM(S): at 15:15

## 2025-06-29 RX ADMIN — Medication 1 APPLICATION(S): at 12:02

## 2025-06-29 RX ADMIN — INSULIN LISPRO 4: 100 INJECTION, SOLUTION INTRAVENOUS; SUBCUTANEOUS at 12:38

## 2025-06-29 RX ADMIN — GABAPENTIN 600 MILLIGRAM(S): 400 CAPSULE ORAL at 17:23

## 2025-06-29 RX ADMIN — Medication 75 MICROGRAM(S): at 06:09

## 2025-06-29 RX ADMIN — Medication 1 MILLIGRAM(S): at 09:01

## 2025-06-29 RX ADMIN — LOSARTAN POTASSIUM 100 MILLIGRAM(S): 100 TABLET, FILM COATED ORAL at 06:09

## 2025-06-29 NOTE — PATIENT PROFILE ADULT - MEDICATIONS/VISITS
1900 Bedside and Verbal shift change report given to Brandon Scott RN (oncoming nurse) by Kvng Joe RN (offgoing nurse). Report included the following information Nurse Handoff Report, Index, Adult Overview, Intake/Output, MAR, and Recent Results. Family at bedside, head to toe to follow. 2040 Daughter is concerned about nighttime agitation for 2 nights in a row, reassured daughter that this RN would keep a close eye on pt using video monitoring system and to call if there are any other acute changes in pt's condition, pt's daughter verbalized understanding. 2050 Pt is agitated, but oriented x4. New bag of IVF hung with same rate of 75 mL/hr. Pt refused to get blood sugar taken. Provided education on importance of being aware about blood sugar levels to be able to give the appropriate intervention, pt still refused. Hand  are weak bilaterally, RA with limited movement d/t ace wrap keeping IV in place. No drooping/unilateral weaknesses. Lungs are diminished bilaterally in RA, pt reports dyspnea at rest when she's trying to cough up some phlegm. Davis in place and draining clear yellow urine, inner thigh area is moist with blanchable redness, pericare provided. Bed placed in lowest position with alarm activated, video monitor system in place. Call bell within reach and pudding on bedside table per pt request. No further needs at this time. 0015 Pt anxious in bed, xanax given per MAR. Also requested pain medicine, tylenol given. New ice water placed on bedside table per request. No other needs at this time, will continue current plan of care. 0415 Pt resting in bed with eyes closed, respirations even and unlabored. 0700 Bedside and Verbal shift change report given to Lavon Baron RN (oncoming nurse) by Brandon Scott RN (offgoing nurse). Report included the following information Nurse Handoff Report, Index, Adult Overview, Intake/Output, MAR, and Recent Results. no

## 2025-06-29 NOTE — H&P ADULT - NSHPPHYSICALEXAM_GEN_ALL_CORE
GENERAL: NAD  HEAD:  Atraumatic, normocephalic  EYES: EOMI, PERRL  NECK: Supple, trachea midline, no JVD  HEART: Regular rate and rhythm  LUNGS: Unlabored respirations.  Clear to auscultation bilaterally, no crackles, wheezing, or rhonchi  ABDOMEN: Soft, nontender, nondistended, +BS  EXTREMITIES: 2+ peripheral pulses bilaterally. No clubbing, cyanosis, or edema

## 2025-06-29 NOTE — H&P ADULT - ASSESSMENT
Assessment:     59-year-old female PMH HTN, HLD, hypothyroidism, bipolar disorder with IPPA for psychosis in the past, [facility], anxiety, and mood change.        Plan:     #Acute Psychosis   #Bipolar Disorder  - Seen by  "Pt currently presents with a psychiatric decompensation with paranoid delusions and likely auditory hallucinations in the setting of medication nonadherence. She is at high risk of harm to herself and others and would benefit from an IPP admission for further observation and stabilization"  - Initial plan to admit to IPP however, patient tachycardic   - Per psych:   - Hold Cymbalta for now  - Start Risperdal 1 mg PO QHS for psychosis  - PRNs: Haldol 5 mg PO q6h for agitation and Ativan 2 mg PO q6h for severe anxiety and agitation if not medically contraindicated    #Sinus Tachycardia   - Can be ISO of anxiety/agitation/psychosis   - Telemetry monitoring for now          Assessment:     59-year-old female PMH HTN, HLD, hypothyroidism, bipolar disorder with IPPA for psychosis in the past, [facility], anxiety, and mood change.        Plan:     #Acute Psychosis   #Bipolar Disorder  - Seen by  "Pt currently presents with a psychiatric decompensation with paranoid delusions and likely auditory hallucinations in the setting of medication nonadherence. She is at high risk of harm to herself and others and would benefit from an IPP admission for further observation and stabilization"  - Initial plan to admit to IPP however, patient tachycardic   - Per psych:   - Hold Cymbalta for now  - Start Risperdal 1 mg PO QHS for psychosis  - PRNs: Haldol 5 mg PO q6h for agitation and Ativan 2 mg PO q6h for severe anxiety and agitation if not medically contraindicated    #Sinus Tachycardia   - Can be ISO of anxiety/agitation/psychosis   - Telemetry monitoring for now     #HTN   - C/W Home Nifedipine Losartan & Metoprolol     #DM   - C/W home Lantus 33 units at bedtime  - ISS   - Goal -180     #DVT PPx: Lovenox   #GI PPx: Not indicated   #Activity: IAAT   #Diet: DASJ

## 2025-06-29 NOTE — CONSULT NOTE ADULT - SUBJECTIVE AND OBJECTIVE BOX
Podiatry Consult Note    Subjective:  DANISH NIXON  Seen Bedside 59y Female  .   Patient is a 59y old  Female who presents with a chief complaint of combativeness (29 Jun 2025 04:39)    HPI:  59-year-old female PMH HTN, HLD, hypothyroidism, bipolar disorder with IPPA for psychosis in the past, who [facility] of combat[facility], anxiety, and mood change.  Patient is tearful and repeatedly saying "they are going to hurt me, they are going to torment me" while rocking back and forth.  When questioned who "they "is, patient does not give an answer.  Patient also reports feeling guilty because she smoked a vape but denies any other substance or drug use.    Vitals in ED:   T(C): 36.6 (06-29-25 @ 03:22), Max: 37.2 (06-28-25 @ 19:09)  HR: 120 (06-29-25 @ 03:22) (112 - 125)  BP: 159/92 (06-29-25 @ 03:22) (139/77 - 194/91)  RR: 17 (06-29-25 @ 03:22) (17 - 19)  SpO2: 99% (06-29-25 @ 03:22) (96% - 99%)    Labs WNL     EKG: Sinus Tachycardia     Patient admitted to medicine for placement/ sinus tachycardia.  (29 Jun 2025 04:39)      Past Medical History and Surgical History  PAST MEDICAL & SURGICAL HISTORY:  Hypertension      Diabetes mellitus      Thyroid disease      Anemia      H/O diabetic neuropathy      Fibroid uterus      Bipolar mood disorder      S/P lumbar discectomy  2006.      S/P tonsillectomy      H/O arthroscopy of knee           Review of Systems:  [X] Ten point review of systems is otherwise negative except as noted     Objective:  Vital Signs Last 24 Hrs  T(C): 36.6 (29 Jun 2025 12:25), Max: 37.2 (28 Jun 2025 19:09)  T(F): 97.9 (29 Jun 2025 12:25), Max: 98.9 (28 Jun 2025 19:09)  HR: 95 (29 Jun 2025 12:25) (95 - 125)  BP: 116/75 (29 Jun 2025 12:25) (116/75 - 173/79)  BP(mean): 121 (29 Jun 2025 04:40) (114 - 121)  RR: 16 (29 Jun 2025 12:25) (16 - 19)  SpO2: 97% (29 Jun 2025 12:25) (96% - 99%)    Parameters below as of 29 Jun 2025 03:22  Patient On (Oxygen Delivery Method): room air                            14.3   9.57  )-----------( 237      ( 28 Jun 2025 15:00 )             43.3                 06-28    136  |  101  |  17  ----------------------------<  170[H]  4.1   |  19  |  0.5[L]    Ca    9.9      28 Jun 2025 14:08          Physical Exam - Right Lower Extremity Focused:   Derm: Right 4th digit minimal erythema , no pus , no drainage , no malodor  Vascular: DP and PT Pulses palpable ; Foot is normal temp to the touch; Capillary Refill Time < 3 Seconds;    Neuro: Protective Sensation intact   MSK: No Pain On Palpation at Wound Site     Assessment:  Right foot 4th digit paronychia - Resolved     Plan:  -Chart reviewed and Patient evaluated. All Questions and Concerns Addressed and Answere  -Weight Bearing Status; WBAT  -Discussed Plan w/ Dr. Batres   - Patient is stable and toe pain resolved.   - No further intervention from Podiatry. Reconsult if needed   Podiatry appreciates the consult!    Podiatry   Please message on teams for further questions

## 2025-06-29 NOTE — H&P ADULT - NSHPLABSRESULTS_GEN_ALL_CORE
LABS:                        14.3   9.57  )-----------( 237      ( 28 Jun 2025 15:00 )             43.3     06-28    136  |  101  |  17  ----------------------------<  170[H]  4.1   |  19  |  0.5[L]    Ca    9.9      28 Jun 2025 14:08        Urinalysis Basic - ( 28 Jun 2025 14:08 )    Color: x / Appearance: x / SG: x / pH: x  Gluc: 170 mg/dL / Ketone: x  / Bili: x / Urobili: x   Blood: x / Protein: x / Nitrite: x   Leuk Esterase: x / RBC: x / WBC x   Sq Epi: x / Non Sq Epi: x / Bacteria: x       CAPILLARY BLOOD GLUCOSE      POCT Blood Glucose.: 234 mg/dL (29 Jun 2025 11:52)  POCT Blood Glucose.: 202 mg/dL (29 Jun 2025 07:28)  POCT Blood Glucose.: 146 mg/dL (28 Jun 2025 15:03)        Urinalysis Basic - ( 28 Jun 2025 14:08 )    Color: x / Appearance: x / SG: x / pH: x  Gluc: 170 mg/dL / Ketone: x  / Bili: x / Urobili: x   Blood: x / Protein: x / Nitrite: x   Leuk Esterase: x / RBC: x / WBC x   Sq Epi: x / Non Sq Epi: x / Bacteria: x        RADIOLOGY & ADDITIONAL TESTS:    Consultant(s) Notes Reviewed:  [x ] YES  [ ] NO  Care Discussed with Consultants/Other Providers [ x] YES  [ ] NO  Imaging Personally Reviewed:  [ ] YES  [ ] NO

## 2025-06-29 NOTE — H&P ADULT - ATTENDING COMMENTS
59-year-old female PMH HTN, HLD, hypothyroidism, bipolar disorder with IPPA for psychosis in the past, [facility], anxiety, and mood change.    Vital Signs Last 24 Hrs  T(F): 97.9 (29 Jun 2025 12:25), Max: 98.9 (28 Jun 2025 19:09)  HR: 95 (29 Jun 2025 12:25) (95 - 125)  BP: 116/75 (29 Jun 2025 12:25) (116/75 - 188/98)  RR: 16 (29 Jun 2025 12:25) (16 - 19)  SpO2: 97% (29 Jun 2025 12:25) (96% - 99%)    PHYSICAL EXAM:  GENERAL: NAD, well-groomed, well-developed  HEAD:  Atraumatic, Normocephalic  EYES: EOMI, conjunctiva and sclera clear  ENMT: Moist mucous membranes, Good dentition, no thrush  NECK: Supple, No JVD  CHEST/LUNG: Clear to auscultation bilaterally, good air entry, non-labored breathing  HEART: RRR; S1/S2, No murmur, tachy   ABDOMEN: Soft, Nontender, Nondistended; Bowel sounds present  VASCULAR: Normal pulses, Normal capillary refill  EXTREMITIES: No calf tenderness, No cyanosis, No edema  LYMPH: Normal; No lymphadenopathy noted  SKIN: Warm, Intact  PSYCH: anxious, inappropriate   NERVOUS SYSTEM:  A/O x3, Good concentration; CN 2-12 intact, No focal deficits    #Acute Psychosis   #Bipolar Disorder  - Seen by  "Pt currently presents with a psychiatric decompensation with paranoid delusions and likely auditory hallucinations in the setting of medication nonadherence. She is at high risk of harm to herself and others and would benefit from an IPP admission for further observation and stabilization"  - Initial plan to admit to IPP however, patient tachycardic   - Per psych:   - Hold Cymbalta for now  - Start Risperdal 1 mg PO QHS for psychosis  - PRNs: Haldol 5 mg PO q6h for agitation and Ativan 2 mg PO q6h for severe anxiety and agitation if not medically contraindicated  - pt is on an involuntary hold and cannot leave AMA     #Sinus Tachycardia   - Can be ISO of anxiety/agitation/psychosis   - Telemetry monitoring for now   - TSH, T4  - Tox screen-pt with hx of cocaine use   - EKG shows sinus tach  - chest xray ordered     #HTN   - C/W Home Nifedipine Losartan & Metoprolol     #DM   - C/W home Lantus 33 units at bedtime  - ISS   - Goal -180     #DVT PPx: Lovenox   #GI PPx: Not indicated   #Activity: IAAT   #Diet: DASH  #Pending: Tsh, T4, tele, chest xray

## 2025-06-29 NOTE — H&P ADULT - HISTORY OF PRESENT ILLNESS
59-year-old female PMH HTN, HLD, hypothyroidism, bipolar disorder with IPPA for psychosis in the past, [facility] of [facility], anxiety, and mood change.  Patient is tearful and repeatedly saying "they are going to hurt me, they are going to torment me" while rocking back and forth.  When questioned who "they "is, patient does not give an answer.  Patient also reports feeling guilty because she smoked a vape but denies any other substance or drug use.    Vitals in ED:   T(C): 36.6 (06-29-25 @ 03:22), Max: 37.2 (06-28-25 @ 19:09)  HR: 120 (06-29-25 @ 03:22) (112 - 125)  BP: 159/92 (06-29-25 @ 03:22) (139/77 - 194/91)  RR: 17 (06-29-25 @ 03:22) (17 - 19)  SpO2: 99% (06-29-25 @ 03:22) (96% - 99%)    Labs WNL     EKG: Sinus Tachycardia     Patient admitted to medicine for placement/ sinus tachycardia.

## 2025-06-30 ENCOUNTER — TRANSCRIPTION ENCOUNTER (OUTPATIENT)
Age: 60
End: 2025-06-30

## 2025-06-30 DIAGNOSIS — F39 UNSPECIFIED MOOD [AFFECTIVE] DISORDER: ICD-10-CM

## 2025-06-30 DIAGNOSIS — F41.9 ANXIETY DISORDER, UNSPECIFIED: ICD-10-CM

## 2025-06-30 LAB
AMPHET UR-MCNC: NEGATIVE — SIGNIFICANT CHANGE UP
ANION GAP SERPL CALC-SCNC: 10 MMOL/L — SIGNIFICANT CHANGE UP (ref 7–14)
APPEARANCE UR: CLEAR — SIGNIFICANT CHANGE UP
BARBITURATES UR SCN-MCNC: NEGATIVE — SIGNIFICANT CHANGE UP
BENZODIAZ UR-MCNC: POSITIVE
BILIRUB UR-MCNC: NEGATIVE — SIGNIFICANT CHANGE UP
BUN SERPL-MCNC: 24 MG/DL — HIGH (ref 10–20)
CALCIUM SERPL-MCNC: 9.4 MG/DL — SIGNIFICANT CHANGE UP (ref 8.4–10.5)
CHLORIDE SERPL-SCNC: 99 MMOL/L — SIGNIFICANT CHANGE UP (ref 98–110)
CO2 SERPL-SCNC: 24 MMOL/L — SIGNIFICANT CHANGE UP (ref 17–32)
COCAINE METAB.OTHER UR-MCNC: NEGATIVE — SIGNIFICANT CHANGE UP
COLOR SPEC: YELLOW — SIGNIFICANT CHANGE UP
CREAT SERPL-MCNC: 0.6 MG/DL — LOW (ref 0.7–1.5)
DIFF PNL FLD: NEGATIVE — SIGNIFICANT CHANGE UP
EGFR: 103 ML/MIN/1.73M2 — SIGNIFICANT CHANGE UP
EGFR: 103 ML/MIN/1.73M2 — SIGNIFICANT CHANGE UP
FENTANYL UR QL: NEGATIVE — SIGNIFICANT CHANGE UP
GLUCOSE BLDC GLUCOMTR-MCNC: 287 MG/DL — HIGH (ref 70–99)
GLUCOSE BLDC GLUCOMTR-MCNC: 316 MG/DL — HIGH (ref 70–99)
GLUCOSE BLDC GLUCOMTR-MCNC: 343 MG/DL — HIGH (ref 70–99)
GLUCOSE BLDC GLUCOMTR-MCNC: 353 MG/DL — HIGH (ref 70–99)
GLUCOSE BLDC GLUCOMTR-MCNC: 370 MG/DL — HIGH (ref 70–99)
GLUCOSE BLDC GLUCOMTR-MCNC: 383 MG/DL — HIGH (ref 70–99)
GLUCOSE SERPL-MCNC: 265 MG/DL — HIGH (ref 70–99)
GLUCOSE UR QL: >=1000 MG/DL
HCT VFR BLD CALC: 40.1 % — SIGNIFICANT CHANGE UP (ref 37–47)
HGB BLD-MCNC: 13.4 G/DL — SIGNIFICANT CHANGE UP (ref 12–16)
KETONES UR QL: NEGATIVE MG/DL — SIGNIFICANT CHANGE UP
LEUKOCYTE ESTERASE UR-ACNC: NEGATIVE — SIGNIFICANT CHANGE UP
MCHC RBC-ENTMCNC: 29.8 PG — SIGNIFICANT CHANGE UP (ref 27–31)
MCHC RBC-ENTMCNC: 33.4 G/DL — SIGNIFICANT CHANGE UP (ref 32–37)
MCV RBC AUTO: 89.3 FL — SIGNIFICANT CHANGE UP (ref 81–99)
METHADONE UR-MCNC: NEGATIVE — SIGNIFICANT CHANGE UP
NITRITE UR-MCNC: NEGATIVE — SIGNIFICANT CHANGE UP
NRBC BLD AUTO-RTO: 0 /100 WBCS — SIGNIFICANT CHANGE UP (ref 0–0)
OPIATES UR-MCNC: NEGATIVE — SIGNIFICANT CHANGE UP
PCP SPEC-MCNC: SIGNIFICANT CHANGE UP
PH UR: 6 — SIGNIFICANT CHANGE UP (ref 5–8)
PLATELET # BLD AUTO: 216 K/UL — SIGNIFICANT CHANGE UP (ref 130–400)
PMV BLD: 10.1 FL — SIGNIFICANT CHANGE UP (ref 7.4–10.4)
POTASSIUM SERPL-MCNC: 4.1 MMOL/L — SIGNIFICANT CHANGE UP (ref 3.5–5)
POTASSIUM SERPL-SCNC: 4.1 MMOL/L — SIGNIFICANT CHANGE UP (ref 3.5–5)
PROPOXYPHENE QUALITATIVE URINE RESULT: NEGATIVE — SIGNIFICANT CHANGE UP
PROT UR-MCNC: NEGATIVE MG/DL — SIGNIFICANT CHANGE UP
RBC # BLD: 4.49 M/UL — SIGNIFICANT CHANGE UP (ref 4.2–5.4)
RBC # FLD: 14.2 % — SIGNIFICANT CHANGE UP (ref 11.5–14.5)
SODIUM SERPL-SCNC: 133 MMOL/L — LOW (ref 135–146)
SP GR SPEC: >1.03 — HIGH (ref 1–1.03)
T4 FREE SERPL-MCNC: 1.1 NG/DL — SIGNIFICANT CHANGE UP (ref 0.9–1.8)
TSH SERPL-MCNC: 4.47 UIU/ML — HIGH (ref 0.27–4.2)
UROBILINOGEN FLD QL: 0.2 MG/DL — SIGNIFICANT CHANGE UP (ref 0.2–1)
WBC # BLD: 6.71 K/UL — SIGNIFICANT CHANGE UP (ref 4.8–10.8)
WBC # FLD AUTO: 6.71 K/UL — SIGNIFICANT CHANGE UP (ref 4.8–10.8)

## 2025-06-30 PROCEDURE — 99233 SBSQ HOSP IP/OBS HIGH 50: CPT

## 2025-06-30 PROCEDURE — 99232 SBSQ HOSP IP/OBS MODERATE 35: CPT

## 2025-06-30 RX ORDER — INSULIN GLARGINE-YFGN 100 [IU]/ML
30 INJECTION, SOLUTION SUBCUTANEOUS AT BEDTIME
Refills: 0 | Status: DISCONTINUED | OUTPATIENT
Start: 2025-06-30 | End: 2025-07-01

## 2025-06-30 RX ORDER — INSULIN GLARGINE-YFGN 100 [IU]/ML
15 INJECTION, SOLUTION SUBCUTANEOUS ONCE
Refills: 0 | Status: COMPLETED | OUTPATIENT
Start: 2025-06-30 | End: 2025-06-30

## 2025-06-30 RX ADMIN — Medication 2 MILLIGRAM(S): at 18:19

## 2025-06-30 RX ADMIN — INSULIN GLARGINE-YFGN 15 UNIT(S): 100 INJECTION, SOLUTION SUBCUTANEOUS at 08:54

## 2025-06-30 RX ADMIN — Medication 1 MILLIGRAM(S): at 11:48

## 2025-06-30 RX ADMIN — Medication 30 MILLIGRAM(S): at 05:34

## 2025-06-30 RX ADMIN — INSULIN GLARGINE-YFGN 30 UNIT(S): 100 INJECTION, SOLUTION SUBCUTANEOUS at 21:25

## 2025-06-30 RX ADMIN — INSULIN LISPRO 8: 100 INJECTION, SOLUTION INTRAVENOUS; SUBCUTANEOUS at 07:59

## 2025-06-30 RX ADMIN — Medication 81 MILLIGRAM(S): at 11:48

## 2025-06-30 RX ADMIN — Medication 75 MICROGRAM(S): at 05:35

## 2025-06-30 RX ADMIN — Medication 2 MILLIGRAM(S): at 11:57

## 2025-06-30 RX ADMIN — Medication 650 MILLIGRAM(S): at 14:39

## 2025-06-30 RX ADMIN — BACLOFEN 5 MILLIGRAM(S): 10 INJECTION INTRATHECAL at 17:03

## 2025-06-30 RX ADMIN — LATANOPROST PF 1 DROP(S): 0.05 SOLUTION/ DROPS OPHTHALMIC at 21:25

## 2025-06-30 RX ADMIN — BACLOFEN 5 MILLIGRAM(S): 10 INJECTION INTRATHECAL at 05:34

## 2025-06-30 RX ADMIN — GABAPENTIN 600 MILLIGRAM(S): 400 CAPSULE ORAL at 17:02

## 2025-06-30 RX ADMIN — GABAPENTIN 600 MILLIGRAM(S): 400 CAPSULE ORAL at 05:34

## 2025-06-30 RX ADMIN — INSULIN LISPRO 10: 100 INJECTION, SOLUTION INTRAVENOUS; SUBCUTANEOUS at 17:03

## 2025-06-30 RX ADMIN — LOSARTAN POTASSIUM 100 MILLIGRAM(S): 100 TABLET, FILM COATED ORAL at 05:34

## 2025-06-30 RX ADMIN — INSULIN LISPRO 8: 100 INJECTION, SOLUTION INTRAVENOUS; SUBCUTANEOUS at 11:47

## 2025-06-30 RX ADMIN — Medication 2 MILLIGRAM(S): at 01:59

## 2025-06-30 RX ADMIN — METOPROLOL SUCCINATE 200 MILLIGRAM(S): 50 TABLET, EXTENDED RELEASE ORAL at 05:34

## 2025-06-30 RX ADMIN — Medication 1 APPLICATION(S): at 11:48

## 2025-06-30 NOTE — DISCHARGE NOTE PROVIDER - NSDCMRMEDTOKEN_GEN_ALL_CORE_FT
acetaminophen 325 mg oral tablet: 2 tab(s) orally every 6 hours  aspirin 81 mg oral capsule: 1 cap(s) orally  baclofen 5 mg oral tablet: 1 tab(s) orally 2 times a day  Cozaar 100 mg oral tablet: 1 tab(s) orally once a day  Cymbalta 20 mg oral delayed release capsule: 2 cap(s) orally once a day  folic acid: 400 microgram(s) once a day  gabapentin 600 mg oral tablet: 1 tab(s) orally 2 times a day  Insulin Glargine: 33 unit(s) once a day (at bedtime)  Jardiance 25 mg oral tablet: 1 tab(s) orally once a day  latanoprost 0.005% ophthalmic solution: 1 drop(s) in each eye  levothyroxine 75 mcg (0.075 mg) oral tablet: 1 tab(s) orally once a day  melatonin 10 mg oral tablet: 1 tab(s) orally  metFORMIN 1000 mg oral tablet: 1 tab(s) orally once a day  metoprolol succinate 200 mg oral tablet, extended release: 1 tab(s) orally once a day  naproxen 500 mg oral delayed release tablet: 1 tab(s) orally 2 times a day  NIFEdipine 30 mg oral tablet, extended release: 1 tab(s) orally once a day  ondansetron 4 mg oral tablet: 1 tab(s) orally every 8 hours as needed for  nausea  pioglitazone 45 mg oral tablet: 1 tab(s) orally once a day   acetaminophen 325 mg oral tablet: 2 tab(s) orally every 6 hours As needed Mild Pain (1 - 3), Moderate Pain (4 - 6), Severe Pain (7 - 10)  aspirin 81 mg oral capsule: 1 cap(s) orally  baclofen 5 mg oral tablet: 1 tab(s) orally 2 times a day  Cozaar 100 mg oral tablet: 1 tab(s) orally once a day  Cymbalta 20 mg oral delayed release capsule: 2 cap(s) orally once a day  folic acid: 400 microgram(s) once a day  gabapentin 600 mg oral tablet: 1 tab(s) orally 2 times a day  hydrOXYzine hydrochloride 50 mg oral tablet: 1 tab(s) orally every 6 hours as needed for agitation  Insulin Glargine: 33 unit(s) once a day (at bedtime)  Jardiance 25 mg oral tablet: 1 tab(s) orally once a day  latanoprost 0.005% ophthalmic solution: 1 drop(s) in each eye  levothyroxine 75 mcg (0.075 mg) oral tablet: 1 tab(s) orally once a day  melatonin 10 mg oral tablet: 1 tab(s) orally  metFORMIN 1000 mg oral tablet: 1 tab(s) orally once a day  metoprolol succinate 200 mg oral tablet, extended release: 1 tab(s) orally once a day  NIFEdipine 30 mg oral tablet, extended release: 1 tab(s) orally once a day  ondansetron 4 mg oral tablet: 1 tab(s) orally every 8 hours as needed for  nausea  pioglitazone 45 mg oral tablet: 1 tab(s) orally once a day   acetaminophen 325 mg oral tablet: 2 tab(s) orally every 6 hours As needed Mild Pain (1 - 3), Moderate Pain (4 - 6), Severe Pain (7 - 10)  aspirin 81 mg oral capsule: 1 cap(s) orally once a day  baclofen 5 mg oral tablet: 1 tab(s) orally every 12 hours  Cozaar 100 mg oral tablet: 1 tab(s) orally once a day  DULoxetine 60 mg oral delayed release capsule: 1 cap(s) orally once a day  folic acid: 400 microgram(s) once a day  gabapentin 600 mg oral tablet: 1 tab(s) orally 2 times a day  hydrOXYzine hydrochloride 50 mg oral tablet: 1 tab(s) orally every 6 hours as needed for agitation  Insulin Glargine: 33 unit(s) once a day (at bedtime)  Jardiance 25 mg oral tablet: 1 tab(s) orally once a day  latanoprost 0.005% ophthalmic solution: 1 drop(s) to each affected eye once a day (at bedtime)  levothyroxine 75 mcg (0.075 mg) oral tablet: 1 tab(s) orally once a day  melatonin 3 mg oral tablet: 1 tab(s) orally once a day (at bedtime) As needed Insomnia  metFORMIN 1000 mg oral tablet: 1 tab(s) orally once a day  metoprolol succinate 200 mg oral tablet, extended release: 1 tab(s) orally once a day  NIFEdipine 30 mg oral tablet, extended release: 1 tab(s) orally once a day  ondansetron 4 mg oral tablet: 1 tab(s) orally every 8 hours as needed for  nausea  pioglitazone 45 mg oral tablet: 1 tab(s) orally once a day  risperiDONE 1 mg oral tablet: 1 tab(s) orally once a day one tab daily at bedtime   acetaminophen 325 mg oral tablet: 2 tab(s) orally every 6 hours As needed Mild Pain (1 - 3), Moderate Pain (4 - 6), Severe Pain (7 - 10)  aspirin 81 mg oral capsule: 1 cap(s) orally once a day  baclofen 5 mg oral tablet: 1 tab(s) orally every 12 hours  Cozaar 100 mg oral tablet: 1 tab(s) orally once a day  DULoxetine 60 mg oral delayed release capsule: 1 cap(s) orally once a day  folic acid: 400 microgram(s) once a day  gabapentin 600 mg oral tablet: 1 tab(s) orally 2 times a day  hydrOXYzine hydrochloride 50 mg oral tablet: 1 tab(s) orally every 6 hours as needed for  agitation as needed for agitation  Insulin Glargine: 40 unit(s) once a day (at bedtime)  Jardiance 25 mg oral tablet: 1 tab(s) orally once a day  latanoprost 0.005% ophthalmic solution: 1 drop(s) to each affected eye once a day (at bedtime)  levothyroxine 75 mcg (0.075 mg) oral tablet: 1 tab(s) orally once a day  melatonin 3 mg oral tablet: 1 tab(s) orally once a day (at bedtime) As needed Insomnia  metFORMIN 1000 mg oral tablet: 1 tab(s) orally once a day  metoprolol succinate 200 mg oral tablet, extended release: 1 tab(s) orally once a day  NIFEdipine 30 mg oral tablet, extended release: 1 tab(s) orally once a day  ondansetron 4 mg oral tablet: 1 tab(s) orally every 8 hours as needed for  nausea  pioglitazone 45 mg oral tablet: 1 tab(s) orally once a day  risperiDONE 1 mg oral tablet: 1 tab(s) orally once a day one tab daily at bedtime

## 2025-06-30 NOTE — PROGRESS NOTE ADULT - ASSESSMENT
Assessment:     59-year-old female PMH HTN, HLD, hypothyroidism, bipolar disorder with IPPA for psychosis in the past, [facility] of [facility], anxiety, and mood change.        Plan:     #Acute Psychosis   #Bipolar Disorder  - Seen by  "Pt currently presents with a psychiatric decompensation with paranoid delusions and likely auditory hallucinations in the setting of medication nonadherence. She is at high risk of harm to herself and others and would benefit from an IPP admission for further observation and stabilization"  - Initial plan to admit to IPP however, patient tachycardic   - Per psych:   - Hold Cymbalta for now  - Start Risperdal 1 mg PO QHS for psychosis  - PRNs: Haldol 5 mg PO q6h for agitation and Ativan 2 mg PO q6h for severe anxiety and agitation if not medically contraindicated    #Sinus Tachycardia   - Can be ISO of anxiety/agitation/psychosis   - Telemetry monitoring for now     #HTN   - C/W Home Nifedipine Losartan & Metoprolol     #DM   - C/W home Lantus 33 units at bedtime  - ISS   - Goal -180     #DVT PPx: Lovenox   #GI PPx: Not indicated   #Activity: IAAT   #Diet: DASJ  Assessment:     59-year-old female PMH of HTN, HLD, hypothyroidism, bipolar disorder with IPPA for psychosis in the past, [facility] of [facility], anxiety, and mood change.        Plan:     #Acute Psychosis   #Bipolar Disorder  - Seen by  "Pt currently presents with a psychiatric decompensation with paranoid delusions and likely auditory hallucinations in the setting of medication nonadherence. She is at high risk of harm to herself and others and would benefit from an IPP admission for further observation and stabilization"  - Initial plan to admit to IPP however, patient tachycardic   - Per psych:   - Hold Cymbalta for now  - Start Risperdal 1 mg PO QHS for psychosis  - PRNs: Haldol 5 mg PO q6h for agitation and Ativan 2 mg PO q6h for severe anxiety and agitation if not medically contraindicated    #Sinus Tachycardia   - Can be ISO of anxiety/agitation/psychosis   - Telemetry monitoring for now     #HTN   - C/W Home Nifedipine Losartan & Metoprolol     #DM   - C/W home Lantus 33 units at bedtime  - ISS   - Goal -180     #DVT PPx: Lovenox   #GI PPx: Not indicated   #Activity: IAAT   #Diet: DASJ  Assessment:     59-year-old female PMH of HTN, HLD, hypothyroidism, bipolar disorder with IPPA for psychosis in the past, who [facility] combativeness, anxiety, and mood change.        Plan:     #Acute Psychosis   #Bipolar Disorder  - Seen by  "Pt currently presents with a psychiatric decompensation with paranoid delusions and likely auditory hallucinations in the setting of medication nonadherence. She is at high risk of harm to herself and others and would benefit from an IPP admission for further observation and stabilization"  - Initial plan to admit to IPP however, patient tachycardic   - Per psych:   - Hold Cymbalta for now  - Start Risperdal 1 mg PO QHS for psychosis  - PRNs: Haldol 5 mg PO q6h for agitation and Ativan 2 mg PO q6h for severe anxiety and agitation if not medically contraindicated    #Sinus Tachycardia   - Can be ISO of anxiety/agitation/psychosis  - Telemetry monitoring for now     #HTN   - C/W Home Nifedipine Losartan & Metoprolol     #DM   - Glucose level elevated at 353 today. Increase to 30 units Lantus.   - C/W home Lantus 33 units at bedtime  - ISS   - Goal -180     #DVT PPx: Lovenox   #GI PPx: Not indicated   #Activity: IAAT   #Diet: DASJ

## 2025-06-30 NOTE — BH CONSULTATION LIAISON PROGRESS NOTE - NSBHFUPINTERVALHXFT_PSY_A_CORE
Patient is a 59-year-old female PMH HTN, HLD, hypothyroidism, bipolar disorder with IPPA for psychosis in the past, who presents ED from the [facility] rehab facility for concerns of combativeness, anxiety, and mood change.  Patient is tearful and repeatedly saying "they are going to hurt me, they are going to torment me" while rocking back and forth.  When questioned who "they "is, patient does not give an answer.  Patient also reports feeling guilty because she smoked a vape but denies any other substance or drug use.  Denies SI or HI.  Denies visual or auditory hallucinations.  Denies any medical complaints at this time.    On interview, patient she was calm, pleasant, and cooperative. She did not appear psychotic or delusional. She endorsed being confused about why she needed to be hospitalized. She was tearful that her nursing home did not want her back due to her behaviors and stated she didn't want to go to [facility], but would be willing if she had to return. 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 Patient is not suicidal, homicidal, disorganized or paranoid at time of assessment.     Patient does admit to historic cocaine use, but endorses she has not used in about 2 years since she started living in the nursing home. Patient does self report issues with short term memory and executive function--but was able to demonstrate full orientation/complex attention tasks/math. Patient describes a period of confusion and describes having difficulty remembering details about her apparent week long stay at [facility]. Patient endorses after getting back from graduation she was having trouble telling days from nights. She does believe she was in the psychiatric unit, but also stated that she initially was hospitalized because her blood sugars were found to be in the 600s. Patient complains extensively about poor ability to manage blood sugars and peripheral neuropathy from her diabetes.    Per chart review:  ED psych eval recommended inpatient psychiatry for further monitoring based on vague and possibly paranoid comments patient was making and collateral from nursing home where patient was not at baseline after her hospitalization at [facility]. See ED psych note: "Last week, the patient was taken out of rehabilitation for two days to spend time with her family following her niece's graduation. Upon returning, she was not at her baseline, exhibiting verbal aggression and vaping. She was subsequently taken to [facility] and admitted for one week. She returned yesterday (6/27), and this morning, she was again yelling at and cursing at everyone, and staff were unable to redirect her. Given this is the opposite of her baseline, and they are not equipped to manage psychiatric issues, they do not feel she is safe to return. "    In previous  notes--she was hospitalized at [facility] in 2022 with symptoms of carlton--possibly medication/substance induced. Patient was managed with Abilify (unclear when discontinued). Cocaine was positive on initial presentation.    Per collateral from patient's --he feels that she "gets this way" when getting medically sick and needs to get her blood sugar better controlled. He does not strongly feel that patient needs inpatient psychiatry although seemed open to it if required.

## 2025-06-30 NOTE — PROGRESS NOTE ADULT - ASSESSMENT
59-year-old female PMH of HTN, HLD, hypothyroidism, bipolar disorder with IPPA for psychosis in the past, who [facility] concerns of combativeness, anxiety, and mood change.        #Acute Psychosis   #Bipolar Disorder  - Pt initially seen by Psych and recommended IPP, 6/30 Psych is continuing evaluation - wants pt kept overnight and will give final recs regarding IPP 7/1  - continue constant monitoring - pt unable to leave AMA at this time  - Per psych:   - Hold Cymbalta for now  - Start Risperdal 1 mg PO QHS for psychosis  - PRNs: Haldol 5 mg PO q6h for agitation and Ativan 2 mg PO q6h for severe anxiety and agitation if not medically contraindicated    #Sinus Tachycardia   - tachy has resolved as on 6/30  - suspect pt took some substance that cause a tachycardia and delirium that has left the system as of 6/30   - Can be ISO of anxiety/agitation/psychosis  - Telemetry monitoring for now     #HTN   - C/W Home Nifedipine Losartan & Metoprolol     #DM   - Glucose level elevated at 353 today. Increase to 30 units Lantus.   - C/W home Lantus 33 units at bedtime  - ISS   - Goal -180     #DVT PPx: Lovenox   #GI PPx: Not indicated   #Activity: IAAT   #Diet: DASH  # Pending: mood, BS, HR

## 2025-06-30 NOTE — DISCHARGE NOTE PROVIDER - NSDCCPCAREPLAN_GEN_ALL_CORE_FT
PRINCIPAL DISCHARGE DIAGNOSIS  Diagnosis: Agitation  Assessment and Plan of Treatment: You were admitted to the hospital because of increased anxiety and mood changes. You also had an elevated heart rate.  Please take all of your medications as prescribed.      SECONDARY DISCHARGE DIAGNOSES  Diagnosis: Tachycardia  Assessment and Plan of Treatment: Fast heart rate which was evaluated and managed by the medicine team. Vital signs were monitored and your heart rate improved during your stay.  Please take all of your medications as prescribed.    Diagnosis: Anxiety  Assessment and Plan of Treatment:

## 2025-06-30 NOTE — PROGRESS NOTE ADULT - SUBJECTIVE AND OBJECTIVE BOX
SUBJECTIVE:    Patient is a 59y old Female who presents with a chief complaint of combativeness (30 Jun 2025 08:07)    Currently admitted to medicine with the primary diagnosis of Agitation       Today is hospital day 1d. This morning she is resting comfortably in bed and reports no new issues or overnight events.     PAST MEDICAL & SURGICAL HISTORY  Hypertension    Diabetes mellitus    Thyroid disease    Anemia    H/O diabetic neuropathy    Fibroid uterus    Bipolar mood disorder    S/P lumbar discectomy  2006.    S/P tonsillectomy    H/O arthroscopy of knee      SOCIAL HISTORY:  Negative for smoking/alcohol/drug use.     ALLERGIES:  No Known Allergies    MEDICATIONS:  STANDING MEDICATIONS  aspirin  chewable 81 milliGRAM(s) Oral daily  baclofen 5 milliGRAM(s) Oral every 12 hours  chlorhexidine 2% Cloths 1 Application(s) Topical daily  dextrose 5%. 1000 milliLiter(s) IV Continuous <Continuous>  dextrose 5%. 1000 milliLiter(s) IV Continuous <Continuous>  dextrose 50% Injectable 25 Gram(s) IV Push once  dextrose 50% Injectable 12.5 Gram(s) IV Push once  dextrose 50% Injectable 25 Gram(s) IV Push once  gabapentin 600 milliGRAM(s) Oral two times a day  glucagon  Injectable 1 milliGRAM(s) IntraMuscular once  insulin glargine Injectable (LANTUS) 30 Unit(s) SubCutaneous at bedtime  insulin lispro (ADMELOG) corrective regimen sliding scale   SubCutaneous three times a day before meals  latanoprost 0.005% Ophthalmic Solution 1 Drop(s) Both EYES at bedtime  levothyroxine 75 MICROGram(s) Oral daily  losartan 100 milliGRAM(s) Oral daily  metoprolol succinate  milliGRAM(s) Oral daily  NIFEdipine XL 30 milliGRAM(s) Oral daily  risperiDONE   Tablet 1 milliGRAM(s) Oral daily    PRN MEDICATIONS  acetaminophen     Tablet .. 650 milliGRAM(s) Oral every 6 hours PRN  aluminum hydroxide/magnesium hydroxide/simethicone Suspension 30 milliLiter(s) Oral every 4 hours PRN  dextrose Oral Gel 15 Gram(s) Oral once PRN  haloperidol     Tablet 5 milliGRAM(s) Oral every 6 hours PRN  LORazepam     Tablet 2 milliGRAM(s) Oral every 6 hours PRN  melatonin 3 milliGRAM(s) Oral at bedtime PRN  ondansetron Injectable 4 milliGRAM(s) IV Push every 8 hours PRN    VITALS:   T(F): 97.3  HR: 92  BP: 135/82  RR: 18  SpO2: 97%    LABS:                        13.4   6.71  )-----------( 216      ( 30 Jun 2025 06:48 )             40.1     06-30    133[L]  |  99  |  24[H]  ----------------------------<  265[H]  4.1   |  24  |  0.6[L]    Ca    9.4      30 Jun 2025 06:48        Urinalysis Basic - ( 30 Jun 2025 06:48 )    Color: x / Appearance: x / SG: x / pH: x  Gluc: 265 mg/dL / Ketone: x  / Bili: x / Urobili: x   Blood: x / Protein: x / Nitrite: x   Leuk Esterase: x / RBC: x / WBC x   Sq Epi: x / Non Sq Epi: x / Bacteria: x                RADIOLOGY:    PHYSICAL EXAM:  GEN: No acute distress  LUNGS: Clear to auscultation bilaterally   HEART: Regular  ABD: Soft, non-tender, non-distended.  EXT: NC/NC/NE/2+PP/SUE/Skin Intact.   NEURO: AAOX3    Intravenous access:   NG tube:   Shannon Catheter:        SUBJECTIVE:    Patient is a 59y old Female who presents with a chief complaint of combativeness (30 Jun 2025 08:07)    Currently admitted to medicine with the primary diagnosis of Agitation       Today is hospital day 1d. This morning she reports having vaginal burning and itchiness with a light link discharge. She said she is doing well otherwise and rested well.     PAST MEDICAL & SURGICAL HISTORY  Hypertension    Diabetes mellitus    Thyroid disease    Anemia    H/O diabetic neuropathy    Fibroid uterus    Bipolar mood disorder    S/P lumbar discectomy  2006.    S/P tonsillectomy    H/O arthroscopy of knee      SOCIAL HISTORY:  Negative for smoking/alcohol/drug use.     ALLERGIES:  No Known Allergies    MEDICATIONS:  STANDING MEDICATIONS  aspirin  chewable 81 milliGRAM(s) Oral daily  baclofen 5 milliGRAM(s) Oral every 12 hours  chlorhexidine 2% Cloths 1 Application(s) Topical daily  dextrose 5%. 1000 milliLiter(s) IV Continuous <Continuous>  dextrose 5%. 1000 milliLiter(s) IV Continuous <Continuous>  dextrose 50% Injectable 25 Gram(s) IV Push once  dextrose 50% Injectable 12.5 Gram(s) IV Push once  dextrose 50% Injectable 25 Gram(s) IV Push once  gabapentin 600 milliGRAM(s) Oral two times a day  glucagon  Injectable 1 milliGRAM(s) IntraMuscular once  insulin glargine Injectable (LANTUS) 30 Unit(s) SubCutaneous at bedtime  insulin lispro (ADMELOG) corrective regimen sliding scale   SubCutaneous three times a day before meals  latanoprost 0.005% Ophthalmic Solution 1 Drop(s) Both EYES at bedtime  levothyroxine 75 MICROGram(s) Oral daily  losartan 100 milliGRAM(s) Oral daily  metoprolol succinate  milliGRAM(s) Oral daily  NIFEdipine XL 30 milliGRAM(s) Oral daily  risperiDONE   Tablet 1 milliGRAM(s) Oral daily    PRN MEDICATIONS  acetaminophen     Tablet .. 650 milliGRAM(s) Oral every 6 hours PRN  aluminum hydroxide/magnesium hydroxide/simethicone Suspension 30 milliLiter(s) Oral every 4 hours PRN  dextrose Oral Gel 15 Gram(s) Oral once PRN  haloperidol     Tablet 5 milliGRAM(s) Oral every 6 hours PRN  LORazepam     Tablet 2 milliGRAM(s) Oral every 6 hours PRN  melatonin 3 milliGRAM(s) Oral at bedtime PRN  ondansetron Injectable 4 milliGRAM(s) IV Push every 8 hours PRN    VITALS:   T(F): 97.3  HR: 92  BP: 135/82  RR: 18  SpO2: 97%    LABS:                        13.4   6.71  )-----------( 216      ( 30 Jun 2025 06:48 )             40.1     06-30    133[L]  |  99  |  24[H]  ----------------------------<  265[H]  4.1   |  24  |  0.6[L]    Ca    9.4      30 Jun 2025 06:48        Urinalysis Basic - ( 30 Jun 2025 06:48 )    Color: x / Appearance: x / SG: x / pH: x  Gluc: 265 mg/dL / Ketone: x  / Bili: x / Urobili: x   Blood: x / Protein: x / Nitrite: x   Leuk Esterase: x / RBC: x / WBC x   Sq Epi: x / Non Sq Epi: x / Bacteria: x                RADIOLOGY:    PHYSICAL EXAM:  GEN: No acute distress  LUNGS: Clear to auscultation bilaterally   HEART: Regular  ABD: Soft, non-tender, non-distended.  EXT: NC/NC/NE/2+PP/SUE/Skin Intact.   NEURO: AAOX3    Intravenous access:   NG tube:   Hsannon Catheter:        SUBJECTIVE:    Patient is a 59y old Female with a PMH of HTN, HLD, hypothyroidisim, bipolar disorder with IPPA for psychosis in the past who [facility] for concerns of combativeness, anxiety, and mood change. Patient is tearful and repeatedly saying "they are going to hurt me, they are going to torment me" while rocking back and forth.  When questioned who "they "is, patient does not give an answer.  Patient also reports feeling guilty because she smoked a vape but denies any other substance or drug use.  (30 Jun 2025 08:07)    Currently admitted to medicine with the primary diagnosis of tachycardia.     Today is hospital day 1d. This morning she reports having vaginal burning and itchiness with a light link discharge. She said she is doing well otherwise and rested well.     PAST MEDICAL & SURGICAL HISTORY  Hypertension    Diabetes mellitus    Thyroid disease    Anemia    H/O diabetic neuropathy    Fibroid uterus    Bipolar mood disorder    S/P lumbar discectomy  2006.    S/P tonsillectomy    H/O arthroscopy of knee      SOCIAL HISTORY:  Negative for smoking/alcohol/drug use.     ALLERGIES:  No Known Allergies    MEDICATIONS:  STANDING MEDICATIONS  aspirin  chewable 81 milliGRAM(s) Oral daily  baclofen 5 milliGRAM(s) Oral every 12 hours  chlorhexidine 2% Cloths 1 Application(s) Topical daily  dextrose 5%. 1000 milliLiter(s) IV Continuous <Continuous>  dextrose 5%. 1000 milliLiter(s) IV Continuous <Continuous>  dextrose 50% Injectable 25 Gram(s) IV Push once  dextrose 50% Injectable 12.5 Gram(s) IV Push once  dextrose 50% Injectable 25 Gram(s) IV Push once  gabapentin 600 milliGRAM(s) Oral two times a day  glucagon  Injectable 1 milliGRAM(s) IntraMuscular once  insulin glargine Injectable (LANTUS) 30 Unit(s) SubCutaneous at bedtime  insulin lispro (ADMELOG) corrective regimen sliding scale   SubCutaneous three times a day before meals  latanoprost 0.005% Ophthalmic Solution 1 Drop(s) Both EYES at bedtime  levothyroxine 75 MICROGram(s) Oral daily  losartan 100 milliGRAM(s) Oral daily  metoprolol succinate  milliGRAM(s) Oral daily  NIFEdipine XL 30 milliGRAM(s) Oral daily  risperiDONE   Tablet 1 milliGRAM(s) Oral daily    PRN MEDICATIONS  acetaminophen     Tablet .. 650 milliGRAM(s) Oral every 6 hours PRN  aluminum hydroxide/magnesium hydroxide/simethicone Suspension 30 milliLiter(s) Oral every 4 hours PRN  dextrose Oral Gel 15 Gram(s) Oral once PRN  haloperidol     Tablet 5 milliGRAM(s) Oral every 6 hours PRN  LORazepam     Tablet 2 milliGRAM(s) Oral every 6 hours PRN  melatonin 3 milliGRAM(s) Oral at bedtime PRN  ondansetron Injectable 4 milliGRAM(s) IV Push every 8 hours PRN    VITALS:   T(F): 97.3  HR: 92  BP: 135/82  RR: 18  SpO2: 97%    LABS:                        13.4   6.71  )-----------( 216      ( 30 Jun 2025 06:48 )             40.1     06-30    133[L]  |  99  |  24[H]  ----------------------------<  265[H]  4.1   |  24  |  0.6[L]    Ca    9.4      30 Jun 2025 06:48        Urinalysis Basic - ( 30 Jun 2025 06:48 )    Color: x / Appearance: x / SG: x / pH: x  Gluc: 265 mg/dL / Ketone: x  / Bili: x / Urobili: x   Blood: x / Protein: x / Nitrite: x   Leuk Esterase: x / RBC: x / WBC x   Sq Epi: x / Non Sq Epi: x / Bacteria: x                RADIOLOGY:    PHYSICAL EXAM:  GEN: No acute distress  LUNGS: Clear to auscultation bilaterally   HEART: Regular  ABD: Soft, non-tender, non-distended.  EXT: NC/NC/NE/2+PP/SUE/Skin Intact.   NEURO: AAOX3    Intravenous access:   NG tube:   Shannon Catheter:

## 2025-06-30 NOTE — BH CONSULTATION LIAISON PROGRESS NOTE - NSBHASSESSMENTFT_PSY_ALL_CORE
Patient is a 59 year old female, currently living at [facility] since 2024, with a PMH of HTN, IDDM, Hypothyroidism, and Neuropathy, with a PPH of psychosis vs bipolar d/o (per chart review), prior IPP admissions for psychosis, recently discharged from [facility] for psychosis, hx of being on Abilify, no current medications (was supposed to start Risperdal tonight), no reported hx of suicide attempts/non-suicidal self injury, hx of cocaine use, hx of verbal aggression, who was [facility] the [facility] after pt was verbally aggressive with the staff.      Patient was admitted to the medical floor for tachycardia and CL was consulted to evaluate need for IPP. At time of evaluation patient is well related, calm, pleasant, coherent, and not noticeably psychotic. She is not paranoid and she is not having hallucinations (she is not responding to internal stimuli). Her course of illness was previously suggestive of bipolar versus substance induced mood not schizoaffective so acute psychosis does not properly fit with course of illness. Of note after returning from her daughter's graduation patient seemed to describe becoming delirious and was apparently hospitalized at [facility] with blood glucose of 600s (suggestive of DKA or HHS likely causing toxic metabolic encephalopathy).     Patient's seemingly rapidly self-resolving symptoms is again less likely suggestive of psychiatric decompensation. At current presentation--standard of care is minimally restrictive treatment--involuntary psychiatric hospitalization does not make sense given patient is not acutely at risk of harming herself or others in the hospital. She is in behavioral control. She did received several prn dosages of ativan, but not haldol suggestive that patient was anxious, but less agitated. Will clarify with further collateral to determine final disposition.

## 2025-06-30 NOTE — PROGRESS NOTE ADULT - SUBJECTIVE AND OBJECTIVE BOX
Patient is a 59y old  Female who presents with a chief complaint of combativeness (30 Jun 2025 09:30)      Patient seen and examined at bedside.    ALLERGIES:  No Known Allergies    MEDICATIONS:  acetaminophen     Tablet .. 650 milliGRAM(s) Oral every 6 hours PRN  aluminum hydroxide/magnesium hydroxide/simethicone Suspension 30 milliLiter(s) Oral every 4 hours PRN  aspirin  chewable 81 milliGRAM(s) Oral daily  baclofen 5 milliGRAM(s) Oral every 12 hours  chlorhexidine 2% Cloths 1 Application(s) Topical daily  dextrose 5%. 1000 milliLiter(s) IV Continuous <Continuous>  dextrose 5%. 1000 milliLiter(s) IV Continuous <Continuous>  dextrose 50% Injectable 25 Gram(s) IV Push once  dextrose 50% Injectable 12.5 Gram(s) IV Push once  dextrose 50% Injectable 25 Gram(s) IV Push once  dextrose Oral Gel 15 Gram(s) Oral once PRN  gabapentin 600 milliGRAM(s) Oral two times a day  glucagon  Injectable 1 milliGRAM(s) IntraMuscular once  haloperidol     Tablet 5 milliGRAM(s) Oral every 6 hours PRN  insulin glargine Injectable (LANTUS) 30 Unit(s) SubCutaneous at bedtime  insulin lispro (ADMELOG) corrective regimen sliding scale   SubCutaneous three times a day before meals  latanoprost 0.005% Ophthalmic Solution 1 Drop(s) Both EYES at bedtime  levothyroxine 75 MICROGram(s) Oral daily  LORazepam     Tablet 2 milliGRAM(s) Oral every 6 hours PRN  losartan 100 milliGRAM(s) Oral daily  melatonin 3 milliGRAM(s) Oral at bedtime PRN  metoprolol succinate  milliGRAM(s) Oral daily  NIFEdipine XL 30 milliGRAM(s) Oral daily  ondansetron Injectable 4 milliGRAM(s) IV Push every 8 hours PRN  risperiDONE   Tablet 1 milliGRAM(s) Oral daily    Vital Signs Last 24 Hrs  T(F): 98.7 (30 Jun 2025 13:48), Max: 98.7 (30 Jun 2025 13:48)  HR: 75 (30 Jun 2025 13:48) (75 - 92)  BP: 90/51 (30 Jun 2025 13:48) (90/51 - 135/82)  RR: 18 (30 Jun 2025 13:48) (17 - 18)  SpO2: 97% (30 Jun 2025 04:44) (97% - 98%)  I&O's Summary    29 Jun 2025 07:01  -  30 Jun 2025 07:00  --------------------------------------------------------  IN: 921 mL / OUT: 1150 mL / NET: -229 mL    30 Jun 2025 07:01  -  30 Jun 2025 16:14  --------------------------------------------------------  IN: 650 mL / OUT: 500 mL / NET: 150 mL        PHYSICAL EXAM:  General: NAD, A/O x 3  ENT: MMM  Neck: Supple, No JVD  Lungs: Clear to auscultation bilaterally  Cardio: RRR, S1/S2, No murmurs  Abdomen: Soft, Nontender, Nondistended; Bowel sounds present  Extremities: No cyanosis, No edema    LABS:                        13.4   6.71  )-----------( 216      ( 30 Jun 2025 06:48 )             40.1     06-30    133  |  99  |  24  ----------------------------<  265  4.1   |  24  |  0.6    Ca    9.4      30 Jun 2025 06:48                  TSH 4.47   TSH with FT4 reflex --  Total T3 --              POCT Blood Glucose.: 316 mg/dL (30 Jun 2025 11:38)  POCT Blood Glucose.: 353 mg/dL (30 Jun 2025 08:53)  POCT Blood Glucose.: 343 mg/dL (30 Jun 2025 07:51)  POCT Blood Glucose.: 267 mg/dL (29 Jun 2025 21:21)  POCT Blood Glucose.: 253 mg/dL (29 Jun 2025 16:37)      Urinalysis Basic - ( 30 Jun 2025 06:48 )    Color: x / Appearance: x / SG: x / pH: x  Gluc: 265 mg/dL / Ketone: x  / Bili: x / Urobili: x   Blood: x / Protein: x / Nitrite: x   Leuk Esterase: x / RBC: x / WBC x   Sq Epi: x / Non Sq Epi: x / Bacteria: x        COVID-19 PCR: NotDetec (06-28-25 @ 13:45)      RADIOLOGY & ADDITIONAL TESTS:    Care Discussed with Consultants/Other Providers:

## 2025-06-30 NOTE — BH CHART NOTE - NSEVENTNOTEFT_PSY_ALL_CORE
Call Log  [facility]:  - No comment on pt state or condition previously  - Pt file was faxed over to [facility] shortly after admission   (Gonzalez Godinez):  - Pt has severe, uncontrolled DM (states "it's a problem when her sugar goes over 600")  - Pt has "really bad pains in her legs" for which she is prescribed pain medication  -- Physician located on [place] -  is unsure of the medication, physician name, or physician location  - 1 year ago physician increased pt medication dosage which was followed by an unspecified psychiatric episode (mentions mistaken Dx of BPD)  - Medication was changed back and the pts episode stopped  -  states pt needs one steady physician to help manage her care and limit her pain meds to help prevent further deterioration Call Log  [facility]:  - No comment on pt state or condition previously  - Pt file was faxed over to [facility] shortly after admission       (Gonzalez Godinez) @ [phone]:  - Pt has severe, uncontrolled DM (states "it's a problem when her sugar goes over 600")  - Pt has "really bad pains in her legs" for which she is prescribed pain medication  -- Physician located on [place] -  is unsure of the medication, physician name, or physician location  - 1 year ago physician increased pt medication dosage which was followed by an unspecified psychiatric episode (mentions mistaken Dx of BPD)  - Medication was changed back and the pts episode stopped  -  states pt needs one steady physician to help manage her care and limit her pain meds to help prevent further deterioration    -  seems to believe that patient's behavior is related to patient's medical issues and notices an association when medical issues are not well controlled.  - He does not seem to think she definitely needs to go to inpatient psychiatry.

## 2025-06-30 NOTE — DISCHARGE NOTE PROVIDER - HOSPITAL COURSE
59-year-old female PMH HTN, HLD, hypothyroidism, bipolar disorder with IPPA for psychosis in the past, who [facility] for concerns of combativeness, anxiety, and mood change.       #Acute Psychosis   #Bipolar Disorder  - Seen by  "Pt currently presents with a psychiatric decompensation with paranoid delusions and likely auditory hallucinations in the setting of medication nonadherence. She is at high risk of harm to herself and others and would benefit from an IPP admission for further observation and stabilization"  - Initial plan to admit to IPP however, patient tachycardic   - Per psych:   - Hold Cymbalta for now  - Start Risperdal 1 mg PO QHS for psychosis  - PRNs: Haldol 5 mg PO q6h for agitation and Ativan 2 mg PO q6h for severe anxiety and agitation if not medically contraindicated    #Sinus Tachycardia   - Can be ISO of anxiety/agitation/psychosis   - Telemetry monitoring for now     #HTN   - C/W Home Nifedipine Losartan & Metoprolol     #DM   - C/W home Lantus 33 units at bedtime  - ISS   - Goal -180     #DVT PPx: Lovenox   #GI PPx: Not indicated   #Activity: IAAT   #Diet: DASJ      59-year-old female PMH HTN, HLD, hypothyroidism, bipolar disorder with IPPA for psychosis in the past, who [facility] concerns of combativeness, anxiety, and mood change.     - Seen by  "Pt currently presents with a psychiatric decompensation with paranoid delusions and likely auditory hallucinations in the setting of medication nonadherence. She is at high risk of harm to herself and others and would benefit from an IPP admission for further observation and stabilization"  - Initial plan to admit to IPP however, patient tachycardic     On 06/28: EKG ordered and showed sinus tachycardia. CXR ordered as well.   On 06/29, patient admitted to medicine to be evaluated for tachycardia.   On 6/30, tachycardia resolved. Patient held from 6/30 to 7/1 for final psych evaluation regarding IPP.       #Acute Psychosis   #Bipolar Disorder  - Seen by  "Pt currently presents with a psychiatric decompensation with paranoid delusions and likely auditory hallucinations in the setting of medication nonadherence. She is at high risk of harm to herself and others and would benefit from an IPP admission for further observation and stabilization"  - Initial plan to admit to IPP however, patient tachycardic   - Per psych:   - Hold Cymbalta for now  - Start Risperdal 1 mg PO QHS for psychosis  - PRNs: Haldol 5 mg PO q6h for agitation and Ativan 1 mg PO q6h for severe anxiety and agitation if not medically contraindicated    #Sinus Tachycardia   - Can be ISO of anxiety/agitation/psychosis   - Telemetry monitoring for now     #HTN   - C/W Home Nifedipine Losartan & Metoprolol     #DM   - C/W home Lantus 33 units at bedtime  - ISS   - Goal -180     #DVT PPx: Lovenox   #GI PPx: Not indicated   #Activity: IAAT   #Diet: DASJ      59-year-old female PMH HTN, HLD, hypothyroidism, bipolar disorder with IPPA for psychosis in the past, who [facility] of [facility], anxiety, and mood change.     - Seen by  "Pt currently presents with a psychiatric decompensation with paranoid delusions and likely auditory hallucinations in the setting of medication nonadherence. She is at high risk of harm to herself and others and would benefit from an IPP admission for further observation and stabilization"  - Initial plan to admit to IPP however, patient tachycardic     On 06/28: EKG ordered and showed sinus tachycardia. CXR ordered as well.   On 06/29, patient admitted to medicine to be evaluated for tachycardia.   On 6/30, tachycardia resolved. Patient held from 6/30 to 7/1 for final psych evaluation regarding IPP.       #Acute Psychosis was ruled out - pt was found to have acute delirium that resolved at discharge   #Bipolar Disorder  - Seen by  "Pt currently presents with a psychiatric decompensation with paranoid delusions and likely auditory hallucinations in the setting of medication nonadherence. She is at high risk of harm to herself and others and would benefit from an IPP admission for further observation and stabilization"  - pt reevaluated by psych found she was safe to return to her nursing home and did not require IPP   - restart cymbalta   - continue Risperdal 1 mg PO QHS   - Hydroxyzine for anxiety     #Sinus Tachycardia   - resolved after 24 hrs  - maybe secondary to ingestion of a substance      #HTN   - C/W Home Nifedipine Losartan & Metoprolol     #DM   - lantus increased to 40u      Pt discharged to nursing home

## 2025-06-30 NOTE — DISCHARGE NOTE PROVIDER - ATTENDING DISCHARGE PHYSICAL EXAMINATION:
Vital Signs Last 24 Hrs  T(F): 98.1 (01 Jul 2025 12:29), Max: 98.1 (01 Jul 2025 12:29)  HR: 74 (01 Jul 2025 12:29) (74 - 95)  BP: 128/80 (01 Jul 2025 12:29) (123/78 - 130/76)  RR: 18 (01 Jul 2025 12:29) (18 - 18)  SpO2: 97% (01 Jul 2025 04:08) (97% - 97%)    PHYSICAL EXAM:  GENERAL: NAD, well-groomed, well-developed  HEAD:  Atraumatic, Normocephalic  EYES: EOMI, conjunctiva and sclera clear  ENMT: Moist mucous membranes, Good dentition, no thrush  NECK: Supple, No JVD  CHEST/LUNG: Clear to auscultation bilaterally, good air entry, non-labored breathing  HEART: RRR; S1/S2, no murmur   ABDOMEN: Soft, Nontender, Nondistended; Bowel sounds present  VASCULAR: Normal pulses, Normal capillary refill  EXTREMITIES: No calf tenderness, No cyanosis, No edema  LYMPH: Normal; No lymphadenopathy noted  SKIN: Warm, Intact  PSYCH: Normal mood, Normal affect  NERVOUS SYSTEM:  A/O x3, poor concentration; CN 2-12 intact, No focal deficits

## 2025-06-30 NOTE — BH CONSULTATION LIAISON PROGRESS NOTE - NSBHCONSULTRECOMMENDOTHER_PSY_A_CORE FT
-Continue Risperdal 1 mg qhs for mood stabilization.  -Will determine voluntary IPP versus d/c back to SNF tomorrow after collateral from patient's daughter.

## 2025-06-30 NOTE — DISCHARGE NOTE PROVIDER - CARE PROVIDERS DIRECT ADDRESSES
,[email or web address]

## 2025-06-30 NOTE — DISCHARGE NOTE PROVIDER - CARE PROVIDER_API CALL
Aneesh Gaitan  Internal Medicine  [phone] [place] [address]  Phone: [phone]  Fax: [phone])-  Follow Up Time: 1-3 days

## 2025-07-01 ENCOUNTER — TRANSCRIPTION ENCOUNTER (OUTPATIENT)
Age: 60
End: 2025-07-01

## 2025-07-01 VITALS
SYSTOLIC BLOOD PRESSURE: 128 MMHG | RESPIRATION RATE: 18 BRPM | HEART RATE: 74 BPM | TEMPERATURE: 98 F | DIASTOLIC BLOOD PRESSURE: 80 MMHG

## 2025-07-01 LAB
ANION GAP SERPL CALC-SCNC: 10 MMOL/L — SIGNIFICANT CHANGE UP (ref 7–14)
BUN SERPL-MCNC: 22 MG/DL — HIGH (ref 10–20)
CALCIUM SERPL-MCNC: 9.7 MG/DL — SIGNIFICANT CHANGE UP (ref 8.4–10.5)
CHLORIDE SERPL-SCNC: 100 MMOL/L — SIGNIFICANT CHANGE UP (ref 98–110)
CO2 SERPL-SCNC: 25 MMOL/L — SIGNIFICANT CHANGE UP (ref 17–32)
CREAT SERPL-MCNC: 0.7 MG/DL — SIGNIFICANT CHANGE UP (ref 0.7–1.5)
CULTURE RESULTS: SIGNIFICANT CHANGE UP
EGFR: 99 ML/MIN/1.73M2 — SIGNIFICANT CHANGE UP
EGFR: 99 ML/MIN/1.73M2 — SIGNIFICANT CHANGE UP
GLUCOSE BLDC GLUCOMTR-MCNC: 293 MG/DL — HIGH (ref 70–99)
GLUCOSE BLDC GLUCOMTR-MCNC: 306 MG/DL — HIGH (ref 70–99)
GLUCOSE BLDC GLUCOMTR-MCNC: 371 MG/DL — HIGH (ref 70–99)
GLUCOSE SERPL-MCNC: 342 MG/DL — HIGH (ref 70–99)
POTASSIUM SERPL-MCNC: 4.2 MMOL/L — SIGNIFICANT CHANGE UP (ref 3.5–5)
POTASSIUM SERPL-SCNC: 4.2 MMOL/L — SIGNIFICANT CHANGE UP (ref 3.5–5)
SODIUM SERPL-SCNC: 135 MMOL/L — SIGNIFICANT CHANGE UP (ref 135–146)
SPECIMEN SOURCE: SIGNIFICANT CHANGE UP

## 2025-07-01 PROCEDURE — 99239 HOSP IP/OBS DSCHRG MGMT >30: CPT

## 2025-07-01 PROCEDURE — 99232 SBSQ HOSP IP/OBS MODERATE 35: CPT

## 2025-07-01 RX ORDER — NIFEDIPINE 30 MG
1 TABLET, EXTENDED RELEASE 24 HR ORAL
Qty: 0 | Refills: 0 | DISCHARGE
Start: 2025-07-01

## 2025-07-01 RX ORDER — HYDROXYZINE HYDROCHLORIDE 25 MG/1
50 TABLET, FILM COATED ORAL ONCE
Refills: 0 | Status: COMPLETED | OUTPATIENT
Start: 2025-07-01 | End: 2025-07-01

## 2025-07-01 RX ORDER — NAPROXEN SODIUM 275 MG
1 TABLET ORAL
Refills: 0 | DISCHARGE

## 2025-07-01 RX ORDER — RISPERIDONE 4 MG
1 TABLET ORAL
Qty: 0 | Refills: 0 | DISCHARGE
Start: 2025-07-01

## 2025-07-01 RX ORDER — LEVOTHYROXINE SODIUM 300 MCG
1 TABLET ORAL
Qty: 0 | Refills: 0 | DISCHARGE
Start: 2025-07-01

## 2025-07-01 RX ORDER — ACETAMINOPHEN 500 MG/5ML
2 LIQUID (ML) ORAL
Qty: 0 | Refills: 0 | DISCHARGE
Start: 2025-07-01

## 2025-07-01 RX ORDER — HYDROXYZINE HYDROCHLORIDE 25 MG/1
1 TABLET, FILM COATED ORAL
Qty: 0 | Refills: 0 | DISCHARGE

## 2025-07-01 RX ORDER — NIFEDIPINE 30 MG
1 TABLET, EXTENDED RELEASE 24 HR ORAL
Refills: 0 | DISCHARGE

## 2025-07-01 RX ORDER — INSULIN GLARGINE-YFGN 100 [IU]/ML
10 INJECTION, SOLUTION SUBCUTANEOUS ONCE
Refills: 0 | Status: COMPLETED | OUTPATIENT
Start: 2025-07-01 | End: 2025-07-01

## 2025-07-01 RX ORDER — INSULIN GLARGINE-YFGN 100 [IU]/ML
40 INJECTION, SOLUTION SUBCUTANEOUS AT BEDTIME
Refills: 0 | Status: DISCONTINUED | OUTPATIENT
Start: 2025-07-01 | End: 2025-07-01

## 2025-07-01 RX ORDER — DULOXETINE 20 MG/1
40 CAPSULE, DELAYED RELEASE ORAL DAILY
Refills: 0 | Status: DISCONTINUED | OUTPATIENT
Start: 2025-07-01 | End: 2025-07-01

## 2025-07-01 RX ORDER — INSULIN GLARGINE-YFGN 100 [IU]/ML
40 INJECTION, SOLUTION SUBCUTANEOUS
Qty: 0 | Refills: 0 | DISCHARGE

## 2025-07-01 RX ORDER — DULOXETINE 20 MG/1
2 CAPSULE, DELAYED RELEASE ORAL
Refills: 0 | DISCHARGE

## 2025-07-01 RX ORDER — BACLOFEN 10 MG/20ML
1 INJECTION INTRATHECAL
Qty: 0 | Refills: 0 | DISCHARGE
Start: 2025-07-01

## 2025-07-01 RX ORDER — DULOXETINE 20 MG/1
60 CAPSULE, DELAYED RELEASE ORAL DAILY
Refills: 0 | Status: DISCONTINUED | OUTPATIENT
Start: 2025-07-01 | End: 2025-07-01

## 2025-07-01 RX ORDER — DULOXETINE 20 MG/1
1 CAPSULE, DELAYED RELEASE ORAL
Qty: 0 | Refills: 0 | DISCHARGE
Start: 2025-07-01

## 2025-07-01 RX ORDER — ASPIRIN 325 MG
1 TABLET ORAL
Qty: 0 | Refills: 0 | DISCHARGE

## 2025-07-01 RX ORDER — LATANOPROST PF 0.05 MG/ML
1 SOLUTION/ DROPS OPHTHALMIC
Qty: 0 | Refills: 0 | DISCHARGE
Start: 2025-07-01

## 2025-07-01 RX ORDER — MELATONIN 5 MG
1 TABLET ORAL
Qty: 0 | Refills: 0 | DISCHARGE
Start: 2025-07-01

## 2025-07-01 RX ORDER — BACLOFEN 10 MG/20ML
1 INJECTION INTRATHECAL
Refills: 0 | DISCHARGE

## 2025-07-01 RX ADMIN — GABAPENTIN 600 MILLIGRAM(S): 400 CAPSULE ORAL at 17:03

## 2025-07-01 RX ADMIN — INSULIN LISPRO 10: 100 INJECTION, SOLUTION INTRAVENOUS; SUBCUTANEOUS at 17:02

## 2025-07-01 RX ADMIN — Medication 2 MILLIGRAM(S): at 08:14

## 2025-07-01 RX ADMIN — Medication 81 MILLIGRAM(S): at 11:24

## 2025-07-01 RX ADMIN — BACLOFEN 5 MILLIGRAM(S): 10 INJECTION INTRATHECAL at 17:03

## 2025-07-01 RX ADMIN — Medication 2 MILLIGRAM(S): at 01:37

## 2025-07-01 RX ADMIN — Medication 1 APPLICATION(S): at 11:26

## 2025-07-01 RX ADMIN — INSULIN GLARGINE-YFGN 10 UNIT(S): 100 INJECTION, SOLUTION SUBCUTANEOUS at 11:22

## 2025-07-01 RX ADMIN — INSULIN LISPRO 6: 100 INJECTION, SOLUTION INTRAVENOUS; SUBCUTANEOUS at 08:14

## 2025-07-01 RX ADMIN — METOPROLOL SUCCINATE 200 MILLIGRAM(S): 50 TABLET, EXTENDED RELEASE ORAL at 05:19

## 2025-07-01 RX ADMIN — LOSARTAN POTASSIUM 100 MILLIGRAM(S): 100 TABLET, FILM COATED ORAL at 05:19

## 2025-07-01 RX ADMIN — DULOXETINE 40 MILLIGRAM(S): 20 CAPSULE, DELAYED RELEASE ORAL at 11:26

## 2025-07-01 RX ADMIN — Medication 1 MILLIGRAM(S): at 11:23

## 2025-07-01 RX ADMIN — BACLOFEN 5 MILLIGRAM(S): 10 INJECTION INTRATHECAL at 05:20

## 2025-07-01 RX ADMIN — Medication 30 MILLIGRAM(S): at 05:20

## 2025-07-01 RX ADMIN — Medication 75 MICROGRAM(S): at 05:20

## 2025-07-01 RX ADMIN — HYDROXYZINE HYDROCHLORIDE 50 MILLIGRAM(S): 25 TABLET, FILM COATED ORAL at 11:35

## 2025-07-01 RX ADMIN — INSULIN LISPRO 8: 100 INJECTION, SOLUTION INTRAVENOUS; SUBCUTANEOUS at 11:40

## 2025-07-01 RX ADMIN — GABAPENTIN 600 MILLIGRAM(S): 400 CAPSULE ORAL at 05:19

## 2025-07-01 NOTE — BH CONSULTATION LIAISON PROGRESS NOTE - MSE UNSTRUCTURED FT
Adequate grooming and hygiene, appearance consistent with chronological age. Alert and oriented to person, place, time and situation. Behavior is appropriate. No involuntary movements. Eye contact is appropriate. Speech is non-pressured and of normal amount, rate, volume and articulation. Mood is neutral with congruent affect. Flow of thought is linear. Content of thought is unremarkable and no delusional or obsessive material is presented. Denies thoughts of harm to self or others. Denies auditory or visual hallucinations. Fair insight and judgement.     Adequate grooming and hygiene, appearance consistent with chronological age. Alert and oriented to person, place, time and situation. Behavior is appropriate. No involuntary movements. Eye contact is appropriate. Speech is non-pressured and of normal amount, rate, volume and articulation. Mood is neutral with congruent affect. Flow of thought is linear. Content of thought is unremarkable and no delusional or obsessive material is presented. Denies thoughts of harm to self or others. Denies auditory or visual hallucinations. Fair insight and judgement.

## 2025-07-01 NOTE — DISCHARGE NOTE NURSING/CASE MANAGEMENT/SOCIAL WORK - PATIENT PORTAL LINK FT
You can access the FollowMyHealth Patient Portal offered by [facility] by registering at the following website: [email or web address]. By joining "[employer] [place]"’s FollowMyHealth portal, you will also be able to view your health information using other applications (apps) compatible with our system.

## 2025-07-01 NOTE — BH CONSULTATION LIAISON PROGRESS NOTE - NSBHASSESSMENTFT_PSY_ALL_CORE
Patient is a 59 year old female, currently living at [facility] since 2024, with a PMH of HTN, IDDM, Hypothyroidism, and Neuropathy, with a PPH of psychosis vs bipolar d/o (per chart review), prior IPP admissions for psychosis, recently discharged from [facility] for psychosis, hx of being on Abilify, no current medications (was supposed to start Risperdal tonight), no reported hx of suicide attempts/non-suicidal self injury, hx of cocaine use, hx of verbal aggression, who was BIBEMS activated by the skilled nursing after pt was verbally aggressive with the staff.      Patient was admitted to the medical floor for tachycardia and CL was consulted to evaluate need for IPP. At time of evaluation patient is well related, calm, pleasant, coherent, and not noticeably psychotic. She is not paranoid and she is not having hallucinations (she is not responding to internal stimuli). Her course of illness was previously suggestive of bipolar versus substance induced mood not schizoaffective so acute psychosis does not properly fit with course of illness. Of note after returning from her daughter's graduation patient seemed to describe becoming delirious and was reportedly hospitalized at [facility] with blood glucose of 600s (suggestive of DKA or HHS likely causing toxic metabolic encephalopathy).     Patient's seemingly rapidly self-resolving symptoms is again less likely suggestive of psychiatric decompensation. At current presentation--standard of care is minimally restrictive treatment--involuntary psychiatric hospitalization does not make sense given patient is not acutely at risk of harming herself or others in the hospital. She is in behavioral control. She did received several prn dosages of ativan, but not haldol suggestive that patient was anxious, but less agitated. Will clarify with further collateral to determine final disposition. Patient is a 59 year old female, currently living at [facility] since 2024, with a PMH of HTN, IDDM, Hypothyroidism, and Neuropathy, with a PPH of psychosis vs bipolar d/o (per chart review), prior IPP admissions for psychosis, recently discharged from [facility] for psychosis, hx of being on Abilify, no current medications (was supposed to start Risperdal tonight), no reported hx of suicide attempts/non-suicidal self injury, hx of cocaine use, hx of verbal aggression, who was BIBEMS activated by the [facility] after pt was verbally aggressive with the staff.      Patient was admitted to the medical floor for tachycardia and CL was consulted to evaluate need for IPP. At time of evaluation patient is well related, calm, pleasant, coherent, and not noticeably psychotic. She is not paranoid and she is not having hallucinations (she is not responding to internal stimuli). Her course of illness was previously suggestive of bipolar versus substance induced mood not schizoaffective so acute psychosis does not properly fit with course of illness. Of note after returning from her daughter's graduation patient seemed to describe becoming delirious and was reportedly hospitalized at [facility] with blood glucose of 600s (suggestive of DKA or HHS likely causing toxic metabolic encephalopathy). Blood sugar continues to be elevated.    Patient's seemingly rapidly self-resolving symptoms is again less likely suggestive of psychiatric decompensation. At current presentation--standard of care is minimally restrictive treatment--involuntary psychiatric hospitalization does not make sense given patient is not acutely at risk of harming herself or others in the hospital. She is in behavioral control. She did received several prn dosages of ativan, but not haldol suggestive that patient was anxious, but less agitated. Will clarify with further collateral to determine final disposition. Patient is a 59 year old female, currently living at [facility] since 2024, with a PMH of HTN, IDDM, Hypothyroidism, and Neuropathy, with a PPH of psychosis vs bipolar d/o (per chart review), prior IPP admissions for psychosis, recently discharged from [facility] for psychosis, hx of being on Abilify, no current medications (was supposed to start Risperdal tonight), no reported hx of suicide attempts/non-suicidal self injury, hx of cocaine use, hx of verbal aggression, who was BIBEMS activated by the assisted after pt was verbally aggressive with the staff.      Patient was admitted to the medical floor for tachycardia and CL was consulted to evaluate need for IPP. At time of evaluation patient is well related, calm, pleasant, coherent, and not noticeably psychotic. She is not paranoid and she is not having hallucinations (she is not responding to internal stimuli). Her course of illness was previously suggestive of bipolar versus substance induced mood not schizoaffective so acute psychosis does not properly fit with course of illness. Of note after returning from her daughter's graduation patient seemed to describe becoming delirious and was reportedly hospitalized at [facility] with blood glucose of 600s (suggestive of DKA or HHS likely causing toxic metabolic encephalopathy). Blood sugar continues to be elevated which may continue to be a possible source of delirium.    Patient's seemingly rapidly self-resolving symptoms is again less likely suggestive of psychiatric decompensation (not consistent with previous mood disorder dx--patient is not manic/psychotic). At current presentation--standard of care is minimally restrictive treatment--involuntary psychiatric hospitalization does not make sense given patient is not acutely at risk of harming herself or others in the hospital. She is in behavioral control. She did received several prn dosages of ativan, but not haldol as patient was anxious, but less agitated. Will clarify with further collateral to determine final disposition.

## 2025-07-01 NOTE — BH CONSULTATION LIAISON PROGRESS NOTE - NSBHFUPINTERVALHXFT_PSY_A_CORE
Chart review. Patient seen at bedside. She is well organized, pleasant, calm, and logical. She does not endorse visual or auditory hallucinations at this time. She is not paranoid nor delusional. She has not been agitated, but does endorse having anxiety on/off throughout the day. She is not suicidal or homicidal. Per collateral note (from [facility])--patient's daughter endorses she had a previous episode caused by being on ambien. Patient was immediately brought to the ED after she was dropped off back in the nursing home for hyperglycemia. Daughter also is wanting patient's medical issues to be better controlled. Chart review. Patient receiving prn ativan multiple times over the past 24 hours. Patient seen at bedside. She is well organized, pleasant, calm, well related, and logical. She does not endorse visual or auditory hallucinations at this time. She is not paranoid nor delusional. She has not been agitated, but does endorse having anxiety on/off throughout the day. She is not suicidal or homicidal. Per collateral note (from [facility])--patient's daughter endorses she had a previous episode caused by being on ambien. Patient was immediately brought to the ED after she was dropped off back in the nursing home for hyperglycemia. Daughter also is wanting patient's medical issues to be better controlled and thinks her behavioral issues may be related.

## 2025-07-01 NOTE — DISCHARGE NOTE NURSING/CASE MANAGEMENT/SOCIAL WORK - NSDCPEFALRISK_GEN_ALL_CORE
For information on Fall & Injury Prevention, visit: [email or web address] OR  [email or web address] OR  https://www.cdc.gov/steadi/patient.html

## 2025-07-01 NOTE — BH CONSULTATION LIAISON PROGRESS NOTE - NSBHATTESTBILLING_PSY_A_CORE
91350-[id number] OBS or IP - high complexity OR 50-79 mins
69838-[id number] OBS or IP - moderate complexity OR 35-49 mins

## 2025-07-01 NOTE — BH CONSULTATION LIAISON PROGRESS NOTE - NSBHCHARTREVIEWVS_PSY_A_CORE FT
Vital Signs Last 24 Hrs  T(C): 36.7 (01 Jul 2025 12:29), Max: 37.1 (30 Jun 2025 13:48)  T(F): 98.1 (01 Jul 2025 12:29), Max: 98.7 (30 Jun 2025 13:48)  HR: 74 (01 Jul 2025 12:29) (74 - 95)  BP: 128/80 (01 Jul 2025 12:29) (90/51 - 130/76)  BP(mean): 93 (01 Jul 2025 04:08) (93 - 93)  RR: 18 (01 Jul 2025 12:29) (18 - 18)  SpO2: 97% (01 Jul 2025 04:08) (97% - 97%)    
Vital Signs Last 24 Hrs  T(C): 37.1 (30 Jun 2025 13:48), Max: 37.1 (30 Jun 2025 13:48)  T(F): 98.7 (30 Jun 2025 13:48), Max: 98.7 (30 Jun 2025 13:48)  HR: 75 (30 Jun 2025 13:48) (75 - 92)  BP: 90/51 (30 Jun 2025 13:48) (90/51 - 135/82)  BP(mean): 100 (30 Jun 2025 04:44) (100 - 100)  RR: 18 (30 Jun 2025 13:48) (17 - 18)  SpO2: 97% (30 Jun 2025 04:44) (97% - 98%)    Parameters below as of 29 Jun 2025 19:48  Patient On (Oxygen Delivery Method): room air

## 2025-07-01 NOTE — BH CONSULTATION LIAISON PROGRESS NOTE - NSBHTIMEACTIVITIESPERFORMED_PSY_A_CORE
Preparing to see the patient, counseling or educating patient, documentation, care coordination, reviewing separately obtained history, communicating with other health care professionals
Preparing to see the patient, counseling or educating patient, documentation, care coordination, reviewing separately obtained history, communicating with other health care professionals

## 2025-07-01 NOTE — BH CONSULTATION LIAISON PROGRESS NOTE - NSICDXBHSECONDARYDX_PSY_ALL_CORE
Anxiety   F41.9  Unspecified mood [affective] disorder   F39  
Anxiety   F41.9  Unspecified mood [affective] disorder   F39

## 2025-07-01 NOTE — DISCHARGE NOTE NURSING/CASE MANAGEMENT/SOCIAL WORK - FINANCIAL ASSISTANCE
[facility] provides services at a reduced cost to those who are determined to be eligible through [facility]’s financial assistance program. Information regarding [facility]’s financial assistance program can be found by going to [email or web address] or by calling [phone].

## 2025-07-01 NOTE — BH CONSULTATION LIAISON PROGRESS NOTE - NSBHCHARTREVIEWINVESTIGATE_PSY_A_CORE FT
< from: 12 Lead ECG (06.28.25 @ 13:42) >    Ventricular Rate 118 BPM    Atrial Rate 118 BPM    P-R Interval 160 ms    QRS Duration 78 ms    Q-T Interval 318 ms    QTC Calculation(Bazett) 445 ms    < end of copied text >

## 2025-07-01 NOTE — BH CHART NOTE - NSEVENTNOTEFT_PSY_ALL_CORE
Patient called her daughter (Arabella), allowed me to discuss details surrounding the 2 weeks prior to hospitalization at [facility]  - Daughter stated there were no substances in the house (drugs) that the patient could have taken (Vape device was located within the house)  - Daughter stated there was alcohol (champagne) present on one occasion that the patient "may have drank half a glass if anything"  - Daughter stated the patient was "normal" with regard to emotional status, affect, tone, and conversing with others  - Daughter stated the patient was normal up to dropping her back off at [facility]. She later received a call that her mother was taken to the Emergency Room, possibly regarding a problem with her blood sugar  - Daughter disclosed that the patient has been doing well mentally, but that in the past medications such as AMbien may be responsible for the episodes ("talking gibberish, mumbling, not being herself")  - Daughter asked for updates and to figure out if her mothers problems are medical or psychological, so that she can be properly treated    After handing the phone back to the patient, the patient became slightly distressed and upset that her family may not trust her due to her medical problems. After reassurances, the patient returned to a more calm state.

## 2025-07-01 NOTE — BH CONSULTATION LIAISON PROGRESS NOTE - NSBHCONSULTRECOMMENDOTHER_PSY_A_CORE FT
-On discharge schedule Risperdal 1 mg at bedtime.  -Resume home Cymbalta 60 mg daily.  -Okay to stop 1:1--patient has not been a harm/risk to herself or others. She is not suicidal/homicidal.   -Recommend discharge back to nursing home      For breakthrough anxiety:  -lower Ativan to 1 mg q 6 hrs prn.  -On discharge patient can use hydroxyzine 25 mg q 6 hrs prn. -On discharge schedule Risperdal 1 mg at bedtime.  -Resume home Cymbalta 60 mg daily.  -Okay to stop 1:1--patient has not been a harm/risk to herself or others. She is not suicidal/homicidal.   -Recommend discharge back to nursing home      For breakthrough anxiety:  -Lower Ativan to 1 mg q 6 hrs prn.  -On discharge patient can use hydroxyzine 25 mg q 6 hrs prn. -On discharge schedule Risperdal 1 mg at bedtime.  -Resume home Cymbalta 60 mg daily.  -Okay to stop 1:1--patient has not been a harm/risk to herself or others. She is not suicidal/homicidal.   -Recommend discharge back to nursing home      For breakthrough anxiety:  -Lower Ativan to 1 mg q 6 hrs prn.  -On discharge patient can use hydroxyzine 25 mg q 6 hrs prn.    Recommendations to nursing for non-pharmacological interventions for delirium management:  1.	Reorient Frequently   2.	Encourage Early Mobility   3.	Encourage talking to patient prior to hands on care    4.	Prevent overstimulation     5.	Prevent Day night Reversal : Out of bed at least 3 times during the day. Can be sitting in chair or walking in unit with assistance or even sitting on bed   6.	Curtains up from 8 am to 8 pm    7.	At least 6 hours of uninterrupted sleep at night    8.	Avoid Benzodiazepines, Anticholinergics and antihistaminergics   9.	Avoid Restraints : if needed utilize the least restrictive form : 1:1  <hand mitts< 2 point soft < 4 point soft < 2 point hard <  4 point hard    10.	Treat underlying cause    11.	Maintain K>4 and Mg >2 at all times     Optimize blood sugars to reduce recurrence of delirium.

## 2025-07-01 NOTE — BH CONSULTATION LIAISON PROGRESS NOTE - NSBHCHARTREVIEWLAB_PSY_A_CORE FT
07-01    135  |  100  |  22[H]  ----------------------------<  342[H]  4.2   |  25  |  0.7    Ca    9.7      01 Jul 2025 07:03                          13.4   6.71  )-----------( 216 ( 30 Jun 2025 06:48 )             40.1

## 2025-07-01 NOTE — BH CONSULTATION LIAISON PROGRESS NOTE - CURRENT MEDICATION
MEDICATIONS  (STANDING):  aspirin  chewable 81 milliGRAM(s) Oral daily  baclofen 5 milliGRAM(s) Oral every 12 hours  chlorhexidine 2% Cloths 1 Application(s) Topical daily  dextrose 5%. 1000 milliLiter(s) (50 mL/Hr) IV Continuous <Continuous>  dextrose 5%. 1000 milliLiter(s) (100 mL/Hr) IV Continuous <Continuous>  dextrose 50% Injectable 25 Gram(s) IV Push once  dextrose 50% Injectable 12.5 Gram(s) IV Push once  dextrose 50% Injectable 25 Gram(s) IV Push once  DULoxetine 40 milliGRAM(s) Oral daily  gabapentin 600 milliGRAM(s) Oral two times a day  glucagon  Injectable 1 milliGRAM(s) IntraMuscular once  insulin glargine Injectable (LANTUS) 40 Unit(s) SubCutaneous at bedtime  insulin lispro (ADMELOG) corrective regimen sliding scale   SubCutaneous three times a day before meals  latanoprost 0.005% Ophthalmic Solution 1 Drop(s) Both EYES at bedtime  levothyroxine 75 MICROGram(s) Oral daily  losartan 100 milliGRAM(s) Oral daily  metoprolol succinate  milliGRAM(s) Oral daily  NIFEdipine XL 30 milliGRAM(s) Oral daily  risperiDONE   Tablet 1 milliGRAM(s) Oral daily    MEDICATIONS  (PRN):  acetaminophen     Tablet .. 650 milliGRAM(s) Oral every 6 hours PRN Mild Pain (1 - 3), Moderate Pain (4 - 6), Severe Pain (7 - 10)  aluminum hydroxide/magnesium hydroxide/simethicone Suspension 30 milliLiter(s) Oral every 4 hours PRN Dyspepsia  dextrose Oral Gel 15 Gram(s) Oral once PRN Blood Glucose LESS THAN 70 milliGRAM(s)/deciliter  haloperidol     Tablet 5 milliGRAM(s) Oral every 6 hours PRN agitation  melatonin 3 milliGRAM(s) Oral at bedtime PRN Insomnia  ondansetron Injectable 4 milliGRAM(s) IV Push every 8 hours PRN Nausea and/or Vomiting  
MEDICATIONS  (STANDING):  aspirin  chewable 81 milliGRAM(s) Oral daily  baclofen 5 milliGRAM(s) Oral every 12 hours  chlorhexidine 2% Cloths 1 Application(s) Topical daily  dextrose 5%. 1000 milliLiter(s) (50 mL/Hr) IV Continuous <Continuous>  dextrose 5%. 1000 milliLiter(s) (100 mL/Hr) IV Continuous <Continuous>  dextrose 50% Injectable 25 Gram(s) IV Push once  dextrose 50% Injectable 12.5 Gram(s) IV Push once  dextrose 50% Injectable 25 Gram(s) IV Push once  gabapentin 600 milliGRAM(s) Oral two times a day  glucagon  Injectable 1 milliGRAM(s) IntraMuscular once  insulin glargine Injectable (LANTUS) 30 Unit(s) SubCutaneous at bedtime  insulin lispro (ADMELOG) corrective regimen sliding scale   SubCutaneous three times a day before meals  latanoprost 0.005% Ophthalmic Solution 1 Drop(s) Both EYES at bedtime  levothyroxine 75 MICROGram(s) Oral daily  losartan 100 milliGRAM(s) Oral daily  metoprolol succinate  milliGRAM(s) Oral daily  NIFEdipine XL 30 milliGRAM(s) Oral daily  risperiDONE   Tablet 1 milliGRAM(s) Oral daily    MEDICATIONS  (PRN):  acetaminophen     Tablet .. 650 milliGRAM(s) Oral every 6 hours PRN Mild Pain (1 - 3), Moderate Pain (4 - 6), Severe Pain (7 - 10)  aluminum hydroxide/magnesium hydroxide/simethicone Suspension 30 milliLiter(s) Oral every 4 hours PRN Dyspepsia  dextrose Oral Gel 15 Gram(s) Oral once PRN Blood Glucose LESS THAN 70 milliGRAM(s)/deciliter  haloperidol     Tablet 5 milliGRAM(s) Oral every 6 hours PRN agitation  LORazepam     Tablet 2 milliGRAM(s) Oral every 6 hours PRN Anxiety  melatonin 3 milliGRAM(s) Oral at bedtime PRN Insomnia  ondansetron Injectable 4 milliGRAM(s) IV Push every 8 hours PRN Nausea and/or Vomiting

## 2025-07-02 LAB
AMPHET UR-MCNC: NEGATIVE NG/ML — SIGNIFICANT CHANGE UP
BARBITURATES UR QL SCN: NEGATIVE NG/ML — SIGNIFICANT CHANGE UP
BARBITURATES UR-MCNC: NEGATIVE NG/ML — SIGNIFICANT CHANGE UP
BENZODIAZ UR-MCNC: NEGATIVE NG/ML — SIGNIFICANT CHANGE UP
COCAINE METAB.OTHER UR-MCNC: NEGATIVE NG/ML — SIGNIFICANT CHANGE UP
CREATININE, URINE THERAPEUTIC: 31.5 MG/DL — SIGNIFICANT CHANGE UP (ref 20–300)
FENTANYL UR QL SCN: NEGATIVE NG/ML — SIGNIFICANT CHANGE UP
METHADONE UR QL SCN: NEGATIVE NG/ML — SIGNIFICANT CHANGE UP
OPIATES UR-MCNC: NEGATIVE NG/ML — SIGNIFICANT CHANGE UP
OXYCODONE UR QL SCN: NEGATIVE NG/ML — SIGNIFICANT CHANGE UP
PCP UR-MCNC: NEGATIVE NG/ML — SIGNIFICANT CHANGE UP
PH, URINE RESULT: 5.4 — SIGNIFICANT CHANGE UP (ref 4.5–8.9)
THC UR QL: NEGATIVE NG/ML — SIGNIFICANT CHANGE UP

## 2025-07-07 NOTE — DISCHARGE NOTE NURSING/CASE MANAGEMENT/SOCIAL WORK - NSTRANSFERBELONGINGSDISPO_GEN_A_NUR
ALLERGY AND IMMUNOLOGY CLINIC FOLLOW-UP OFFICE VISIT    Chief Complaint   Patient presents with   • Asthma     ACT: 20  \"When the weather is hot and humid I find it harder to breathe.\"      HISTORY OF PRESENT ILLNESS:  Rakesh Andrews is a 56 year old White male who presents for follow-up visit. Presents today with his wife.    -Notes that he was recently dx with autoimmune hepatitis. Currently taking methotrexate.     RESPIRATORY  -He worked as  for 34 years with exposure to diesel fuel and dust.  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  -He is now racing cars.   -Since last visit he has followed with Gastroenterology [facility], markd with autoimmune hepatitis based on biopsy 11/2023.  He is on azathioprine for this, and last seen 02/26/2024  -Follows with pulm, Dr Bragg, last seen 7/31/24.     -ACT=20  -Currently: increased sx with humidity, notes that most recent injection 6/25/25 \"was a miss fire\" unsure how much of the injection he received.   -Does not wear oxygen at night or CPAP. Does not snore, no witnessed apneic spells. Feels well rested.   -Acute visits: None    Current tx:  -Continues Nucala q4 weeks (started 09/29/2021). He cannot tell when he is due for next injection, but his wife can- will hear him wheeze with activity.   -Continues Trelegy 200 mcg 1 puff qday.  -Continues Combivent 1 puff before Trelegy and as needed. Needs occasionally with heat & humidity & will use prior to exercise.   -Continues montelukast 10 mg daily     -Off daily prednisone since around 4/2024.    -Quit smoking around 2/9/24. He does not cough as often since he quit smoking per wife.     -No longer wears O2 at night. During the day his O2 is around 98%.  He snores intermittently, doesn't stop breathing.  Feels rested. He does not have a CPAP machine.      -was on prednisone 5 mg every other day but has successfully tapered off. Last dose was 04/2024.  Pt reports that he has been doing well off of it. He tried discontinuing twice before this (March & April 2022), but continued to have breakthrough symptoms. He was previously on 10 mg every other day as of 07/2021. Failed tapering down to 3/4 mg every other day 9/2023       ALLERGIC RHINITIS   -Serology 9/2021 positive to dog & ragweed  -No longer has pets in home. Previously with 1 dog in home. He does have regular exposure to dogs at his neighbors.   -Current nasal/ocular symptoms: feels that they have been controlled for the most part, will get rhinorrhea  -No use of nasal steroid    -Need for antibiotics: No   -Continues levocetirizine 5 mg daily.       MEDICATION REACTION   -Patient has history of aspirin allergy diagnosed as a child. He was told he was given it during surgery and it was ineffective, he was then given tylenol which helped and then again given Aspirin with no benefit. He has taken Meloxicam, Ibuprofen and Celebrex without a reaction.      I have reviewed the patient's past medical, surgical, social and family history, updating these as appropriate.  See Histories section of the EMR for a display of this information.       REVIEW OF SYSTEMS: Review of systems is as per noted in history of present illness and is otherwise negative.     PHYSICAL EXAMINATION:  Visit Vitals  /72   Pulse (!) 56   Resp 16   Ht 6' 1\" (1.854 m)   Wt 108 kg (238 lb)   BMI 31.40 kg/m²       Constitutional:  Well-developed, well-nourished, no acute distress.   Eyes: without conjunctival injection, without infraorbital darkening, with mild infraorbital edema  HENT:  Head atraumatic/normocephalic.  Ears: Tympanic membranes: with light reflex, without injection, without effusions, without scarring.  Nose: with mild mucosal edema, pink, scant mucoid drainage, nasal septum intact. Oropharynx moist, no pharyngeal discharge, no halitosis, no posterior lymphoid hyperplasia.    Respiratory:  No respiratory distress, breath  sounds throughout, no rales, no rhonchi, no wheezing.   Cardiovascular:  +S1/S2, Normal rate, no murmurs, no gallops, no rubs.          DATA:   IN-OFFICE SPIROMETRY PERFORMED DURING TODAY'S VISIT  - Spirometric volumes are normal based on ATS criteria (FEV1>79%).  - Expiratory flow volume loop is normal.     - Volumes are significantly improved from 7/10/24        Latest Ref Rng & Units 7/8/2025     7:40 AM 7/10/2024     8:57 AM 8/30/2023    12:00 AM   SPIROMETRY   FVC, Actual 3.76 - 6.22 L 4.70  4.13  4.69    FVC, Predicted  4.98      FVC, % Predicted % 94 %  82  88    FVNLLN  3.76      FVC PRE Z SCORE  -0.37      FEV1, Actual 2.88 - 4.79 L 3.69  3.21  3.55    FEV1, Predicted  3.87      FEV1, % Predicted % 96 %  80  83    [id number]  2.88      FEV1 PRE Z SCORE  -0.29      FEV1/FVC, Actual 66.02 - 87.14 % 78.58  0.78  0.76    FEV1/FVC, Predicted  78      FEF 25-75%, Actual 1.63 - 5.36 L/s 3.44  2.74  3.01    FEF 25-75%, Predicted  3.22      FEF 25-75%, % Predicted % 107 %  57  70    POST BRONCH SPIROMETRY  -- -- --     CT Lung Screen 7/30/23:   IMPRESSION:   No suspicious pulmonary nodule or evidence for acute inflammatory process.     7/21/2021 XR Chest: IMPRESSION: No acute findings.     7/20/2021 Complete PFT w/ bronchodilator:  FINDINGS:   1.  Total lung capacity 101% of predicted, slow vital capacity 78%, residual ratio 0.46.   2.  Forced vital capacity 79%, FEV1 of 61%, obstructive ratio 0.59.  Albuterol increased FEV1 18% above baseline, 490 mL.   3.  Diffusion 86% of predicted.     IMPRESSIONS:     Moderate (at GOLD stage II) expiratory flow limitation of chronic nature given the presence of air-trapping, likely the result of \"39 years of smoking\" as reported.  Albuterol exerts significant bronchodilator effect, suggestive of ongoing bronchospasm.       6/31/2021 XR Chest: IMPRESSION: No significant abnormalities.     Labs reviewed significant labs outlined as follows:  7/29/21 IgE = 485, HDS=796, RNP=2407,  normal alpha-1 antitrypsin, negative ANCA, IgE dog = 5.83    Component      Latest Ref Rng & Units 9/1/2021   Animal Dog Dander IgE Allergen      <0.35 KU/L 5.71 (H)     Component      Latest Ref Rng & Units 9/1/2021   Weed Ragweed Short IgE  Allergen      <0.35 KU/L 1.28 (H)     ASSESSMENT:  1. Severe Persistent Asthma with COPD (CMS/HCC)    2. Encounter for long-term (current) use of medications-  -Nucala start 9/2021, home    3. Eosinophilic asthma (CMD)    4. Allergic rhinitis due to dogs, ragweed (based on serology 9/2021)    5. History of tobacco use        PLAN:  Orders Placed This Encounter   • SPIROMETRY     Severe persistent asthma with COPD  Category: severe persistent  Risk: high with ED visit x 2 (6/21, 7/21)  Control: Currently well-controlled. Off prednisone since April 2024, no longer smoking since February 2024.     -Patient does have severe, eosinophilic phenotype asthma  -During exacerbation 7/2021 eosinophil count was 500 with 8% total eosinophils.  He was steroid dependent until he was on Nucala    -Nucala started 09/29/2021. Continue Nucala but he will increase the duration between injections. To update allergy clinic once every 3 months between injections. Currently q4 weeks dosing.  Previously unable to increase duration between doses but he was smoking at this time.    -Since starting Nucala, With clinical improvement including: Has not required oral glucocorticoids for exacerbations, has been able to discontinue daily oral steroids, and decreased need for rescue therapy  -He has been able to stop prednisone since 4/2024  -No steroid bursts and no exacerbations since starting Nucala    -Continue Trelegy 200 mcg 1 puff qday  -Continue Combivent 1 puff before Trelegy and q4-6 h prn and before exercise.   -Continue montelukast 10 mg nightly  -was on prednisone 5 mg every other day but has successfully tapered off. Last dose was 04/2024. -AM cortisol 5/13/24 = 12.1      FVC post bronchodilator  07/2021 was 4.77   FEV1 post bronchodilator 07/2021 was 3.12    -FVC 11/2022 =4.39 (81%)  -FEV1 11/2022= 3.39 (81%)    -volumes 8/2023 improved from 11/2022  -FVC now 4.69, 11/2022 =4.39   -FEV1 now 3.55, 11/2022= 3.39     Patient has been off of work due to severe asthma symptoms which are exacerbated by physical exertion as well as exposure to fumes at work. Patient FMLA has been previously managed by Dr. Bragg. He started Nucala 09/29/2021. He is still not physically able to return to work.     -Dr. Bragg remains involved in his care,   as he also has a component of COPD and obstructive sleep apnea and O2 dependent     -He not wearing O2 at night.  And doesn't feel he has signs of sleep apnea.  He has lost 30-40 pounds overall.    No longer smoking. Congratulated patient on smoking cessation. Declines smoking cessation resources to help with cravings. Stopped 2/2024.       Allergic rhinitis  -Serology 9/2021 positive to ragweed & dog   -No longer with 1 dog in home. Clinically this is not a relevant allergen with regards to asthma control  -IgE dog = 5.83  -Continue levocetirizine (Xyzal) 5 mg daily.        History of aspirin allergy   -He was told he had an aspirin allergy as a child. He has taken meloxicam, ibuprofen and celebrex without any symptoms.   -They plan to do oral challenge at home - 325 mg low risk.  Reviewed option to do in clinic supervised      Patient Instructions   For asthma:   Continue Nucala but INCREASE duration between injections. Update allergy clinic once you get to every 3 months between injections. I would like to get a spirometry when you are 6 months off of Nucala.   Continue Trelegy 200 mcg 1 puff daily.   Continue Combivent 1 puff before Trelegy and every 4-6 hours as needed and before exercise.   Continue montelukast 10 mg nightly.     For environmental allergies:  Continue levocetirizine (Xyzal) 5 mg daily.     For Questions:  Robyn Ocampo DO, FACAAI Ruthann Peck,  [facility]  [facility]  [facility] [place]  Phone [phone] (allergy clinic), [phone] (general [facility])  Fax [phone]  - Or use [employer] to contact our clinic - messages sent through [email or web address] go directly to our nursing staff inbox      Return in 6 months (on 1/8/2026) for ASTHMA WITH VERONICA, asthma with veronica.     On 7/8/2025, IJUAN scribed the services personally performed by Robyn Ocampo, DO      The documentation recorded by the scribe accurately and completely reflects the service(s) I personally performed and the decisions made by me.           with patient

## 2025-07-08 DIAGNOSIS — Z79.82 LONG TERM (CURRENT) USE OF ASPIRIN: ICD-10-CM

## 2025-07-08 DIAGNOSIS — T65.91XA TOXIC EFFECT OF UNSPECIFIED SUBSTANCE, ACCIDENTAL (UNINTENTIONAL), INITIAL ENCOUNTER: ICD-10-CM

## 2025-07-08 DIAGNOSIS — Z79.890 HORMONE REPLACEMENT THERAPY: ICD-10-CM

## 2025-07-08 DIAGNOSIS — F31.9 BIPOLAR DISORDER, UNSPECIFIED: ICD-10-CM

## 2025-07-08 DIAGNOSIS — Z79.4 LONG TERM (CURRENT) USE OF INSULIN: ICD-10-CM

## 2025-07-08 DIAGNOSIS — I10 ESSENTIAL (PRIMARY) HYPERTENSION: ICD-10-CM

## 2025-07-08 DIAGNOSIS — F41.9 ANXIETY DISORDER, UNSPECIFIED: ICD-10-CM

## 2025-07-08 DIAGNOSIS — R41.0 DISORIENTATION, UNSPECIFIED: ICD-10-CM

## 2025-07-08 DIAGNOSIS — Z79.84 LONG TERM (CURRENT) USE OF ORAL HYPOGLYCEMIC DRUGS: ICD-10-CM

## 2025-07-08 DIAGNOSIS — E03.9 HYPOTHYROIDISM, UNSPECIFIED: ICD-10-CM

## 2025-07-08 DIAGNOSIS — Z11.52 ENCOUNTER FOR SCREENING FOR COVID-19: ICD-10-CM

## 2025-07-08 DIAGNOSIS — E11.40 TYPE 2 DIABETES MELLITUS WITH DIABETIC NEUROPATHY, UNSPECIFIED: ICD-10-CM
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
## 2025-07-12 ENCOUNTER — INPATIENT (INPATIENT)
Facility: HOSPITAL | Age: 60
LOS: 17 days | Discharge: HOME | DRG: 776 | End: 2025-07-30
Attending: PSYCHIATRY & NEUROLOGY | Admitting: PSYCHIATRY & NEUROLOGY
Payer: MEDICAID

## 2025-07-12 VITALS
RESPIRATION RATE: 18 BRPM | HEART RATE: 65 BPM | HEIGHT: 66 IN | TEMPERATURE: 98 F | DIASTOLIC BLOOD PRESSURE: 81 MMHG | SYSTOLIC BLOOD PRESSURE: 168 MMHG

## 2025-07-12 DIAGNOSIS — F30.9 MANIC EPISODE, UNSPECIFIED: ICD-10-CM

## 2025-07-12 LAB
ANION GAP SERPL CALC-SCNC: 14 MMOL/L — SIGNIFICANT CHANGE UP (ref 7–14)
APAP SERPL-MCNC: <5 UG/ML — LOW (ref 10–30)
APPEARANCE UR: CLEAR — SIGNIFICANT CHANGE UP
BASOPHILS # BLD AUTO: 0.04 K/UL — SIGNIFICANT CHANGE UP (ref 0–0.2)
BASOPHILS NFR BLD AUTO: 0.5 % — SIGNIFICANT CHANGE UP (ref 0–1)
BILIRUB UR-MCNC: NEGATIVE — SIGNIFICANT CHANGE UP
BUN SERPL-MCNC: 25 MG/DL — HIGH (ref 10–20)
CALCIUM SERPL-MCNC: 9.1 MG/DL — SIGNIFICANT CHANGE UP (ref 8.4–10.5)
CHLORIDE SERPL-SCNC: 105 MMOL/L — SIGNIFICANT CHANGE UP (ref 98–110)
CO2 SERPL-SCNC: 21 MMOL/L — SIGNIFICANT CHANGE UP (ref 17–32)
COLOR SPEC: YELLOW — SIGNIFICANT CHANGE UP
CREAT SERPL-MCNC: 0.6 MG/DL — LOW (ref 0.7–1.5)
DIFF PNL FLD: NEGATIVE — SIGNIFICANT CHANGE UP
EGFR: 103 ML/MIN/1.73M2 — SIGNIFICANT CHANGE UP
EGFR: 103 ML/MIN/1.73M2 — SIGNIFICANT CHANGE UP
EOSINOPHIL # BLD AUTO: 0.06 K/UL — SIGNIFICANT CHANGE UP (ref 0–0.7)
EOSINOPHIL NFR BLD AUTO: 0.7 % — SIGNIFICANT CHANGE UP (ref 0–8)
ETHANOL SERPL-MCNC: <10 MG/DL — SIGNIFICANT CHANGE UP
FLUAV AG NPH QL: SIGNIFICANT CHANGE UP
FLUBV AG NPH QL: SIGNIFICANT CHANGE UP
GAS PNL BLDV: SIGNIFICANT CHANGE UP
GLUCOSE SERPL-MCNC: 150 MG/DL — HIGH (ref 70–99)
GLUCOSE UR QL: >=1000 MG/DL
HCT VFR BLD CALC: 39.6 % — SIGNIFICANT CHANGE UP (ref 37–47)
HGB BLD-MCNC: 13.2 G/DL — SIGNIFICANT CHANGE UP (ref 12–16)
IMM GRANULOCYTES NFR BLD AUTO: 0.3 % — SIGNIFICANT CHANGE UP (ref 0.1–0.3)
KETONES UR QL: 40 MG/DL
LEUKOCYTE ESTERASE UR-ACNC: ABNORMAL
LYMPHOCYTES # BLD AUTO: 1.62 K/UL — SIGNIFICANT CHANGE UP (ref 1.2–3.4)
LYMPHOCYTES # BLD AUTO: 18.3 % — LOW (ref 20.5–51.1)
MCHC RBC-ENTMCNC: 29.2 PG — SIGNIFICANT CHANGE UP (ref 27–31)
MCHC RBC-ENTMCNC: 33.3 G/DL — SIGNIFICANT CHANGE UP (ref 32–37)
MCV RBC AUTO: 87.6 FL — SIGNIFICANT CHANGE UP (ref 81–99)
MONOCYTES # BLD AUTO: 0.64 K/UL — HIGH (ref 0.1–0.6)
MONOCYTES NFR BLD AUTO: 7.2 % — SIGNIFICANT CHANGE UP (ref 1.7–9.3)
NEUTROPHILS # BLD AUTO: 6.48 K/UL — SIGNIFICANT CHANGE UP (ref 1.4–6.5)
NEUTROPHILS NFR BLD AUTO: 73 % — SIGNIFICANT CHANGE UP (ref 42.2–75.2)
NITRITE UR-MCNC: NEGATIVE — SIGNIFICANT CHANGE UP
NRBC BLD AUTO-RTO: 0 /100 WBCS — SIGNIFICANT CHANGE UP (ref 0–0)
PH UR: 6 — SIGNIFICANT CHANGE UP (ref 5–8)
PLATELET # BLD AUTO: 219 K/UL — SIGNIFICANT CHANGE UP (ref 130–400)
PMV BLD: 9.9 FL — SIGNIFICANT CHANGE UP (ref 7.4–10.4)
POTASSIUM SERPL-MCNC: 3.8 MMOL/L — SIGNIFICANT CHANGE UP (ref 3.5–5)
POTASSIUM SERPL-SCNC: 3.8 MMOL/L — SIGNIFICANT CHANGE UP (ref 3.5–5)
PROT UR-MCNC: 30 MG/DL
RBC # BLD: 4.52 M/UL — SIGNIFICANT CHANGE UP (ref 4.2–5.4)
RBC # FLD: 13.9 % — SIGNIFICANT CHANGE UP (ref 11.5–14.5)
RSV RNA NPH QL NAA+NON-PROBE: SIGNIFICANT CHANGE UP
SALICYLATES SERPL-MCNC: <0.3 MG/DL — LOW (ref 4–30)
SARS-COV-2 RNA SPEC QL NAA+PROBE: SIGNIFICANT CHANGE UP
SODIUM SERPL-SCNC: 140 MMOL/L — SIGNIFICANT CHANGE UP (ref 135–146)
SOURCE RESPIRATORY: SIGNIFICANT CHANGE UP
SP GR SPEC: >1.03 — HIGH (ref 1–1.03)
UROBILINOGEN FLD QL: 1 MG/DL — SIGNIFICANT CHANGE UP (ref 0.2–1)
WBC # BLD: 8.87 K/UL — SIGNIFICANT CHANGE UP (ref 4.8–10.8)
WBC # FLD AUTO: 8.87 K/UL — SIGNIFICANT CHANGE UP (ref 4.8–10.8)

## 2025-07-12 PROCEDURE — 80061 LIPID PANEL: CPT

## 2025-07-12 PROCEDURE — 80053 COMPREHEN METABOLIC PANEL: CPT

## 2025-07-12 PROCEDURE — 87637 SARSCOV2&INF A&B&RSV AMP PRB: CPT

## 2025-07-12 PROCEDURE — 36415 COLL VENOUS BLD VENIPUNCTURE: CPT

## 2025-07-12 PROCEDURE — 82962 GLUCOSE BLOOD TEST: CPT

## 2025-07-12 PROCEDURE — 80307 DRUG TEST PRSMV CHEM ANLYZR: CPT

## 2025-07-12 PROCEDURE — 99285 EMERGENCY DEPT VISIT HI MDM: CPT

## 2025-07-12 PROCEDURE — 93005 ELECTROCARDIOGRAM TRACING: CPT

## 2025-07-12 PROCEDURE — 83036 HEMOGLOBIN GLYCOSYLATED A1C: CPT

## 2025-07-12 RX ORDER — MIDAZOLAM IN 0.9 % SOD.CHLORID 1 MG/ML
5 PLASTIC BAG, INJECTION (ML) INTRAVENOUS ONCE
Refills: 0 | Status: DISCONTINUED | OUTPATIENT
Start: 2025-07-12 | End: 2025-07-12

## 2025-07-12 RX ORDER — HALOPERIDOL 10 MG/1
5 TABLET ORAL ONCE
Refills: 0 | Status: COMPLETED | OUTPATIENT
Start: 2025-07-12 | End: 2025-07-12

## 2025-07-12 RX ORDER — ACETAMINOPHEN 500 MG/5ML
650 LIQUID (ML) ORAL EVERY 6 HOURS
Refills: 0 | Status: DISCONTINUED | OUTPATIENT
Start: 2025-07-13 | End: 2025-07-30

## 2025-07-12 RX ORDER — RISPERIDONE 4 MG
1 TABLET ORAL
Refills: 0 | Status: DISCONTINUED | OUTPATIENT
Start: 2025-07-13 | End: 2025-07-17

## 2025-07-12 RX ORDER — GABAPENTIN 400 MG/1
300 CAPSULE ORAL ONCE
Refills: 0 | Status: COMPLETED | OUTPATIENT
Start: 2025-07-12 | End: 2025-07-13

## 2025-07-12 RX ORDER — LORAZEPAM 4 MG/ML
1 VIAL (ML) INJECTION EVERY 8 HOURS
Refills: 0 | Status: DISCONTINUED | OUTPATIENT
Start: 2025-07-13 | End: 2025-07-19

## 2025-07-12 RX ORDER — IBUPROFEN 200 MG
400 TABLET ORAL ONCE
Refills: 0 | Status: COMPLETED | OUTPATIENT
Start: 2025-07-12 | End: 2025-07-13

## 2025-07-12 RX ORDER — MELATONIN 5 MG
3 TABLET ORAL AT BEDTIME
Refills: 0 | Status: DISCONTINUED | OUTPATIENT
Start: 2025-07-13 | End: 2025-07-14

## 2025-07-12 RX ORDER — ACETAMINOPHEN 500 MG/5ML
650 LIQUID (ML) ORAL ONCE
Refills: 0 | Status: COMPLETED | OUTPATIENT
Start: 2025-07-12 | End: 2025-07-12

## 2025-07-12 RX ORDER — RISPERIDONE 4 MG
1 TABLET ORAL EVERY 8 HOURS
Refills: 0 | Status: DISCONTINUED | OUTPATIENT
Start: 2025-07-13 | End: 2025-07-14

## 2025-07-12 RX ADMIN — Medication 650 MILLIGRAM(S): at 16:47

## 2025-07-12 RX ADMIN — Medication 5 MILLIGRAM(S): at 06:00

## 2025-07-12 RX ADMIN — HALOPERIDOL 5 MILLIGRAM(S): 10 TABLET ORAL at 06:00

## 2025-07-13 DIAGNOSIS — F19.94 OTHER PSYCHOACTIVE SUBSTANCE USE, UNSPECIFIED WITH PSYCHOACTIVE SUBSTANCE-INDUCED MOOD DISORDER: ICD-10-CM

## 2025-07-13 LAB
CHOLEST SERPL-MCNC: 150 MG/DL — SIGNIFICANT CHANGE UP
HDLC SERPL-MCNC: 37 MG/DL — LOW
LDLC SERPL-MCNC: 95 MG/DL — SIGNIFICANT CHANGE UP
LIPID PNL WITH DIRECT LDL SERPL: 95 MG/DL — SIGNIFICANT CHANGE UP
NONHDLC SERPL-MCNC: 113 MG/DL — SIGNIFICANT CHANGE UP
TRIGL SERPL-MCNC: 94 MG/DL — SIGNIFICANT CHANGE UP

## 2025-07-13 PROCEDURE — G0545: CPT

## 2025-07-13 PROCEDURE — 99222 1ST HOSP IP/OBS MODERATE 55: CPT

## 2025-07-13 RX ORDER — ACETAMINOPHEN 500 MG/5ML
650 LIQUID (ML) ORAL EVERY 6 HOURS
Refills: 0 | Status: DISCONTINUED | OUTPATIENT
Start: 2025-07-13 | End: 2025-07-30

## 2025-07-13 RX ADMIN — Medication 1 MILLIGRAM(S): at 08:39

## 2025-07-13 RX ADMIN — Medication 1 MILLIGRAM(S): at 20:55

## 2025-07-13 RX ADMIN — Medication 3 MILLIGRAM(S): at 23:18

## 2025-07-13 RX ADMIN — Medication 650 MILLIGRAM(S): at 21:07

## 2025-07-13 RX ADMIN — Medication 650 MILLIGRAM(S): at 21:06

## 2025-07-13 RX ADMIN — Medication 1 MILLIGRAM(S): at 02:00

## 2025-07-13 RX ADMIN — GABAPENTIN 300 MILLIGRAM(S): 400 CAPSULE ORAL at 00:11

## 2025-07-13 RX ADMIN — Medication 3 MILLIGRAM(S): at 02:00

## 2025-07-13 RX ADMIN — Medication 400 MILLIGRAM(S): at 00:11

## 2025-07-13 RX ADMIN — Medication 1 MILLIGRAM(S): at 14:10

## 2025-07-13 RX ADMIN — Medication 1 MILLIGRAM(S): at 21:05

## 2025-07-13 RX ADMIN — Medication 1 MILLIGRAM(S): at 13:45

## 2025-07-14 LAB
A1C WITH ESTIMATED AVERAGE GLUCOSE RESULT: 8.1 % — HIGH (ref 4–5.6)
ESTIMATED AVERAGE GLUCOSE: 186 MG/DL — HIGH (ref 68–114)
GLUCOSE BLDC GLUCOMTR-MCNC: 269 MG/DL — HIGH (ref 70–99)
GLUCOSE BLDC GLUCOMTR-MCNC: 314 MG/DL — HIGH (ref 70–99)
GLUCOSE BLDC GLUCOMTR-MCNC: 341 MG/DL — HIGH (ref 70–99)

## 2025-07-14 PROCEDURE — 93010 ELECTROCARDIOGRAM REPORT: CPT

## 2025-07-14 PROCEDURE — 99232 SBSQ HOSP IP/OBS MODERATE 35: CPT

## 2025-07-14 RX ORDER — LEVOTHYROXINE SODIUM 300 MCG
100 TABLET ORAL
Refills: 0 | Status: DISCONTINUED | OUTPATIENT
Start: 2025-07-15 | End: 2025-07-30

## 2025-07-14 RX ORDER — BRIMONIDINE TARTRATE, TIMOLOL MALEATE 2; 5 MG/ML; MG/ML
1 SOLUTION/ DROPS TOPICAL
Refills: 0 | DISCHARGE

## 2025-07-14 RX ORDER — DEXTROSE 50 % IN WATER 50 %
15 SYRINGE (ML) INTRAVENOUS ONCE
Refills: 0 | Status: DISCONTINUED | OUTPATIENT
Start: 2025-07-14 | End: 2025-07-30

## 2025-07-14 RX ORDER — SODIUM CHLORIDE 9 G/1000ML
1000 INJECTION, SOLUTION INTRAVENOUS
Refills: 0 | Status: DISCONTINUED | OUTPATIENT
Start: 2025-07-14 | End: 2025-07-30

## 2025-07-14 RX ORDER — HALOPERIDOL 10 MG/1
5 TABLET ORAL EVERY 6 HOURS
Refills: 0 | Status: DISCONTINUED | OUTPATIENT
Start: 2025-07-14 | End: 2025-07-30

## 2025-07-14 RX ORDER — LEVOTHYROXINE SODIUM 300 MCG
75 TABLET ORAL
Refills: 0 | Status: DISCONTINUED | OUTPATIENT
Start: 2025-07-14 | End: 2025-07-14

## 2025-07-14 RX ORDER — METFORMIN HYDROCHLORIDE 850 MG/1
1000 TABLET ORAL DAILY
Refills: 0 | Status: DISCONTINUED | OUTPATIENT
Start: 2025-07-14 | End: 2025-07-30

## 2025-07-14 RX ORDER — DEXTROSE 50 % IN WATER 50 %
25 SYRINGE (ML) INTRAVENOUS ONCE
Refills: 0 | Status: DISCONTINUED | OUTPATIENT
Start: 2025-07-14 | End: 2025-07-30

## 2025-07-14 RX ORDER — LATANOPROST PF 0.05 MG/ML
1 SOLUTION/ DROPS OPHTHALMIC AT BEDTIME
Refills: 0 | Status: DISCONTINUED | OUTPATIENT
Start: 2025-07-14 | End: 2025-07-30

## 2025-07-14 RX ORDER — GLUCAGON 3 MG/1
1 POWDER NASAL ONCE
Refills: 0 | Status: DISCONTINUED | OUTPATIENT
Start: 2025-07-14 | End: 2025-07-30

## 2025-07-14 RX ORDER — GABAPENTIN 400 MG/1
600 CAPSULE ORAL
Refills: 0 | Status: DISCONTINUED | OUTPATIENT
Start: 2025-07-14 | End: 2025-07-30

## 2025-07-14 RX ORDER — INSULIN GLARGINE-YFGN 100 [IU]/ML
40 INJECTION, SOLUTION SUBCUTANEOUS AT BEDTIME
Refills: 0 | Status: DISCONTINUED | OUTPATIENT
Start: 2025-07-14 | End: 2025-07-14

## 2025-07-14 RX ORDER — INSULIN GLARGINE-YFGN 100 [IU]/ML
30 INJECTION, SOLUTION SUBCUTANEOUS AT BEDTIME
Refills: 0 | Status: DISCONTINUED | OUTPATIENT
Start: 2025-07-14 | End: 2025-07-15

## 2025-07-14 RX ORDER — INSULIN GLARGINE-YFGN 100 [IU]/ML
30 INJECTION, SOLUTION SUBCUTANEOUS AT BEDTIME
Refills: 0 | Status: DISCONTINUED | OUTPATIENT
Start: 2025-07-14 | End: 2025-07-14

## 2025-07-14 RX ORDER — ASPIRIN 325 MG
81 TABLET ORAL DAILY
Refills: 0 | Status: DISCONTINUED | OUTPATIENT
Start: 2025-07-14 | End: 2025-07-14

## 2025-07-14 RX ORDER — GABAPENTIN 400 MG/1
600 CAPSULE ORAL ONCE
Refills: 0 | Status: COMPLETED | OUTPATIENT
Start: 2025-07-14 | End: 2025-07-14

## 2025-07-14 RX ORDER — NIFEDIPINE 30 MG
30 TABLET, EXTENDED RELEASE 24 HR ORAL DAILY
Refills: 0 | Status: DISCONTINUED | OUTPATIENT
Start: 2025-07-14 | End: 2025-07-30

## 2025-07-14 RX ORDER — METOPROLOL SUCCINATE 50 MG/1
200 TABLET, EXTENDED RELEASE ORAL DAILY
Refills: 0 | Status: DISCONTINUED | OUTPATIENT
Start: 2025-07-14 | End: 2025-07-30

## 2025-07-14 RX ORDER — INSULIN LISPRO 100 U/ML
INJECTION, SOLUTION INTRAVENOUS; SUBCUTANEOUS
Refills: 0 | Status: DISCONTINUED | OUTPATIENT
Start: 2025-07-14 | End: 2025-07-30

## 2025-07-14 RX ORDER — DEXTROSE 50 % IN WATER 50 %
12.5 SYRINGE (ML) INTRAVENOUS ONCE
Refills: 0 | Status: DISCONTINUED | OUTPATIENT
Start: 2025-07-14 | End: 2025-07-30

## 2025-07-14 RX ORDER — LOSARTAN POTASSIUM 100 MG/1
100 TABLET, FILM COATED ORAL DAILY
Refills: 0 | Status: DISCONTINUED | OUTPATIENT
Start: 2025-07-14 | End: 2025-07-30

## 2025-07-14 RX ORDER — SENNA 187 MG
2 TABLET ORAL AT BEDTIME
Refills: 0 | Status: DISCONTINUED | OUTPATIENT
Start: 2025-07-14 | End: 2025-07-30

## 2025-07-14 RX ORDER — LEVOTHYROXINE SODIUM 300 MCG
1 TABLET ORAL
Refills: 0 | DISCHARGE

## 2025-07-14 RX ORDER — POLYETHYLENE GLYCOL 3350 17 G/17G
0 POWDER, FOR SOLUTION ORAL
Refills: 0 | DISCHARGE

## 2025-07-14 RX ORDER — ASPIRIN 325 MG
81 TABLET ORAL DAILY
Refills: 0 | Status: DISCONTINUED | OUTPATIENT
Start: 2025-07-15 | End: 2025-07-30

## 2025-07-14 RX ORDER — BACLOFEN 10 MG/20ML
5 INJECTION INTRATHECAL EVERY 12 HOURS
Refills: 0 | Status: DISCONTINUED | OUTPATIENT
Start: 2025-07-14 | End: 2025-07-30

## 2025-07-14 RX ORDER — MELATONIN 5 MG
3 TABLET ORAL AT BEDTIME
Refills: 0 | Status: DISCONTINUED | OUTPATIENT
Start: 2025-07-14 | End: 2025-07-30

## 2025-07-14 RX ADMIN — BACLOFEN 5 MILLIGRAM(S): 10 INJECTION INTRATHECAL at 10:13

## 2025-07-14 RX ADMIN — Medication 1 MILLIGRAM(S): at 21:15

## 2025-07-14 RX ADMIN — Medication 81 MILLIGRAM(S): at 10:20

## 2025-07-14 RX ADMIN — GABAPENTIN 600 MILLIGRAM(S): 400 CAPSULE ORAL at 20:59

## 2025-07-14 RX ADMIN — Medication 650 MILLIGRAM(S): at 07:12

## 2025-07-14 RX ADMIN — Medication 2 TABLET(S): at 20:59

## 2025-07-14 RX ADMIN — METFORMIN HYDROCHLORIDE 1000 MILLIGRAM(S): 850 TABLET ORAL at 10:20

## 2025-07-14 RX ADMIN — BACLOFEN 5 MILLIGRAM(S): 10 INJECTION INTRATHECAL at 21:52

## 2025-07-14 RX ADMIN — Medication 75 MICROGRAM(S): at 10:14

## 2025-07-14 RX ADMIN — LATANOPROST PF 1 DROP(S): 0.05 SOLUTION/ DROPS OPHTHALMIC at 20:59

## 2025-07-14 RX ADMIN — Medication 3 MILLIGRAM(S): at 21:00

## 2025-07-14 RX ADMIN — GABAPENTIN 600 MILLIGRAM(S): 400 CAPSULE ORAL at 10:27

## 2025-07-14 RX ADMIN — METOPROLOL SUCCINATE 200 MILLIGRAM(S): 50 TABLET, EXTENDED RELEASE ORAL at 10:14

## 2025-07-14 RX ADMIN — Medication 650 MILLIGRAM(S): at 06:42

## 2025-07-14 RX ADMIN — Medication 1 MILLIGRAM(S): at 08:27

## 2025-07-14 RX ADMIN — Medication 1 MILLIGRAM(S): at 21:00

## 2025-07-14 RX ADMIN — INSULIN GLARGINE-YFGN 30 UNIT(S): 100 INJECTION, SOLUTION SUBCUTANEOUS at 21:02

## 2025-07-14 RX ADMIN — Medication 1 MILLIGRAM(S): at 06:42

## 2025-07-14 RX ADMIN — INSULIN LISPRO 4: 100 INJECTION, SOLUTION INTRAVENOUS; SUBCUTANEOUS at 11:29

## 2025-07-14 RX ADMIN — INSULIN LISPRO 3: 100 INJECTION, SOLUTION INTRAVENOUS; SUBCUTANEOUS at 17:23

## 2025-07-15 LAB
ALBUMIN SERPL ELPH-MCNC: 4 G/DL — SIGNIFICANT CHANGE UP (ref 3.5–5.2)
ALP SERPL-CCNC: 147 U/L — HIGH (ref 30–115)
ALT FLD-CCNC: 12 U/L — SIGNIFICANT CHANGE UP (ref 0–41)
ANION GAP SERPL CALC-SCNC: 15 MMOL/L — HIGH (ref 7–14)
AST SERPL-CCNC: 11 U/L — SIGNIFICANT CHANGE UP (ref 0–41)
BILIRUB SERPL-MCNC: 0.3 MG/DL — SIGNIFICANT CHANGE UP (ref 0.2–1.2)
BUN SERPL-MCNC: 16 MG/DL — SIGNIFICANT CHANGE UP (ref 10–20)
CALCIUM SERPL-MCNC: 9.6 MG/DL — SIGNIFICANT CHANGE UP (ref 8.4–10.5)
CHLORIDE SERPL-SCNC: 102 MMOL/L — SIGNIFICANT CHANGE UP (ref 98–110)
CO2 SERPL-SCNC: 24 MMOL/L — SIGNIFICANT CHANGE UP (ref 17–32)
CREAT SERPL-MCNC: 0.5 MG/DL — LOW (ref 0.7–1.5)
EGFR: 107 ML/MIN/1.73M2 — SIGNIFICANT CHANGE UP
EGFR: 107 ML/MIN/1.73M2 — SIGNIFICANT CHANGE UP
GLUCOSE BLDC GLUCOMTR-MCNC: 229 MG/DL — HIGH (ref 70–99)
GLUCOSE BLDC GLUCOMTR-MCNC: 258 MG/DL — HIGH (ref 70–99)
GLUCOSE BLDC GLUCOMTR-MCNC: 276 MG/DL — HIGH (ref 70–99)
GLUCOSE BLDC GLUCOMTR-MCNC: 310 MG/DL — HIGH (ref 70–99)
GLUCOSE SERPL-MCNC: 293 MG/DL — HIGH (ref 70–99)
POTASSIUM SERPL-MCNC: 4 MMOL/L — SIGNIFICANT CHANGE UP (ref 3.5–5)
POTASSIUM SERPL-SCNC: 4 MMOL/L — SIGNIFICANT CHANGE UP (ref 3.5–5)
PROT SERPL-MCNC: 7 G/DL — SIGNIFICANT CHANGE UP (ref 6–8)
SODIUM SERPL-SCNC: 141 MMOL/L — SIGNIFICANT CHANGE UP (ref 135–146)

## 2025-07-15 PROCEDURE — 99231 SBSQ HOSP IP/OBS SF/LOW 25: CPT

## 2025-07-15 RX ORDER — INSULIN GLARGINE-YFGN 100 [IU]/ML
40 INJECTION, SOLUTION SUBCUTANEOUS AT BEDTIME
Refills: 0 | Status: DISCONTINUED | OUTPATIENT
Start: 2025-07-15 | End: 2025-07-16

## 2025-07-15 RX ORDER — DULOXETINE 20 MG/1
20 CAPSULE, DELAYED RELEASE ORAL
Refills: 0 | Status: DISCONTINUED | OUTPATIENT
Start: 2025-07-15 | End: 2025-07-30

## 2025-07-15 RX ORDER — NICOTINE POLACRILEX 4 MG/1
2 GUM, CHEWING ORAL
Refills: 0 | Status: DISCONTINUED | OUTPATIENT
Start: 2025-07-15 | End: 2025-07-30

## 2025-07-15 RX ADMIN — Medication 100 MICROGRAM(S): at 06:02

## 2025-07-15 RX ADMIN — LATANOPROST PF 1 DROP(S): 0.05 SOLUTION/ DROPS OPHTHALMIC at 20:27

## 2025-07-15 RX ADMIN — Medication 81 MILLIGRAM(S): at 08:17

## 2025-07-15 RX ADMIN — METOPROLOL SUCCINATE 200 MILLIGRAM(S): 50 TABLET, EXTENDED RELEASE ORAL at 11:46

## 2025-07-15 RX ADMIN — LOSARTAN POTASSIUM 100 MILLIGRAM(S): 100 TABLET, FILM COATED ORAL at 08:17

## 2025-07-15 RX ADMIN — DULOXETINE 20 MILLIGRAM(S): 20 CAPSULE, DELAYED RELEASE ORAL at 14:37

## 2025-07-15 RX ADMIN — Medication 3 MILLIGRAM(S): at 20:16

## 2025-07-15 RX ADMIN — HALOPERIDOL 5 MILLIGRAM(S): 10 TABLET ORAL at 11:47

## 2025-07-15 RX ADMIN — DULOXETINE 20 MILLIGRAM(S): 20 CAPSULE, DELAYED RELEASE ORAL at 20:16

## 2025-07-15 RX ADMIN — INSULIN LISPRO 3: 100 INJECTION, SOLUTION INTRAVENOUS; SUBCUTANEOUS at 08:08

## 2025-07-15 RX ADMIN — GABAPENTIN 600 MILLIGRAM(S): 400 CAPSULE ORAL at 20:16

## 2025-07-15 RX ADMIN — Medication 2 TABLET(S): at 20:16

## 2025-07-15 RX ADMIN — Medication 1 MILLIGRAM(S): at 08:13

## 2025-07-15 RX ADMIN — BACLOFEN 5 MILLIGRAM(S): 10 INJECTION INTRATHECAL at 08:14

## 2025-07-15 RX ADMIN — GABAPENTIN 600 MILLIGRAM(S): 400 CAPSULE ORAL at 08:13

## 2025-07-15 RX ADMIN — METFORMIN HYDROCHLORIDE 1000 MILLIGRAM(S): 850 TABLET ORAL at 08:13

## 2025-07-15 RX ADMIN — INSULIN GLARGINE-YFGN 40 UNIT(S): 100 INJECTION, SOLUTION SUBCUTANEOUS at 20:15

## 2025-07-15 RX ADMIN — Medication 30 MILLIGRAM(S): at 08:17

## 2025-07-15 RX ADMIN — Medication 1 MILLIGRAM(S): at 11:46

## 2025-07-15 RX ADMIN — Medication 1 MILLIGRAM(S): at 20:27

## 2025-07-15 RX ADMIN — INSULIN LISPRO 4: 100 INJECTION, SOLUTION INTRAVENOUS; SUBCUTANEOUS at 16:18

## 2025-07-15 RX ADMIN — INSULIN LISPRO 3: 100 INJECTION, SOLUTION INTRAVENOUS; SUBCUTANEOUS at 11:54

## 2025-07-15 RX ADMIN — Medication 1 MILLIGRAM(S): at 20:16

## 2025-07-15 RX ADMIN — BACLOFEN 5 MILLIGRAM(S): 10 INJECTION INTRATHECAL at 20:16

## 2025-07-16 LAB
GLUCOSE BLDC GLUCOMTR-MCNC: 207 MG/DL — HIGH (ref 70–99)
GLUCOSE BLDC GLUCOMTR-MCNC: 236 MG/DL — HIGH (ref 70–99)
GLUCOSE BLDC GLUCOMTR-MCNC: 274 MG/DL — HIGH (ref 70–99)
GLUCOSE BLDC GLUCOMTR-MCNC: 321 MG/DL — HIGH (ref 70–99)
GLUCOSE BLDC GLUCOMTR-MCNC: 322 MG/DL — HIGH (ref 70–99)

## 2025-07-16 PROCEDURE — 99231 SBSQ HOSP IP/OBS SF/LOW 25: CPT

## 2025-07-16 PROCEDURE — 99252 IP/OBS CONSLTJ NEW/EST SF 35: CPT

## 2025-07-16 RX ORDER — INSULIN GLARGINE-YFGN 100 [IU]/ML
45 INJECTION, SOLUTION SUBCUTANEOUS AT BEDTIME
Refills: 0 | Status: DISCONTINUED | OUTPATIENT
Start: 2025-07-16 | End: 2025-07-30

## 2025-07-16 RX ORDER — INSULIN LISPRO 100 U/ML
10 INJECTION, SOLUTION INTRAVENOUS; SUBCUTANEOUS
Refills: 0 | Status: DISCONTINUED | OUTPATIENT
Start: 2025-07-16 | End: 2025-07-30

## 2025-07-16 RX ADMIN — LATANOPROST PF 1 DROP(S): 0.05 SOLUTION/ DROPS OPHTHALMIC at 20:08

## 2025-07-16 RX ADMIN — Medication 2 TABLET(S): at 20:06

## 2025-07-16 RX ADMIN — Medication 81 MILLIGRAM(S): at 08:42

## 2025-07-16 RX ADMIN — INSULIN LISPRO 4: 100 INJECTION, SOLUTION INTRAVENOUS; SUBCUTANEOUS at 11:33

## 2025-07-16 RX ADMIN — METOPROLOL SUCCINATE 200 MILLIGRAM(S): 50 TABLET, EXTENDED RELEASE ORAL at 08:44

## 2025-07-16 RX ADMIN — DULOXETINE 20 MILLIGRAM(S): 20 CAPSULE, DELAYED RELEASE ORAL at 20:06

## 2025-07-16 RX ADMIN — INSULIN LISPRO 10 UNIT(S): 100 INJECTION, SOLUTION INTRAVENOUS; SUBCUTANEOUS at 16:20

## 2025-07-16 RX ADMIN — Medication 1 MILLIGRAM(S): at 15:19

## 2025-07-16 RX ADMIN — BACLOFEN 5 MILLIGRAM(S): 10 INJECTION INTRATHECAL at 20:07

## 2025-07-16 RX ADMIN — METFORMIN HYDROCHLORIDE 1000 MILLIGRAM(S): 850 TABLET ORAL at 08:43

## 2025-07-16 RX ADMIN — Medication 30 MILLIGRAM(S): at 08:48

## 2025-07-16 RX ADMIN — LOSARTAN POTASSIUM 100 MILLIGRAM(S): 100 TABLET, FILM COATED ORAL at 08:42

## 2025-07-16 RX ADMIN — Medication 3 MILLIGRAM(S): at 20:06

## 2025-07-16 RX ADMIN — GABAPENTIN 600 MILLIGRAM(S): 400 CAPSULE ORAL at 20:06

## 2025-07-16 RX ADMIN — INSULIN LISPRO 3: 100 INJECTION, SOLUTION INTRAVENOUS; SUBCUTANEOUS at 07:29

## 2025-07-16 RX ADMIN — INSULIN GLARGINE-YFGN 45 UNIT(S): 100 INJECTION, SOLUTION SUBCUTANEOUS at 20:07

## 2025-07-16 RX ADMIN — DULOXETINE 20 MILLIGRAM(S): 20 CAPSULE, DELAYED RELEASE ORAL at 08:43

## 2025-07-16 RX ADMIN — BACLOFEN 5 MILLIGRAM(S): 10 INJECTION INTRATHECAL at 08:41

## 2025-07-16 RX ADMIN — INSULIN LISPRO 4: 100 INJECTION, SOLUTION INTRAVENOUS; SUBCUTANEOUS at 16:19

## 2025-07-16 RX ADMIN — GABAPENTIN 600 MILLIGRAM(S): 400 CAPSULE ORAL at 08:42

## 2025-07-16 RX ADMIN — Medication 100 MICROGRAM(S): at 06:30

## 2025-07-16 RX ADMIN — Medication 1 MILLIGRAM(S): at 08:42

## 2025-07-16 RX ADMIN — Medication 1 MILLIGRAM(S): at 20:06

## 2025-07-17 LAB
AMPHET UR-MCNC: NEGATIVE NG/ML — SIGNIFICANT CHANGE UP
BARBITURATES UR QL SCN: NEGATIVE NG/ML — SIGNIFICANT CHANGE UP
BARBITURATES UR-MCNC: NEGATIVE NG/ML — SIGNIFICANT CHANGE UP
BENZODIAZ UR-MCNC: NEGATIVE NG/ML — SIGNIFICANT CHANGE UP
COCAINE METAB.OTHER UR-MCNC: NEGATIVE NG/ML — SIGNIFICANT CHANGE UP
CREATININE, URINE THERAPEUTIC: 38.8 MG/DL — SIGNIFICANT CHANGE UP (ref 20–300)
FENTANYL UR QL SCN: NEGATIVE NG/ML — SIGNIFICANT CHANGE UP
GLUCOSE BLDC GLUCOMTR-MCNC: 152 MG/DL — HIGH (ref 70–99)
GLUCOSE BLDC GLUCOMTR-MCNC: 180 MG/DL — HIGH (ref 70–99)
GLUCOSE BLDC GLUCOMTR-MCNC: 182 MG/DL — HIGH (ref 70–99)
GLUCOSE BLDC GLUCOMTR-MCNC: 219 MG/DL — HIGH (ref 70–99)
GLUCOSE BLDC GLUCOMTR-MCNC: 299 MG/DL — HIGH (ref 70–99)
METHADONE UR QL SCN: NEGATIVE NG/ML — SIGNIFICANT CHANGE UP
OPIATES UR-MCNC: NEGATIVE NG/ML — SIGNIFICANT CHANGE UP
OXYCODONE UR QL SCN: NEGATIVE NG/ML — SIGNIFICANT CHANGE UP
PCP UR-MCNC: NEGATIVE NG/ML — SIGNIFICANT CHANGE UP
PH, URINE RESULT: 5.6 — SIGNIFICANT CHANGE UP (ref 4.5–8.9)
THC UR QL: NEGATIVE NG/ML — SIGNIFICANT CHANGE UP

## 2025-07-17 PROCEDURE — 99231 SBSQ HOSP IP/OBS SF/LOW 25: CPT

## 2025-07-17 PROCEDURE — 93010 ELECTROCARDIOGRAM REPORT: CPT

## 2025-07-17 RX ORDER — HYDROXYZINE HYDROCHLORIDE 25 MG/1
25 TABLET, FILM COATED ORAL EVERY 6 HOURS
Refills: 0 | Status: DISCONTINUED | OUTPATIENT
Start: 2025-07-17 | End: 2025-07-30

## 2025-07-17 RX ORDER — LORAZEPAM 4 MG/ML
1 VIAL (ML) INJECTION EVERY 8 HOURS
Refills: 0 | Status: DISCONTINUED | OUTPATIENT
Start: 2025-07-20 | End: 2025-07-19

## 2025-07-17 RX ORDER — SULFAMETHOXAZOLE/TRIMETHOPRIM 800-160 MG
1 TABLET ORAL
Refills: 0 | Status: COMPLETED | OUTPATIENT
Start: 2025-07-17 | End: 2025-07-24

## 2025-07-17 RX ORDER — RISPERIDONE 4 MG
2 TABLET ORAL AT BEDTIME
Refills: 0 | Status: DISCONTINUED | OUTPATIENT
Start: 2025-07-17 | End: 2025-07-28

## 2025-07-17 RX ORDER — RISPERIDONE 4 MG
1 TABLET ORAL DAILY
Refills: 0 | Status: DISCONTINUED | OUTPATIENT
Start: 2025-07-18 | End: 2025-07-28

## 2025-07-17 RX ORDER — SIMETHICONE 80 MG
80 TABLET,CHEWABLE ORAL
Refills: 0 | Status: DISCONTINUED | OUTPATIENT
Start: 2025-07-17 | End: 2025-07-30

## 2025-07-17 RX ADMIN — Medication 1 MILLIGRAM(S): at 08:11

## 2025-07-17 RX ADMIN — METOPROLOL SUCCINATE 200 MILLIGRAM(S): 50 TABLET, EXTENDED RELEASE ORAL at 08:13

## 2025-07-17 RX ADMIN — Medication 3 MILLIGRAM(S): at 21:06

## 2025-07-17 RX ADMIN — INSULIN LISPRO 2: 100 INJECTION, SOLUTION INTRAVENOUS; SUBCUTANEOUS at 11:28

## 2025-07-17 RX ADMIN — INSULIN LISPRO 10 UNIT(S): 100 INJECTION, SOLUTION INTRAVENOUS; SUBCUTANEOUS at 11:28

## 2025-07-17 RX ADMIN — HYDROXYZINE HYDROCHLORIDE 25 MILLIGRAM(S): 25 TABLET, FILM COATED ORAL at 21:07

## 2025-07-17 RX ADMIN — BACLOFEN 5 MILLIGRAM(S): 10 INJECTION INTRATHECAL at 08:10

## 2025-07-17 RX ADMIN — Medication 81 MILLIGRAM(S): at 08:12

## 2025-07-17 RX ADMIN — Medication 80 MILLIGRAM(S): at 21:15

## 2025-07-17 RX ADMIN — LOSARTAN POTASSIUM 100 MILLIGRAM(S): 100 TABLET, FILM COATED ORAL at 08:12

## 2025-07-17 RX ADMIN — BACLOFEN 5 MILLIGRAM(S): 10 INJECTION INTRATHECAL at 21:08

## 2025-07-17 RX ADMIN — Medication 100 MICROGRAM(S): at 05:59

## 2025-07-17 RX ADMIN — INSULIN LISPRO 10 UNIT(S): 100 INJECTION, SOLUTION INTRAVENOUS; SUBCUTANEOUS at 16:47

## 2025-07-17 RX ADMIN — NICOTINE POLACRILEX 2 MILLIGRAM(S): 4 GUM, CHEWING ORAL at 14:09

## 2025-07-17 RX ADMIN — Medication 2 MILLIGRAM(S): at 21:08

## 2025-07-17 RX ADMIN — INSULIN LISPRO 3: 100 INJECTION, SOLUTION INTRAVENOUS; SUBCUTANEOUS at 07:30

## 2025-07-17 RX ADMIN — INSULIN LISPRO 1: 100 INJECTION, SOLUTION INTRAVENOUS; SUBCUTANEOUS at 16:46

## 2025-07-17 RX ADMIN — INSULIN LISPRO 10 UNIT(S): 100 INJECTION, SOLUTION INTRAVENOUS; SUBCUTANEOUS at 07:30

## 2025-07-17 RX ADMIN — Medication 30 MILLIGRAM(S): at 08:11

## 2025-07-17 RX ADMIN — GABAPENTIN 600 MILLIGRAM(S): 400 CAPSULE ORAL at 08:12

## 2025-07-17 RX ADMIN — Medication 2 TABLET(S): at 21:07

## 2025-07-17 RX ADMIN — Medication 1 TABLET(S): at 21:08

## 2025-07-17 RX ADMIN — GABAPENTIN 600 MILLIGRAM(S): 400 CAPSULE ORAL at 21:07

## 2025-07-17 RX ADMIN — INSULIN GLARGINE-YFGN 45 UNIT(S): 100 INJECTION, SOLUTION SUBCUTANEOUS at 20:38

## 2025-07-17 RX ADMIN — METFORMIN HYDROCHLORIDE 1000 MILLIGRAM(S): 850 TABLET ORAL at 08:13

## 2025-07-17 RX ADMIN — HYDROXYZINE HYDROCHLORIDE 25 MILLIGRAM(S): 25 TABLET, FILM COATED ORAL at 14:09

## 2025-07-17 RX ADMIN — NICOTINE POLACRILEX 2 MILLIGRAM(S): 4 GUM, CHEWING ORAL at 18:33

## 2025-07-17 RX ADMIN — LATANOPROST PF 1 DROP(S): 0.05 SOLUTION/ DROPS OPHTHALMIC at 21:07

## 2025-07-17 RX ADMIN — DULOXETINE 20 MILLIGRAM(S): 20 CAPSULE, DELAYED RELEASE ORAL at 21:07

## 2025-07-17 RX ADMIN — DULOXETINE 20 MILLIGRAM(S): 20 CAPSULE, DELAYED RELEASE ORAL at 08:11

## 2025-07-18 LAB
1OH-MIDAZOLAM UR CFM-MCNC: NEGATIVE NG/ML — SIGNIFICANT CHANGE UP
7AMINOCLONAZEPAM UR CFM-MCNC: NEGATIVE NG/ML — SIGNIFICANT CHANGE UP
ALPHA-HYDROXYALPRAZOLAM, UR RESULT: NEGATIVE NG/ML — SIGNIFICANT CHANGE UP
ALPRAZ UR CFM-MCNC: NEGATIVE NG/ML — SIGNIFICANT CHANGE UP
BENZODIAZ UR QL SCN: POSITIVE
CLONAZEPAM UR CFM-MCNC: NEGATIVE NG/ML — SIGNIFICANT CHANGE UP
DIAZEPAM UR CFM-MCNC: NEGATIVE NG/ML — SIGNIFICANT CHANGE UP
FLUNITRAZEPAM UR CFM-MCNC: NEGATIVE NG/ML — SIGNIFICANT CHANGE UP
FLURAZEPAM UR CFM-MCNC: NEGATIVE NG/ML — SIGNIFICANT CHANGE UP
GLUCOSE BLDC GLUCOMTR-MCNC: 170 MG/DL — HIGH (ref 70–99)
GLUCOSE BLDC GLUCOMTR-MCNC: 193 MG/DL — HIGH (ref 70–99)
GLUCOSE BLDC GLUCOMTR-MCNC: 204 MG/DL — HIGH (ref 70–99)
GLUCOSE BLDC GLUCOMTR-MCNC: 246 MG/DL — HIGH (ref 70–99)
LORAZEPAM UR CFM-MCNC: 644 NG/ML — HIGH
MIDAZOLAM UR CFM-MCNC: NEGATIVE NG/ML — SIGNIFICANT CHANGE UP
NORDIAZEPAM, UR RESULT: NEGATIVE NG/ML — SIGNIFICANT CHANGE UP
OXAZEPAM UR QL SCN: NEGATIVE NG/ML — SIGNIFICANT CHANGE UP
TEMAZEPAM UR CFM-MCNC: NEGATIVE NG/ML — SIGNIFICANT CHANGE UP

## 2025-07-18 PROCEDURE — 99232 SBSQ HOSP IP/OBS MODERATE 35: CPT

## 2025-07-18 RX ADMIN — Medication 81 MILLIGRAM(S): at 08:30

## 2025-07-18 RX ADMIN — LOSARTAN POTASSIUM 100 MILLIGRAM(S): 100 TABLET, FILM COATED ORAL at 08:34

## 2025-07-18 RX ADMIN — Medication 1 MILLIGRAM(S): at 08:34

## 2025-07-18 RX ADMIN — Medication 1 TABLET(S): at 08:34

## 2025-07-18 RX ADMIN — INSULIN LISPRO 2: 100 INJECTION, SOLUTION INTRAVENOUS; SUBCUTANEOUS at 16:38

## 2025-07-18 RX ADMIN — GABAPENTIN 600 MILLIGRAM(S): 400 CAPSULE ORAL at 08:43

## 2025-07-18 RX ADMIN — INSULIN GLARGINE-YFGN 45 UNIT(S): 100 INJECTION, SOLUTION SUBCUTANEOUS at 21:12

## 2025-07-18 RX ADMIN — INSULIN LISPRO 2: 100 INJECTION, SOLUTION INTRAVENOUS; SUBCUTANEOUS at 07:51

## 2025-07-18 RX ADMIN — METFORMIN HYDROCHLORIDE 1000 MILLIGRAM(S): 850 TABLET ORAL at 08:33

## 2025-07-18 RX ADMIN — INSULIN LISPRO 10 UNIT(S): 100 INJECTION, SOLUTION INTRAVENOUS; SUBCUTANEOUS at 16:39

## 2025-07-18 RX ADMIN — Medication 2 MILLIGRAM(S): at 21:11

## 2025-07-18 RX ADMIN — LATANOPROST PF 1 DROP(S): 0.05 SOLUTION/ DROPS OPHTHALMIC at 21:13

## 2025-07-18 RX ADMIN — HYDROXYZINE HYDROCHLORIDE 25 MILLIGRAM(S): 25 TABLET, FILM COATED ORAL at 16:09

## 2025-07-18 RX ADMIN — DULOXETINE 20 MILLIGRAM(S): 20 CAPSULE, DELAYED RELEASE ORAL at 21:11

## 2025-07-18 RX ADMIN — GABAPENTIN 600 MILLIGRAM(S): 400 CAPSULE ORAL at 21:11

## 2025-07-18 RX ADMIN — Medication 100 MICROGRAM(S): at 05:55

## 2025-07-18 RX ADMIN — INSULIN LISPRO 10 UNIT(S): 100 INJECTION, SOLUTION INTRAVENOUS; SUBCUTANEOUS at 11:45

## 2025-07-18 RX ADMIN — INSULIN LISPRO 1: 100 INJECTION, SOLUTION INTRAVENOUS; SUBCUTANEOUS at 11:45

## 2025-07-18 RX ADMIN — Medication 1 TABLET(S): at 21:11

## 2025-07-18 RX ADMIN — Medication 2 TABLET(S): at 21:11

## 2025-07-18 RX ADMIN — BACLOFEN 5 MILLIGRAM(S): 10 INJECTION INTRATHECAL at 21:13

## 2025-07-18 RX ADMIN — DULOXETINE 20 MILLIGRAM(S): 20 CAPSULE, DELAYED RELEASE ORAL at 08:33

## 2025-07-18 RX ADMIN — METOPROLOL SUCCINATE 200 MILLIGRAM(S): 50 TABLET, EXTENDED RELEASE ORAL at 08:34

## 2025-07-18 RX ADMIN — Medication 30 MILLIGRAM(S): at 08:34

## 2025-07-18 RX ADMIN — INSULIN LISPRO 10 UNIT(S): 100 INJECTION, SOLUTION INTRAVENOUS; SUBCUTANEOUS at 07:51

## 2025-07-18 RX ADMIN — BACLOFEN 5 MILLIGRAM(S): 10 INJECTION INTRATHECAL at 08:30

## 2025-07-19 LAB
GLUCOSE BLDC GLUCOMTR-MCNC: 162 MG/DL — HIGH (ref 70–99)
GLUCOSE BLDC GLUCOMTR-MCNC: 180 MG/DL — HIGH (ref 70–99)
GLUCOSE BLDC GLUCOMTR-MCNC: 239 MG/DL — HIGH (ref 70–99)
GLUCOSE BLDC GLUCOMTR-MCNC: 246 MG/DL — HIGH (ref 70–99)

## 2025-07-19 RX ADMIN — Medication 1 MILLIGRAM(S): at 08:09

## 2025-07-19 RX ADMIN — BACLOFEN 5 MILLIGRAM(S): 10 INJECTION INTRATHECAL at 20:01

## 2025-07-19 RX ADMIN — INSULIN LISPRO 2: 100 INJECTION, SOLUTION INTRAVENOUS; SUBCUTANEOUS at 07:14

## 2025-07-19 RX ADMIN — Medication 100 MICROGRAM(S): at 06:57

## 2025-07-19 RX ADMIN — GABAPENTIN 600 MILLIGRAM(S): 400 CAPSULE ORAL at 20:02

## 2025-07-19 RX ADMIN — INSULIN LISPRO 10 UNIT(S): 100 INJECTION, SOLUTION INTRAVENOUS; SUBCUTANEOUS at 11:35

## 2025-07-19 RX ADMIN — INSULIN LISPRO 1: 100 INJECTION, SOLUTION INTRAVENOUS; SUBCUTANEOUS at 11:34

## 2025-07-19 RX ADMIN — Medication 1 MILLIGRAM(S): at 12:33

## 2025-07-19 RX ADMIN — Medication 2 TABLET(S): at 20:01

## 2025-07-19 RX ADMIN — BACLOFEN 5 MILLIGRAM(S): 10 INJECTION INTRATHECAL at 08:09

## 2025-07-19 RX ADMIN — Medication 30 MILLIGRAM(S): at 08:05

## 2025-07-19 RX ADMIN — Medication 1 MILLIGRAM(S): at 23:31

## 2025-07-19 RX ADMIN — LOSARTAN POTASSIUM 100 MILLIGRAM(S): 100 TABLET, FILM COATED ORAL at 08:08

## 2025-07-19 RX ADMIN — Medication 81 MILLIGRAM(S): at 08:06

## 2025-07-19 RX ADMIN — Medication 1 TABLET(S): at 20:01

## 2025-07-19 RX ADMIN — DULOXETINE 20 MILLIGRAM(S): 20 CAPSULE, DELAYED RELEASE ORAL at 08:08

## 2025-07-19 RX ADMIN — METFORMIN HYDROCHLORIDE 1000 MILLIGRAM(S): 850 TABLET ORAL at 08:06

## 2025-07-19 RX ADMIN — INSULIN GLARGINE-YFGN 45 UNIT(S): 100 INJECTION, SOLUTION SUBCUTANEOUS at 20:07

## 2025-07-19 RX ADMIN — Medication 1 TABLET(S): at 08:07

## 2025-07-19 RX ADMIN — DULOXETINE 20 MILLIGRAM(S): 20 CAPSULE, DELAYED RELEASE ORAL at 20:01

## 2025-07-19 RX ADMIN — METOPROLOL SUCCINATE 100 MILLIGRAM(S): 50 TABLET, EXTENDED RELEASE ORAL at 08:11

## 2025-07-19 RX ADMIN — Medication 2 MILLIGRAM(S): at 20:01

## 2025-07-19 RX ADMIN — INSULIN LISPRO 10 UNIT(S): 100 INJECTION, SOLUTION INTRAVENOUS; SUBCUTANEOUS at 07:16

## 2025-07-19 RX ADMIN — GABAPENTIN 600 MILLIGRAM(S): 400 CAPSULE ORAL at 08:07

## 2025-07-19 RX ADMIN — LATANOPROST PF 1 DROP(S): 0.05 SOLUTION/ DROPS OPHTHALMIC at 20:03

## 2025-07-20 LAB
GLUCOSE BLDC GLUCOMTR-MCNC: 169 MG/DL — HIGH (ref 70–99)
GLUCOSE BLDC GLUCOMTR-MCNC: 210 MG/DL — HIGH (ref 70–99)
GLUCOSE BLDC GLUCOMTR-MCNC: 271 MG/DL — HIGH (ref 70–99)
GLUCOSE BLDC GLUCOMTR-MCNC: 306 MG/DL — HIGH (ref 70–99)

## 2025-07-20 RX ADMIN — Medication 1 TABLET(S): at 20:25

## 2025-07-20 RX ADMIN — Medication 81 MILLIGRAM(S): at 08:54

## 2025-07-20 RX ADMIN — Medication 1 MILLIGRAM(S): at 08:56

## 2025-07-20 RX ADMIN — Medication 1 TABLET(S): at 08:56

## 2025-07-20 RX ADMIN — GABAPENTIN 600 MILLIGRAM(S): 400 CAPSULE ORAL at 20:25

## 2025-07-20 RX ADMIN — INSULIN LISPRO 10 UNIT(S): 100 INJECTION, SOLUTION INTRAVENOUS; SUBCUTANEOUS at 11:55

## 2025-07-20 RX ADMIN — LOSARTAN POTASSIUM 100 MILLIGRAM(S): 100 TABLET, FILM COATED ORAL at 08:55

## 2025-07-20 RX ADMIN — Medication 2 MILLIGRAM(S): at 20:26

## 2025-07-20 RX ADMIN — Medication 30 MILLIGRAM(S): at 08:56

## 2025-07-20 RX ADMIN — DULOXETINE 20 MILLIGRAM(S): 20 CAPSULE, DELAYED RELEASE ORAL at 20:25

## 2025-07-20 RX ADMIN — LATANOPROST PF 1 DROP(S): 0.05 SOLUTION/ DROPS OPHTHALMIC at 20:26

## 2025-07-20 RX ADMIN — INSULIN LISPRO 4: 100 INJECTION, SOLUTION INTRAVENOUS; SUBCUTANEOUS at 07:28

## 2025-07-20 RX ADMIN — HYDROXYZINE HYDROCHLORIDE 25 MILLIGRAM(S): 25 TABLET, FILM COATED ORAL at 23:30

## 2025-07-20 RX ADMIN — INSULIN LISPRO 10 UNIT(S): 100 INJECTION, SOLUTION INTRAVENOUS; SUBCUTANEOUS at 16:49

## 2025-07-20 RX ADMIN — Medication 2 TABLET(S): at 20:26

## 2025-07-20 RX ADMIN — INSULIN LISPRO 10 UNIT(S): 100 INJECTION, SOLUTION INTRAVENOUS; SUBCUTANEOUS at 07:28

## 2025-07-20 RX ADMIN — BACLOFEN 5 MILLIGRAM(S): 10 INJECTION INTRATHECAL at 20:25

## 2025-07-20 RX ADMIN — DULOXETINE 20 MILLIGRAM(S): 20 CAPSULE, DELAYED RELEASE ORAL at 08:55

## 2025-07-20 RX ADMIN — BACLOFEN 5 MILLIGRAM(S): 10 INJECTION INTRATHECAL at 08:54

## 2025-07-20 RX ADMIN — INSULIN LISPRO 3: 100 INJECTION, SOLUTION INTRAVENOUS; SUBCUTANEOUS at 11:54

## 2025-07-20 RX ADMIN — HYDROXYZINE HYDROCHLORIDE 25 MILLIGRAM(S): 25 TABLET, FILM COATED ORAL at 14:50

## 2025-07-20 RX ADMIN — GABAPENTIN 600 MILLIGRAM(S): 400 CAPSULE ORAL at 08:55

## 2025-07-20 RX ADMIN — Medication 100 MICROGRAM(S): at 06:42

## 2025-07-20 RX ADMIN — INSULIN GLARGINE-YFGN 45 UNIT(S): 100 INJECTION, SOLUTION SUBCUTANEOUS at 20:25

## 2025-07-20 RX ADMIN — INSULIN LISPRO 1: 100 INJECTION, SOLUTION INTRAVENOUS; SUBCUTANEOUS at 16:48

## 2025-07-20 RX ADMIN — Medication 3 MILLIGRAM(S): at 23:30

## 2025-07-20 RX ADMIN — METFORMIN HYDROCHLORIDE 1000 MILLIGRAM(S): 850 TABLET ORAL at 08:55

## 2025-07-20 RX ADMIN — METOPROLOL SUCCINATE 200 MILLIGRAM(S): 50 TABLET, EXTENDED RELEASE ORAL at 08:56

## 2025-07-21 LAB
GLUCOSE BLDC GLUCOMTR-MCNC: 155 MG/DL — HIGH (ref 70–99)
GLUCOSE BLDC GLUCOMTR-MCNC: 177 MG/DL — HIGH (ref 70–99)
GLUCOSE BLDC GLUCOMTR-MCNC: 265 MG/DL — HIGH (ref 70–99)
GLUCOSE BLDC GLUCOMTR-MCNC: 277 MG/DL — HIGH (ref 70–99)

## 2025-07-21 PROCEDURE — 99232 SBSQ HOSP IP/OBS MODERATE 35: CPT | Mod: GC

## 2025-07-21 RX ADMIN — INSULIN LISPRO 10 UNIT(S): 100 INJECTION, SOLUTION INTRAVENOUS; SUBCUTANEOUS at 07:11

## 2025-07-21 RX ADMIN — INSULIN LISPRO 3: 100 INJECTION, SOLUTION INTRAVENOUS; SUBCUTANEOUS at 12:08

## 2025-07-21 RX ADMIN — Medication 2 MILLIGRAM(S): at 20:27

## 2025-07-21 RX ADMIN — INSULIN GLARGINE-YFGN 45 UNIT(S): 100 INJECTION, SOLUTION SUBCUTANEOUS at 20:27

## 2025-07-21 RX ADMIN — DULOXETINE 20 MILLIGRAM(S): 20 CAPSULE, DELAYED RELEASE ORAL at 08:44

## 2025-07-21 RX ADMIN — INSULIN LISPRO 1: 100 INJECTION, SOLUTION INTRAVENOUS; SUBCUTANEOUS at 16:37

## 2025-07-21 RX ADMIN — BACLOFEN 5 MILLIGRAM(S): 10 INJECTION INTRATHECAL at 08:45

## 2025-07-21 RX ADMIN — Medication 1 MILLIGRAM(S): at 08:44

## 2025-07-21 RX ADMIN — INSULIN LISPRO 3: 100 INJECTION, SOLUTION INTRAVENOUS; SUBCUTANEOUS at 07:10

## 2025-07-21 RX ADMIN — HYDROXYZINE HYDROCHLORIDE 25 MILLIGRAM(S): 25 TABLET, FILM COATED ORAL at 23:58

## 2025-07-21 RX ADMIN — Medication 3 MILLIGRAM(S): at 23:58

## 2025-07-21 RX ADMIN — METOPROLOL SUCCINATE 200 MILLIGRAM(S): 50 TABLET, EXTENDED RELEASE ORAL at 08:43

## 2025-07-21 RX ADMIN — INSULIN LISPRO 10 UNIT(S): 100 INJECTION, SOLUTION INTRAVENOUS; SUBCUTANEOUS at 12:09

## 2025-07-21 RX ADMIN — BACLOFEN 5 MILLIGRAM(S): 10 INJECTION INTRATHECAL at 20:26

## 2025-07-21 RX ADMIN — LOSARTAN POTASSIUM 100 MILLIGRAM(S): 100 TABLET, FILM COATED ORAL at 08:43

## 2025-07-21 RX ADMIN — GABAPENTIN 600 MILLIGRAM(S): 400 CAPSULE ORAL at 08:44

## 2025-07-21 RX ADMIN — GABAPENTIN 600 MILLIGRAM(S): 400 CAPSULE ORAL at 20:26

## 2025-07-21 RX ADMIN — Medication 650 MILLIGRAM(S): at 05:50

## 2025-07-21 RX ADMIN — Medication 1 TABLET(S): at 08:45

## 2025-07-21 RX ADMIN — Medication 650 MILLIGRAM(S): at 19:01

## 2025-07-21 RX ADMIN — LATANOPROST PF 1 DROP(S): 0.05 SOLUTION/ DROPS OPHTHALMIC at 20:27

## 2025-07-21 RX ADMIN — Medication 1 TABLET(S): at 20:27

## 2025-07-21 RX ADMIN — DULOXETINE 20 MILLIGRAM(S): 20 CAPSULE, DELAYED RELEASE ORAL at 20:26

## 2025-07-21 RX ADMIN — Medication 81 MILLIGRAM(S): at 08:44

## 2025-07-21 RX ADMIN — Medication 30 MILLIGRAM(S): at 08:44

## 2025-07-21 RX ADMIN — Medication 2 TABLET(S): at 20:27

## 2025-07-21 RX ADMIN — Medication 650 MILLIGRAM(S): at 05:18

## 2025-07-21 RX ADMIN — Medication 100 MICROGRAM(S): at 05:18

## 2025-07-21 RX ADMIN — METFORMIN HYDROCHLORIDE 1000 MILLIGRAM(S): 850 TABLET ORAL at 08:44

## 2025-07-21 RX ADMIN — HYDROXYZINE HYDROCHLORIDE 25 MILLIGRAM(S): 25 TABLET, FILM COATED ORAL at 18:31

## 2025-07-21 RX ADMIN — INSULIN LISPRO 10 UNIT(S): 100 INJECTION, SOLUTION INTRAVENOUS; SUBCUTANEOUS at 16:37

## 2025-07-21 RX ADMIN — Medication 650 MILLIGRAM(S): at 18:31

## 2025-07-22 LAB
GLUCOSE BLDC GLUCOMTR-MCNC: 110 MG/DL — HIGH (ref 70–99)
GLUCOSE BLDC GLUCOMTR-MCNC: 135 MG/DL — HIGH (ref 70–99)
GLUCOSE BLDC GLUCOMTR-MCNC: 159 MG/DL — HIGH (ref 70–99)
GLUCOSE BLDC GLUCOMTR-MCNC: 211 MG/DL — HIGH (ref 70–99)

## 2025-07-22 PROCEDURE — 99232 SBSQ HOSP IP/OBS MODERATE 35: CPT | Mod: GC

## 2025-07-22 RX ADMIN — HYDROXYZINE HYDROCHLORIDE 25 MILLIGRAM(S): 25 TABLET, FILM COATED ORAL at 23:05

## 2025-07-22 RX ADMIN — METOPROLOL SUCCINATE 200 MILLIGRAM(S): 50 TABLET, EXTENDED RELEASE ORAL at 08:08

## 2025-07-22 RX ADMIN — Medication 2 TABLET(S): at 20:34

## 2025-07-22 RX ADMIN — Medication 1 TABLET(S): at 08:09

## 2025-07-22 RX ADMIN — INSULIN LISPRO 10 UNIT(S): 100 INJECTION, SOLUTION INTRAVENOUS; SUBCUTANEOUS at 11:42

## 2025-07-22 RX ADMIN — DULOXETINE 20 MILLIGRAM(S): 20 CAPSULE, DELAYED RELEASE ORAL at 20:31

## 2025-07-22 RX ADMIN — Medication 1 MILLIGRAM(S): at 08:11

## 2025-07-22 RX ADMIN — BACLOFEN 5 MILLIGRAM(S): 10 INJECTION INTRATHECAL at 20:32

## 2025-07-22 RX ADMIN — Medication 650 MILLIGRAM(S): at 12:11

## 2025-07-22 RX ADMIN — HYDROXYZINE HYDROCHLORIDE 25 MILLIGRAM(S): 25 TABLET, FILM COATED ORAL at 13:18

## 2025-07-22 RX ADMIN — INSULIN LISPRO 1: 100 INJECTION, SOLUTION INTRAVENOUS; SUBCUTANEOUS at 11:42

## 2025-07-22 RX ADMIN — Medication 81 MILLIGRAM(S): at 08:09

## 2025-07-22 RX ADMIN — DULOXETINE 20 MILLIGRAM(S): 20 CAPSULE, DELAYED RELEASE ORAL at 08:09

## 2025-07-22 RX ADMIN — INSULIN GLARGINE-YFGN 45 UNIT(S): 100 INJECTION, SOLUTION SUBCUTANEOUS at 20:31

## 2025-07-22 RX ADMIN — INSULIN LISPRO 10 UNIT(S): 100 INJECTION, SOLUTION INTRAVENOUS; SUBCUTANEOUS at 16:26

## 2025-07-22 RX ADMIN — INSULIN LISPRO 10 UNIT(S): 100 INJECTION, SOLUTION INTRAVENOUS; SUBCUTANEOUS at 07:20

## 2025-07-22 RX ADMIN — LOSARTAN POTASSIUM 100 MILLIGRAM(S): 100 TABLET, FILM COATED ORAL at 09:33

## 2025-07-22 RX ADMIN — Medication 650 MILLIGRAM(S): at 04:48

## 2025-07-22 RX ADMIN — Medication 3 MILLIGRAM(S): at 23:05

## 2025-07-22 RX ADMIN — INSULIN LISPRO 2: 100 INJECTION, SOLUTION INTRAVENOUS; SUBCUTANEOUS at 07:20

## 2025-07-22 RX ADMIN — Medication 30 MILLIGRAM(S): at 08:15

## 2025-07-22 RX ADMIN — Medication 650 MILLIGRAM(S): at 11:41

## 2025-07-22 RX ADMIN — METFORMIN HYDROCHLORIDE 1000 MILLIGRAM(S): 850 TABLET ORAL at 08:11

## 2025-07-22 RX ADMIN — Medication 2 MILLIGRAM(S): at 20:34

## 2025-07-22 RX ADMIN — Medication 100 MICROGRAM(S): at 05:08

## 2025-07-22 RX ADMIN — LATANOPROST PF 1 DROP(S): 0.05 SOLUTION/ DROPS OPHTHALMIC at 20:33

## 2025-07-22 RX ADMIN — Medication 1 TABLET(S): at 20:32

## 2025-07-22 RX ADMIN — GABAPENTIN 600 MILLIGRAM(S): 400 CAPSULE ORAL at 08:11

## 2025-07-22 RX ADMIN — GABAPENTIN 600 MILLIGRAM(S): 400 CAPSULE ORAL at 20:34

## 2025-07-22 RX ADMIN — Medication 650 MILLIGRAM(S): at 04:49

## 2025-07-22 RX ADMIN — BACLOFEN 5 MILLIGRAM(S): 10 INJECTION INTRATHECAL at 08:09

## 2025-07-23 LAB
GLUCOSE BLDC GLUCOMTR-MCNC: 105 MG/DL — HIGH (ref 70–99)
GLUCOSE BLDC GLUCOMTR-MCNC: 111 MG/DL — HIGH (ref 70–99)
GLUCOSE BLDC GLUCOMTR-MCNC: 167 MG/DL — HIGH (ref 70–99)
GLUCOSE BLDC GLUCOMTR-MCNC: 238 MG/DL — HIGH (ref 70–99)

## 2025-07-23 PROCEDURE — 99231 SBSQ HOSP IP/OBS SF/LOW 25: CPT

## 2025-07-23 RX ADMIN — INSULIN LISPRO 1: 100 INJECTION, SOLUTION INTRAVENOUS; SUBCUTANEOUS at 07:45

## 2025-07-23 RX ADMIN — Medication 3 MILLIGRAM(S): at 21:32

## 2025-07-23 RX ADMIN — Medication 80 MILLIGRAM(S): at 09:02

## 2025-07-23 RX ADMIN — LOSARTAN POTASSIUM 100 MILLIGRAM(S): 100 TABLET, FILM COATED ORAL at 08:14

## 2025-07-23 RX ADMIN — BACLOFEN 5 MILLIGRAM(S): 10 INJECTION INTRATHECAL at 20:10

## 2025-07-23 RX ADMIN — Medication 2 MILLIGRAM(S): at 20:10

## 2025-07-23 RX ADMIN — INSULIN LISPRO 10 UNIT(S): 100 INJECTION, SOLUTION INTRAVENOUS; SUBCUTANEOUS at 17:11

## 2025-07-23 RX ADMIN — DULOXETINE 20 MILLIGRAM(S): 20 CAPSULE, DELAYED RELEASE ORAL at 20:10

## 2025-07-23 RX ADMIN — Medication 30 MILLIGRAM(S): at 08:13

## 2025-07-23 RX ADMIN — Medication 650 MILLIGRAM(S): at 05:44

## 2025-07-23 RX ADMIN — Medication 80 MILLIGRAM(S): at 13:19

## 2025-07-23 RX ADMIN — DULOXETINE 20 MILLIGRAM(S): 20 CAPSULE, DELAYED RELEASE ORAL at 08:12

## 2025-07-23 RX ADMIN — HYDROXYZINE HYDROCHLORIDE 25 MILLIGRAM(S): 25 TABLET, FILM COATED ORAL at 13:19

## 2025-07-23 RX ADMIN — Medication 2 TABLET(S): at 20:10

## 2025-07-23 RX ADMIN — INSULIN LISPRO 10 UNIT(S): 100 INJECTION, SOLUTION INTRAVENOUS; SUBCUTANEOUS at 07:44

## 2025-07-23 RX ADMIN — INSULIN LISPRO 2: 100 INJECTION, SOLUTION INTRAVENOUS; SUBCUTANEOUS at 12:00

## 2025-07-23 RX ADMIN — METOPROLOL SUCCINATE 200 MILLIGRAM(S): 50 TABLET, EXTENDED RELEASE ORAL at 08:12

## 2025-07-23 RX ADMIN — INSULIN LISPRO 10 UNIT(S): 100 INJECTION, SOLUTION INTRAVENOUS; SUBCUTANEOUS at 12:01

## 2025-07-23 RX ADMIN — Medication 1 TABLET(S): at 08:13

## 2025-07-23 RX ADMIN — Medication 81 MILLIGRAM(S): at 08:12

## 2025-07-23 RX ADMIN — GABAPENTIN 600 MILLIGRAM(S): 400 CAPSULE ORAL at 08:14

## 2025-07-23 RX ADMIN — Medication 100 MICROGRAM(S): at 05:43

## 2025-07-23 RX ADMIN — INSULIN GLARGINE-YFGN 45 UNIT(S): 100 INJECTION, SOLUTION SUBCUTANEOUS at 20:09

## 2025-07-23 RX ADMIN — METFORMIN HYDROCHLORIDE 1000 MILLIGRAM(S): 850 TABLET ORAL at 08:13

## 2025-07-23 RX ADMIN — LATANOPROST PF 1 DROP(S): 0.05 SOLUTION/ DROPS OPHTHALMIC at 20:09

## 2025-07-23 RX ADMIN — Medication 1 MILLIGRAM(S): at 08:14

## 2025-07-23 RX ADMIN — Medication 1 TABLET(S): at 20:11

## 2025-07-23 RX ADMIN — NICOTINE POLACRILEX 2 MILLIGRAM(S): 4 GUM, CHEWING ORAL at 15:00

## 2025-07-23 RX ADMIN — BACLOFEN 5 MILLIGRAM(S): 10 INJECTION INTRATHECAL at 08:12

## 2025-07-23 RX ADMIN — GABAPENTIN 600 MILLIGRAM(S): 400 CAPSULE ORAL at 20:10

## 2025-07-24 LAB
GLUCOSE BLDC GLUCOMTR-MCNC: 117 MG/DL — HIGH (ref 70–99)
GLUCOSE BLDC GLUCOMTR-MCNC: 129 MG/DL — HIGH (ref 70–99)
GLUCOSE BLDC GLUCOMTR-MCNC: 130 MG/DL — HIGH (ref 70–99)
GLUCOSE BLDC GLUCOMTR-MCNC: 178 MG/DL — HIGH (ref 70–99)

## 2025-07-24 PROCEDURE — 99231 SBSQ HOSP IP/OBS SF/LOW 25: CPT

## 2025-07-24 RX ADMIN — INSULIN LISPRO 10 UNIT(S): 100 INJECTION, SOLUTION INTRAVENOUS; SUBCUTANEOUS at 16:44

## 2025-07-24 RX ADMIN — Medication 80 MILLIGRAM(S): at 09:08

## 2025-07-24 RX ADMIN — INSULIN GLARGINE-YFGN 45 UNIT(S): 100 INJECTION, SOLUTION SUBCUTANEOUS at 20:16

## 2025-07-24 RX ADMIN — Medication 3 MILLIGRAM(S): at 20:16

## 2025-07-24 RX ADMIN — Medication 2 MILLIGRAM(S): at 20:17

## 2025-07-24 RX ADMIN — Medication 100 MICROGRAM(S): at 06:55

## 2025-07-24 RX ADMIN — Medication 1 MILLIGRAM(S): at 09:07

## 2025-07-24 RX ADMIN — Medication 1 TABLET(S): at 09:07

## 2025-07-24 RX ADMIN — HYDROXYZINE HYDROCHLORIDE 25 MILLIGRAM(S): 25 TABLET, FILM COATED ORAL at 16:24

## 2025-07-24 RX ADMIN — Medication 30 MILLIGRAM(S): at 09:10

## 2025-07-24 RX ADMIN — INSULIN LISPRO 1: 100 INJECTION, SOLUTION INTRAVENOUS; SUBCUTANEOUS at 16:44

## 2025-07-24 RX ADMIN — GABAPENTIN 600 MILLIGRAM(S): 400 CAPSULE ORAL at 20:16

## 2025-07-24 RX ADMIN — DULOXETINE 20 MILLIGRAM(S): 20 CAPSULE, DELAYED RELEASE ORAL at 09:04

## 2025-07-24 RX ADMIN — Medication 81 MILLIGRAM(S): at 09:03

## 2025-07-24 RX ADMIN — Medication 2 TABLET(S): at 20:17

## 2025-07-24 RX ADMIN — LOSARTAN POTASSIUM 100 MILLIGRAM(S): 100 TABLET, FILM COATED ORAL at 09:10

## 2025-07-24 RX ADMIN — INSULIN LISPRO 10 UNIT(S): 100 INJECTION, SOLUTION INTRAVENOUS; SUBCUTANEOUS at 07:42

## 2025-07-24 RX ADMIN — BACLOFEN 5 MILLIGRAM(S): 10 INJECTION INTRATHECAL at 20:17

## 2025-07-24 RX ADMIN — LATANOPROST PF 1 DROP(S): 0.05 SOLUTION/ DROPS OPHTHALMIC at 20:16

## 2025-07-24 RX ADMIN — METFORMIN HYDROCHLORIDE 1000 MILLIGRAM(S): 850 TABLET ORAL at 09:05

## 2025-07-24 RX ADMIN — INSULIN LISPRO 10 UNIT(S): 100 INJECTION, SOLUTION INTRAVENOUS; SUBCUTANEOUS at 11:53

## 2025-07-24 RX ADMIN — DULOXETINE 20 MILLIGRAM(S): 20 CAPSULE, DELAYED RELEASE ORAL at 20:17

## 2025-07-24 RX ADMIN — METOPROLOL SUCCINATE 200 MILLIGRAM(S): 50 TABLET, EXTENDED RELEASE ORAL at 09:05

## 2025-07-24 RX ADMIN — GABAPENTIN 600 MILLIGRAM(S): 400 CAPSULE ORAL at 09:04

## 2025-07-24 RX ADMIN — BACLOFEN 5 MILLIGRAM(S): 10 INJECTION INTRATHECAL at 09:03

## 2025-07-25 LAB
GLUCOSE BLDC GLUCOMTR-MCNC: 115 MG/DL — HIGH (ref 70–99)
GLUCOSE BLDC GLUCOMTR-MCNC: 150 MG/DL — HIGH (ref 70–99)
GLUCOSE BLDC GLUCOMTR-MCNC: 185 MG/DL — HIGH (ref 70–99)
GLUCOSE BLDC GLUCOMTR-MCNC: 229 MG/DL — HIGH (ref 70–99)

## 2025-07-25 PROCEDURE — 99231 SBSQ HOSP IP/OBS SF/LOW 25: CPT

## 2025-07-25 RX ORDER — MAGNESIUM, ALUMINUM HYDROXIDE 200-200 MG
30 TABLET,CHEWABLE ORAL EVERY 6 HOURS
Refills: 0 | Status: DISCONTINUED | OUTPATIENT
Start: 2025-07-25 | End: 2025-07-30

## 2025-07-25 RX ADMIN — LATANOPROST PF 1 DROP(S): 0.05 SOLUTION/ DROPS OPHTHALMIC at 20:19

## 2025-07-25 RX ADMIN — BACLOFEN 5 MILLIGRAM(S): 10 INJECTION INTRATHECAL at 09:01

## 2025-07-25 RX ADMIN — LOSARTAN POTASSIUM 100 MILLIGRAM(S): 100 TABLET, FILM COATED ORAL at 09:04

## 2025-07-25 RX ADMIN — Medication 81 MILLIGRAM(S): at 09:01

## 2025-07-25 RX ADMIN — Medication 30 MILLIGRAM(S): at 09:04

## 2025-07-25 RX ADMIN — INSULIN LISPRO 10 UNIT(S): 100 INJECTION, SOLUTION INTRAVENOUS; SUBCUTANEOUS at 16:41

## 2025-07-25 RX ADMIN — Medication 30 MILLILITER(S): at 16:42

## 2025-07-25 RX ADMIN — DULOXETINE 20 MILLIGRAM(S): 20 CAPSULE, DELAYED RELEASE ORAL at 20:19

## 2025-07-25 RX ADMIN — INSULIN LISPRO 2: 100 INJECTION, SOLUTION INTRAVENOUS; SUBCUTANEOUS at 16:41

## 2025-07-25 RX ADMIN — Medication 80 MILLIGRAM(S): at 09:00

## 2025-07-25 RX ADMIN — Medication 3 MILLIGRAM(S): at 20:20

## 2025-07-25 RX ADMIN — METOPROLOL SUCCINATE 200 MILLIGRAM(S): 50 TABLET, EXTENDED RELEASE ORAL at 09:00

## 2025-07-25 RX ADMIN — Medication 2 MILLIGRAM(S): at 20:19

## 2025-07-25 RX ADMIN — Medication 1 MILLIGRAM(S): at 09:02

## 2025-07-25 RX ADMIN — DULOXETINE 20 MILLIGRAM(S): 20 CAPSULE, DELAYED RELEASE ORAL at 09:01

## 2025-07-25 RX ADMIN — METFORMIN HYDROCHLORIDE 1000 MILLIGRAM(S): 850 TABLET ORAL at 09:00

## 2025-07-25 RX ADMIN — BACLOFEN 5 MILLIGRAM(S): 10 INJECTION INTRATHECAL at 20:20

## 2025-07-25 RX ADMIN — Medication 2 TABLET(S): at 20:19

## 2025-07-25 RX ADMIN — GABAPENTIN 600 MILLIGRAM(S): 400 CAPSULE ORAL at 09:01

## 2025-07-25 RX ADMIN — INSULIN GLARGINE-YFGN 45 UNIT(S): 100 INJECTION, SOLUTION SUBCUTANEOUS at 20:21

## 2025-07-25 RX ADMIN — INSULIN LISPRO 10 UNIT(S): 100 INJECTION, SOLUTION INTRAVENOUS; SUBCUTANEOUS at 11:46

## 2025-07-25 RX ADMIN — INSULIN LISPRO 10 UNIT(S): 100 INJECTION, SOLUTION INTRAVENOUS; SUBCUTANEOUS at 07:28

## 2025-07-25 RX ADMIN — Medication 100 MICROGRAM(S): at 06:47

## 2025-07-25 RX ADMIN — GABAPENTIN 600 MILLIGRAM(S): 400 CAPSULE ORAL at 20:19

## 2025-07-25 RX ADMIN — HYDROXYZINE HYDROCHLORIDE 25 MILLIGRAM(S): 25 TABLET, FILM COATED ORAL at 01:49

## 2025-07-26 LAB
GLUCOSE BLDC GLUCOMTR-MCNC: 163 MG/DL — HIGH (ref 70–99)
GLUCOSE BLDC GLUCOMTR-MCNC: 213 MG/DL — HIGH (ref 70–99)
GLUCOSE BLDC GLUCOMTR-MCNC: 247 MG/DL — HIGH (ref 70–99)
GLUCOSE BLDC GLUCOMTR-MCNC: 346 MG/DL — HIGH (ref 70–99)

## 2025-07-26 RX ADMIN — LATANOPROST PF 1 DROP(S): 0.05 SOLUTION/ DROPS OPHTHALMIC at 21:26

## 2025-07-26 RX ADMIN — DULOXETINE 20 MILLIGRAM(S): 20 CAPSULE, DELAYED RELEASE ORAL at 08:13

## 2025-07-26 RX ADMIN — Medication 81 MILLIGRAM(S): at 08:14

## 2025-07-26 RX ADMIN — METFORMIN HYDROCHLORIDE 1000 MILLIGRAM(S): 850 TABLET ORAL at 08:14

## 2025-07-26 RX ADMIN — INSULIN LISPRO 4: 100 INJECTION, SOLUTION INTRAVENOUS; SUBCUTANEOUS at 16:39

## 2025-07-26 RX ADMIN — METOPROLOL SUCCINATE 200 MILLIGRAM(S): 50 TABLET, EXTENDED RELEASE ORAL at 08:14

## 2025-07-26 RX ADMIN — INSULIN LISPRO 10 UNIT(S): 100 INJECTION, SOLUTION INTRAVENOUS; SUBCUTANEOUS at 11:40

## 2025-07-26 RX ADMIN — INSULIN LISPRO 10 UNIT(S): 100 INJECTION, SOLUTION INTRAVENOUS; SUBCUTANEOUS at 16:39

## 2025-07-26 RX ADMIN — Medication 2 TABLET(S): at 21:24

## 2025-07-26 RX ADMIN — BACLOFEN 5 MILLIGRAM(S): 10 INJECTION INTRATHECAL at 08:14

## 2025-07-26 RX ADMIN — Medication 3 MILLIGRAM(S): at 21:24

## 2025-07-26 RX ADMIN — INSULIN LISPRO 10 UNIT(S): 100 INJECTION, SOLUTION INTRAVENOUS; SUBCUTANEOUS at 08:15

## 2025-07-26 RX ADMIN — Medication 30 MILLIGRAM(S): at 08:14

## 2025-07-26 RX ADMIN — Medication 100 MICROGRAM(S): at 06:30

## 2025-07-26 RX ADMIN — INSULIN GLARGINE-YFGN 45 UNIT(S): 100 INJECTION, SOLUTION SUBCUTANEOUS at 21:00

## 2025-07-26 RX ADMIN — HYDROXYZINE HYDROCHLORIDE 25 MILLIGRAM(S): 25 TABLET, FILM COATED ORAL at 23:23

## 2025-07-26 RX ADMIN — INSULIN LISPRO 1: 100 INJECTION, SOLUTION INTRAVENOUS; SUBCUTANEOUS at 11:40

## 2025-07-26 RX ADMIN — BACLOFEN 5 MILLIGRAM(S): 10 INJECTION INTRATHECAL at 21:24

## 2025-07-26 RX ADMIN — Medication 1 MILLIGRAM(S): at 08:14

## 2025-07-26 RX ADMIN — INSULIN LISPRO 2: 100 INJECTION, SOLUTION INTRAVENOUS; SUBCUTANEOUS at 08:12

## 2025-07-26 RX ADMIN — Medication 2 MILLIGRAM(S): at 21:24

## 2025-07-26 RX ADMIN — GABAPENTIN 600 MILLIGRAM(S): 400 CAPSULE ORAL at 08:14

## 2025-07-26 RX ADMIN — DULOXETINE 20 MILLIGRAM(S): 20 CAPSULE, DELAYED RELEASE ORAL at 21:24

## 2025-07-26 RX ADMIN — NICOTINE POLACRILEX 2 MILLIGRAM(S): 4 GUM, CHEWING ORAL at 12:57

## 2025-07-26 RX ADMIN — GABAPENTIN 600 MILLIGRAM(S): 400 CAPSULE ORAL at 21:24

## 2025-07-26 RX ADMIN — LOSARTAN POTASSIUM 100 MILLIGRAM(S): 100 TABLET, FILM COATED ORAL at 08:14

## 2025-07-27 LAB
GLUCOSE BLDC GLUCOMTR-MCNC: 184 MG/DL — HIGH (ref 70–99)
GLUCOSE BLDC GLUCOMTR-MCNC: 236 MG/DL — HIGH (ref 70–99)
GLUCOSE BLDC GLUCOMTR-MCNC: 275 MG/DL — HIGH (ref 70–99)
GLUCOSE BLDC GLUCOMTR-MCNC: 286 MG/DL — HIGH (ref 70–99)

## 2025-07-27 RX ADMIN — Medication 2 MILLIGRAM(S): at 20:15

## 2025-07-27 RX ADMIN — INSULIN GLARGINE-YFGN 45 UNIT(S): 100 INJECTION, SOLUTION SUBCUTANEOUS at 20:16

## 2025-07-27 RX ADMIN — BACLOFEN 5 MILLIGRAM(S): 10 INJECTION INTRATHECAL at 20:16

## 2025-07-27 RX ADMIN — Medication 3 MILLIGRAM(S): at 22:59

## 2025-07-27 RX ADMIN — Medication 1 MILLIGRAM(S): at 08:15

## 2025-07-27 RX ADMIN — HYDROXYZINE HYDROCHLORIDE 25 MILLIGRAM(S): 25 TABLET, FILM COATED ORAL at 22:59

## 2025-07-27 RX ADMIN — GABAPENTIN 600 MILLIGRAM(S): 400 CAPSULE ORAL at 20:16

## 2025-07-27 RX ADMIN — DULOXETINE 20 MILLIGRAM(S): 20 CAPSULE, DELAYED RELEASE ORAL at 20:17

## 2025-07-27 RX ADMIN — LATANOPROST PF 1 DROP(S): 0.05 SOLUTION/ DROPS OPHTHALMIC at 20:17

## 2025-07-27 RX ADMIN — INSULIN LISPRO 10 UNIT(S): 100 INJECTION, SOLUTION INTRAVENOUS; SUBCUTANEOUS at 16:42

## 2025-07-27 RX ADMIN — METOPROLOL SUCCINATE 200 MILLIGRAM(S): 50 TABLET, EXTENDED RELEASE ORAL at 08:14

## 2025-07-27 RX ADMIN — Medication 30 MILLIGRAM(S): at 08:14

## 2025-07-27 RX ADMIN — INSULIN LISPRO 10 UNIT(S): 100 INJECTION, SOLUTION INTRAVENOUS; SUBCUTANEOUS at 11:25

## 2025-07-27 RX ADMIN — Medication 80 MILLIGRAM(S): at 16:37

## 2025-07-27 RX ADMIN — Medication 100 MICROGRAM(S): at 06:38

## 2025-07-27 RX ADMIN — INSULIN LISPRO 1: 100 INJECTION, SOLUTION INTRAVENOUS; SUBCUTANEOUS at 16:42

## 2025-07-27 RX ADMIN — INSULIN LISPRO 3: 100 INJECTION, SOLUTION INTRAVENOUS; SUBCUTANEOUS at 11:25

## 2025-07-27 RX ADMIN — METFORMIN HYDROCHLORIDE 1000 MILLIGRAM(S): 850 TABLET ORAL at 08:14

## 2025-07-27 RX ADMIN — DULOXETINE 20 MILLIGRAM(S): 20 CAPSULE, DELAYED RELEASE ORAL at 08:15

## 2025-07-27 RX ADMIN — INSULIN LISPRO 10 UNIT(S): 100 INJECTION, SOLUTION INTRAVENOUS; SUBCUTANEOUS at 07:29

## 2025-07-27 RX ADMIN — Medication 2 TABLET(S): at 20:15

## 2025-07-27 RX ADMIN — GABAPENTIN 600 MILLIGRAM(S): 400 CAPSULE ORAL at 08:15

## 2025-07-27 RX ADMIN — BACLOFEN 5 MILLIGRAM(S): 10 INJECTION INTRATHECAL at 08:15

## 2025-07-27 RX ADMIN — INSULIN LISPRO 3: 100 INJECTION, SOLUTION INTRAVENOUS; SUBCUTANEOUS at 07:29

## 2025-07-27 RX ADMIN — Medication 81 MILLIGRAM(S): at 11:25

## 2025-07-28 LAB
GLUCOSE BLDC GLUCOMTR-MCNC: 182 MG/DL — HIGH (ref 70–99)
GLUCOSE BLDC GLUCOMTR-MCNC: 236 MG/DL — HIGH (ref 70–99)
GLUCOSE BLDC GLUCOMTR-MCNC: 261 MG/DL — HIGH (ref 70–99)
GLUCOSE BLDC GLUCOMTR-MCNC: 311 MG/DL — HIGH (ref 70–99)

## 2025-07-28 PROCEDURE — 99231 SBSQ HOSP IP/OBS SF/LOW 25: CPT

## 2025-07-28 RX ORDER — RISPERIDONE 4 MG
2 TABLET ORAL AT BEDTIME
Refills: 0 | Status: COMPLETED | OUTPATIENT
Start: 2025-07-28 | End: 2025-07-28

## 2025-07-28 RX ORDER — RISPERIDONE 4 MG
3 TABLET ORAL AT BEDTIME
Refills: 0 | Status: DISCONTINUED | OUTPATIENT
Start: 2025-07-29 | End: 2025-07-30

## 2025-07-28 RX ADMIN — INSULIN LISPRO 3: 100 INJECTION, SOLUTION INTRAVENOUS; SUBCUTANEOUS at 11:42

## 2025-07-28 RX ADMIN — HYDROXYZINE HYDROCHLORIDE 25 MILLIGRAM(S): 25 TABLET, FILM COATED ORAL at 22:04

## 2025-07-28 RX ADMIN — Medication 650 MILLIGRAM(S): at 22:45

## 2025-07-28 RX ADMIN — Medication 100 MICROGRAM(S): at 06:24

## 2025-07-28 RX ADMIN — BACLOFEN 5 MILLIGRAM(S): 10 INJECTION INTRATHECAL at 20:10

## 2025-07-28 RX ADMIN — Medication 2 TABLET(S): at 20:11

## 2025-07-28 RX ADMIN — Medication 81 MILLIGRAM(S): at 08:26

## 2025-07-28 RX ADMIN — Medication 2 MILLIGRAM(S): at 20:10

## 2025-07-28 RX ADMIN — INSULIN LISPRO 4: 100 INJECTION, SOLUTION INTRAVENOUS; SUBCUTANEOUS at 07:21

## 2025-07-28 RX ADMIN — INSULIN LISPRO 10 UNIT(S): 100 INJECTION, SOLUTION INTRAVENOUS; SUBCUTANEOUS at 07:22

## 2025-07-28 RX ADMIN — GABAPENTIN 600 MILLIGRAM(S): 400 CAPSULE ORAL at 08:24

## 2025-07-28 RX ADMIN — LOSARTAN POTASSIUM 100 MILLIGRAM(S): 100 TABLET, FILM COATED ORAL at 08:27

## 2025-07-28 RX ADMIN — INSULIN GLARGINE-YFGN 45 UNIT(S): 100 INJECTION, SOLUTION SUBCUTANEOUS at 20:11

## 2025-07-28 RX ADMIN — INSULIN LISPRO 1: 100 INJECTION, SOLUTION INTRAVENOUS; SUBCUTANEOUS at 16:09

## 2025-07-28 RX ADMIN — BACLOFEN 5 MILLIGRAM(S): 10 INJECTION INTRATHECAL at 08:26

## 2025-07-28 RX ADMIN — INSULIN LISPRO 10 UNIT(S): 100 INJECTION, SOLUTION INTRAVENOUS; SUBCUTANEOUS at 11:42

## 2025-07-28 RX ADMIN — Medication 1 MILLIGRAM(S): at 08:25

## 2025-07-28 RX ADMIN — GABAPENTIN 600 MILLIGRAM(S): 400 CAPSULE ORAL at 20:10

## 2025-07-28 RX ADMIN — INSULIN LISPRO 10 UNIT(S): 100 INJECTION, SOLUTION INTRAVENOUS; SUBCUTANEOUS at 16:10

## 2025-07-28 RX ADMIN — LATANOPROST PF 1 DROP(S): 0.05 SOLUTION/ DROPS OPHTHALMIC at 20:11

## 2025-07-28 RX ADMIN — DULOXETINE 20 MILLIGRAM(S): 20 CAPSULE, DELAYED RELEASE ORAL at 08:25

## 2025-07-28 RX ADMIN — DULOXETINE 20 MILLIGRAM(S): 20 CAPSULE, DELAYED RELEASE ORAL at 20:10

## 2025-07-28 RX ADMIN — Medication 650 MILLIGRAM(S): at 22:05

## 2025-07-28 RX ADMIN — Medication 80 MILLIGRAM(S): at 12:45

## 2025-07-28 RX ADMIN — NICOTINE POLACRILEX 2 MILLIGRAM(S): 4 GUM, CHEWING ORAL at 10:21

## 2025-07-28 RX ADMIN — METOPROLOL SUCCINATE 200 MILLIGRAM(S): 50 TABLET, EXTENDED RELEASE ORAL at 10:16

## 2025-07-28 RX ADMIN — METFORMIN HYDROCHLORIDE 1000 MILLIGRAM(S): 850 TABLET ORAL at 08:25

## 2025-07-28 RX ADMIN — Medication 3 MILLIGRAM(S): at 22:04

## 2025-07-28 RX ADMIN — Medication 30 MILLIGRAM(S): at 08:25

## 2025-07-29 LAB
GLUCOSE BLDC GLUCOMTR-MCNC: 136 MG/DL — HIGH (ref 70–99)
GLUCOSE BLDC GLUCOMTR-MCNC: 182 MG/DL — HIGH (ref 70–99)
GLUCOSE BLDC GLUCOMTR-MCNC: 186 MG/DL — HIGH (ref 70–99)
GLUCOSE BLDC GLUCOMTR-MCNC: 196 MG/DL — HIGH (ref 70–99)

## 2025-07-29 PROCEDURE — 99231 SBSQ HOSP IP/OBS SF/LOW 25: CPT

## 2025-07-29 RX ORDER — BRIMONIDINE TARTRATE 1.5 MG/ML
1 SOLUTION/ DROPS OPHTHALMIC EVERY 12 HOURS
Refills: 0 | Status: DISCONTINUED | OUTPATIENT
Start: 2025-07-29 | End: 2025-07-30

## 2025-07-29 RX ORDER — TIMOLOL MALEATE 6.8 MG/ML
1 SOLUTION OPHTHALMIC EVERY 12 HOURS
Refills: 0 | Status: DISCONTINUED | OUTPATIENT
Start: 2025-07-29 | End: 2025-07-30

## 2025-07-29 RX ADMIN — INSULIN LISPRO 10 UNIT(S): 100 INJECTION, SOLUTION INTRAVENOUS; SUBCUTANEOUS at 11:57

## 2025-07-29 RX ADMIN — METFORMIN HYDROCHLORIDE 1000 MILLIGRAM(S): 850 TABLET ORAL at 08:41

## 2025-07-29 RX ADMIN — DULOXETINE 20 MILLIGRAM(S): 20 CAPSULE, DELAYED RELEASE ORAL at 08:43

## 2025-07-29 RX ADMIN — INSULIN LISPRO 1: 100 INJECTION, SOLUTION INTRAVENOUS; SUBCUTANEOUS at 16:41

## 2025-07-29 RX ADMIN — GABAPENTIN 600 MILLIGRAM(S): 400 CAPSULE ORAL at 08:42

## 2025-07-29 RX ADMIN — BRIMONIDINE TARTRATE 1 DROP(S): 1.5 SOLUTION/ DROPS OPHTHALMIC at 20:48

## 2025-07-29 RX ADMIN — Medication 3 MILLIGRAM(S): at 20:47

## 2025-07-29 RX ADMIN — TIMOLOL MALEATE 1 DROP(S): 6.8 SOLUTION OPHTHALMIC at 16:07

## 2025-07-29 RX ADMIN — Medication 100 MICROGRAM(S): at 06:26

## 2025-07-29 RX ADMIN — TIMOLOL MALEATE 1 DROP(S): 6.8 SOLUTION OPHTHALMIC at 20:48

## 2025-07-29 RX ADMIN — BACLOFEN 5 MILLIGRAM(S): 10 INJECTION INTRATHECAL at 20:45

## 2025-07-29 RX ADMIN — INSULIN LISPRO 1: 100 INJECTION, SOLUTION INTRAVENOUS; SUBCUTANEOUS at 07:36

## 2025-07-29 RX ADMIN — INSULIN GLARGINE-YFGN 45 UNIT(S): 100 INJECTION, SOLUTION SUBCUTANEOUS at 20:03

## 2025-07-29 RX ADMIN — LOSARTAN POTASSIUM 100 MILLIGRAM(S): 100 TABLET, FILM COATED ORAL at 08:42

## 2025-07-29 RX ADMIN — GABAPENTIN 600 MILLIGRAM(S): 400 CAPSULE ORAL at 20:46

## 2025-07-29 RX ADMIN — DULOXETINE 20 MILLIGRAM(S): 20 CAPSULE, DELAYED RELEASE ORAL at 20:46

## 2025-07-29 RX ADMIN — BRIMONIDINE TARTRATE 1 DROP(S): 1.5 SOLUTION/ DROPS OPHTHALMIC at 16:08

## 2025-07-29 RX ADMIN — BACLOFEN 5 MILLIGRAM(S): 10 INJECTION INTRATHECAL at 08:42

## 2025-07-29 RX ADMIN — Medication 81 MILLIGRAM(S): at 08:46

## 2025-07-29 RX ADMIN — HYDROXYZINE HYDROCHLORIDE 25 MILLIGRAM(S): 25 TABLET, FILM COATED ORAL at 22:20

## 2025-07-29 RX ADMIN — Medication 30 MILLIGRAM(S): at 08:41

## 2025-07-29 RX ADMIN — LATANOPROST PF 1 DROP(S): 0.05 SOLUTION/ DROPS OPHTHALMIC at 20:48

## 2025-07-29 RX ADMIN — INSULIN LISPRO 10 UNIT(S): 100 INJECTION, SOLUTION INTRAVENOUS; SUBCUTANEOUS at 07:52

## 2025-07-29 RX ADMIN — METOPROLOL SUCCINATE 200 MILLIGRAM(S): 50 TABLET, EXTENDED RELEASE ORAL at 08:42

## 2025-07-29 RX ADMIN — INSULIN LISPRO 10 UNIT(S): 100 INJECTION, SOLUTION INTRAVENOUS; SUBCUTANEOUS at 16:41

## 2025-07-29 RX ADMIN — Medication 2 TABLET(S): at 20:47

## 2025-07-30 VITALS — HEART RATE: 96 BPM | TEMPERATURE: 98 F | DIASTOLIC BLOOD PRESSURE: 76 MMHG | SYSTOLIC BLOOD PRESSURE: 131 MMHG

## 2025-07-30 LAB
GLUCOSE BLDC GLUCOMTR-MCNC: 135 MG/DL — HIGH (ref 70–99)
GLUCOSE BLDC GLUCOMTR-MCNC: 169 MG/DL — HIGH (ref 70–99)
GLUCOSE BLDC GLUCOMTR-MCNC: 201 MG/DL — HIGH (ref 70–99)

## 2025-07-30 PROCEDURE — 99238 HOSP IP/OBS DSCHRG MGMT 30/<: CPT

## 2025-07-30 RX ORDER — DULOXETINE 20 MG/1
1 CAPSULE, DELAYED RELEASE ORAL
Refills: 0 | DISCHARGE

## 2025-07-30 RX ORDER — NAPROXEN SODIUM 275 MG
1 TABLET ORAL
Refills: 0 | DISCHARGE

## 2025-07-30 RX ORDER — PAROXETINE HYDROCHLORIDE 20 MG/1
1 TABLET, FILM COATED ORAL
Refills: 0 | DISCHARGE

## 2025-07-30 RX ORDER — DULOXETINE 20 MG/1
1 CAPSULE, DELAYED RELEASE ORAL
Qty: 0 | Refills: 0 | DISCHARGE
Start: 2025-07-30

## 2025-07-30 RX ORDER — POLYETHYLENE GLYCOL 3350 17 G/17G
17 POWDER, FOR SOLUTION ORAL
Qty: 0 | Refills: 0 | DISCHARGE

## 2025-07-30 RX ORDER — SENNA 187 MG
2 TABLET ORAL
Qty: 0 | Refills: 0 | DISCHARGE
Start: 2025-07-30

## 2025-07-30 RX ORDER — INSULIN LISPRO 100 U/ML
0 INJECTION, SOLUTION INTRAVENOUS; SUBCUTANEOUS
Qty: 0 | Refills: 0 | DISCHARGE
Start: 2025-07-30

## 2025-07-30 RX ORDER — LEVOTHYROXINE SODIUM 300 MCG
1 TABLET ORAL
Qty: 0 | Refills: 0 | DISCHARGE
Start: 2025-07-30

## 2025-07-30 RX ORDER — METOPROLOL SUCCINATE 50 MG/1
1 TABLET, EXTENDED RELEASE ORAL
Qty: 0 | Refills: 0 | DISCHARGE
Start: 2025-07-30

## 2025-07-30 RX ORDER — INSULIN GLARGINE-YFGN 100 [IU]/ML
45 INJECTION, SOLUTION SUBCUTANEOUS
Qty: 0 | Refills: 0 | DISCHARGE
Start: 2025-07-30

## 2025-07-30 RX ORDER — RISPERIDONE 4 MG
1 TABLET ORAL
Qty: 0 | Refills: 0 | DISCHARGE
Start: 2025-07-30

## 2025-07-30 RX ORDER — SENNA 187 MG
2 TABLET ORAL
Refills: 0 | DISCHARGE

## 2025-07-30 RX ORDER — LEVOTHYROXINE SODIUM 300 MCG
1 TABLET ORAL
Refills: 0 | DISCHARGE

## 2025-07-30 RX ORDER — LOSARTAN POTASSIUM 100 MG/1
1 TABLET, FILM COATED ORAL
Qty: 0 | Refills: 0 | DISCHARGE
Start: 2025-07-30

## 2025-07-30 RX ORDER — METFORMIN HYDROCHLORIDE 850 MG/1
1 TABLET ORAL
Qty: 0 | Refills: 0 | DISCHARGE
Start: 2025-07-30

## 2025-07-30 RX ORDER — ASPIRIN 325 MG
1 TABLET ORAL
Qty: 0 | Refills: 0 | DISCHARGE
Start: 2025-07-30

## 2025-07-30 RX ORDER — NIFEDIPINE 30 MG
1 TABLET, EXTENDED RELEASE 24 HR ORAL
Qty: 0 | Refills: 0 | DISCHARGE
Start: 2025-07-30

## 2025-07-30 RX ORDER — ARIPIPRAZOLE 2 MG/1
1 TABLET ORAL
Refills: 0 | DISCHARGE

## 2025-07-30 RX ORDER — NICOTINE POLACRILEX 4 MG/1
1 GUM, CHEWING ORAL
Qty: 0 | Refills: 0 | DISCHARGE

## 2025-07-30 RX ORDER — INSULIN LISPRO 100 U/ML
10 INJECTION, SOLUTION INTRAVENOUS; SUBCUTANEOUS
Qty: 0 | Refills: 0 | DISCHARGE
Start: 2025-07-30

## 2025-07-30 RX ORDER — HYDROXYZINE HYDROCHLORIDE 25 MG/1
1 TABLET, FILM COATED ORAL
Qty: 0 | Refills: 0 | DISCHARGE
Start: 2025-07-30

## 2025-07-30 RX ADMIN — BACLOFEN 5 MILLIGRAM(S): 10 INJECTION INTRATHECAL at 08:02

## 2025-07-30 RX ADMIN — INSULIN LISPRO 10 UNIT(S): 100 INJECTION, SOLUTION INTRAVENOUS; SUBCUTANEOUS at 07:20

## 2025-07-30 RX ADMIN — METFORMIN HYDROCHLORIDE 1000 MILLIGRAM(S): 850 TABLET ORAL at 08:03

## 2025-07-30 RX ADMIN — DULOXETINE 20 MILLIGRAM(S): 20 CAPSULE, DELAYED RELEASE ORAL at 08:03

## 2025-07-30 RX ADMIN — BRIMONIDINE TARTRATE 1 DROP(S): 1.5 SOLUTION/ DROPS OPHTHALMIC at 08:03

## 2025-07-30 RX ADMIN — Medication 81 MILLIGRAM(S): at 08:02

## 2025-07-30 RX ADMIN — Medication 30 MILLIGRAM(S): at 08:03

## 2025-07-30 RX ADMIN — LOSARTAN POTASSIUM 100 MILLIGRAM(S): 100 TABLET, FILM COATED ORAL at 08:03

## 2025-07-30 RX ADMIN — METOPROLOL SUCCINATE 200 MILLIGRAM(S): 50 TABLET, EXTENDED RELEASE ORAL at 08:01

## 2025-07-30 RX ADMIN — INSULIN LISPRO 10 UNIT(S): 100 INJECTION, SOLUTION INTRAVENOUS; SUBCUTANEOUS at 11:39

## 2025-07-30 RX ADMIN — TIMOLOL MALEATE 1 DROP(S): 6.8 SOLUTION OPHTHALMIC at 08:03

## 2025-07-30 RX ADMIN — Medication 100 MICROGRAM(S): at 05:58

## 2025-07-30 RX ADMIN — Medication 650 MILLIGRAM(S): at 12:04

## 2025-07-30 RX ADMIN — GABAPENTIN 600 MILLIGRAM(S): 400 CAPSULE ORAL at 08:03

## 2025-07-30 RX ADMIN — Medication 650 MILLIGRAM(S): at 11:18

## 2025-07-30 RX ADMIN — NICOTINE POLACRILEX 2 MILLIGRAM(S): 4 GUM, CHEWING ORAL at 11:18

## 2025-07-30 RX ADMIN — INSULIN LISPRO 1: 100 INJECTION, SOLUTION INTRAVENOUS; SUBCUTANEOUS at 11:38

## 2025-07-30 RX ADMIN — INSULIN LISPRO 2: 100 INJECTION, SOLUTION INTRAVENOUS; SUBCUTANEOUS at 07:20

## 2025-08-05 DIAGNOSIS — T43.506A UNDERDOSING OF UNSPECIFIED ANTIPSYCHOTICS AND NEUROLEPTICS, INITIAL ENCOUNTER: ICD-10-CM

## 2025-08-05 DIAGNOSIS — R45.1 RESTLESSNESS AND AGITATION: ICD-10-CM
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
## 2025-08-05 DIAGNOSIS — Z91.81 HISTORY OF FALLING: ICD-10-CM
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
## 2025-08-05 DIAGNOSIS — Z79.82 LONG TERM (CURRENT) USE OF ASPIRIN: ICD-10-CM

## 2025-08-05 DIAGNOSIS — E11.40 TYPE 2 DIABETES MELLITUS WITH DIABETIC NEUROPATHY, UNSPECIFIED: ICD-10-CM

## 2025-08-05 DIAGNOSIS — E11.65 TYPE 2 DIABETES MELLITUS WITH HYPERGLYCEMIA: ICD-10-CM

## 2025-08-05 DIAGNOSIS — E04.2 NONTOXIC MULTINODULAR GOITER: ICD-10-CM

## 2025-08-05 DIAGNOSIS — E03.9 HYPOTHYROIDISM, UNSPECIFIED: ICD-10-CM

## 2025-08-05 DIAGNOSIS — Z87.81 PERSONAL HISTORY OF (HEALED) TRAUMATIC FRACTURE: ICD-10-CM

## 2025-08-05 DIAGNOSIS — I10 ESSENTIAL (PRIMARY) HYPERTENSION: ICD-10-CM

## 2025-08-05 DIAGNOSIS — F12.951 CANNABIS USE, UNSPECIFIED WITH PSYCHOTIC DISORDER WITH HALLUCINATIONS: ICD-10-CM

## 2025-08-05 DIAGNOSIS — Z79.84 LONG TERM (CURRENT) USE OF ORAL HYPOGLYCEMIC DRUGS: ICD-10-CM

## 2025-08-05 DIAGNOSIS — E78.5 HYPERLIPIDEMIA, UNSPECIFIED: ICD-10-CM

## 2025-08-05 DIAGNOSIS — Z79.4 LONG TERM (CURRENT) USE OF INSULIN: ICD-10-CM

## 2025-08-05 DIAGNOSIS — S61.218A LACERATION WITHOUT FOREIGN BODY OF OTHER FINGER WITHOUT DAMAGE TO NAIL, INITIAL ENCOUNTER: ICD-10-CM

## 2025-08-05 DIAGNOSIS — Z79.890 HORMONE REPLACEMENT THERAPY: ICD-10-CM

## 2025-09-08 ENCOUNTER — APPOINTMENT (OUTPATIENT)
Dept: OBGYN | Facility: CLINIC | Age: 60
End: 2025-09-08